# Patient Record
Sex: MALE | Race: ASIAN | NOT HISPANIC OR LATINO | URBAN - METROPOLITAN AREA
[De-identification: names, ages, dates, MRNs, and addresses within clinical notes are randomized per-mention and may not be internally consistent; named-entity substitution may affect disease eponyms.]

---

## 2020-10-09 ENCOUNTER — INPATIENT (INPATIENT)
Facility: HOSPITAL | Age: 55
LOS: 10 days | Discharge: HOME HEALTH | End: 2020-10-20
Attending: INTERNAL MEDICINE | Admitting: INTERNAL MEDICINE
Payer: COMMERCIAL

## 2020-10-09 VITALS
OXYGEN SATURATION: 100 % | WEIGHT: 100.09 LBS | RESPIRATION RATE: 22 BRPM | HEART RATE: 115 BPM | SYSTOLIC BLOOD PRESSURE: 128 MMHG | DIASTOLIC BLOOD PRESSURE: 91 MMHG | TEMPERATURE: 98 F | HEIGHT: 64 IN

## 2020-10-09 LAB
ALBUMIN SERPL ELPH-MCNC: 2.7 G/DL — LOW (ref 3.3–5)
ALP SERPL-CCNC: 127 U/L — HIGH (ref 40–120)
ALT FLD-CCNC: 17 U/L — SIGNIFICANT CHANGE UP (ref 12–78)
ANION GAP SERPL CALC-SCNC: 18 MMOL/L — HIGH (ref 5–17)
APPEARANCE UR: CLEAR — SIGNIFICANT CHANGE UP
APTT BLD: 34.4 SEC — SIGNIFICANT CHANGE UP (ref 27.5–35.5)
AST SERPL-CCNC: 10 U/L — LOW (ref 15–37)
BACTERIA # UR AUTO: ABNORMAL
BASE EXCESS BLDA CALC-SCNC: -10.3 MMOL/L — LOW (ref -2–2)
BASOPHILS # BLD AUTO: 0 K/UL — SIGNIFICANT CHANGE UP (ref 0–0.2)
BASOPHILS NFR BLD AUTO: 0 % — SIGNIFICANT CHANGE UP (ref 0–2)
BILIRUB SERPL-MCNC: 0.5 MG/DL — SIGNIFICANT CHANGE UP (ref 0.2–1.2)
BILIRUB UR-MCNC: NEGATIVE — SIGNIFICANT CHANGE UP
BLOOD GAS COMMENTS: SIGNIFICANT CHANGE UP
BLOOD GAS COMMENTS: SIGNIFICANT CHANGE UP
BLOOD GAS SOURCE: SIGNIFICANT CHANGE UP
BUN SERPL-MCNC: 20 MG/DL — SIGNIFICANT CHANGE UP (ref 7–23)
CALCIUM SERPL-MCNC: 8.7 MG/DL — SIGNIFICANT CHANGE UP (ref 8.5–10.1)
CHLORIDE SERPL-SCNC: 95 MMOL/L — LOW (ref 96–108)
CO2 SERPL-SCNC: 15 MMOL/L — LOW (ref 22–31)
COLOR SPEC: YELLOW — SIGNIFICANT CHANGE UP
CREAT SERPL-MCNC: 1 MG/DL — SIGNIFICANT CHANGE UP (ref 0.5–1.3)
D DIMER BLD IA.RAPID-MCNC: 613 NG/ML DDU — HIGH
DIFF PNL FLD: ABNORMAL
EOSINOPHIL # BLD AUTO: 0 K/UL — SIGNIFICANT CHANGE UP (ref 0–0.5)
EOSINOPHIL NFR BLD AUTO: 0 % — SIGNIFICANT CHANGE UP (ref 0–6)
EPI CELLS # UR: SIGNIFICANT CHANGE UP
FLU A RESULT: SIGNIFICANT CHANGE UP
FLU A RESULT: SIGNIFICANT CHANGE UP
FLUAV AG NPH QL: SIGNIFICANT CHANGE UP
FLUBV AG NPH QL: SIGNIFICANT CHANGE UP
GLUCOSE BLDC GLUCOMTR-MCNC: 253 MG/DL — HIGH (ref 70–99)
GLUCOSE BLDC GLUCOMTR-MCNC: 382 MG/DL — HIGH (ref 70–99)
GLUCOSE SERPL-MCNC: 378 MG/DL — HIGH (ref 70–99)
GLUCOSE UR QL: 1000 MG/DL
HCO3 BLDA-SCNC: 13 MMOL/L — LOW (ref 21–29)
HCT VFR BLD CALC: 37.1 % — LOW (ref 39–50)
HGB BLD-MCNC: 12.7 G/DL — LOW (ref 13–17)
HOROWITZ INDEX BLDA+IHG-RTO: 21 — SIGNIFICANT CHANGE UP
INR BLD: 1.07 RATIO — SIGNIFICANT CHANGE UP (ref 0.88–1.16)
KETONES UR-MCNC: ABNORMAL
LACTATE SERPL-SCNC: 2.1 MMOL/L — HIGH (ref 0.7–2)
LEUKOCYTE ESTERASE UR-ACNC: ABNORMAL
LYMPHOCYTES # BLD AUTO: 0.52 K/UL — LOW (ref 1–3.3)
LYMPHOCYTES # BLD AUTO: 2 % — LOW (ref 13–44)
MAGNESIUM SERPL-MCNC: 2.4 MG/DL — SIGNIFICANT CHANGE UP (ref 1.6–2.6)
MANUAL SMEAR VERIFICATION: SIGNIFICANT CHANGE UP
MCHC RBC-ENTMCNC: 29 PG — SIGNIFICANT CHANGE UP (ref 27–34)
MCHC RBC-ENTMCNC: 34.2 GM/DL — SIGNIFICANT CHANGE UP (ref 32–36)
MCV RBC AUTO: 84.7 FL — SIGNIFICANT CHANGE UP (ref 80–100)
MONOCYTES # BLD AUTO: 1.04 K/UL — HIGH (ref 0–0.9)
MONOCYTES NFR BLD AUTO: 4 % — SIGNIFICANT CHANGE UP (ref 2–14)
NEUTROPHILS # BLD AUTO: 24.54 K/UL — HIGH (ref 1.8–7.4)
NEUTROPHILS NFR BLD AUTO: 88 % — HIGH (ref 43–77)
NEUTS BAND # BLD: 6 % — SIGNIFICANT CHANGE UP (ref 0–8)
NITRITE UR-MCNC: NEGATIVE — SIGNIFICANT CHANGE UP
NRBC # BLD: 0 /100 — SIGNIFICANT CHANGE UP (ref 0–0)
NRBC # BLD: SIGNIFICANT CHANGE UP /100 WBCS (ref 0–0)
PCO2 BLDA: 22 MMHG — LOW (ref 32–46)
PH BLD: 7.39 — SIGNIFICANT CHANGE UP (ref 7.35–7.45)
PH UR: 5 — SIGNIFICANT CHANGE UP (ref 5–8)
PLAT MORPH BLD: NORMAL — SIGNIFICANT CHANGE UP
PLATELET # BLD AUTO: 408 K/UL — HIGH (ref 150–400)
PO2 BLDA: 94 MMHG — SIGNIFICANT CHANGE UP (ref 74–108)
POTASSIUM SERPL-MCNC: 3.2 MMOL/L — LOW (ref 3.5–5.3)
POTASSIUM SERPL-SCNC: 3.2 MMOL/L — LOW (ref 3.5–5.3)
PROT SERPL-MCNC: 8.2 GM/DL — SIGNIFICANT CHANGE UP (ref 6–8.3)
PROT UR-MCNC: 30 MG/DL
PROTHROM AB SERPL-ACNC: 12.4 SEC — SIGNIFICANT CHANGE UP (ref 10.6–13.6)
RBC # BLD: 4.38 M/UL — SIGNIFICANT CHANGE UP (ref 4.2–5.8)
RBC # FLD: 12.5 % — SIGNIFICANT CHANGE UP (ref 10.3–14.5)
RBC BLD AUTO: NORMAL — SIGNIFICANT CHANGE UP
RBC CASTS # UR COMP ASSIST: ABNORMAL /HPF (ref 0–4)
RSV RESULT: SIGNIFICANT CHANGE UP
RSV RNA RESP QL NAA+PROBE: SIGNIFICANT CHANGE UP
SAO2 % BLDA: 97 % — HIGH (ref 92–96)
SARS-COV-2 RNA SPEC QL NAA+PROBE: SIGNIFICANT CHANGE UP
SODIUM SERPL-SCNC: 128 MMOL/L — LOW (ref 135–145)
SP GR SPEC: 1.01 — SIGNIFICANT CHANGE UP (ref 1.01–1.02)
TROPONIN I SERPL-MCNC: <.015 NG/ML — SIGNIFICANT CHANGE UP (ref 0.01–0.04)
UROBILINOGEN FLD QL: NEGATIVE MG/DL — SIGNIFICANT CHANGE UP
WBC # BLD: 26.11 K/UL — HIGH (ref 3.8–10.5)
WBC # FLD AUTO: 26.11 K/UL — HIGH (ref 3.8–10.5)
WBC UR QL: ABNORMAL

## 2020-10-09 PROCEDURE — 93010 ELECTROCARDIOGRAM REPORT: CPT

## 2020-10-09 PROCEDURE — 99285 EMERGENCY DEPT VISIT HI MDM: CPT

## 2020-10-09 RX ORDER — CEFTRIAXONE 500 MG/1
1000 INJECTION, POWDER, FOR SOLUTION INTRAMUSCULAR; INTRAVENOUS ONCE
Refills: 0 | Status: COMPLETED | OUTPATIENT
Start: 2020-10-09 | End: 2020-10-09

## 2020-10-09 RX ORDER — VANCOMYCIN HCL 1 G
1000 VIAL (EA) INTRAVENOUS ONCE
Refills: 0 | Status: COMPLETED | OUTPATIENT
Start: 2020-10-09 | End: 2020-10-09

## 2020-10-09 RX ORDER — SODIUM CHLORIDE 9 MG/ML
2000 INJECTION INTRAMUSCULAR; INTRAVENOUS; SUBCUTANEOUS ONCE
Refills: 0 | Status: COMPLETED | OUTPATIENT
Start: 2020-10-09 | End: 2020-10-09

## 2020-10-09 RX ORDER — SODIUM CHLORIDE 9 MG/ML
1000 INJECTION, SOLUTION INTRAVENOUS ONCE
Refills: 0 | Status: COMPLETED | OUTPATIENT
Start: 2020-10-09 | End: 2020-10-09

## 2020-10-09 RX ORDER — ACETAMINOPHEN 500 MG
975 TABLET ORAL ONCE
Refills: 0 | Status: COMPLETED | OUTPATIENT
Start: 2020-10-09 | End: 2020-10-09

## 2020-10-09 RX ORDER — INSULIN HUMAN 100 [IU]/ML
10 INJECTION, SOLUTION SUBCUTANEOUS ONCE
Refills: 0 | Status: COMPLETED | OUTPATIENT
Start: 2020-10-09 | End: 2020-10-09

## 2020-10-09 RX ADMIN — CEFTRIAXONE 100 MILLIGRAM(S): 500 INJECTION, POWDER, FOR SOLUTION INTRAMUSCULAR; INTRAVENOUS at 21:28

## 2020-10-09 RX ADMIN — SODIUM CHLORIDE 1000 MILLILITER(S): 9 INJECTION, SOLUTION INTRAVENOUS at 22:17

## 2020-10-09 RX ADMIN — SODIUM CHLORIDE 1000 MILLILITER(S): 9 INJECTION INTRAMUSCULAR; INTRAVENOUS; SUBCUTANEOUS at 21:26

## 2020-10-09 RX ADMIN — Medication 975 MILLIGRAM(S): at 21:28

## 2020-10-09 RX ADMIN — Medication 250 MILLIGRAM(S): at 22:17

## 2020-10-09 NOTE — ED PROVIDER NOTE - OBJECTIVE STATEMENT
Pertinent PMH/PSH/FHx/SHx and Review of Systems contained within:     56 y/o M with pmhx DM had been on glimepiride and metformin up until x2 weeks ago when insurance ran out. Pt now presents with generalized weakness and b/l lower extremity swelling. Pt denies any CP, SOB, cough, abdominal pain, N/V. His main issue is weakness. Pt found to be febrile in ER. He denies sick contacts or travel. Pt had ultrasound of left lower leg at [facility] x2 days ago which was reportedly negative. Patient denies EtOH/tobacco/illicit substance use. Pertinent PMH/PSH/FHx/SHx and Review of Systems contained within:     54 y/o M with pmhx DM had been on glimepiride and metformin up until x2 weeks ago when insurance ran out. Pt now presents with generalized weakness and b/l lower extremity swelling. Pt denies any CP, SOB, cough, abdominal pain, N/V. His main issue is weakness. Pt found to be febrile in ER. He denies sick contacts or travel. Pt had ultrasound of left lower leg at [facility] x2 days ago which was reportedly negative but does not have report. Patient denies EtOH/tobacco/illicit substance use.

## 2020-10-09 NOTE — ED PROVIDER NOTE - CLINICAL SUMMARY MEDICAL DECISION MAKING FREE TEXT BOX
Patient presents with fever, noncompliance with DM medications.  Sepsis alert was called in ER.  Labs, cultures, imaging ordered and reviewed.  IVF and antibiotics were initiated.  Based on studies, source seems to be UTI.  CXR clear, no obvious pneumonia, covid and flu swabs sent.  Abdomen nontender, no concern for meningismus.  Labs do not reflect severe DKA at this time.  Patient is to be admitted to the hospital and the case was discussed with the admitting physician.  Any changes in plan, additional imaging/labs, and further work up will be at the discretion of the admitting physician. Patient presents with fever, noncompliance with DM medications.  Sepsis alert was called in ER.  Labs, cultures, imaging ordered and reviewed.  IVF and antibiotics were initiated.  Based on studies, source seems to be UTI.  CXR clear, no obvious pneumonia, covid and flu swabs sent.  Abdomen nontender, no concern for meningismus.  Labs do not reflect severe DKA at this time.  Patient is to be admitted to the hospital and the case was discussed with the admitting physician.  Any changes in plan, additional imaging/labs, and further work up will be at the discretion of the admitting physician. Per discussion with admitting physician, all necessary consults for admitted patients to be obtained by morning team unless patient requires emergent intervention or medical advice by specialist overnight.

## 2020-10-09 NOTE — ED PROVIDER NOTE - PHYSICAL EXAMINATION
Gen: Alert, NAD, ill appearing  Head: NC, AT, EOMI, normal lids/conjunctiva  ENT: normal hearing, patent oropharynx without erythema/exudate, uvula midline  Neck: +supple, no tenderness/meningismus/JVD, +Trachea midline  Pulm: Bilateral BS, normal resp effort, no wheeze/stridor/retractions  CV: tachycardia, no M/R/G, +dist pulses  Abd: soft, NT/ND, Negative [place] signs, +BS, no palpable masses  Mskel: no edema/erythema/cyanosis  Skin: no rash, warm/dry  Neuro: AAOx3, no apparent sensory/motor deficits, coordination intact Gen: Alert, NAD, ill appearing, K[place]maul breathing  Head: NC, AT, EOMI, normal lids/conjunctiva  ENT: normal hearing, patent oropharynx without erythema/exudate, uvula midline  Neck: +supple, no tenderness/meningismus/JVD, +Trachea midline  Pulm: Bilateral BS, normal resp effort, no wheeze/stridor/retractions  CV: tachycardia, no M/R/G, +dist pulses  Abd: soft, NT/ND, Negative [place] signs, +BS, no palpable masses  Mskel: no significant lateralized edema/erythema/cyanosis  Skin: no rash, warm/dry  Neuro: AAOx3, no apparent sensory/motor deficits, coordination intact

## 2020-10-09 NOTE — ED PROVIDER NOTE - CARE PLAN
Principal Discharge DX:	Sepsis  Secondary Diagnosis:	UTI (urinary tract infection)  Secondary Diagnosis:	Hyperglycemia

## 2020-10-09 NOTE — ED PROVIDER NOTE - NS ED ROS FT
+ weakness, No fever/chills, No photophobia/eye pain/changes in vision, No ear pain/sore throat/dysphagia, No chest pain/palpitations, no SOB/cough/wheeze/stridor, No abdominal pain, No N/V/D, no dysuria/frequency/discharge, + b/l le swelling, No neck/back pain, no rash, no changes in neurological status/function.

## 2020-10-09 NOTE — ED ADULT NURSE NOTE - OBJECTIVE STATEMENT
present to ED with c/o worsening generalized weakness over the past few days, states he was seen at  2 days ago for c/o b/l leg cramps and swelling, had ultrasound which was negative for DVT, went back to  today for weakness but was referred to ED due to elevated blood glucose, Pt states he hasn't taken his Metformin and Glimepiride for 2 weeks due to lack of insurance coverage. Upon arrival Pt awake, alert, oriented X 3, has severe generalized weakness other denies c/o chest pain, abdominal pain, N/V/D, fever, chills, however detected to be febrile with chills in ED

## 2020-10-09 NOTE — ED ADULT TRIAGE NOTE - CHIEF COMPLAINT QUOTE
Pt sent in by PMD for BLE edema, left worse than right and hyperglycemia ([id number]) with tachycardia, rule out DKA

## 2020-10-10 DIAGNOSIS — N30.00 ACUTE CYSTITIS WITHOUT HEMATURIA: ICD-10-CM

## 2020-10-10 DIAGNOSIS — E78.2 MIXED HYPERLIPIDEMIA: ICD-10-CM

## 2020-10-10 DIAGNOSIS — R60.0 LOCALIZED EDEMA: ICD-10-CM

## 2020-10-10 DIAGNOSIS — Z29.9 ENCOUNTER FOR PROPHYLACTIC MEASURES, UNSPECIFIED: ICD-10-CM

## 2020-10-10 DIAGNOSIS — E11.65 TYPE 2 DIABETES MELLITUS WITH HYPERGLYCEMIA: ICD-10-CM

## 2020-10-10 DIAGNOSIS — R73.9 HYPERGLYCEMIA, UNSPECIFIED: ICD-10-CM

## 2020-10-10 LAB
ALBUMIN SERPL ELPH-MCNC: 2 G/DL — LOW (ref 3.3–5)
ALP SERPL-CCNC: 90 U/L — SIGNIFICANT CHANGE UP (ref 40–120)
ALT FLD-CCNC: 13 U/L — SIGNIFICANT CHANGE UP (ref 12–78)
ANION GAP SERPL CALC-SCNC: 14 MMOL/L — SIGNIFICANT CHANGE UP (ref 5–17)
AST SERPL-CCNC: 11 U/L — LOW (ref 15–37)
BILIRUB SERPL-MCNC: 0.5 MG/DL — SIGNIFICANT CHANGE UP (ref 0.2–1.2)
BUN SERPL-MCNC: 16 MG/DL — SIGNIFICANT CHANGE UP (ref 7–23)
CALCIUM SERPL-MCNC: 7.7 MG/DL — LOW (ref 8.5–10.1)
CHLORIDE SERPL-SCNC: 102 MMOL/L — SIGNIFICANT CHANGE UP (ref 96–108)
CK MB BLD-MCNC: 2.8 % — SIGNIFICANT CHANGE UP (ref 0–3.5)
CK MB CFR SERPL CALC: 1.6 NG/ML — SIGNIFICANT CHANGE UP (ref 0.5–3.6)
CK SERPL-CCNC: 57 U/L — SIGNIFICANT CHANGE UP (ref 26–308)
CO2 SERPL-SCNC: 15 MMOL/L — LOW (ref 22–31)
CREAT SERPL-MCNC: 0.65 MG/DL — SIGNIFICANT CHANGE UP (ref 0.5–1.3)
CRP SERPL-MCNC: 31.66 MG/DL — HIGH (ref 0–0.4)
FERRITIN SERPL-MCNC: 1441 NG/ML — HIGH (ref 30–400)
GGT SERPL-CCNC: 12 U/L — SIGNIFICANT CHANGE UP (ref 9–50)
GLUCOSE BLDC GLUCOMTR-MCNC: 250 MG/DL — HIGH (ref 70–99)
GLUCOSE BLDC GLUCOMTR-MCNC: 272 MG/DL — HIGH (ref 70–99)
GLUCOSE BLDC GLUCOMTR-MCNC: 293 MG/DL — HIGH (ref 70–99)
GLUCOSE BLDC GLUCOMTR-MCNC: 293 MG/DL — HIGH (ref 70–99)
GLUCOSE BLDC GLUCOMTR-MCNC: 367 MG/DL — HIGH (ref 70–99)
GLUCOSE SERPL-MCNC: 328 MG/DL — HIGH (ref 70–99)
GRAM STN FLD: SIGNIFICANT CHANGE UP
GRAM STN FLD: SIGNIFICANT CHANGE UP
HCT VFR BLD CALC: 32.1 % — LOW (ref 39–50)
HCT VFR BLD CALC: 32.6 % — LOW (ref 39–50)
HGB BLD-MCNC: 11.3 G/DL — LOW (ref 13–17)
HGB BLD-MCNC: 11.3 G/DL — LOW (ref 13–17)
LACTATE SERPL-SCNC: 1.4 MMOL/L — SIGNIFICANT CHANGE UP (ref 0.7–2)
MAGNESIUM SERPL-MCNC: 2.2 MG/DL — SIGNIFICANT CHANGE UP (ref 1.6–2.6)
MCHC RBC-ENTMCNC: 28.8 PG — SIGNIFICANT CHANGE UP (ref 27–34)
MCHC RBC-ENTMCNC: 29 PG — SIGNIFICANT CHANGE UP (ref 27–34)
MCHC RBC-ENTMCNC: 34.7 GM/DL — SIGNIFICANT CHANGE UP (ref 32–36)
MCHC RBC-ENTMCNC: 35.2 GM/DL — SIGNIFICANT CHANGE UP (ref 32–36)
MCV RBC AUTO: 81.9 FL — SIGNIFICANT CHANGE UP (ref 80–100)
MCV RBC AUTO: 83.8 FL — SIGNIFICANT CHANGE UP (ref 80–100)
METHOD TYPE: SIGNIFICANT CHANGE UP
MSSA DNA SPEC QL NAA+PROBE: SIGNIFICANT CHANGE UP
NRBC # BLD: 0 /100 WBCS — SIGNIFICANT CHANGE UP (ref 0–0)
NRBC # BLD: 0 /100 WBCS — SIGNIFICANT CHANGE UP (ref 0–0)
PLATELET # BLD AUTO: 324 K/UL — SIGNIFICANT CHANGE UP (ref 150–400)
PLATELET # BLD AUTO: 354 K/UL — SIGNIFICANT CHANGE UP (ref 150–400)
POTASSIUM SERPL-MCNC: 3.1 MMOL/L — LOW (ref 3.5–5.3)
POTASSIUM SERPL-SCNC: 3.1 MMOL/L — LOW (ref 3.5–5.3)
PROCALCITONIN SERPL-MCNC: 0.56 NG/ML — HIGH (ref 0.02–0.1)
PROT SERPL-MCNC: 6.2 GM/DL — SIGNIFICANT CHANGE UP (ref 6–8.3)
RBC # BLD: 3.89 M/UL — LOW (ref 4.2–5.8)
RBC # BLD: 3.92 M/UL — LOW (ref 4.2–5.8)
RBC # FLD: 12.5 % — SIGNIFICANT CHANGE UP (ref 10.3–14.5)
RBC # FLD: 12.5 % — SIGNIFICANT CHANGE UP (ref 10.3–14.5)
SODIUM SERPL-SCNC: 131 MMOL/L — LOW (ref 135–145)
SPECIMEN SOURCE: SIGNIFICANT CHANGE UP
SPECIMEN SOURCE: SIGNIFICANT CHANGE UP
TROPONIN I SERPL-MCNC: 0.12 NG/ML — HIGH (ref 0.01–0.04)
TROPONIN I SERPL-MCNC: 0.27 NG/ML — HIGH (ref 0.01–0.04)
WBC # BLD: 22.91 K/UL — HIGH (ref 3.8–10.5)
WBC # BLD: 22.97 K/UL — HIGH (ref 3.8–10.5)
WBC # FLD AUTO: 22.91 K/UL — HIGH (ref 3.8–10.5)
WBC # FLD AUTO: 22.97 K/UL — HIGH (ref 3.8–10.5)

## 2020-10-10 PROCEDURE — 99223 1ST HOSP IP/OBS HIGH 75: CPT | Mod: AI

## 2020-10-10 PROCEDURE — 93970 EXTREMITY STUDY: CPT | Mod: 26

## 2020-10-10 PROCEDURE — 12345: CPT | Mod: NC

## 2020-10-10 PROCEDURE — 93306 TTE W/DOPPLER COMPLETE: CPT | Mod: 26

## 2020-10-10 PROCEDURE — 71045 X-RAY EXAM CHEST 1 VIEW: CPT | Mod: 26

## 2020-10-10 PROCEDURE — 93010 ELECTROCARDIOGRAM REPORT: CPT

## 2020-10-10 RX ORDER — CEFTRIAXONE 500 MG/1
1000 INJECTION, POWDER, FOR SOLUTION INTRAMUSCULAR; INTRAVENOUS EVERY 24 HOURS
Refills: 0 | Status: DISCONTINUED | OUTPATIENT
Start: 2020-10-10 | End: 2020-10-11

## 2020-10-10 RX ORDER — SIMVASTATIN 20 MG/1
20 TABLET, FILM COATED ORAL AT BEDTIME
Refills: 0 | Status: DISCONTINUED | OUTPATIENT
Start: 2020-10-10 | End: 2020-10-16

## 2020-10-10 RX ORDER — DEXTROSE 50 % IN WATER 50 %
25 SYRINGE (ML) INTRAVENOUS ONCE
Refills: 0 | Status: DISCONTINUED | OUTPATIENT
Start: 2020-10-10 | End: 2020-10-16

## 2020-10-10 RX ORDER — INSULIN GLARGINE 100 [IU]/ML
18 INJECTION, SOLUTION SUBCUTANEOUS EVERY MORNING
Refills: 0 | Status: DISCONTINUED | OUTPATIENT
Start: 2020-10-10 | End: 2020-10-13

## 2020-10-10 RX ORDER — INSULIN GLARGINE 100 [IU]/ML
8 INJECTION, SOLUTION SUBCUTANEOUS ONCE
Refills: 0 | Status: COMPLETED | OUTPATIENT
Start: 2020-10-10 | End: 2020-10-10

## 2020-10-10 RX ORDER — ACETAMINOPHEN 500 MG
650 TABLET ORAL EVERY 6 HOURS
Refills: 0 | Status: DISCONTINUED | OUTPATIENT
Start: 2020-10-10 | End: 2020-10-16

## 2020-10-10 RX ORDER — POTASSIUM CHLORIDE 20 MEQ
40 PACKET (EA) ORAL EVERY 4 HOURS
Refills: 0 | Status: COMPLETED | OUTPATIENT
Start: 2020-10-10 | End: 2020-10-10

## 2020-10-10 RX ORDER — INSULIN GLARGINE 100 [IU]/ML
10 INJECTION, SOLUTION SUBCUTANEOUS EVERY MORNING
Refills: 0 | Status: DISCONTINUED | OUTPATIENT
Start: 2020-10-10 | End: 2020-10-10

## 2020-10-10 RX ORDER — SODIUM CHLORIDE 9 MG/ML
1000 INJECTION, SOLUTION INTRAVENOUS
Refills: 0 | Status: DISCONTINUED | OUTPATIENT
Start: 2020-10-10 | End: 2020-10-11

## 2020-10-10 RX ORDER — ENOXAPARIN SODIUM 100 MG/ML
40 INJECTION SUBCUTANEOUS DAILY
Refills: 0 | Status: DISCONTINUED | OUTPATIENT
Start: 2020-10-10 | End: 2020-10-16

## 2020-10-10 RX ORDER — INSULIN LISPRO 100/ML
6 VIAL (ML) SUBCUTANEOUS
Refills: 0 | Status: DISCONTINUED | OUTPATIENT
Start: 2020-10-10 | End: 2020-10-13

## 2020-10-10 RX ORDER — INSULIN LISPRO 100/ML
VIAL (ML) SUBCUTANEOUS AT BEDTIME
Refills: 0 | Status: DISCONTINUED | OUTPATIENT
Start: 2020-10-10 | End: 2020-10-16

## 2020-10-10 RX ORDER — INFLUENZA VIRUS VACCINE 15; 15; 15; 15 UG/.5ML; UG/.5ML; UG/.5ML; UG/.5ML
0.5 SUSPENSION INTRAMUSCULAR ONCE
Refills: 0 | Status: COMPLETED | OUTPATIENT
Start: 2020-10-10 | End: 2020-10-20

## 2020-10-10 RX ORDER — VANCOMYCIN HCL 1 G
1000 VIAL (EA) INTRAVENOUS ONCE
Refills: 0 | Status: COMPLETED | OUTPATIENT
Start: 2020-10-10 | End: 2020-10-10

## 2020-10-10 RX ORDER — GLUCAGON INJECTION, SOLUTION 0.5 MG/.1ML
1 INJECTION, SOLUTION SUBCUTANEOUS ONCE
Refills: 0 | Status: DISCONTINUED | OUTPATIENT
Start: 2020-10-10 | End: 2020-10-16

## 2020-10-10 RX ORDER — DEXTROSE 50 % IN WATER 50 %
12.5 SYRINGE (ML) INTRAVENOUS ONCE
Refills: 0 | Status: DISCONTINUED | OUTPATIENT
Start: 2020-10-10 | End: 2020-10-16

## 2020-10-10 RX ORDER — DEXTROSE 50 % IN WATER 50 %
15 SYRINGE (ML) INTRAVENOUS ONCE
Refills: 0 | Status: DISCONTINUED | OUTPATIENT
Start: 2020-10-10 | End: 2020-10-16

## 2020-10-10 RX ORDER — INSULIN LISPRO 100/ML
VIAL (ML) SUBCUTANEOUS
Refills: 0 | Status: DISCONTINUED | OUTPATIENT
Start: 2020-10-10 | End: 2020-10-16

## 2020-10-10 RX ORDER — SODIUM CHLORIDE 9 MG/ML
1000 INJECTION, SOLUTION INTRAVENOUS
Refills: 0 | Status: DISCONTINUED | OUTPATIENT
Start: 2020-10-10 | End: 2020-10-16

## 2020-10-10 RX ADMIN — SODIUM CHLORIDE 200 MILLILITER(S): 9 INJECTION, SOLUTION INTRAVENOUS at 07:50

## 2020-10-10 RX ADMIN — Medication 40 MILLIEQUIVALENT(S): at 14:51

## 2020-10-10 RX ADMIN — Medication 650 MILLIGRAM(S): at 19:56

## 2020-10-10 RX ADMIN — ENOXAPARIN SODIUM 40 MILLIGRAM(S): 100 INJECTION SUBCUTANEOUS at 12:41

## 2020-10-10 RX ADMIN — INSULIN GLARGINE 18 UNIT(S): 100 INJECTION, SOLUTION SUBCUTANEOUS at 21:31

## 2020-10-10 RX ADMIN — Medication 6: at 11:42

## 2020-10-10 RX ADMIN — Medication 650 MILLIGRAM(S): at 18:59

## 2020-10-10 RX ADMIN — SODIUM CHLORIDE 200 MILLILITER(S): 9 INJECTION, SOLUTION INTRAVENOUS at 14:51

## 2020-10-10 RX ADMIN — Medication 6 UNIT(S): at 16:14

## 2020-10-10 RX ADMIN — SIMVASTATIN 20 MILLIGRAM(S): 20 TABLET, FILM COATED ORAL at 21:33

## 2020-10-10 RX ADMIN — Medication 6: at 16:14

## 2020-10-10 RX ADMIN — INSULIN GLARGINE 8 UNIT(S): 100 INJECTION, SOLUTION SUBCUTANEOUS at 12:36

## 2020-10-10 RX ADMIN — Medication 40 MILLIEQUIVALENT(S): at 18:21

## 2020-10-10 RX ADMIN — CEFTRIAXONE 100 MILLIGRAM(S): 500 INJECTION, POWDER, FOR SOLUTION INTRAMUSCULAR; INTRAVENOUS at 20:52

## 2020-10-10 RX ADMIN — INSULIN GLARGINE 10 UNIT(S): 100 INJECTION, SOLUTION SUBCUTANEOUS at 08:23

## 2020-10-10 RX ADMIN — Medication 10: at 08:23

## 2020-10-10 RX ADMIN — Medication 6 UNIT(S): at 11:42

## 2020-10-10 RX ADMIN — INSULIN HUMAN 10 UNIT(S): 100 INJECTION, SOLUTION SUBCUTANEOUS at 00:04

## 2020-10-10 RX ADMIN — Medication 40 MILLIEQUIVALENT(S): at 11:42

## 2020-10-10 RX ADMIN — Medication 250 MILLIGRAM(S): at 16:55

## 2020-10-10 NOTE — CHART NOTE - NSCHARTNOTEFT_GEN_A_CORE
still hyperglycemia. adjusted insulin. follow finger sticks  follow final urine culture.   replete Hypokalemia. Replete and recheck level.  follow up repeat lactic acid to evaluate for resolution of lactic acidosis.

## 2020-10-10 NOTE — H&P ADULT - HISTORY OF PRESENT ILLNESS
Pt is a 56 y/o male w/DM2 was on sulfonylurea and metform till 2 weeks ago when meds ran out and lack of insurance was on insulin in past.  comes in w/complain of generalized malaise, fatigue and le edema, had us of le to eval for dvt at [facility]  few days ago that was neg.  pt has polydipsia and polyuria. No reported temp at home but in ed was 100.6, no sob, cp, palpitations, n/v/d/c no travels or sick contacts.

## 2020-10-10 NOTE — H&P ADULT - NSHPPHYSICALEXAM_GEN_ALL_CORE
Vital Signs Last 24 Hrs  T(C): 36.8 (10 Oct 2020 05:15), Max: 38.1 (09 Oct 2020 21:16)  T(F): 98.3 (10 Oct 2020 05:15), Max: 100.6 (09 Oct 2020 21:16)  HR: 95 (10 Oct 2020 05:15) (95 - 115)  BP: 139/76 (10 Oct 2020 05:15) (120/86 - 140/70)  BP(mean): --  RR: 22 (10 Oct 2020 05:15) (20 - 24)  SpO2: 97% (10 Oct 2020 05:15) (96% - 100%)    PHYSICAL EXAM:    GENERAL: NAD, well-groomed, well-developed  HEAD:  Atraumatic, Normocephalic  EYES: EOMI, PERRLA, conjunctiva and sclera clear  ENMT: No tonsillar erythema, exudates, or enlargement; Moist mucous membranes, No lesions  NECK: Supple, No JVD, Normal thyroid  NERVOUS SYSTEM:  Alert & Oriented X3, Good concentration; Motor Strength 5/5 B/L upper and lower extremities; DTRs 2+ intact and symmetric  CHEST/LUNG: Clear to percussion bilaterally; No rales, rhonchi, wheezing, or rubs  HEART: Regular rate and rhythm; No rubs, or gallops, +S1,S2  ABDOMEN: Soft, Nontender, Nondistended; Bowel sounds present  EXTREMITIES:  2+ Peripheral Pulses, No clubbing, cyanosis, +edema  LYMPH: No cervical adenopathy  RECTAL: deferred  BREAST: No palpatble masses, skin no lesions   : deferred  SKIN: No rashes or lesions    IMPROVE VTE Individual Risk Assessment        RISK                                                          Points  [  ] Previous VTE                                                3  [  ] Thrombophilia                                             2  [  ] Lower limb paralysis                                    2        (unable to hold up >15 seconds)    [  ] Current Cancer                                             2         (within 6 months)  [ x ] Immobilization > 24 hrs                              1  [  ] ICU/CCU stay > 24 hours                            1  [  ] Age > 60                                                    1  IMPROVE VTE Score _____1____

## 2020-10-10 NOTE — PROVIDER CONTACT NOTE (CRITICAL VALUE NOTIFICATION) - ACTION/TREATMENT ORDERED:
Vancomycin iv x1 dose was given. No further recommendations made.
No further recommendations made. Will continue to monitor patient.

## 2020-10-10 NOTE — H&P ADULT - PROBLEM SELECTOR PLAN 1
admit to medciine  blood cultures  urine cx  cont ceftriaxone  pt received in ed vanco, no clear source other than urine, procal not very high, will hold off on vanco for now

## 2020-10-10 NOTE — H&P ADULT - NSHPLABSRESULTS_GEN_ALL_CORE
LABS:                        12.7   26.11 )-----------( 408      ( 09 Oct 2020 21:51 )             37.1     10    128<L>  |  95<L>  |  20  ----------------------------<  378<H>  3.2<L>   |  15<L>  |  1.00    Ca    8.7      09 Oct 2020 21:51  Mg     2.4     10    TPro  8.2  /  Alb  2.7<L>  /  TBili  0.5  /  DBili  x   /  AST  10<L>  /  ALT  17  /  AlkPhos  127<H>  10-09    PT/INR - ( 09 Oct 2020 21:51 )   PT: 12.4 sec;   INR: 1.07 ratio         PTT - ( 09 Oct 2020 21:51 )  PTT:34.4 sec  Urinalysis Basic - ( 09 Oct 2020 21:57 )    Color: Yellow / Appearance: Clear / S.015 / pH: x  Gluc: x / Ketone: Large  / Bili: Negative / Urobili: Negative mg/dL   Blood: x / Protein: 30 mg/dL / Nitrite: Negative   Leuk Esterase: Small / RBC: 6-10 /HPF / WBC 26-50   Sq Epi: x / Non Sq Epi: Occasional / Bacteria: Moderate      CAPILLARY BLOOD GLUCOSE      POCT Blood Glucose.: 272 mg/dL (10 Oct 2020 03:29)  POCT Blood Glucose.: 253 mg/dL (09 Oct 2020 23:52)  POCT Blood Glucose.: 382 mg/dL (09 Oct 2020 21:12)        RADIOLOGY & ADDITIONAL TESTS:    Imaging Personally Reviewed:  [ X] YES  [ ] NO

## 2020-10-10 NOTE — PROVIDER CONTACT NOTE (CRITICAL VALUE NOTIFICATION) - TEST AND RESULT REPORTED:
2nd set of blood culture collected on 10/9/20, preliminary shows growth in aerobic and anaroebic bottles gram positive  cocci in clusters

## 2020-10-10 NOTE — PROVIDER CONTACT NOTE (CRITICAL VALUE NOTIFICATION) - TEST AND RESULT REPORTED:
Blood culture collected on 10/9/20-preliminary shows growht in aerobic bottle. Gram positive cocci in clusters.

## 2020-10-11 LAB
-  AMPICILLIN/SULBACTAM: SIGNIFICANT CHANGE UP
-  CEFAZOLIN: SIGNIFICANT CHANGE UP
-  GENTAMICIN: SIGNIFICANT CHANGE UP
-  OXACILLIN: SIGNIFICANT CHANGE UP
-  PENICILLIN: SIGNIFICANT CHANGE UP
-  RIFAMPIN: SIGNIFICANT CHANGE UP
-  TETRACYCLINE: SIGNIFICANT CHANGE UP
-  TRIMETHOPRIM/SULFAMETHOXAZOLE: SIGNIFICANT CHANGE UP
-  VANCOMYCIN: SIGNIFICANT CHANGE UP
A1C WITH ESTIMATED AVERAGE GLUCOSE RESULT: 12.9 % — HIGH (ref 4–5.6)
ALBUMIN SERPL ELPH-MCNC: 1.7 G/DL — LOW (ref 3.3–5)
ALP SERPL-CCNC: 103 U/L — SIGNIFICANT CHANGE UP (ref 40–120)
ALT FLD-CCNC: 13 U/L — SIGNIFICANT CHANGE UP (ref 12–78)
ANION GAP SERPL CALC-SCNC: 11 MMOL/L — SIGNIFICANT CHANGE UP (ref 5–17)
AST SERPL-CCNC: 14 U/L — LOW (ref 15–37)
BILIRUB SERPL-MCNC: 0.4 MG/DL — SIGNIFICANT CHANGE UP (ref 0.2–1.2)
BUN SERPL-MCNC: 10 MG/DL — SIGNIFICANT CHANGE UP (ref 7–23)
CALCIUM SERPL-MCNC: 7.8 MG/DL — LOW (ref 8.5–10.1)
CHLORIDE SERPL-SCNC: 102 MMOL/L — SIGNIFICANT CHANGE UP (ref 96–108)
CO2 SERPL-SCNC: 22 MMOL/L — SIGNIFICANT CHANGE UP (ref 22–31)
CREAT SERPL-MCNC: 0.49 MG/DL — LOW (ref 0.5–1.3)
CULTURE RESULTS: SIGNIFICANT CHANGE UP
ESTIMATED AVERAGE GLUCOSE: 324 MG/DL — HIGH (ref 68–114)
GLUCOSE BLDC GLUCOMTR-MCNC: 172 MG/DL — HIGH (ref 70–99)
GLUCOSE BLDC GLUCOMTR-MCNC: 181 MG/DL — HIGH (ref 70–99)
GLUCOSE BLDC GLUCOMTR-MCNC: 212 MG/DL — HIGH (ref 70–99)
GLUCOSE BLDC GLUCOMTR-MCNC: 235 MG/DL — HIGH (ref 70–99)
GLUCOSE SERPL-MCNC: 214 MG/DL — HIGH (ref 70–99)
HCT VFR BLD CALC: 32.7 % — LOW (ref 39–50)
HCV AB S/CO SERPL IA: 0.12 S/CO — SIGNIFICANT CHANGE UP (ref 0–0.99)
HCV AB SERPL-IMP: SIGNIFICANT CHANGE UP
HGB BLD-MCNC: 11 G/DL — LOW (ref 13–17)
MCHC RBC-ENTMCNC: 28.4 PG — SIGNIFICANT CHANGE UP (ref 27–34)
MCHC RBC-ENTMCNC: 33.6 GM/DL — SIGNIFICANT CHANGE UP (ref 32–36)
MCV RBC AUTO: 84.5 FL — SIGNIFICANT CHANGE UP (ref 80–100)
METHOD TYPE: SIGNIFICANT CHANGE UP
NRBC # BLD: 0 /100 WBCS — SIGNIFICANT CHANGE UP (ref 0–0)
ORGANISM # SPEC MICROSCOPIC CNT: SIGNIFICANT CHANGE UP
ORGANISM # SPEC MICROSCOPIC CNT: SIGNIFICANT CHANGE UP
PLATELET # BLD AUTO: 370 K/UL — SIGNIFICANT CHANGE UP (ref 150–400)
POTASSIUM SERPL-MCNC: 3.5 MMOL/L — SIGNIFICANT CHANGE UP (ref 3.5–5.3)
POTASSIUM SERPL-SCNC: 3.5 MMOL/L — SIGNIFICANT CHANGE UP (ref 3.5–5.3)
PROT SERPL-MCNC: 6 GM/DL — SIGNIFICANT CHANGE UP (ref 6–8.3)
RBC # BLD: 3.87 M/UL — LOW (ref 4.2–5.8)
RBC # FLD: 12.7 % — SIGNIFICANT CHANGE UP (ref 10.3–14.5)
SODIUM SERPL-SCNC: 135 MMOL/L — SIGNIFICANT CHANGE UP (ref 135–145)
SPECIMEN SOURCE: SIGNIFICANT CHANGE UP
WBC # BLD: 21.28 K/UL — HIGH (ref 3.8–10.5)
WBC # FLD AUTO: 21.28 K/UL — HIGH (ref 3.8–10.5)

## 2020-10-11 PROCEDURE — 99233 SBSQ HOSP IP/OBS HIGH 50: CPT

## 2020-10-11 RX ORDER — VANCOMYCIN HCL 1 G
1000 VIAL (EA) INTRAVENOUS EVERY 12 HOURS
Refills: 0 | Status: DISCONTINUED | OUTPATIENT
Start: 2020-10-11 | End: 2020-10-11

## 2020-10-11 RX ORDER — VANCOMYCIN HCL 1 G
1000 VIAL (EA) INTRAVENOUS EVERY 8 HOURS
Refills: 0 | Status: DISCONTINUED | OUTPATIENT
Start: 2020-10-11 | End: 2020-10-12

## 2020-10-11 RX ORDER — SODIUM CHLORIDE 9 MG/ML
1000 INJECTION INTRAMUSCULAR; INTRAVENOUS; SUBCUTANEOUS
Refills: 0 | Status: DISCONTINUED | OUTPATIENT
Start: 2020-10-11 | End: 2020-10-16

## 2020-10-11 RX ORDER — CHLORPROMAZINE HCL 10 MG
25 TABLET ORAL ONCE
Refills: 0 | Status: COMPLETED | OUTPATIENT
Start: 2020-10-11 | End: 2020-10-11

## 2020-10-11 RX ADMIN — Medication 4: at 08:08

## 2020-10-11 RX ADMIN — SODIUM CHLORIDE 100 MILLILITER(S): 9 INJECTION INTRAMUSCULAR; INTRAVENOUS; SUBCUTANEOUS at 22:42

## 2020-10-11 RX ADMIN — INSULIN GLARGINE 18 UNIT(S): 100 INJECTION, SOLUTION SUBCUTANEOUS at 08:08

## 2020-10-11 RX ADMIN — ENOXAPARIN SODIUM 40 MILLIGRAM(S): 100 INJECTION SUBCUTANEOUS at 11:39

## 2020-10-11 RX ADMIN — Medication 6 UNIT(S): at 08:08

## 2020-10-11 RX ADMIN — Medication 250 MILLIGRAM(S): at 17:27

## 2020-10-11 RX ADMIN — Medication 6 UNIT(S): at 16:19

## 2020-10-11 RX ADMIN — Medication 250 MILLIGRAM(S): at 10:13

## 2020-10-11 RX ADMIN — Medication 4: at 11:39

## 2020-10-11 RX ADMIN — Medication 2: at 16:20

## 2020-10-11 RX ADMIN — Medication 25 MILLIGRAM(S): at 21:21

## 2020-10-11 RX ADMIN — SIMVASTATIN 20 MILLIGRAM(S): 20 TABLET, FILM COATED ORAL at 21:29

## 2020-10-11 RX ADMIN — SODIUM CHLORIDE 100 MILLILITER(S): 9 INJECTION INTRAMUSCULAR; INTRAVENOUS; SUBCUTANEOUS at 10:13

## 2020-10-11 RX ADMIN — Medication 6 UNIT(S): at 11:39

## 2020-10-11 NOTE — PROGRESS NOTE ADULT - ASSESSMENT
pt w/dm2 off meds comes w/generalized fatigue w/hyperglycemia, electrolyte abnomality and uti    Problem/Plan - 1:  ·  Problem: Acute staph cystitis without hematuria and associated staph bacteremia. start vancomycin. follow level in am. DC iv ceftriaxone. follow final identification and sensitivity.   Follow labs in am. start NS IVF. ECHO with no infective endocarditis.     Problem/Plan - 2:  ·  Problem: Type 2 diabetes mellitus with hyperglycemia, with long-term current use of insulin. better controlled. cont current management. follow finger sticks.    hiccups. can try thorazine. discussed with nurse.     Problem/Plan - 4:  ·  Problem: Mixed hyperlipidemia.  Plan: cont statin.     Problem/Plan - 5:  ·  Problem: Localized edema.  improved. negative US venous duplex.     Problem/Plan - 6:  Problem: Preventive measure. Plan: sq lovenox

## 2020-10-11 NOTE — PROGRESS NOTE ADULT - SUBJECTIVE AND OBJECTIVE BOX
CHIEF COMPLAINT: Follow up of staph bacteremia. + fever spike overnight and yesterday  no cough  no sob  no sore throat  no dysuria  no diarrhea  no skin rash.   Feeling weak  + hiccups per nursing staff.     PHYSICAL EXAM:    GENERAL: well built, well nourished  CHEST/LUNG: Clear to ausculation bilaterally, no wheezing, no crackles   HEART: Regular rate and rhythm; No murmurs, rubs  ABDOMEN: Soft, Nontender, Nondistended; Bowel sounds present  EXTREMITIES:  Moving all four extremities spontaneously, No clubbing, cyanosis. + trace edema  NERVOUS SYSTEM:  Grossly non focal.  Psychiatry: AAO x 3, mood is appropriate       OBJECTIVE DATA:   Vital Signs Last 24 Hrs  T(C): 37.4 (11 Oct 2020 05:25), Max: 38.3 (10 Oct 2020 18:58)  T(F): 99.3 (11 Oct 2020 05:25), Max: 101 (10 Oct 2020 18:58)  HR: 91 (11 Oct 2020 05:25) (88 - 98)  BP: 139/79 (11 Oct 2020 05:25) (128/74 - 139/79)  BP(mean): --  RR: 19 (11 Oct 2020 05:25) (19 - 20)  SpO2: 94% (11 Oct 2020 05:25) (93% - 97%)           Daily     Daily Weight in k.4 (11 Oct 2020 05:25)  LABS:                        11.0   21.28 )-----------( 370      ( 11 Oct 2020 07:13 )             32.7             10-11    135  |  102  |  10  ----------------------------<  214<H>  3.5   |  22  |  0.49<L>    Ca    7.8<L>      11 Oct 2020 07:13  Mg     2.2     10-10    TPro  6.0  /  Alb  1.7<L>  /  TBili  0.4  /  DBili  x   /  AST  14<L>  /  ALT  13  /  AlkPhos  103  10-11              PT/INR - ( 09 Oct 2020 21:51 )   PT: 12.4 sec;   INR: 1.07 ratio         PTT - ( 09 Oct 2020 21:51 )  PTT:34.4 sec  Urinalysis Basic - ( 09 Oct 2020 21:57 )    Color: Yellow / Appearance: Clear / S.015 / pH: x  Gluc: x / Ketone: Large  / Bili: Negative / Urobili: Negative mg/dL   Blood: x / Protein: 30 mg/dL / Nitrite: Negative   Leuk Esterase: Small / RBC: 6-10 /HPF / WBC 26-50   Sq Epi: x / Non Sq Epi: Occasional / Bacteria: Moderate       ABG - ( 09 Oct 2020 21:42 )  pH, Arterial: x     pH, Blood: 7.39  /  pCO2: 22    /  pO2: 94    / HCO3: 13    / Base Excess: -10.3 /  SaO2: 97               CARDIAC MARKERS ( 10 Oct 2020 14:44 )  .272 ng/mL / x     / x     / x     / x      CARDIAC MARKERS ( 10 Oct 2020 06:53 )  .124 ng/mL / x     / 57 U/L / x     / 1.6 ng/mL  CARDIAC MARKERS ( 09 Oct 2020 21:51 )  <.015 ng/mL / x     / x     / x     / x          CAPILLARY BLOOD GLUCOSE      POCT Blood Glucose.: 212 mg/dL (11 Oct 2020 07:34)      Culture - Blood  Source: .Blood Blood-Peripheral  Gram Stain (prelim) (10-10):    Growth in aerobic and anaerobic bottles: Gram Positive Cocci in Clusters  Preliminary Report (10-11):    Growth in aerobic bottle: Staphylococcus aureus    Growth in anaerobic bottle: Gram Positive Cocci in Clusters    "Due to technical problems, Proteus sp. will Not be reported as part of    the BCID panel until further notice" ***Blood Panel PCR results on this    specimen are available    approximately 3 hours after the Gram stain result.***    Gram stain, PCR, and/or culture results may not always    correspond due to difference in methodologies.    ************************************************************    This PCR assay was performed using [employer].    The following targets are tested for: Enterococcus,    vancomycin resistant enterococci, Listeria monocytogenes,    coagulase negative staphylococci, S. aureus,    methicillin resistant S. aureus, Streptococcus agalactiae    (Group B), S. pneumoniae, S. pyogenes (Group A),    Acinetobacter baumannii, Enterobacter cloacae, E. coli,    Klebsiella oxytoca, K. pneumoniae, Proteus sp.,    Serratia marcescens, Haemophilus influenzae,    Neisseria meningitidis, Pseudomonas aeruginosa, Candida    albicans, C. glabrata, C krusei, C parapsilosis,    C. tropicalis and the KPC resistance gene.  Organism: Blood Culture PCR (10-10)  Organism: Blood Culture PCR (10-10)    Sensitivities:      -  Staphylococcus aureus: Detec Any isolate of Staphylococcus aureus from a blood culture is NOT considered a contaminant.      Method Type: PCR    Culture - Blood  Source: .Blood Blood-Peripheral  Gram Stain (prelim) (10-10):    Growth in aerobic and anaerobic bottles: Gram Positive Cocci in Clusters  Preliminary Report (10-10):    Growth in aerobic and anaerobic bottles: Gram Positive Cocci in Clusters    Culture - Urine  Source: .Urine Clean Catch (Midstream)  Preliminary Report (10-10):    50,000 - 99,000 CFU/mL Staphylococcus aureus         Interval Radiology studies: reviewed by me  < from: TTE Echo Complete w/o Contrast w/ Doppler (10.10.20 @ 10:18) >   1. Leftventricular ejection fraction, by visual estimation, is 55 to 60%.   2. Normal global left ventricular systolic function.   3. Spectral Doppler shows impaired relaxation pattern of left ventricular myocardial filling (Grade I diastolic dysfunction).   4. Moderate pleural effusion in both left and right lateral regions.   5. Mild mitral annular calcification.   6. Mild thickening and calcification of the anterior and posterior mitral valve leaflets.   7. Trace mitral valve regurgitation.   8. Sclerotic aortic valve with normal opening.    < end of copied text >        MEDICATIONS  (STANDING):  dextrose 5%. 1000 milliLiter(s) (50 mL/Hr) IV Continuous <Continuous>  dextrose 50% Injectable 12.5 Gram(s) IV Push once  dextrose 50% Injectable 25 Gram(s) IV Push once  dextrose 50% Injectable 25 Gram(s) IV Push once  enoxaparin Injectable 40 milliGRAM(s) SubCutaneous daily  influenza   Vaccine 0.5 milliLiter(s) IntraMuscular once  insulin glargine Injectable (LANTUS) 18 Unit(s) SubCutaneous every morning  insulin lispro (HumaLOG) corrective regimen sliding scale   SubCutaneous three times a day before meals  insulin lispro (HumaLOG) corrective regimen sliding scale   SubCutaneous at bedtime  insulin lispro Injectable (HumaLOG) 6 Unit(s) SubCutaneous three times a day before meals  simvastatin 20 milliGRAM(s) Oral at bedtime  sodium chloride 0.9%. 1000 milliLiter(s) (100 mL/Hr) IV Continuous <Continuous>  vancomycin  IVPB 1000 milliGRAM(s) IV Intermittent every 8 hours    MEDICATIONS  (PRN):  acetaminophen    Suspension .. 650 milliGRAM(s) Oral every 6 hours PRN Temp greater or equal to 38C (100.4F), Moderate Pain (4 - 6)  dextrose 40% Gel 15 Gram(s) Oral once PRN Blood Glucose LESS THAN 70 milliGRAM(s)/deciliter  glucagon  Injectable 1 milliGRAM(s) IntraMuscular once PRN Glucose LESS THAN 70 milligrams/deciliter

## 2020-10-12 LAB
-  AMPICILLIN/SULBACTAM: SIGNIFICANT CHANGE UP
-  CEFAZOLIN: SIGNIFICANT CHANGE UP
-  CLINDAMYCIN: SIGNIFICANT CHANGE UP
-  ERYTHROMYCIN: SIGNIFICANT CHANGE UP
-  GENTAMICIN: SIGNIFICANT CHANGE UP
-  OXACILLIN: SIGNIFICANT CHANGE UP
-  PENICILLIN: SIGNIFICANT CHANGE UP
-  RIFAMPIN: SIGNIFICANT CHANGE UP
-  TETRACYCLINE: SIGNIFICANT CHANGE UP
-  TRIMETHOPRIM/SULFAMETHOXAZOLE: SIGNIFICANT CHANGE UP
-  VANCOMYCIN: SIGNIFICANT CHANGE UP
ANION GAP SERPL CALC-SCNC: 6 MMOL/L — SIGNIFICANT CHANGE UP (ref 5–17)
BUN SERPL-MCNC: 8 MG/DL — SIGNIFICANT CHANGE UP (ref 7–23)
CALCIUM SERPL-MCNC: 7.5 MG/DL — LOW (ref 8.5–10.1)
CHLORIDE SERPL-SCNC: 104 MMOL/L — SIGNIFICANT CHANGE UP (ref 96–108)
CO2 SERPL-SCNC: 28 MMOL/L — SIGNIFICANT CHANGE UP (ref 22–31)
CREAT SERPL-MCNC: 0.41 MG/DL — LOW (ref 0.5–1.3)
CULTURE RESULTS: SIGNIFICANT CHANGE UP
CULTURE RESULTS: SIGNIFICANT CHANGE UP
GLUCOSE BLDC GLUCOMTR-MCNC: 178 MG/DL — HIGH (ref 70–99)
GLUCOSE BLDC GLUCOMTR-MCNC: 185 MG/DL — HIGH (ref 70–99)
GLUCOSE BLDC GLUCOMTR-MCNC: 219 MG/DL — HIGH (ref 70–99)
GLUCOSE BLDC GLUCOMTR-MCNC: 245 MG/DL — HIGH (ref 70–99)
GLUCOSE SERPL-MCNC: 170 MG/DL — HIGH (ref 70–99)
GRAM STN FLD: SIGNIFICANT CHANGE UP
GRAM STN FLD: SIGNIFICANT CHANGE UP
HCT VFR BLD CALC: 30.2 % — LOW (ref 39–50)
HGB BLD-MCNC: 10.7 G/DL — LOW (ref 13–17)
MCHC RBC-ENTMCNC: 28.8 PG — SIGNIFICANT CHANGE UP (ref 27–34)
MCHC RBC-ENTMCNC: 35.4 GM/DL — SIGNIFICANT CHANGE UP (ref 32–36)
MCV RBC AUTO: 81.2 FL — SIGNIFICANT CHANGE UP (ref 80–100)
METHOD TYPE: SIGNIFICANT CHANGE UP
NRBC # BLD: 0 /100 WBCS — SIGNIFICANT CHANGE UP (ref 0–0)
ORGANISM # SPEC MICROSCOPIC CNT: SIGNIFICANT CHANGE UP
PLATELET # BLD AUTO: 292 K/UL — SIGNIFICANT CHANGE UP (ref 150–400)
POTASSIUM SERPL-MCNC: 2.5 MMOL/L — CRITICAL LOW (ref 3.5–5.3)
POTASSIUM SERPL-MCNC: 3.2 MMOL/L — LOW (ref 3.5–5.3)
POTASSIUM SERPL-SCNC: 2.5 MMOL/L — CRITICAL LOW (ref 3.5–5.3)
POTASSIUM SERPL-SCNC: 3.2 MMOL/L — LOW (ref 3.5–5.3)
RBC # BLD: 3.72 M/UL — LOW (ref 4.2–5.8)
RBC # FLD: 12.6 % — SIGNIFICANT CHANGE UP (ref 10.3–14.5)
SARS-COV-2 IGG SERPL QL IA: NEGATIVE — SIGNIFICANT CHANGE UP
SARS-COV-2 IGM SERPL IA-ACNC: <3.8 AU/ML — SIGNIFICANT CHANGE UP
SODIUM SERPL-SCNC: 138 MMOL/L — SIGNIFICANT CHANGE UP (ref 135–145)
SPECIMEN SOURCE: SIGNIFICANT CHANGE UP
SPECIMEN SOURCE: SIGNIFICANT CHANGE UP
WBC # BLD: 15.88 K/UL — HIGH (ref 3.8–10.5)
WBC # FLD AUTO: 15.88 K/UL — HIGH (ref 3.8–10.5)

## 2020-10-12 PROCEDURE — 99233 SBSQ HOSP IP/OBS HIGH 50: CPT

## 2020-10-12 PROCEDURE — 99223 1ST HOSP IP/OBS HIGH 75: CPT | Mod: GC

## 2020-10-12 PROCEDURE — 72147 MRI CHEST SPINE W/DYE: CPT | Mod: 26

## 2020-10-12 PROCEDURE — 72149 MRI LUMBAR SPINE W/DYE: CPT | Mod: 26

## 2020-10-12 RX ORDER — CEFAZOLIN SODIUM 1 G
2000 VIAL (EA) INJECTION EVERY 8 HOURS
Refills: 0 | Status: DISCONTINUED | OUTPATIENT
Start: 2020-10-12 | End: 2020-10-16

## 2020-10-12 RX ORDER — POTASSIUM CHLORIDE 20 MEQ
40 PACKET (EA) ORAL ONCE
Refills: 0 | Status: DISCONTINUED | OUTPATIENT
Start: 2020-10-12 | End: 2020-10-12

## 2020-10-12 RX ORDER — INSULIN NPH HUM/REG INSULIN HM 70-30/ML
14 VIAL (ML) SUBCUTANEOUS
Qty: 10 | Refills: 0
Start: 2020-10-12

## 2020-10-12 RX ORDER — CEFAZOLIN SODIUM 1 G
2000 VIAL (EA) INJECTION ONCE
Refills: 0 | Status: COMPLETED | OUTPATIENT
Start: 2020-10-12 | End: 2020-10-12

## 2020-10-12 RX ORDER — POTASSIUM CHLORIDE 20 MEQ
40 PACKET (EA) ORAL EVERY 4 HOURS
Refills: 0 | Status: COMPLETED | OUTPATIENT
Start: 2020-10-12 | End: 2020-10-12

## 2020-10-12 RX ORDER — POTASSIUM CHLORIDE 20 MEQ
10 PACKET (EA) ORAL
Refills: 0 | Status: COMPLETED | OUTPATIENT
Start: 2020-10-12 | End: 2020-10-12

## 2020-10-12 RX ORDER — CEFAZOLIN SODIUM 1 G
VIAL (EA) INJECTION
Refills: 0 | Status: DISCONTINUED | OUTPATIENT
Start: 2020-10-12 | End: 2020-10-16

## 2020-10-12 RX ORDER — MAGNESIUM SULFATE 500 MG/ML
1 VIAL (ML) INJECTION ONCE
Refills: 0 | Status: COMPLETED | OUTPATIENT
Start: 2020-10-12 | End: 2020-10-12

## 2020-10-12 RX ADMIN — Medication 250 MILLIGRAM(S): at 09:32

## 2020-10-12 RX ADMIN — Medication 40 MILLIEQUIVALENT(S): at 11:38

## 2020-10-12 RX ADMIN — Medication 2: at 08:09

## 2020-10-12 RX ADMIN — Medication 2: at 16:51

## 2020-10-12 RX ADMIN — Medication 100 MILLIGRAM(S): at 18:32

## 2020-10-12 RX ADMIN — Medication 100 MILLIGRAM(S): at 22:12

## 2020-10-12 RX ADMIN — SODIUM CHLORIDE 100 MILLILITER(S): 9 INJECTION INTRAMUSCULAR; INTRAVENOUS; SUBCUTANEOUS at 08:22

## 2020-10-12 RX ADMIN — Medication 100 MILLIEQUIVALENT(S): at 08:22

## 2020-10-12 RX ADMIN — Medication 650 MILLIGRAM(S): at 00:23

## 2020-10-12 RX ADMIN — Medication 100 GRAM(S): at 18:32

## 2020-10-12 RX ADMIN — SIMVASTATIN 20 MILLIGRAM(S): 20 TABLET, FILM COATED ORAL at 22:03

## 2020-10-12 RX ADMIN — Medication 6 UNIT(S): at 16:50

## 2020-10-12 RX ADMIN — SODIUM CHLORIDE 100 MILLILITER(S): 9 INJECTION INTRAMUSCULAR; INTRAVENOUS; SUBCUTANEOUS at 23:28

## 2020-10-12 RX ADMIN — Medication 250 MILLIGRAM(S): at 00:24

## 2020-10-12 RX ADMIN — Medication 4: at 11:37

## 2020-10-12 RX ADMIN — INSULIN GLARGINE 18 UNIT(S): 100 INJECTION, SOLUTION SUBCUTANEOUS at 08:10

## 2020-10-12 RX ADMIN — Medication 100 MILLIEQUIVALENT(S): at 09:32

## 2020-10-12 RX ADMIN — ENOXAPARIN SODIUM 40 MILLIGRAM(S): 100 INJECTION SUBCUTANEOUS at 11:37

## 2020-10-12 RX ADMIN — Medication 6 UNIT(S): at 08:10

## 2020-10-12 RX ADMIN — Medication 40 MILLIEQUIVALENT(S): at 13:33

## 2020-10-12 RX ADMIN — Medication 6 UNIT(S): at 11:37

## 2020-10-12 RX ADMIN — Medication 100 MILLIEQUIVALENT(S): at 11:38

## 2020-10-12 NOTE — PHYSICAL THERAPY INITIAL EVALUATION ADULT - GAIT DEVIATIONS NOTED, PT EVAL
decreased velocity of limb motion/decreased step length/decreased stride length/decreased wolf/increased time in double stance/decreased weight-shifting ability

## 2020-10-12 NOTE — CONSULT NOTE ADULT - ATTENDING COMMENTS
55M with diabetes presented with weakness in his LE b/l as well as swelling and pain over the course of the last 2 weeks. He denies IVDU, or other toxic habits, no recent trauma, no recent hospitalizations or antibiotics use.   10 point ROS all negative other than above.    Exam: 4/5 strength in the b/l LE, no stigmata of endocarditis, no murmurs, very faint crackles at the right base  Leukocytosis 26->15k   CXR (personally reviewed) clear   Blood culture 4/4 MSSA   Urine culture MSSA     A/P:   At this time it is unclear where the source of his bacteremia. When seen in the urine, we often think of bacteremia initially and the caused the urine to be positive (though it can be the other way around). Given the urinary complaints as well as pain and weakness in his legs, a primary and utmost urgent concern is a CNS/spinal source. His strength and sensation is mostly intake and he has control over his bladder and bowels at the moment but careful monitoring is needed if he worsens. He should have an MRI of his spine ideally today. If this is negative, will request CT C/A/P to look for source and may also help support the dx of endocarditis (his Transthoracic Echocardiogram is negative but doesn't fully rule out IE). He doesn't have findings of [place] lesions or osler's nodes but these only support the diagnosis. Additionally, his diabetes needs better control and he will need follow up outpatient ideally on insulin.     55M MSSA bacteremia (unknown source), uncontrolled DM, urinary frequency and changes     #MSSA bacteremia   -Stop Vanco  -Start Cefazolin 2g q8hrs   -repeat 2 sets of blood cultures sent and will send q48hrs until documented clearance   -TTE negative for IE; will determine need for Transesophageal Echocardiogram in coming days depending on workup  -concern for spinal source or seeding-> MRI T/L spine with IV contrast (gadolinium)  -If MRI is positive, stat ortho or neurosurgery consult   -If MRI negative, please obtain CT C/A/P with PO/IV contrast   -HIV ordered for morning (verbal consent obtained)     #DM  -good control of FS very important for control of infection  -consider endocrine consult   -consider Dietician/nutrition consultation as well     #Fever/Leukocytosis   -trend fever curve   -trend WBC until normal   -antibiotics as ordered   -f/u cultures

## 2020-10-12 NOTE — PHYSICAL THERAPY INITIAL EVALUATION ADULT - BED MOBILITY TRAINING, PT EVAL
Pt will perform all tasks of bed mobility c independently within 2-5 days to prevent pressure injuries and deconditioning.

## 2020-10-12 NOTE — PHYSICAL THERAPY INITIAL EVALUATION ADULT - PERTINENT HX OF CURRENT PROBLEM, REHAB EVAL
Pt admitted due to sepsis, UTI, hyperglycemia. Pt is a 54 y/o male w/DM2 was on sulfonylurea and metform till 2 weeks ago when meds ran out and lack of insurance was on insulin in past. Pt comes in w/complain of generalized malaise, fatigue and le edema

## 2020-10-12 NOTE — PROGRESS NOTE ADULT - ASSESSMENT
pt w/dm2 off meds comes w/generalized fatigue w/hyperglycemia, electrolyte abnomality and uti    Problem/Plan - 1:  ·  Problem: Acute staph aureus (likely MSSA) cystitis without hematuria and associated staph aureus bacteremia. Cont vancomycin. pending level. leukocytosis improving but still fever spikes. ECHO no IE. consulted ID>>> follow recs and sensitivity for further antibiotic course.   cont gentle hydration.   follow CBC in am.     Problem/Plan - 2:  ·  Problem: Type 2 diabetes mellitus with hyperglycemia. Patient did not use insulin at home and cant afford meds. hemoglobin A 1c is 12. will need insulin for adequate control. discussed with patient and care team. will prescribe relion N and R and see if patient can afford these from [employer]. Meanwhile cont current management. Follow finger sticks.     Problem/Plan - 4:  ·  Problem: Mixed hyperlipidemia.  Plan: cont statin.     Problem/Plan - 5:  ·  Problem: Localized edema.  improved. negative US venous duplex.     Hypokalemia. Replete and recheck level. Also give iv magnesium sulphate.     Generalized weakness. consulted PT. follow recs.     Problem/Plan - 6:  Problem: Preventive measure. Plan: sq lovenox

## 2020-10-12 NOTE — PHYSICAL THERAPY INITIAL EVALUATION ADULT - BALANCE TRAINING, PT EVAL
Patient will improve static and dynamic standing balance with rolling walker to fair or greater balance in 8 weeks to improve safety and decrease risk of falls.

## 2020-10-12 NOTE — PHYSICAL THERAPY INITIAL EVALUATION ADULT - ADDITIONAL COMMENTS
As per pt, pt states he lives in a private house c 3 sisters and mother c 6 steps to enter c bilat rails. Pt states there are 12-13 steps to bedroom on the 2nd floor. Pt was independent with all ADLs and ambulating s AD. Pt drives and works as a HHA.

## 2020-10-12 NOTE — PROGRESS NOTE ADULT - SUBJECTIVE AND OBJECTIVE BOX
CHIEF COMPLAINT:   Fever 101.   No nausea or vomiting  no diarrhea  no cp/sore throat/cough.   Feeling weak     PHYSICAL EXAM:    GENERAL: Moderately built, no acute distress   CHEST/LUNG: Clear to ausculation bilaterally, no wheezing, no crackles   HEART: Regular rate and rhythm; No murmurs, rubs  ABDOMEN: Soft, Nontender, Nondistended; Bowel sounds present  EXTREMITIES:  Moving all four extremities spontaneously, No clubbing, cyanosis. + trace edema  NERVOUS SYSTEM:  Grossly non focal.  Psychiatry: AAO x 3, mood is appropriate       OBJECTIVE DATA:       Vital Signs Last 24 Hrs  T(C): 37.1 (12 Oct 2020 05:25), Max: 38.3 (12 Oct 2020 01:00)  T(F): 98.8 (12 Oct 2020 05:25), Max: 101 (12 Oct 2020 01:00)  HR: 94 (12 Oct 2020 05:25) (90 - 103)  BP: 127/76 (12 Oct 2020 05:25) (127/73 - 136/78)  BP(mean): --  RR: 18 (12 Oct 2020 05:25) (17 - 18)  SpO2: 94% (12 Oct 2020 05:25) (90% - 94%)           Daily     Daily   LABS:                        10.7   15.88 )-----------( 292      ( 12 Oct 2020 06:58 )             30.2             10-12    138  |  104  |  8   ----------------------------<  170<H>  2.5<LL>   |  28  |  0.41<L>    Ca    7.5<L>      12 Oct 2020 06:58  Mg     2.2     10-10    TPro  6.0  /  Alb  1.7<L>  /  TBili  0.4  /  DBili  x   /  AST  14<L>  /  ALT  13  /  AlkPhos  103  10-11                   CARDIAC MARKERS ( 10 Oct 2020 14:44 )  .272 ng/mL / x     / x     / x     / x          CAPILLARY BLOOD GLUCOSE      POCT Blood Glucose.: 245 mg/dL (12 Oct 2020 10:46)      Culture - Blood (collected 10-10)  Source: .Blood Blood-Peripheral  Gram Stain (prelim) (10-10):    Growth in aerobic and anaerobic bottles: Gram Positive Cocci in Clusters  Preliminary Report (10-11):    Growth in aerobic and anaerobic bottles: Staphylococcus aureus    "Due to technical problems, Proteus sp. will Not be reported as part of    the BCID panel until further notice"    ***Blood Panel PCR results on this specimen are available    approximately 3 hours after the Gram stain result.***    Gram stain, PCR, and/or culture results may not always    correspond due to difference in methodologies.    ************************************************************    This PCR assay was performed using [employer].    The following targets are tested for: Enterococcus,    vancomycin resistant enterococci, Listeria monocytogenes,    coagulase negative staphylococci, S. aureus,    methicillin resistant S. aureus, Streptococcus agalactiae    (Group B), S. pneumoniae, S. pyogenes (Group A),    Acinetobacter baumannii, Enterobacter cloacae, E. coli,    Klebsiella oxytoca, K. pneumoniae, Proteus sp.,    Serratia marcescens, Haemophilus influenzae,    Neisseria meningitidis, Pseudomonas aeruginosa, Candida    albicans, C. glabrata, C krusei, C parapsilosis,    C. tropicalis and the KPC resistance gene.  Organism: Blood Culture PCR (10-10)  Organism: Blood Culture PCR (10-10)    Sensitivities:      -  Staphylococcus aureus: Detec Any isolate of Staphylococcus aureus from a blood culture is NOT considered a contaminant.      Method Type: PCR    Culture - Blood (collected 10-10)  Source: .Blood Blood-Peripheral  Gram Stain (prelim) (10-10):    Growth in aerobic and anaerobic bottles: Gram Positive Cocci in Clusters  Preliminary Report (10-11):    Growth in aerobic and anaerobic bottles: Staphylococcus aureus    See previous culture [id number]    Culture - Urine (collected 10-10)  Source: .Urine Clean Catch (Midstream)  Final Report (10-11):    50,000 - 99,000 CFU/mL Staphylococcus aureus  Organism: Staphylococcus aureus (10-11)  Organism: Staphylococcus aureus (10-11)        MEDICATIONS  (STANDING):  dextrose 5%. 1000 milliLiter(s) (50 mL/Hr) IV Continuous <Continuous>  dextrose 50% Injectable 12.5 Gram(s) IV Push once  dextrose 50% Injectable 25 Gram(s) IV Push once  dextrose 50% Injectable 25 Gram(s) IV Push once  enoxaparin Injectable 40 milliGRAM(s) SubCutaneous daily  influenza   Vaccine 0.5 milliLiter(s) IntraMuscular once  insulin glargine Injectable (LANTUS) 18 Unit(s) SubCutaneous every morning  insulin lispro (HumaLOG) corrective regimen sliding scale   SubCutaneous three times a day before meals  insulin lispro (HumaLOG) corrective regimen sliding scale   SubCutaneous at bedtime  insulin lispro Injectable (HumaLOG) 6 Unit(s) SubCutaneous three times a day before meals  magnesium sulfate  IVPB 1 Gram(s) IV Intermittent once  potassium chloride    Tablet ER 40 milliEquivalent(s) Oral every 4 hours  potassium chloride   Powder 40 milliEquivalent(s) Oral once  potassium chloride  10 mEq/100 mL IVPB 10 milliEquivalent(s) IV Intermittent every 1 hour  simvastatin 20 milliGRAM(s) Oral at bedtime  sodium chloride 0.9%. 1000 milliLiter(s) (100 mL/Hr) IV Continuous <Continuous>  vancomycin  IVPB 1000 milliGRAM(s) IV Intermittent every 8 hours    MEDICATIONS  (PRN):  acetaminophen    Suspension .. 650 milliGRAM(s) Oral every 6 hours PRN Temp greater or equal to 38C (100.4F), Moderate Pain (4 - 6)  dextrose 40% Gel 15 Gram(s) Oral once PRN Blood Glucose LESS THAN 70 milliGRAM(s)/deciliter  glucagon  Injectable 1 milliGRAM(s) IntraMuscular once PRN Glucose LESS THAN 70 milligrams/deciliter

## 2020-10-12 NOTE — CONSULT NOTE ADULT - SUBJECTIVE AND OBJECTIVE BOX
[facility]  Division of Infectious Diseases  [phone]    BHARATH GONZALES  55y, Male  [id number]    Pt is a 56 y/o male, lives at home with his family, works as a healthcare aid. The patient has a history of DM2, he has been taking sulfonylurea and metformin until 2 weeks, ran out of his medication. The patient was on insulin in past.  The patient came to ED with complains of  generalized malaise, fatigue and bilateral lower extremities edema and pain, polydipsia and polyuria. The patient denies any fevers at home, however, spiked a temperature of 100.6 once admitted to ED. Upon admission the patient did not have any sob, cp, palpitations, n/v/d/c, denies any travels or sick contacts.    The patient was admitted for an evaluation, found to have leukocytosis of 26.11, trending down - 15.88 today. The patient had a fever of 101 last night, afebrile now. Patient's urine culture and collected on 10/10/2020 grew staph aureus, ECHO performed - EF 55-60%, + Grade I diastolic dysfunction.       PMH/PSH--  DM (diabetes mellitus)    No significant past surgical history        Allergies--  No Known Allergies      Medications--  Antibiotics: vancomycin  IVPB 1000 milliGRAM(s) IV Intermittent every 8 hours    Immunologic: influenza   Vaccine 0.5 milliLiter(s) IntraMuscular once    Other: acetaminophen    Suspension .. PRN  dextrose 40% Gel PRN  dextrose 5%.  dextrose 50% Injectable  dextrose 50% Injectable  dextrose 50% Injectable  enoxaparin Injectable  glucagon  Injectable PRN  insulin glargine Injectable (LANTUS)  insulin lispro (HumaLOG) corrective regimen sliding scale  insulin lispro (HumaLOG) corrective regimen sliding scale  insulin lispro Injectable (HumaLOG)  magnesium sulfate  IVPB  simvastatin  sodium chloride 0.9%.    Antimicrobials last 90 days per EMR: MEDICATIONS  (STANDING):  cefTRIAXone   IVPB   100 mL/Hr IV Intermittent (10-09-20 @ 21:28)    cefTRIAXone   IVPB   100 mL/Hr IV Intermittent (10-10-20 @ 20:52)    vancomycin  IVPB   250 mL/Hr IV Intermittent (10-12-20 @ 09:32)   250 mL/Hr IV Intermittent (10-12-20 @ 00:24)   250 mL/Hr IV Intermittent (10-11-20 @ 17:27)   250 mL/Hr IV Intermittent (10-11-20 @ 10:13)    vancomycin  IVPB   250 mL/Hr IV Intermittent (10-10-20 @ 16:55)    vancomycin  IVPB   250 mL/Hr IV Intermittent (10-09-20 @ 22:17)        Social History--  EtOH: denies   Tobacco: denies   Drug Use: denies     Family/Marital History--  No pertinent family history in first degree relatives          Travel/Environmental/Occupational History:      Review of Systems:  A >=10-point review of systems was obtained.     Pertinent positives and negatives--  Constitutional: No fevers. No Chills. No Rigors.   Eyes:  ENMT:  Cardiovascular: No chest pain. No palpitations.  Respiratory: No shortness of breath. No cough.  Gastrointestinal: No nausea or vomiting. No diarrhea or constipation.   Genitourinary:  Musculoskeletal:  Skin:  Neurologic:  Psychiatric: Pleasant. Appropriate affect.  Endocrine:  Heme/Lymphatic:  Allergy/Immunologic:    Review of systems otherwise negative except as previously noted.    Physical Exam--  Vital Signs: T(F): 98.8 (10-12-20 @ 05:25), Max: 101 (10-12-20 @ 01:00)  HR: 94 (10-12-20 @ 05:25)  BP: 127/76 (10-12-20 @ 05:25)  RR: 18 (10-12-20 @ 05:25)  SpO2: 94% (10-12-20 @ 05:25)  Wt(kg): --  General: Nontoxic-appearing Male in no acute distress.  HEENT: AT/NC. PERRL. EOMI. Anicteric. Conjunctiva pink and moist. Oropharynx clear. Dentition fair.  Neck: Not rigid. No sense of mass.  Nodes: None palpable.  Lungs: Clear bilaterally without rales, wheezing or rhonchi  Heart: Regular rate and rhythm. No Murmur. No rub. No gallop. No palpable thrill.  Abdomen: Bowel sounds present and normoactive. Soft. Nondistended. Nontender. No sense of mass. No organomegaly.  Back: No spinal tenderness. No costovertebral angle tenderness.   Extremities: No cyanosis or clubbing. No edema.   Skin: Warm. Dry. Good turgor. No rash. No vasculitic stigmata.  Psychiatric: Appropriate affect and mood for situation.         Laboratory & Imaging Data--  CBC                        10.7   15.88 )-----------( 292      ( 12 Oct 2020 06:58 )             30.2       Chemistries  10-12    138  |  104  |  8   ----------------------------<  170<H>  2.5<LL>   |  28  |  0.41<L>    Ca    7.5<L>      12 Oct 2020 06:58    TPro  6.0  /  Alb  1.7<L>  /  TBili  0.4  /  DBili  x   /  AST  14<L>  /  ALT  13  /  AlkPhos  103  10-11      Culture Data    Culture - Blood (collected 10 Oct 2020 00:43)  Source: .Blood Blood-Peripheral  Gram Stain (12 Oct 2020 13:43):    Growth in aerobic and anaerobic bottles: Gram Positive Cocci in Clusters  Final Report (12 Oct 2020 13:43):    Growth in aerobic and anaerobic bottles: Staphylococcus aureus    "Due to technical problems, Proteus sp. will Not be reported as part of    the BCID panel until further notice"    ***Blood Panel PCR results on this specimen are available    approximately 3 hours after the Gram stain result.***    Gram stain, PCR, and/or culture results may not always    correspond due to difference in methodologies.    ************************************************************    This PCR assay was performed using [employer].    The following targets are tested for: Enterococcus,    vancomycin resistant enterococci, Listeria monocytogenes,    coagulase negative staphylococci, S. aureus,    methicillin resistant S. aureus, Streptococcus agalactiae    (Group B), S. pneumoniae, S. pyogenes (Group A),    Acinetobacter baumannii, Enterobacter cloacae, E. coli,    Klebsiella oxytoca, K. pneumoniae, Proteus sp.,    Serratia marcescens, Haemophilus influenzae,    Neisseria meningitidis, Pseudomonas aeruginosa, Candida    albicans, C. glabrata, C krusei, C parapsilosis,    C. tropicalis and the KPC resistance gene.  Organism: Blood Culture PCR  Staphylococcus aureus (12 Oct 2020 13:43)  Organism: Staphylococcus aureus (12 Oct 2020 13:43)  Organism: Blood Culture PCR (12 Oct 2020 13:43)    Culture - Blood (collected 10 Oct 2020 00:43)  Source: .Blood Blood-Peripheral  Gram Stain (12 Oct 2020 13:44):    Growth in aerobic and anaerobic bottles: Gram Positive Cocci in Clusters  Final Report (12 Oct 2020 13:44):    Growth in aerobic and anaerobic bottles: Staphylococcus aureus    See previous culture [id number]    Culture - Urine (collected 10 Oct 2020 00:40)  Source: .Urine Clean Catch (Midstream)  Final Report (11 Oct 2020 19:56):    50,000 - 99,000 CFU/mL Staphylococcus aureus  Organism: Staphylococcus aureus (11 Oct 2020 19:56)  Organism: Staphylococcus aureus (11 Oct 2020 19:56)         [facility]  Division of Infectious Diseases  [phone]    BHARATH GONZALES  55y, Male  [id number]    Pt is a 54 y/o male, lives at home with his sister and his mother, 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 The patient has a history of DM2, he has been taking sulfonylurea and metformin until 2 weeks, ran out of his medication. The patient was on insulin in the past.  The patient came to ED with complains of  generalized malaise, fatigue and bilateral lower extremities edema and pain, states that he could not walk, polydipsia and polyuria, no incontinence. The patient denies any fevers at home, however, spiked a temperature of 100.6 once admitted to ED. Upon admission the patient did not have any sob, cp, palpitations, n/v/d/c, denies any travels or sick contacts.    The patient was admitted for an evaluation due to uncontrolled diabetes, Hgb A1C 12.9, and bilateral lower extremities pain. The patient was found to have leukocytosis of 26.11, trending down - 15.88 today. The patient spiked a temperature of 101 last night, afebrile now. Patient's urine and blood cultures and collected on 10/10/2020 grew staph aureus, ECHO performed - EF 55-60%, + Grade I diastolic dysfunction, Chest Xray is negative. Venous dopplers are negative for bilateral lower extremities DVT. The patient was placed on Rocephin 1 gram IV q 24 hours, which was discontinued. The patient is currently on Vancomycin 1 gram IV q 8 hours.       PMH/PSH--  DM (diabetes mellitus)    No significant past surgical history        Allergies--  No Known Allergies      Medications--  Antibiotics: vancomycin  IVPB 1000 milliGRAM(s) IV Intermittent every 8 hours    Immunologic: influenza   Vaccine 0.5 milliLiter(s) IntraMuscular once    Other: acetaminophen    Suspension .. PRN  dextrose 40% Gel PRN  dextrose 5%.  dextrose 50% Injectable  dextrose 50% Injectable  dextrose 50% Injectable  enoxaparin Injectable  glucagon  Injectable PRN  insulin glargine Injectable (LANTUS)  insulin lispro (HumaLOG) corrective regimen sliding scale  insulin lispro (HumaLOG) corrective regimen sliding scale  insulin lispro Injectable (HumaLOG)  magnesium sulfate  IVPB  simvastatin  sodium chloride 0.9%.    Antimicrobials last 90 days per EMR: MEDICATIONS  (STANDING):  cefTRIAXone   IVPB   100 mL/Hr IV Intermittent (10-09-20 @ 21:28)    cefTRIAXone   IVPB   100 mL/Hr IV Intermittent (10-10-20 @ 20:52)    vancomycin  IVPB   250 mL/Hr IV Intermittent (10-12-20 @ 09:32)   250 mL/Hr IV Intermittent (10-12-20 @ 00:24)   250 mL/Hr IV Intermittent (10-11-20 @ 17:27)   250 mL/Hr IV Intermittent (10-11-20 @ 10:13)    vancomycin  IVPB   250 mL/Hr IV Intermittent (10-10-20 @ 16:55)    vancomycin  IVPB   250 mL/Hr IV Intermittent (10-09-20 @ 22:17)        Social History--  EtOH: denies   Tobacco: denies   Drug Use: denies .  The patient reports not to be sexually active     Family/Marital History--  Mother - HTN, cardiac disease, alive   Father - passed away, + history of cardiac disease          Travel/Environmental/Occupational History: no recent travel, works as a home health aid      Review of Systems:    Pertinent positives and negatives--  Constitutional: No fevers. No Chills. No Rigors. No recent significant weight loss.   Eyes: reports having eye exam long time ago  ENMT: + partial dentures   Cardiovascular: No chest pain. No palpitations.  Respiratory: No shortness of breath. No cough.  Gastrointestinal: No nausea or vomiting. No diarrhea or constipation. No abdominal pain  Genitourinary: + urinary urgency, denies any incontinence   Musculoskeletal: complains of pain in bilateral lower extremities, and bilateral forearms   Skin: denies   Neurologic: denies   Psychiatric: Pleasant. Appropriate affect.  Endocrine:  Heme/Lymphatic:  Allergy/Immunologic: no known allergies     Review of systems otherwise negative except as previously noted.    Physical Exam--  Vital Signs: T(F): 98.8 (10-12-20 @ 05:25), Max: 101 (10-12-20 @ 01:00)  HR: 94 (10-12-20 @ 05:25)  BP: 127/76 (10-12-20 @ 05:25)  RR: 18 (10-12-20 @ 05:25)  SpO2: 94% (10-12-20 @ 05:25)  Wt(kg): --  General: Nontoxic-appearing thin Male in no acute distress.  HEENT: AT/NC. PERRL. EOMI. Anicteric. Conjunctiva pink and moist. Oropharynx clear. Dentition + partial dentures   Neck: Not rigid. No sense of mass. supple  Nodes: None palpable.  Lungs: Clear bilaterally without rales, wheezing or rhonchi  Heart: Regular rate and rhythm. No Murmur. No rub. No gallop. No palpable thrill.  Abdomen: Bowel sounds present and normoactive. Soft. Nondistended. Nontender. No sense of mass. No organomegaly.  Back: No spinal tenderness. No costovertebral angle tenderness.   Extremities: No cyanosis or clubbing. No edema.   Skin: Warm. Dry. Good turgor. No rash. No vasculitic stigmata.  Psychiatric: Appropriate affect and mood for situation.         Laboratory & Imaging Data--  CBC                        10.7   15.88 )-----------( 292      ( 12 Oct 2020 06:58 )             30.2     WBC trend  26.11  22.97  22.91  21.28  15.88    Chemistries  10-12    138  |  104  |  8   ----------------------------<  170<H>  2.5<LL>   |  28  |  0.41<L>    Ca    7.5<L>      12 Oct 2020 06:58    TPro  6.0  /  Alb  1.7<L>  /  TBili  0.4  /  DBili  x   /  AST  14<L>  /  ALT  13  /  AlkPhos  103  10-11    A1C with Estimated Average Glucose (10.11.20 @ 11:41)    A1C with Estimated Average Glucose Result: 12.9: Method: Immunoassay       Reference Range                4.0-5.6%       High risk (prediabetic)        5.7-6.4%       Diabetic, diagnostic             >=6.5%       ADA diabetic treatment goal       <7.0%  The Hemoglobin A1c testing is NGSP-certified.Reference ranges are based  upon the 2010 recommendations of  the American Diabetes Association.  Interpretation may vary for children  and adolescents. %    Estimated Average Glucose: 324: The Estimated Average Glucose (eAG) or Mean Plasma Glucose (MPG) value is  calculated from the hemoglobin A1c value and covers the same time period.   The American Diabetes Association (ADA) and other professional  organizations recommend reporting the eAG with the HgbA1c. mg/dL      Culture Data    Culture - Blood (collected 10 Oct 2020 00:43)  Source: .Blood Blood-Peripheral  Gram Stain (12 Oct 2020 13:43):    Growth in aerobic and anaerobic bottles: Gram Positive Cocci in Clusters  Final Report (12 Oct 2020 13:43):    Growth in aerobic and anaerobic bottles: Staphylococcus aureus    "Due to technical problems, Proteus sp. will Not be reported as part of    the BCID panel until further notice"    ***Blood Panel PCR results on this specimen are available    approximately 3 hours after the Gram stain result.***    Gram stain, PCR, and/or culture results may not always    correspond due to difference in methodologies.    ************************************************************    This PCR assay was performed using [employer].    The following targets are tested for: Enterococcus,    vancomycin resistant enterococci, Listeria monocytogenes,    coagulase negative staphylococci, S. aureus,    methicillin resistant S. aureus, Streptococcus agalactiae    (Group B), S. pneumoniae, S. pyogenes (Group A),    Acinetobacter baumannii, Enterobacter cloacae, E. coli,    Klebsiella oxytoca, K. pneumoniae, Proteus sp.,    Serratia marcescens, Haemophilus influenzae,    Neisseria meningitidis, Pseudomonas aeruginosa, Candida    albicans, C. glabrata, C krusei, C parapsilosis,    C. tropicalis and the KPC resistance gene.  Organism: Blood Culture PCR  Staphylococcus aureus (12 Oct 2020 13:43)  Organism: Staphylococcus aureus (12 Oct 2020 13:43)  Organism: Blood Culture PCR (12 Oct 2020 13:43)    Culture - Blood (collected 10 Oct 2020 00:43)  Source: .Blood Blood-Peripheral  Gram Stain (12 Oct 2020 13:44):    Growth in aerobic and anaerobic bottles: Gram Positive Cocci in Clusters  Final Report (12 Oct 2020 13:44):    Growth in aerobic and anaerobic bottles: Staphylococcus aureus    See previous culture [id number]    Culture - Urine (collected 10 Oct 2020 00:40)  Source: .Urine Clean Catch (Midstream)  Final Report (11 Oct 2020 19:56):    50,000 - 99,000 CFU/mL Staphylococcus aureus  Organism: Staphylococcus aureus (11 Oct 2020 19:56)  Organism: Staphylococcus aureus (11 Oct 2020 19:56)    < from: Xray Chest 1 View- PORTABLE-Urgent (10.10.20 @ 00:28) >  EXAM:  XR CHEST PORTABLE URGENT 1V                            PROCEDURE DATE:  10/10/2020          INTERPRETATION:  Portable chest radiograph    CLINICAL INFORMATION:   Short of breath.  Cough and fever  TECHNIQUE:  Portable  AP view of the chest was obtained.    COMPARISON: No previous examinations are available for review.    FINDINGS:  The lungs are clear of airspace consolidations or effusions. No pneumothorax.    The heart and mediastinum are within normal limits.    Visualized osseous structures are intact.        IMPRESSION:   No evidence of active chest disease.    Lateral radiograph recommended to exclude posterior lung base pathology.            KEERTHI LA M.D., ATTENDING RADIOLOGIST  This document has been electronically signed. Oct 10 2020 10:01AM    < end of copied text >  < from: US Duplex Venous Lower Ext Complete, Bilateral (10.10.20 @ 01:47) >  EXAM:  US DPLX LWR EXT VEINS COMPL BI                            PROCEDURE DATE:  10/10/2020          INTERPRETATION:  CLINICAL INFORMATION: Pain in legs.    COMPARISON: None available.    TECHNIQUE: Duplex sonography of the BILATERAL LOWER extremity veins with color and spectral Doppler, with and without compression.    FINDINGS:    There is normal compressibility of the bilateral common femoral, femoral and popliteal veins.  Doppler examination shows normal spontaneous and phasic flow.    No calf vein thrombosis is detected.    IMPRESSION:  No evidence of deep venous thrombosis in either lower extremity.        GREGORY SERRANO M.D., ATTENDING RADIOLOGIST  This document has been electronically signed. Oct 10 2020  1:54AM    < end of copied text >    < from: TTE Echo Complete w/o Contrast w/ Doppler (10.10.20 @ 10:18) >   EXAM:  ECHO TTE WO CON COMP W DOPP         PROCEDURE DATE:  10/10/2020        INTERPRETATION:  REPORT:  TRANSTHORACIC ECHOCARDIOGRAM REPORT        Patient Name:   BHARATH GONZALES Patient Location: [facility] Rec #:  [id number]      Accession #:  [id number]  Account #:                      Height:           63.8 in 162.0 cm  YOB: 1965        Weight:           112.0 lb 50.80 kg  Patient Age:    55 years        BSA:              1.52 m²  Patient Gender: M               BP:       139/76 mmHg      Date of Exam:        10/10/2020 10:18:43 AM  Sonographer:         MIGUEL  Referring Physician: TINO    Procedure:   2D Echo/Doppler/Color Doppler Complete.  Indications: Edema, unspecified - R60.9  Diagnosis:   Edema, unspecified- R60.9        2D AND M-MODE MEASUREMENTS (normal ranges within parentheses):  Left Ventricle:                  Normal   Aorta/Left Atrium:          Normal  IVSd (2D):              1.00 cm (0.7-1.1) Aortic Root (2D):  3.72 cm (2.4-3.7)  LVPWd (2D):          0.98 cm (0.7-1.1) Left Atrium (2D):  3.55 cm (1.9-4.0)  LVIDd (2D):             4.23 cm (3.4-5.7) Right Ventricle:  LVIDs (2D):             3.06 cm           TAPSE:           1.99 cm  LV FS (2D):             27.6 %   (>25%)  Relative Wall Thickness  0.46    (<0.42)    LV DIASTOLIC FUNCTION:  MV Peak E: 0.71 m/s Decel Time:  184 msec  MV Peak A: 0.99 m/s Septal E/e'  10.3  E/A Ratio: 0.72     Lateral E/e' 8.3  Septal e'  0.1 m/s  Lateral e' 0.1 m/s    SPECTRAL DOPPLER ANALYSIS (where applicable):  Mitral Valve:  MV P1/2 Time: 53.28 msec  MV Area, PHT: 4.13 cm²    Aortic Valve: AoV Max Satish: 1.10 m/s AoV Peak P.9 mmHg AoV Mean PG: 3.0 mmHg    LVOT Vmax: 0.90 m/s LVOT VTI: 0.154 m LVOT Diameter: 2.01 cm    AoV Area, Vmax: 2.57 cm² AoV Area, VTI: 2.67 cm² AoV Area, Vmn: 2.10 cm²    Tricuspid Valve and PA/RV Systolic Pressure: TR Max Velocity: 2.48 m/s RA Pressure: 5 mmHg RVSP/PASP: 29.7 mmHg    Pulmonic Valve:  PV Max Velocity: 1.03 m/s PV Max P.2 mmHg PV Mean PG:      PHYSICIAN INTERPRETATION:  Left Ventricle:  Global LV systolic function was normal. Left ventricular ejection fraction, by visual estimation, is 55 to 60%. Spectral Doppler shows impaired relaxation pattern of left ventricular myocardial filling (Grade I diastolic dysfunction).  Right Ventricle: Normal right ventricular size and function.  Left Atrium: The left atrium is normal in size.  Right Atrium: The right atrium is normal in size.  Pericardium: There is a moderate pleural effusion in both left andright lateral regions.  Mitral Valve: Mild thickening and calcification of the anterior and posterior mitral valve leaflets. There is mild mitral annular calcification. Trace mitral valve regurgitation is seen.  Tricuspid Valve: The tricuspid valve is normal in structure. Trivial tricuspid regurgitation is visualized.  Aortic Valve: Sclerotic aortic valve with normal opening. Peak transaortic gradient equals 4.9 mmHg, mean transaortic gradient equals 3.0 mmHg, the calculated aortic valve area equals 2.67 cm² by the continuity equation consistent with normally opening aortic valve. Trivial aortic valve regurgitation is seen.  Pulmonic Valve: The pulmonic valve was not well visualized. Trace pulmonic valve regurgitation.      Summary:   1. Leftventricular ejection fraction, by visual estimation, is 55 to 60%.   2. Normal global left ventricular systolic function.   3. Spectral Doppler shows impaired relaxation pattern of left ventricular myocardial filling (Grade I diastolic dysfunction).   4. Moderate pleural effusion in both left and right lateral regions.   5. Mild mitral annular calcification.   6. Mild thickening and calcification of the anterior and posterior mitral valve leaflets.   7. Trace mitral valve regurgitation.   8. Sclerotic aortic valve with normal opening.      [id number] Sharyn Falk MD  Electronically signed on 10/10/2020 at 1:31:52 PM        *** Final ***        SHARYN FALK   This document has been electronically signed. Oct 10 2020 10:18AM    < end of copied text >

## 2020-10-13 LAB
ANION GAP SERPL CALC-SCNC: 6 MMOL/L — SIGNIFICANT CHANGE UP (ref 5–17)
BUN SERPL-MCNC: 5 MG/DL — LOW (ref 7–23)
CALCIUM SERPL-MCNC: 7.7 MG/DL — LOW (ref 8.5–10.1)
CHLORIDE SERPL-SCNC: 102 MMOL/L — SIGNIFICANT CHANGE UP (ref 96–108)
CO2 SERPL-SCNC: 31 MMOL/L — SIGNIFICANT CHANGE UP (ref 22–31)
CREAT SERPL-MCNC: 0.58 MG/DL — SIGNIFICANT CHANGE UP (ref 0.5–1.3)
GLUCOSE BLDC GLUCOMTR-MCNC: 162 MG/DL — HIGH (ref 70–99)
GLUCOSE BLDC GLUCOMTR-MCNC: 240 MG/DL — HIGH (ref 70–99)
GLUCOSE BLDC GLUCOMTR-MCNC: 269 MG/DL — HIGH (ref 70–99)
GLUCOSE BLDC GLUCOMTR-MCNC: 287 MG/DL — HIGH (ref 70–99)
GLUCOSE SERPL-MCNC: 231 MG/DL — HIGH (ref 70–99)
GRAM STN FLD: SIGNIFICANT CHANGE UP
GRAM STN FLD: SIGNIFICANT CHANGE UP
HCT VFR BLD CALC: 30.9 % — LOW (ref 39–50)
HGB BLD-MCNC: 10.8 G/DL — LOW (ref 13–17)
HIV 1+2 AB+HIV1 P24 AG SERPL QL IA: SIGNIFICANT CHANGE UP
MAGNESIUM SERPL-MCNC: 2.6 MG/DL — SIGNIFICANT CHANGE UP (ref 1.6–2.6)
MCHC RBC-ENTMCNC: 29 PG — SIGNIFICANT CHANGE UP (ref 27–34)
MCHC RBC-ENTMCNC: 35 GM/DL — SIGNIFICANT CHANGE UP (ref 32–36)
MCV RBC AUTO: 83.1 FL — SIGNIFICANT CHANGE UP (ref 80–100)
NRBC # BLD: 0 /100 WBCS — SIGNIFICANT CHANGE UP (ref 0–0)
PLATELET # BLD AUTO: 307 K/UL — SIGNIFICANT CHANGE UP (ref 150–400)
POTASSIUM SERPL-MCNC: 3.2 MMOL/L — LOW (ref 3.5–5.3)
POTASSIUM SERPL-SCNC: 3.2 MMOL/L — LOW (ref 3.5–5.3)
RBC # BLD: 3.72 M/UL — LOW (ref 4.2–5.8)
RBC # FLD: 13 % — SIGNIFICANT CHANGE UP (ref 10.3–14.5)
SODIUM SERPL-SCNC: 139 MMOL/L — SIGNIFICANT CHANGE UP (ref 135–145)
SPECIMEN SOURCE: SIGNIFICANT CHANGE UP
SPECIMEN SOURCE: SIGNIFICANT CHANGE UP
WBC # BLD: 13.48 K/UL — HIGH (ref 3.8–10.5)
WBC # FLD AUTO: 13.48 K/UL — HIGH (ref 3.8–10.5)

## 2020-10-13 PROCEDURE — 99223 1ST HOSP IP/OBS HIGH 75: CPT

## 2020-10-13 PROCEDURE — 99233 SBSQ HOSP IP/OBS HIGH 50: CPT

## 2020-10-13 PROCEDURE — 71260 CT THORAX DX C+: CPT | Mod: 26

## 2020-10-13 PROCEDURE — 74177 CT ABD & PELVIS W/CONTRAST: CPT | Mod: 26

## 2020-10-13 RX ORDER — INSULIN LISPRO 100/ML
8 VIAL (ML) SUBCUTANEOUS
Refills: 0 | Status: DISCONTINUED | OUTPATIENT
Start: 2020-10-13 | End: 2020-10-16

## 2020-10-13 RX ORDER — INSULIN GLARGINE 100 [IU]/ML
20 INJECTION, SOLUTION SUBCUTANEOUS EVERY MORNING
Refills: 0 | Status: DISCONTINUED | OUTPATIENT
Start: 2020-10-13 | End: 2020-10-14

## 2020-10-13 RX ORDER — POTASSIUM CHLORIDE 20 MEQ
40 PACKET (EA) ORAL ONCE
Refills: 0 | Status: COMPLETED | OUTPATIENT
Start: 2020-10-13 | End: 2020-10-13

## 2020-10-13 RX ORDER — POTASSIUM CHLORIDE 20 MEQ
10 PACKET (EA) ORAL
Refills: 0 | Status: COMPLETED | OUTPATIENT
Start: 2020-10-13 | End: 2020-10-13

## 2020-10-13 RX ORDER — TAMSULOSIN HYDROCHLORIDE 0.4 MG/1
0.4 CAPSULE ORAL AT BEDTIME
Refills: 0 | Status: DISCONTINUED | OUTPATIENT
Start: 2020-10-13 | End: 2020-10-16

## 2020-10-13 RX ORDER — IOHEXOL 300 MG/ML
30 INJECTION, SOLUTION INTRAVENOUS ONCE
Refills: 0 | Status: COMPLETED | OUTPATIENT
Start: 2020-10-13 | End: 2020-10-13

## 2020-10-13 RX ADMIN — Medication 8 UNIT(S): at 11:19

## 2020-10-13 RX ADMIN — INSULIN GLARGINE 20 UNIT(S): 100 INJECTION, SOLUTION SUBCUTANEOUS at 09:23

## 2020-10-13 RX ADMIN — Medication 100 MILLIGRAM(S): at 13:02

## 2020-10-13 RX ADMIN — Medication 100 MILLIGRAM(S): at 21:56

## 2020-10-13 RX ADMIN — IOHEXOL 30 MILLILITER(S): 300 INJECTION, SOLUTION INTRAVENOUS at 11:14

## 2020-10-13 RX ADMIN — Medication 8 UNIT(S): at 17:21

## 2020-10-13 RX ADMIN — Medication 6: at 09:24

## 2020-10-13 RX ADMIN — ENOXAPARIN SODIUM 40 MILLIGRAM(S): 100 INJECTION SUBCUTANEOUS at 12:03

## 2020-10-13 RX ADMIN — Medication 2: at 17:20

## 2020-10-13 RX ADMIN — Medication 6: at 11:18

## 2020-10-13 RX ADMIN — Medication 40 MILLIEQUIVALENT(S): at 14:03

## 2020-10-13 RX ADMIN — SODIUM CHLORIDE 100 MILLILITER(S): 9 INJECTION INTRAMUSCULAR; INTRAVENOUS; SUBCUTANEOUS at 13:02

## 2020-10-13 RX ADMIN — TAMSULOSIN HYDROCHLORIDE 0.4 MILLIGRAM(S): 0.4 CAPSULE ORAL at 21:56

## 2020-10-13 RX ADMIN — Medication 100 MILLIEQUIVALENT(S): at 14:05

## 2020-10-13 RX ADMIN — Medication 0: at 21:56

## 2020-10-13 RX ADMIN — Medication 100 MILLIEQUIVALENT(S): at 16:32

## 2020-10-13 RX ADMIN — Medication 100 MILLIEQUIVALENT(S): at 17:21

## 2020-10-13 RX ADMIN — SIMVASTATIN 20 MILLIGRAM(S): 20 TABLET, FILM COATED ORAL at 21:56

## 2020-10-13 RX ADMIN — Medication 100 MILLIGRAM(S): at 05:05

## 2020-10-13 NOTE — DIETITIAN INITIAL EVALUATION ADULT. - OTHER INFO
Pt lived c sister who did the shopping/ cooking.  Pt stated that he stopped taking meds/insulin due to financial problems, has not been following up c Endocrinologist.   Pt aware of foods containing CHO, unsure of blood glucose goals.  RD educated pt on CHO controlled diet c meal planning c plate method, blood glucose goals.  RD encouraged adequate protein-energy intake.   RD advised to discuss problems obtaining meds/insulin due to financial problems with .  Pt is being followed by Endocrinologist @ present.

## 2020-10-13 NOTE — CONSULT NOTE ADULT - SUBJECTIVE AND OBJECTIVE BOX
Patient is a 55y old  Male who presents with a chief complaint of fatigue (12 Oct 2020 14:54)      HPI:  Pt is a 56 y/o male w/DM2 was on sulfonylurea and metform till 2 weeks ago when meds ran out and lack of insurance was on insulin in past.  comes in w/complain of generalized malaise, fatigue and le edema, had us of le to eval for dvt at [facility] [place]  few days ago that was neg.  pt has polydipsia and polyuria. No reported temp at home but in ed was 100.6, no sob, cp, palpitations, n/v/d/c no travels or sick contacts. (10 Oct 2020 04:20)    pt has been diabetic for >5y, was on insulin but stopped due to insurance issues with change in job  no prior known complications from DM      PAST MEDICAL & SURGICAL HISTORY:  DM (diabetes mellitus)    No significant past surgical history    FAMILY HISTORY:  No pertinent family history in first degree relatives          Social History:    Allergies    No Known Allergies    Intolerances        MEDICATIONS  (STANDING):  ceFAZolin   IVPB      ceFAZolin   IVPB 2000 milliGRAM(s) IV Intermittent every 8 hours  dextrose 5%. 1000 milliLiter(s) (50 mL/Hr) IV Continuous <Continuous>  dextrose 50% Injectable 12.5 Gram(s) IV Push once  dextrose 50% Injectable 25 Gram(s) IV Push once  dextrose 50% Injectable 25 Gram(s) IV Push once  enoxaparin Injectable 40 milliGRAM(s) SubCutaneous daily  influenza   Vaccine 0.5 milliLiter(s) IntraMuscular once  insulin glargine Injectable (LANTUS) 20 Unit(s) SubCutaneous every morning  insulin lispro (HumaLOG) corrective regimen sliding scale   SubCutaneous three times a day before meals  insulin lispro (HumaLOG) corrective regimen sliding scale   SubCutaneous at bedtime  insulin lispro Injectable (HumaLOG) 8 Unit(s) SubCutaneous three times a day before meals  iohexol 300 mG (iodine)/mL Oral Solution 30 milliLiter(s) Oral once  simvastatin 20 milliGRAM(s) Oral at bedtime  sodium chloride 0.9%. 1000 milliLiter(s) (100 mL/Hr) IV Continuous <Continuous>  tamsulosin 0.4 milliGRAM(s) Oral at bedtime    MEDICATIONS  (PRN):  acetaminophen    Suspension .. 650 milliGRAM(s) Oral every 6 hours PRN Temp greater or equal to 38C (100.4F), Moderate Pain (4 - 6)  dextrose 40% Gel 15 Gram(s) Oral once PRN Blood Glucose LESS THAN 70 milliGRAM(s)/deciliter  glucagon  Injectable 1 milliGRAM(s) IntraMuscular once PRN Glucose LESS THAN 70 milligrams/deciliter      REVIEW OF SYSTEMS:  CONSTITUTIONAL:  as per HPI  ENT:  No earache, sore throat or runny nose.  CARDIOVASCULAR:  No pressure, squeezing, strangling, tightness, heaviness or aching about the chest, neck, axilla or epigastrium.  RESPIRATORY:  No cough, shortness of breath, PND or orthopnea.  GASTROINTESTINAL:  No nausea, vomiting or diarrhea  ENDOCRINE:  No heat or cold intolerance, +polyuria + polydipsia. no abnormal weight gain or loss, oral thrush      T(C): 37.6 (10-13-20 @ 05:20), Max: 37.9 (10-12-20 @ 18:32)  HR: 94 (10-13-20 @ 05:20) (94 - 99)  BP: 137/86 (10-13-20 @ 05:20) (137/79 - 150/83)  RR: 18 (10-13-20 @ 05:20) (18 - 18)  SpO2: 94% (10-13-20 @ 05:20) (93% - 94%)  Wt(kg): --    PHYSICAL EXAM:  GENERAL: NAD, well-groomed, well-developed  HEAD:  Atraumatic, Normocephalic  EYES:, conjunctiva and sclera clear  CHEST/LUNG: Clear to percussion bilaterally; No rales, rhonchi, wheezing, or rubs  HEART: Regular rate and rhythm; No murmurs, rubs, or gallops  ABDOMEN: Soft, Nontender, Nondistended; Bowel sounds present      CAPILLARY BLOOD GLUCOSE      POCT Blood Glucose.: 269 mg/dL (13 Oct 2020 08:34)  POCT Blood Glucose.: 219 mg/dL (12 Oct 2020 21:59)  POCT Blood Glucose.: 178 mg/dL (12 Oct 2020 16:41)  POCT Blood Glucose.: 245 mg/dL (12 Oct 2020 10:46)                            10.8   13.48 )-----------( 307      ( 13 Oct 2020 07:23 )             30.9       CMP:  10-13 @ 07:23  SGPT --  Albumin --   Alk Phos --   Anion Gap 6   SGOT --   Total Bili --   BUN 5   Calcium Total 7.7   CO2 31   Chloride 102   Creatinine 0.58   eGFR if    eGFR if non    Glucose 231   Potassium 3.2   Protein --   Sodium 139

## 2020-10-13 NOTE — DIETITIAN INITIAL EVALUATION ADULT. - PERTINENT LABORATORY DATA
10-13 Na139 mmol/L Glu 231 mg/dL<H> K+ 3.2 mmol/L<L> Cr  0.58 mg/dL BUN 5 mg/dL<L> 10-11 Alb 1.7 g/dL<L>10-11 ALT 13 U/L AST 14 U/L<L> Alkaline Phosphatase 103 U/L  10-11-20 @ 11:41 a1c 12.9<H>

## 2020-10-13 NOTE — DIETITIAN INITIAL EVALUATION ADULT. - PHYSICAL APPEARANCE
other (specify)/underweight/BMI=19.2(10/10), 10/10-10/12, 1+ edema of feet noted Nutrition focused physical exam conducted; Subcutaneous fat Exam;  [ Moderate  ]  Orbital fat pads region,  [ Mild  ]Buccal fat region,  [ Mild  ]triceps region, [ WNL  ]ribs region.  Muscle Exam; [ Mild  ]temples region, [ Moderate  ]clavicle region, [ Moderate  ]shoulder region, [ Mild  ]Scapula region, [ Mild  ]Interosseous region, [ Mild   ]thigh region, [ Mild  ]Calf region

## 2020-10-13 NOTE — DIETITIAN INITIAL EVALUATION ADULT. - ETIOLOGY
inadequate protein-energy intake in setting of uncontrolled diabetes, financial constraints to mange diabetes care inadequate protein-energy intake & impaired nutrient absorption in setting of uncontrolled diabetes, financial constraints to mange diabetes care

## 2020-10-13 NOTE — DIETITIAN INITIAL EVALUATION ADULT. - PERTINENT MEDS FT
MEDICATIONS  (STANDING):  ceFAZolin   IVPB      ceFAZolin   IVPB 2000 milliGRAM(s) IV Intermittent every 8 hours  dextrose 5%. 1000 milliLiter(s) (50 mL/Hr) IV Continuous <Continuous>  dextrose 50% Injectable 12.5 Gram(s) IV Push once  dextrose 50% Injectable 25 Gram(s) IV Push once  dextrose 50% Injectable 25 Gram(s) IV Push once  enoxaparin Injectable 40 milliGRAM(s) SubCutaneous daily  influenza   Vaccine 0.5 milliLiter(s) IntraMuscular once  insulin glargine Injectable (LANTUS) 20 Unit(s) SubCutaneous every morning  insulin lispro (HumaLOG) corrective regimen sliding scale   SubCutaneous three times a day before meals  insulin lispro (HumaLOG) corrective regimen sliding scale   SubCutaneous at bedtime  insulin lispro Injectable (HumaLOG) 8 Unit(s) SubCutaneous three times a day before meals  iohexol 300 mG (iodine)/mL Oral Solution 30 milliLiter(s) Oral once  simvastatin 20 milliGRAM(s) Oral at bedtime  sodium chloride 0.9%. 1000 milliLiter(s) (100 mL/Hr) IV Continuous <Continuous>  tamsulosin 0.4 milliGRAM(s) Oral at bedtime    MEDICATIONS  (PRN):  acetaminophen    Suspension .. 650 milliGRAM(s) Oral every 6 hours PRN Temp greater or equal to 38C (100.4F), Moderate Pain (4 - 6)  dextrose 40% Gel 15 Gram(s) Oral once PRN Blood Glucose LESS THAN 70 milliGRAM(s)/deciliter  glucagon  Injectable 1 milliGRAM(s) IntraMuscular once PRN Glucose LESS THAN 70 milligrams/deciliter

## 2020-10-13 NOTE — CONSULT NOTE ADULT - ASSESSMENT
A/P: 54yo Male with PMH DM admitted with Sepsis, MSSA bacteremia, ?etiology. Leukocytosis improving, low grade temps. Renal function stable.  this morning.   Urology consulted for CT findings of Cystitis and Prostatitis and Urine cx + Staph aureus.   As per Dr. Rosenbaum, unlikely that cause of MSSA bacteremia is from urinary source.     -F/u PVR, may need stallworth catheter if high residual   -Continue ID follow up, IV ABX per ID recs  -F/u RENETTA r/o IE  -F/u repeat blood culture and Repeat Urine cx  -F/u labs, trend renal function  -Discussed with Dr. Rosenbaum
DM2 uncontrolled with hyperglycemia secondary to poor compliance with meds (due to insurance issues)  ha1c 12.9%
1. Bacteremia - 4/4 blood cultures collected on 10/10/2020 grew Staph aurgeus  2. UTI - UCx grew staph aureus  3. Leukocytosis - decreasing  4. Fevers - had a temperature of 101 last night      Plan:  - discontinue Vancomycin  - start Cefazolin 2 grams IV q 8 hours  - HIV testing  - the patient will need MRI of thoracic spine to r/o osteomyelitis   - glycemic control is important for faster improvement of patient's bacteremia and if there is a difficulty to control patient's glucose levels, endocrine consult is suggested

## 2020-10-13 NOTE — DIETITIAN INITIAL EVALUATION ADULT. - ENERGY NEEDS
Height (cm): 162.6 (10-09)  Weight (kg): 50.8 (10-10)  BMI (kg/m2): 19.2 (10-10)  IBW: 58.9 kg         % IBW:  86%           UBW: 48.9 kg            %UBW: 103%

## 2020-10-13 NOTE — PROGRESS NOTE ADULT - ASSESSMENT
1. MSSA bacteremia (unknown source)  2. MSSA UCx  3. Leukocytosis - decreasing  4. Fevers - improving      Plan:    - continue Cefazolin 2 grams IV q 8 hours  - awaiting for repeat blood cultures final result  - awaiting for HIV testing result  - CT abdomen/pelvis/chest with PO/IV contrast   - glycemic control is important, endocrinologist consult reviewed  - TTE negative for IE, will determine need for RENETTA in coming days depending on workup  - trend fever curve   - trend WBC until normal

## 2020-10-13 NOTE — PROGRESS NOTE ADULT - ATTENDING COMMENTS
Discussed with Alba Smith NP. Note reviewed and modified where appropriate.   Seen and examined. He has painful palpable cord in right forearm.   Per RN he is retaining a little urine on PVR.   CT demonstrated cystitis/prostatitis but also has a hypodense lesion in the liver (is this a septic embolus?) (I personally reviewed)    Repeat blood culture are again positive     Plan:   continue cefazolin  repeat blood culture again tomorrow   Transesophageal Echocardiogram ordered and being arranged   if positive for endocarditis, transfer to [facility] for CT surgery evaluation   Endocrine consult noted: increased insulin->please ensure good glycemic control   warm compress to R upper ext  obtain doppler right upper ext

## 2020-10-13 NOTE — DIETITIAN INITIAL EVALUATION ADULT. - DIET TYPE
+  Glucerna Shake 2 x daily= 440 calories, 20 grams protein daily/low sodium/consistent carbohydrate (evening snack)

## 2020-10-13 NOTE — CONSULT NOTE ADULT - PROBLEM SELECTOR RECOMMENDATION 9
while inpt, increase lantus to 20units am  increase lispro to 8units with meals  continue DANAY with meals  upon d/c, in view of no prescription coverage as outpt, can be d/c on [employer] brand Relion 70/30 insulin 25units with breakfast and 15units with dinner  will need prescriptions for 70/30 relion insulin vials, insulin syringes, relion glucometer, test strips and lancets  recommend f/u with endocrine as outpt within 2wks

## 2020-10-13 NOTE — CONSULT NOTE ADULT - SUBJECTIVE AND OBJECTIVE BOX
UROLOGY CONSULT NOTE    Patient is a 55y old  Male who presents with a chief complaint of fatigue (13 Oct 2020 11:43)    HPI:  Pt is a 56 y/o male w/DM2 was on sulfonylurea and metform till 2 weeks ago when meds ran out and lack of insurance was on insulin in past.  comes in w/complain of generalized malaise, fatigue and le edema, had us of le to eval for dvt at [facility]  few days ago that was neg.  pt has polydipsia and polyuria. No reported temp at home but in ed was 100.6, no sob, cp, palpitations, n/v/d/c no travels or sick contacts. (10 Oct 2020 04:20)      PAST MEDICAL & SURGICAL HISTORY:  DM (diabetes mellitus)    No significant past surgical history    Review of Systems:  Contained within HPI    MEDICATIONS  (STANDING):  ceFAZolin   IVPB      ceFAZolin   IVPB 2000 milliGRAM(s) IV Intermittent every 8 hours  dextrose 5%. 1000 milliLiter(s) (50 mL/Hr) IV Continuous <Continuous>  dextrose 50% Injectable 12.5 Gram(s) IV Push once  dextrose 50% Injectable 25 Gram(s) IV Push once  dextrose 50% Injectable 25 Gram(s) IV Push once  enoxaparin Injectable 40 milliGRAM(s) SubCutaneous daily  influenza   Vaccine 0.5 milliLiter(s) IntraMuscular once  insulin glargine Injectable (LANTUS) 20 Unit(s) SubCutaneous every morning  insulin lispro (HumaLOG) corrective regimen sliding scale   SubCutaneous three times a day before meals  insulin lispro (HumaLOG) corrective regimen sliding scale   SubCutaneous at bedtime  insulin lispro Injectable (HumaLOG) 8 Unit(s) SubCutaneous three times a day before meals  potassium chloride  10 mEq/100 mL IVPB 10 milliEquivalent(s) IV Intermittent every 1 hour  simvastatin 20 milliGRAM(s) Oral at bedtime  sodium chloride 0.9%. 1000 milliLiter(s) (100 mL/Hr) IV Continuous <Continuous>  tamsulosin 0.4 milliGRAM(s) Oral at bedtime    MEDICATIONS  (PRN):  acetaminophen    Suspension .. 650 milliGRAM(s) Oral every 6 hours PRN Temp greater or equal to 38C (100.4F), Moderate Pain (4 - 6)  dextrose 40% Gel 15 Gram(s) Oral once PRN Blood Glucose LESS THAN 70 milliGRAM(s)/deciliter  glucagon  Injectable 1 milliGRAM(s) IntraMuscular once PRN Glucose LESS THAN 70 milligrams/deciliter      Allergies  No Known Allergies    SOCIAL HISTORY   Denies etoh or tobacco use.    FAMILY HISTORY:  No pertinent family history in first degree relatives      Vital Signs Last 24 Hrs  T(C): 37.2 (13 Oct 2020 11:44), Max: 37.9 (12 Oct 2020 18:32)  T(F): 99 (13 Oct 2020 11:44), Max: 100.3 (12 Oct 2020 18:32)  HR: 91 (13 Oct 2020 11:44) (91 - 99)  BP: 140/80 (13 Oct 2020 11:44) (137/79 - 150/83)  RR: 18 (13 Oct 2020 11:44) (18 - 18)  SpO2: 96% (13 Oct 2020 11:44) (93% - 96%)    Physical Exam:    General:  Appears stated age, well-groomed, well-nourished, no distress  Eyes : ALESSANDRA  HENT:  WNL, no JVD  Chest:  clear breath sounds  Cardiovascular: S1S2, Regular rate & rhythm  Abdomen: Soft, NTND  :   Extremities: Calf nontender b/l. 2+ peripheral pulses b/l.   Skin:  No rash  Musculoskeletal:  normal strength  Neuro/Psych:  Alert, oriented to time, place and person       LABS:                        10.8   13.48 )-----------( 307      ( 13 Oct 2020 07:23 )             30.9     10-13    139  |  102  |  5<L>  ----------------------------<  231<H>  3.2<L>   |  31  |  0.58    Ca    7.7<L>      13 Oct 2020 07:23  Mg     2.6     10-13    RADIOLOGY & ADDITIONAL STUDIES:  < from: CT Abdomen and Pelvis w/ Oral Cont and w/ IV Cont (10.13.20 @ 12:19) >  EXAM:  CT ABDOMEN AND PELVIS OC IC                          EXAM:  CT CHEST OC IC                            PROCEDURE DATE:  10/13/2020          INTERPRETATION:  CLINICAL INFORMATION: Sepsis    COMPARISON: None.    PROCEDURE:  CT of the Chest, Abdomen and Pelvis was performed with intravenous contrast.  Intravenous contrast: 90 ml Omnipaque 350. 10 ml discarded.  Oral contrast: Positive contrast was administered.  Sagittal and coronal reformats were performed.    FINDINGS:  CHEST:  LUNGS AND LARGE AIRWAYS: Patent central airways. Bilateral dependent and basilar atelectasis. 3 mm left lower lobe subpleural nodule, possibly intrapulmonary lymph node. No lung consolidations.  PLEURA: Small bilateral pleural effusions.  VESSELS: Atherosclerotic changes of thoracic aorta and coronary arteries.  HEART: Heart size is normal. No pericardial effusion.  MEDIASTINUM AND DENIS: No lymphadenopathy.  CHEST WALL AND LOWER NECK: Within normal limits.    ABDOMEN AND PELVIS:  LIVER: Subcentimeter hepatichypodensity, too small to further characterize.  BILE DUCTS: Normal caliber.  GALLBLADDER: Within normal limits.  SPLEEN: Within normal limits.  PANCREAS: Mildly atrophic.  ADRENALS: Within normal limits.  KIDNEYS/URETERS: Within normal limits.    BLADDER: Wall thickening and inflammation.  REPRODUCTIVE ORGANS: Prostatic inflammatory changes.    BOWEL: No bowel obstruction. Appendix is normal.  PERITONEUM: Trace pelvic fluid.  VESSELS: Atherosclerotic changes.  RETROPERITONEUM/LYMPH NODES: No lymphadenopathy.  ABDOMINAL WALL: Subcutaneous soft tissue edema.  BONES: Degenerative changes.    IMPRESSION:  No lung consolidations.  Cystitis/prostatitis.        REESE KAUR M.D., ATTENDING RADIOLOGIST  This document has been electronically signed. Oct 13 2020 12:41PM    < end of copied text >        Culture - Urine (10.10.20 @ 00:40)    -  Ampicillin/Sulbactam: S <=8/4    -  Cefazolin: S <=4    -  Gentamicin: S <=1 Should not be used as monotherapy    -  Oxacillin: S <=0.25    -  Penicillin: R 2    -  RIF- Rifampin: S <=1 Should not be used as monotherapy    -  Tetra/Doxy: S <=1    -  Trimethoprim/Sulfamethoxazole: S <=0.5/9.5    -  Vancomycin: S 2    Specimen Source: .Urine Clean Catch (Midstream)    Culture Results:   50,000 - 99,000 CFU/mL Staphylococcus aureus    Organism Identification: Staphylococcus aureus    Organism: Staphylococcus aureus    Method Type: ESME       UROLOGY CONSULT NOTE    Patient is a 55y old  Male who presents with a chief complaint of fatigue (13 Oct 2020 11:43)    HPI:  Pt is a 54 y/o male w/DM2 was on sulfonylurea and metform till 2 weeks ago when meds ran out and lack of insurance was on insulin in past.  comes in w/complain of generalized malaise, fatigue and le edema, had us of le to eval for dvt at [facility]  few days ago that was neg.  pt has polydipsia and polyuria. No reported temp at home but in ed was 100.6, no sob, cp, palpitations, n/v/d/c no travels or sick contacts. (10 Oct 2020 04:20)    Pt seen and examined at bedside. States he came to the hospital for weakness and b/l LE pain, still with c/o feeling weak but doing better. Admits to h/o Urinary frequency and nocturia, denies incontinence, urgency, dysuria or hematuria. Voiding spontaneously without difficulty. Denies h/o UTIs or Kidney stones. Does not have a Urologist.   Denies chills, chest pain, sob, dizziness, abdominal pain, N/V, Diarrhea/constipation.     PAST MEDICAL & SURGICAL HISTORY:  DM (diabetes mellitus)    No significant past surgical history    Review of Systems:  Contained within HPI    MEDICATIONS  (STANDING):  ceFAZolin   IVPB      ceFAZolin   IVPB 2000 milliGRAM(s) IV Intermittent every 8 hours  dextrose 5%. 1000 milliLiter(s) (50 mL/Hr) IV Continuous <Continuous>  dextrose 50% Injectable 12.5 Gram(s) IV Push once  dextrose 50% Injectable 25 Gram(s) IV Push once  dextrose 50% Injectable 25 Gram(s) IV Push once  enoxaparin Injectable 40 milliGRAM(s) SubCutaneous daily  influenza   Vaccine 0.5 milliLiter(s) IntraMuscular once  insulin glargine Injectable (LANTUS) 20 Unit(s) SubCutaneous every morning  insulin lispro (HumaLOG) corrective regimen sliding scale   SubCutaneous three times a day before meals  insulin lispro (HumaLOG) corrective regimen sliding scale   SubCutaneous at bedtime  insulin lispro Injectable (HumaLOG) 8 Unit(s) SubCutaneous three times a day before meals  potassium chloride  10 mEq/100 mL IVPB 10 milliEquivalent(s) IV Intermittent every 1 hour  simvastatin 20 milliGRAM(s) Oral at bedtime  sodium chloride 0.9%. 1000 milliLiter(s) (100 mL/Hr) IV Continuous <Continuous>  tamsulosin 0.4 milliGRAM(s) Oral at bedtime    MEDICATIONS  (PRN):  acetaminophen    Suspension .. 650 milliGRAM(s) Oral every 6 hours PRN Temp greater or equal to 38C (100.4F), Moderate Pain (4 - 6)  dextrose 40% Gel 15 Gram(s) Oral once PRN Blood Glucose LESS THAN 70 milliGRAM(s)/deciliter  glucagon  Injectable 1 milliGRAM(s) IntraMuscular once PRN Glucose LESS THAN 70 milligrams/deciliter      Allergies  No Known Allergies    SOCIAL HISTORY   Denies etoh or tobacco use.    FAMILY HISTORY:  No pertinent family history in first degree relatives      Vital Signs Last 24 Hrs  T(C): 37.2 (13 Oct 2020 11:44), Max: 37.9 (12 Oct 2020 18:32)  T(F): 99 (13 Oct 2020 11:44), Max: 100.3 (12 Oct 2020 18:32)  HR: 91 (13 Oct 2020 11:44) (91 - 99)  BP: 140/80 (13 Oct 2020 11:44) (137/79 - 150/83)  RR: 18 (13 Oct 2020 11:44) (18 - 18)  SpO2: 96% (13 Oct 2020 11:44) (93% - 96%)    Physical Exam:    General: Appears stated age, well-groomed, well-nourished, no distress  Eyes : ALESSANDRA  HENT:  WNL, no JVD  Chest:  clear breath sounds  Cardiovascular: S1S2, Regular rate & rhythm  Abdomen: Soft, NTND  : Adequate meatus. No scrotal edema or tenderness. No SP tenderness or bladder distention. Clear yellow urine in urinal at bedside.   Extremities: Calf nontender b/l. 2+ peripheral pulses b/l.   Skin:  No rash or wounds noted.  Musculoskeletal:  normal strength  Neuro/Psych:  Alert, oriented to time, place and person       LABS:                        10.8   13.48 )-----------( 307      ( 13 Oct 2020 07:23 )             30.9     10-13    139  |  102  |  5<L>  ----------------------------<  231<H>  3.2<L>   |  31  |  0.58    Ca    7.7<L>      13 Oct 2020 07:23  Mg     2.6     10-13    RADIOLOGY & ADDITIONAL STUDIES:  < from: CT Abdomen and Pelvis w/ Oral Cont and w/ IV Cont (10.13.20 @ 12:19) >  EXAM:  CT ABDOMEN AND PELVIS OC IC                          EXAM:  CT CHEST OC IC                            PROCEDURE DATE:  10/13/2020          INTERPRETATION:  CLINICAL INFORMATION: Sepsis    COMPARISON: None.    PROCEDURE:  CT of the Chest, Abdomen and Pelvis was performed with intravenous contrast.  Intravenous contrast: 90 ml Omnipaque 350. 10 ml discarded.  Oral contrast: Positive contrast was administered.  Sagittal and coronal reformats were performed.    FINDINGS:  CHEST:  LUNGS AND LARGE AIRWAYS: Patent central airways. Bilateral dependent and basilar atelectasis. 3 mm left lower lobe subpleural nodule, possibly intrapulmonary lymph node. No lung consolidations.  PLEURA: Small bilateral pleural effusions.  VESSELS: Atherosclerotic changes of thoracic aorta and coronary arteries.  HEART: Heart size is normal. No pericardial effusion.  MEDIASTINUM AND DENIS: No lymphadenopathy.  CHEST WALL AND LOWER NECK: Within normal limits.    ABDOMEN AND PELVIS:  LIVER: Subcentimeter hepatichypodensity, too small to further characterize.  BILE DUCTS: Normal caliber.  GALLBLADDER: Within normal limits.  SPLEEN: Within normal limits.  PANCREAS: Mildly atrophic.  ADRENALS: Within normal limits.  KIDNEYS/URETERS: Within normal limits.    BLADDER: Wall thickening and inflammation.  REPRODUCTIVE ORGANS: Prostatic inflammatory changes.    BOWEL: No bowel obstruction. Appendix is normal.  PERITONEUM: Trace pelvic fluid.  VESSELS: Atherosclerotic changes.  RETROPERITONEUM/LYMPH NODES: No lymphadenopathy.  ABDOMINAL WALL: Subcutaneous soft tissue edema.  BONES: Degenerative changes.    IMPRESSION:  No lung consolidations.  Cystitis/prostatitis.        REESE KAUR M.D., ATTENDING RADIOLOGIST  This document has been electronically signed. Oct 13 2020 12:41PM    < end of copied text >        Culture - Urine (10.10.20 @ 00:40)    -  Ampicillin/Sulbactam: S <=8/4    -  Cefazolin: S <=4    -  Gentamicin: S <=1 Should not be used as monotherapy    -  Oxacillin: S <=0.25    -  Penicillin: R 2    -  RIF- Rifampin: S <=1 Should not be used as monotherapy    -  Tetra/Doxy: S <=1    -  Trimethoprim/Sulfamethoxazole: S <=0.5/9.5    -  Vancomycin: S 2    Specimen Source: .Urine Clean Catch (Midstream)    Culture Results:   50,000 - 99,000 CFU/mL Staphylococcus aureus    Organism Identification: Staphylococcus aureus    Organism: Staphylococcus aureus    Method Type: ESME

## 2020-10-13 NOTE — DIETITIAN INITIAL EVALUATION ADULT. - FACTORS AFF FOOD INTAKE
Dulera not covered by insurance.  Formulary alternative is Symbicort.  Please advise.   acute illness/other (specify)

## 2020-10-13 NOTE — CHART NOTE - TREATMENT: THE FOLLOWING DIET HAS BEEN RECOMMENDED
Diet, Consistent Carbohydrate w/Evening Snack:   Low Sodium  Supplement Feeding Modality:  Oral  Glucerna Shake Cans or Servings Per Day:  2       Frequency:  Daily (10-13-20 @ 11:16) [Pending Verification By Attending]  Diet, DASH/TLC:   Sodium & Cholesterol Restricted  Consistent Carbohydrate {No Snacks} (10-10-20 @ 06:06) [Active]

## 2020-10-13 NOTE — PROGRESS NOTE ADULT - ASSESSMENT
pt w/dm2 off meds comes w/generalized fatigue w/hyperglycemia, electrolyte abnomality and uti    Problem/Plan - 1:  ·  Problem: Acute MSSA cystitis without hematuria and MSSA bacteremia.  Persistently positive blood culture. MRI spine did not show any OM. Pending CT chest/A/P. cont iv cefazolin. follow fever curve. follow CBC. will likely needs RENETTA if CT scan negative. ID following.     Problem/Plan - 2:  ·  Problem: Type 2 diabetes mellitus with hyperglycemia. Patient did not use insulin at home and cant afford meds. hemoglobin A 1c is 12. cont current insulin per endocrinologist consult recs.  will help with outpatient insulin affordability.   follow finger sticks.     Problem/Plan - 4:  ·  Problem: Mixed hyperlipidemia.  Plan: cont statin.     Problem/Plan - 5:  ·  Problem: Localized edema.  improved. negative US venous duplex.     Hypokalemia. Repleted    Generalized weakness. consulted PT. MARY at discharge. check vitamin D level.     Problem/Plan - 6:  Problem: Preventive measure. Plan: sq lovenox   pt w/dm2 off meds comes w/generalized fatigue w/hyperglycemia, electrolyte abnomality and uti    Problem/Plan - 1:  ·  Problem: Acute MSSA cystitis without hematuria and MSSA bacteremia.  Persistently positive blood culture. MRI spine did not show any OM. Pending CT chest/A/P. cont iv cefazolin. follow fever curve. follow CBC. will likely needs RENETTA if CT scan negative. ID following.     Problem/Plan - 2:  ·  Problem: Type 2 diabetes mellitus with hyperglycemia. Patient did not use insulin at home and cant afford meds. hemoglobin A 1c is 12. cont current insulin per endocrinologist consult recs.  will help with outpatient insulin affordability.   follow finger sticks.     Problem/Plan - 4:  ·  Problem: Mixed hyperlipidemia.  Plan: cont statin.     Problem/Plan - 5:  ·  Problem: Localized edema.  improved. negative US venous duplex.     Hypokalemia. Repleted    Generalized weakness. consulted PT. MARY at discharge. check vitamin D level.     Problem/Plan - 6:  Problem: Preventive measure. Plan: sq lovenox    addendum: CT abd showed cystitis/prostatitis. discusssed with ID. will consult urologist. repeat blood culture in am.   Also RENETTA to rule out IE.

## 2020-10-13 NOTE — PROGRESS NOTE ADULT - SUBJECTIVE AND OBJECTIVE BOX
CHIEF COMPLAINT:   Low grade fever.   No nausea or vomiting  no diarrhea  no cp/sore throat/cough.   Feeling weak     PHYSICAL EXAM:    GENERAL: Moderately built, malnourished   CHEST/LUNG: Clear to ausculation bilaterally, no wheezing, no crackles   HEART: Regular rate and rhythm; No murmurs, rubs  ABDOMEN: Soft, Nontender, Nondistended; Bowel sounds present  EXTREMITIES:  Moving all four extremities spontaneously, No clubbing, cyanosis. + trace edema  NERVOUS SYSTEM:  Grossly non focal.  Psychiatry: AAO x 3, mood is appropriate       OBJECTIVE DATA:       Vital Signs Last 24 Hrs  T(C): 37.6 (13 Oct 2020 05:20), Max: 37.9 (12 Oct 2020 18:32)  T(F): 99.6 (13 Oct 2020 05:20), Max: 100.3 (12 Oct 2020 18:32)  HR: 94 (13 Oct 2020 05:20) (94 - 99)  BP: 137/86 (13 Oct 2020 05:20) (137/79 - 150/83)  BP(mean): --  RR: 18 (13 Oct 2020 05:20) (18 - 18)  SpO2: 94% (13 Oct 2020 05:20) (93% - 94%)           Daily     Daily Weight in k.9 (13 Oct 2020 10:44)  LABS:                        10.8   13.48 )-----------( 307      ( 13 Oct 2020 07:23 )             30.9             10-13    139  |  102  |  5<L>  ----------------------------<  231<H>  3.2<L>   |  31  |  0.58    Ca    7.7<L>      13 Oct 2020 07:23  Mg     2.6     10-13                         CAPILLARY BLOOD GLUCOSE      POCT Blood Glucose.: 287 mg/dL (13 Oct 2020 10:48)      Culture - Blood (collected 10-12)  Source: .Blood Blood-Peripheral  Gram Stain (prelim) (10-13):    Growth in aerobic bottle: Gram Positive Cocci in Clusters  Preliminary Report (10-13):    Growth in aerobic bottle: Gram Positive Cocci in Clusters    Culture - Blood (collected 10-10)  Source: .Blood Blood-Peripheral  Gram Stain (10-12):    Growth in aerobic and anaerobic bottles: Gram Positive Cocci in Clusters  Final Report (10-12):    Growth in aerobic and anaerobic bottles: Staphylococcus aureus    "Due to technical problems, Proteus sp. will Not be reported as part of    the BCID panel until further notice"    ***Blood Panel PCR results on this specimen are available    approximately 3 hours after the Gram stain result.***    Gram stain, PCR, and/or culture results may not always    correspond due to difference in methodologies.    ************************************************************    This PCR assay was performed using [employer].    The following targets are tested for: Enterococcus,    vancomycin resistant enterococci, Listeria monocytogenes,    coagulase negative staphylococci, S. aureus,    methicillin resistant S. aureus, Streptococcus agalactiae    (Group B), S. pneumoniae, S. pyogenes (Group A),    Acinetobacter baumannii, Enterobacter cloacae, E. coli,    Klebsiella oxytoca, K. pneumoniae, Proteus sp.,    Serratia marcescens, Haemophilus influenzae,    Neisseria meningitidis, Pseudomonas aeruginosa, Candida    albicans, C. glabrata, C krusei, C parapsilosis,    C. tropicalis and the KPC resistance gene.  Organism: Blood Culture PCR  Staphylococcus aureus (10-12)  Organism: Staphylococcus aureus (10-12)    Sensitivities:      -  Ampicillin/Sulbactam: S <=8/4      -  Cefazolin: S <=4      -  Clindamycin: S <=0.25      -  Erythromycin: S <=0.25      -  Gentamicin: S <=1 Should not be used as monotherapy      -  Oxacillin: S 0.5      -  Penicillin: R 4      -  RIF- Rifampin: S <=1 Should not be used as monotherapy      -  Tetra/Doxy: S <=1      -  Trimethoprim/Sulfamethoxazole: S <=0.5/9.5      -  Vancomycin: S 1      Method Type: ESME  Organism: Blood Culture PCR (10-12)    Sensitivities:      -  Staphylococcus aureus: Detec Any isolate of Staphylococcus aureus from a blood culture is NOT considered a contaminant.      Method Type: PCR    Culture - Blood (collected 10-10)  Source: .Blood Blood-Peripheral  Gram Stain (10-12):    Growth in aerobic and anaerobic bottles: Gram Positive Cocci in Clusters  Final Report (10-12):    Growth in aerobic and anaerobic bottles: Staphylococcus aureus    See previous culture [id number]    Culture - Urine (collected 10-10)  Source: .Urine Clean Catch (Midstream)  Final Report (10-11):    50,000 - 99,000 CFU/mL Staphylococcus aureus  Organism: Staphylococcus aureus (10-11)  Organism: Staphylococcus aureus (10-11)    Sensitivities:      -  Ampicillin/Sulbactam: S <=8/4      -  Cefazolin: S <=4      -  Gentamicin: S <=1 Should not be used as monotherapy      -  Oxacillin: S <=0.25      -  Penicillin: R 2      -  RIF- Rifampin: S <=1 Should not be used as monotherapy      -  Tetra/Doxy: S <=1      -  Trimethoprim/Sulfamethoxazole: S <=0.5/9.5      -  Vancomycin: S 2      Method Type: ESME          Interval Radiology studies: reviewed     < from: MR Lumbar Spine w/ IV Cont (10.12.20 @ 18:02) >  Thoracic spine: No acute finding. No evidence of discitis osteomyelitis or epidural abscess.  Small bilateral pleural effusions.    Lumbar spine: No evidence of discitis osteomyelitis or epidural abscess.    Mild degenerative changes most notable atL4/L5 where there is mild to moderate canal stenosis and moderate to severe right neural foraminal narrowing. No nerve root or cauda equina compression.    Distended urinary bladder. Please correlate clinically for retention. Suggestion of perirectalinflammation which can be seen with proctitis. Follow-up with CT abdomen and pelvis with IV contrast is recommended.    < end of copied text >    MEDICATIONS  (STANDING):  ceFAZolin   IVPB      ceFAZolin   IVPB 2000 milliGRAM(s) IV Intermittent every 8 hours  dextrose 5%. 1000 milliLiter(s) (50 mL/Hr) IV Continuous <Continuous>  dextrose 50% Injectable 12.5 Gram(s) IV Push once  dextrose 50% Injectable 25 Gram(s) IV Push once  dextrose 50% Injectable 25 Gram(s) IV Push once  enoxaparin Injectable 40 milliGRAM(s) SubCutaneous daily  influenza   Vaccine 0.5 milliLiter(s) IntraMuscular once  insulin glargine Injectable (LANTUS) 20 Unit(s) SubCutaneous every morning  insulin lispro (HumaLOG) corrective regimen sliding scale   SubCutaneous three times a day before meals  insulin lispro (HumaLOG) corrective regimen sliding scale   SubCutaneous at bedtime  insulin lispro Injectable (HumaLOG) 8 Unit(s) SubCutaneous three times a day before meals  simvastatin 20 milliGRAM(s) Oral at bedtime  sodium chloride 0.9%. 1000 milliLiter(s) (100 mL/Hr) IV Continuous <Continuous>  tamsulosin 0.4 milliGRAM(s) Oral at bedtime    MEDICATIONS  (PRN):  acetaminophen    Suspension .. 650 milliGRAM(s) Oral every 6 hours PRN Temp greater or equal to 38C (100.4F), Moderate Pain (4 - 6)  dextrose 40% Gel 15 Gram(s) Oral once PRN Blood Glucose LESS THAN 70 milliGRAM(s)/deciliter  glucagon  Injectable 1 milliGRAM(s) IntraMuscular once PRN Glucose LESS THAN 70 milligrams/deciliter

## 2020-10-14 LAB
24R-OH-CALCIDIOL SERPL-MCNC: 22 NG/ML — LOW (ref 30–80)
ANION GAP SERPL CALC-SCNC: 8 MMOL/L — SIGNIFICANT CHANGE UP (ref 5–17)
BUN SERPL-MCNC: 6 MG/DL — LOW (ref 7–23)
CALCIUM SERPL-MCNC: 8.1 MG/DL — LOW (ref 8.5–10.1)
CHLORIDE SERPL-SCNC: 99 MMOL/L — SIGNIFICANT CHANGE UP (ref 96–108)
CO2 SERPL-SCNC: 31 MMOL/L — SIGNIFICANT CHANGE UP (ref 22–31)
CREAT SERPL-MCNC: 0.61 MG/DL — SIGNIFICANT CHANGE UP (ref 0.5–1.3)
CULTURE RESULTS: SIGNIFICANT CHANGE UP
CULTURE RESULTS: SIGNIFICANT CHANGE UP
GLUCOSE BLDC GLUCOMTR-MCNC: 126 MG/DL — HIGH (ref 70–99)
GLUCOSE BLDC GLUCOMTR-MCNC: 218 MG/DL — HIGH (ref 70–99)
GLUCOSE BLDC GLUCOMTR-MCNC: 221 MG/DL — HIGH (ref 70–99)
GLUCOSE BLDC GLUCOMTR-MCNC: 296 MG/DL — HIGH (ref 70–99)
GLUCOSE SERPL-MCNC: 276 MG/DL — HIGH (ref 70–99)
GRAM STN FLD: SIGNIFICANT CHANGE UP
GRAM STN FLD: SIGNIFICANT CHANGE UP
HCT VFR BLD CALC: 35 % — LOW (ref 39–50)
HGB BLD-MCNC: 11.5 G/DL — LOW (ref 13–17)
MCHC RBC-ENTMCNC: 28.5 PG — SIGNIFICANT CHANGE UP (ref 27–34)
MCHC RBC-ENTMCNC: 32.9 GM/DL — SIGNIFICANT CHANGE UP (ref 32–36)
MCV RBC AUTO: 86.8 FL — SIGNIFICANT CHANGE UP (ref 80–100)
NRBC # BLD: 0 /100 WBCS — SIGNIFICANT CHANGE UP (ref 0–0)
PLATELET # BLD AUTO: 349 K/UL — SIGNIFICANT CHANGE UP (ref 150–400)
POTASSIUM SERPL-MCNC: 3.2 MMOL/L — LOW (ref 3.5–5.3)
POTASSIUM SERPL-SCNC: 3.2 MMOL/L — LOW (ref 3.5–5.3)
RBC # BLD: 4.03 M/UL — LOW (ref 4.2–5.8)
RBC # FLD: 12.7 % — SIGNIFICANT CHANGE UP (ref 10.3–14.5)
SODIUM SERPL-SCNC: 138 MMOL/L — SIGNIFICANT CHANGE UP (ref 135–145)
SPECIMEN SOURCE: SIGNIFICANT CHANGE UP
SPECIMEN SOURCE: SIGNIFICANT CHANGE UP
WBC # BLD: 11.24 K/UL — HIGH (ref 3.8–10.5)
WBC # FLD AUTO: 11.24 K/UL — HIGH (ref 3.8–10.5)

## 2020-10-14 PROCEDURE — 93971 EXTREMITY STUDY: CPT | Mod: 26,RT

## 2020-10-14 PROCEDURE — 99232 SBSQ HOSP IP/OBS MODERATE 35: CPT

## 2020-10-14 PROCEDURE — 99233 SBSQ HOSP IP/OBS HIGH 50: CPT

## 2020-10-14 RX ORDER — POTASSIUM CHLORIDE 20 MEQ
40 PACKET (EA) ORAL ONCE
Refills: 0 | Status: DISCONTINUED | OUTPATIENT
Start: 2020-10-14 | End: 2020-10-14

## 2020-10-14 RX ORDER — INSULIN GLARGINE 100 [IU]/ML
24 INJECTION, SOLUTION SUBCUTANEOUS EVERY MORNING
Refills: 0 | Status: DISCONTINUED | OUTPATIENT
Start: 2020-10-14 | End: 2020-10-15

## 2020-10-14 RX ORDER — POTASSIUM CHLORIDE 20 MEQ
40 PACKET (EA) ORAL
Refills: 0 | Status: COMPLETED | OUTPATIENT
Start: 2020-10-14 | End: 2020-10-14

## 2020-10-14 RX ADMIN — Medication 40 MILLIEQUIVALENT(S): at 11:46

## 2020-10-14 RX ADMIN — Medication 8 UNIT(S): at 11:25

## 2020-10-14 RX ADMIN — Medication 6: at 08:17

## 2020-10-14 RX ADMIN — Medication 100 MILLIGRAM(S): at 05:18

## 2020-10-14 RX ADMIN — Medication 8 UNIT(S): at 08:17

## 2020-10-14 RX ADMIN — SIMVASTATIN 20 MILLIGRAM(S): 20 TABLET, FILM COATED ORAL at 21:07

## 2020-10-14 RX ADMIN — Medication 4: at 11:25

## 2020-10-14 RX ADMIN — SODIUM CHLORIDE 100 MILLILITER(S): 9 INJECTION INTRAMUSCULAR; INTRAVENOUS; SUBCUTANEOUS at 14:27

## 2020-10-14 RX ADMIN — INSULIN GLARGINE 24 UNIT(S): 100 INJECTION, SOLUTION SUBCUTANEOUS at 08:29

## 2020-10-14 RX ADMIN — Medication 100 MILLIGRAM(S): at 13:32

## 2020-10-14 RX ADMIN — ENOXAPARIN SODIUM 40 MILLIGRAM(S): 100 INJECTION SUBCUTANEOUS at 11:26

## 2020-10-14 RX ADMIN — TAMSULOSIN HYDROCHLORIDE 0.4 MILLIGRAM(S): 0.4 CAPSULE ORAL at 21:43

## 2020-10-14 RX ADMIN — Medication 100 MILLIGRAM(S): at 21:07

## 2020-10-14 RX ADMIN — Medication 40 MILLIEQUIVALENT(S): at 13:35

## 2020-10-14 NOTE — PROGRESS NOTE ADULT - ASSESSMENT
pt w/dm2 off meds comes w/generalized fatigue w/hyperglycemia, electrolyte abnomality and uti    Problem/Plan - 1:  ·  Problem: Acute MSSA cystitis without hematuria and MSSA bacteremia.  Persistently positive blood culture. MRI spine did not show any OM.  CT chest/A/P showed cystitis/prostatitis. cont iv cefazolin. follow repeat culture again today. follow CBC daily. US right arm showed right superificial thrombophlebitis. Pending RENETTA to rule out IE. Discussed with Dr Caputo in person.     Problem/Plan - 2:  ·  Problem: Type 2 diabetes mellitus with hyperglycemia. uncontrolled with hyperglycemia. Increase lantus. cont other management. follow finger sticks. Discuss with care team>>> patient in process of getting his insurance established and then hopefully will not have any problem with medication coverage as outpatient.     Problem/Plan - 4:  ·  Problem: Mixed hyperlipidemia.  Plan: cont statin.     Problem/Plan - 5:  ·  Problem: Localized edema.  improved. negative US venous duplex.     Hypokalemia. Repleted    Generalized weakness. consulted PT. MARY at discharge.    Problem/Plan - 6:  Problem: Preventive measure. Plan: sq lovenox

## 2020-10-14 NOTE — PROGRESS NOTE ADULT - PROBLEM SELECTOR PLAN 1
Continue with the same regimen while inpatient   Patient's nutrition as in the nutrition section / please see notes   patient can be discharged on 70/30 mix 40 units in divided doses

## 2020-10-14 NOTE — PROGRESS NOTE ADULT - SUBJECTIVE AND OBJECTIVE BOX
CHIEF COMPLAINT:   No fever   No nausea or vomiting  no diarrhea  no cp/sore throat/cough.       PHYSICAL EXAM:    GENERAL: Moderately built, malnourished   CHEST/LUNG: Clear to ausculation bilaterally, no wheezing, no crackles   HEART: Regular rate and rhythm; No murmurs, rubs  ABDOMEN: Soft, Nontender, Nondistended; Bowel sounds present  EXTREMITIES:  Moving all four extremities spontaneously, No clubbing, cyanosis. + trace edema  NERVOUS SYSTEM:  Grossly non focal.  Psychiatry: AAO x 3, mood is appropriate       OBJECTIVE DATA:       Vital Signs Last 24 Hrs  T(C): 36.4 (14 Oct 2020 05:03), Max: 37.3 (13 Oct 2020 17:00)  T(F): 97.5 (14 Oct 2020 05:03), Max: 99.2 (13 Oct 2020 17:00)  HR: 98 (14 Oct 2020 05:03) (90 - 98)  BP: 137/87 (14 Oct 2020 05:03) (137/87 - 150/-)  BP(mean): --  RR: 18 (14 Oct 2020 05:03) (18 - 18)  SpO2: 94% (14 Oct 2020 05:03) (93% - 96%)           Daily     Daily Weight in k.8 (14 Oct 2020 05:03)  LABS:                        11.5   11.24 )-----------( 349      ( 14 Oct 2020 08:00 )             35.0             10-14    138  |  99  |  6<L>  ----------------------------<  276<H>  3.2<L>   |  31  |  0.61    Ca    8.1<L>      14 Oct 2020 08:00  Mg     2.6     10-13                         CAPILLARY BLOOD GLUCOSE      POCT Blood Glucose.: 221 mg/dL (14 Oct 2020 11:02)      Culture - Blood (collected 10-12)  Source: .Blood Blood-Peripheral  Gram Stain (prelim) (10-13):    Growth in aerobic bottle: Gram Positive Cocci in Clusters    Growth in anaerobic bottle: Gram Positive Cocci in Clusters  Preliminary Report (10-13):    Growth in aerobic bottle: Gram Positive Cocci in Clusters    Growth in anaerobic bottle: Gram Positive Cocci in Clusters    Culture - Blood (collected 10-12)  Source: .Blood Blood-Peripheral  Gram Stain (prelim) (10-13):    Growth in aerobic bottle: Gram Positive Cocci in Clusters    Growth in anaerobic bottle: Gram Positive Cocci in Clusters  Preliminary Report (10-13):    Growth in aerobic bottle: Gram Positive Cocci in Clusters    Growth in anaerobic bottle: Gram Positive Cocci in Clusters    Culture - Blood (collected 10-10)  Source: .Blood Blood-Peripheral  Gram Stain (10-12):    Growth in aerobic and anaerobic bottles: Gram Positive Cocci in Clusters  Final Report (10-12):    Growth in aerobic and anaerobic bottles: Staphylococcus aureus    "Due to technical problems, Proteus sp. will Not be reported as part of    the BCID panel until further notice"    ***Blood Panel PCR results on this specimen are available    approximately 3 hours after the Gram stain result.***    Gram stain, PCR, and/or culture results may not always    correspond due to difference in methodologies.    ************************************************************    This PCR assay was performed using [employer].    The following targets are tested for: Enterococcus,    vancomycin resistant enterococci, Listeria monocytogenes,    coagulase negative staphylococci, S. aureus,    methicillin resistant S. aureus, Streptococcus agalactiae    (Group B), S. pneumoniae, S. pyogenes (Group A),    Acinetobacter baumannii, Enterobacter cloacae, E. coli,    Klebsiella oxytoca, K. pneumoniae, Proteus sp.,    Serratia marcescens, Haemophilus influenzae,    Neisseria meningitidis, Pseudomonas aeruginosa, Candida    albicans, C. glabrata, C krusei, C parapsilosis,    C. tropicalis and the KPC resistance gene.  Organism: Blood Culture PCR  Staphylococcus aureus (10-12)  Organism: Staphylococcus aureus (10-12)    Sensitivities:      -  Ampicillin/Sulbactam: S <=8/4      -  Cefazolin: S <=4      -  Clindamycin: S <=0.25      -  Erythromycin: S <=0.25      -  Gentamicin: S <=1 Should not be used as monotherapy      -  Oxacillin: S 0.5      -  Penicillin: R 4      -  RIF- Rifampin: S <=1 Should not be used as monotherapy      -  Tetra/Doxy: S <=1      -  Trimethoprim/Sulfamethoxazole: S <=0.5/9.5      -  Vancomycin: S 1      Method Type: ESME  Organism: Blood Culture PCR (10-12)    Sensitivities:      -  Staphylococcus aureus: Detec Any isolate of Staphylococcus aureus from a blood culture is NOT considered a contaminant.      Method Type: PCR    Culture - Blood (collected 10-10)  Source: .Blood Blood-Peripheral  Gram Stain (10-12):    Growth in aerobic and anaerobic bottles: Gram Positive Cocci in Clusters  Final Report (10-12):    Growth in aerobic and anaerobic bottles: Staphylococcus aureus    See previous culture [id number]    Culture - Urine (collected 10-10)  Source: .Urine Clean Catch (Midstream)  Final Report (10-11):    50,000 - 99,000 CFU/mL Staphylococcus aureus  Organism: Staphylococcus aureus (10-11)  Organism: Staphylococcus aureus (10-11)    Sensitivities:      -  Ampicillin/Sulbactam: S <=8/4      -  Cefazolin: S <=4      -  Gentamicin: S <=1 Should not be used as monotherapy      -  Oxacillin: S <=0.25      -  Penicillin: R 2      -  RIF- Rifampin: S <=1 Should not be used as monotherapy      -  Tetra/Doxy: S <=1      -  Trimethoprim/Sulfamethoxazole: S <=0.5/9.5      -  Vancomycin: S 2      Method Type: ESME          Interval Radiology studies: reviewed     < from: US Duplex Venous Upper Ext Ltd, Right (10.14.20 @ 09:40) >  No evidence of right upper extremity deep venous thrombosis.    Thrombi in the right mid basilic vein, compatible with superficial thrombophlebitis.    < end of copied text >    MEDICATIONS  (STANDING):  ceFAZolin   IVPB      ceFAZolin   IVPB 2000 milliGRAM(s) IV Intermittent every 8 hours  dextrose 5%. 1000 milliLiter(s) (50 mL/Hr) IV Continuous <Continuous>  dextrose 50% Injectable 12.5 Gram(s) IV Push once  dextrose 50% Injectable 25 Gram(s) IV Push once  dextrose 50% Injectable 25 Gram(s) IV Push once  enoxaparin Injectable 40 milliGRAM(s) SubCutaneous daily  influenza   Vaccine 0.5 milliLiter(s) IntraMuscular once  insulin glargine Injectable (LANTUS) 24 Unit(s) SubCutaneous every morning  insulin lispro (HumaLOG) corrective regimen sliding scale   SubCutaneous three times a day before meals  insulin lispro (HumaLOG) corrective regimen sliding scale   SubCutaneous at bedtime  insulin lispro Injectable (HumaLOG) 8 Unit(s) SubCutaneous three times a day before meals  potassium chloride    Tablet ER 40 milliEquivalent(s) Oral every 2 hours  simvastatin 20 milliGRAM(s) Oral at bedtime  sodium chloride 0.9%. 1000 milliLiter(s) (100 mL/Hr) IV Continuous <Continuous>  tamsulosin 0.4 milliGRAM(s) Oral at bedtime    MEDICATIONS  (PRN):  acetaminophen    Suspension .. 650 milliGRAM(s) Oral every 6 hours PRN Temp greater or equal to 38C (100.4F), Moderate Pain (4 - 6)  dextrose 40% Gel 15 Gram(s) Oral once PRN Blood Glucose LESS THAN 70 milliGRAM(s)/deciliter  glucagon  Injectable 1 milliGRAM(s) IntraMuscular once PRN Glucose LESS THAN 70 milligrams/deciliter

## 2020-10-14 NOTE — PROGRESS NOTE ADULT - ASSESSMENT
10/14: Venous dopplers of RUE performed No evidence of right upper extremity deep venous thrombosis. Thrombi in the right mid basilic vein, compatible with superficial thrombophlebitis.   CT abdomen/plevis/chest was done and revealed no lung disease, + cystitis and prostatitis, urology on board, PVRs monitored, 171 ml yesterday, may will need a Elizabeth. The patient remains afebrile, leukocytosis is improving, WBC 11.24 today, repeat blood cultures grew gram positive cocci in clusters. HIV testing is negative. The plan for the patient is to have RENETTA done and if positive, the patient will be referred to CT surgery evaluation.         1. MSSA bacteremia (unknown source)  2. MSSA UCx  3. Leukocytosis - decreasing  4. Fevers - improved       Plan:    - continue Cefazolin 2 grams IV q 8 hours  - awaiting for repeat blood cultures final result  - glycemic control is important, endocrinologist consult reviewed  - RENETTA and if findings are positive the patient will be referred to CT surgery for evaluation   - trend fever curve   - trend WBC until normal

## 2020-10-14 NOTE — PROGRESS NOTE ADULT - ATTENDING COMMENTS
Discussed with Alba Smith NP. Note reviewed and modified where appropriate.   Seen and examined. Yesterday there was concern for palpable cord in the right forearm.   US done shows thrombophlebitis   he says he is urinating a lot   CT demonstrated cystitis/prostatitis but also has a hypodense lesion in the liver (is this a septic embolus?) (I personally reviewed)    Repeat blood culture are again positive, and is awaiting Transesophageal Echocardiogram     Plan:   continue cefazolin 2g q8hrs   repeat blood culture sent   Transesophageal Echocardiogram ordered and being arranged   if positive for endocarditis, transfer to [facility] for CT surgery evaluation   Endocrine consult noted: increased insulin->please ensure good glycemic control   warm compress to R upper ext  consider vascular consult for thrombophlebitis

## 2020-10-14 NOTE — PROGRESS NOTE ADULT - SUBJECTIVE AND OBJECTIVE BOX
Patient seen and examined bedside resting comfortably. Wife present.   No complaints offered. Denies pain.  Voiding spontaneously without difficulty.  Denies hematuria and dysuria. Denies nausea and vomiting. Tolerating diet.  Denies fever/chills, chest pain, dyspnea, cough.    T(F): 98.2 (10-14-20 @ 16:19), Max: 99.2 (10-13-20 @ 17:00)  HR: 92 (10-14-20 @ 16:19) (87 - 98)  BP: 129/75 (10-14-20 @ 16:19) (129/75 - 150/-)  RR: 18 (10-14-20 @ 16:19) (16 - 18)  SpO2: 94% (10-14-20 @ 12:24) (93% - 96%)    POCT Blood Glucose.: 126 mg/dL (14 Oct 2020 16:08)  POCT Blood Glucose.: 221 mg/dL (14 Oct 2020 11:02)  POCT Blood Glucose.: 296 mg/dL (14 Oct 2020 08:01)  POCT Blood Glucose.: 240 mg/dL (13 Oct 2020 21:55)  POCT Blood Glucose.: 162 mg/dL (13 Oct 2020 16:47)      PHYSICAL EXAM:    General: NAD, alert and awake  Chest: nonlabored respirations  Abdomen: soft, NT/ND.   Extremities: Calf soft, nontender b/l.   : No suprapubic tenderness or bladder distention.      LABS:                        11.5   11.24 )-----------( 349      ( 14 Oct 2020 08:00 )             35.0   10-14    138  |  99  |  6<L>  ----------------------------<  276<H>  3.2<L>   |  31  |  0.61    Ca    8.1<L>      14 Oct 2020 08:00  Mg     2.6     10-13      I&O's Detail    13 Oct 2020 07:01  -  14 Oct 2020 07:00  --------------------------------------------------------  IN:    IV PiggyBack: 50 mL    IV PiggyBack: 300 mL    sodium chloride 0.9%: 700 mL  Total IN: 1050 mL    OUT:    Voided (mL): 4290 mL  Total OUT: 4290 mL    Total NET: -3240 mL      A/P: 54yo Male with PMH DM admitted with Sepsis, MSSA bacteremia, ?etiology. Leukocytosis improving, low grade temps. Renal function stable.  this morning.   Urology consulted for CT findings of Cystitis and Prostatitis and Urine cx + Staph aureus.   As per Dr. Rosenbaum, unlikely that cause of MSSA bacteremia is from urinary source.     -F/u PVR, may need stallworth catheter if high residual   -Continue ID follow up, IV ABX per ID recs  -F/u RENETTA r/o IE  -F/u repeat blood culture and Repeat Urine cx  -F/u labs, trend renal function  -No acute  intervention  -Discussed with Dr. Rosenbaum

## 2020-10-14 NOTE — PROGRESS NOTE ADULT - SUBJECTIVE AND OBJECTIVE BOX
[facility]  Division of Infectious Diseases  [phone]    BHARATH GONZALES  55y, Male  [id number]    Interval note:  The patient is in bed, comfortable, no distress, reports feeling better overall. However, the patient continues to complains of the pain in his bilateral lower extremities and forearms pain. Venous dopplers of RUE performed No evidence of right upper extremity deep venous thrombosis. Thrombi in the right mid basilic vein, compatible with superficial thrombophlebitis. CT abdomen/plevis/chest was done and revealed no lung disease, + cystitis and prostatitis, urology on board.    The patient remains afebrile, leukocytosis is improving, WBC 11.24 today, repeat blood cultures grew gram positive cocci in clusters. HIV testing is negative.   The plan for the patient is to have RENETTA done and if positive, the patient will be referred to CT surgery evaluation.   The patient was seen by urology, found to have PVR of 171 ml, may will need Elizabeth if the patient will continue having high PVRs.       PMH/PSH--  DM (diabetes mellitus)    No significant past surgical history        Allergies--  No Known Allergies      Medications--  Antibiotics: ceFAZolin   IVPB      ceFAZolin   IVPB 2000 milliGRAM(s) IV Intermittent every 8 hours    Immunologic: influenza   Vaccine 0.5 milliLiter(s) IntraMuscular once    Other: acetaminophen    Suspension .. PRN  dextrose 40% Gel PRN  dextrose 5%.  dextrose 50% Injectable  dextrose 50% Injectable  dextrose 50% Injectable  enoxaparin Injectable  glucagon  Injectable PRN  insulin glargine Injectable (LANTUS)  insulin lispro (HumaLOG) corrective regimen sliding scale  insulin lispro (HumaLOG) corrective regimen sliding scale  insulin lispro Injectable (HumaLOG)  potassium chloride    Tablet ER  simvastatin  sodium chloride 0.9%.  tamsulosin    Antimicrobials last 90 days per EMR: MEDICATIONS  (STANDING):    ceFAZolin   IVPB   100 mL/Hr IV Intermittent (10-12-20 @ 18:32)    ceFAZolin   IVPB   100 mL/Hr IV Intermittent (10-14-20 @ 05:18)   100 mL/Hr IV Intermittent (10-13-20 @ 21:56)   100 mL/Hr IV Intermittent (10-13-20 @ 13:02)   100 mL/Hr IV Intermittent (10-13-20 @ 05:05)   100 mL/Hr IV Intermittent (10-12-20 @ 22:12)    cefTRIAXone   IVPB   100 mL/Hr IV Intermittent (10-10-20 @ 20:52)    cefTRIAXone   IVPB   100 mL/Hr IV Intermittent (10-09-20 @ 21:28)    vancomycin  IVPB   250 mL/Hr IV Intermittent (10-12-20 @ 09:32)   250 mL/Hr IV Intermittent (10-12-20 @ 00:24)   250 mL/Hr IV Intermittent (10-11-20 @ 17:27)   250 mL/Hr IV Intermittent (10-11-20 @ 10:13)    vancomycin  IVPB   250 mL/Hr IV Intermittent (10-10-20 @ 16:55)    vancomycin  IVPB   250 mL/Hr IV Intermittent (10-09-20 @ 22:17)        Social History--  EtOH: denies   Tobacco: denies   Drug Use: denies     Family/Marital History--  No pertinent family history in first degree relatives          Travel/Environmental/Occupational History:      Review of Systems:    Pertinent positives and negatives--  Constitutional: No fevers. No Chills. No Rigors. No recent significant weight loss.   Eyes: reports having eye exam long time ago  ENMT: + partial dentures   Cardiovascular: No chest pain. No palpitations.  Respiratory: No shortness of breath. No cough.  Gastrointestinal: No nausea or vomiting. No diarrhea or constipation. No abdominal pain  Genitourinary: + urinary urgency, denies any incontinence, no dysuria   Musculoskeletal: complains of pain in bilateral lower extremities, and bilateral forearms   Skin: denies   Neurologic: denies   Psychiatric: Pleasant. Appropriate affect.  Endocrine:  Heme/Lymphatic:  Allergy/Immunologic: no known allergies     Review of systems otherwise negative except as previously noted.    Physical exam    Vital Signs: T(F): 97.5 (10-14-20 @ 05:03), Max: 99.2 (10-13-20 @ 17:00)  HR: 98 (10-14-20 @ 05:03)  BP: 137/87 (10-14-20 @ 05:03)  RR: 18 (10-14-20 @ 05:03)  SpO2: 94% (10-14-20 @ 05:03)  Wt(kg): --      General: Nontoxic-appearing thin Male in no acute distress.  HEENT: AT/NC. PERRL. EOMI. Anicteric. Conjunctiva pink and moist. Oropharynx clear. Dentition + partial dentures   Neck: Not rigid. No sense of mass.   Nodes: None palpable.  Lungs: Clear bilaterally without rales, wheezing or rhonchi, respirations not labored.   Heart: Regular rate and rhythm. No Murmur. No rub. No gallop. No palpable thrill.  Abdomen: Bowel sounds present and normoactive. Soft. Nondistended. Nontender. No sense of mass. No organomegaly.  Back: No spinal tenderness. No costovertebral angle tenderness.   Extremities: No cyanosis or clubbing. No edema. Tender upon palpation bilateral lower extremities and bilateral forearms. Painful palpable cord in right forearm.   Skin: Warm. Dry. Good turgor. No rash. No vasculitic stigmata.  Psychiatric: Appropriate affect and mood for situation.         Laboratory & Imaging Data--  CBC                        11.5   11.24 )-----------( 349      ( 14 Oct 2020 08:00 )             35.0   WBC trend  22.91  21.28  15.88  13.48  11.24 - today    Chemistries  10-14    138  |  99  |  6<L>  ----------------------------<  276<H>  3.2<L>   |  31  |  0.61    Ca    8.1<L>      14 Oct 2020 08:00  Mg     2.6     10-13        Culture Data    Culture - Blood (collected 12 Oct 2020 18:54)  Source: .Blood Blood-Peripheral  Gram Stain (prelim) (13 Oct 2020 16:29):    Growth in aerobic bottle: Gram Positive Cocci in Clusters    Growth in anaerobic bottle: Gram Positive Cocci in Clusters  Preliminary Report (13 Oct 2020 16:32):    Growth in aerobic bottle: Gram Positive Cocci in Clusters    Growth in anaerobic bottle: Gram Positive Cocci in Clusters    Culture - Blood (collected 12 Oct 2020 18:54)  Source: .Blood Blood-Peripheral  Gram Stain (prelim) (13 Oct 2020 16:58):    Growth in aerobic bottle: Gram Positive Cocci in Clusters    Growth in anaerobic bottle: Gram Positive Cocci in Clusters  Preliminary Report (13 Oct 2020 16:58):    Growth in aerobic bottle: Gram Positive Cocci in Clusters    Growth in anaerobic bottle: Gram Positive Cocci in Clusters    Culture - Blood (collected 10 Oct 2020 00:43)  Source: .Blood Blood-Peripheral  Gram Stain (12 Oct 2020 13:43):    Growth in aerobic and anaerobic bottles: Gram Positive Cocci in Clusters  Final Report (12 Oct 2020 13:43):    Growth in aerobic and anaerobic bottles: Staphylococcus aureus    "Due to technical problems, Proteus sp. will Not be reported as part of    the BCID panel until further notice"    ***Blood Panel PCR results on this specimen are available    approximately 3 hours after the Gram stain result.***    Gram stain, PCR, and/or culture results may not always    correspond due to difference in methodologies.    ************************************************************    This PCR assay was performed using [employer].    The following targets are tested for: Enterococcus,    vancomycin resistant enterococci, Listeria monocytogenes,    coagulase negative staphylococci, S. aureus,    methicillin resistant S. aureus, Streptococcus agalactiae    (Group B), S. pneumoniae, S. pyogenes (Group A),    Acinetobacter baumannii, Enterobacter cloacae, E. coli,    Klebsiella oxytoca, K. pneumoniae, Proteus sp.,    Serratia marcescens, Haemophilus influenzae,    Neisseria meningitidis, Pseudomonas aeruginosa, Candida    albicans, C. glabrata, C krusei, C parapsilosis,    C. tropicalis and the KPC resistance gene.  Organism: Blood Culture PCR  Staphylococcus aureus (12 Oct 2020 13:43)  Organism: Staphylococcus aureus (12 Oct 2020 13:43)  Organism: Blood Culture PCR (12 Oct 2020 13:43)    Culture - Blood (collected 10 Oct 2020 00:43)  Source: .Blood Blood-Peripheral  Gram Stain (12 Oct 2020 13:44):    Growth in aerobic and anaerobic bottles: Gram Positive Cocci in Clusters  Final Report (12 Oct 2020 13:44):    Growth in aerobic and anaerobic bottles: Staphylococcus aureus    See previous culture [id number]    Culture - Urine (collected 10 Oct 2020 00:40)  Source: .Urine Clean Catch (Midstream)  Final Report (11 Oct 2020 19:56):    50,000 - 99,000 CFU/mL Staphylococcus aureus  Organism: Staphylococcus aureus (11 Oct 2020 19:56)  Organism: Staphylococcus aureus (11 Oct 2020 19:56)      < from: CT Chest w/ Oral Cont and w/ IV Cont (10.13.20 @ 12:19) >    EXAM:  CT ABDOMEN AND PELVIS OC IC                          EXAM:  CT CHEST OC IC                            PROCEDURE DATE:  10/13/2020          INTERPRETATION:  CLINICAL INFORMATION: Sepsis    COMPARISON: None.    PROCEDURE:  CT of the Chest, Abdomen and Pelvis was performed with intravenous contrast.  Intravenous contrast: 90 ml Omnipaque 350. 10 ml discarded.  Oral contrast: Positive contrast was administered.  Sagittal and coronal reformats were performed.    FINDINGS:  CHEST:  LUNGS AND LARGE AIRWAYS: Patent central airways. Bilateral dependent and basilar atelectasis. 3 mm left lower lobe subpleural nodule, possibly intrapulmonary lymph node. No lung consolidations.  PLEURA: Small bilateral pleural effusions.  VESSELS: Atherosclerotic changes of thoracic aorta and coronary arteries.  HEART: Heart size is normal. No pericardial effusion.  MEDIASTINUM AND DENIS: No lymphadenopathy.  CHEST WALL AND LOWER NECK: Within normal limits.    ABDOMEN AND PELVIS:  LIVER: Subcentimeter hepatichypodensity, too small to further characterize.  BILE DUCTS: Normal caliber.  GALLBLADDER: Within normal limits.  SPLEEN: Within normal limits.  PANCREAS: Mildly atrophic.  ADRENALS: Within normal limits.  KIDNEYS/URETERS: Within normal limits.    BLADDER: Wall thickening and inflammation.  REPRODUCTIVE ORGANS: Prostatic inflammatory changes.    BOWEL: No bowel obstruction. Appendix is normal.  PERITONEUM: Trace pelvic fluid.  VESSELS: Atherosclerotic changes.  RETROPERITONEUM/LYMPH NODES: No lymphadenopathy.  ABDOMINAL WALL: Subcutaneous soft tissue edema.  BONES: Degenerative changes.    IMPRESSION:  No lung consolidations.  Cystitis/prostatitis.      REESE KAUR M.D., ATTENDING RADIOLOGIST  This document has been electronically signed. Oct 13 2020 12:41PM    < end of copied text >  < from: US Duplex Venous Upper Ext Ltd, Right (10.14.20 @ 09:40) >  EXAM:  US DPLX UPR EXT VEINS LTD RT                            PROCEDURE DATE:  10/14/2020          INTERPRETATION:  CLINICAL INFORMATION: Probable cord in the right arm    COMPARISON: None available.    TECHNIQUE: Duplex sonography of the RIGHT UPPER extremity veins with color and spectral Doppler, with and without compression.    FINDINGS:    The right internal jugular, subclavian, axillary, brachial, radial and ulnar veins are patent and compressible where applicable.  The cephalic vein (superficial vein) is patent and without thrombus. Thrombi are noted in the mid basilic vein, compatible with superficial thrombophlebitis.    Doppler examination shows normal spontaneous and phasic flow except the mid basilic vein.    IMPRESSION:  No evidence of right upper extremity deep venous thrombosis.    Thrombi in the right mid basilic vein, compatible with superficial thrombophlebitis.      VIK JEAN M.D., ATTENDING RADIOLOGIST  This document has been electronically signed. Oct 14 2020  9:50AM    < end of copied text >   [facility]  Division of Infectious Diseases  [phone]    BHARATH GONZALES  55y, Male  [phone]    Interval note:  The patient is in bed, comfortable, no distress, reports feeling better overall. However, the patient continues to complains of the pain in his bilateral lower extremities and forearms pain. Venous dopplers of RUE performed No evidence of right upper extremity deep venous thrombosis. Thrombi in the right mid basilic vein, compatible with superficial thrombophlebitis. CT abdomen/plevis/chest was done and revealed no lung disease, + cystitis and prostatitis, urology on board.    The patient remains afebrile, leukocytosis is improving, WBC 11.24 today, repeat blood cultures grew gram positive cocci in clusters. HIV testing is negative.   The plan for the patient is to have RENETTA done and if positive, the patient will be referred to CT surgery evaluation.   The patient was seen by urology, found to have PVR of 171 ml, may will need Elizabeth if the patient will continue having high PVRs.       PMH/PSH--  DM (diabetes mellitus)    No significant past surgical history        Allergies--  No Known Allergies      Medications--  Antibiotics: ceFAZolin   IVPB      ceFAZolin   IVPB 2000 milliGRAM(s) IV Intermittent every 8 hours    Immunologic: influenza   Vaccine 0.5 milliLiter(s) IntraMuscular once    Other: acetaminophen    Suspension .. PRN  dextrose 40% Gel PRN  dextrose 5%.  dextrose 50% Injectable  dextrose 50% Injectable  dextrose 50% Injectable  enoxaparin Injectable  glucagon  Injectable PRN  insulin glargine Injectable (LANTUS)  insulin lispro (HumaLOG) corrective regimen sliding scale  insulin lispro (HumaLOG) corrective regimen sliding scale  insulin lispro Injectable (HumaLOG)  potassium chloride    Tablet ER  simvastatin  sodium chloride 0.9%.  tamsulosin    Antimicrobials last 90 days per EMR: MEDICATIONS  (STANDING):    ceFAZolin   IVPB   100 mL/Hr IV Intermittent (10-12-20 @ 18:32)    ceFAZolin   IVPB   100 mL/Hr IV Intermittent (10-14-20 @ 05:18)   100 mL/Hr IV Intermittent (10-13-20 @ 21:56)   100 mL/Hr IV Intermittent (10-13-20 @ 13:02)   100 mL/Hr IV Intermittent (10-13-20 @ 05:05)   100 mL/Hr IV Intermittent (10-12-20 @ 22:12)    cefTRIAXone   IVPB   100 mL/Hr IV Intermittent (10-10-20 @ 20:52)    cefTRIAXone   IVPB   100 mL/Hr IV Intermittent (10-09-20 @ 21:28)    vancomycin  IVPB   250 mL/Hr IV Intermittent (10-12-20 @ 09:32)   250 mL/Hr IV Intermittent (10-12-20 @ 00:24)   250 mL/Hr IV Intermittent (10-11-20 @ 17:27)   250 mL/Hr IV Intermittent (10-11-20 @ 10:13)    vancomycin  IVPB   250 mL/Hr IV Intermittent (10-10-20 @ 16:55)    vancomycin  IVPB   250 mL/Hr IV Intermittent (10-09-20 @ 22:17)        Social History--  EtOH: denies   Tobacco: denies   Drug Use: denies     Family/Marital History--  No pertinent family history in first degree relatives          Travel/Environmental/Occupational History:      Review of Systems:    Pertinent positives and negatives--  Constitutional: No fevers. No Chills. No Rigors. No recent significant weight loss.   Eyes: reports having eye exam long time ago  ENMT: + partial dentures   Cardiovascular: No chest pain. No palpitations.  Respiratory: No shortness of breath. No cough.  Gastrointestinal: No nausea or vomiting. No diarrhea or constipation. No abdominal pain  Genitourinary: + urinary urgency, denies any incontinence, no dysuria   Musculoskeletal: complains of pain in bilateral lower extremities, and bilateral forearms   Skin: denies   Neurologic: denies   Psychiatric: Pleasant. Appropriate affect.  Endocrine:  Heme/Lymphatic:  Allergy/Immunologic: no known allergies     Review of systems otherwise negative except as previously noted.    Physical exam    Vital Signs: T(F): 97.5 (10-14-20 @ 05:03), Max: 99.2 (10-13-20 @ 17:00)  HR: 98 (10-14-20 @ 05:03)  BP: 137/87 (10-14-20 @ 05:03)  RR: 18 (10-14-20 @ 05:03)  SpO2: 94% (10-14-20 @ 05:03)  Wt(kg): --      General: Nontoxic-appearing thin Male in no acute distress.  HEENT: AT/NC. PERRL. EOMI. Anicteric. Conjunctiva pink and moist. Oropharynx clear. Dentition + partial dentures   Neck: Not rigid. No sense of mass.   Nodes: None palpable.  Lungs: Clear bilaterally without rales, wheezing or rhonchi, respirations not labored.   Heart: Regular rate and rhythm. No Murmur. No rub. No gallop. No palpable thrill.  Abdomen: Bowel sounds present and normoactive. Soft. Nondistended. Nontender. No sense of mass. No organomegaly.  Back: No spinal tenderness. No costovertebral angle tenderness.   Extremities: No cyanosis or clubbing. No edema. Tender upon palpation bilateral lower extremities and bilateral forearms. Painful palpable cord in right forearm.   Skin: Warm. Dry. Good turgor. No rash. No vasculitic stigmata.  Psychiatric: Appropriate affect and mood for situation.         Laboratory & Imaging Data--  CBC                        11.5   11.24 )-----------( 349      ( 14 Oct 2020 08:00 )             35.0   WBC trend  22.91  21.28  15.88  13.48  11.24 - today    Chemistries  10-14    138  |  99  |  6<L>  ----------------------------<  276<H>  3.2<L>   |  31  |  0.61    Ca    8.1<L>      14 Oct 2020 08:00  Mg     2.6     10-13        Culture Data    Culture - Blood (collected 12 Oct 2020 18:54)  Source: .Blood Blood-Peripheral  Gram Stain (prelim) (13 Oct 2020 16:29):    Growth in aerobic bottle: Gram Positive Cocci in Clusters    Growth in anaerobic bottle: Gram Positive Cocci in Clusters  Preliminary Report (13 Oct 2020 16:32):    Growth in aerobic bottle: Gram Positive Cocci in Clusters    Growth in anaerobic bottle: Gram Positive Cocci in Clusters    Culture - Blood (collected 12 Oct 2020 18:54)  Source: .Blood Blood-Peripheral  Gram Stain (prelim) (13 Oct 2020 16:58):    Growth in aerobic bottle: Gram Positive Cocci in Clusters    Growth in anaerobic bottle: Gram Positive Cocci in Clusters  Preliminary Report (13 Oct 2020 16:58):    Growth in aerobic bottle: Gram Positive Cocci in Clusters    Growth in anaerobic bottle: Gram Positive Cocci in Clusters    Culture - Blood (collected 10 Oct 2020 00:43)  Source: .Blood Blood-Peripheral  Gram Stain (12 Oct 2020 13:43):    Growth in aerobic and anaerobic bottles: Gram Positive Cocci in Clusters  Final Report (12 Oct 2020 13:43):    Growth in aerobic and anaerobic bottles: Staphylococcus aureus    "Due to technical problems, Proteus sp. will Not be reported as part of    the BCID panel until further notice"    ***Blood Panel PCR results on this specimen are available    approximately 3 hours after the Gram stain result.***    Gram stain, PCR, and/or culture results may not always    correspond due to difference in methodologies.    ************************************************************    This PCR assay was performed using [employer].    The following targets are tested for: Enterococcus,    vancomycin resistant enterococci, Listeria monocytogenes,    coagulase negative staphylococci, S. aureus,    methicillin resistant S. aureus, Streptococcus agalactiae    (Group B), S. pneumoniae, S. pyogenes (Group A),    Acinetobacter baumannii, Enterobacter cloacae, E. coli,    Klebsiella oxytoca, K. pneumoniae, Proteus sp.,    Serratia marcescens, Haemophilus influenzae,    Neisseria meningitidis, Pseudomonas aeruginosa, Candida    albicans, C. glabrata, C krusei, C parapsilosis,    C. tropicalis and the KPC resistance gene.  Organism: Blood Culture PCR  Staphylococcus aureus (12 Oct 2020 13:43)  Organism: Staphylococcus aureus (12 Oct 2020 13:43)  Organism: Blood Culture PCR (12 Oct 2020 13:43)    Culture - Blood (collected 10 Oct 2020 00:43)  Source: .Blood Blood-Peripheral  Gram Stain (12 Oct 2020 13:44):    Growth in aerobic and anaerobic bottles: Gram Positive Cocci in Clusters  Final Report (12 Oct 2020 13:44):    Growth in aerobic and anaerobic bottles: Staphylococcus aureus    See previous culture [id number]    Culture - Urine (collected 10 Oct 2020 00:40)  Source: .Urine Clean Catch (Midstream)  Final Report (11 Oct 2020 19:56):    50,000 - 99,000 CFU/mL Staphylococcus aureus  Organism: Staphylococcus aureus (11 Oct 2020 19:56)  Organism: Staphylococcus aureus (11 Oct 2020 19:56)      < from: CT Chest w/ Oral Cont and w/ IV Cont (10.13.20 @ 12:19) >    EXAM:  CT ABDOMEN AND PELVIS OC IC                          EXAM:  CT CHEST OC IC                            PROCEDURE DATE:  10/13/2020          INTERPRETATION:  CLINICAL INFORMATION: Sepsis    COMPARISON: None.    PROCEDURE:  CT of the Chest, Abdomen and Pelvis was performed with intravenous contrast.  Intravenous contrast: 90 ml Omnipaque 350. 10 ml discarded.  Oral contrast: Positive contrast was administered.  Sagittal and coronal reformats were performed.    FINDINGS:  CHEST:  LUNGS AND LARGE AIRWAYS: Patent central airways. Bilateral dependent and basilar atelectasis. 3 mm left lower lobe subpleural nodule, possibly intrapulmonary lymph node. No lung consolidations.  PLEURA: Small bilateral pleural effusions.  VESSELS: Atherosclerotic changes of thoracic aorta and coronary arteries.  HEART: Heart size is normal. No pericardial effusion.  MEDIASTINUM AND DENIS: No lymphadenopathy.  CHEST WALL AND LOWER NECK: Within normal limits.    ABDOMEN AND PELVIS:  LIVER: Subcentimeter hepatichypodensity, too small to further characterize.  BILE DUCTS: Normal caliber.  GALLBLADDER: Within normal limits.  SPLEEN: Within normal limits.  PANCREAS: Mildly atrophic.  ADRENALS: Within normal limits.  KIDNEYS/URETERS: Within normal limits.    BLADDER: Wall thickening and inflammation.  REPRODUCTIVE ORGANS: Prostatic inflammatory changes.    BOWEL: No bowel obstruction. Appendix is normal.  PERITONEUM: Trace pelvic fluid.  VESSELS: Atherosclerotic changes.  RETROPERITONEUM/LYMPH NODES: No lymphadenopathy.  ABDOMINAL WALL: Subcutaneous soft tissue edema.  BONES: Degenerative changes.    IMPRESSION:  No lung consolidations.  Cystitis/prostatitis.      REESE KAUR M.D., ATTENDING RADIOLOGIST  This document has been electronically signed. Oct 13 2020 12:41PM    < end of copied text >  < from: US Duplex Venous Upper Ext Ltd, Right (10.14.20 @ 09:40) >  EXAM:  US DPLX UPR EXT VEINS LTD RT                            PROCEDURE DATE:  10/14/2020          INTERPRETATION:  CLINICAL INFORMATION: Probable cord in the right arm    COMPARISON: None available.    TECHNIQUE: Duplex sonography of the RIGHT UPPER extremity veins with color and spectral Doppler, with and without compression.    FINDINGS:    The right internal jugular, subclavian, axillary, brachial, radial and ulnar veins are patent and compressible where applicable.  The cephalic vein (superficial vein) is patent and without thrombus. Thrombi are noted in the mid basilic vein, compatible with superficial thrombophlebitis.    Doppler examination shows normal spontaneous and phasic flow except the mid basilic vein.    IMPRESSION:  No evidence of right upper extremity deep venous thrombosis.    Thrombi in the right mid basilic vein, compatible with superficial thrombophlebitis.      VIK JAEN M.D., ATTENDING RADIOLOGIST  This document has been electronically signed. Oct 14 2020  9:50AM    < end of copied text >

## 2020-10-14 NOTE — PROGRESS NOTE ADULT - SUBJECTIVE AND OBJECTIVE BOX
Patient is a 55y old  Male who presents with a chief complaint of fatigue (14 Oct 2020 16:41)      Interval History: finger sticks are now < 200   stable finger sticks   , on the current diabetic regimen     MEDICATIONS  (STANDING):  ceFAZolin   IVPB      ceFAZolin   IVPB 2000 milliGRAM(s) IV Intermittent every 8 hours  dextrose 5%. 1000 milliLiter(s) (50 mL/Hr) IV Continuous <Continuous>  dextrose 50% Injectable 12.5 Gram(s) IV Push once  dextrose 50% Injectable 25 Gram(s) IV Push once  dextrose 50% Injectable 25 Gram(s) IV Push once  enoxaparin Injectable 40 milliGRAM(s) SubCutaneous daily  influenza   Vaccine 0.5 milliLiter(s) IntraMuscular once  insulin glargine Injectable (LANTUS) 24 Unit(s) SubCutaneous every morning  insulin lispro (HumaLOG) corrective regimen sliding scale   SubCutaneous three times a day before meals  insulin lispro (HumaLOG) corrective regimen sliding scale   SubCutaneous at bedtime  insulin lispro Injectable (HumaLOG) 8 Unit(s) SubCutaneous three times a day before meals  simvastatin 20 milliGRAM(s) Oral at bedtime  sodium chloride 0.9%. 1000 milliLiter(s) (100 mL/Hr) IV Continuous <Continuous>  tamsulosin 0.4 milliGRAM(s) Oral at bedtime    MEDICATIONS  (PRN):  acetaminophen    Suspension .. 650 milliGRAM(s) Oral every 6 hours PRN Temp greater or equal to 38C (100.4F), Moderate Pain (4 - 6)  dextrose 40% Gel 15 Gram(s) Oral once PRN Blood Glucose LESS THAN 70 milliGRAM(s)/deciliter  glucagon  Injectable 1 milliGRAM(s) IntraMuscular once PRN Glucose LESS THAN 70 milligrams/deciliter      Allergies    No Known Allergies    Intolerances        REVIEW OF SYSTEMS:  CONSTITUTIONAL: no changes    Vital Signs Last 24 Hrs  T(C): 36.8 (14 Oct 2020 16:19), Max: 37.1 (14 Oct 2020 12:24)  T(F): 98.2 (14 Oct 2020 16:19), Max: 98.8 (14 Oct 2020 12:24)  HR: 92 (14 Oct 2020 16:19) (87 - 98)  BP: 129/75 (14 Oct 2020 16:19) (129/75 - 150/86)  BP(mean): --  RR: 18 (14 Oct 2020 16:19) (16 - 18)  SpO2: 94% (14 Oct 2020 12:24) (93% - 95%)    PHYSICAL EXAM:  GENERAL:   HEAD: Atraumatic, Normocephalic  EYES: PERRLA, conjunctiva and sclera clear  ENMT: No tonsillar erythema, exudates, or enlargement; Moist mucous membranes, Good dentition, No lesions  NECK: Supple, No JVD, Normal thyroid  NERVOUS SYSTEM:  Alert & Oriented X3, Good concentration; Motor Strength 5/5 B/L upper and lower extremities  CHEST/LUNG: Clear to auscultaion bilaterally; No rales, rhonchi, wheezing, or rubs  HEART: Regular rate and rhythm; No murmurs, rubs, or gallops  ABDOMEN: Soft, Nontender, Nondistended; Bowel sounds present  EXTREMITIES:  2+ Peripheral Pulses, no edema  SKIN: No rashes or lesions    LABS:        CAPILLARY BLOOD GLUCOSE      POCT Blood Glucose.: 126 mg/dL (14 Oct 2020 16:08)  POCT Blood Glucose.: 221 mg/dL (14 Oct 2020 11:02)  POCT Blood Glucose.: 296 mg/dL (14 Oct 2020 08:01)  POCT Blood Glucose.: 240 mg/dL (13 Oct 2020 21:55)    Lipid panel:           Thyroid:  Diabetes Tests:  Parathyroid Panel:  Adrenals:  RADIOLOGY & ADDITIONAL TESTS:    Imaging Personally Reviewed:  [ ] YES  [ ] NO    Consultant(s) Notes Reviewed:  [ ] YES  [ ] NO    Care Discussed with Consultants/Other Providers [ ] YES  [ ] NO

## 2020-10-15 LAB
ALDOST SERPL-MCNC: 3.7 NG/DL — SIGNIFICANT CHANGE UP
ANION GAP SERPL CALC-SCNC: 4 MMOL/L — LOW (ref 5–17)
BUN SERPL-MCNC: 7 MG/DL — SIGNIFICANT CHANGE UP (ref 7–23)
CALCIUM SERPL-MCNC: 8 MG/DL — LOW (ref 8.5–10.1)
CHLORIDE SERPL-SCNC: 101 MMOL/L — SIGNIFICANT CHANGE UP (ref 96–108)
CHLORIDE UR-SCNC: 64 MMOL/L — SIGNIFICANT CHANGE UP
CO2 SERPL-SCNC: 31 MMOL/L — SIGNIFICANT CHANGE UP (ref 22–31)
CREAT ?TM UR-MCNC: 12 MG/DL — SIGNIFICANT CHANGE UP
CREAT SERPL-MCNC: 0.7 MG/DL — SIGNIFICANT CHANGE UP (ref 0.5–1.3)
CULTURE RESULTS: SIGNIFICANT CHANGE UP
GLUCOSE BLDC GLUCOMTR-MCNC: 135 MG/DL — HIGH (ref 70–99)
GLUCOSE BLDC GLUCOMTR-MCNC: 208 MG/DL — HIGH (ref 70–99)
GLUCOSE BLDC GLUCOMTR-MCNC: 215 MG/DL — HIGH (ref 70–99)
GLUCOSE BLDC GLUCOMTR-MCNC: 251 MG/DL — HIGH (ref 70–99)
GLUCOSE BLDC GLUCOMTR-MCNC: 282 MG/DL — HIGH (ref 70–99)
GLUCOSE SERPL-MCNC: 264 MG/DL — HIGH (ref 70–99)
HCT VFR BLD CALC: 32.3 % — LOW (ref 39–50)
HGB BLD-MCNC: 10.9 G/DL — LOW (ref 13–17)
MCHC RBC-ENTMCNC: 28.8 PG — SIGNIFICANT CHANGE UP (ref 27–34)
MCHC RBC-ENTMCNC: 33.7 GM/DL — SIGNIFICANT CHANGE UP (ref 32–36)
MCV RBC AUTO: 85.2 FL — SIGNIFICANT CHANGE UP (ref 80–100)
NRBC # BLD: 0 /100 WBCS — SIGNIFICANT CHANGE UP (ref 0–0)
OSMOLALITY UR: 218 MOSM/KG — SIGNIFICANT CHANGE UP (ref 50–1200)
PLATELET # BLD AUTO: 368 K/UL — SIGNIFICANT CHANGE UP (ref 150–400)
POTASSIUM SERPL-MCNC: 3.7 MMOL/L — SIGNIFICANT CHANGE UP (ref 3.5–5.3)
POTASSIUM SERPL-SCNC: 3.7 MMOL/L — SIGNIFICANT CHANGE UP (ref 3.5–5.3)
POTASSIUM UR-SCNC: 20.1 MMOL/L — SIGNIFICANT CHANGE UP
RBC # BLD: 3.79 M/UL — LOW (ref 4.2–5.8)
RBC # FLD: 12.8 % — SIGNIFICANT CHANGE UP (ref 10.3–14.5)
SODIUM SERPL-SCNC: 136 MMOL/L — SIGNIFICANT CHANGE UP (ref 135–145)
SODIUM UR-SCNC: 75 MMOL/L — SIGNIFICANT CHANGE UP
SPECIMEN SOURCE: SIGNIFICANT CHANGE UP
WBC # BLD: 10.44 K/UL — SIGNIFICANT CHANGE UP (ref 3.8–10.5)
WBC # FLD AUTO: 10.44 K/UL — SIGNIFICANT CHANGE UP (ref 3.8–10.5)

## 2020-10-15 PROCEDURE — 99232 SBSQ HOSP IP/OBS MODERATE 35: CPT

## 2020-10-15 RX ORDER — INSULIN GLARGINE 100 [IU]/ML
6 INJECTION, SOLUTION SUBCUTANEOUS ONCE
Refills: 0 | Status: COMPLETED | OUTPATIENT
Start: 2020-10-15 | End: 2020-10-15

## 2020-10-15 RX ORDER — POTASSIUM CHLORIDE 20 MEQ
40 PACKET (EA) ORAL ONCE
Refills: 0 | Status: COMPLETED | OUTPATIENT
Start: 2020-10-15 | End: 2020-10-15

## 2020-10-15 RX ORDER — INSULIN GLARGINE 100 [IU]/ML
30 INJECTION, SOLUTION SUBCUTANEOUS EVERY MORNING
Refills: 0 | Status: DISCONTINUED | OUTPATIENT
Start: 2020-10-16 | End: 2020-10-16

## 2020-10-15 RX ORDER — ERGOCALCIFEROL 1.25 MG/1
50000 CAPSULE ORAL ONCE
Refills: 0 | Status: COMPLETED | OUTPATIENT
Start: 2020-10-15 | End: 2020-10-15

## 2020-10-15 RX ADMIN — Medication 6: at 08:20

## 2020-10-15 RX ADMIN — Medication 40 MILLIEQUIVALENT(S): at 14:17

## 2020-10-15 RX ADMIN — SODIUM CHLORIDE 100 MILLILITER(S): 9 INJECTION INTRAMUSCULAR; INTRAVENOUS; SUBCUTANEOUS at 00:23

## 2020-10-15 RX ADMIN — ERGOCALCIFEROL 50000 UNIT(S): 1.25 CAPSULE ORAL at 11:24

## 2020-10-15 RX ADMIN — ENOXAPARIN SODIUM 40 MILLIGRAM(S): 100 INJECTION SUBCUTANEOUS at 11:23

## 2020-10-15 RX ADMIN — SIMVASTATIN 20 MILLIGRAM(S): 20 TABLET, FILM COATED ORAL at 21:25

## 2020-10-15 RX ADMIN — Medication 100 MILLIGRAM(S): at 14:17

## 2020-10-15 RX ADMIN — Medication 4: at 17:33

## 2020-10-15 RX ADMIN — SODIUM CHLORIDE 100 MILLILITER(S): 9 INJECTION INTRAMUSCULAR; INTRAVENOUS; SUBCUTANEOUS at 17:33

## 2020-10-15 RX ADMIN — Medication 8 UNIT(S): at 17:33

## 2020-10-15 RX ADMIN — Medication 8 UNIT(S): at 11:23

## 2020-10-15 RX ADMIN — Medication 4: at 11:23

## 2020-10-15 RX ADMIN — INSULIN GLARGINE 6 UNIT(S): 100 INJECTION, SOLUTION SUBCUTANEOUS at 09:45

## 2020-10-15 RX ADMIN — Medication 8 UNIT(S): at 08:19

## 2020-10-15 RX ADMIN — Medication 100 MILLIGRAM(S): at 05:10

## 2020-10-15 RX ADMIN — Medication 100 MILLIGRAM(S): at 21:26

## 2020-10-15 RX ADMIN — INSULIN GLARGINE 24 UNIT(S): 100 INJECTION, SOLUTION SUBCUTANEOUS at 08:19

## 2020-10-15 RX ADMIN — TAMSULOSIN HYDROCHLORIDE 0.4 MILLIGRAM(S): 0.4 CAPSULE ORAL at 21:25

## 2020-10-15 NOTE — PROGRESS NOTE ADULT - SUBJECTIVE AND OBJECTIVE BOX
Patient seen and examined bedside resting comfortably.  No complaints offered.   Voiding spontaneously without difficulty. Denies hematuria or dysuria.   Denies N/V/D, CP, SOB.    T(F): 99.2 (10-15-20 @ 16:14), Max: 99.9 (10-15-20 @ 05:09)  HR: 93 (10-15-20 @ 16:14) (88 - 96)  BP: 130/76 (10-15-20 @ 16:14) (121/70 - 137/83)  RR: 18 (10-15-20 @ 16:14) (18 - 20)  SpO2: 94% (10-15-20 @ 16:14) (93% - 94%)    POCT Blood Glucose.: 208 mg/dL (15 Oct 2020 17:28)  POCT Blood Glucose.: 215 mg/dL (15 Oct 2020 11:12)  POCT Blood Glucose.: 282 mg/dL (15 Oct 2020 09:22)  POCT Blood Glucose.: 251 mg/dL (15 Oct 2020 07:45)  POCT Blood Glucose.: 218 mg/dL (14 Oct 2020 21:19)      PHYSICAL EXAM:  General: NAD  Chest: nonlabored respirations  Abdomen: soft, NT/ND  : No suprapubic tenderness or bladder distention  Extremities: calf soft, non tender b/l      LABS:                        10.9   10.44 )-----------( 368      ( 15 Oct 2020 07:45 )             32.3   10-15    136  |  101  |  7   ----------------------------<  264<H>  3.7   |  31  |  0.70    Ca    8.0<L>      15 Oct 2020 07:45      I&O's Detail    14 Oct 2020 07:01  -  15 Oct 2020 07:00  --------------------------------------------------------  IN:    IV PiggyBack: 50 mL    Oral Fluid: 150 mL  Total IN: 200 mL    OUT:    Voided (mL): 200 mL  Total OUT: 200 mL    Total NET: 0 mL      15 Oct 2020 07:01  -  15 Oct 2020 19:05  --------------------------------------------------------  IN:    Oral Fluid: 596 mL  Total IN: 596 mL    OUT:    Voided (mL): 1975 mL  Total OUT: 1975 mL    Total NET: -1379 mL    ASSESSMENT: 56yo Male with PMH DM admitted with Sepsis, MSSA bacteremia, ?etiology. Leukocytosis resolved.  Urology consulted for CT findings of Cystitis and Prostatitis and Urine cx + Staph aureus.   Renal function stable.  this afternoon.    PLAN  - continue ID follow up, continue IV abx per ID recs  - f/u RENETTA r/o IE  - urine cx with negative results  - f/u repeat blood culture  - continue medical management  - no acute  intervention at this time.   - discussed with Dr. Rosenbaum

## 2020-10-15 NOTE — PROGRESS NOTE ADULT - SUBJECTIVE AND OBJECTIVE BOX
Patient is a 55y old  Male who presents with a chief complaint of fatigue (14 Oct 2020 17:27)      INTERVAL HPI/OVERNIGHT EVENTS:  pt wit no complaints  eating well, good appetite  am hyperglycemia    MEDICATIONS  (STANDING):  ceFAZolin   IVPB      ceFAZolin   IVPB 2000 milliGRAM(s) IV Intermittent every 8 hours  dextrose 5%. 1000 milliLiter(s) (50 mL/Hr) IV Continuous <Continuous>  dextrose 50% Injectable 12.5 Gram(s) IV Push once  dextrose 50% Injectable 25 Gram(s) IV Push once  dextrose 50% Injectable 25 Gram(s) IV Push once  enoxaparin Injectable 40 milliGRAM(s) SubCutaneous daily  influenza   Vaccine 0.5 milliLiter(s) IntraMuscular once  insulin glargine Injectable (LANTUS) 6 Unit(s) SubCutaneous once  insulin lispro (HumaLOG) corrective regimen sliding scale   SubCutaneous three times a day before meals  insulin lispro (HumaLOG) corrective regimen sliding scale   SubCutaneous at bedtime  insulin lispro Injectable (HumaLOG) 8 Unit(s) SubCutaneous three times a day before meals  simvastatin 20 milliGRAM(s) Oral at bedtime  sodium chloride 0.9%. 1000 milliLiter(s) (100 mL/Hr) IV Continuous <Continuous>  tamsulosin 0.4 milliGRAM(s) Oral at bedtime    MEDICATIONS  (PRN):  acetaminophen    Suspension .. 650 milliGRAM(s) Oral every 6 hours PRN Temp greater or equal to 38C (100.4F), Moderate Pain (4 - 6)  dextrose 40% Gel 15 Gram(s) Oral once PRN Blood Glucose LESS THAN 70 milliGRAM(s)/deciliter  glucagon  Injectable 1 milliGRAM(s) IntraMuscular once PRN Glucose LESS THAN 70 milligrams/deciliter      REVIEW OF SYSTEMS:  CONSTITUTIONAL: No fever, weight loss, or fatigue  RESPIRATORY: No cough, wheezing, chills or hemoptysis; No shortness of breath  CARDIOVASCULAR: No chest pain, palpitations, dizziness, or leg swelling  GASTROINTESTINAL: No abdominal or epigastric pain. No nausea, vomiting, or hematemesis; No diarrhea or constipation. No melena or hematochezia.  ENDOCRINE: No heat or cold intolerance; No hair loss      Vital Signs Last 24 Hrs  T(C): 37.7 (15 Oct 2020 05:09), Max: 37.7 (15 Oct 2020 05:09)  T(F): 99.9 (15 Oct 2020 05:09), Max: 99.9 (15 Oct 2020 05:09)  HR: 88 (15 Oct 2020 05:09) (87 - 94)  BP: 130/79 (15 Oct 2020 05:09) (129/75 - 142/85)  BP(mean): --  RR: 18 (15 Oct 2020 05:09) (16 - 18)  SpO2: 93% (15 Oct 2020 05:09) (93% - 94%)    PHYSICAL EXAM:  GENERAL: NAD, well-groomed, well-developed        LABS:                        10.9   10.44 )-----------( 368      ( 15 Oct 2020 07:45 )             32.3     10-15    136  |  101  |  7   ----------------------------<  264<H>  3.7   |  31  |  0.70    Ca    8.0<L>      15 Oct 2020 07:45          CAPILLARY BLOOD GLUCOSE      POCT Blood Glucose.: 251 mg/dL (15 Oct 2020 07:45)  POCT Blood Glucose.: 218 mg/dL (14 Oct 2020 21:19)  POCT Blood Glucose.: 126 mg/dL (14 Oct 2020 16:08)  POCT Blood Glucose.: 221 mg/dL (14 Oct 2020 11:02)    Lipid panel:               RADIOLOGY & ADDITIONAL TESTS:

## 2020-10-15 NOTE — PROGRESS NOTE ADULT - SUBJECTIVE AND OBJECTIVE BOX
CHIEF COMPLAINT:   Low grade fever. no cough no dysuria  no hematuria  No nausea or vomiting  no diarrhea  no cp  feeling better and participating in PT.       PHYSICAL EXAM:    GENERAL: Moderately built, malnourished   CHEST/LUNG: Clear to ausculation bilaterally, no wheezing, no crackles   HEART: Regular rate and rhythm; No murmurs, rubs  ABDOMEN: Soft, Nontender, Nondistended; Bowel sounds present  EXTREMITIES:  Moving all four extremities spontaneously, No clubbing, cyanosis. no edema  NERVOUS SYSTEM:  Grossly non focal.  Psychiatry: AAO x 3, mood is appropriate       OBJECTIVE DATA:       Vital Signs Last 24 Hrs  T(C): 37 (15 Oct 2020 11:05), Max: 37.7 (15 Oct 2020 05:09)  T(F): 98.6 (15 Oct 2020 11:05), Max: 99.9 (15 Oct 2020 05:09)  HR: 96 (15 Oct 2020 11:05) (87 - 96)  BP: 121/70 (15 Oct 2020 11:05) (121/70 - 142/85)  BP(mean): --  RR: 20 (15 Oct 2020 11:05) (16 - 20)  SpO2: 93% (15 Oct 2020 11:05) (93% - 94%)           Daily     Daily Weight in k.8 (15 Oct 2020 05:09)  LABS:                        10.9   10.44 )-----------( 368      ( 15 Oct 2020 07:45 )             32.3             10-15    136  |  101  |  7   ----------------------------<  264<H>  3.7   |  31  |  0.70    Ca    8.0<L>      15 Oct 2020 07:45                         CAPILLARY BLOOD GLUCOSE      POCT Blood Glucose.: 215 mg/dL (15 Oct 2020 11:12)      Culture - Urine (collected 10-14)  Source: .Urine Clean Catch (Midstream)  Final Report (10-15):    <10,000 CFU/mL Normal Urogenital Lin    Culture - Blood (collected 10-12)  Source: .Blood Blood-Peripheral  Gram Stain (10-14):    Growth in aerobic bottle: Gram Positive Cocci in Clusters    Growth in anaerobic bottle: Gram Positive Cocci in Clusters  Final Report (10-14):    Growth in aerobic and anaerobic bottles: Staphylococcus aureus    See previous culture [id number]    Culture - Blood (collected 10-12)  Source: .Blood Blood-Peripheral  Gram Stain (10-14):    Growth in aerobic bottle: Gram Positive Cocci in Clusters    Growth in anaerobic bottle: Gram Positive Cocci in Clusters  Final Report (10-14):    Growth in aerobic and anaerobic bottles: Staphylococcus aureus    See previous culture [id number]        MEDICATIONS  (STANDING):  ceFAZolin   IVPB      ceFAZolin   IVPB 2000 milliGRAM(s) IV Intermittent every 8 hours  dextrose 5%. 1000 milliLiter(s) (50 mL/Hr) IV Continuous <Continuous>  dextrose 50% Injectable 12.5 Gram(s) IV Push once  dextrose 50% Injectable 25 Gram(s) IV Push once  dextrose 50% Injectable 25 Gram(s) IV Push once  enoxaparin Injectable 40 milliGRAM(s) SubCutaneous daily  influenza   Vaccine 0.5 milliLiter(s) IntraMuscular once  insulin lispro (HumaLOG) corrective regimen sliding scale   SubCutaneous three times a day before meals  insulin lispro (HumaLOG) corrective regimen sliding scale   SubCutaneous at bedtime  insulin lispro Injectable (HumaLOG) 8 Unit(s) SubCutaneous three times a day before meals  simvastatin 20 milliGRAM(s) Oral at bedtime  sodium chloride 0.9%. 1000 milliLiter(s) (100 mL/Hr) IV Continuous <Continuous>  tamsulosin 0.4 milliGRAM(s) Oral at bedtime    MEDICATIONS  (PRN):  acetaminophen    Suspension .. 650 milliGRAM(s) Oral every 6 hours PRN Temp greater or equal to 38C (100.4F), Moderate Pain (4 - 6)  dextrose 40% Gel 15 Gram(s) Oral once PRN Blood Glucose LESS THAN 70 milliGRAM(s)/deciliter  glucagon  Injectable 1 milliGRAM(s) IntraMuscular once PRN Glucose LESS THAN 70 milligrams/deciliter

## 2020-10-15 NOTE — PROGRESS NOTE ADULT - ASSESSMENT
DM2 uncontrolled with hyperglycemia secondary to poor compliance with meds (due to insurance issues)  ha1c 12.9%

## 2020-10-15 NOTE — PROGRESS NOTE ADULT - ASSESSMENT
pt w/dm2 off meds comes w/generalized fatigue w/hyperglycemia, electrolyte abnomality and uti    Problem/Plan - 1:  ·  Problem: Acute MSSA cystitis without hematuria and MSSA bacteremia.  Persistently positive blood culture. pending recent blood culture.  MRI spine did not show any OM.  CT chest/A/P showed cystitis/prostatitis. cont iv cefazolin. leukocytosis resolved. US right arm showed right superificial thrombophlebitis. ID following. NPO after MN today for RENETTA tomorrow. per ID, patient will likely need 4-6 weeks of iv antibitotic.     Problem/Plan - 2:  ·  Problem: Type 2 diabetes mellitus with hyperglycemia. uncontrolled with hyperglycemia. Increased lantus again by endo.. Follow finger sticks. Hold premeal insulin if NPO. Patient can afford outpatient meds now with insurance.     Problem/Plan - 4:  ·  Problem: Mixed hyperlipidemia.  Plan: cont statin.     Problem/Plan - 5:  ·  Problem: Localized edema.  improved. negative US venous duplex.     Hypokalemia. Repleted. reviewed urine electrolytes. pending aldosterone level.     Vitamin D deficiency. start supplement.     urinary retention on PVR but patient was able to void. urology following.     Generalized weakness. consulted PT. Patient doing better.     Problem/Plan - 6:  Problem: Preventive measure. Plan: sq lovenox

## 2020-10-15 NOTE — PROGRESS NOTE ADULT - SUBJECTIVE AND OBJECTIVE BOX
BHARATH GONZALES  MRN-[id number]    Interval note:  The patient is out of bed to chair, eating his lunch, in a good mood. The patient is afebrile, T 99.9 this am, leukocytosis normalized, blood and urine cultures collected yesterday with no growth so far, blood and urine cultures collected previously grew staph aureus.   The patient ambulated with PT earlier, did well, continue to complain of pain in bilateral lower extremities and forearms. Today's blood culture is pending collection, RENETTA is pending and if positive for endocarditis once done the patient will be referred to CT surgery.     ROS:      [x ] All other systems negative    Constitutional: no fever, no chills  Head: no trauma  Eyes: no vision changes, no eye pain  ENT:  no sore throat, no rhinorrhea  Cardiovascular:  no chest pain, no palpitation  Respiratory:  no SOB, no cough  GI:  no abd pain, no vomiting, no diarrhea  urinary: no dysuria, no hematuria, no flank pain  musculoskeletal:  bilateral lower extremities pain and bilateral forearms  skin:  no rash  neurology:  no headache, no seizure, no change in mental status  psych: no anxiety, no depression         Allergies  No Known Allergies        ANTIMICROBIALS:  ceFAZolin   IVPB    ceFAZolin   IVPB 2000 every 8 hours      OTHER MEDS:  acetaminophen    Suspension .. 650 milliGRAM(s) Oral every 6 hours PRN  dextrose 40% Gel 15 Gram(s) Oral once PRN  dextrose 5%. 1000 milliLiter(s) IV Continuous <Continuous>  dextrose 50% Injectable 12.5 Gram(s) IV Push once  dextrose 50% Injectable 25 Gram(s) IV Push once  dextrose 50% Injectable 25 Gram(s) IV Push once  enoxaparin Injectable 40 milliGRAM(s) SubCutaneous daily  glucagon  Injectable 1 milliGRAM(s) IntraMuscular once PRN  influenza   Vaccine 0.5 milliLiter(s) IntraMuscular once  insulin lispro (HumaLOG) corrective regimen sliding scale   SubCutaneous three times a day before meals  insulin lispro (HumaLOG) corrective regimen sliding scale   SubCutaneous at bedtime  insulin lispro Injectable (HumaLOG) 8 Unit(s) SubCutaneous three times a day before meals  potassium chloride    Tablet ER 40 milliEquivalent(s) Oral once  simvastatin 20 milliGRAM(s) Oral at bedtime  sodium chloride 0.9%. 1000 milliLiter(s) IV Continuous <Continuous>  tamsulosin 0.4 milliGRAM(s) Oral at bedtime      Vital Signs Last 24 Hrs  T(C): 37 (15 Oct 2020 11:05), Max: 37.7 (15 Oct 2020 05:09)  T(F): 98.6 (15 Oct 2020 11:05), Max: 99.9 (15 Oct 2020 05:09)  HR: 96 (15 Oct 2020 11:05) (88 - 96)  BP: 121/70 (15 Oct 2020 11:05) (121/70 - 137/83)  BP(mean): --  RR: 20 (15 Oct 2020 11:05) (18 - 20)  SpO2: 93% (15 Oct 2020 11:05) (93% - 94%)    General: Nontoxic-appearing thin Male in no acute distress.  HEENT: AT/NC. PERRL. EOMI. Anicteric. Conjunctiva pink and moist. Oropharynx clear. Dentition + partial dentures   Neck: Not rigid. No sense of mass.   Nodes: None palpable.  Lungs: Clear bilaterally without rales, wheezing or rhonchi, respirations not labored.   Heart: Regular rate and rhythm. No Murmur. No rub. No gallop. No palpable thrill.  Abdomen: Bowel sounds present and normoactive. Soft. Nondistended. Nontender. No sense of mass. No organomegaly.  Back: No spinal tenderness. No costovertebral angle tenderness.   Extremities: No cyanosis or clubbing. No edema. Tender upon palpation bilateral lower extremities and bilateral forearms. Painful palpable cord in right forearm.   Skin: Warm. Dry. Good turgor. No rash. No vasculitic stigmata.  Psychiatric: Appropriate affect and mood for situation.       WBC Count: 10.44 K/uL (10-15 @ 07:45)  WBC Count: 11.24 K/uL (10-14 @ 08:00)  WBC Count: 13.48 K/uL (10-13 @ 07:23)  WBC Count: 15.88 K/uL (10-12 @ 06:58)  WBC Count: 21.28 K/uL (10-11 @ 07:13)  WBC Count: 22.91 K/uL (10-10 @ 18:52)  WBC Count: 22.97 K/uL (10-10 @ 06:53)                            10.9   10.44 )-----------( 368      ( 15 Oct 2020 07:45 )             32.3       10-15    136  |  101  |  7   ----------------------------<  264<H>  3.7   |  31  |  0.70    Ca    8.0<L>      15 Oct 2020 07:45            Creatinine Trend: 0.70<--, 0.61<--, 0.58<--, 0.41<--, 0.49<--, 0.65<--      MICROBIOLOGY:  v  .Blood Blood  10-14-20   No growth to date.  --  --      .Urine Clean Catch (Midstream)  10-14-20   <10,000 CFU/mL Normal Urogenital Lin  --  --      .Blood Blood-Peripheral  10-12-20   Growth in aerobic and anaerobic bottles: Staphylococcus aureus  See previous culture [id number]  --    Growth in aerobic bottle: Gram Positive Cocci in Clusters  Growth in anaerobic bottle: Gram Positive Cocci in Clusters      .Blood Blood-Peripheral  10-10-20   Growth in aerobic and anaerobic bottles: Staphylococcus aureus  See previous culture [id number]  --  Blood Culture PCR  Staphylococcus aureus      .Urine Clean Catch (Midstream)  10-10-20   50,000 - 99,000 CFU/mL Staphylococcus aureus  --  Staphylococcus aureus              COVID-19 PCR: NotDetec (10-09)    C-Reactive Protein, Serum: 31.66 (10-10)    Ferritin, Serum: 1441 (10-10)      D-Dimer Assay, Quantitative: 613 (10-09)    Procalcitonin, Serum: 0.56 (10-10-20 @ 00:43)

## 2020-10-15 NOTE — PROGRESS NOTE ADULT - PROBLEM SELECTOR PLAN 1
while inpt, increase lantus to 30units am  continue lispro  8units with meals  continue DANAY with meals  upon d/c, in view of no prescription coverage as outpt, can be d/c on [employer] brand Relion 70/30 insulin 30units with breakfast and 20units with dinner  will need prescriptions for 70/30 relion insulin vials, insulin syringes, relion glucometer, test strips and lancets  recommend f/u with endocrine as outpt within 2wks.

## 2020-10-15 NOTE — PROGRESS NOTE ADULT - ASSESSMENT
10/14: Venous dopplers of RUE performed No evidence of right upper extremity deep venous thrombosis. Thrombi in the right mid basilic vein, compatible with superficial thrombophlebitis.   CT abdomen/plevis/chest was done and revealed no lung disease, + cystitis and prostatitis, urology on board, PVRs monitored, 171 ml yesterday, may will need a Elizabeth. The patient remains afebrile, leukocytosis is improving, WBC 11.24 today, repeat blood cultures grew gram positive cocci in clusters. HIV testing is negative. The plan for the patient is to have RENETTA done and if positive, the patient will be referred to CT surgery evaluation.   10/15: repeat blood culture collected on 10/14/2020 with no growth so far, RENETTA pending, afebrile (T 99.9 this am), leukocytosis normalized, no new complains.     1. MSSA bacteremia (unknown source)  2. MSSA UCx  3. Leukocytosis - normalized   4. Fevers - improved   5. right forearm thrombophlebitis       Plan:    - continue Cefazolin 2 grams IV q 8 hours  - awaiting for repeat blood cultures final result  - glycemic control is important, endocrinologist consult reviewed  - RENETTA and if findings are positive the patient will be referred to CT surgery for evaluation   - trend fever curve   - trend WBC   - consider vascular consult for thrombophlebitis

## 2020-10-15 NOTE — PROGRESS NOTE ADULT - ATTENDING COMMENTS
Discussed with Alba Smith NP. I have reviewed all pertinent clinical information, including history, physical exam, plan and the NP's note and agree except as noted.   Pt seen and examined   doing well and awaiting RENETTA    Plan:   continue cefazolin 2g q8hrs   repeat blood culture negative thus far from 10/14  Transesophageal Echocardiogram arranged for tomorrow   if positive for endocarditis, transfer to [facility] for CT surgery evaluation   Endocrine consult noted: please ensure good glycemic control   warm compress to R upper ext as still has palpable cord( unlikely to be source as blood culture were drawn the moment that IV was placed)  consider vascular consult for thrombophlebitis if it worsens    Kevin Caputo,   Cell: [phone]  Pager [phone]  Infectious Disease Attending  After 5pm/weekends please call [phone] for all inquiries and new consults

## 2020-10-16 LAB
ANION GAP SERPL CALC-SCNC: 5 MMOL/L — SIGNIFICANT CHANGE UP (ref 5–17)
BUN SERPL-MCNC: 8 MG/DL — SIGNIFICANT CHANGE UP (ref 7–23)
CALCIUM SERPL-MCNC: 7.9 MG/DL — LOW (ref 8.5–10.1)
CHLORIDE SERPL-SCNC: 102 MMOL/L — SIGNIFICANT CHANGE UP (ref 96–108)
CO2 SERPL-SCNC: 31 MMOL/L — SIGNIFICANT CHANGE UP (ref 22–31)
CREAT SERPL-MCNC: 0.75 MG/DL — SIGNIFICANT CHANGE UP (ref 0.5–1.3)
GLUCOSE BLDC GLUCOMTR-MCNC: 131 MG/DL — HIGH (ref 70–99)
GLUCOSE BLDC GLUCOMTR-MCNC: 163 MG/DL — HIGH (ref 70–99)
GLUCOSE BLDC GLUCOMTR-MCNC: 194 MG/DL — HIGH (ref 70–99)
GLUCOSE BLDC GLUCOMTR-MCNC: 289 MG/DL — HIGH (ref 70–99)
GLUCOSE BLDC GLUCOMTR-MCNC: 87 MG/DL — SIGNIFICANT CHANGE UP (ref 70–99)
GLUCOSE SERPL-MCNC: 168 MG/DL — HIGH (ref 70–99)
POTASSIUM SERPL-MCNC: 3.7 MMOL/L — SIGNIFICANT CHANGE UP (ref 3.5–5.3)
POTASSIUM SERPL-SCNC: 3.7 MMOL/L — SIGNIFICANT CHANGE UP (ref 3.5–5.3)
SODIUM SERPL-SCNC: 138 MMOL/L — SIGNIFICANT CHANGE UP (ref 135–145)

## 2020-10-16 PROCEDURE — 93320 DOPPLER ECHO COMPLETE: CPT | Mod: 26

## 2020-10-16 PROCEDURE — 93325 DOPPLER ECHO COLOR FLOW MAPG: CPT | Mod: 26

## 2020-10-16 PROCEDURE — 93312 ECHO TRANSESOPHAGEAL: CPT | Mod: 26

## 2020-10-16 PROCEDURE — 99232 SBSQ HOSP IP/OBS MODERATE 35: CPT

## 2020-10-16 RX ORDER — INSULIN LISPRO 100/ML
VIAL (ML) SUBCUTANEOUS
Refills: 0 | Status: DISCONTINUED | OUTPATIENT
Start: 2020-10-16 | End: 2020-10-20

## 2020-10-16 RX ORDER — INSULIN LISPRO 100/ML
8 VIAL (ML) SUBCUTANEOUS
Refills: 0 | Status: DISCONTINUED | OUTPATIENT
Start: 2020-10-16 | End: 2020-10-18

## 2020-10-16 RX ORDER — CEFAZOLIN SODIUM 1 G
2000 VIAL (EA) INJECTION EVERY 8 HOURS
Refills: 0 | Status: DISCONTINUED | OUTPATIENT
Start: 2020-10-16 | End: 2020-10-20

## 2020-10-16 RX ORDER — DEXTROSE 50 % IN WATER 50 %
12.5 SYRINGE (ML) INTRAVENOUS ONCE
Refills: 0 | Status: DISCONTINUED | OUTPATIENT
Start: 2020-10-16 | End: 2020-10-16

## 2020-10-16 RX ORDER — INSULIN LISPRO 100/ML
VIAL (ML) SUBCUTANEOUS
Refills: 0 | Status: DISCONTINUED | OUTPATIENT
Start: 2020-10-16 | End: 2020-10-16

## 2020-10-16 RX ORDER — DEXTROSE 50 % IN WATER 50 %
25 SYRINGE (ML) INTRAVENOUS ONCE
Refills: 0 | Status: DISCONTINUED | OUTPATIENT
Start: 2020-10-16 | End: 2020-10-20

## 2020-10-16 RX ORDER — INSULIN LISPRO 100/ML
VIAL (ML) SUBCUTANEOUS AT BEDTIME
Refills: 0 | Status: DISCONTINUED | OUTPATIENT
Start: 2020-10-16 | End: 2020-10-16

## 2020-10-16 RX ORDER — SODIUM CHLORIDE 9 MG/ML
1000 INJECTION INTRAMUSCULAR; INTRAVENOUS; SUBCUTANEOUS
Refills: 0 | Status: DISCONTINUED | OUTPATIENT
Start: 2020-10-16 | End: 2020-10-17

## 2020-10-16 RX ORDER — ENOXAPARIN SODIUM 100 MG/ML
40 INJECTION SUBCUTANEOUS DAILY
Refills: 0 | Status: DISCONTINUED | OUTPATIENT
Start: 2020-10-16 | End: 2020-10-20

## 2020-10-16 RX ORDER — INSULIN GLARGINE 100 [IU]/ML
30 INJECTION, SOLUTION SUBCUTANEOUS EVERY MORNING
Refills: 0 | Status: DISCONTINUED | OUTPATIENT
Start: 2020-10-16 | End: 2020-10-19

## 2020-10-16 RX ORDER — DEXTROSE 50 % IN WATER 50 %
15 SYRINGE (ML) INTRAVENOUS ONCE
Refills: 0 | Status: DISCONTINUED | OUTPATIENT
Start: 2020-10-16 | End: 2020-10-16

## 2020-10-16 RX ORDER — ACETAMINOPHEN 500 MG
650 TABLET ORAL EVERY 6 HOURS
Refills: 0 | Status: DISCONTINUED | OUTPATIENT
Start: 2020-10-16 | End: 2020-10-20

## 2020-10-16 RX ORDER — TAMSULOSIN HYDROCHLORIDE 0.4 MG/1
0.4 CAPSULE ORAL AT BEDTIME
Refills: 0 | Status: DISCONTINUED | OUTPATIENT
Start: 2020-10-16 | End: 2020-10-20

## 2020-10-16 RX ORDER — SIMVASTATIN 20 MG/1
20 TABLET, FILM COATED ORAL AT BEDTIME
Refills: 0 | Status: DISCONTINUED | OUTPATIENT
Start: 2020-10-16 | End: 2020-10-20

## 2020-10-16 RX ORDER — GLUCAGON INJECTION, SOLUTION 0.5 MG/.1ML
1 INJECTION, SOLUTION SUBCUTANEOUS ONCE
Refills: 0 | Status: DISCONTINUED | OUTPATIENT
Start: 2020-10-16 | End: 2020-10-16

## 2020-10-16 RX ORDER — DEXTROSE 50 % IN WATER 50 %
25 SYRINGE (ML) INTRAVENOUS ONCE
Refills: 0 | Status: DISCONTINUED | OUTPATIENT
Start: 2020-10-16 | End: 2020-10-16

## 2020-10-16 RX ORDER — CEFAZOLIN SODIUM 1 G
500 VIAL (EA) INJECTION EVERY 8 HOURS
Refills: 0 | Status: DISCONTINUED | OUTPATIENT
Start: 2020-10-16 | End: 2020-10-16

## 2020-10-16 RX ADMIN — SIMVASTATIN 20 MILLIGRAM(S): 20 TABLET, FILM COATED ORAL at 21:16

## 2020-10-16 RX ADMIN — ENOXAPARIN SODIUM 40 MILLIGRAM(S): 100 INJECTION SUBCUTANEOUS at 11:42

## 2020-10-16 RX ADMIN — SODIUM CHLORIDE 100 MILLILITER(S): 9 INJECTION INTRAMUSCULAR; INTRAVENOUS; SUBCUTANEOUS at 16:51

## 2020-10-16 RX ADMIN — Medication 100 MILLIGRAM(S): at 21:16

## 2020-10-16 RX ADMIN — SODIUM CHLORIDE 100 MILLILITER(S): 9 INJECTION INTRAMUSCULAR; INTRAVENOUS; SUBCUTANEOUS at 03:31

## 2020-10-16 RX ADMIN — Medication 2: at 06:02

## 2020-10-16 RX ADMIN — INSULIN GLARGINE 30 UNIT(S): 100 INJECTION, SOLUTION SUBCUTANEOUS at 08:13

## 2020-10-16 RX ADMIN — Medication 100 MILLIGRAM(S): at 05:21

## 2020-10-16 RX ADMIN — TAMSULOSIN HYDROCHLORIDE 0.4 MILLIGRAM(S): 0.4 CAPSULE ORAL at 21:16

## 2020-10-16 NOTE — PROGRESS NOTE ADULT - SUBJECTIVE AND OBJECTIVE BOX
CHIEF COMPLAINT:   No fever. no cough no dysuria  no hematuria  No nausea or vomiting  no diarrhea  no cp  feeling better and participating in PT.   NPO after MN for RENETTA today       PHYSICAL EXAM:    GENERAL: Moderately built, malnourished   CHEST/LUNG: Clear to ausculation bilaterally, no wheezing, no crackles   HEART: Regular rate and rhythm; No murmurs, rubs  ABDOMEN: Soft, Nontender, Nondistended; Bowel sounds present  EXTREMITIES:  Moving all four extremities spontaneously, No clubbing, cyanosis. no edema  NERVOUS SYSTEM:  Grossly non focal.  Psychiatry: AAO x 3, mood is appropriate       OBJECTIVE DATA:       Vital Signs Last 24 Hrs  T(C): 37.2 (16 Oct 2020 04:55), Max: 37.4 (16 Oct 2020 00:13)  T(F): 98.9 (16 Oct 2020 04:55), Max: 99.4 (16 Oct 2020 00:13)  HR: 99 (16 Oct 2020 04:55) (92 - 99)  BP: 133/76 (16 Oct 2020 04:55) (121/70 - 145/81)  BP(mean): --  RR: 18 (16 Oct 2020 04:55) (18 - 20)  SpO2: 95% (16 Oct 2020 04:55) (93% - 95%)           Daily     Daily Weight in k.6 (16 Oct 2020 04:55)  LABS:                        10.9   10.44 )-----------( 368      ( 15 Oct 2020 07:45 )             32.3             10-    138  |  102  |  8   ----------------------------<  168<H>  3.7   |  31  |  0.75    Ca    7.9<L>      16 Oct 2020 08:22                         CAPILLARY BLOOD GLUCOSE      POCT Blood Glucose.: 163 mg/dL (16 Oct 2020 07:58)      Culture - Blood (collected 10-14)  Source: .Blood Blood-Venous  Preliminary Report (10-15):    No growth to date.    Culture - Blood (collected 10-14)  Source: .Blood Blood  Preliminary Report (10-15):    No growth to date.    Culture - Urine (collected 10-14)  Source: .Urine Clean Catch (Midstream)  Final Report (10-15):    <10,000 CFU/mL Normal Urogenital Lin    Culture - Blood (collected 10-12)  Source: .Blood Blood-Peripheral  Gram Stain (10-14):    Growth in aerobic bottle: Gram Positive Cocci in Clusters    Growth in anaerobic bottle: Gram Positive Cocci in Clusters  Final Report (10-14):    Growth in aerobic and anaerobic bottles: Staphylococcus aureus    See previous culture [id number]    Culture - Blood (collected 10-12)  Source: .Blood Blood-Peripheral  Gram Stain (10-14):    Growth in aerobic bottle: Gram Positive Cocci in Clusters    Growth in anaerobic bottle: Gram Positive Cocci in Clusters  Final Report (10-14):    Growth in aerobic and anaerobic bottles: Staphylococcus aureus    See previous culture [id number]        MEDICATIONS  (STANDING):  ceFAZolin   IVPB      ceFAZolin   IVPB 2000 milliGRAM(s) IV Intermittent every 8 hours  dextrose 5%. 1000 milliLiter(s) (50 mL/Hr) IV Continuous <Continuous>  dextrose 50% Injectable 25 Gram(s) IV Push once  dextrose 50% Injectable 12.5 Gram(s) IV Push once  dextrose 50% Injectable 25 Gram(s) IV Push once  enoxaparin Injectable 40 milliGRAM(s) SubCutaneous daily  influenza   Vaccine 0.5 milliLiter(s) IntraMuscular once  insulin glargine Injectable (LANTUS) 30 Unit(s) SubCutaneous every morning  insulin lispro (HumaLOG) corrective regimen sliding scale   SubCutaneous three times a day before meals  insulin lispro (HumaLOG) corrective regimen sliding scale   SubCutaneous at bedtime  insulin lispro Injectable (HumaLOG) 8 Unit(s) SubCutaneous three times a day before meals  simvastatin 20 milliGRAM(s) Oral at bedtime  sodium chloride 0.9%. 1000 milliLiter(s) (100 mL/Hr) IV Continuous <Continuous>  tamsulosin 0.4 milliGRAM(s) Oral at bedtime    MEDICATIONS  (PRN):  acetaminophen    Suspension .. 650 milliGRAM(s) Oral every 6 hours PRN Temp greater or equal to 38C (100.4F), Moderate Pain (4 - 6)  dextrose 40% Gel 15 Gram(s) Oral once PRN Blood Glucose LESS THAN 70 milliGRAM(s)/deciliter  glucagon  Injectable 1 milliGRAM(s) IntraMuscular once PRN Glucose LESS THAN 70 milligrams/deciliter

## 2020-10-16 NOTE — PROGRESS NOTE ADULT - PROBLEM SELECTOR PLAN 1
improved control  continue lantus  30units am  continue lispro  8units with meals (hold while npo)  continue DANAY with meals  upon d/c, in view of no prescription coverage as outpt, can be d/c on [employer] brand Relion 70/30 insulin 30units with breakfast and 20units with dinner  will need prescriptions for 70/30 relion insulin vials, insulin syringes, relion glucometer, test strips and lancets  recommend f/u with endocrine as outpt within 2wks.

## 2020-10-16 NOTE — PROGRESS NOTE ADULT - SUBJECTIVE AND OBJECTIVE BOX
Patient seen and examined bedside resting comfortably.  No complaints offered. Tolerating diet.  Voiding spontaneously without difficulty. Denies hematuria and dysuria  Denies N/V/D, CP, SOB, cough.    T(F): 98.8 (10-16-20 @ 16:09), Max: 99.4 (10-16-20 @ 00:13)  HR: 86 (10-16-20 @ 16:09) (73 - 99)  BP: 138/81 (10-16-20 @ 16:09) (81/41 - 153/116)  RR: 17 (10-16-20 @ 16:09) (14 - 22)  SpO2: 93% (10-16-20 @ 16:09) (93% - 100%)    POCT Blood Glucose.: 87 mg/dL (16 Oct 2020 16:38)  POCT Blood Glucose.: 131 mg/dL (16 Oct 2020 11:22)  POCT Blood Glucose.: 163 mg/dL (16 Oct 2020 07:58)  POCT Blood Glucose.: 194 mg/dL (16 Oct 2020 05:18)  POCT Blood Glucose.: 135 mg/dL (15 Oct 2020 21:20)      PHYSICAL EXAM:  General: NAD  Heart: S1S2  Chest: nonlabored respirations  : No suprapubic tenderness or bladder distention.    Neuro: AAO x3    LABS:                        10.9   10.44 )-----------( 368      ( 15 Oct 2020 07:45 )             32.3   10-16    138  |  102  |  8   ----------------------------<  168<H>  3.7   |  31  |  0.75    Ca    7.9<L>      16 Oct 2020 08:22      I&O's Detail    15 Oct 2020 07:01  -  16 Oct 2020 07:00  --------------------------------------------------------  IN:    Oral Fluid: 816 mL  Total IN: 816 mL    OUT:    Voided (mL): 5225 mL  Total OUT: 5225 mL    Total NET: -4409 mL      ASSESSMENT:   56yo Male with PMH DM admitted with Sepsis, MSSA bacteremia, ?etiology. Leukocytosis resolved.  Urology consulted for urinary retention (resolved), CT findings of Cystitis, Prostatitis, and Urine cx + Staph aureus.   Renal function stable.     PLAN  - continue ID follow up, continue IV abx per ID recs  - f/u RENETTA reults  - urine cx with negative results  - f/u repeat blood culture with no growth to date  - continue medical management  - no acute  intervention at this time, please reconsult urology as needed.   - discussed with Dr. Rosenbaum     Patient seen and examined bedside resting comfortably.  No complaints offered. Tolerating diet.  Voiding spontaneously without difficulty. Denies hematuria and dysuria  Denies N/V/D, CP, SOB, cough.    T(F): 98.8 (10-16-20 @ 16:09), Max: 99.4 (10-16-20 @ 00:13)  HR: 86 (10-16-20 @ 16:09) (73 - 99)  BP: 138/81 (10-16-20 @ 16:09) (81/41 - 153/116)  RR: 17 (10-16-20 @ 16:09) (14 - 22)  SpO2: 93% (10-16-20 @ 16:09) (93% - 100%)    POCT Blood Glucose.: 87 mg/dL (16 Oct 2020 16:38)  POCT Blood Glucose.: 131 mg/dL (16 Oct 2020 11:22)  POCT Blood Glucose.: 163 mg/dL (16 Oct 2020 07:58)  POCT Blood Glucose.: 194 mg/dL (16 Oct 2020 05:18)  POCT Blood Glucose.: 135 mg/dL (15 Oct 2020 21:20)      PHYSICAL EXAM:  General: NAD  Heart: S1S2  Chest: nonlabored respirations  : No suprapubic tenderness or bladder distention.    Neuro: AAO x3    LABS:                        10.9   10.44 )-----------( 368      ( 15 Oct 2020 07:45 )             32.3   10-16    138  |  102  |  8   ----------------------------<  168<H>  3.7   |  31  |  0.75    Ca    7.9<L>      16 Oct 2020 08:22      I&O's Detail    15 Oct 2020 07:01  -  16 Oct 2020 07:00  --------------------------------------------------------  IN:    Oral Fluid: 816 mL  Total IN: 816 mL    OUT:    Voided (mL): 5225 mL  Total OUT: 5225 mL    Total NET: -4409 mL      ASSESSMENT:   56yo Male with PMH DM admitted with Sepsis, MSSA bacteremia, ?etiology. Leukocytosis resolved.  Urology consulted for urinary retention (resolved), CT findings of Cystitis, Prostatitis, and Urine cx + Staph aureus.   Renal function stable.     PLAN  - continue ID follow up, continue IV abx per ID recs  - f/u RENETTA reults  - urine cx with negative results  - f/u repeat blood culture with no growth to date  - continue medical management  - no acute  intervention at this time. Will sign off, please reconsult urology as needed.   - discussed with Dr. Rosenbaum

## 2020-10-16 NOTE — PROGRESS NOTE ADULT - ASSESSMENT
10/14: Venous dopplers of RUE performed No evidence of right upper extremity deep venous thrombosis. Thrombi in the right mid basilic vein, compatible with superficial thrombophlebitis.   CT abdomen/plevis/chest was done and revealed no lung disease, + cystitis and prostatitis, urology on board, PVRs monitored, 171 ml yesterday, may will need a Elizabeth. The patient remains afebrile, leukocytosis is improving, WBC 11.24 today, repeat blood cultures grew gram positive cocci in clusters. HIV testing is negative. The plan for the patient is to have RENETTA done and if positive, the patient will be referred to CT surgery evaluation.   10/15: repeat blood culture collected on 10/14/2020 with no growth so far, RENETTA pending, afebrile (T 99.9 this am), leukocytosis normalized, no new complains.   10/16: no change in patient's presentation, repeat blood cultures from 10/14/2020 remains with no growth, repeat blood culture collected yesterday is pending result, the patient is awaiting for RENETTA scheduled for today.     1. MSSA bacteremia (unknown source)  2. MSSA UCx  3. Leukocytosis - normalized   4. Fevers - improved   5. right forearm thrombophlebitis       Plan:    - continue Cefazolin 2 grams IV q 8 hours  - awaiting for repeat blood cultures  - glycemic control is important, endocrinologist consult reviewed  - RENETTA and if findings are positive the patient will be referred to CT surgery for evaluation   - trend fever curve   - trend WBC   - warm compress to R upper ext as still has palpable cord ( unlikely to be source as blood culture were drawn the moment that IV was placed)  consider vascular consult for thrombophlebitis if it worsens 10/14: Venous dopplers of RUE performed No evidence of right upper extremity deep venous thrombosis. Thrombi in the right mid basilic vein, compatible with superficial thrombophlebitis.   CT abdomen/plevis/chest was done and revealed no lung disease, + cystitis and prostatitis, urology on board, PVRs monitored, 171 ml yesterday, may will need a Elizabeth. The patient remains afebrile, leukocytosis is improving, WBC 11.24 today, repeat blood cultures grew gram positive cocci in clusters. HIV testing is negative. The plan for the patient is to have RENETTA done and if positive, the patient will be referred to CT surgery evaluation.   10/15: repeat blood culture collected on 10/14/2020 with no growth so far, RENETTA pending, afebrile (T 99.9 this am), leukocytosis normalized, no new complains.   10/16: no change in patient's presentation, repeat blood cultures from 10/14/2020 remains with no growth, repeat blood culture collected yesterday is pending result, the patient is awaiting for RENETTA scheduled for today.     1. MSSA bacteremia (unknown source)  2. MSSA UCx  3. Leukocytosis - normalized   4. Fevers - improved   5. right forearm thrombophlebitis       Plan:    - continue Cefazolin 2 grams IV q 8 hours  - Repeat blood cultures negative thus far  - glycemic control is important, endocrinologist consult reviewed  - RENETTA and if findings are positive the patient will be referred to CT surgery for evaluation   - trend fever curve   - trend WBC   - warm compress to R upper ext as still has palpable cord ( unlikely to be source as blood culture were drawn the moment that IV was placed)  -consider vascular consult for thrombophlebitis if it worsens

## 2020-10-16 NOTE — PROGRESS NOTE ADULT - ASSESSMENT
pt w/dm2 off meds comes w/generalized fatigue w/hyperglycemia, electrolyte abnomality and uti    Problem/Plan - 1:  ·  Problem: Acute MSSA cystitis without hematuria and MSSA bacteremia.  Persistently positive blood culture. pending recent blood culture.  MRI spine did not show any OM.  CT chest/A/P showed cystitis/prostatitis. cont iv cefazolin. leukocytosis resolved. US right arm showed right superificial thrombophlebitis. ID following. NPO after MN today for RENETTA . per ID, patient will likely need 4-6 weeks of iv antibitotic.     Problem/Plan - 2:  ·  Problem: Type 2 diabetes mellitus with hyperglycemia. better controlled. cont current management. Follow finger sticks. Hold premeal insulin if NPO. Patient can afford outpatient meds now with insurance.     Problem/Plan - 4:  ·  Problem: Mixed hyperlipidemia.  Plan: cont statin.     Problem/Plan - 5:  ·  Problem: Localized edema.  improved. negative US venous duplex.     Hypokalemia. Repleted. reviewed urine electrolytes. pending aldosterone level.     Vitamin D deficiency. start supplement.     urinary retention on PVR but patient was able to void. urology following.     Generalized weakness. consulted PT. Patient doing better. Might not need MARY>>. will follow     Problem/Plan - 6:  Problem: Preventive measure. Plan: sq lovenox

## 2020-10-16 NOTE — PROGRESS NOTE ADULT - ATTENDING COMMENTS
Discussed with Alba Smith NP. I have reviewed all pertinent clinical information, including history, physical exam, plan and the NP's note and agree.    Kevin Caputo DO  Cell: [phone]  Pager [phone]  Infectious Disease Attending  After 5pm/weekends please call [phone] for all inquiries and new consults

## 2020-10-16 NOTE — PROGRESS NOTE ADULT - SUBJECTIVE AND OBJECTIVE BOX
Patient is a 55y old  Male who presents with a chief complaint of fatigue (15 Oct 2020 19:04)      INTERVAL HPI/OVERNIGHT EVENTS:  pt NPO for RENETTA  no complaints    MEDICATIONS  (STANDING):  ceFAZolin   IVPB      ceFAZolin   IVPB 2000 milliGRAM(s) IV Intermittent every 8 hours  dextrose 5%. 1000 milliLiter(s) (50 mL/Hr) IV Continuous <Continuous>  dextrose 50% Injectable 12.5 Gram(s) IV Push once  dextrose 50% Injectable 25 Gram(s) IV Push once  dextrose 50% Injectable 25 Gram(s) IV Push once  enoxaparin Injectable 40 milliGRAM(s) SubCutaneous daily  influenza   Vaccine 0.5 milliLiter(s) IntraMuscular once  insulin glargine Injectable (LANTUS) 30 Unit(s) SubCutaneous every morning  insulin lispro (HumaLOG) corrective regimen sliding scale   SubCutaneous three times a day before meals  insulin lispro (HumaLOG) corrective regimen sliding scale   SubCutaneous at bedtime  insulin lispro Injectable (HumaLOG) 8 Unit(s) SubCutaneous three times a day before meals  simvastatin 20 milliGRAM(s) Oral at bedtime  sodium chloride 0.9%. 1000 milliLiter(s) (100 mL/Hr) IV Continuous <Continuous>  tamsulosin 0.4 milliGRAM(s) Oral at bedtime    MEDICATIONS  (PRN):  acetaminophen    Suspension .. 650 milliGRAM(s) Oral every 6 hours PRN Temp greater or equal to 38C (100.4F), Moderate Pain (4 - 6)  dextrose 40% Gel 15 Gram(s) Oral once PRN Blood Glucose LESS THAN 70 milliGRAM(s)/deciliter  glucagon  Injectable 1 milliGRAM(s) IntraMuscular once PRN Glucose LESS THAN 70 milligrams/deciliter      REVIEW OF SYSTEMS:  CONSTITUTIONAL: No fever, weight loss, or fatigue  RESPIRATORY: No cough, wheezing, chills or hemoptysis; No shortness of breath  CARDIOVASCULAR: No chest pain, palpitations, dizziness, or leg swelling  GASTROINTESTINAL: No abdominal or epigastric pain. No nausea, vomiting, or hematemesis; No diarrhea or constipation. No melena or hematochezia.  ENDOCRINE: No heat or cold intolerance; No hair loss      Vital Signs Last 24 Hrs  T(C): 37.2 (16 Oct 2020 04:55), Max: 37.4 (16 Oct 2020 00:13)  T(F): 98.9 (16 Oct 2020 04:55), Max: 99.4 (16 Oct 2020 00:13)  HR: 99 (16 Oct 2020 04:55) (92 - 99)  BP: 133/76 (16 Oct 2020 04:55) (121/70 - 145/81)  BP(mean): --  RR: 18 (16 Oct 2020 04:55) (18 - 20)  SpO2: 95% (16 Oct 2020 04:55) (93% - 95%)    PHYSICAL EXAM:  GENERAL: NAD, well-groomed, well-developed      LABS:                        10.9   10.44 )-----------( 368      ( 15 Oct 2020 07:45 )             32.3     10-16    138  |  102  |  8   ----------------------------<  168<H>  3.7   |  31  |  0.75    Ca    7.9<L>      16 Oct 2020 08:22          CAPILLARY BLOOD GLUCOSE      POCT Blood Glucose.: 163 mg/dL (16 Oct 2020 07:58)  POCT Blood Glucose.: 194 mg/dL (16 Oct 2020 05:18)  POCT Blood Glucose.: 135 mg/dL (15 Oct 2020 21:20)  POCT Blood Glucose.: 208 mg/dL (15 Oct 2020 17:28)  POCT Blood Glucose.: 215 mg/dL (15 Oct 2020 11:12)    Lipid panel:               RADIOLOGY & ADDITIONAL TESTS:

## 2020-10-16 NOTE — PROGRESS NOTE ADULT - SUBJECTIVE AND OBJECTIVE BOX
BHARATH GONZALES  MRN-[id number]    Follow up: MSSA bacteremia     Interval note:  No change in patient's presentation today, remains afebrile, no new CBC, leukocytosis normalized previously. BC collected on 10/14/2020 with no growth, repeat blood culture collected yesterday is pending result. The patient is in his bed, no distress, awaiting for RENETTA scheduled for today, if positive for endocarditis once done the patient will be referred to CT surgery.     ROS:      [x ] All other systems negative    Constitutional: no fever, no chills  Head: no trauma  Eyes: no vision changes, no eye pain  ENT:  no sore throat, no rhinorrhea  Cardiovascular:  no chest pain, no palpitation  Respiratory:  no SOB, no cough  GI:  no abd pain, no vomiting, no diarrhea  urinary: no dysuria, no hematuria, no flank pain  musculoskeletal:  bilateral lower extremities pain and bilateral forearms  skin:  no rash  neurology:  no headache, no seizure, no change in mental status  psych: no anxiety, no depression       Allergies  No Known Allergies        ANTIMICROBIALS:  ceFAZolin   IVPB    ceFAZolin   IVPB 2000 every 8 hours      OTHER MEDS:  acetaminophen    Suspension .. 650 milliGRAM(s) Oral every 6 hours PRN  dextrose 40% Gel 15 Gram(s) Oral once PRN  dextrose 5%. 1000 milliLiter(s) IV Continuous <Continuous>  dextrose 50% Injectable 25 Gram(s) IV Push once  dextrose 50% Injectable 12.5 Gram(s) IV Push once  dextrose 50% Injectable 25 Gram(s) IV Push once  enoxaparin Injectable 40 milliGRAM(s) SubCutaneous daily  glucagon  Injectable 1 milliGRAM(s) IntraMuscular once PRN  influenza   Vaccine 0.5 milliLiter(s) IntraMuscular once  insulin glargine Injectable (LANTUS) 30 Unit(s) SubCutaneous every morning  insulin lispro (HumaLOG) corrective regimen sliding scale   SubCutaneous three times a day before meals  insulin lispro (HumaLOG) corrective regimen sliding scale   SubCutaneous at bedtime  insulin lispro Injectable (HumaLOG) 8 Unit(s) SubCutaneous three times a day before meals  simvastatin 20 milliGRAM(s) Oral at bedtime  sodium chloride 0.9%. 1000 milliLiter(s) IV Continuous <Continuous>  tamsulosin 0.4 milliGRAM(s) Oral at bedtime      Vital Signs Last 24 Hrs  T(C): 37.2 (16 Oct 2020 04:55), Max: 37.4 (16 Oct 2020 00:13)  T(F): 98.9 (16 Oct 2020 04:55), Max: 99.4 (16 Oct 2020 00:13)  HR: 99 (16 Oct 2020 04:55) (92 - 99)  BP: 133/76 (16 Oct 2020 04:55) (121/70 - 145/81)  BP(mean): --  RR: 18 (16 Oct 2020 04:55) (18 - 20)  SpO2: 95% (16 Oct 2020 04:55) (93% - 95%)    General: Nontoxic-appearing thin Male in no acute distress.  HEENT: AT/NC. PERRL. EOMI. Anicteric. Conjunctiva pink and moist. Oropharynx clear. Dentition + partial dentures   Neck: Not rigid. No sense of mass.   Nodes: None palpable.  Lungs: Clear bilaterally without rales, wheezing or rhonchi, respirations not labored.   Heart: Regular rate and rhythm. No Murmur. No rub. No gallop. No palpable thrill.  Abdomen: Bowel sounds present and normoactive. Soft. Nondistended. Nontender. No sense of mass. No organomegaly.  Back: No spinal tenderness. No costovertebral angle tenderness.   Extremities: No cyanosis or clubbing. No edema. Tender upon palpation bilateral lower extremities and bilateral forearms. Painful palpable cord in right forearm.   Skin: Warm. Dry. Good turgor. No rash. No vasculitic stigmata.  Psychiatric: Appropriate affect and mood for situation.     WBC Count: 10.44 K/uL (10-15 @ 07:45)  WBC Count: 11.24 K/uL (10-14 @ 08:00)  WBC Count: 13.48 K/uL (10-13 @ 07:23)  WBC Count: 15.88 K/uL (10-12 @ 06:58)  WBC Count: 21.28 K/uL (10-11 @ 07:13)  WBC Count: 22.91 K/uL (10-10 @ 18:52)  WBC Count: 22.97 K/uL (10-10 @ 06:53)                            10.9   10.44 )-----------( 368      ( 15 Oct 2020 07:45 )             32.3       10-16    138  |  102  |  8   ----------------------------<  168<H>  3.7   |  31  |  0.75    Ca    7.9<L>      16 Oct 2020 08:22            Creatinine Trend: 0.75<--, 0.70<--, 0.61<--, 0.58<--, 0.41<--, 0.49<--      MICROBIOLOGY:  v  .Blood Blood  10-14-20   No growth to date.  --  --      .Urine Clean Catch (Midstream)  10-14-20   <10,000 CFU/mL Normal Urogenital Lin  --  --      .Blood Blood-Peripheral  10-12-20   Growth in aerobic and anaerobic bottles: Staphylococcus aureus  See previous culture [id number]  --    Growth in aerobic bottle: Gram Positive Cocci in Clusters  Growth in anaerobic bottle: Gram Positive Cocci in Clusters      .Blood Blood-Peripheral  10-10-20   Growth in aerobic and anaerobic bottles: Staphylococcus aureus  See previous culture [id number]  --  Blood Culture PCR  Staphylococcus aureus      .Urine Clean Catch (Midstream)  10-10-20   50,000 - 99,000 CFU/mL Staphylococcus aureus  --  Staphylococcus aureus              COVID-19 PCR: NotDetec (10-09)    C-Reactive Protein, Serum: 31.66 (10-10)    Ferritin, Serum: 1441 (10-10)      D-Dimer Assay, Quantitative: 613 (10-09)    Procalcitonin, Serum: 0.56 (10-10-20 @ 00:43)   BHARATH GONZALES  MRN-[id number]    Follow up: MSSA bacteremia     Interval note:  No change in patient's presentation today, remains afebrile, no new CBC, leukocytosis normalized previously. BC collected on 10/14/2020 with no growth, repeat blood culture collected yesterday is pending result. The patient is in his bed, no distress, awaiting for RENETTA scheduled for today, if positive for endocarditis once done the patient will be referred to CT surgery.     ROS:      [x ] All other systems negative    Constitutional: no fever, no chills  Head: no trauma  Eyes: no vision changes, no eye pain  ENT:  no sore throat, no rhinorrhea  Cardiovascular:  no chest pain, no palpitation  Respiratory:  no SOB, no cough  GI:  no abd pain, no vomiting, no diarrhea  urinary: no dysuria, no hematuria, no flank pain  musculoskeletal:  bilateral lower extremities pain and bilateral forearms  skin:  no rash  neurology:  no headache, no seizure, no change in mental status  psych: no anxiety, no depression       Allergies  No Known Allergies        ANTIMICROBIALS:  ceFAZolin   IVPB    ceFAZolin   IVPB 2000 every 8 hours      OTHER MEDS:  acetaminophen    Suspension .. 650 milliGRAM(s) Oral every 6 hours PRN  dextrose 40% Gel 15 Gram(s) Oral once PRN  dextrose 5%. 1000 milliLiter(s) IV Continuous <Continuous>  dextrose 50% Injectable 25 Gram(s) IV Push once  dextrose 50% Injectable 12.5 Gram(s) IV Push once  dextrose 50% Injectable 25 Gram(s) IV Push once  enoxaparin Injectable 40 milliGRAM(s) SubCutaneous daily  glucagon  Injectable 1 milliGRAM(s) IntraMuscular once PRN  influenza   Vaccine 0.5 milliLiter(s) IntraMuscular once  insulin glargine Injectable (LANTUS) 30 Unit(s) SubCutaneous every morning  insulin lispro (HumaLOG) corrective regimen sliding scale   SubCutaneous three times a day before meals  insulin lispro (HumaLOG) corrective regimen sliding scale   SubCutaneous at bedtime  insulin lispro Injectable (HumaLOG) 8 Unit(s) SubCutaneous three times a day before meals  simvastatin 20 milliGRAM(s) Oral at bedtime  sodium chloride 0.9%. 1000 milliLiter(s) IV Continuous <Continuous>  tamsulosin 0.4 milliGRAM(s) Oral at bedtime      Vital Signs Last 24 Hrs  T(C): 37.2 (16 Oct 2020 04:55), Max: 37.4 (16 Oct 2020 00:13)  T(F): 98.9 (16 Oct 2020 04:55), Max: 99.4 (16 Oct 2020 00:13)  HR: 99 (16 Oct 2020 04:55) (92 - 99)  BP: 133/76 (16 Oct 2020 04:55) (121/70 - 145/81)  BP(mean): --  RR: 18 (16 Oct 2020 04:55) (18 - 20)  SpO2: 95% (16 Oct 2020 04:55) (93% - 95%)    General: Nontoxic-appearing thin Male in no acute distress.  HEENT: AT/NC. PERRL. EOMI. Anicteric. Conjunctiva pink and moist. Oropharynx clear. Dentition + partial dentures   Neck: Not rigid. No sense of mass.   Nodes: None palpable.  Lungs: Clear bilaterally without rales, wheezing or rhonchi, respirations not labored.   Heart: Regular rate and rhythm. No Murmur. No rub. No gallop. No palpable thrill.  Abdomen: Bowel sounds present and normoactive. Soft. Nondistended. Nontender. No sense of mass. No organomegaly.  Back: No spinal tenderness. No costovertebral angle tenderness.   Extremities: No cyanosis or clubbing. No edema. nontender upon palpation bilateral lower extremities and bilateral forearms. nontender palpable cord in right forearm.   Skin: Warm. Dry. Good turgor. No rash. No vasculitic stigmata.  Psychiatric: Appropriate affect and mood for situation.     WBC Count: 10.44 K/uL (10-15 @ 07:45)  WBC Count: 11.24 K/uL (10-14 @ 08:00)  WBC Count: 13.48 K/uL (10-13 @ 07:23)  WBC Count: 15.88 K/uL (10-12 @ 06:58)  WBC Count: 21.28 K/uL (10-11 @ 07:13)  WBC Count: 22.91 K/uL (10-10 @ 18:52)  WBC Count: 22.97 K/uL (10-10 @ 06:53)                            10.9   10.44 )-----------( 368      ( 15 Oct 2020 07:45 )             32.3       10-16    138  |  102  |  8   ----------------------------<  168<H>  3.7   |  31  |  0.75    Ca    7.9<L>      16 Oct 2020 08:22            Creatinine Trend: 0.75<--, 0.70<--, 0.61<--, 0.58<--, 0.41<--, 0.49<--      MICROBIOLOGY:  v  .Blood Blood  10-14-20   No growth to date.  --  --      .Urine Clean Catch (Midstream)  10-14-20   <10,000 CFU/mL Normal Urogenital Lin  --  --      .Blood Blood-Peripheral  10-12-20   Growth in aerobic and anaerobic bottles: Staphylococcus aureus  See previous culture [id number]  --    Growth in aerobic bottle: Gram Positive Cocci in Clusters  Growth in anaerobic bottle: Gram Positive Cocci in Clusters      .Blood Blood-Peripheral  10-10-20   Growth in aerobic and anaerobic bottles: Staphylococcus aureus  See previous culture [id number]  --  Blood Culture PCR  Staphylococcus aureus      .Urine Clean Catch (Midstream)  10-10-20   50,000 - 99,000 CFU/mL Staphylococcus aureus  --  Staphylococcus aureus      COVID-19 PCR: NotDetec (10-09)    C-Reactive Protein, Serum: 31.66 (10-10)    Ferritin, Serum: 1441 (10-10)      D-Dimer Assay, Quantitative: 613 (10-09)    Procalcitonin, Serum: 0.56 (10-10-20 @ 00:43)

## 2020-10-17 LAB
ANION GAP SERPL CALC-SCNC: 6 MMOL/L — SIGNIFICANT CHANGE UP (ref 5–17)
BUN SERPL-MCNC: 7 MG/DL — SIGNIFICANT CHANGE UP (ref 7–23)
CALCIUM SERPL-MCNC: 7.7 MG/DL — LOW (ref 8.5–10.1)
CHLORIDE SERPL-SCNC: 100 MMOL/L — SIGNIFICANT CHANGE UP (ref 96–108)
CO2 SERPL-SCNC: 30 MMOL/L — SIGNIFICANT CHANGE UP (ref 22–31)
CREAT SERPL-MCNC: 0.7 MG/DL — SIGNIFICANT CHANGE UP (ref 0.5–1.3)
GLUCOSE BLDC GLUCOMTR-MCNC: 202 MG/DL — HIGH (ref 70–99)
GLUCOSE BLDC GLUCOMTR-MCNC: 226 MG/DL — HIGH (ref 70–99)
GLUCOSE BLDC GLUCOMTR-MCNC: 227 MG/DL — HIGH (ref 70–99)
GLUCOSE BLDC GLUCOMTR-MCNC: 286 MG/DL — HIGH (ref 70–99)
GLUCOSE SERPL-MCNC: 285 MG/DL — HIGH (ref 70–99)
POTASSIUM SERPL-MCNC: 4.1 MMOL/L — SIGNIFICANT CHANGE UP (ref 3.5–5.3)
POTASSIUM SERPL-SCNC: 4.1 MMOL/L — SIGNIFICANT CHANGE UP (ref 3.5–5.3)
SODIUM SERPL-SCNC: 136 MMOL/L — SIGNIFICANT CHANGE UP (ref 135–145)

## 2020-10-17 PROCEDURE — 99232 SBSQ HOSP IP/OBS MODERATE 35: CPT

## 2020-10-17 RX ADMIN — Medication 100 MILLIGRAM(S): at 15:19

## 2020-10-17 RX ADMIN — Medication 100 MILLIGRAM(S): at 05:07

## 2020-10-17 RX ADMIN — Medication 4: at 12:00

## 2020-10-17 RX ADMIN — SIMVASTATIN 20 MILLIGRAM(S): 20 TABLET, FILM COATED ORAL at 21:05

## 2020-10-17 RX ADMIN — TAMSULOSIN HYDROCHLORIDE 0.4 MILLIGRAM(S): 0.4 CAPSULE ORAL at 21:05

## 2020-10-17 RX ADMIN — Medication 100 MILLIGRAM(S): at 21:06

## 2020-10-17 RX ADMIN — Medication 6: at 08:07

## 2020-10-17 RX ADMIN — Medication 4: at 17:05

## 2020-10-17 RX ADMIN — INSULIN GLARGINE 30 UNIT(S): 100 INJECTION, SOLUTION SUBCUTANEOUS at 08:53

## 2020-10-17 RX ADMIN — Medication 8 UNIT(S): at 17:05

## 2020-10-17 RX ADMIN — Medication 8 UNIT(S): at 08:07

## 2020-10-17 RX ADMIN — ENOXAPARIN SODIUM 40 MILLIGRAM(S): 100 INJECTION SUBCUTANEOUS at 12:00

## 2020-10-17 RX ADMIN — Medication 8 UNIT(S): at 11:59

## 2020-10-17 NOTE — PROGRESS NOTE ADULT - SUBJECTIVE AND OBJECTIVE BOX
CHIEF COMPLAINT:   Feeling better.   no nausea or vomiting  no fever   no cp/sob       PHYSICAL EXAM:    GENERAL: Moderately built, malnourished   CHEST/LUNG: Clear to ausculation bilaterally, no wheezing, no crackles   HEART: Regular rate and rhythm; No murmurs, rubs  ABDOMEN: Soft, Nontender, Nondistended; Bowel sounds present  EXTREMITIES:  Moving all four extremities spontaneously, No clubbing, cyanosis. no edema  NERVOUS SYSTEM:  Grossly non focal.  Psychiatry: AAO x 3, mood is appropriate       OBJECTIVE DATA:       Vital Signs Last 24 Hrs  T(C): 36.8 (17 Oct 2020 11:04), Max: 37.1 (16 Oct 2020 16:09)  T(F): 98.2 (17 Oct 2020 11:04), Max: 98.8 (16 Oct 2020 16:09)  HR: 88 (17 Oct 2020 11:04) (73 - 91)  BP: 122/72 (17 Oct 2020 11:04) (81/41 - 153/116)  BP(mean): 46 (16 Oct 2020 14:58) (46 - 46)  RR: 17 (17 Oct 2020 11:04) (14 - 22)  SpO2: 98% (17 Oct 2020 11:04) (93% - 100%)           Daily     Daily Weight in k.6 (17 Oct 2020 05:52)  LABS:            10-17    136  |  100  |  7   ----------------------------<  285<H>  4.1   |  30  |  0.70    Ca    7.7<L>      17 Oct 2020 08:09                         CAPILLARY BLOOD GLUCOSE      POCT Blood Glucose.: 227 mg/dL (17 Oct 2020 11:08)      Culture - Blood (collected 10-15)  Source: .Blood Blood  Preliminary Report (10-16):    No growth to date.    Culture - Blood (collected 10-14)  Source: .Blood Blood-Venous  Preliminary Report (10-15):    No growth to date.    Culture - Blood (collected 10-14)  Source: .Blood Blood  Preliminary Report (10-15):    No growth to date.    Culture - Urine (collected 10-14)  Source: .Urine Clean Catch (Midstream)  Final Report (10-15):    <10,000 CFU/mL Normal Urogenital Lin    Culture - Blood (collected 10-12)  Source: .Blood Blood-Peripheral  Gram Stain (10-14):    Growth in aerobic bottle: Gram Positive Cocci in Clusters    Growth in anaerobic bottle: Gram Positive Cocci in Clusters  Final Report (10-14):    Growth in aerobic and anaerobic bottles: Staphylococcus aureus    See previous culture [id number]    Culture - Blood (collected 10-12)  Source: .Blood Blood-Peripheral  Gram Stain (10-14):    Growth in aerobic bottle: Gram Positive Cocci in Clusters    Growth in anaerobic bottle: Gram Positive Cocci in Clusters  Final Report (10-14):    Growth in aerobic and anaerobic bottles: Staphylococcus aureus    See previous culture [id number]          Interval Radiology studies: reviewed     < from: RENETTA Echo Doppler (10.16.20 @ 14:15) >  ummary:   1. Left ventricular ejection fraction, by visual estimation, is 60 to 65%.   2. Normal Transesophageal Echocardiogram.   3. Mild mitral valve regurgitation.   4. No evidence of any vegetations.    < end of copied text >    MEDICATIONS  (STANDING):  ceFAZolin   IVPB 2000 milliGRAM(s) IV Intermittent every 8 hours  dextrose 50% Injectable 25 Gram(s) IV Push once  enoxaparin Injectable 40 milliGRAM(s) SubCutaneous daily  influenza   Vaccine 0.5 milliLiter(s) IntraMuscular once  insulin glargine Injectable (LANTUS) 30 Unit(s) SubCutaneous every morning  insulin lispro (HumaLOG) corrective regimen sliding scale   SubCutaneous three times a day before meals  insulin lispro Injectable (HumaLOG) 8 Unit(s) SubCutaneous three times a day before meals  simvastatin 20 milliGRAM(s) Oral at bedtime  tamsulosin 0.4 milliGRAM(s) Oral at bedtime    MEDICATIONS  (PRN):  acetaminophen    Suspension .. 650 milliGRAM(s) Oral every 6 hours PRN Temp greater or equal to 38C (100.4F), Moderate Pain (4 - 6)

## 2020-10-17 NOTE — PROGRESS NOTE ADULT - PROBLEM SELECTOR PLAN 1
Continue with the same regimen while inpatient   addition of low dose Metformin will help control finger sticks better with decreased Insulin Resistance   while inpatient Finger Sticks should be in 140-180 range, preferably <160

## 2020-10-17 NOTE — PROGRESS NOTE ADULT - SUBJECTIVE AND OBJECTIVE BOX
Patient is a 55y old  Male who presents with a chief complaint of fatigue (17 Oct 2020 11:23)      Interval History: Lantus increased to 30 units and prandial lispro continued at 8 units   finger sticks are in 200's   suspect increased Insulin Resistance   Creatinine and GFR normal     MEDICATIONS  (STANDING):  ceFAZolin   IVPB 2000 milliGRAM(s) IV Intermittent every 8 hours  dextrose 50% Injectable 25 Gram(s) IV Push once  enoxaparin Injectable 40 milliGRAM(s) SubCutaneous daily  influenza   Vaccine 0.5 milliLiter(s) IntraMuscular once  insulin glargine Injectable (LANTUS) 30 Unit(s) SubCutaneous every morning  insulin lispro (HumaLOG) corrective regimen sliding scale   SubCutaneous three times a day before meals  insulin lispro Injectable (HumaLOG) 8 Unit(s) SubCutaneous three times a day before meals  simvastatin 20 milliGRAM(s) Oral at bedtime  tamsulosin 0.4 milliGRAM(s) Oral at bedtime    MEDICATIONS  (PRN):  acetaminophen    Suspension .. 650 milliGRAM(s) Oral every 6 hours PRN Temp greater or equal to 38C (100.4F), Moderate Pain (4 - 6)      Allergies    No Known Allergies    Intolerances        REVIEW OF SYSTEMS:  CONSTITUTIONAL: no changes  EYES: No eye pain, visual disturbances, or discharge  ENMT:  No difficulty hearing, No sinus or throat pain  NECK: No pain or stiffness  RESPIRATORY: No cough, wheezing, chills or hemoptysis; No shortness of breath  CARDIOVASCULAR: No chest pain, palpitations or leg swelling  GASTROINTESTINAL: No abdominal or epigastric pain. No nausea, vomiting, or hematemesis; No diarrhea or constipation. No melena or hematochezia.  GENITOURINARY: No dysuria, frequency, hematuria, or incontinence  NEUROLOGICAL: No headaches, memory loss, loss of strength, numbness, or tremors  SKIN: No itching, burning, rashes, or lesions   ENDOCRINE: No heat or cold intolerance; No hair loss  MUSCULOSKELETAL: No joint pain or swelling; No muscle, back, or extremity pain  PSYCHIATRIC: No depression, anxiety, mood swings, or difficulty sleeping  HEME/LYMPH: No easy bruising, or bleeding gums  ALLERY AND IMMUNOLOGIC: No hives or eczema    Vital Signs Last 24 Hrs  T(C): 36.8 (17 Oct 2020 11:04), Max: 36.9 (17 Oct 2020 00:53)  T(F): 98.2 (17 Oct 2020 11:04), Max: 98.4 (17 Oct 2020 00:53)  HR: 88 (17 Oct 2020 11:04) (88 - 91)  BP: 122/72 (17 Oct 2020 11:04) (122/72 - 148/79)  BP(mean): --  RR: 17 (17 Oct 2020 11:04) (17 - 17)  SpO2: 98% (17 Oct 2020 11:04) (93% - 98%)    PHYSICAL EXAM:  GENERAL:   HEAD: Atraumatic, Normocephalic  EYES: PERRLA, conjunctiva and sclera clear  ENMT: No tonsillar erythema, exudates, or enlargement; Moist mucous membranes, Good dentition, No lesions  NECK: Supple, No JVD, Normal thyroid  NERVOUS SYSTEM:  Alert & Oriented X3, Good concentration; Motor Strength 5/5 B/L upper and lower extremities  CHEST/LUNG: Clear to auscultaion bilaterally; No rales, rhonchi, wheezing, or rubs  HEART: Regular rate and rhythm; No murmurs, rubs, or gallops  ABDOMEN: Soft, Nontender, Nondistended; Bowel sounds present  EXTREMITIES:  2+ Peripheral Pulses, no edema  SKIN: No rashes or lesions    LABS:        CAPILLARY BLOOD GLUCOSE      POCT Blood Glucose.: 226 mg/dL (17 Oct 2020 17:04)  POCT Blood Glucose.: 227 mg/dL (17 Oct 2020 11:08)  POCT Blood Glucose.: 286 mg/dL (17 Oct 2020 07:45)  POCT Blood Glucose.: 289 mg/dL (16 Oct 2020 21:18)    Lipid panel:           Thyroid:  Diabetes Tests:  Parathyroid Panel:  Adrenals:  RADIOLOGY & ADDITIONAL TESTS:    Imaging Personally Reviewed:  [ ] YES  [ ] NO    Consultant(s) Notes Reviewed:  [ ] YES  [ ] NO    Care Discussed with Consultants/Other Providers [ ] YES  [ ] NO

## 2020-10-17 NOTE — PROGRESS NOTE ADULT - ASSESSMENT
pt w/dm2 off meds comes w/generalized fatigue w/hyperglycemia, electrolyte abnomality and uti    Problem/Plan - 1:  ·  Problem: Acute MSSA cystitis without hematuria and MSSA bacteremia.  Persistently positive blood culture. pending recent blood culture.  MRI spine did not show any OM.  CT chest/A/P showed cystitis/prostatitis. cont iv cefazolin. leukocytosis resolved. US right arm showed right superificial thrombophlebitis. No IE on RENETTA. ID following. Midline placed. needs iv antibiotic duration recommended to start dc planning. cont PT.     Problem/Plan - 2:  ·  Problem: Type 2 diabetes mellitus with hyperglycemia. better controlled. cont current management. Follow finger sticks. Patient can afford outpatient meds now with insurance.     Problem/Plan - 4:  ·  Problem: Mixed hyperlipidemia.  Plan: cont statin.     Problem/Plan - 5:  ·  Problem: Localized edema.  improved. negative US venous duplex.     Hypokalemia. Repleted.     Vitamin D deficiency. start supplement.     urinary retention on PVR but patient was able to void. urology following.     Generalized weakness. consulted PT. Patient doing better. Might not need MARY>>. will follow     Problem/Plan - 6:  Problem: Preventive measure. Plan: sq lovenox     pt w/dm2 off meds comes w/generalized fatigue w/hyperglycemia, electrolyte abnomality and uti    Problem/Plan - 1:  ·  Problem: Acute MSSA cystitis without hematuria and MSSA bacteremia.  Persistently positive blood culture. pending recent blood culture.  MRI spine did not show any OM.  CT chest/A/P showed cystitis/prostatitis. cont iv cefazolin. leukocytosis resolved. US right arm showed right superificial thrombophlebitis. No IE on REENTTA. ID following. Midline will be placed. needs iv antibiotic duration recommended to start dc planning. cont PT.     Problem/Plan - 2:  ·  Problem: Type 2 diabetes mellitus with hyperglycemia. better controlled. cont current management. Follow finger sticks. Patient can afford outpatient meds now with insurance.     Problem/Plan - 4:  ·  Problem: Mixed hyperlipidemia.  Plan: cont statin.     Problem/Plan - 5:  ·  Problem: Localized edema.  improved. negative US venous duplex.     Hypokalemia. Repleted.     Vitamin D deficiency. start supplement.     urinary retention on PVR but patient was able to void. urology following.     Generalized weakness. consulted PT. Patient doing better. Might not need MARY>>. will follow     Problem/Plan - 6:  Problem: Preventive measure. Plan: sq lovenox

## 2020-10-18 LAB
GLUCOSE BLDC GLUCOMTR-MCNC: 213 MG/DL — HIGH (ref 70–99)
GLUCOSE BLDC GLUCOMTR-MCNC: 226 MG/DL — HIGH (ref 70–99)
GLUCOSE BLDC GLUCOMTR-MCNC: 246 MG/DL — HIGH (ref 70–99)
GLUCOSE BLDC GLUCOMTR-MCNC: 271 MG/DL — HIGH (ref 70–99)
SARS-COV-2 RNA SPEC QL NAA+PROBE: SIGNIFICANT CHANGE UP

## 2020-10-18 PROCEDURE — 99232 SBSQ HOSP IP/OBS MODERATE 35: CPT

## 2020-10-18 RX ORDER — ALPRAZOLAM 0.25 MG
0.5 TABLET ORAL AT BEDTIME
Refills: 0 | Status: DISCONTINUED | OUTPATIENT
Start: 2020-10-18 | End: 2020-10-20

## 2020-10-18 RX ORDER — METFORMIN HYDROCHLORIDE 850 MG/1
500 TABLET ORAL
Refills: 0 | Status: DISCONTINUED | OUTPATIENT
Start: 2020-10-18 | End: 2020-10-20

## 2020-10-18 RX ORDER — INSULIN LISPRO 100/ML
10 VIAL (ML) SUBCUTANEOUS
Refills: 0 | Status: DISCONTINUED | OUTPATIENT
Start: 2020-10-18 | End: 2020-10-19

## 2020-10-18 RX ADMIN — Medication 10 UNIT(S): at 18:30

## 2020-10-18 RX ADMIN — TAMSULOSIN HYDROCHLORIDE 0.4 MILLIGRAM(S): 0.4 CAPSULE ORAL at 21:19

## 2020-10-18 RX ADMIN — Medication 100 MILLIGRAM(S): at 15:00

## 2020-10-18 RX ADMIN — Medication 0.5 MILLIGRAM(S): at 21:19

## 2020-10-18 RX ADMIN — SIMVASTATIN 20 MILLIGRAM(S): 20 TABLET, FILM COATED ORAL at 21:19

## 2020-10-18 RX ADMIN — METFORMIN HYDROCHLORIDE 500 MILLIGRAM(S): 850 TABLET ORAL at 18:31

## 2020-10-18 RX ADMIN — INSULIN GLARGINE 30 UNIT(S): 100 INJECTION, SOLUTION SUBCUTANEOUS at 08:41

## 2020-10-18 RX ADMIN — Medication 4: at 08:16

## 2020-10-18 RX ADMIN — ENOXAPARIN SODIUM 40 MILLIGRAM(S): 100 INJECTION SUBCUTANEOUS at 11:16

## 2020-10-18 RX ADMIN — Medication 100 MILLIGRAM(S): at 21:23

## 2020-10-18 RX ADMIN — Medication 6: at 11:15

## 2020-10-18 RX ADMIN — Medication 8 UNIT(S): at 08:16

## 2020-10-18 RX ADMIN — Medication 100 MILLIGRAM(S): at 05:09

## 2020-10-18 RX ADMIN — Medication 10 UNIT(S): at 11:14

## 2020-10-18 RX ADMIN — Medication 4: at 18:30

## 2020-10-18 NOTE — PROGRESS NOTE ADULT - ASSESSMENT
pt w/dm2 off meds comes w/generalized fatigue w/hyperglycemia, electrolyte abnomality and uti    Problem/Plan - 1:  ·  Problem: Acute MSSA cystitis without hematuria and MSSA bacteremia.  Persistently positive blood culture. pending recent blood culture.  MRI spine did not show any OM.  CT chest/A/P showed cystitis/prostatitis. cont iv cefazolin. leukocytosis resolved. US right arm showed right superificial thrombophlebitis. No IE on RENETTA. ID following. PICC line tomorrow. follow ID about further antibiotic course. PT eval for safe discharge planning. COVID testing for discharge.     Problem/Plan - 2:  ·  Problem: Type 2 diabetes mellitus with hyperglycemia. still uncontrolled with hyperglycemia. add metformin and increase premeal insulin. Follow finger sticks. Patient can afford outpatient meds now with insurance.     Problem/Plan - 4:  ·  Problem: Mixed hyperlipidemia.  Plan: cont statin.     Problem/Plan - 5:  ·  Problem: Localized edema.  improved. negative US venous duplex.     Hypokalemia. Repleted.     Vitamin D deficiency. Cont supplement.     urinary retention on PVR but patient was able to void. urology following.     Generalized weakness. consulted PT. Patient doing better. Might not need MARY>>. will follow     Problem/Plan - 6:  Problem: Preventive measure. Plan: sq lovenox

## 2020-10-18 NOTE — PROGRESS NOTE ADULT - SUBJECTIVE AND OBJECTIVE BOX
BHARATH GONZALES  MRN-[id number]    Follow up: MSSA bacteremia     Interval note:  Pt seen and examined earlier in the day. Reports feeling well and has no complaints. Likely will get PICC line tomorrow and plan for d/c    ROS:      [x ] All other systems negative    Constitutional: no fever, no chills  Head: no trauma  Eyes: no vision changes, no eye pain  ENT:  no sore throat, no rhinorrhea  Cardiovascular:  no chest pain, no palpitation  Respiratory:  no SOB, no cough  GI:  no abd pain, no vomiting, no diarrhea  urinary: no dysuria, no hematuria, no flank pain  musculoskeletal:  bilateral lower extremities pain and bilateral forearms  skin:  no rash  neurology:  no headache, no seizure, no change in mental status  psych: no anxiety, no depression       Allergies  No Known Allergies        ANTIMICROBIALS:    ceFAZolin   IVPB 2000 every 8 hours  influenza   Vaccine 0.5 once      MEDICATIONS  (STANDING):  ALPRAZolam 0.5 at bedtime  dextrose 50% Injectable 25 once  enoxaparin Injectable 40 daily  influenza   Vaccine 0.5 once  insulin glargine Injectable (LANTUS) 30 every morning  insulin lispro (HumaLOG) corrective regimen sliding scale  three times a day before meals  insulin lispro Injectable (HumaLOG) 10 three times a day before meals  metFORMIN 500 two times a day  simvastatin 20 at bedtime  tamsulosin 0.4 at bedtime      PRN  acetaminophen    Suspension .. 650 milliGRAM(s) Oral every 6 hours PRN      Vital Signs Last 24 Hrs  T(F): 98.1 (10-18-20 @ 11:13), Max: 99.3 (10-17-20 @ 23:35)  HR: 93 (10-18-20 @ 11:13)  BP: 111/67 (10-18-20 @ 11:13)  RR: 18 (10-18-20 @ 11:13)  SpO2: 97% (10-18-20 @ 11:13) (95% - 98%)  Wt(kg): --    General: Nontoxic-appearing thin Male in no acute distress.  HEENT: AT/NC. PERRL. EOMI. Anicteric. Conjunctiva pink and moist. Oropharynx clear. Dentition + partial dentures   Neck: Not rigid. No sense of mass.   Nodes: None palpable.  Lungs: Clear bilaterally without rales, wheezing or rhonchi, respirations not labored.   Heart: Regular rate and rhythm. No Murmur. No rub. No gallop. No palpable thrill.  Abdomen: Bowel sounds present and normoactive. Soft. Nondistended. Nontender. No sense of mass. No organomegaly.  Back: No spinal tenderness. No costovertebral angle tenderness.   Extremities: No cyanosis or clubbing. No edema. nontender upon palpation bilateral lower extremities and bilateral forearms. nontender palpable cord in right forearm still present   Skin: Warm. Dry. Good turgor. No rash. No vasculitic stigmata.  Psychiatric: Appropriate affect and mood for situation.       Labs:  WBC Count: 10.44 K/uL (10-15 @ 07:45)  WBC Count: 11.24 K/uL (10-14 @ 08:00)  WBC Count: 13.48 K/uL (10-13 @ 07:23)  WBC Count: 15.88 K/uL (10-12 @ 06:58)          10-17    136  |  100  |  7   ----------------------------<  285<H>  4.1   |  30  |  0.70    Ca    7.7<L>      17 Oct 2020 08:09        Creatinine Trend: 0.70<--, 0.75<--, 0.70<--, 0.61<--, 0.58<--, 0.41<--          MICROBIOLOGY:  v  .Blood Blood  10-14-20   No growth to date.  --  --      .Urine Clean Catch (Midstream)  10-14-20   <10,000 CFU/mL Normal Urogenital Lin  --  --      .Blood Blood-Peripheral  10-12-20   Growth in aerobic and anaerobic bottles: Staphylococcus aureus  See previous culture [id number]  --    Growth in aerobic bottle: Gram Positive Cocci in Clusters  Growth in anaerobic bottle: Gram Positive Cocci in Clusters      .Blood Blood-Peripheral  10-10-20   Growth in aerobic and anaerobic bottles: Staphylococcus aureus  See previous culture [id number]  --  Blood Culture PCR  Staphylococcus aureus      .Urine Clean Catch (Midstream)  10-10-20   50,000 - 99,000 CFU/mL Staphylococcus aureus  --  Staphylococcus aureus      COVID-19 PCR: Floyd (10-09)    C-Reactive Protein, Serum: 31.66 (10-10)    Ferritin, Serum: 1441 (10-10)      D-Dimer Assay, Quantitative: 613 (10-09)    Procalcitonin, Serum: 0.56 (10-10-20 @ 00:43)    Cardiology:  < from: RENETTA Echo Doppler (10.16.20 @ 14:15) >  Summary:   1. Left ventricular ejection fraction, by visual estimation, is 60 to 65%.   2. Normal Transesophageal Echocardiogram.   3. Mild mitral valve regurgitation.   4. No evidence of any vegetations.

## 2020-10-18 NOTE — PROGRESS NOTE ADULT - SUBJECTIVE AND OBJECTIVE BOX
CHIEF COMPLAINT:   No overnight events   no fever no diarrhea   no active gross bleeding        PHYSICAL EXAM:    GENERAL: Moderately built, malnourished   CHEST/LUNG: Clear to ausculation bilaterally, no wheezing, no crackles   HEART: Regular rate and rhythm; No murmurs, rubs  ABDOMEN: Soft, Nontender, Nondistended; Bowel sounds present  EXTREMITIES:  Moving all four extremities spontaneously, No clubbing, cyanosis. no edema  NERVOUS SYSTEM:  Grossly non focal.  Psychiatry: AAO x 3, mood is appropriate       OBJECTIVE DATA:       Vital Signs Last 24 Hrs  T(C): 36.6 (18 Oct 2020 05:12), Max: 37.4 (17 Oct 2020 23:35)  T(F): 97.9 (18 Oct 2020 05:12), Max: 99.3 (17 Oct 2020 23:35)  HR: 85 (18 Oct 2020 05:12) (84 - 88)  BP: 135/80 (18 Oct 2020 05:12) (120/69 - 147/69)  BP(mean): --  RR: 17 (18 Oct 2020 05:12) (17 - 18)  SpO2: 95% (18 Oct 2020 05:12) (95% - 99%)           Daily     Daily Weight in k.8 (18 Oct 2020 05:12)  LABS:            10-17    136  |  100  |  7   ----------------------------<  285<H>  4.1   |  30  |  0.70    Ca    7.7<L>      17 Oct 2020 08:09                         CAPILLARY BLOOD GLUCOSE      POCT Blood Glucose.: 213 mg/dL (18 Oct 2020 08:15)      Culture - Blood (collected 10-15)  Source: .Blood Blood  Preliminary Report (10-16):    No growth to date.    Culture - Blood (collected 10-14)  Source: .Blood Blood-Venous  Preliminary Report (10-15):    No growth to date.    Culture - Blood (collected 10-14)  Source: .Blood Blood  Preliminary Report (10-15):    No growth to date.    Culture - Urine (collected 10-14)  Source: .Urine Clean Catch (Midstream)  Final Report (10-15):    <10,000 CFU/mL Normal Urogenital Lin      MEDICATIONS  (STANDING):  ALPRAZolam 0.5 milliGRAM(s) Oral at bedtime  ceFAZolin   IVPB 2000 milliGRAM(s) IV Intermittent every 8 hours  dextrose 50% Injectable 25 Gram(s) IV Push once  enoxaparin Injectable 40 milliGRAM(s) SubCutaneous daily  influenza   Vaccine 0.5 milliLiter(s) IntraMuscular once  insulin glargine Injectable (LANTUS) 30 Unit(s) SubCutaneous every morning  insulin lispro (HumaLOG) corrective regimen sliding scale   SubCutaneous three times a day before meals  insulin lispro Injectable (HumaLOG) 10 Unit(s) SubCutaneous three times a day before meals  metFORMIN 500 milliGRAM(s) Oral two times a day  simvastatin 20 milliGRAM(s) Oral at bedtime  tamsulosin 0.4 milliGRAM(s) Oral at bedtime    MEDICATIONS  (PRN):  acetaminophen    Suspension .. 650 milliGRAM(s) Oral every 6 hours PRN Temp greater or equal to 38C (100.4F), Moderate Pain (4 - 6)

## 2020-10-18 NOTE — PROGRESS NOTE ADULT - SUBJECTIVE AND OBJECTIVE BOX
Patient is a 55y old  Male who presents with a chief complaint of fatigue (18 Oct 2020 11:01)      Interval History: finger sticks are in low 200's   on Lantus 30 units and prandial lispro 10 units   Metformin 500 mg BID added today    MEDICATIONS  (STANDING):  ALPRAZolam 0.5 milliGRAM(s) Oral at bedtime  ceFAZolin   IVPB 2000 milliGRAM(s) IV Intermittent every 8 hours  dextrose 50% Injectable 25 Gram(s) IV Push once  enoxaparin Injectable 40 milliGRAM(s) SubCutaneous daily  influenza   Vaccine 0.5 milliLiter(s) IntraMuscular once  insulin glargine Injectable (LANTUS) 30 Unit(s) SubCutaneous every morning  insulin lispro (HumaLOG) corrective regimen sliding scale   SubCutaneous three times a day before meals  insulin lispro Injectable (HumaLOG) 10 Unit(s) SubCutaneous three times a day before meals  metFORMIN 500 milliGRAM(s) Oral two times a day  simvastatin 20 milliGRAM(s) Oral at bedtime  tamsulosin 0.4 milliGRAM(s) Oral at bedtime    MEDICATIONS  (PRN):  acetaminophen    Suspension .. 650 milliGRAM(s) Oral every 6 hours PRN Temp greater or equal to 38C (100.4F), Moderate Pain (4 - 6)      Allergies    No Known Allergies    Intolerances        REVIEW OF SYSTEMS:  CONSTITUTIONAL: no changes  EYES: No eye pain, visual disturbances, or discharge  ENMT:  No difficulty hearing, No sinus or throat pain  NECK: No pain or stiffness  RESPIRATORY: No cough, wheezing, chills or hemoptysis; No shortness of breath  CARDIOVASCULAR: No chest pain, palpitations or leg swelling  GASTROINTESTINAL: No abdominal or epigastric pain. No nausea, vomiting, or hematemesis; No diarrhea or constipation. No melena or hematochezia.  GENITOURINARY: No dysuria, frequency, hematuria, or incontinence  NEUROLOGICAL: No headaches, memory loss, loss of strength, numbness, or tremors  SKIN: No itching, burning, rashes, or lesions   ENDOCRINE: No heat or cold intolerance; No hair loss  MUSCULOSKELETAL: No joint pain or swelling; No muscle, back, or extremity pain  PSYCHIATRIC: No depression, anxiety, mood swings, or difficulty sleeping  HEME/LYMPH: No easy bruising, or bleeding gums  ALLERY AND IMMUNOLOGIC: No hives or eczema    Vital Signs Last 24 Hrs  T(C): 36.7 (18 Oct 2020 11:13), Max: 37.4 (17 Oct 2020 23:35)  T(F): 98.1 (18 Oct 2020 11:13), Max: 99.3 (17 Oct 2020 23:35)  HR: 93 (18 Oct 2020 11:13) (85 - 93)  BP: 111/67 (18 Oct 2020 11:13) (111/67 - 147/69)  BP(mean): --  RR: 18 (18 Oct 2020 11:13) (17 - 18)  SpO2: 97% (18 Oct 2020 11:13) (95% - 98%)    PHYSICAL EXAM:  GENERAL:   HEAD: Atraumatic, Normocephalic  EYES: PERRLA, conjunctiva and sclera clear  ENMT: No tonsillar erythema, exudates, or enlargement; Moist mucous membranes, Good dentition, No lesions  NECK: Supple, No JVD, Normal thyroid  NERVOUS SYSTEM:  Alert & Oriented X3, Good concentration; Motor Strength 5/5 B/L upper and lower extremities  CHEST/LUNG: Clear to auscultaion bilaterally; No rales, rhonchi, wheezing, or rubs  HEART: Regular rate and rhythm; No murmurs, rubs, or gallops  ABDOMEN: Soft, Nontender, Nondistended; Bowel sounds present  EXTREMITIES:  2+ Peripheral Pulses, no edema  SKIN: No rashes or lesions    LABS:        CAPILLARY BLOOD GLUCOSE      POCT Blood Glucose.: 226 mg/dL (18 Oct 2020 18:29)  POCT Blood Glucose.: 271 mg/dL (18 Oct 2020 11:13)  POCT Blood Glucose.: 213 mg/dL (18 Oct 2020 08:15)  POCT Blood Glucose.: 202 mg/dL (17 Oct 2020 21:02)    Lipid panel:           Thyroid:  Diabetes Tests:  Parathyroid Panel:  Adrenals:  RADIOLOGY & ADDITIONAL TESTS:    Imaging Personally Reviewed:  [ ] YES  [ ] NO    Consultant(s) Notes Reviewed:  [ ] YES  [ ] NO    Care Discussed with Consultants/Other Providers [ ] YES  [ ] NO

## 2020-10-18 NOTE — PROGRESS NOTE ADULT - PROBLEM SELECTOR PLAN 1
Continue with the same regimen while inpatient   Follow up finger sticks   while inpatient Finger Sticks should be in 140-180 range, preferably <160   increased Insulin Resistance but now on Metformin BID low dose

## 2020-10-18 NOTE — PROGRESS NOTE ADULT - ASSESSMENT
55M with DM admitted with MSSA Bacteremia    10/14: Venous dopplers of RUE performed No evidence of right upper extremity deep venous thrombosis. Thrombi in the right mid basilic vein, compatible with superficial thrombophlebitis.   CT abdomen/plevis/chest was done and revealed no lung disease, + cystitis and prostatitis, urology on board, PVRs monitored, 171 ml yesterday, may will need a Elizabeth. The patient remains afebrile, leukocytosis is improving, WBC 11.24 today, repeat blood cultures grew gram positive cocci in clusters. HIV testing is negative. The plan for the patient is to have RENETTA done and if positive, the patient will be referred to CT surgery evaluation.   10/15: repeat blood culture collected on 10/14/2020 with no growth so far, RENETTA pending, afebrile (T 99.9 this am), leukocytosis normalized, no new complains.   10/16: no change in patient's presentation, repeat blood cultures from 10/14/2020 remains with no growth, repeat blood culture collected yesterday is pending result, the patient is awaiting for RENETTA scheduled for today.   10/18: blood culture remain negative, Transesophageal Echocardiogram was negative for IE, doing well on cefazolin, workup for etiology did not reveal a source    1. MSSA bacteremia (unknown source)  2. MSSA UCx  3. Leukocytosis - normalized   4. Fevers - resolved  5. right forearm thrombophlebitis       Plan:    - continue Cefazolin 2 grams IV q 8 hours  - Repeat blood cultures negative thus far  - glycemic control is important, endocrinologist consult appreciated  - RENETTA negative  - trend fever curve   - trend WBC   -OK to place midline or PICC line from an infectious disease standpoint    For d/c planning:  Medication: Cefazolin  Dose: 2 grams  Interval: every 8 hours  Route: IV  Start date: October 19th, 2020  End date: November 11th, 2020  Labs to be drawn Weekly: CBC, CMP, ESR, CRP  Fax: [phone]   Follow up: Patient should schedule an appointment with me 2-3 weeks after completing antibiotics   Office information: [address] [facility], [address], [place], [place], Office: [phone]    Discussed with Dr. Anish Caputo,   Cell: [phone]  Pager [phone]  Infectious Disease Attending  After 5pm/weekends please call [phone] for all inquiries and new consults

## 2020-10-19 LAB
CULTURE RESULTS: SIGNIFICANT CHANGE UP
CULTURE RESULTS: SIGNIFICANT CHANGE UP
GLUCOSE BLDC GLUCOMTR-MCNC: 122 MG/DL — HIGH (ref 70–99)
GLUCOSE BLDC GLUCOMTR-MCNC: 138 MG/DL — HIGH (ref 70–99)
GLUCOSE BLDC GLUCOMTR-MCNC: 192 MG/DL — HIGH (ref 70–99)
GLUCOSE BLDC GLUCOMTR-MCNC: 228 MG/DL — HIGH (ref 70–99)
SPECIMEN SOURCE: SIGNIFICANT CHANGE UP
SPECIMEN SOURCE: SIGNIFICANT CHANGE UP

## 2020-10-19 PROCEDURE — 36410 VNPNXR 3YR/> PHY/QHP DX/THER: CPT

## 2020-10-19 PROCEDURE — 76937 US GUIDE VASCULAR ACCESS: CPT | Mod: 26

## 2020-10-19 PROCEDURE — 99232 SBSQ HOSP IP/OBS MODERATE 35: CPT

## 2020-10-19 RX ORDER — INSULIN GLARGINE 100 [IU]/ML
35 INJECTION, SOLUTION SUBCUTANEOUS
Refills: 0 | Status: DISCONTINUED | OUTPATIENT
Start: 2020-10-20 | End: 2020-10-20

## 2020-10-19 RX ORDER — INSULIN LISPRO 100/ML
12 VIAL (ML) SUBCUTANEOUS
Refills: 0 | Status: DISCONTINUED | OUTPATIENT
Start: 2020-10-19 | End: 2020-10-20

## 2020-10-19 RX ORDER — INSULIN GLARGINE 100 [IU]/ML
35 INJECTION, SOLUTION SUBCUTANEOUS EVERY MORNING
Refills: 0 | Status: DISCONTINUED | OUTPATIENT
Start: 2020-10-19 | End: 2020-10-19

## 2020-10-19 RX ADMIN — METFORMIN HYDROCHLORIDE 500 MILLIGRAM(S): 850 TABLET ORAL at 17:59

## 2020-10-19 RX ADMIN — SIMVASTATIN 20 MILLIGRAM(S): 20 TABLET, FILM COATED ORAL at 21:42

## 2020-10-19 RX ADMIN — INSULIN GLARGINE 30 UNIT(S): 100 INJECTION, SOLUTION SUBCUTANEOUS at 08:44

## 2020-10-19 RX ADMIN — TAMSULOSIN HYDROCHLORIDE 0.4 MILLIGRAM(S): 0.4 CAPSULE ORAL at 21:42

## 2020-10-19 RX ADMIN — Medication 12 UNIT(S): at 17:58

## 2020-10-19 RX ADMIN — Medication 12 UNIT(S): at 12:16

## 2020-10-19 RX ADMIN — Medication 4: at 08:37

## 2020-10-19 RX ADMIN — Medication 0.5 MILLIGRAM(S): at 21:42

## 2020-10-19 RX ADMIN — Medication 100 MILLIGRAM(S): at 15:00

## 2020-10-19 RX ADMIN — Medication 2: at 12:17

## 2020-10-19 RX ADMIN — METFORMIN HYDROCHLORIDE 500 MILLIGRAM(S): 850 TABLET ORAL at 08:44

## 2020-10-19 RX ADMIN — Medication 100 MILLIGRAM(S): at 21:45

## 2020-10-19 RX ADMIN — ENOXAPARIN SODIUM 40 MILLIGRAM(S): 100 INJECTION SUBCUTANEOUS at 17:59

## 2020-10-19 RX ADMIN — Medication 10 UNIT(S): at 08:38

## 2020-10-19 RX ADMIN — Medication 100 MILLIGRAM(S): at 05:51

## 2020-10-19 NOTE — PROGRESS NOTE ADULT - SUBJECTIVE AND OBJECTIVE BOX
CHIEF COMPLAINT:   No overnight events   no fever no diarrhea   no n/v/abd pain       PHYSICAL EXAM:    GENERAL: Moderately built, malnourished   CHEST/LUNG: Clear to ausculation bilaterally, no wheezing, no crackles   HEART: Regular rate and rhythm; No murmurs, rubs  ABDOMEN: Soft, Nontender, Nondistended; Bowel sounds present  EXTREMITIES:  Moving all four extremities spontaneously, No clubbing, cyanosis. no edema  NERVOUS SYSTEM:  Grossly non focal.  Psychiatry: AAO x 3, mood is appropriate       OBJECTIVE DATA:       Vital Signs Last 24 Hrs  T(C): 37.4 (19 Oct 2020 05:27), Max: 37.4 (19 Oct 2020 05:27)  T(F): 99.4 (19 Oct 2020 05:27), Max: 99.4 (19 Oct 2020 05:27)  HR: 90 (19 Oct 2020 05:27) (89 - 93)  BP: 117/69 (19 Oct 2020 05:27) (111/67 - 137/90)  BP(mean): --  RR: 18 (19 Oct 2020 05:27) (17 - 18)  SpO2: 96% (19 Oct 2020 05:27) (96% - 97%)           Daily     Daily Weight in k.4 (19 Oct 2020 05:27)  LABS:                                     CAPILLARY BLOOD GLUCOSE      POCT Blood Glucose.: 228 mg/dL (19 Oct 2020 07:57)      Culture - Blood (collected 10-15)  Source: .Blood Blood  Preliminary Report (10-16):    No growth to date.    Culture - Blood (collected 10-14)  Source: .Blood Blood-Venous  Preliminary Report (10-15):    No growth to date.    Culture - Blood (collected 10-14)  Source: .Blood Blood  Preliminary Report (10-15):    No growth to date.         MEDICATIONS  (STANDING):  ALPRAZolam 0.5 milliGRAM(s) Oral at bedtime  ceFAZolin   IVPB 2000 milliGRAM(s) IV Intermittent every 8 hours  dextrose 50% Injectable 25 Gram(s) IV Push once  enoxaparin Injectable 40 milliGRAM(s) SubCutaneous daily  influenza   Vaccine 0.5 milliLiter(s) IntraMuscular once  insulin lispro (HumaLOG) corrective regimen sliding scale   SubCutaneous three times a day before meals  insulin lispro Injectable (HumaLOG) 12 Unit(s) SubCutaneous three times a day before meals  metFORMIN 500 milliGRAM(s) Oral two times a day  simvastatin 20 milliGRAM(s) Oral at bedtime  tamsulosin 0.4 milliGRAM(s) Oral at bedtime    MEDICATIONS  (PRN):  acetaminophen    Suspension .. 650 milliGRAM(s) Oral every 6 hours PRN Temp greater or equal to 38C (100.4F), Moderate Pain (4 - 6)

## 2020-10-19 NOTE — PROGRESS NOTE ADULT - PROBLEM SELECTOR PLAN 1
worse control  increase lantus  to 35units am  increase lispro to 12units with meals  continue DANAY with meals  continue metformin  upon d/c, in view of no prescription coverage as outpt, can be d/c on [employer] brand Relion 70/30 insulin 40units with breakfast and 30units with dinner  will need prescriptions for 70/30 relion insulin vials, insulin syringes, relion glucometer, test strips and lancets  recommend f/u with endocrine as outpt within 2wks.

## 2020-10-19 NOTE — PROGRESS NOTE ADULT - ATTENDING COMMENTS
Discussed with Alba Smith NP. I have reviewed all pertinent clinical information, including history, physical exam, plan and the NP's note and agree. Pt seen and examined and I agree with the above assessment and plan.     Kevin Caputo DO  Cell: [phone]  Pager [phone]  Infectious Disease Attending  After 5pm/weekends please call [phone] for all inquiries and new consults Discussed with Alba Smith NP. I have reviewed all pertinent clinical information, including history, physical exam, plan and the NP's note and agree. Pt seen and examined and I agree with the above assessment and plan.     Will sign off. Please call with questions.    Kevin Caputo DO  Cell: [phone]  Pager [phone]  Infectious Disease Attending  After 5pm/weekends please call [phone] for all inquiries and new consults

## 2020-10-19 NOTE — PROGRESS NOTE ADULT - ASSESSMENT
55M with DM admitted with MSSA Bacteremia    10/14: Venous dopplers of RUE performed No evidence of right upper extremity deep venous thrombosis. Thrombi in the right mid basilic vein, compatible with superficial thrombophlebitis.   CT abdomen/plevis/chest was done and revealed no lung disease, + cystitis and prostatitis, urology on board, PVRs monitored, 171 ml yesterday, may will need a Elizabeth. The patient remains afebrile, leukocytosis is improving, WBC 11.24 today, repeat blood cultures grew gram positive cocci in clusters. HIV testing is negative. The plan for the patient is to have RENETTA done and if positive, the patient will be referred to CT surgery evaluation.   10/15: repeat blood culture collected on 10/14/2020 with no growth so far, RENETTA pending, afebrile (T 99.9 this am), leukocytosis normalized, no new complains.   10/16: no change in patient's presentation, repeat blood cultures from 10/14/2020 remains with no growth, repeat blood culture collected yesterday is pending result, the patient is awaiting for RENETTA scheduled for today.   10/18: blood culture remain negative, Transesophageal Echocardiogram was negative for IE, doing well on cefazolin, workup for etiology did not reveal a source  10/19: repeat blood cultures remain with no growth, remains afebrile, awaiting for PICC line placement, doing well on cefazolin, bacteremia with no source    1. MSSA bacteremia (unknown source)  2. MSSA UCx  3. Leukocytosis - normalized   4. Fevers - resolved  5. right forearm thrombophlebitis       Plan:    - continue Cefazolin 2 grams IV q 8 hours  - Repeat blood cultures negative thus far  - glycemic control is important, endocrinologist consult appreciated  - RENETTA negative  - trend fever curve   - trend WBC   -OK to place midline or PICC line from an infectious disease standpoint    For d/c planning:  Medication: Cefazolin  Dose: 2 grams  Interval: every 8 hours  Route: IV  Start date: October 19th, 2020  End date: November 11th, 2020  Labs to be drawn Weekly: CBC, CMP, ESR, CRP  Fax: [phone]   Follow up: Patient should schedule an appointment with me 2-3 weeks after completing antibiotics   Office information: [address], [address], [place], [place], Office: [phone]   55M with DM admitted with MSSA Bacteremia    10/14: Venous dopplers of RUE performed No evidence of right upper extremity deep venous thrombosis. Thrombi in the right mid basilic vein, compatible with superficial thrombophlebitis.   CT abdomen/plevis/chest was done and revealed no lung disease, + cystitis and prostatitis, urology on board, PVRs monitored, 171 ml yesterday, may will need a Elizabeth. The patient remains afebrile, leukocytosis is improving, WBC 11.24 today, repeat blood cultures grew gram positive cocci in clusters. HIV testing is negative. The plan for the patient is to have RENETTA done and if positive, the patient will be referred to CT surgery evaluation.   10/15: repeat blood culture collected on 10/14/2020 with no growth so far, RENETTA pending, afebrile (T 99.9 this am), leukocytosis normalized, no new complains.   10/16: no change in patient's presentation, repeat blood cultures from 10/14/2020 remains with no growth, repeat blood culture collected yesterday is pending result, the patient is awaiting for RENETTA scheduled for today.   10/18: blood culture remain negative, Transesophageal Echocardiogram was negative for IE, doing well on cefazolin, workup for etiology did not reveal a source  10/19: repeat blood cultures remain with no growth, remains afebrile, mildine placed, doing well on cefazolin, bacteremia with no source    1. MSSA bacteremia (unknown source)  2. MSSA UCx  3. Leukocytosis - normalized   4. Fevers - resolved  5. right forearm thrombophlebitis       Plan:    - continue Cefazolin 2 grams IV q 8 hours  - Repeat blood cultures negative  - glycemic control is important, endocrinologist consult appreciated  - RENETTA negative  - trend fever curve   - trend WBC   - OK for d/c from ID standpoint    For d/c planning:  Medication: Cefazolin  Dose: 2 grams  Interval: every 8 hours  Route: IV  Start date: October 19th, 2020  End date: November 11th, 2020  Labs to be drawn Weekly: CBC, CMP, ESR, CRP  Fax: [phone]   Follow up: Patient should schedule an appointment with me 2-3 weeks after completing antibiotics   Office information: [address], [address], [place], [place], Office: [phone]

## 2020-10-19 NOTE — PROGRESS NOTE ADULT - ASSESSMENT
pt w/dm2 off meds comes w/generalized fatigue w/hyperglycemia, electrolyte abnomality and uti    Problem/Plan - 1:  ·  Problem: Acute MSSA cystitis without hematuria and MSSA bacteremia.  Persistently positive blood culture. pending recent blood culture.  MRI spine did not show any OM.  CT chest/A/P showed cystitis/prostatitis. cont iv cefazolin. leukocytosis resolved. US right arm showed right superificial thrombophlebitis. No IE on RENETTA. ID following. PICC line today. medically ready for discharge on iv antibiotic till 11/11/20.     Problem/Plan - 2:  ·  Problem: Type 2 diabetes mellitus with hyperglycemia. still uncontrolled with hyperglycemia. Cont metformin and  premeal insulin. Increase lantus. Follow finger sticks.     Problem/Plan - 4:  ·  Problem: Mixed hyperlipidemia.  Plan: cont statin.     Problem/Plan - 5:  ·  Problem: Localized edema.  improved. negative US venous duplex.     Hypokalemia. Repleted.     Vitamin D deficiency. Cont supplement.     urinary retention on PVR but patient was able to void. urology following.     Generalized weakness. consulted PT. Patient doing better. Might not need MARY vs home>>. will follow about safe discharge plan.     Problem/Plan - 6:  Problem: Preventive measure. Plan: sq lovenox

## 2020-10-19 NOTE — PROGRESS NOTE ADULT - SUBJECTIVE AND OBJECTIVE BOX
BHARATH GONZALES  MRN-[id number]    Follow up: MSSA bacteremia   Interval note: The patient has no new complains, awaiting for a PICC line placement today. Plan for discharge on IV antibiotics.     ROS:      [x ] All other systems negative    Constitutional: no fever, no chills  Head: no trauma  Eyes: no vision changes, no eye pain  ENT:  no sore throat, no rhinorrhea  Cardiovascular:  no chest pain, no palpitation  Respiratory:  no SOB, no cough  GI:  no abd pain, no vomiting, no diarrhea  urinary: no dysuria, no hematuria, no flank pain  musculoskeletal:  bilateral lower extremities pain and bilateral forearms  skin:  no rash  neurology:  no headache, no seizure, no change in mental status  psych: no anxiety, no depression         Allergies  No Known Allergies        ANTIMICROBIALS:  ceFAZolin   IVPB 2000 every 8 hours      OTHER MEDS:  acetaminophen    Suspension .. 650 milliGRAM(s) Oral every 6 hours PRN  ALPRAZolam 0.5 milliGRAM(s) Oral at bedtime  dextrose 50% Injectable 25 Gram(s) IV Push once  enoxaparin Injectable 40 milliGRAM(s) SubCutaneous daily  influenza   Vaccine 0.5 milliLiter(s) IntraMuscular once  insulin lispro (HumaLOG) corrective regimen sliding scale   SubCutaneous three times a day before meals  insulin lispro Injectable (HumaLOG) 12 Unit(s) SubCutaneous three times a day before meals  metFORMIN 500 milliGRAM(s) Oral two times a day  simvastatin 20 milliGRAM(s) Oral at bedtime  tamsulosin 0.4 milliGRAM(s) Oral at bedtime      Vital Signs Last 24 Hrs  T(C): 37.4 (19 Oct 2020 05:27), Max: 37.4 (19 Oct 2020 05:27)  T(F): 99.4 (19 Oct 2020 05:27), Max: 99.4 (19 Oct 2020 05:27)  HR: 90 (19 Oct 2020 05:27) (89 - 93)  BP: 117/69 (19 Oct 2020 05:27) (111/67 - 137/90)  BP(mean): --  RR: 18 (19 Oct 2020 05:27) (17 - 18)  SpO2: 96% (19 Oct 2020 05:27) (96% - 97%)    Physical exam:  General: Nontoxic-appearing thin Male in no acute distress.  HEENT: AT/NC. PERRL. EOMI. Anicteric. Conjunctiva pink and moist. Oropharynx clear. Dentition + partial dentures   Neck: Not rigid. No sense of mass.   Nodes: None palpable.  Lungs: Clear bilaterally without rales, wheezing or rhonchi, respirations not labored.   Heart: Regular rate and rhythm. No Murmur. No rub. No gallop. No palpable thrill.  Abdomen: Bowel sounds present and normoactive. Soft. Nondistended. Nontender. No sense of mass. No organomegaly.  Back: No spinal tenderness. No costovertebral angle tenderness.   Extremities: No cyanosis or clubbing. No edema. tenderness improved upon palpation bilateral lower extremities and bilateral forearms. + palpable cord in right forearm still present   Skin: Warm. Dry. Good turgor. No rash. No vasculitic stigmata.  Psychiatric: Appropriate affect and mood for situation.     WBC Count: 10.44 K/uL (10-15 @ 07:45)  WBC Count: 11.24 K/uL (10-14 @ 08:00)  WBC Count: 13.48 K/uL (10-13 @ 07:23)        Creatinine Trend: 0.70<--, 0.75<--, 0.70<--, 0.61<--, 0.58<--, 0.41<--      MICROBIOLOGY:  v  .Blood Blood  10-15-20   No growth to date.  --  --      .Blood Blood  10-14-20   No growth to date.  --  --      .Urine Clean Catch (Midstream)  10-14-20   <10,000 CFU/mL Normal Urogenital Lin  --  --      .Blood Blood-Peripheral  10-12-20   Growth in aerobic and anaerobic bottles: Staphylococcus aureus  See previous culture [id number]  --    Growth in aerobic bottle: Gram Positive Cocci in Clusters  Growth in anaerobic bottle: Gram Positive Cocci in Clusters      .Blood Blood-Peripheral  10-10-20   Growth in aerobic and anaerobic bottles: Staphylococcus aureus  See previous culture [id number]  --  Blood Culture PCR  Staphylococcus aureus      .Urine Clean Catch (Midstream)  10-10-20   50,000 - 99,000 CFU/mL Staphylococcus aureus  --  Staphylococcus aureus              COVID-19 PCR: NotDetec (10-18)  COVID-19 PCR: NotDetec (10-09)    C-Reactive Protein, Serum: 31.66 (10-10)    Ferritin, Serum: 1441 (10-10)      D-Dimer Assay, Quantitative: 613 (10-09)     BHARATH GONZALES  MRN-[id number]    Follow up: MSSA bacteremia   Interval note: The patient has no new complains, awaiting for a midline placement today. Plan for discharge on IV antibiotics.     ROS:      [x ] All other systems negative    Constitutional: no fever, no chills  Head: no trauma  Eyes: no vision changes, no eye pain  ENT:  no sore throat, no rhinorrhea  Cardiovascular:  no chest pain, no palpitation  Respiratory:  no SOB, no cough  GI:  no abd pain, no vomiting, no diarrhea  urinary: no dysuria, no hematuria, no flank pain  musculoskeletal:  bilateral lower extremities pain and bilateral forearms  skin:  no rash  neurology:  no headache, no seizure, no change in mental status  psych: no anxiety, no depression         Allergies  No Known Allergies        ANTIMICROBIALS:  ceFAZolin   IVPB 2000 every 8 hours      OTHER MEDS:  acetaminophen    Suspension .. 650 milliGRAM(s) Oral every 6 hours PRN  ALPRAZolam 0.5 milliGRAM(s) Oral at bedtime  dextrose 50% Injectable 25 Gram(s) IV Push once  enoxaparin Injectable 40 milliGRAM(s) SubCutaneous daily  influenza   Vaccine 0.5 milliLiter(s) IntraMuscular once  insulin lispro (HumaLOG) corrective regimen sliding scale   SubCutaneous three times a day before meals  insulin lispro Injectable (HumaLOG) 12 Unit(s) SubCutaneous three times a day before meals  metFORMIN 500 milliGRAM(s) Oral two times a day  simvastatin 20 milliGRAM(s) Oral at bedtime  tamsulosin 0.4 milliGRAM(s) Oral at bedtime      Vital Signs Last 24 Hrs  T(C): 37.4 (19 Oct 2020 05:27), Max: 37.4 (19 Oct 2020 05:27)  T(F): 99.4 (19 Oct 2020 05:27), Max: 99.4 (19 Oct 2020 05:27)  HR: 90 (19 Oct 2020 05:27) (89 - 93)  BP: 117/69 (19 Oct 2020 05:27) (111/67 - 137/90)  BP(mean): --  RR: 18 (19 Oct 2020 05:27) (17 - 18)  SpO2: 96% (19 Oct 2020 05:27) (96% - 97%)    Physical exam:  General: Nontoxic-appearing thin Male in no acute distress.  HEENT: AT/NC. PERRL. EOMI. Anicteric. Conjunctiva pink and moist. Oropharynx clear. Dentition + partial dentures   Neck: Not rigid. No sense of mass.   Nodes: None palpable.  Lungs: Clear bilaterally without rales, wheezing or rhonchi, respirations not labored.   Heart: Regular rate and rhythm. No Murmur. No rub. No gallop. No palpable thrill.  Abdomen: Bowel sounds present and normoactive. Soft. Nondistended. Nontender. No sense of mass. No organomegaly.  Back: No spinal tenderness. No costovertebral angle tenderness.   Extremities: No cyanosis or clubbing. No edema. tenderness improved upon palpation bilateral lower extremities and bilateral forearms. + palpable cord in right forearm still present   Skin: Warm. Dry. Good turgor. No rash. No vasculitic stigmata.  Psychiatric: Appropriate affect and mood for situation.     WBC Count: 10.44 K/uL (10-15 @ 07:45)  WBC Count: 11.24 K/uL (10-14 @ 08:00)  WBC Count: 13.48 K/uL (10-13 @ 07:23)        Creatinine Trend: 0.70<--, 0.75<--, 0.70<--, 0.61<--, 0.58<--, 0.41<--      MICROBIOLOGY:  v  .Blood Blood  10-15-20   No growth to date.  --  --      .Blood Blood  10-14-20   No growth to date.  --  --      .Urine Clean Catch (Midstream)  10-14-20   <10,000 CFU/mL Normal Urogenital Lin  --  --      .Blood Blood-Peripheral  10-12-20   Growth in aerobic and anaerobic bottles: Staphylococcus aureus  See previous culture [id number]  --    Growth in aerobic bottle: Gram Positive Cocci in Clusters  Growth in anaerobic bottle: Gram Positive Cocci in Clusters      .Blood Blood-Peripheral  10-10-20   Growth in aerobic and anaerobic bottles: Staphylococcus aureus  See previous culture [id number]  --  Blood Culture PCR  Staphylococcus aureus      .Urine Clean Catch (Midstream)  10-10-20   50,000 - 99,000 CFU/mL Staphylococcus aureus  --  Staphylococcus aureus              COVID-19 PCR: NotDetec (10-18)  COVID-19 PCR: NotDetec (10-09)    C-Reactive Protein, Serum: 31.66 (10-10)    Ferritin, Serum: 1441 (10-10)      D-Dimer Assay, Quantitative: 613 (10-09)

## 2020-10-19 NOTE — PROGRESS NOTE ADULT - SUBJECTIVE AND OBJECTIVE BOX
Patient is a 55y old  Male who presents with a chief complaint of fatigue (18 Oct 2020 22:29)      INTERVAL HPI/OVERNIGHT EVENTS:  pt with no complaints  eating well, good appetite    MEDICATIONS  (STANDING):  ALPRAZolam 0.5 milliGRAM(s) Oral at bedtime  ceFAZolin   IVPB 2000 milliGRAM(s) IV Intermittent every 8 hours  dextrose 50% Injectable 25 Gram(s) IV Push once  enoxaparin Injectable 40 milliGRAM(s) SubCutaneous daily  influenza   Vaccine 0.5 milliLiter(s) IntraMuscular once  insulin lispro (HumaLOG) corrective regimen sliding scale   SubCutaneous three times a day before meals  insulin lispro Injectable (HumaLOG) 12 Unit(s) SubCutaneous three times a day before meals  metFORMIN 500 milliGRAM(s) Oral two times a day  simvastatin 20 milliGRAM(s) Oral at bedtime  tamsulosin 0.4 milliGRAM(s) Oral at bedtime    MEDICATIONS  (PRN):  acetaminophen    Suspension .. 650 milliGRAM(s) Oral every 6 hours PRN Temp greater or equal to 38C (100.4F), Moderate Pain (4 - 6)      REVIEW OF SYSTEMS:  CONSTITUTIONAL: No fever, weight loss, or fatigue  RESPIRATORY: No cough, wheezing, chills or hemoptysis; No shortness of breath  CARDIOVASCULAR: No chest pain, palpitations, dizziness, or leg swelling  GASTROINTESTINAL: No abdominal or epigastric pain. No nausea, vomiting, or hematemesis; No diarrhea or constipation. No melena or hematochezia.  ENDOCRINE: No heat or cold intolerance; No hair loss      Vital Signs Last 24 Hrs  T(C): 37.4 (19 Oct 2020 05:27), Max: 37.4 (19 Oct 2020 05:27)  T(F): 99.4 (19 Oct 2020 05:27), Max: 99.4 (19 Oct 2020 05:27)  HR: 90 (19 Oct 2020 05:27) (89 - 93)  BP: 117/69 (19 Oct 2020 05:27) (111/67 - 137/90)  BP(mean): --  RR: 18 (19 Oct 2020 05:27) (17 - 18)  SpO2: 96% (19 Oct 2020 05:27) (96% - 97%)    PHYSICAL EXAM:  GENERAL: NAD, well-groomed, well-developed        LABS:              CAPILLARY BLOOD GLUCOSE      POCT Blood Glucose.: 228 mg/dL (19 Oct 2020 07:57)  POCT Blood Glucose.: 246 mg/dL (18 Oct 2020 21:21)  POCT Blood Glucose.: 226 mg/dL (18 Oct 2020 18:29)  POCT Blood Glucose.: 271 mg/dL (18 Oct 2020 11:13)    Lipid panel:               RADIOLOGY & ADDITIONAL TESTS:

## 2020-10-19 NOTE — PROCEDURE NOTE - ADDITIONAL PROCEDURE DETAILS
s/p midline placement inserted into the left brachial vein under US guidance.  4fr x 20cm  SL.  No complications  -midline can be accessed

## 2020-10-20 ENCOUNTER — TRANSCRIPTION ENCOUNTER (OUTPATIENT)
Age: 55
End: 2020-10-20

## 2020-10-20 VITALS
HEART RATE: 96 BPM | DIASTOLIC BLOOD PRESSURE: 82 MMHG | RESPIRATION RATE: 17 BRPM | TEMPERATURE: 98 F | SYSTOLIC BLOOD PRESSURE: 137 MMHG | OXYGEN SATURATION: 97 %

## 2020-10-20 LAB
CULTURE RESULTS: SIGNIFICANT CHANGE UP
GLUCOSE BLDC GLUCOMTR-MCNC: 244 MG/DL — HIGH (ref 70–99)
GLUCOSE BLDC GLUCOMTR-MCNC: 284 MG/DL — HIGH (ref 70–99)
SPECIMEN SOURCE: SIGNIFICANT CHANGE UP

## 2020-10-20 PROCEDURE — 99232 SBSQ HOSP IP/OBS MODERATE 35: CPT

## 2020-10-20 PROCEDURE — 99239 HOSP IP/OBS DSCHRG MGMT >30: CPT

## 2020-10-20 RX ORDER — INSULIN GLARGINE 100 [IU]/ML
40 INJECTION, SOLUTION SUBCUTANEOUS
Refills: 0 | Status: DISCONTINUED | OUTPATIENT
Start: 2020-10-20 | End: 2020-10-20

## 2020-10-20 RX ORDER — METFORMIN HYDROCHLORIDE 850 MG/1
1 TABLET ORAL
Qty: 60 | Refills: 0
Start: 2020-10-20

## 2020-10-20 RX ORDER — ERGOCALCIFEROL 1.25 MG/1
1 CAPSULE ORAL
Qty: 13 | Refills: 0
Start: 2020-10-20

## 2020-10-20 RX ORDER — CEFAZOLIN SODIUM 1 G
2 VIAL (EA) INJECTION
Qty: 68 | Refills: 0
Start: 2020-10-20

## 2020-10-20 RX ORDER — TAMSULOSIN HYDROCHLORIDE 0.4 MG/1
1 CAPSULE ORAL
Qty: 30 | Refills: 0
Start: 2020-10-20

## 2020-10-20 RX ORDER — ISOPROPYL ALCOHOL, BENZOCAINE .7; .06 ML/ML; ML/ML
1 SWAB TOPICAL
Qty: 100 | Refills: 1
Start: 2020-10-20 | End: 2020-12-08

## 2020-10-20 RX ORDER — INSULIN GLARGINE 100 [IU]/ML
35 INJECTION, SOLUTION SUBCUTANEOUS
Qty: 12 | Refills: 0
Start: 2020-10-20

## 2020-10-20 RX ORDER — INSULIN LISPRO 100/ML
10 VIAL (ML) SUBCUTANEOUS
Qty: 12 | Refills: 0
Start: 2020-10-20

## 2020-10-20 RX ORDER — INSULIN ASPART 100 [IU]/ML
10 INJECTION, SOLUTION SUBCUTANEOUS
Qty: 12 | Refills: 0
Start: 2020-10-20

## 2020-10-20 RX ORDER — INSULIN DETEMIR 100/ML (3)
0 INSULIN PEN (ML) SUBCUTANEOUS
Qty: 0 | Refills: 0 | DISCHARGE

## 2020-10-20 RX ADMIN — Medication 4: at 11:25

## 2020-10-20 RX ADMIN — Medication 6: at 08:16

## 2020-10-20 RX ADMIN — INSULIN GLARGINE 40 UNIT(S): 100 INJECTION, SOLUTION SUBCUTANEOUS at 11:24

## 2020-10-20 RX ADMIN — Medication 12 UNIT(S): at 11:25

## 2020-10-20 RX ADMIN — Medication 100 MILLIGRAM(S): at 05:30

## 2020-10-20 RX ADMIN — METFORMIN HYDROCHLORIDE 500 MILLIGRAM(S): 850 TABLET ORAL at 08:36

## 2020-10-20 RX ADMIN — ENOXAPARIN SODIUM 40 MILLIGRAM(S): 100 INJECTION SUBCUTANEOUS at 11:26

## 2020-10-20 RX ADMIN — Medication 12 UNIT(S): at 08:17

## 2020-10-20 RX ADMIN — INFLUENZA VIRUS VACCINE 0.5 MILLILITER(S): 15; 15; 15; 15 SUSPENSION INTRAMUSCULAR at 12:16

## 2020-10-20 NOTE — PROGRESS NOTE ADULT - ASSESSMENT
55M with DM admitted with MSSA Bacteremia    10/14: Venous dopplers of RUE performed No evidence of right upper extremity deep venous thrombosis. Thrombi in the right mid basilic vein, compatible with superficial thrombophlebitis.   CT abdomen/plevis/chest was done and revealed no lung disease, + cystitis and prostatitis, urology on board, PVRs monitored, 171 ml yesterday, may will need a Elizabeth. The patient remains afebrile, leukocytosis is improving, WBC 11.24 today, repeat blood cultures grew gram positive cocci in clusters. HIV testing is negative. The plan for the patient is to have RENETTA done and if positive, the patient will be referred to CT surgery evaluation.   10/15: repeat blood culture collected on 10/14/2020 with no growth so far, RENETTA pending, afebrile (T 99.9 this am), leukocytosis normalized, no new complains.   10/16: no change in patient's presentation, repeat blood cultures from 10/14/2020 remains with no growth, repeat blood culture collected yesterday is pending result, the patient is awaiting for RENETTA scheduled for today.   10/18: blood culture remain negative, Transesophageal Echocardiogram was negative for IE, doing well on cefazolin, workup for etiology did not reveal a source  10/19: repeat blood cultures remain with no growth, remains afebrile, mildine placed, doing well on cefazolin, bacteremia with no source  10/20: s/p midline placement yesterday, overall the pt is doing better, afebrile, no new lab work, blood cultures repeated on 10/14 and 10/15 with no growth    1. MSSA bacteremia (unknown source)  2. MSSA UCx  3. Leukocytosis - normalized   4. Fevers - resolved  5. right forearm thrombophlebitis       Plan:    - continue Cefazolin 2 grams IV q 8 hours  - Repeat blood cultures negative  - glycemic control is important, endocrinologist consult appreciated  - RENETTA negative  - trend fever curve   - trend WBC   - OK for d/c from ID standpoint    For d/c planning:  Medication: Cefazolin  Dose: 2 grams  Interval: every 8 hours  Route: IV  Start date: October 19th, 2020  End date: November 11th, 2020  Labs to be drawn Weekly: CBC, CMP, ESR, CRP  Fax: [phone]   Follow up: Patient should schedule an appointment with me 2-3 weeks after completing antibiotics   Office information: [address] [facility], [address], [place], [place], Office: [phone]   55M with DM admitted with MSSA Bacteremia    10/14: Venous dopplers of RUE performed No evidence of right upper extremity deep venous thrombosis. Thrombi in the right mid basilic vein, compatible with superficial thrombophlebitis.   CT abdomen/plevis/chest was done and revealed no lung disease, + cystitis and prostatitis, urology on board, PVRs monitored, 171 ml yesterday, may will need a Elizabeth. The patient remains afebrile, leukocytosis is improving, WBC 11.24 today, repeat blood cultures grew gram positive cocci in clusters. HIV testing is negative. The plan for the patient is to have RENETTA done and if positive, the patient will be referred to CT surgery evaluation.   10/15: repeat blood culture collected on 10/14/2020 with no growth so far, RENETTA pending, afebrile (T 99.9 this am), leukocytosis normalized, no new complains.   10/16: no change in patient's presentation, repeat blood cultures from 10/14/2020 remains with no growth, repeat blood culture collected yesterday is pending result, the patient is awaiting for RENETTA scheduled for today.   10/18: blood culture remain negative, Transesophageal Echocardiogram was negative for IE, doing well on cefazolin, workup for etiology did not reveal a source  10/19: repeat blood cultures remain with no growth, remains afebrile, mildine placed, doing well on cefazolin, bacteremia with no source  10/20: s/p midline placement yesterday, overall the pt is doing better, afebrile, no new lab work, blood cultures repeated on 10/14 and 10/15 with no growth    1. MSSA bacteremia (unknown source)  2. MSSA UCx  3. Leukocytosis - normalized   4. Fevers - resolved  5. right forearm thrombophlebitis       Plan:    - continue Cefazolin 2 grams IV q 8 hours  - Repeat blood cultures negative  - glycemic control is important, endocrinologist consult appreciated  - RENETTA negative  - trend fever curve   - trend WBC   - OK for d/c from ID standpoint    For d/c planning:  Medication: Cefazolin  Dose: 2 grams  Interval: every 8 hours  Route: IV  Start date: October 19th, 2020  End date: November 11th, 2020  Labs to be drawn Weekly: CBC, CMP, ESR, CRP  Fax: [phone]   Follow up: Patient should schedule an appointment with Dr. Caputo 2-3 weeks after completing antibiotics   Office information: [address] [place] [address], [address], [place], [place], Office: [phone]

## 2020-10-20 NOTE — CHART NOTE - NSCHARTNOTEFT_GEN_A_CORE
Patient was in the hospital for his medical condition during 10/10-10/20/20. Patient will three times a day in antibiotic for his medical condition and should be excused from his work till then. Please call if any questions.

## 2020-10-20 NOTE — DISCHARGE NOTE PROVIDER - NSDCCPCAREPLAN_GEN_ALL_CORE_FT
PRINCIPAL DISCHARGE DIAGNOSIS  Diagnosis: MSSA bacteremia  Assessment and Plan of Treatment:       SECONDARY DISCHARGE DIAGNOSES  Diagnosis: Hyperglycemia  Assessment and Plan of Treatment:     Diagnosis: UTI (urinary tract infection)  Assessment and Plan of Treatment:

## 2020-10-20 NOTE — DISCHARGE NOTE PROVIDER - HOSPITAL COURSE
pt w/dm2 off meds comes w/generalized fatigue w/hyperglycemia, electrolyte abnomality and uti. found to have MSSA UTI and bacteremia.     Problem/Plan - 1:  ·  Problem: Acute MSSA cystitis without hematuria and MSSA bacteremia..  MRI spine did not show any OM.  CT chest/A/P showed cystitis/prostatitis. cont iv cefazolin till 11/11/20. US right arm showed right superificial thrombophlebitis.     Problem/Plan - 2:  ·  Problem: Type 2 diabetes mellitus with hyperglycemia. lantus and premeal insulin and metformin.     Problem/Plan - 4:  ·  Problem: Mixed hyperlipidemia.  Plan: cont statin.     Problem/Plan - 5:  ·  Problem: Localized edema.  improved. negative US venous duplex.     Hypokalemia. Repleted.     Vitamin D deficiency. Cont supplement.     urinary retention. improved. urologist followed.      Generalized weakness.refused rehab. going home with midline.           · Nutritional Assessment  This patient has been assessed with a concern for Malnutrition and has been determined to have a diagnosis/diagnoses of Moderate protein-calorie malnutrition.    This patient is being managed with:   Diet Consistent Carbohydrate w/Evening Snack-  Low Sodium  Supplement Feeding Modality:  Oral  Glucerna Shake Cans or Servings Per Day:  2       Frequency:  Daily      Seen and examined by me today. Vitals stable.   All questions welcomed and answered appropriately. Patient verbalized understanding of post discharge physician's follows up and discharge instructions.   DC time spent by me excluding billable procedures 38 mins pt w/dm2 off meds comes w/generalized fatigue w/hyperglycemia, electrolyte abnomality and uti. found to have sepsiss with MSSA UTI and bacteremia.     Problem/Plan - 1:  ·  Problem: Sepsis with Acute MSSA cystitis without hematuria and MSSA bacteremia (POA).  MRI spine did not show any OM.  CT chest/A/P showed cystitis/prostatitis. cont iv cefazolin till 11/11/20. US right arm showed right superificial thrombophlebitis.     Problem/Plan - 2:  ·  Problem: Type 2 diabetes mellitus with hyperglycemia. lantus and premeal insulin and metformin.     Problem/Plan - 4:  ·  Problem: Mixed hyperlipidemia.  Plan: cont statin.     Problem/Plan - 5:  ·  Problem: Localized edema.  improved. negative US venous duplex.     Hypokalemia. Repleted.     Vitamin D deficiency. Cont supplement.     urinary retention. improved. urologist followed.      Generalized weakness.refused rehab. going home with midline.           · Nutritional Assessment  This patient has been assessed with a concern for Malnutrition and has been determined to have a diagnosis/diagnoses of Moderate protein-calorie malnutrition.    This patient is being managed with:   Diet Consistent Carbohydrate w/Evening Snack-  Low Sodium  Supplement Feeding Modality:  Oral  Glucerna Shake Cans or Servings Per Day:  2       Frequency:  Daily      Seen and examined by me today. Vitals stable.   All questions welcomed and answered appropriately. Patient verbalized understanding of post discharge physician's follows up and discharge instructions.   DC time spent by me excluding billable procedures 38 mins Bilateral Helical Rim Advancement Flap Text: The defect edges were debeveled with a #15 blade scalpel.  Given the location of the defect and the proximity to free margins (helical rim) a bilateral helical rim advancement flap was deemed most appropriate.  Using a sterile surgical marker, the appropriate advancement flaps were drawn incorporating the defect and placing the expected incisions between the helical rim and antihelix where possible.  The area thus outlined was incised through and through with a #15 scalpel blade.  With a skin hook and iris scissors, the flaps were gently and sharply undermined and freed up.

## 2020-10-20 NOTE — DISCHARGE NOTE PROVIDER - NSDCFUADDINST_GEN_ALL_CORE_FT
1) It is important to see your primary physician as well as other necessary consultants within the next week to perform a comprehensive medical review.  Call their offices for an appointment as soon as you leave the hospital.  If you do not have a primary physician or cant reach him, contact the [facility] Physician Referral Service at [phone].  Your medical issues appear to be stable at this time, but if your symptoms recur or worsen, contact your physicians and/or return to the hospital if necessary.  If you encounter any issues or questions with your medication, call your physicians before stopping the medication.     2) Labs to be drawn Weekly: CBC, CMP, ESR, CRP

## 2020-10-20 NOTE — PROGRESS NOTE ADULT - NUTRITIONAL ASSESSMENT
This patient has been assessed with a concern for Malnutrition and has been determined to have a diagnosis/diagnoses of Moderate protein-calorie malnutrition.    This patient is being managed with:   Diet Consistent Carbohydrate w/Evening Snack-  Low Sodium  Supplement Feeding Modality:  Oral  Glucerna Shake Cans or Servings Per Day:  2       Frequency:  Daily  Entered: Oct 13 2020 11:16AM    
This patient has been assessed with a concern for Malnutrition and has been determined to have a diagnosis/diagnoses of Moderate protein-calorie malnutrition.    This patient is being managed with:   Diet Consistent Carbohydrate w/Evening Snack-  Low Sodium  Supplement Feeding Modality:  Oral  Glucerna Shake Cans or Servings Per Day:  2       Frequency:  Daily  Entered: Oct 16 2020  4:26PM    
This patient has been assessed with a concern for Malnutrition and has been determined to have a diagnosis/diagnoses of Moderate protein-calorie malnutrition.    This patient is being managed with:   Diet NPO after Midnight-     NPO Start Date: 15-Oct-2020   NPO Start Time: 23:59  Except Medications  Entered: Oct 15 2020  2:10PM    Diet Consistent Carbohydrate w/Evening Snack-  Low Sodium  Supplement Feeding Modality:  Oral  Glucerna Shake Cans or Servings Per Day:  2       Frequency:  Daily  Entered: Oct 13 2020 11:16AM    
This patient has been assessed with a concern for Malnutrition and has been determined to have a diagnosis/diagnoses of Moderate protein-calorie malnutrition.    This patient is being managed with:   Diet Consistent Carbohydrate w/Evening Snack-  Low Sodium  Supplement Feeding Modality:  Oral  Glucerna Shake Cans or Servings Per Day:  2       Frequency:  Daily  Entered: Oct 13 2020 11:16AM    
This patient has been assessed with a concern for Malnutrition and has been determined to have a diagnosis/diagnoses of Moderate protein-calorie malnutrition.    This patient is being managed with:   Diet Consistent Carbohydrate w/Evening Snack-  Low Sodium  Supplement Feeding Modality:  Oral  Glucerna Shake Cans or Servings Per Day:  2       Frequency:  Daily  Entered: Oct 13 2020 11:16AM    
This patient has been assessed with a concern for Malnutrition and has been determined to have a diagnosis/diagnoses of Moderate protein-calorie malnutrition.    This patient is being managed with:   Diet Consistent Carbohydrate w/Evening Snack-  Low Sodium  Supplement Feeding Modality:  Oral  Glucerna Shake Cans or Servings Per Day:  2       Frequency:  Daily  Entered: Oct 16 2020  4:26PM    
This patient has been assessed with a concern for Malnutrition and has been determined to have a diagnosis/diagnoses of Moderate protein-calorie malnutrition.    This patient is being managed with:   Diet DASH/TLC-  Sodium & Cholesterol Restricted  Consistent Carbohydrate {No Snacks}  Entered: Oct 10 2020  6:05AM    The following pending diet order is being considered for treatment of Moderate protein-calorie malnutrition:  Diet Consistent Carbohydrate w/Evening Snack-  Low Sodium  Supplement Feeding Modality:  Oral  Glucerna Shake Cans or Servings Per Day:  2       Frequency:  Daily  Entered: Oct 13 2020 11:16AM  
This patient has been assessed with a concern for Malnutrition and has been determined to have a diagnosis/diagnoses of Moderate protein-calorie malnutrition.    This patient is being managed with:   Diet NPO after Midnight-     NPO Start Date: 15-Oct-2020   NPO Start Time: 23:59  Except Medications  Entered: Oct 15 2020  2:10PM    Diet Consistent Carbohydrate w/Evening Snack-  Low Sodium  Supplement Feeding Modality:  Oral  Glucerna Shake Cans or Servings Per Day:  2       Frequency:  Daily  Entered: Oct 13 2020 11:16AM    
This patient has been assessed with a concern for Malnutrition and has been determined to have a diagnosis/diagnoses of Moderate protein-calorie malnutrition.    This patient is being managed with:   Diet Consistent Carbohydrate w/Evening Snack-  Low Sodium  Supplement Feeding Modality:  Oral  Glucerna Shake Cans or Servings Per Day:  2       Frequency:  Daily  Entered: Oct 13 2020 11:16AM    
This patient has been assessed with a concern for Malnutrition and has been determined to have a diagnosis/diagnoses of Moderate protein-calorie malnutrition.    This patient is being managed with:   Diet Consistent Carbohydrate w/Evening Snack-  Low Sodium  Supplement Feeding Modality:  Oral  Glucerna Shake Cans or Servings Per Day:  2       Frequency:  Daily  Entered: Oct 16 2020  4:26PM    
This patient has been assessed with a concern for Malnutrition and has been determined to have a diagnosis/diagnoses of Moderate protein-calorie malnutrition.    This patient is being managed with:   Diet Consistent Carbohydrate w/Evening Snack-  Low Sodium  Supplement Feeding Modality:  Oral  Glucerna Shake Cans or Servings Per Day:  2       Frequency:  Daily  Entered: Oct 16 2020  4:26PM    
This patient has been assessed with a concern for Malnutrition and has been determined to have a diagnosis/diagnoses of Moderate protein-calorie malnutrition.    This patient is being managed with:   Diet DASH/TLC-  Sodium & Cholesterol Restricted  Consistent Carbohydrate {No Snacks}  Entered: Oct 10 2020  6:05AM    The following pending diet order is being considered for treatment of Moderate protein-calorie malnutrition:  Diet Consistent Carbohydrate w/Evening Snack-  Low Sodium  Supplement Feeding Modality:  Oral  Glucerna Shake Cans or Servings Per Day:  2       Frequency:  Daily  Entered: Oct 13 2020 11:16AM  
This patient has been assessed with a concern for Malnutrition and has been determined to have a diagnosis/diagnoses of Moderate protein-calorie malnutrition.    This patient is being managed with:   Diet NPO after Midnight-     NPO Start Date: 15-Oct-2020   NPO Start Time: 23:59  Except Medications  Entered: Oct 15 2020  2:10PM    Diet Consistent Carbohydrate w/Evening Snack-  Low Sodium  Supplement Feeding Modality:  Oral  Glucerna Shake Cans or Servings Per Day:  2       Frequency:  Daily  Entered: Oct 13 2020 11:16AM    
This patient has been assessed with a concern for Malnutrition and has been determined to have a diagnosis/diagnoses of Moderate protein-calorie malnutrition.    This patient is being managed with:   Diet Consistent Carbohydrate w/Evening Snack-  Low Sodium  Supplement Feeding Modality:  Oral  Glucerna Shake Cans or Servings Per Day:  2       Frequency:  Daily  Entered: Oct 13 2020 11:16AM    
This patient has been assessed with a concern for Malnutrition and has been determined to have a diagnosis/diagnoses of Moderate protein-calorie malnutrition.    This patient is being managed with:   Diet Consistent Carbohydrate w/Evening Snack-  Low Sodium  Supplement Feeding Modality:  Oral  Glucerna Shake Cans or Servings Per Day:  2       Frequency:  Daily  Entered: Oct 13 2020 11:16AM    
This patient has been assessed with a concern for Malnutrition and has been determined to have a diagnosis/diagnoses of Moderate protein-calorie malnutrition.    This patient is being managed with:   Diet Consistent Carbohydrate w/Evening Snack-  Low Sodium  Supplement Feeding Modality:  Oral  Glucerna Shake Cans or Servings Per Day:  2       Frequency:  Daily  Entered: Oct 16 2020  4:26PM    
This patient has been assessed with a concern for Malnutrition and has been determined to have a diagnosis/diagnoses of Moderate protein-calorie malnutrition.    This patient is being managed with:   Diet DASH/TLC-  Sodium & Cholesterol Restricted  Consistent Carbohydrate {No Snacks}  Entered: Oct 10 2020  6:05AM    The following pending diet order is being considered for treatment of Moderate protein-calorie malnutrition:  Diet Consistent Carbohydrate w/Evening Snack-  Low Sodium  Supplement Feeding Modality:  Oral  Glucerna Shake Cans or Servings Per Day:  2       Frequency:  Daily  Entered: Oct 13 2020 11:16AM  At this time it is unclear where the source of his bacteremia. When seen in the urine, we often think of bacteremia initially and the caused the urine to be positive (though it can be the other way around). Given the urinary complaints as well as pain and weakness in his legs, a primary and utmost urgent concern is a CNS/spinal source. His strength and sensation is mostly intake and he has control over his bladder and bowels at the moment but careful monitoring is needed if he worsens. He should have an MRI of his spine ideally today. If this is negative, will request CT C/A/P to look for source and may also help support the dx of endocarditis (his Transthoracic Echocardiogram is negative but doesn't fully rule out IE). He doesn't have findings of Janeway lesions or osler's nodes but these only support the diagnosis. Additionally, his diabetes needs better control and he will need follow up outpatient ideally on insulin.
This patient has been assessed with a concern for Malnutrition and has been determined to have a diagnosis/diagnoses of Moderate protein-calorie malnutrition.    This patient is being managed with:   Diet NPO after Midnight-     NPO Start Date: 15-Oct-2020   NPO Start Time: 23:59  Except Medications  Entered: Oct 15 2020  2:10PM    Diet Consistent Carbohydrate w/Evening Snack-  Low Sodium  Supplement Feeding Modality:  Oral  Glucerna Shake Cans or Servings Per Day:  2       Frequency:  Daily  Entered: Oct 13 2020 11:16AM

## 2020-10-20 NOTE — DISCHARGE NOTE PROVIDER - INSTRUCTIONS
Consistent Carbohydrate w/Evening Snack-  Low Sodium  Supplement Feeding Modality:  Oral  Glucerna Shake Cans or Servings Per Day:  2       Frequency:  Daily

## 2020-10-20 NOTE — DISCHARGE NOTE PROVIDER - NSDCMRMEDTOKEN_GEN_ALL_CORE_FT
ceFAZolin 2 g intravenous injection: 2 gram(s) intravenous every 8 hours  Drisdol 50,000 intl units (1.25 mg) oral capsule: 1 cap(s) orally once a week   insulin glargine: 35 unit(s) subcutaneous once a day   insulin lispro (concentrated) 200 units/mL subcutaneous solution: 10 unit(s) subcutaneous 3 times a day (before meals)   metFORMIN 500 mg oral tablet: 1 tab(s) orally 2 times a day  ROSUVASTATIN 5MG TABLETS:   tamsulosin 0.4 mg oral capsule: 1 cap(s) orally once a day (at bedtime)

## 2020-10-20 NOTE — PROGRESS NOTE ADULT - REASON FOR ADMISSION
fatigue

## 2020-10-20 NOTE — PROGRESS NOTE ADULT - PROBLEM SELECTOR PLAN 1
am hyperglycemia  increase lantus  to 40units am  continue lispro 12units with meals  continue DANAY with meals  continue metformin  upon d/c, in view of no prescription coverage as outpt, can be d/c on [employer] brand Relion 70/30 insulin 40units with breakfast and 30units with dinner  will need prescriptions for 70/30 relion insulin vials, insulin syringes, relion glucometer, test strips and lancets  recommend f/u with endocrine as outpt within 2wks.

## 2020-10-20 NOTE — DISCHARGE NOTE NURSING/CASE MANAGEMENT/SOCIAL WORK - PATIENT PORTAL LINK FT
You can access the FollowMyHealth Patient Portal offered by [facility] by registering at the following website: [email or web address]. By joining [facility] [facility]’s FollowMyHealth portal, you will also be able to view your health information using other applications (apps) compatible with our system.

## 2020-10-20 NOTE — DISCHARGE NOTE PROVIDER - PROVIDER TOKENS
FREE:[LAST:[your PCP in a week],PHONE:[(   )    -],FAX:[(   )    -]],PROVIDER:[TOKEN:[[id number]],FOLLOWUP:[2 weeks]]

## 2020-10-20 NOTE — DISCHARGE NOTE PROVIDER - CARE PROVIDER_API CALL
your PCP in a week,   Phone: (   )    -  Fax: (   )    -  Follow Up Time:     Kevin Caputo (DO)  Internal Medicine  [address] [place] [address]  Phone: [phone]  Fax: ()-  Follow Up Time: 2 weeks

## 2020-10-20 NOTE — PROGRESS NOTE ADULT - SUBJECTIVE AND OBJECTIVE BOX
BHARATH GONZALES  MRN-[id number]      Follow up: MSSA bacteremia   Interval note: The patient has no new complains, s/p right arm midline placement yesterday, reports that his pain in bilateral lower extremities improved. Plan for discharge on IV antibiotics.     ROS:      [x ] All other systems negative    Constitutional: no fever, no chills  Head: no trauma  Eyes: no vision changes, no eye pain  ENT:  no sore throat, no rhinorrhea  Cardiovascular:  no chest pain, no palpitation  Respiratory:  no SOB, no cough  GI:  no abd pain, no vomiting, no diarrhea  urinary: no dysuria, no hematuria, no flank pain  musculoskeletal:  bilateral lower extremities pain and bilateral forearms - improving   skin:  no rash  neurology:  no headache, no seizure, no change in mental status  psych: no anxiety, no depression         Allergies  No Known Allergies        ANTIMICROBIALS:  ceFAZolin   IVPB 2000 every 8 hours      OTHER MEDS:  acetaminophen    Suspension .. 650 milliGRAM(s) Oral every 6 hours PRN  ALPRAZolam 0.5 milliGRAM(s) Oral at bedtime  dextrose 50% Injectable 25 Gram(s) IV Push once  enoxaparin Injectable 40 milliGRAM(s) SubCutaneous daily  influenza   Vaccine 0.5 milliLiter(s) IntraMuscular once  insulin glargine Injectable (LANTUS) 40 Unit(s) SubCutaneous <User Schedule>  insulin lispro (HumaLOG) corrective regimen sliding scale   SubCutaneous three times a day before meals  insulin lispro Injectable (HumaLOG) 12 Unit(s) SubCutaneous three times a day before meals  metFORMIN 500 milliGRAM(s) Oral two times a day  simvastatin 20 milliGRAM(s) Oral at bedtime  tamsulosin 0.4 milliGRAM(s) Oral at bedtime      Vital Signs Last 24 Hrs  T(C): 36.7 (20 Oct 2020 05:03), Max: 37.4 (20 Oct 2020 00:11)  T(F): 98 (20 Oct 2020 05:03), Max: 99.3 (20 Oct 2020 00:11)  HR: 87 (20 Oct 2020 05:03) (87 - 99)  BP: 127/82 (20 Oct 2020 05:03) (125/77 - 143/87)  BP(mean): --  RR: 17 (20 Oct 2020 05:03) (17 - 18)  SpO2: 95% (20 Oct 2020 05:03) (95% - 98%)      Physical exam:  General: Nontoxic-appearing thin Male in no acute distress.  HEENT: AT/NC. PERRL. EOMI. Anicteric. Conjunctiva pink and moist. Oropharynx clear. Dentition + partial dentures   Neck: Not rigid. No sense of mass.   Nodes: None palpable.  Lungs: Clear bilaterally without rales, wheezing or rhonchi, respirations not labored.   Heart: Regular rate and rhythm. No Murmur. No rub. No gallop. No palpable thrill.  Abdomen: Bowel sounds present and normoactive. Soft. Nondistended. Nontender. No sense of mass. No organomegaly.  Back: No spinal tenderness. No costovertebral angle tenderness.   Extremities: No cyanosis or clubbing. No edema. tenderness improved upon palpation bilateral lower extremities and bilateral forearms. + palpable cord in right forearm still present   Skin: Warm. Dry. Good turgor. No rash. No vasculitic stigmata. left arm midline c/d/i  Psychiatric: Appropriate affect and mood for situation.     WBC Count: 10.44 K/uL (10-15 @ 07:45)  WBC Count: 11.24 K/uL (10-14 @ 08:00)    Creatinine Trend: 0.70<--, 0.75<--, 0.70<--, 0.61<--, 0.58<--, 0.41<--      MICROBIOLOGY:  v  .Blood Blood  10-15-20   No Growth Final  --  --      .Blood Blood  10-14-20   No Growth Final  --  --      .Urine Clean Catch (Midstream)  10-14-20   <10,000 CFU/mL Normal Urogenital Lin  --  --      .Blood Blood-Peripheral  10-12-20   Growth in aerobic and anaerobic bottles: Staphylococcus aureus  See previous culture [id number]  --    Growth in aerobic bottle: Gram Positive Cocci in Clusters  Growth in anaerobic bottle: Gram Positive Cocci in Clusters      .Blood Blood-Peripheral  10-10-20   Growth in aerobic and anaerobic bottles: Staphylococcus aureus  See previous culture [id number]  --  Blood Culture PCR  Staphylococcus aureus      .Urine Clean Catch (Midstream)  10-10-20   50,000 - 99,000 CFU/mL Staphylococcus aureus  --  Staphylococcus aureus              COVID-19 PCR: NotDetec (10-18)  COVID-19 PCR: NotDetec (10-09)    C-Reactive Protein, Serum: 31.66 (10-10)    Ferritin, Serum: 1441 (10-10)      D-Dimer Assay, Quantitative: 613 (10-09)

## 2020-10-20 NOTE — PROGRESS NOTE ADULT - SUBJECTIVE AND OBJECTIVE BOX
Patient is a 55y old  Male who presents with a chief complaint of fatigue (19 Oct 2020 10:32)      INTERVAL HPI/OVERNIGHT EVENTS:  pt with no complaints  eating well  denies eating hs snack    MEDICATIONS  (STANDING):  ALPRAZolam 0.5 milliGRAM(s) Oral at bedtime  ceFAZolin   IVPB 2000 milliGRAM(s) IV Intermittent every 8 hours  dextrose 50% Injectable 25 Gram(s) IV Push once  enoxaparin Injectable 40 milliGRAM(s) SubCutaneous daily  influenza   Vaccine 0.5 milliLiter(s) IntraMuscular once  insulin glargine Injectable (LANTUS) 35 Unit(s) SubCutaneous <User Schedule>  insulin lispro (HumaLOG) corrective regimen sliding scale   SubCutaneous three times a day before meals  insulin lispro Injectable (HumaLOG) 12 Unit(s) SubCutaneous three times a day before meals  metFORMIN 500 milliGRAM(s) Oral two times a day  simvastatin 20 milliGRAM(s) Oral at bedtime  tamsulosin 0.4 milliGRAM(s) Oral at bedtime    MEDICATIONS  (PRN):  acetaminophen    Suspension .. 650 milliGRAM(s) Oral every 6 hours PRN Temp greater or equal to 38C (100.4F), Moderate Pain (4 - 6)      REVIEW OF SYSTEMS:  CONSTITUTIONAL: No fever, weight loss, or fatigue  RESPIRATORY: No cough, wheezing, chills or hemoptysis; No shortness of breath  CARDIOVASCULAR: No chest pain, palpitations, dizziness, or leg swelling  GASTROINTESTINAL: No abdominal or epigastric pain. No nausea, vomiting, or hematemesis; No diarrhea or constipation. No melena or hematochezia.  ENDOCRINE: No heat or cold intolerance; No hair loss      Vital Signs Last 24 Hrs  T(C): 36.7 (20 Oct 2020 05:03), Max: 37.4 (20 Oct 2020 00:11)  T(F): 98 (20 Oct 2020 05:03), Max: 99.3 (20 Oct 2020 00:11)  HR: 87 (20 Oct 2020 05:03) (87 - 99)  BP: 127/82 (20 Oct 2020 05:03) (125/77 - 143/87)  BP(mean): --  RR: 17 (20 Oct 2020 05:03) (17 - 18)  SpO2: 95% (20 Oct 2020 05:03) (93% - 98%)    PHYSICAL EXAM:  GENERAL: NAD, well-groomed, well-developed      LABS:              CAPILLARY BLOOD GLUCOSE      POCT Blood Glucose.: 284 mg/dL (20 Oct 2020 08:01)  POCT Blood Glucose.: 122 mg/dL (19 Oct 2020 21:44)  POCT Blood Glucose.: 138 mg/dL (19 Oct 2020 17:57)  POCT Blood Glucose.: 192 mg/dL (19 Oct 2020 11:28)    Lipid panel:               RADIOLOGY & ADDITIONAL TESTS:

## 2020-10-20 NOTE — PROGRESS NOTE ADULT - ATTENDING COMMENTS
Doing well. No ID objection to discharge. F/U with Dr. Caputo as above.  Jerzy Walton MD  Attending Physician  [facility]  Division of Infectious Diseases  [phone]

## 2020-10-28 DIAGNOSIS — R53.1 WEAKNESS: ICD-10-CM

## 2020-10-28 DIAGNOSIS — E11.65 TYPE 2 DIABETES MELLITUS WITH HYPERGLYCEMIA: ICD-10-CM

## 2020-10-28 DIAGNOSIS — E55.9 VITAMIN D DEFICIENCY, UNSPECIFIED: ICD-10-CM

## 2020-10-28 DIAGNOSIS — E78.2 MIXED HYPERLIPIDEMIA: ICD-10-CM

## 2020-10-28 DIAGNOSIS — N41.3 PROSTATOCYSTITIS: ICD-10-CM

## 2020-10-28 DIAGNOSIS — T38.3X6A UNDERDOSING OF INSULIN AND ORAL HYPOGLYCEMIC [ANTIDIABETIC] DRUGS, INITIAL ENCOUNTER: ICD-10-CM

## 2020-10-28 DIAGNOSIS — R33.9 RETENTION OF URINE, UNSPECIFIED: ICD-10-CM

## 2020-10-28 DIAGNOSIS — E87.6 HYPOKALEMIA: ICD-10-CM

## 2020-10-28 DIAGNOSIS — Z79.4 LONG TERM (CURRENT) USE OF INSULIN: ICD-10-CM

## 2020-10-28 DIAGNOSIS — R60.0 LOCALIZED EDEMA: ICD-10-CM

## 2020-10-28 DIAGNOSIS — Z91.120 PATIENT'S INTENTIONAL UNDERDOSING OF MEDICATION REGIMEN DUE TO FINANCIAL HARDSHIP: ICD-10-CM

## 2020-10-28 DIAGNOSIS — E44.0 MODERATE PROTEIN-CALORIE MALNUTRITION: ICD-10-CM

## 2020-10-28 DIAGNOSIS — A41.01 SEPSIS DUE TO METHICILLIN SUSCEPTIBLE STAPHYLOCOCCUS AUREUS: ICD-10-CM
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
## 2020-10-28 DIAGNOSIS — Z23 ENCOUNTER FOR IMMUNIZATION: ICD-10-CM

## 2020-10-28 DIAGNOSIS — E87.2 ACIDOSIS: ICD-10-CM

## 2020-10-28 DIAGNOSIS — A41.9 SEPSIS, UNSPECIFIED ORGANISM: ICD-10-CM

## 2020-10-28 DIAGNOSIS — I80.8 PHLEBITIS AND THROMBOPHLEBITIS OF OTHER SITES: ICD-10-CM

## 2020-10-28 PROBLEM — Z00.00 ENCOUNTER FOR PREVENTIVE HEALTH EXAMINATION: Status: ACTIVE | Noted: 2020-10-28

## 2020-11-12 ENCOUNTER — APPOINTMENT (OUTPATIENT)
Dept: INFECTIOUS DISEASE | Facility: CLINIC | Age: 55
End: 2020-11-12
Payer: COMMERCIAL

## 2020-11-12 VITALS
SYSTOLIC BLOOD PRESSURE: 154 MMHG | BODY MASS INDEX: 19.12 KG/M2 | HEIGHT: 64 IN | OXYGEN SATURATION: 95 % | WEIGHT: 112 LBS | RESPIRATION RATE: 18 BRPM | HEART RATE: 107 BPM | DIASTOLIC BLOOD PRESSURE: 85 MMHG | TEMPERATURE: 98.2 F

## 2020-11-12 DIAGNOSIS — Z23 ENCOUNTER FOR IMMUNIZATION: ICD-10-CM

## 2020-11-12 DIAGNOSIS — B95.8 UNSPECIFIED STAPHYLOCOCCUS AS THE CAUSE OF DISEASES CLASSIFIED ELSEWHERE: ICD-10-CM

## 2020-11-12 PROCEDURE — 90471 IMMUNIZATION ADMIN: CPT

## 2020-11-12 PROCEDURE — 90715 TDAP VACCINE 7 YRS/> IM: CPT

## 2020-11-12 PROCEDURE — 99214 OFFICE O/P EST MOD 30 MIN: CPT | Mod: 25

## 2020-11-12 RX ORDER — METFORMIN HYDROCHLORIDE 500 MG/1
500 TABLET, COATED ORAL
Refills: 0 | Status: ACTIVE | COMMUNITY

## 2020-11-12 NOTE — PHYSICAL EXAM
[General Appearance - Alert] : alert [General Appearance - In No Acute Distress] : in no acute distress [Sclera] : the sclera and conjunctiva were normal [PERRL With Normal Accommodation] : pupils were equal in size, round, reactive to light [Extraocular Movements] : extraocular movements were intact [Outer Ear] : the ears and nose were normal in appearance [Oropharynx] : the oropharynx was normal with no thrush [Neck Appearance] : the appearance of the neck was normal [Neck Cervical Mass (___cm)] : no neck mass was observed [Jugular Venous Distention Increased] : there was no jugular-venous distention [Thyroid Diffuse Enlargement] : the thyroid was not enlarged [Auscultation Breath Sounds / Voice Sounds] : lungs were clear to auscultation bilaterally [Heart Rate And Rhythm] : heart rate was normal and rhythm regular [Heart Sounds] : normal S1 and S2 [Heart Sounds Gallop] : no gallops [Murmurs] : no murmurs [Heart Sounds Pericardial Friction Rub] : no pericardial rub [Edema] : there was no peripheral edema [Bowel Sounds] : normal bowel sounds [Abdomen Soft] : soft [Abdomen Tenderness] : non-tender [Abdomen Mass (___ Cm)] : no abdominal mass palpated [Costovertebral Angle Tenderness] : no CVA tenderness [No Palpable Adenopathy] : no palpable adenopathy [Musculoskeletal - Swelling] : no joint swelling [Range of Motion to Joints] : range of motion to joints [] : no rash [FreeTextEntry1] : +LUE midline appears c/d/i [No Focal Deficits] : no focal deficits [Oriented To Time, Place, And Person] : oriented to person, place, and time [Affect] : the affect was normal

## 2020-11-12 NOTE — ASSESSMENT
[FreeTextEntry1] : 1.MSSA bacteremia\par -completed abx yesterday\par -ok to take out line\par -labs from 11/8 have been uploaded and look good \par -advised patient and his sister if fevers chills weakness etc to go to ER or call \par \par 2. BPH\par continue tamsulosin\par refered to Dr. Rosenbaum from urology who saw him in the hospital  \par \par 3. Need for vaccination\par -will give Tdap \par -received flu on 10/20/20\par \par 4.IDDM\par newly dx DM in hospital \par no lantus/novolog\par FS at home have been good\par may have had episode of hypoglycemia \par advised to lower lantus to 30u and check FS every morning plus premeal \par referred to endocrinology \par \par RTC PRN

## 2020-11-12 NOTE — HISTORY OF PRESENT ILLNESS
[FreeTextEntry1] : 55M with new onset IDDM on lantus 35 and lispro 8/8/8. \par He was hospitalized in October with MSSA bacteremia of unknown etiology\par He had TTE and RENETTA both negative for IE. CT C/A/P showed possible prostatitis and cystitis but no abscesses or discitis or OM.  \par He has no symptoms. \par He still has his PICC line in. \par He received his flu shot on 10/20/20 at [facility] \par Has appt with endocrine in December or January \par His most recent blood work on 11/8 was good

## 2021-07-27 NOTE — PATIENT PROFILE ADULT - LEGAL HELP
Aide Charles is a 41 year old female     PRESENTING PROBLEM:  Pt seen in urgent care earlier today for coughing and related SOB. Pt reports having a negative covid test at urgent care and CXR was unremarkable. Pt was advise to seek care in the ER due to hx of having blood clots.     Pt wanting to see if possibly she could just be seen in clinic. Discussed her hx of blood clots was about 2 years ago from birth control. Pt currently not on any blood thinners or birth control. Pt DENIES severe SOB, chest pain or difficulty breathing. Pt does endorse SOB with frequent coughing and is coughing a lot while on phone with writer.     Advised pt that the best for her would be to be seen in the ER. Discussed severity of blood clots and need for additional testing that should be done in the ER. Pt verbally agrees with this plan.     Maricruz Felix, RN, BSN       no

## 2022-05-03 NOTE — PHYSICAL THERAPY INITIAL EVALUATION ADULT - ASSISTIVE DEVICE FOR TRANSFER: BED/CHAIR, REHAB EVAL
Pt unable to tx back to PCP due to Fentanyl and MME.    Please help schedule Pt to see a new pain provider    Alexandr Martínez RN  Patient Care Supervisor   [facility]        rolling walker

## 2023-03-02 ENCOUNTER — NON-APPOINTMENT (OUTPATIENT)
Age: 58
End: 2023-03-02

## 2023-03-02 ENCOUNTER — APPOINTMENT (OUTPATIENT)
Dept: INTERNAL MEDICINE | Facility: CLINIC | Age: 58
End: 2023-03-02
Payer: MEDICAID

## 2023-03-02 VITALS
HEART RATE: 99 BPM | HEIGHT: 64 IN | WEIGHT: 133 LBS | DIASTOLIC BLOOD PRESSURE: 86 MMHG | SYSTOLIC BLOOD PRESSURE: 150 MMHG | BODY MASS INDEX: 22.71 KG/M2 | OXYGEN SATURATION: 98 % | RESPIRATION RATE: 16 BRPM

## 2023-03-02 DIAGNOSIS — Z13.220 ENCOUNTER FOR SCREENING FOR LIPOID DISORDERS: ICD-10-CM

## 2023-03-02 DIAGNOSIS — Z02.1 ENCOUNTER FOR PRE-EMPLOYMENT EXAMINATION: ICD-10-CM

## 2023-03-02 DIAGNOSIS — Z01.84 ENCOUNTER FOR ANTIBODY RESPONSE EXAMINATION: ICD-10-CM

## 2023-03-02 DIAGNOSIS — E78.5 HYPERLIPIDEMIA, UNSPECIFIED: ICD-10-CM

## 2023-03-02 DIAGNOSIS — Z11.1 ENCOUNTER FOR SCREENING FOR RESPIRATORY TUBERCULOSIS: ICD-10-CM

## 2023-03-02 DIAGNOSIS — Z13.29 ENCOUNTER FOR SCREENING FOR OTHER SUSPECTED ENDOCRINE DISORDER: ICD-10-CM

## 2023-03-02 DIAGNOSIS — Z13.0 ENCOUNTER FOR SCREENING FOR DISEASES OF THE BLOOD AND BLOOD-FORMING ORGANS AND CERTAIN DISORDERS INVOLVING THE IMMUNE MECHANISM: ICD-10-CM

## 2023-03-02 DIAGNOSIS — Z13.21 ENCOUNTER FOR SCREENING FOR NUTRITIONAL DISORDER: ICD-10-CM

## 2023-03-02 DIAGNOSIS — Z12.11 ENCOUNTER FOR SCREENING FOR MALIGNANT NEOPLASM OF COLON: ICD-10-CM

## 2023-03-02 DIAGNOSIS — N40.0 BENIGN PROSTATIC HYPERPLASIA WITHOUT LOWER URINARY TRACT SYMPMS: ICD-10-CM

## 2023-03-02 PROCEDURE — 99203 OFFICE O/P NEW LOW 30 MIN: CPT

## 2023-03-02 NOTE — HISTORY OF PRESENT ILLNESS
[FreeTextEntry1] : 57-year-old male is here for checkup. [de-identified] : 57-year-old male is here for checkup.  This is the first time that I am seeing this patients.\par \par Patient denies any fevers, chills, nausea, vomiting, diarrhea, constipation, chest pain, shortness of breath, weakness, numbness, tingling at this time.\par \par Pt needs pre employment physical. Would like forms to be filled. \par \par Reports he has had the flu vaccine this year\par Reports he has had 3 COVID vaccines\par \par

## 2023-03-02 NOTE — HEALTH RISK ASSESSMENT
[Good] : ~his/her~  mood as  good [No] : In the past 12 months have you used drugs other than those required for medical reasons? No [0] : 2) Feeling down, depressed, or hopeless: Not at all (0) [None] : None [With Family] : lives with family [Employed] : employed [College] : College [Single] : single [Fully functional (bathing, dressing, toileting, transferring, walking, feeding)] : Fully functional (bathing, dressing, toileting, transferring, walking, feeding) [Fully functional (using the telephone, shopping, preparing meals, housekeeping, doing laundry, using] : Fully functional and needs no help or supervision to perform IADLs (using the telephone, shopping, preparing meals, housekeeping, doing laundry, using transportation, managing medications and managing finances) [Never] : Never [[id number]] : 0 [ColonoscopyDate] : Never had one [ColonoscopyComments] : Would like a colonoscopy [de-identified] : Mother, Sister, Daughter [de-identified] : [facility]. Works in [place].

## 2023-03-02 NOTE — PLAN
[FreeTextEntry1] : We will do routine blood work in the form of CBC, CMP, A1c, lipid, TSH, B12 folate at this point.\par Patient is unsure of medications that he takes.  He is unable to recall the medication name and the dose at this point is completely.  Patient told to bring a medication list next time in 1 week so that we can scan it into his chart and have a record for results as to what medications he is actually taking.  Patient reports he had the flu vaccine in November.  Patient told to bring a copy of the flu vaccine in 1 week when he comes to  his preemployment forms so we can scan it into our system.\par Patient states he has a primary care doctor with [facility] Dr. Jose Apple.  He states he would like to keep that primary care doctor (Dr. Jose Apple) at this point.  Patient told that if he wishes to follow-up with us he can follow-up with us in 6 months.\par he may follow-up earlier if needed\par Pt was told to call with problems or concerns. The patient was told to seek immediate attention by calling 911 or going to the ER if his condition does not improve or gets acutely worse.\par

## 2023-03-10 ENCOUNTER — NON-APPOINTMENT (OUTPATIENT)
Age: 58
End: 2023-03-10

## 2023-03-15 ENCOUNTER — NON-APPOINTMENT (OUTPATIENT)
Age: 58
End: 2023-03-15

## 2023-03-15 LAB
ALBUMIN SERPL ELPH-MCNC: 4.5 G/DL
ALP BLD-CCNC: 65 U/L
ALT SERPL-CCNC: 12 U/L
ANION GAP SERPL CALC-SCNC: 13 MMOL/L
AST SERPL-CCNC: 18 U/L
BASOPHILS # BLD AUTO: 0.05 K/UL
BASOPHILS NFR BLD AUTO: 0.7 %
BILIRUB SERPL-MCNC: 0.4 MG/DL
BUN SERPL-MCNC: 33 MG/DL
CALCIUM SERPL-MCNC: 9.5 MG/DL
CHLORIDE SERPL-SCNC: 96 MMOL/L
CHOLEST SERPL-MCNC: 204 MG/DL
CO2 SERPL-SCNC: 23 MMOL/L
CREAT SERPL-MCNC: 1.24 MG/DL
EGFR: 67 ML/MIN/1.73M2
EOSINOPHIL # BLD AUTO: 0.16 K/UL
EOSINOPHIL NFR BLD AUTO: 2.1 %
ESTIMATED AVERAGE GLUCOSE: 189 MG/DL
FOLATE SERPL-MCNC: >20 NG/ML
GLUCOSE SERPL-MCNC: 206 MG/DL
HBA1C MFR BLD HPLC: 8.2 %
HCT VFR BLD CALC: 36.6 %
HDLC SERPL-MCNC: 79 MG/DL
HGB BLD-MCNC: 11.7 G/DL
IMM GRANULOCYTES NFR BLD AUTO: 0.3 %
LDLC SERPL CALC-MCNC: 106 MG/DL
LYMPHOCYTES # BLD AUTO: 1.85 K/UL
LYMPHOCYTES NFR BLD AUTO: 24.4 %
M TB IFN-G BLD-IMP: NEGATIVE
MAN DIFF?: NORMAL
MCHC RBC-ENTMCNC: 28.4 PG
MCHC RBC-ENTMCNC: 32 GM/DL
MCV RBC AUTO: 88.8 FL
MEV IGG FLD QL IA: >300 AU/ML
MEV IGG+IGM SER-IMP: POSITIVE
MONOCYTES # BLD AUTO: 0.48 K/UL
MONOCYTES NFR BLD AUTO: 6.3 %
MUV AB SER-ACNC: POSITIVE
MUV IGG SER QL IA: 70.9 AU/ML
NEUTROPHILS # BLD AUTO: 5.01 K/UL
NEUTROPHILS NFR BLD AUTO: 66.2 %
NONHDLC SERPL-MCNC: 125 MG/DL
PLATELET # BLD AUTO: 325 K/UL
POTASSIUM SERPL-SCNC: 5.7 MMOL/L
PROT SERPL-MCNC: 7.6 G/DL
QUANTIFERON TB PLUS MITOGEN MINUS NIL: 7.26 IU/ML
QUANTIFERON TB PLUS NIL: 0.02 IU/ML
QUANTIFERON TB PLUS TB1 MINUS NIL: 0.03 IU/ML
QUANTIFERON TB PLUS TB2 MINUS NIL: 0.01 IU/ML
RBC # BLD: 4.12 M/UL
RBC # FLD: 12.6 %
RUBV IGG FLD-ACNC: >33 INDEX
RUBV IGG SER-IMP: POSITIVE
SODIUM SERPL-SCNC: 133 MMOL/L
TRIGL SERPL-MCNC: 93 MG/DL
TSH SERPL-ACNC: 1.97 UIU/ML
VIT B12 SERPL-MCNC: 657 PG/ML
VZV AB TITR SER: POSITIVE
VZV IGG SER IF-ACNC: 1144 INDEX
WBC # FLD AUTO: 7.57 K/UL

## 2023-09-05 ENCOUNTER — APPOINTMENT (OUTPATIENT)
Dept: INTERNAL MEDICINE | Facility: CLINIC | Age: 58
End: 2023-09-05

## 2023-09-19 ENCOUNTER — APPOINTMENT (OUTPATIENT)
Dept: INTERNAL MEDICINE | Facility: CLINIC | Age: 58
End: 2023-09-19

## 2024-01-01 ENCOUNTER — INPATIENT (INPATIENT)
Facility: HOSPITAL | Age: 59
LOS: 8 days | Discharge: ANOTHER HOSPITAL | End: 2024-01-10
Attending: INTERNAL MEDICINE | Admitting: INTERNAL MEDICINE
Payer: MEDICAID

## 2024-01-01 VITALS
TEMPERATURE: 98 F | RESPIRATION RATE: 17 BRPM | WEIGHT: 119.93 LBS | OXYGEN SATURATION: 99 % | SYSTOLIC BLOOD PRESSURE: 113 MMHG | HEIGHT: 64 IN | HEART RATE: 113 BPM | DIASTOLIC BLOOD PRESSURE: 68 MMHG

## 2024-01-01 DIAGNOSIS — E78.5 HYPERLIPIDEMIA, UNSPECIFIED: ICD-10-CM

## 2024-01-01 DIAGNOSIS — N40.0 BENIGN PROSTATIC HYPERPLASIA WITHOUT LOWER URINARY TRACT SYMPTOMS: ICD-10-CM

## 2024-01-01 DIAGNOSIS — E11.65 TYPE 2 DIABETES MELLITUS WITH HYPERGLYCEMIA: ICD-10-CM

## 2024-01-01 DIAGNOSIS — N17.9 ACUTE KIDNEY FAILURE, UNSPECIFIED: ICD-10-CM

## 2024-01-01 DIAGNOSIS — D64.9 ANEMIA, UNSPECIFIED: ICD-10-CM

## 2024-01-01 DIAGNOSIS — I10 ESSENTIAL (PRIMARY) HYPERTENSION: ICD-10-CM

## 2024-01-01 DIAGNOSIS — E87.1 HYPO-OSMOLALITY AND HYPONATREMIA: ICD-10-CM

## 2024-01-01 LAB
A1C WITH ESTIMATED AVERAGE GLUCOSE RESULT: 12.8 % — HIGH (ref 4–5.6)
A1C WITH ESTIMATED AVERAGE GLUCOSE RESULT: 12.8 % — HIGH (ref 4–5.6)
ALBUMIN SERPL ELPH-MCNC: 2.4 G/DL — LOW (ref 3.3–5)
ALBUMIN SERPL ELPH-MCNC: 2.4 G/DL — LOW (ref 3.3–5)
ALP SERPL-CCNC: 36 U/L — LOW (ref 40–120)
ALP SERPL-CCNC: 36 U/L — LOW (ref 40–120)
ALT FLD-CCNC: 17 U/L — SIGNIFICANT CHANGE UP (ref 12–78)
ALT FLD-CCNC: 17 U/L — SIGNIFICANT CHANGE UP (ref 12–78)
ANION GAP SERPL CALC-SCNC: 8 MMOL/L — SIGNIFICANT CHANGE UP (ref 5–17)
ANION GAP SERPL CALC-SCNC: 8 MMOL/L — SIGNIFICANT CHANGE UP (ref 5–17)
ANION GAP SERPL CALC-SCNC: 9 MMOL/L — SIGNIFICANT CHANGE UP (ref 5–17)
ANION GAP SERPL CALC-SCNC: 9 MMOL/L — SIGNIFICANT CHANGE UP (ref 5–17)
ANISOCYTOSIS BLD QL: SLIGHT — SIGNIFICANT CHANGE UP
ANISOCYTOSIS BLD QL: SLIGHT — SIGNIFICANT CHANGE UP
APPEARANCE UR: CLEAR — SIGNIFICANT CHANGE UP
APPEARANCE UR: CLEAR — SIGNIFICANT CHANGE UP
AST SERPL-CCNC: 18 U/L — SIGNIFICANT CHANGE UP (ref 15–37)
AST SERPL-CCNC: 18 U/L — SIGNIFICANT CHANGE UP (ref 15–37)
BASOPHILS # BLD AUTO: 0 K/UL — SIGNIFICANT CHANGE UP (ref 0–0.2)
BASOPHILS # BLD AUTO: 0 K/UL — SIGNIFICANT CHANGE UP (ref 0–0.2)
BASOPHILS NFR BLD AUTO: 0 % — SIGNIFICANT CHANGE UP (ref 0–2)
BASOPHILS NFR BLD AUTO: 0 % — SIGNIFICANT CHANGE UP (ref 0–2)
BILIRUB SERPL-MCNC: <0.1 MG/DL — LOW (ref 0.2–1.2)
BILIRUB SERPL-MCNC: <0.1 MG/DL — LOW (ref 0.2–1.2)
BILIRUB UR-MCNC: NEGATIVE — SIGNIFICANT CHANGE UP
BILIRUB UR-MCNC: NEGATIVE — SIGNIFICANT CHANGE UP
BLD GP AB SCN SERPL QL: SIGNIFICANT CHANGE UP
BLD GP AB SCN SERPL QL: SIGNIFICANT CHANGE UP
BUN SERPL-MCNC: 63 MG/DL — HIGH (ref 7–23)
BUN SERPL-MCNC: 63 MG/DL — HIGH (ref 7–23)
BUN SERPL-MCNC: 66 MG/DL — HIGH (ref 7–23)
BUN SERPL-MCNC: 66 MG/DL — HIGH (ref 7–23)
CALCIUM SERPL-MCNC: 7.6 MG/DL — LOW (ref 8.5–10.1)
CALCIUM SERPL-MCNC: 7.6 MG/DL — LOW (ref 8.5–10.1)
CALCIUM SERPL-MCNC: 7.7 MG/DL — LOW (ref 8.5–10.1)
CALCIUM SERPL-MCNC: 7.7 MG/DL — LOW (ref 8.5–10.1)
CHLORIDE SERPL-SCNC: 101 MMOL/L — SIGNIFICANT CHANGE UP (ref 96–108)
CO2 SERPL-SCNC: 24 MMOL/L — SIGNIFICANT CHANGE UP (ref 22–31)
CO2 SERPL-SCNC: 24 MMOL/L — SIGNIFICANT CHANGE UP (ref 22–31)
CO2 SERPL-SCNC: 25 MMOL/L — SIGNIFICANT CHANGE UP (ref 22–31)
CO2 SERPL-SCNC: 25 MMOL/L — SIGNIFICANT CHANGE UP (ref 22–31)
COLOR SPEC: YELLOW — SIGNIFICANT CHANGE UP
COLOR SPEC: YELLOW — SIGNIFICANT CHANGE UP
CREAT SERPL-MCNC: 1.4 MG/DL — HIGH (ref 0.5–1.3)
CREAT SERPL-MCNC: 1.4 MG/DL — HIGH (ref 0.5–1.3)
CREAT SERPL-MCNC: 1.42 MG/DL — HIGH (ref 0.5–1.3)
CREAT SERPL-MCNC: 1.42 MG/DL — HIGH (ref 0.5–1.3)
DIFF PNL FLD: NEGATIVE — SIGNIFICANT CHANGE UP
DIFF PNL FLD: NEGATIVE — SIGNIFICANT CHANGE UP
EGFR: 57 ML/MIN/1.73M2 — LOW
EGFR: 57 ML/MIN/1.73M2 — LOW
EGFR: 58 ML/MIN/1.73M2 — LOW
EGFR: 58 ML/MIN/1.73M2 — LOW
EOSINOPHIL # BLD AUTO: 0 K/UL — SIGNIFICANT CHANGE UP (ref 0–0.5)
EOSINOPHIL # BLD AUTO: 0 K/UL — SIGNIFICANT CHANGE UP (ref 0–0.5)
EOSINOPHIL NFR BLD AUTO: 0 % — SIGNIFICANT CHANGE UP (ref 0–6)
EOSINOPHIL NFR BLD AUTO: 0 % — SIGNIFICANT CHANGE UP (ref 0–6)
ESTIMATED AVERAGE GLUCOSE: 321 MG/DL — HIGH (ref 68–114)
ESTIMATED AVERAGE GLUCOSE: 321 MG/DL — HIGH (ref 68–114)
FERRITIN SERPL-MCNC: 19 NG/ML — LOW (ref 30–400)
FERRITIN SERPL-MCNC: 19 NG/ML — LOW (ref 30–400)
FOLATE SERPL-MCNC: 11.7 NG/ML — SIGNIFICANT CHANGE UP
FOLATE SERPL-MCNC: 11.7 NG/ML — SIGNIFICANT CHANGE UP
GLUCOSE BLDC GLUCOMTR-MCNC: 228 MG/DL — HIGH (ref 70–99)
GLUCOSE BLDC GLUCOMTR-MCNC: 228 MG/DL — HIGH (ref 70–99)
GLUCOSE BLDC GLUCOMTR-MCNC: 235 MG/DL — HIGH (ref 70–99)
GLUCOSE BLDC GLUCOMTR-MCNC: 235 MG/DL — HIGH (ref 70–99)
GLUCOSE BLDC GLUCOMTR-MCNC: 257 MG/DL — HIGH (ref 70–99)
GLUCOSE BLDC GLUCOMTR-MCNC: 257 MG/DL — HIGH (ref 70–99)
GLUCOSE BLDC GLUCOMTR-MCNC: 310 MG/DL — HIGH (ref 70–99)
GLUCOSE BLDC GLUCOMTR-MCNC: 310 MG/DL — HIGH (ref 70–99)
GLUCOSE BLDC GLUCOMTR-MCNC: 316 MG/DL — HIGH (ref 70–99)
GLUCOSE BLDC GLUCOMTR-MCNC: 316 MG/DL — HIGH (ref 70–99)
GLUCOSE SERPL-MCNC: 276 MG/DL — HIGH (ref 70–99)
GLUCOSE SERPL-MCNC: 276 MG/DL — HIGH (ref 70–99)
GLUCOSE SERPL-MCNC: 299 MG/DL — HIGH (ref 70–99)
GLUCOSE SERPL-MCNC: 299 MG/DL — HIGH (ref 70–99)
GLUCOSE UR QL: >=1000 MG/DL
GLUCOSE UR QL: >=1000 MG/DL
HCT VFR BLD CALC: 13.9 % — CRITICAL LOW (ref 39–50)
HCT VFR BLD CALC: 13.9 % — CRITICAL LOW (ref 39–50)
HCT VFR BLD CALC: 20.4 % — CRITICAL LOW (ref 39–50)
HCT VFR BLD CALC: 20.4 % — CRITICAL LOW (ref 39–50)
HCT VFR BLD CALC: 21.3 % — LOW (ref 39–50)
HCT VFR BLD CALC: 21.3 % — LOW (ref 39–50)
HGB BLD-MCNC: 4.7 G/DL — CRITICAL LOW (ref 13–17)
HGB BLD-MCNC: 4.7 G/DL — CRITICAL LOW (ref 13–17)
HGB BLD-MCNC: 6.8 G/DL — CRITICAL LOW (ref 13–17)
HGB BLD-MCNC: 6.8 G/DL — CRITICAL LOW (ref 13–17)
HGB BLD-MCNC: 7.2 G/DL — LOW (ref 13–17)
HGB BLD-MCNC: 7.2 G/DL — LOW (ref 13–17)
HYPOCHROMIA BLD QL: SLIGHT — SIGNIFICANT CHANGE UP
HYPOCHROMIA BLD QL: SLIGHT — SIGNIFICANT CHANGE UP
IRON SATN MFR SERPL: 18 % — SIGNIFICANT CHANGE UP (ref 16–55)
IRON SATN MFR SERPL: 18 % — SIGNIFICANT CHANGE UP (ref 16–55)
IRON SATN MFR SERPL: 50 UG/DL — SIGNIFICANT CHANGE UP (ref 45–165)
IRON SATN MFR SERPL: 50 UG/DL — SIGNIFICANT CHANGE UP (ref 45–165)
KETONES UR-MCNC: ABNORMAL MG/DL
KETONES UR-MCNC: ABNORMAL MG/DL
LEUKOCYTE ESTERASE UR-ACNC: NEGATIVE — SIGNIFICANT CHANGE UP
LEUKOCYTE ESTERASE UR-ACNC: NEGATIVE — SIGNIFICANT CHANGE UP
LYMPHOCYTES # BLD AUTO: 1.07 K/UL — SIGNIFICANT CHANGE UP (ref 1–3.3)
LYMPHOCYTES # BLD AUTO: 1.07 K/UL — SIGNIFICANT CHANGE UP (ref 1–3.3)
LYMPHOCYTES # BLD AUTO: 12 % — LOW (ref 13–44)
LYMPHOCYTES # BLD AUTO: 12 % — LOW (ref 13–44)
MACROCYTES BLD QL: SLIGHT — SIGNIFICANT CHANGE UP
MACROCYTES BLD QL: SLIGHT — SIGNIFICANT CHANGE UP
MAGNESIUM SERPL-MCNC: 2.2 MG/DL — SIGNIFICANT CHANGE UP (ref 1.6–2.6)
MAGNESIUM SERPL-MCNC: 2.2 MG/DL — SIGNIFICANT CHANGE UP (ref 1.6–2.6)
MANUAL SMEAR VERIFICATION: SIGNIFICANT CHANGE UP
MANUAL SMEAR VERIFICATION: SIGNIFICANT CHANGE UP
MCHC RBC-ENTMCNC: 28.7 PG — SIGNIFICANT CHANGE UP (ref 27–34)
MCHC RBC-ENTMCNC: 28.7 PG — SIGNIFICANT CHANGE UP (ref 27–34)
MCHC RBC-ENTMCNC: 30.1 PG — SIGNIFICANT CHANGE UP (ref 27–34)
MCHC RBC-ENTMCNC: 30.1 PG — SIGNIFICANT CHANGE UP (ref 27–34)
MCHC RBC-ENTMCNC: 30.3 PG — SIGNIFICANT CHANGE UP (ref 27–34)
MCHC RBC-ENTMCNC: 30.3 PG — SIGNIFICANT CHANGE UP (ref 27–34)
MCHC RBC-ENTMCNC: 33.3 G/DL — SIGNIFICANT CHANGE UP (ref 32–36)
MCHC RBC-ENTMCNC: 33.3 G/DL — SIGNIFICANT CHANGE UP (ref 32–36)
MCHC RBC-ENTMCNC: 33.8 G/DL — SIGNIFICANT CHANGE UP (ref 32–36)
MCV RBC AUTO: 84.8 FL — SIGNIFICANT CHANGE UP (ref 80–100)
MCV RBC AUTO: 84.8 FL — SIGNIFICANT CHANGE UP (ref 80–100)
MCV RBC AUTO: 89.5 FL — SIGNIFICANT CHANGE UP (ref 80–100)
MCV RBC AUTO: 89.5 FL — SIGNIFICANT CHANGE UP (ref 80–100)
MCV RBC AUTO: 90.3 FL — SIGNIFICANT CHANGE UP (ref 80–100)
MCV RBC AUTO: 90.3 FL — SIGNIFICANT CHANGE UP (ref 80–100)
MICROCYTES BLD QL: SLIGHT — SIGNIFICANT CHANGE UP
MICROCYTES BLD QL: SLIGHT — SIGNIFICANT CHANGE UP
MONOCYTES # BLD AUTO: 0.53 K/UL — SIGNIFICANT CHANGE UP (ref 0–0.9)
MONOCYTES # BLD AUTO: 0.53 K/UL — SIGNIFICANT CHANGE UP (ref 0–0.9)
MONOCYTES NFR BLD AUTO: 6 % — SIGNIFICANT CHANGE UP (ref 2–14)
MONOCYTES NFR BLD AUTO: 6 % — SIGNIFICANT CHANGE UP (ref 2–14)
NEUTROPHILS # BLD AUTO: 7.28 K/UL — SIGNIFICANT CHANGE UP (ref 1.8–7.4)
NEUTROPHILS # BLD AUTO: 7.28 K/UL — SIGNIFICANT CHANGE UP (ref 1.8–7.4)
NEUTROPHILS NFR BLD AUTO: 81 % — HIGH (ref 43–77)
NEUTROPHILS NFR BLD AUTO: 81 % — HIGH (ref 43–77)
NEUTS BAND # BLD: 1 % — SIGNIFICANT CHANGE UP (ref 0–8)
NEUTS BAND # BLD: 1 % — SIGNIFICANT CHANGE UP (ref 0–8)
NITRITE UR-MCNC: NEGATIVE — SIGNIFICANT CHANGE UP
NITRITE UR-MCNC: NEGATIVE — SIGNIFICANT CHANGE UP
NRBC # BLD: 0 /100 WBCS — SIGNIFICANT CHANGE UP (ref 0–0)
NRBC # BLD: 0 /100 WBCS — SIGNIFICANT CHANGE UP (ref 0–0)
NRBC # BLD: 1 /100 WBCS — HIGH (ref 0–0)
NRBC # BLD: SIGNIFICANT CHANGE UP /100 WBCS (ref 0–0)
NRBC # BLD: SIGNIFICANT CHANGE UP /100 WBCS (ref 0–0)
PH UR: 6 — SIGNIFICANT CHANGE UP (ref 5–8)
PH UR: 6 — SIGNIFICANT CHANGE UP (ref 5–8)
PLAT MORPH BLD: NORMAL — SIGNIFICANT CHANGE UP
PLAT MORPH BLD: NORMAL — SIGNIFICANT CHANGE UP
PLATELET # BLD AUTO: 218 K/UL — SIGNIFICANT CHANGE UP (ref 150–400)
PLATELET # BLD AUTO: 218 K/UL — SIGNIFICANT CHANGE UP (ref 150–400)
PLATELET # BLD AUTO: 227 K/UL — SIGNIFICANT CHANGE UP (ref 150–400)
PLATELET # BLD AUTO: 227 K/UL — SIGNIFICANT CHANGE UP (ref 150–400)
PLATELET # BLD AUTO: 268 K/UL — SIGNIFICANT CHANGE UP (ref 150–400)
PLATELET # BLD AUTO: 268 K/UL — SIGNIFICANT CHANGE UP (ref 150–400)
POLYCHROMASIA BLD QL SMEAR: SLIGHT — SIGNIFICANT CHANGE UP
POLYCHROMASIA BLD QL SMEAR: SLIGHT — SIGNIFICANT CHANGE UP
POTASSIUM SERPL-MCNC: 3.7 MMOL/L — SIGNIFICANT CHANGE UP (ref 3.5–5.3)
POTASSIUM SERPL-SCNC: 3.7 MMOL/L — SIGNIFICANT CHANGE UP (ref 3.5–5.3)
PROT SERPL-MCNC: 5.4 GM/DL — LOW (ref 6–8.3)
PROT SERPL-MCNC: 5.4 GM/DL — LOW (ref 6–8.3)
PROT UR-MCNC: NEGATIVE MG/DL — SIGNIFICANT CHANGE UP
PROT UR-MCNC: NEGATIVE MG/DL — SIGNIFICANT CHANGE UP
RAPID RVP RESULT: SIGNIFICANT CHANGE UP
RAPID RVP RESULT: SIGNIFICANT CHANGE UP
RBC # BLD: 1.64 M/UL — LOW (ref 4.2–5.8)
RBC # BLD: 1.64 M/UL — LOW (ref 4.2–5.8)
RBC # BLD: 2.26 M/UL — LOW (ref 4.2–5.8)
RBC # BLD: 2.26 M/UL — LOW (ref 4.2–5.8)
RBC # BLD: 2.38 M/UL — LOW (ref 4.2–5.8)
RBC # BLD: 2.38 M/UL — LOW (ref 4.2–5.8)
RBC # FLD: 12.7 % — SIGNIFICANT CHANGE UP (ref 10.3–14.5)
RBC # FLD: 12.7 % — SIGNIFICANT CHANGE UP (ref 10.3–14.5)
RBC # FLD: 14.5 % — SIGNIFICANT CHANGE UP (ref 10.3–14.5)
RBC # FLD: 14.5 % — SIGNIFICANT CHANGE UP (ref 10.3–14.5)
RBC # FLD: 14.6 % — HIGH (ref 10.3–14.5)
RBC # FLD: 14.6 % — HIGH (ref 10.3–14.5)
RBC BLD AUTO: ABNORMAL
RBC BLD AUTO: ABNORMAL
SARS-COV-2 RNA SPEC QL NAA+PROBE: SIGNIFICANT CHANGE UP
SARS-COV-2 RNA SPEC QL NAA+PROBE: SIGNIFICANT CHANGE UP
SODIUM SERPL-SCNC: 133 MMOL/L — LOW (ref 135–145)
SODIUM SERPL-SCNC: 133 MMOL/L — LOW (ref 135–145)
SODIUM SERPL-SCNC: 135 MMOL/L — SIGNIFICANT CHANGE UP (ref 135–145)
SODIUM SERPL-SCNC: 135 MMOL/L — SIGNIFICANT CHANGE UP (ref 135–145)
SP GR SPEC: 1.02 — SIGNIFICANT CHANGE UP (ref 1–1.03)
SP GR SPEC: 1.02 — SIGNIFICANT CHANGE UP (ref 1–1.03)
TIBC SERPL-MCNC: 284 UG/DL — SIGNIFICANT CHANGE UP (ref 220–430)
TIBC SERPL-MCNC: 284 UG/DL — SIGNIFICANT CHANGE UP (ref 220–430)
TROPONIN I, HIGH SENSITIVITY RESULT: 14.1 NG/L — SIGNIFICANT CHANGE UP
TROPONIN I, HIGH SENSITIVITY RESULT: 14.1 NG/L — SIGNIFICANT CHANGE UP
UIBC SERPL-MCNC: 234 UG/DL — SIGNIFICANT CHANGE UP (ref 110–370)
UIBC SERPL-MCNC: 234 UG/DL — SIGNIFICANT CHANGE UP (ref 110–370)
UROBILINOGEN FLD QL: 0.2 MG/DL — SIGNIFICANT CHANGE UP (ref 0.2–1)
UROBILINOGEN FLD QL: 0.2 MG/DL — SIGNIFICANT CHANGE UP (ref 0.2–1)
VIT B12 SERPL-MCNC: 1666 PG/ML — HIGH (ref 232–1245)
VIT B12 SERPL-MCNC: 1666 PG/ML — HIGH (ref 232–1245)
WBC # BLD: 11.15 K/UL — HIGH (ref 3.8–10.5)
WBC # BLD: 11.15 K/UL — HIGH (ref 3.8–10.5)
WBC # BLD: 16.8 K/UL — HIGH (ref 3.8–10.5)
WBC # BLD: 16.8 K/UL — HIGH (ref 3.8–10.5)
WBC # BLD: 8.88 K/UL — SIGNIFICANT CHANGE UP (ref 3.8–10.5)
WBC # BLD: 8.88 K/UL — SIGNIFICANT CHANGE UP (ref 3.8–10.5)
WBC # FLD AUTO: 11.15 K/UL — HIGH (ref 3.8–10.5)
WBC # FLD AUTO: 11.15 K/UL — HIGH (ref 3.8–10.5)
WBC # FLD AUTO: 16.8 K/UL — HIGH (ref 3.8–10.5)
WBC # FLD AUTO: 16.8 K/UL — HIGH (ref 3.8–10.5)
WBC # FLD AUTO: 8.88 K/UL — SIGNIFICANT CHANGE UP (ref 3.8–10.5)
WBC # FLD AUTO: 8.88 K/UL — SIGNIFICANT CHANGE UP (ref 3.8–10.5)

## 2024-01-01 PROCEDURE — 99232 SBSQ HOSP IP/OBS MODERATE 35: CPT

## 2024-01-01 PROCEDURE — 93010 ELECTROCARDIOGRAM REPORT: CPT

## 2024-01-01 PROCEDURE — 99223 1ST HOSP IP/OBS HIGH 75: CPT

## 2024-01-01 PROCEDURE — 99285 EMERGENCY DEPT VISIT HI MDM: CPT

## 2024-01-01 RX ORDER — LANOLIN ALCOHOL/MO/W.PET/CERES
3 CREAM (GRAM) TOPICAL AT BEDTIME
Refills: 0 | Status: DISCONTINUED | OUTPATIENT
Start: 2024-01-01 | End: 2024-01-10

## 2024-01-01 RX ORDER — SODIUM CHLORIDE 9 MG/ML
1000 INJECTION, SOLUTION INTRAVENOUS
Refills: 0 | Status: DISCONTINUED | OUTPATIENT
Start: 2024-01-01 | End: 2024-01-08

## 2024-01-01 RX ORDER — DEXTROSE 50 % IN WATER 50 %
15 SYRINGE (ML) INTRAVENOUS ONCE
Refills: 0 | Status: DISCONTINUED | OUTPATIENT
Start: 2024-01-01 | End: 2024-01-08

## 2024-01-01 RX ORDER — TAMSULOSIN HYDROCHLORIDE 0.4 MG/1
0.4 CAPSULE ORAL AT BEDTIME
Refills: 0 | Status: DISCONTINUED | OUTPATIENT
Start: 2024-01-01 | End: 2024-01-10

## 2024-01-01 RX ORDER — PANTOPRAZOLE SODIUM 20 MG/1
40 TABLET, DELAYED RELEASE ORAL EVERY 12 HOURS
Refills: 0 | Status: DISCONTINUED | OUTPATIENT
Start: 2024-01-01 | End: 2024-01-10

## 2024-01-01 RX ORDER — GLUCAGON INJECTION, SOLUTION 0.5 MG/.1ML
1 INJECTION, SOLUTION SUBCUTANEOUS ONCE
Refills: 0 | Status: DISCONTINUED | OUTPATIENT
Start: 2024-01-01 | End: 2024-01-08

## 2024-01-01 RX ORDER — AMLODIPINE BESYLATE 2.5 MG/1
5 TABLET ORAL DAILY
Refills: 0 | Status: DISCONTINUED | OUTPATIENT
Start: 2024-01-01 | End: 2024-01-10

## 2024-01-01 RX ORDER — INSULIN GLARGINE 100 [IU]/ML
15 INJECTION, SOLUTION SUBCUTANEOUS EVERY MORNING
Refills: 0 | Status: DISCONTINUED | OUTPATIENT
Start: 2024-01-01 | End: 2024-01-03

## 2024-01-01 RX ORDER — ATORVASTATIN CALCIUM 80 MG/1
20 TABLET, FILM COATED ORAL AT BEDTIME
Refills: 0 | Status: DISCONTINUED | OUTPATIENT
Start: 2024-01-01 | End: 2024-01-10

## 2024-01-01 RX ORDER — INSULIN HUMAN 100 [IU]/ML
5 INJECTION, SOLUTION SUBCUTANEOUS ONCE
Refills: 0 | Status: COMPLETED | OUTPATIENT
Start: 2024-01-01 | End: 2024-01-01

## 2024-01-01 RX ORDER — INSULIN GLARGINE 100 [IU]/ML
15 INJECTION, SOLUTION SUBCUTANEOUS AT BEDTIME
Refills: 0 | Status: DISCONTINUED | OUTPATIENT
Start: 2024-01-01 | End: 2024-01-03

## 2024-01-01 RX ORDER — DEXTROSE 50 % IN WATER 50 %
25 SYRINGE (ML) INTRAVENOUS ONCE
Refills: 0 | Status: DISCONTINUED | OUTPATIENT
Start: 2024-01-01 | End: 2024-01-08

## 2024-01-01 RX ORDER — ROSUVASTATIN CALCIUM 5 MG/1
0 TABLET ORAL
Qty: 0 | Refills: 1 | DISCHARGE

## 2024-01-01 RX ORDER — SODIUM CHLORIDE 9 MG/ML
1000 INJECTION INTRAMUSCULAR; INTRAVENOUS; SUBCUTANEOUS ONCE
Refills: 0 | Status: COMPLETED | OUTPATIENT
Start: 2024-01-01 | End: 2024-01-01

## 2024-01-01 RX ORDER — INSULIN HUMAN 100 [IU]/ML
3 INJECTION, SOLUTION SUBCUTANEOUS ONCE
Refills: 0 | Status: COMPLETED | OUTPATIENT
Start: 2024-01-01 | End: 2024-01-01

## 2024-01-01 RX ORDER — PANTOPRAZOLE SODIUM 20 MG/1
40 TABLET, DELAYED RELEASE ORAL DAILY
Refills: 0 | Status: DISCONTINUED | OUTPATIENT
Start: 2024-01-01 | End: 2024-01-01

## 2024-01-01 RX ORDER — ACETAMINOPHEN 500 MG
650 TABLET ORAL EVERY 6 HOURS
Refills: 0 | Status: DISCONTINUED | OUTPATIENT
Start: 2024-01-01 | End: 2024-01-10

## 2024-01-01 RX ORDER — DEXTROSE 50 % IN WATER 50 %
12.5 SYRINGE (ML) INTRAVENOUS ONCE
Refills: 0 | Status: DISCONTINUED | OUTPATIENT
Start: 2024-01-01 | End: 2024-01-08

## 2024-01-01 RX ORDER — ONDANSETRON 8 MG/1
4 TABLET, FILM COATED ORAL EVERY 8 HOURS
Refills: 0 | Status: DISCONTINUED | OUTPATIENT
Start: 2024-01-01 | End: 2024-01-03

## 2024-01-01 RX ORDER — INSULIN LISPRO 100/ML
VIAL (ML) SUBCUTANEOUS
Refills: 0 | Status: DISCONTINUED | OUTPATIENT
Start: 2024-01-01 | End: 2024-01-08

## 2024-01-01 RX ADMIN — ONDANSETRON 4 MILLIGRAM(S): 8 TABLET, FILM COATED ORAL at 08:32

## 2024-01-01 RX ADMIN — INSULIN HUMAN 3 UNIT(S): 100 INJECTION, SOLUTION SUBCUTANEOUS at 04:14

## 2024-01-01 RX ADMIN — Medication 4: at 11:34

## 2024-01-01 RX ADMIN — AMLODIPINE BESYLATE 5 MILLIGRAM(S): 2.5 TABLET ORAL at 08:41

## 2024-01-01 RX ADMIN — PANTOPRAZOLE SODIUM 40 MILLIGRAM(S): 20 TABLET, DELAYED RELEASE ORAL at 17:30

## 2024-01-01 RX ADMIN — PANTOPRAZOLE SODIUM 40 MILLIGRAM(S): 20 TABLET, DELAYED RELEASE ORAL at 11:13

## 2024-01-01 RX ADMIN — INSULIN GLARGINE 15 UNIT(S): 100 INJECTION, SOLUTION SUBCUTANEOUS at 22:16

## 2024-01-01 RX ADMIN — Medication 2: at 06:55

## 2024-01-01 RX ADMIN — SODIUM CHLORIDE 1000 MILLILITER(S): 9 INJECTION INTRAMUSCULAR; INTRAVENOUS; SUBCUTANEOUS at 05:25

## 2024-01-01 RX ADMIN — Medication 4: at 17:34

## 2024-01-01 RX ADMIN — SODIUM CHLORIDE 1000 MILLILITER(S): 9 INJECTION INTRAMUSCULAR; INTRAVENOUS; SUBCUTANEOUS at 03:19

## 2024-01-01 RX ADMIN — INSULIN HUMAN 5 UNIT(S): 100 INJECTION, SOLUTION SUBCUTANEOUS at 05:42

## 2024-01-01 RX ADMIN — INSULIN GLARGINE 15 UNIT(S): 100 INJECTION, SOLUTION SUBCUTANEOUS at 08:33

## 2024-01-01 NOTE — ED ADULT NURSE NOTE - NSFALLRISKINTERV_ED_ALL_ED
Assistance OOB with selected safe patient handling equipment if applicable/Assistance with ambulation/Communicate fall risk and risk factors to all staff, patient, and family/Provide visual cue: yellow wristband, yellow gown, etc/Reinforce activity limits and safety measures with patient and family/Call bell, personal items and telephone in reach/Instruct patient to call for assistance before getting out of bed/chair/stretcher/Non-slip footwear applied when patient is off stretcher/[place] to call system/Physically safe environment - no spills, clutter or unnecessary equipment/Purposeful Proactive Rounding/Room/bathroom lighting operational, light cord in reach Assistance OOB with selected safe patient handling equipment if applicable/Assistance with ambulation/Communicate fall risk and risk factors to all staff, patient, and family/Provide visual cue: yellow wristband, yellow gown, etc/Reinforce activity limits and safety measures with patient and family/Call bell, personal items and telephone in reach/Instruct patient to call for assistance before getting out of bed/chair/stretcher/Non-slip footwear applied when patient is off stretcher/[place] to call system/Physically safe environment - no spills, clutter or unnecessary equipment/Purposeful Proactive Rounding/Room/bathroom lighting operational, light cord in reach

## 2024-01-01 NOTE — ED ADULT NURSE REASSESSMENT NOTE - NS ED NURSE REASSESS COMMENT FT1
ACP Yary made aware patient had 1 episode of nausea during blood transfusion patient stated feeling Nauseas before actual administration  blood  during 2 RN's verfication check. blood transfusion completed  in 1c . Patient VS stable . patient had another episode of vomiting now dark blood in color. Yary QURESHI made aware through teams. also Chavo FREIRE made aware at this time.

## 2024-01-01 NOTE — ED PROVIDER NOTE - CLINICAL SUMMARY MEDICAL DECISION MAKING FREE TEXT BOX
59 yo M with hyperglycemia on FS, dizziness, non-compliance with DM meds, symptoms likely 2/2 hyperglycemia, also less concerning for ACS, UTI, electrolyte disturbance  -cbc, cmp, ua/cx, mag, rvp, trop, EKG, iv, hydration bolus, finger stick q hr  -f/u results, reeval

## 2024-01-01 NOTE — ED PROVIDER NOTE - CARE PLAN
1 Principal Discharge DX:	Dizziness  Secondary Diagnosis:	Hyperglycemia   Principal Discharge DX:	Dizziness  Secondary Diagnosis:	Hyperglycemia  Secondary Diagnosis:	Fatigue associated with anemia  Secondary Diagnosis:	Anemia requiring transfusions

## 2024-01-01 NOTE — H&P ADULT - NSHPPHYSICALEXAM_GEN_ALL_CORE
T(C): 36.4 (01-01-24 @ 01:08), Max: 36.4 (01-01-24 @ 01:08)  HR: 104 (01-01-24 @ 05:30) (104 - 113)  BP: 119/67 (01-01-24 @ 05:30) (113/68 - 119/67)  RR: 20 (01-01-24 @ 05:30) (17 - 20)  SpO2: 98% (01-01-24 @ 05:30) (98% - 99%)    CONSTITUTIONAL: Well groomed  EYES: PERRLA and symmetric, EOMI, b/l conjunctiva pale  ENMT: Oral mucosa with dry membranes  RESP: No respiratory distress, no use of accessory muscles; CTA b/l  CV: tachycardic  GI: Soft, NT, ND  SKIN: pallor

## 2024-01-01 NOTE — H&P ADULT - NSICDXPASTMEDICALHX_GEN_ALL_CORE_FT
PAST MEDICAL HISTORY:  BPH (benign prostatic hyperplasia)     HLD (hyperlipidemia)     Insulin dependent type 2 diabetes mellitus      PAST MEDICAL HISTORY:  BPH (benign prostatic hyperplasia)     HLD (hyperlipidemia)     HTN (hypertension)     Insulin dependent type 2 diabetes mellitus

## 2024-01-01 NOTE — PATIENT PROFILE ADULT - FALL HARM RISK - TYPE OF ASSESSMENT
discussed the results of these studies.  There are lucent spots on his spine, which are most likely related to his multiple myeloma.  His PET scan (previously scheduled) should be able to clarify this further.    We discussed pain control for the next few days.  My recommendation would be to continue using the Tylenol and use the oxycodone as well.  I would try to use the oxycodone on a more regular basis to help you to assess whether or not it is helping and whether or not you are seeing consistent side effects from the oxycodone.  This can help you decide what to do next with your outpatient doctors.    Please return to the ER with difficulty breathing, increasing fevers, or any new, worrisome concerns.   Admission

## 2024-01-01 NOTE — PATIENT PROFILE ADULT - FALL HARM RISK - HARM RISK INTERVENTIONS
Assistance with ambulation/Assistance OOB with selected safe patient handling equipment/Communicate Risk of Fall with Harm to all staff/Reinforce activity limits and safety measures with patient and family/Tailored Fall Risk Interventions/Visual Cue: Yellow wristband and red socks/Bed in lowest position, wheels locked, appropriate side rails in place/Call bell, personal items and telephone in reach/Instruct patient to call for assistance before getting out of bed or chair/Non-slip footwear when patient is out of bed/[place] to call system/Physically safe environment - no spills, clutter or unnecessary equipment/Purposeful Proactive Rounding/Room/bathroom lighting operational, light cord in reach Assistance with ambulation/Assistance OOB with selected safe patient handling equipment/Communicate Risk of Fall with Harm to all staff/Reinforce activity limits and safety measures with patient and family/Tailored Fall Risk Interventions/Visual Cue: Yellow wristband and red socks/Bed in lowest position, wheels locked, appropriate side rails in place/Call bell, personal items and telephone in reach/Instruct patient to call for assistance before getting out of bed or chair/Non-slip footwear when patient is out of bed/[place] to call system/Physically safe environment - no spills, clutter or unnecessary equipment/Purposeful Proactive Rounding/Room/bathroom lighting operational, light cord in reach

## 2024-01-01 NOTE — ED ADULT NURSE NOTE - ED STAT RN HANDOFF DETAILS 2
Report given to receiving mac FREIRE pts history, current condition and reason for admission discussed, safety concerns addressed and reviewed, pt currently in stable condition, IV flushes for patency and site shows no signs or symptoms of infiltrate, dressing is clean dry and intact, pt is aware of plan of care. Pt education deemed successful at time of report after patient demonstrates successful teach back for proficiency. Report given to receiving RN, jennifer Tony pts history, current condition and reason for admission discussed, safety concerns addressed and reviewed, pt currently in stable condition, IV flushes for patency and site shows no signs or symptoms of infiltrate, dressing is clean dry and intact, pt is aware of plan of care. Pt education deemed successful at time of report after patient demonstrates successful teach back for proficiency.

## 2024-01-01 NOTE — ED ADULT NURSE NOTE - BEFAST ARM SIDE DRIFT
24 hr events:  renal function worsening  minimal urine output  remains in shock state on levophed  episodes of bradypnea  family meeting today with decision for comfort measures    ## ROS:  [x] unable to obtain due to metabolic encephalopathy    ## Labs:  CBC:                        9.4    11.49 )-----------( 148      ( 19 Oct 2020 04:19 )             28.5     Chem:  10-19    136  |  107  |  80<H>  ----------------------------<  188<H>  5.0   |  20<L>  |  3.26<H>    Ca    7.1<L>      19 Oct 2020 04:19  Phos  4.1     10-19  Mg     2.3     10-19      Culture - Urine (10.16.20 @ 08:29)    Specimen Source: .Urine Clean Catch (Midstream)    Culture Results: No growth    Culture - Blood (10.16.20 @ 08:26)    Specimen Source: .Blood Blood-Peripheral    Culture Results: No growth to date.        ## Imaging:  no new imaging    ## Medications:  fentaNYL   Patch  50 MICROgram(s)/Hr. 1 Patch Transdermal every 48 hours  HYDROmorphone  Injectable 1 milliGRAM(s) IV Push every 4 hours PRN  HYDROmorphone  Injectable 0.5 milliGRAM(s) IV Push every 4 hours PRN  ondansetron Injectable 4 milliGRAM(s) IV Push every 6 hours PRN  scopolamine 1 mG/72 Hr(s) Patch 1 Patch Transdermal every 72 hours      ## Vitals:  T(C): 36.7 (10-19-20 @ 12:30), Max: 37.2 (10-19-20 @ 00:00)  HR: 99 (10-19-20 @ 19:00) (74 - 103)  BP: 78/42 (10-19-20 @ 19:00) (76/44 - 164/118)  BP(mean): 51 (10-19-20 @ 19:00) (51 - 127)  RR: 14 (10-19-20 @ 19:00) (5 - 20)  SpO2: 93% (10-19-20 @ 19:00) (93% - 100%)        10-18 @ 07:01  -  10-19 @ 07:00  --------------------------------------------------------  IN: 2938.2 mL / OUT: 165 mL / NET: 2773.2 mL    10-19 @ 07:01  -  10-19 @ 20:28  --------------------------------------------------------  IN: 549.2 mL / OUT: 10 mL / NET: 539.2 mL          ## P/E:  Gen: lying in bed, moans and groans at times  HEENT: NC/AT  Resp: CTA B/L   CVS: RRR  Abd: no bowel sounds  Ext: trace bilateral lower extremity edema, R foot cold to touch no dopplerable pulses, skin color changes to darkish purple R posterior calf  Neuro: arousable, moans/groans      CENTRAL LINE: [ ] YES [ ] NO  LOCATION:   DATE INSERTED:  REMOVE: [ ] YES [ ] NO      MANNING: [x ] YES [ ] NO    DATE INSERTED:  REMOVE:  [ ] YES [ ] NO      A-LINE:  [ ] YES [x ] NO  LOCATION:   DATE INSERTED:  REMOVE:  [ ] YES [ ] NO  EXPLAIN:    GLOBAL ISSUE/BEST PRACTICE:  Analgesia: dilaudid  Sedation: n/a  HOB elevation: yes  Stress ulcer prophylaxis: n/a  VTE prophylaxis: n/a  Oral Care: n/a  Glycemic control: n/a  Nutrition: npo    CODE STATUS: [ ] full code  [x] DNR  [x] DNI  [x] [facility]  Goals of care discussion: [x] yes No

## 2024-01-01 NOTE — H&P ADULT - HISTORY OF PRESENT ILLNESS
Linwood Villanueva is a 58 year old male with PMHx of HLD, IDDM2, and BPH who presented to the ED on 1/1/24 for complaints of ***      In the ED, VSS except HR as elevated as 113. Hgb 4.7, sodium 133, BUN 63, Cr 1.42, blood glucose as elevated as 352. U/A with ketonuria and glucosuria. Received 1L NS bolus, insulin 3 U IV, typed and screened. Two units of pRBCs ordered, not yet infusing.  Linwood Villanueva is a 58 year old male with PMHx of HTN, HLD, IDDM2, and BPH who presented to the ED on 1/1/24 for complaints of dizziness and fatigue.    Patient reports he has been feeling dizzy for the past two days but it was worse today. Associated fatigue and low energy. Also describes nonspecific pain all over his shoulders. Never received colonoscopy in the past. Denies hematuria, hematemesis, or hematochezia. States he does not take his insulin as prescribed. Daughter at bedside states he is "stubborn in taking insulin."    In the ED, VSS except HR as elevated as 113. Hgb 4.7, sodium 133, BUN 63, Cr 1.42, blood glucose as elevated as 352. U/A with ketonuria and glucosuria. Received 1L NS bolus, insulin 3 U IV, typed and screened. Two units of pRBCs ordered, not yet transfusing.

## 2024-01-01 NOTE — ED ADULT NURSE NOTE - ED STAT RN HAND OFF
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 Repeat in 4-5 hrs for stability  - CTH 11/24 pm showed no acute intracranial pathology  - Neurosx recs no acute neurosurgical intervention. Ok to start ppx DVT  - CT head STAT if there is a change of mental status  - Neuro check q4hrs. PT/OT  - f/u TTE    #Hematochezia  - f/u stool labs    #Chest pain - musculoskeletal  - Reproducible with sternal palpation  - CTA chest - No PE on CT scan, D-dimer 1000  - EKG shows sinus rhythm, trop neg  - c/w ASA    #Macrocytic anemia  - Possibly secondary to alcohol use, thiamine/folic deficiency  - f/u B12, folate  - c/w thiamine, folic acid, multivitamin    #Active alcohol use disorder  - c/w CIWA protocol with Librium taper, can give extra dose of Ativan for breakthroughs  - CATCH team evaluation    DVT ppx: lovenox qd  GI ppx: protonix  Diet: Regular  Activity: Fall risk  Ambulatory status: Ambulate with cane  Lines: Peripheral IVs   Code status: FULL CODE 51 years old homeless male with past medical history of left lung mass (diagnosed 2 years ago, but lost to follow up), former smoker (7 pack-years, quit 3 months ago), active alcohol use disorder, presented to ED with a complaint of chest pain x 1 day and mechanical fall with head trauma    #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 Repeat in 4-5 hrs for stability  - CTH 11/24 pm showed no acute intracranial pathology  - Neurosx recs no acute neurosurgical intervention. Ok to start ppx DVT  - CT head STAT if there is a change of mental status  - Neuro check q4hrs. PT/OT  - f/u TTE    #Hematochezia  - f/u stool labs    #Chest pain - musculoskeletal  - Reproducible with sternal palpation  - CTA chest - No PE on CT scan, D-dimer 1000  - EKG shows sinus rhythm, trop neg  - c/w ASA    #Macrocytic anemia  - Possibly secondary to alcohol use, thiamine/folic deficiency  - f/u B12, folate  - c/w thiamine, folic acid, multivitamin    #Active alcohol use disorder  - c/w CIWA protocol with Librium taper, can give extra dose of Ativan for breakthroughs  - CATCH team evaluation    DVT ppx: lovenox qd  GI ppx: protonix  Diet: Regular  Activity: Fall risk  Ambulatory status: Ambulate with cane  Lines: Peripheral IVs   Code status: FULL CODE Handoff

## 2024-01-01 NOTE — CHART NOTE - NSCHARTNOTEFT_GEN_A_CORE
Patient seen and examined. Chart and labs reviewed.    Tolerated some food by mouth. Episode of bloody vomiting earlier today.     AT present, Mr Villanueva has no SOB/cp/LH/dizziness. Lungs CTAB, no peripheral edema. Mild tachycardia, BP stable.    Continue blood transfusion. Obtain CBC post-transfusion. Goal Hgb >7.0.    GI consulted. Awaiting recommendations. Patient seen and examined. Chart and labs reviewed.    Tolerated some food by mouth. Episode of bloody vomiting earlier today. Will make NPO as hematemesis is new.    AT present, Mr Villanueva has no SOB/cp/LH/dizziness. Lungs CTAB, no peripheral edema. Mild tachycardia, BP stable.    Continue blood transfusion. Obtain CBC post-transfusion. Goal Hgb >7.0.    GI consulted. Awaiting recommendations.

## 2024-01-01 NOTE — ED ADULT NURSE NOTE - OBJECTIVE STATEMENT
Pt alert and oriented x4, states that on Friday pt started to feel weakness and dizziness when walking around the house. Developed a cough, a few episodes of vomiting. PMH DM. Pt alert and oriented x4, states that on Friday he started to feel weakness and dizziness when walking around the house. Developed a cough,  and had a few episodes of vomiting. PMH DM, states he has not taken his injections recently because he had not been eating. Placed on monitor, NSR. Breathing unlabored on room air.

## 2024-01-01 NOTE — ED PROVIDER NOTE - OBJECTIVE STATEMENT
59 yo M with lightheadedness, general weakness, shoulder pain b/l for 3 days.  Daughter notes that pt. stopped taking insulin because "he's stubborn."  ROS: negative for fever, cough, headache, chest pain, shortness of breath, abd pain, nausea, vomiting, diarrhea, rash, paresthesia, and weakness--all other systems reviewed are negative.   PMH: DM; Meds: non-compliant with sulfonylurea and metformin; SH: Denies smoking/drinking/drug use

## 2024-01-01 NOTE — ED ADULT NURSE NOTE - ED STAT RN HANDOFF DETAILS
Report endorsed to oncoming RN Katie. Safety checks compld this shift.  IV sites checked Q2+remains WDL. Meds given as ordered with no s/s of adverse RXNs. Fall +skin precs in place. Any issues endorsed to oncoming RN for follow up.

## 2024-01-01 NOTE — CONSULT NOTE ADULT - SUBJECTIVE AND OBJECTIVE BOX
Patient is a 58y old  Male who presents with a chief complaint of Symptomatic anemia, hyperglycemia      HPI:  Linwood Villanueva is a 58 year old male with PMHx of HTN, HLD, IDDM2, and BPH who presented to the ED on 1/1/24 for complaints of dizziness and fatigue.    Patient reports he has been feeling dizzy for the past 2-3 days but it was worse today. Associated fatigue and low energy. Also describes nonspecific pain all over his shoulders. Never received colonoscopy in the past. Denies hematuria, hematemesis, or hematochezia at home prior to coming to the hospital. States he does not take his insulin as prescribed. Daughter at bedside states he is "stubborn in taking insulin."    In the ED, VSS except HR as elevated as 113. Hgb 4.7, sodium 133, BUN 63, Cr 1.42, blood glucose as elevated as 352. U/A with ketonuria and glucosuria. Received 1L NS bolus, insulin 3 U IV, typed and screened. Two units of pRBCs ordered.  He had one episode of small hematemesis in the ER.    From a GI perspective, patient reports that he has had decreased appetite and nausea recently but has not vomited at home.  He believes he has lost weight.  He reports that his stools have been recently black but he has seen intermittent dark stools for a few months.  He denies any NSAID use, abdominal pain, FH of GI malignancies or prior EGD/ colonoscopy.  He sees his PCP every 3 months but does not know if he was previously anemic.      PAST MEDICAL & SURGICAL HISTORY:  HLD (hyperlipidemia)  Insulin dependent type 2 diabetes mellitus  BPH (benign prostatic hyperplasia)  HTN (hypertension)      No significant past surgical history    Allergies  No Known Allergies      MEDICATIONS  (STANDING):  amLODIPine   Tablet 5 milliGRAM(s) Oral daily  atorvastatin 20 milliGRAM(s) Oral at bedtime  dextrose 5%. 1000 milliLiter(s) (50 mL/Hr) IV Continuous <Continuous>  dextrose 5%. 1000 milliLiter(s) (100 mL/Hr) IV Continuous <Continuous>  dextrose 50% Injectable 25 Gram(s) IV Push once  dextrose 50% Injectable 12.5 Gram(s) IV Push once  dextrose 50% Injectable 25 Gram(s) IV Push once  glucagon  Injectable 1 milliGRAM(s) IntraMuscular once  insulin glargine Injectable (LANTUS) 15 Unit(s) SubCutaneous at bedtime  insulin glargine Injectable (LANTUS) 15 Unit(s) SubCutaneous every morning  insulin lispro (ADMELOG) corrective regimen sliding scale   SubCutaneous three times a day before meals  pantoprazole  Injectable 40 milliGRAM(s) IV Push every 12 hours  tamsulosin 0.4 milliGRAM(s) Oral at bedtime    MEDICATIONS  (PRN):  acetaminophen     Tablet .. 650 milliGRAM(s) Oral every 6 hours PRN Temp greater or equal to 38C (100.4F), Mild Pain (1 - 3)  aluminum hydroxide/magnesium hydroxide/simethicone Suspension 30 milliLiter(s) Oral every 4 hours PRN Dyspepsia  dextrose Oral Gel 15 Gram(s) Oral once PRN Blood Glucose LESS THAN 70 milliGRAM(s)/deciliter  melatonin 3 milliGRAM(s) Oral at bedtime PRN Insomnia  ondansetron Injectable 4 milliGRAM(s) IV Push every 8 hours PRN Nausea and/or Vomiting      FAMILY HISTORY:  Denies family history of GI malignancies    Social History:  Denies smoking, EtOH or drug use      Review of Systems:  Pertinent ROS as per HPI, otherwise negative  General:  No wt loss, fevers, chills, night sweats, fatigue,   CV:  No pain, palpitations, hypo/hypertension  Resp:  No dyspnea, cough, tachypnea, wheezing  GI:  as per HPI  :  No pain, bleeding, incontinence, nocturia  Muscle:  No pain, weakness  Neuro:  No weakness, tingling, memory problems  Psych:  No fatigue, insomnia, mood problems, depression  Endocrine:  No polyuria, polydipsia, cold/heat intolerance  Heme:  No petechiae, ecchymosis, easy bruisability  Skin:  No rash, tattoos, scars, edema      Vital Signs Last 24 Hrs  T(C): 37.1 (01 Jan 2024 12:10), Max: 37.2 (01 Jan 2024 12:05)  T(F): 98.7 (01 Jan 2024 12:10), Max: 99 (01 Jan 2024 12:05)  HR: 98 (01 Jan 2024 12:10) (98 - 113)  BP: 118/69 (01 Jan 2024 12:10) (113/62 - 149/78)  BP(mean): --  RR: 20 (01 Jan 2024 12:10) (17 - 23)  SpO2: 97% (01 Jan 2024 12:10) (94% - 100%)    Parameters below as of 01 Jan 2024 12:10  Patient On (Oxygen Delivery Method): room air          PHYSICAL EXAM:  Constitutional: NAD, well-developed  HEENT: anicteric, EOMI  Neck: No LAD, supple  Cardiovascular: S1 and S2, RRR, no M  Respiratory: CTA and P  Gastrointestinal: BS+, soft, NT/ND, neg HSM,  Extremities: No peripheral edema, neg clubbing, cyanosis  Vascular: 2+ peripheral pulses  Neurological: A/O x 3, no focal deficits  Psychiatric: Normal mood, normal affect  Skin: No rashes, normal turgor      LABS:                        7.2    11.15 )-----------( 218      ( 01 Jan 2024 14:30 )             21.3     01-01    135  |  101  |  66<H>  ----------------------------<  299<H>  3.7   |  25  |  1.40<H>    Ca    7.7<L>      01 Jan 2024 12:15  Mg     2.2     01-01    TPro  5.4<L>  /  Alb  2.4<L>  /  TBili  <0.1<L>  /  DBili  x   /  AST  18  /  ALT  17  /  AlkPhos  36<L>  01-01      Urinalysis Basic - ( 01 Jan 2024 12:15 )    Color: x / Appearance: x / SG: x / pH: x  Gluc: 299 mg/dL / Ketone: x  / Bili: x / Urobili: x   Blood: x / Protein: x / Nitrite: x   Leuk Esterase: x / RBC: x / WBC x   Sq Epi: x / Non Sq Epi: x / Bacteria: x      LIVER FUNCTIONS - ( 01 Jan 2024 03:05 )  Alb: 2.4 g/dL / Pro: 5.4 gm/dL / ALK PHOS: 36 U/L / ALT: 17 U/L / AST: 18 U/L / GGT: x             RADIOLOGY & ADDITIONAL TESTS:

## 2024-01-01 NOTE — ED PROVIDER NOTE - PHYSICAL EXAMINATION
Vitals: tachy at 113  Gen: AAOx3, NAD, sitting comfortably in stretcher, non-toxic   Head: ncat, perrla, eomi b/l  Neck: supple, no lymphadenopathy, no midline deviation  Heart: rrr, no m/r/g  Lungs: CTA b/l, no rales/ronchi/wheezes  Abd: soft, nontender, non-distended, no rebound or guarding  Ext: no clubbing/cyanosis/edema  Neuro: sensation and muscle strength intact b/l, no focal weakness or sensory loss

## 2024-01-01 NOTE — PATIENT PROFILE ADULT - FUNCTIONAL ASSESSMENT - BASIC MOBILITY 6.
3-calculated by average/Not able to assess (calculate score using [facility] averaging method)  3-calculated by average/Not able to assess (calculate score using [facility] averaging method)

## 2024-01-01 NOTE — ED PROVIDER NOTE - INPATIENT RESIDENT/ACP NOTIFIED
farshad, [facility] farshad, [facility] [place]

## 2024-01-01 NOTE — CONSULT NOTE ADULT - ASSESSMENT
58 year old male with PMHx of HTN, HLD, IDDM2, and BPH p/w symptomatic anemia, Hb: 4.7 with dark stools and 1 episode of hematemesis in the ED.    1.  Acute blood loss anemia with hematemesis and melena.  Differential includes peptic ulcer, hiatus hernia with erosions, varices, esophageal or gastric neoplasm.  2.  IDDM.  3.  HTN.    Recs:  - Monitor Hb, transfuse to Hb > 7.  - Protonix gtt.  - Antiemetics PRN.  - Keep NPO.  - Plan for EGD tomorrow if patient medically optimized.  - Will need eventual colonoscopy (inpt vs outpt) pending EGD findings.

## 2024-01-01 NOTE — CHART NOTE - NSCHARTNOTEFT_GEN_A_CORE
Called by RN regarding red bloody vomitus after 2nd unit of PRBC.  Patient is hemodynamically stable, awaiting 3rd unit PRBC.    Around 20 cc of dark red bloody vomitus seen in trash bin.  Patient states this is the first episode of bloody vomitus he has had, he had no bloody BMs. Patient complains of nausea, given zofran and subsided  -GI consult placed  -Start IV PPI  -3rd unit PRBC  -Await repeat CBC, morning labs Called by RN regarding red bloody vomitus after 2nd unit of PRBC.  Patient is hemodynamically stable, awaiting 3rd unit PRBC.    Around 20 cc of dark red bloody vomitus seen in trash bin.  Patient states this is the first episode of bloody vomitus he has had, he had no bloody BMs. Patient complains of nausea, given zofran and subsided  -GI consult placed, spoke with Dr. Todd Maldonado who will see patient  -Start IV PPI  -3rd unit PRBC  -Await repeat CBC, morning labs

## 2024-01-01 NOTE — H&P ADULT - NSHPLABSRESULTS_GEN_ALL_CORE
4.7    8.88  )-----------( 268      ( 2024 03:05 )             13.9   01-    133<L>  |  101  |  63<H>  ----------------------------<  276<H>  3.7   |  24  |  1.42<H>    Ca    7.6<L>      2024 03:05  Mg     2.2         TPro  5.4<L>  /  Alb  2.4<L>  /  TBili  <0.1<L>  /  DBili  x   /  AST  18  /  ALT  17  /  AlkPhos  36<L>      Urinalysis Basic - ( 2024 03:20 )    Color: Yellow / Appearance: Clear / S.023 / pH: x  Gluc: x / Ketone: Trace mg/dL  / Bili: Negative / Urobili: 0.2 mg/dL   Blood: x / Protein: Negative mg/dL / Nitrite: Negative   Leuk Esterase: Negative / RBC: x / WBC x   Sq Epi: x / Non Sq Epi: x / Bacteria: x

## 2024-01-01 NOTE — ED ADULT NURSE REASSESSMENT NOTE - NS ED NURSE REASSESS COMMENT FT1
patient pending another unit of blood . currently receiving 2nd unit of blood Chavo FREIRE made aware.

## 2024-01-01 NOTE — ED ADULT NURSE REASSESSMENT NOTE - NS ED NURSE REASSESS COMMENT FT1
received patient pending blood transfusion .  consent in chart .patient educated on s/s of blood transfusion reaction .

## 2024-01-02 ENCOUNTER — RESULT REVIEW (OUTPATIENT)
Age: 59
End: 2024-01-02

## 2024-01-02 LAB
ALBUMIN SERPL ELPH-MCNC: 2.1 G/DL — LOW (ref 3.3–5)
ALBUMIN SERPL ELPH-MCNC: 2.1 G/DL — LOW (ref 3.3–5)
ALP SERPL-CCNC: 26 U/L — LOW (ref 40–120)
ALP SERPL-CCNC: 26 U/L — LOW (ref 40–120)
ALT FLD-CCNC: 13 U/L — SIGNIFICANT CHANGE UP (ref 12–78)
ALT FLD-CCNC: 13 U/L — SIGNIFICANT CHANGE UP (ref 12–78)
ANION GAP SERPL CALC-SCNC: 10 MMOL/L — SIGNIFICANT CHANGE UP (ref 5–17)
ANION GAP SERPL CALC-SCNC: 10 MMOL/L — SIGNIFICANT CHANGE UP (ref 5–17)
AST SERPL-CCNC: 13 U/L — LOW (ref 15–37)
AST SERPL-CCNC: 13 U/L — LOW (ref 15–37)
BILIRUB SERPL-MCNC: 0.5 MG/DL — SIGNIFICANT CHANGE UP (ref 0.2–1.2)
BILIRUB SERPL-MCNC: 0.5 MG/DL — SIGNIFICANT CHANGE UP (ref 0.2–1.2)
BUN SERPL-MCNC: 48 MG/DL — HIGH (ref 7–23)
BUN SERPL-MCNC: 48 MG/DL — HIGH (ref 7–23)
CALCIUM SERPL-MCNC: 7.3 MG/DL — LOW (ref 8.5–10.1)
CALCIUM SERPL-MCNC: 7.3 MG/DL — LOW (ref 8.5–10.1)
CHLORIDE SERPL-SCNC: 108 MMOL/L — SIGNIFICANT CHANGE UP (ref 96–108)
CHLORIDE SERPL-SCNC: 108 MMOL/L — SIGNIFICANT CHANGE UP (ref 96–108)
CO2 SERPL-SCNC: 21 MMOL/L — LOW (ref 22–31)
CO2 SERPL-SCNC: 21 MMOL/L — LOW (ref 22–31)
CREAT SERPL-MCNC: 1.04 MG/DL — SIGNIFICANT CHANGE UP (ref 0.5–1.3)
CREAT SERPL-MCNC: 1.04 MG/DL — SIGNIFICANT CHANGE UP (ref 0.5–1.3)
CULTURE RESULTS: SIGNIFICANT CHANGE UP
CULTURE RESULTS: SIGNIFICANT CHANGE UP
EGFR: 82 ML/MIN/1.73M2 — SIGNIFICANT CHANGE UP
EGFR: 82 ML/MIN/1.73M2 — SIGNIFICANT CHANGE UP
GLUCOSE BLDC GLUCOMTR-MCNC: 158 MG/DL — HIGH (ref 70–99)
GLUCOSE BLDC GLUCOMTR-MCNC: 158 MG/DL — HIGH (ref 70–99)
GLUCOSE BLDC GLUCOMTR-MCNC: 170 MG/DL — HIGH (ref 70–99)
GLUCOSE BLDC GLUCOMTR-MCNC: 170 MG/DL — HIGH (ref 70–99)
GLUCOSE BLDC GLUCOMTR-MCNC: 205 MG/DL — HIGH (ref 70–99)
GLUCOSE BLDC GLUCOMTR-MCNC: 205 MG/DL — HIGH (ref 70–99)
GLUCOSE BLDC GLUCOMTR-MCNC: 345 MG/DL — HIGH (ref 70–99)
GLUCOSE BLDC GLUCOMTR-MCNC: 345 MG/DL — HIGH (ref 70–99)
GLUCOSE SERPL-MCNC: 186 MG/DL — HIGH (ref 70–99)
GLUCOSE SERPL-MCNC: 186 MG/DL — HIGH (ref 70–99)
HCT VFR BLD CALC: 22.4 % — LOW (ref 39–50)
HGB BLD-MCNC: 7.5 G/DL — LOW (ref 13–17)
HGB BLD-MCNC: 7.5 G/DL — LOW (ref 13–17)
HGB BLD-MCNC: 7.7 G/DL — LOW (ref 13–17)
HGB BLD-MCNC: 7.7 G/DL — LOW (ref 13–17)
MCHC RBC-ENTMCNC: 29.3 PG — SIGNIFICANT CHANGE UP (ref 27–34)
MCHC RBC-ENTMCNC: 29.3 PG — SIGNIFICANT CHANGE UP (ref 27–34)
MCHC RBC-ENTMCNC: 29.8 PG — SIGNIFICANT CHANGE UP (ref 27–34)
MCHC RBC-ENTMCNC: 29.8 PG — SIGNIFICANT CHANGE UP (ref 27–34)
MCHC RBC-ENTMCNC: 33.5 G/DL — SIGNIFICANT CHANGE UP (ref 32–36)
MCHC RBC-ENTMCNC: 33.5 G/DL — SIGNIFICANT CHANGE UP (ref 32–36)
MCHC RBC-ENTMCNC: 34.4 G/DL — SIGNIFICANT CHANGE UP (ref 32–36)
MCHC RBC-ENTMCNC: 34.4 G/DL — SIGNIFICANT CHANGE UP (ref 32–36)
MCV RBC AUTO: 86.8 FL — SIGNIFICANT CHANGE UP (ref 80–100)
MCV RBC AUTO: 86.8 FL — SIGNIFICANT CHANGE UP (ref 80–100)
MCV RBC AUTO: 87.5 FL — SIGNIFICANT CHANGE UP (ref 80–100)
MCV RBC AUTO: 87.5 FL — SIGNIFICANT CHANGE UP (ref 80–100)
NRBC # BLD: 0 /100 WBCS — SIGNIFICANT CHANGE UP (ref 0–0)
NRBC # BLD: 0 /100 WBCS — SIGNIFICANT CHANGE UP (ref 0–0)
NRBC # BLD: 1 /100 WBCS — HIGH (ref 0–0)
NRBC # BLD: 1 /100 WBCS — HIGH (ref 0–0)
PLATELET # BLD AUTO: 211 K/UL — SIGNIFICANT CHANGE UP (ref 150–400)
PLATELET # BLD AUTO: 211 K/UL — SIGNIFICANT CHANGE UP (ref 150–400)
PLATELET # BLD AUTO: 225 K/UL — SIGNIFICANT CHANGE UP (ref 150–400)
PLATELET # BLD AUTO: 225 K/UL — SIGNIFICANT CHANGE UP (ref 150–400)
POTASSIUM SERPL-MCNC: 3.5 MMOL/L — SIGNIFICANT CHANGE UP (ref 3.5–5.3)
POTASSIUM SERPL-MCNC: 3.5 MMOL/L — SIGNIFICANT CHANGE UP (ref 3.5–5.3)
POTASSIUM SERPL-SCNC: 3.5 MMOL/L — SIGNIFICANT CHANGE UP (ref 3.5–5.3)
POTASSIUM SERPL-SCNC: 3.5 MMOL/L — SIGNIFICANT CHANGE UP (ref 3.5–5.3)
PROT SERPL-MCNC: 4.7 GM/DL — LOW (ref 6–8.3)
PROT SERPL-MCNC: 4.7 GM/DL — LOW (ref 6–8.3)
RBC # BLD: 2.56 M/UL — LOW (ref 4.2–5.8)
RBC # BLD: 2.56 M/UL — LOW (ref 4.2–5.8)
RBC # BLD: 2.58 M/UL — LOW (ref 4.2–5.8)
RBC # BLD: 2.58 M/UL — LOW (ref 4.2–5.8)
RBC # FLD: 14.5 % — SIGNIFICANT CHANGE UP (ref 10.3–14.5)
RBC # FLD: 14.5 % — SIGNIFICANT CHANGE UP (ref 10.3–14.5)
RBC # FLD: 15.1 % — HIGH (ref 10.3–14.5)
RBC # FLD: 15.1 % — HIGH (ref 10.3–14.5)
SODIUM SERPL-SCNC: 139 MMOL/L — SIGNIFICANT CHANGE UP (ref 135–145)
SODIUM SERPL-SCNC: 139 MMOL/L — SIGNIFICANT CHANGE UP (ref 135–145)
SPECIMEN SOURCE: SIGNIFICANT CHANGE UP
SPECIMEN SOURCE: SIGNIFICANT CHANGE UP
WBC # BLD: 11.52 K/UL — HIGH (ref 3.8–10.5)
WBC # BLD: 11.52 K/UL — HIGH (ref 3.8–10.5)
WBC # BLD: 12.86 K/UL — HIGH (ref 3.8–10.5)
WBC # BLD: 12.86 K/UL — HIGH (ref 3.8–10.5)
WBC # FLD AUTO: 11.52 K/UL — HIGH (ref 3.8–10.5)
WBC # FLD AUTO: 11.52 K/UL — HIGH (ref 3.8–10.5)
WBC # FLD AUTO: 12.86 K/UL — HIGH (ref 3.8–10.5)
WBC # FLD AUTO: 12.86 K/UL — HIGH (ref 3.8–10.5)

## 2024-01-02 PROCEDURE — 99233 SBSQ HOSP IP/OBS HIGH 50: CPT

## 2024-01-02 PROCEDURE — 88312 SPECIAL STAINS GROUP 1: CPT | Mod: 26

## 2024-01-02 PROCEDURE — 88305 TISSUE EXAM BY PATHOLOGIST: CPT | Mod: 26

## 2024-01-02 PROCEDURE — 43239 EGD BIOPSY SINGLE/MULTIPLE: CPT

## 2024-01-02 RX ADMIN — Medication 1: at 11:18

## 2024-01-02 RX ADMIN — PANTOPRAZOLE SODIUM 40 MILLIGRAM(S): 20 TABLET, DELAYED RELEASE ORAL at 05:31

## 2024-01-02 RX ADMIN — AMLODIPINE BESYLATE 5 MILLIGRAM(S): 2.5 TABLET ORAL at 05:31

## 2024-01-02 RX ADMIN — TAMSULOSIN HYDROCHLORIDE 0.4 MILLIGRAM(S): 0.4 CAPSULE ORAL at 22:29

## 2024-01-02 RX ADMIN — INSULIN GLARGINE 15 UNIT(S): 100 INJECTION, SOLUTION SUBCUTANEOUS at 22:30

## 2024-01-02 RX ADMIN — PANTOPRAZOLE SODIUM 40 MILLIGRAM(S): 20 TABLET, DELAYED RELEASE ORAL at 18:16

## 2024-01-02 RX ADMIN — Medication 2: at 08:36

## 2024-01-02 RX ADMIN — ATORVASTATIN CALCIUM 20 MILLIGRAM(S): 80 TABLET, FILM COATED ORAL at 22:30

## 2024-01-02 NOTE — PROGRESS NOTE ADULT - SUBJECTIVE AND OBJECTIVE BOX
PROGRESS NOTE:     Patient is a 58y old  Male who presents with a chief complaint of Symptomatic anemia, hyperglycemia (01 Jan 2024 15:15)      SUBJECTIVE / OVERNIGHT EVENTS: Pt feels weak. No vomiting.     ADDITIONAL REVIEW OF SYSTEMS:    MEDICATIONS  (STANDING):  amLODIPine   Tablet 5 milliGRAM(s) Oral daily  atorvastatin 20 milliGRAM(s) Oral at bedtime  dextrose 5%. 1000 milliLiter(s) (100 mL/Hr) IV Continuous <Continuous>  dextrose 5%. 1000 milliLiter(s) (50 mL/Hr) IV Continuous <Continuous>  dextrose 50% Injectable 25 Gram(s) IV Push once  dextrose 50% Injectable 25 Gram(s) IV Push once  dextrose 50% Injectable 12.5 Gram(s) IV Push once  glucagon  Injectable 1 milliGRAM(s) IntraMuscular once  insulin glargine Injectable (LANTUS) 15 Unit(s) SubCutaneous every morning  insulin glargine Injectable (LANTUS) 15 Unit(s) SubCutaneous at bedtime  insulin lispro (ADMELOG) corrective regimen sliding scale   SubCutaneous three times a day before meals  pantoprazole  Injectable 40 milliGRAM(s) IV Push every 12 hours  tamsulosin 0.4 milliGRAM(s) Oral at bedtime    MEDICATIONS  (PRN):  acetaminophen     Tablet .. 650 milliGRAM(s) Oral every 6 hours PRN Temp greater or equal to 38C (100.4F), Mild Pain (1 - 3)  aluminum hydroxide/magnesium hydroxide/simethicone Suspension 30 milliLiter(s) Oral every 4 hours PRN Dyspepsia  dextrose Oral Gel 15 Gram(s) Oral once PRN Blood Glucose LESS THAN 70 milliGRAM(s)/deciliter  melatonin 3 milliGRAM(s) Oral at bedtime PRN Insomnia  ondansetron Injectable 4 milliGRAM(s) IV Push every 8 hours PRN Nausea and/or Vomiting      CAPILLARY BLOOD GLUCOSE      POCT Blood Glucose.: 170 mg/dL (02 Jan 2024 10:56)  POCT Blood Glucose.: 205 mg/dL (02 Jan 2024 07:44)  POCT Blood Glucose.: 235 mg/dL (01 Jan 2024 22:00)  POCT Blood Glucose.: 310 mg/dL (01 Jan 2024 17:32)    I&O's Summary      PHYSICAL EXAM:  Vital Signs Last 24 Hrs  T(C): 36.6 (02 Jan 2024 10:55), Max: 37.4 (01 Jan 2024 23:40)  T(F): 97.8 (02 Jan 2024 10:55), Max: 99.4 (01 Jan 2024 23:40)  HR: 102 (02 Jan 2024 10:55) (92 - 104)  BP: 134/75 (02 Jan 2024 10:55) (118/69 - 162/82)  BP(mean): --  RR: 17 (02 Jan 2024 10:55) (17 - 22)  SpO2: 95% (02 Jan 2024 10:55) (95% - 97%)    Parameters below as of 02 Jan 2024 02:08  Patient On (Oxygen Delivery Method): room air        CONSTITUTIONAL: NAD  RESPIRATORY: Normal respiratory effort; lungs are clear to auscultation bilaterally  CARDIOVASCULAR: Tachycardia, normal S1 and S2, no murmur/rub/gallop; No lower extremity edema; Peripheral pulses are 2+ bilaterally  ABDOMEN: Nontender to palpation, normoactive bowel sounds, no rebound/guarding; No hepatosplenomegaly  MUSCLOSKELETAL: no clubbing or cyanosis of digits; no joint swelling or tenderness to palpation  PSYCH: A+O to person, place, and time; affect appropriate    LABS:                        7.5    12.86 )-----------( 225      ( 02 Jan 2024 08:05 )             22.4     01-02    139  |  108  |  48<H>  ----------------------------<  186<H>  3.5   |  21<L>  |  1.04    Ca    7.3<L>      02 Jan 2024 08:05  Mg     2.2     01-01    TPro  4.7<L>  /  Alb  2.1<L>  /  TBili  0.5  /  DBili  x   /  AST  13<L>  /  ALT  13  /  AlkPhos  26<L>  01-02          Urinalysis Basic - ( 02 Jan 2024 08:05 )    Color: x / Appearance: x / SG: x / pH: x  Gluc: 186 mg/dL / Ketone: x  / Bili: x / Urobili: x   Blood: x / Protein: x / Nitrite: x   Leuk Esterase: x / RBC: x / WBC x   Sq Epi: x / Non Sq Epi: x / Bacteria: x        Culture - Urine (collected 01 Jan 2024 03:20)  Source: Clean Catch Clean Catch (Midstream)  Final Report (02 Jan 2024 07:56):    <10,000 CFU/mL Normal Urogenital Lin        RADIOLOGY & ADDITIONAL TESTS:  Results Reviewed:   Imaging Personally Reviewed:  Electrocardiogram Personally Reviewed:    COORDINATION OF CARE:  Care Discussed with Consultants/Other Providers [Y/N]:  Prior or Outpatient Records Reviewed [Y/N]:

## 2024-01-02 NOTE — PROGRESS NOTE ADULT - ASSESSMENT
58 year old male with PMHx of HTN, HLD, IDDM2, and BPH who presented to the ED on 1/1/24 for complaints of dizziness and fatigue and admitted for symptomatic anemia and hyperglycemia.    Acute blood loss anemia d/t upper GI bleed   -Hgb 4.7 on admission, baseline ~11.5  -s/p 4 units PRBC 1/1   -Monitor CBC  -c/w IV PPI   -seen by GI, plan for EGD today   -currently NPO    IDDM2 with hyperglycemia secondary to medication noncompliance  Admits to not taking insulin as prescribed  Hold metformin and jardiance   Hgb A1c 12.8   c/w ISS  Continue Lantus 15 units BID  Monitor fingersticks     NATALIA likely prerenal   BUN 63 and Cr 1.42 on admission, baseline Cr ~0.8  S/p 1L NS bolus in the ED  S/p PRBC as above   Avoid nephrotoxins, renally dose meds  Monitor renal function    HTN  c/w amlodipine 5 mg   monitor BP

## 2024-01-03 LAB
ALBUMIN SERPL ELPH-MCNC: 1.9 G/DL — LOW (ref 3.3–5)
ALBUMIN SERPL ELPH-MCNC: 1.9 G/DL — LOW (ref 3.3–5)
ALP SERPL-CCNC: 26 U/L — LOW (ref 40–120)
ALP SERPL-CCNC: 26 U/L — LOW (ref 40–120)
ALT FLD-CCNC: 12 U/L — SIGNIFICANT CHANGE UP (ref 12–78)
ALT FLD-CCNC: 12 U/L — SIGNIFICANT CHANGE UP (ref 12–78)
ANION GAP SERPL CALC-SCNC: 7 MMOL/L — SIGNIFICANT CHANGE UP (ref 5–17)
ANION GAP SERPL CALC-SCNC: 7 MMOL/L — SIGNIFICANT CHANGE UP (ref 5–17)
AST SERPL-CCNC: 15 U/L — SIGNIFICANT CHANGE UP (ref 15–37)
AST SERPL-CCNC: 15 U/L — SIGNIFICANT CHANGE UP (ref 15–37)
BILIRUB SERPL-MCNC: 0.5 MG/DL — SIGNIFICANT CHANGE UP (ref 0.2–1.2)
BILIRUB SERPL-MCNC: 0.5 MG/DL — SIGNIFICANT CHANGE UP (ref 0.2–1.2)
BUN SERPL-MCNC: 29 MG/DL — HIGH (ref 7–23)
BUN SERPL-MCNC: 29 MG/DL — HIGH (ref 7–23)
CALCIUM SERPL-MCNC: 7.4 MG/DL — LOW (ref 8.5–10.1)
CALCIUM SERPL-MCNC: 7.4 MG/DL — LOW (ref 8.5–10.1)
CHLORIDE SERPL-SCNC: 106 MMOL/L — SIGNIFICANT CHANGE UP (ref 96–108)
CHLORIDE SERPL-SCNC: 106 MMOL/L — SIGNIFICANT CHANGE UP (ref 96–108)
CO2 SERPL-SCNC: 24 MMOL/L — SIGNIFICANT CHANGE UP (ref 22–31)
CO2 SERPL-SCNC: 24 MMOL/L — SIGNIFICANT CHANGE UP (ref 22–31)
CREAT SERPL-MCNC: 0.95 MG/DL — SIGNIFICANT CHANGE UP (ref 0.5–1.3)
CREAT SERPL-MCNC: 0.95 MG/DL — SIGNIFICANT CHANGE UP (ref 0.5–1.3)
EGFR: 92 ML/MIN/1.73M2 — SIGNIFICANT CHANGE UP
EGFR: 92 ML/MIN/1.73M2 — SIGNIFICANT CHANGE UP
GLUCOSE BLDC GLUCOMTR-MCNC: 141 MG/DL — HIGH (ref 70–99)
GLUCOSE BLDC GLUCOMTR-MCNC: 141 MG/DL — HIGH (ref 70–99)
GLUCOSE BLDC GLUCOMTR-MCNC: 163 MG/DL — HIGH (ref 70–99)
GLUCOSE BLDC GLUCOMTR-MCNC: 163 MG/DL — HIGH (ref 70–99)
GLUCOSE BLDC GLUCOMTR-MCNC: 192 MG/DL — HIGH (ref 70–99)
GLUCOSE BLDC GLUCOMTR-MCNC: 192 MG/DL — HIGH (ref 70–99)
GLUCOSE BLDC GLUCOMTR-MCNC: 208 MG/DL — HIGH (ref 70–99)
GLUCOSE BLDC GLUCOMTR-MCNC: 208 MG/DL — HIGH (ref 70–99)
GLUCOSE SERPL-MCNC: 147 MG/DL — HIGH (ref 70–99)
GLUCOSE SERPL-MCNC: 147 MG/DL — HIGH (ref 70–99)
HCT VFR BLD CALC: 17.9 % — CRITICAL LOW (ref 39–50)
HCT VFR BLD CALC: 17.9 % — CRITICAL LOW (ref 39–50)
HCT VFR BLD CALC: 18.7 % — CRITICAL LOW (ref 39–50)
HCT VFR BLD CALC: 18.7 % — CRITICAL LOW (ref 39–50)
HGB BLD-MCNC: 6.2 G/DL — CRITICAL LOW (ref 13–17)
MAGNESIUM SERPL-MCNC: 2.1 MG/DL — SIGNIFICANT CHANGE UP (ref 1.6–2.6)
MAGNESIUM SERPL-MCNC: 2.1 MG/DL — SIGNIFICANT CHANGE UP (ref 1.6–2.6)
MCHC RBC-ENTMCNC: 29.4 PG — SIGNIFICANT CHANGE UP (ref 27–34)
MCHC RBC-ENTMCNC: 29.4 PG — SIGNIFICANT CHANGE UP (ref 27–34)
MCHC RBC-ENTMCNC: 30.5 PG — SIGNIFICANT CHANGE UP (ref 27–34)
MCHC RBC-ENTMCNC: 30.5 PG — SIGNIFICANT CHANGE UP (ref 27–34)
MCHC RBC-ENTMCNC: 33.2 G/DL — SIGNIFICANT CHANGE UP (ref 32–36)
MCHC RBC-ENTMCNC: 33.2 G/DL — SIGNIFICANT CHANGE UP (ref 32–36)
MCHC RBC-ENTMCNC: 34.6 G/DL — SIGNIFICANT CHANGE UP (ref 32–36)
MCHC RBC-ENTMCNC: 34.6 G/DL — SIGNIFICANT CHANGE UP (ref 32–36)
MCV RBC AUTO: 88.2 FL — SIGNIFICANT CHANGE UP (ref 80–100)
MCV RBC AUTO: 88.2 FL — SIGNIFICANT CHANGE UP (ref 80–100)
MCV RBC AUTO: 88.6 FL — SIGNIFICANT CHANGE UP (ref 80–100)
MCV RBC AUTO: 88.6 FL — SIGNIFICANT CHANGE UP (ref 80–100)
NRBC # BLD: 0 /100 WBCS — SIGNIFICANT CHANGE UP (ref 0–0)
PLATELET # BLD AUTO: 236 K/UL — SIGNIFICANT CHANGE UP (ref 150–400)
PLATELET # BLD AUTO: 236 K/UL — SIGNIFICANT CHANGE UP (ref 150–400)
PLATELET # BLD AUTO: 242 K/UL — SIGNIFICANT CHANGE UP (ref 150–400)
PLATELET # BLD AUTO: 242 K/UL — SIGNIFICANT CHANGE UP (ref 150–400)
POTASSIUM SERPL-MCNC: 3.2 MMOL/L — LOW (ref 3.5–5.3)
POTASSIUM SERPL-MCNC: 3.2 MMOL/L — LOW (ref 3.5–5.3)
POTASSIUM SERPL-SCNC: 3.2 MMOL/L — LOW (ref 3.5–5.3)
POTASSIUM SERPL-SCNC: 3.2 MMOL/L — LOW (ref 3.5–5.3)
PROT SERPL-MCNC: 4.2 GM/DL — LOW (ref 6–8.3)
PROT SERPL-MCNC: 4.2 GM/DL — LOW (ref 6–8.3)
RBC # BLD: 2.03 M/UL — LOW (ref 4.2–5.8)
RBC # BLD: 2.03 M/UL — LOW (ref 4.2–5.8)
RBC # BLD: 2.11 M/UL — LOW (ref 4.2–5.8)
RBC # BLD: 2.11 M/UL — LOW (ref 4.2–5.8)
RBC # FLD: 15.6 % — HIGH (ref 10.3–14.5)
RBC # FLD: 15.6 % — HIGH (ref 10.3–14.5)
RBC # FLD: 15.7 % — HIGH (ref 10.3–14.5)
RBC # FLD: 15.7 % — HIGH (ref 10.3–14.5)
SODIUM SERPL-SCNC: 137 MMOL/L — SIGNIFICANT CHANGE UP (ref 135–145)
SODIUM SERPL-SCNC: 137 MMOL/L — SIGNIFICANT CHANGE UP (ref 135–145)
WBC # BLD: 8.28 K/UL — SIGNIFICANT CHANGE UP (ref 3.8–10.5)
WBC # BLD: 8.28 K/UL — SIGNIFICANT CHANGE UP (ref 3.8–10.5)
WBC # BLD: 8.62 K/UL — SIGNIFICANT CHANGE UP (ref 3.8–10.5)
WBC # BLD: 8.62 K/UL — SIGNIFICANT CHANGE UP (ref 3.8–10.5)
WBC # FLD AUTO: 8.28 K/UL — SIGNIFICANT CHANGE UP (ref 3.8–10.5)
WBC # FLD AUTO: 8.28 K/UL — SIGNIFICANT CHANGE UP (ref 3.8–10.5)
WBC # FLD AUTO: 8.62 K/UL — SIGNIFICANT CHANGE UP (ref 3.8–10.5)
WBC # FLD AUTO: 8.62 K/UL — SIGNIFICANT CHANGE UP (ref 3.8–10.5)

## 2024-01-03 PROCEDURE — 99232 SBSQ HOSP IP/OBS MODERATE 35: CPT

## 2024-01-03 RX ORDER — SODIUM CHLORIDE 9 MG/ML
1000 INJECTION, SOLUTION INTRAVENOUS
Refills: 0 | Status: DISCONTINUED | OUTPATIENT
Start: 2024-01-03 | End: 2024-01-10

## 2024-01-03 RX ORDER — SOD SULF/SODIUM/NAHCO3/KCL/PEG
2000 SOLUTION, RECONSTITUTED, ORAL ORAL ONCE
Refills: 0 | Status: COMPLETED | OUTPATIENT
Start: 2024-01-04 | End: 2024-01-04

## 2024-01-03 RX ORDER — POTASSIUM CHLORIDE 20 MEQ
40 PACKET (EA) ORAL ONCE
Refills: 0 | Status: COMPLETED | OUTPATIENT
Start: 2024-01-03 | End: 2024-01-03

## 2024-01-03 RX ORDER — SOD SULF/SODIUM/NAHCO3/KCL/PEG
4000 SOLUTION, RECONSTITUTED, ORAL ORAL ONCE
Refills: 0 | Status: COMPLETED | OUTPATIENT
Start: 2024-01-03 | End: 2024-01-03

## 2024-01-03 RX ADMIN — Medication 1: at 17:36

## 2024-01-03 RX ADMIN — Medication 20 MILLIGRAM(S): at 17:38

## 2024-01-03 RX ADMIN — SODIUM CHLORIDE 60 MILLILITER(S): 9 INJECTION, SOLUTION INTRAVENOUS at 16:49

## 2024-01-03 RX ADMIN — Medication 4000 MILLILITER(S): at 20:57

## 2024-01-03 RX ADMIN — TAMSULOSIN HYDROCHLORIDE 0.4 MILLIGRAM(S): 0.4 CAPSULE ORAL at 21:08

## 2024-01-03 RX ADMIN — AMLODIPINE BESYLATE 5 MILLIGRAM(S): 2.5 TABLET ORAL at 06:16

## 2024-01-03 RX ADMIN — Medication 2: at 11:57

## 2024-01-03 RX ADMIN — Medication 40 MILLIEQUIVALENT(S): at 08:47

## 2024-01-03 RX ADMIN — PANTOPRAZOLE SODIUM 40 MILLIGRAM(S): 20 TABLET, DELAYED RELEASE ORAL at 06:17

## 2024-01-03 RX ADMIN — ATORVASTATIN CALCIUM 20 MILLIGRAM(S): 80 TABLET, FILM COATED ORAL at 21:08

## 2024-01-03 RX ADMIN — INSULIN GLARGINE 15 UNIT(S): 100 INJECTION, SOLUTION SUBCUTANEOUS at 08:03

## 2024-01-03 RX ADMIN — PANTOPRAZOLE SODIUM 40 MILLIGRAM(S): 20 TABLET, DELAYED RELEASE ORAL at 17:35

## 2024-01-03 NOTE — DIETITIAN INITIAL EVALUATION ADULT - NSICDXPASTMEDICALHX_GEN_ALL_CORE_FT
PAST MEDICAL HISTORY:  BPH (benign prostatic hyperplasia)     HLD (hyperlipidemia)     HTN (hypertension)     Insulin dependent type 2 diabetes mellitus

## 2024-01-03 NOTE — DIETITIAN INITIAL EVALUATION ADULT - NAME AND PHONE
Lexi Mooney RD, CDN, [facility] [place]  Lexi Mooney RD, CDN, [facility]

## 2024-01-03 NOTE — DIETITIAN INITIAL EVALUATION ADULT - PERTINENT LABORATORY DATA
01-03    137  |  106  |  29<H>  ----------------------------<  147<H>  3.2<L>   |  24  |  0.95    Ca    7.4<L>      03 Jan 2024 05:25  Mg     2.1     01-03    TPro  4.2<L>  /  Alb  1.9<L>  /  TBili  0.5  /  DBili  x   /  AST  15  /  ALT  12  /  AlkPhos  26<L>  01-03  POCT Blood Glucose.: 141 mg/dL (01-03-24 @ 07:36)  A1C with Estimated Average Glucose Result: 12.8 % <H>/estimated average Glucose 321 mg/dL<H> (01-01-24 @ 06:00)

## 2024-01-03 NOTE — DIETITIAN INITIAL EVALUATION ADULT - PERTINENT MEDS FT
MEDICATIONS  (STANDING):  amLODIPine   Tablet 5 milliGRAM(s) Oral daily  atorvastatin 20 milliGRAM(s) Oral at bedtime  bisacodyl 20 milliGRAM(s) Oral once  dextrose 5%. 1000 milliLiter(s) (50 mL/Hr) IV Continuous <Continuous>  dextrose 5%. 1000 milliLiter(s) (100 mL/Hr) IV Continuous <Continuous>  dextrose 50% Injectable 25 Gram(s) IV Push once  dextrose 50% Injectable 25 Gram(s) IV Push once  dextrose 50% Injectable 12.5 Gram(s) IV Push once  glucagon  Injectable 1 milliGRAM(s) IntraMuscular once  insulin glargine Injectable (LANTUS) 15 Unit(s) SubCutaneous at bedtime  insulin glargine Injectable (LANTUS) 15 Unit(s) SubCutaneous every morning  insulin lispro (ADMELOG) corrective regimen sliding scale   SubCutaneous three times a day before meals  pantoprazole  Injectable 40 milliGRAM(s) IV Push every 12 hours  polyethylene glycol/electrolyte Solution. 4000 milliLiter(s) Oral once  tamsulosin 0.4 milliGRAM(s) Oral at bedtime    MEDICATIONS  (PRN):  acetaminophen     Tablet .. 650 milliGRAM(s) Oral every 6 hours PRN Temp greater or equal to 38C (100.4F), Mild Pain (1 - 3)  aluminum hydroxide/magnesium hydroxide/simethicone Suspension 30 milliLiter(s) Oral every 4 hours PRN Dyspepsia  dextrose Oral Gel 15 Gram(s) Oral once PRN Blood Glucose LESS THAN 70 milliGRAM(s)/deciliter  melatonin 3 milliGRAM(s) Oral at bedtime PRN Insomnia  ondansetron Injectable 4 milliGRAM(s) IV Push every 8 hours PRN Nausea and/or Vomiting

## 2024-01-03 NOTE — PROGRESS NOTE ADULT - ASSESSMENT
58 year old male with PMHx of HTN, HLD, IDDM2, and BPH who presented to the ED on 1/1/24 for complaints of dizziness and fatigue and admitted for symptomatic anemia and hyperglycemia.      Acute blood loss anemia d/t upper GI bleed   -Hgb 4.7 on admission, baseline ~11.5  -s/p 4 units PRBC 1/1   -Monitor CBC  -c/w IV PPI   -seen by GI, plan for EGD today   -currently NPO, for colonoscopy in AM.     IDDM2 with hyperglycemia secondary to medication noncompliance  Admits to not taking insulin as prescribed  Hold metformin and jardiance   Hgb A1c 12.8   c/w ISS  Continue Lantus 15 units BID, hold while NPO  Monitor fingersticks     NATALIA likely prerenal   BUN 63 and Cr 1.42 on admission, baseline Cr ~0.8  S/p 1L NS bolus in the ED  S/p PRBC as above   Avoid nephrotoxins, renally dose meds  Monitor renal function    HTN  c/w amlodipine 5 mg   monitor BP    Discussed with GI, for colonoscopy in AM.  58 year old male with PMHx of HTN, HLD, IDDM2, and BPH who presented to the ED on 1/1/24 for complaints of dizziness and fatigue and admitted for symptomatic anemia and hyperglycemia.      Acute blood loss anemia d/t upper GI bleed   -Hgb 4.7 on admission, baseline ~11.5  -s/p 4 units PRBC 1/1   -Monitor CBC  -c/w IV PPI 40 mg bid  -seen by GI, plan for colonoscopy in AM.     IDDM2 with hyperglycemia secondary to medication noncompliance  Admits to not taking insulin as prescribed  Hold metformin and jardiance   Hgb A1c 12.8   c/w ISS  Continue Lantus 15 units BID, hold while NPO  Monitor fingersticks     NATALIA likely prerenal   BUN 63 and Cr 1.42 on admission, baseline Cr ~0.8  S/p 1L NS bolus in the ED  S/p PRBC as above   Avoid nephrotoxins, renally dose meds  Monitor renal function    HTN  c/w amlodipine 5 mg   monitor BP    Discussed with GI, for colonoscopy in AM. Prep done today.

## 2024-01-03 NOTE — PROVIDER CONTACT NOTE (CRITICAL VALUE NOTIFICATION) - TEST AND RESULT REPORTED:
Hemoglobin 6.8 Hemotocrit 20.4
Hgb 6.2 Hct 18.7
hemoglobin - 4.7, hematocrit -13.9
Hgb 6.2 and Hct 17.9

## 2024-01-03 NOTE — DIETITIAN INITIAL EVALUATION ADULT - ORAL INTAKE PTA/DIET HISTORY
Pt reports good appetite PTA, his sister did the shopping/cooking.  Diet PTA: lack of compliance c consistent CHO intake as per diet hx, skips breakfast @ times.  Pt without report of altered chew/swallow/food allergies/intolerances.

## 2024-01-03 NOTE — DIETITIAN INITIAL EVALUATION ADULT - OTHER INFO
Pt without report of wt. changes, was on metformin, empagliflozin, Lantus PTA.  Pt was SMBG 2 x day,  elevated glucose PTA & A1C% in the 10's.  Pt s/p EGD 01/02; findings of nodule on gastric side of the GEJ, possible healing Kavita Valero tear noted.  Pt is scheduled for colonoscopy.  Pt provided c education for consistent CHO intake & blood glucose goals.

## 2024-01-03 NOTE — PROGRESS NOTE ADULT - SUBJECTIVE AND OBJECTIVE BOX
INTERVAL HPI/OVERNIGHT EVENTS:  Pt seen and examined at bedside.     Allergies/Intolerance: No Known Allergies      MEDICATIONS  (STANDING):  amLODIPine   Tablet 5 milliGRAM(s) Oral daily  atorvastatin 20 milliGRAM(s) Oral at bedtime  bisacodyl 20 milliGRAM(s) Oral once  dextrose 5%. 1000 milliLiter(s) (100 mL/Hr) IV Continuous <Continuous>  dextrose 5%. 1000 milliLiter(s) (50 mL/Hr) IV Continuous <Continuous>  dextrose 50% Injectable 25 Gram(s) IV Push once  dextrose 50% Injectable 25 Gram(s) IV Push once  dextrose 50% Injectable 12.5 Gram(s) IV Push once  glucagon  Injectable 1 milliGRAM(s) IntraMuscular once  insulin lispro (ADMELOG) corrective regimen sliding scale   SubCutaneous three times a day before meals  pantoprazole  Injectable 40 milliGRAM(s) IV Push every 12 hours  polyethylene glycol/electrolyte Solution. 4000 milliLiter(s) Oral once  tamsulosin 0.4 milliGRAM(s) Oral at bedtime    MEDICATIONS  (PRN):  acetaminophen     Tablet .. 650 milliGRAM(s) Oral every 6 hours PRN Temp greater or equal to 38C (100.4F), Mild Pain (1 - 3)  dextrose Oral Gel 15 Gram(s) Oral once PRN Blood Glucose LESS THAN 70 milliGRAM(s)/deciliter  melatonin 3 milliGRAM(s) Oral at bedtime PRN Insomnia        ROS: all systems reviewed and wnl      PHYSICAL EXAMINATION:  Vital Signs Last 24 Hrs  T(C): 37.1 (03 Jan 2024 11:23), Max: 37.1 (03 Jan 2024 11:23)  T(F): 98.8 (03 Jan 2024 11:23), Max: 98.8 (03 Jan 2024 11:23)  HR: 94 (03 Jan 2024 11:23) (94 - 108)  BP: 123/69 (03 Jan 2024 11:23) (110/60 - 146/73)  BP(mean): --  RR: 18 (03 Jan 2024 11:23) (16 - 22)  SpO2: 96% (03 Jan 2024 11:23) (94% - 100%)    Parameters below as of 03 Jan 2024 11:03  Patient On (Oxygen Delivery Method): room air      CAPILLARY BLOOD GLUCOSE      POCT Blood Glucose.: 208 mg/dL (03 Jan 2024 11:40)  POCT Blood Glucose.: 141 mg/dL (03 Jan 2024 07:36)  POCT Blood Glucose.: 345 mg/dL (02 Jan 2024 22:28)  POCT Blood Glucose.: 158 mg/dL (02 Jan 2024 16:44)        GENERAL:   NECK: supple, No JVD  CHEST/LUNG: clear to auscultation bilaterally; no rales, rhonchi, or wheezing b/l  HEART: normal S1, S2  ABDOMEN: BS+, soft, ND, NT   EXTREMITIES:  pulses palpable; no clubbing, cyanosis, or edema b/l LEs  SKIN: no rashes or lesions      LABS:                        6.2    8.62  )-----------( 236      ( 03 Jan 2024 09:54 )             18.7     01-03    137  |  106  |  29<H>  ----------------------------<  147<H>  3.2<L>   |  24  |  0.95    Ca    7.4<L>      03 Jan 2024 05:25  Mg     2.1     01-03    TPro  4.2<L>  /  Alb  1.9<L>  /  TBili  0.5  /  DBili  x   /  AST  15  /  ALT  12  /  AlkPhos  26<L>  01-03      Urinalysis Basic - ( 03 Jan 2024 05:25 )    Color: x / Appearance: x / SG: x / pH: x  Gluc: 147 mg/dL / Ketone: x  / Bili: x / Urobili: x   Blood: x / Protein: x / Nitrite: x   Leuk Esterase: x / RBC: x / WBC x   Sq Epi: x / Non Sq Epi: x / Bacteria: x             INTERVAL HPI/OVERNIGHT EVENTS:  Pt seen and examined at bedside.     Allergies/Intolerance: No Known Allergies      MEDICATIONS  (STANDING):  amLODIPine   Tablet 5 milliGRAM(s) Oral daily  atorvastatin 20 milliGRAM(s) Oral at bedtime  bisacodyl 20 milliGRAM(s) Oral once  dextrose 5%. 1000 milliLiter(s) (100 mL/Hr) IV Continuous <Continuous>  dextrose 5%. 1000 milliLiter(s) (50 mL/Hr) IV Continuous <Continuous>  dextrose 50% Injectable 25 Gram(s) IV Push once  dextrose 50% Injectable 25 Gram(s) IV Push once  dextrose 50% Injectable 12.5 Gram(s) IV Push once  glucagon  Injectable 1 milliGRAM(s) IntraMuscular once  insulin lispro (ADMELOG) corrective regimen sliding scale   SubCutaneous three times a day before meals  pantoprazole  Injectable 40 milliGRAM(s) IV Push every 12 hours  polyethylene glycol/electrolyte Solution. 4000 milliLiter(s) Oral once  tamsulosin 0.4 milliGRAM(s) Oral at bedtime    MEDICATIONS  (PRN):  acetaminophen     Tablet .. 650 milliGRAM(s) Oral every 6 hours PRN Temp greater or equal to 38C (100.4F), Mild Pain (1 - 3)  dextrose Oral Gel 15 Gram(s) Oral once PRN Blood Glucose LESS THAN 70 milliGRAM(s)/deciliter  melatonin 3 milliGRAM(s) Oral at bedtime PRN Insomnia        ROS: all systems reviewed and wnl      PHYSICAL EXAMINATION:  Vital Signs Last 24 Hrs  T(C): 37.1 (03 Jan 2024 11:23), Max: 37.1 (03 Jan 2024 11:23)  T(F): 98.8 (03 Jan 2024 11:23), Max: 98.8 (03 Jan 2024 11:23)  HR: 94 (03 Jan 2024 11:23) (94 - 108)  BP: 123/69 (03 Jan 2024 11:23) (110/60 - 146/73)  BP(mean): --  RR: 18 (03 Jan 2024 11:23) (16 - 22)  SpO2: 96% (03 Jan 2024 11:23) (94% - 100%)    Parameters below as of 03 Jan 2024 11:03  Patient On (Oxygen Delivery Method): room air      CAPILLARY BLOOD GLUCOSE      POCT Blood Glucose.: 208 mg/dL (03 Jan 2024 11:40)  POCT Blood Glucose.: 141 mg/dL (03 Jan 2024 07:36)  POCT Blood Glucose.: 345 mg/dL (02 Jan 2024 22:28)  POCT Blood Glucose.: 158 mg/dL (02 Jan 2024 16:44)        GENERAL: stable, no melena or abdominal pain.   NECK: supple, No JVD  CHEST/LUNG: clear to auscultation bilaterally; no rales, rhonchi, or wheezing b/l  HEART: normal S1, S2  ABDOMEN: BS+, soft, ND, NT   EXTREMITIES:  pulses palpable; no clubbing, cyanosis, or edema b/l LEs      LABS:                        6.2    8.62  )-----------( 236      ( 03 Jan 2024 09:54 )             18.7     01-03    137  |  106  |  29<H>  ----------------------------<  147<H>  3.2<L>   |  24  |  0.95    Ca    7.4<L>      03 Jan 2024 05:25  Mg     2.1     01-03    TPro  4.2<L>  /  Alb  1.9<L>  /  TBili  0.5  /  DBili  x   /  AST  15  /  ALT  12  /  AlkPhos  26<L>  01-03      Urinalysis Basic - ( 03 Jan 2024 05:25 )    Color: x / Appearance: x / SG: x / pH: x  Gluc: 147 mg/dL / Ketone: x  / Bili: x / Urobili: x   Blood: x / Protein: x / Nitrite: x   Leuk Esterase: x / RBC: x / WBC x   Sq Epi: x / Non Sq Epi: x / Bacteria: x

## 2024-01-04 ENCOUNTER — RESULT REVIEW (OUTPATIENT)
Age: 59
End: 2024-01-04

## 2024-01-04 LAB
BLD GP AB SCN SERPL QL: SIGNIFICANT CHANGE UP
BLD GP AB SCN SERPL QL: SIGNIFICANT CHANGE UP
GLUCOSE BLDC GLUCOMTR-MCNC: 100 MG/DL — HIGH (ref 70–99)
GLUCOSE BLDC GLUCOMTR-MCNC: 100 MG/DL — HIGH (ref 70–99)
GLUCOSE BLDC GLUCOMTR-MCNC: 136 MG/DL — HIGH (ref 70–99)
GLUCOSE BLDC GLUCOMTR-MCNC: 136 MG/DL — HIGH (ref 70–99)
GLUCOSE BLDC GLUCOMTR-MCNC: 187 MG/DL — HIGH (ref 70–99)
GLUCOSE BLDC GLUCOMTR-MCNC: 187 MG/DL — HIGH (ref 70–99)
GLUCOSE BLDC GLUCOMTR-MCNC: 308 MG/DL — HIGH (ref 70–99)
GLUCOSE BLDC GLUCOMTR-MCNC: 308 MG/DL — HIGH (ref 70–99)
HCT VFR BLD CALC: 24.5 % — LOW (ref 39–50)
HCT VFR BLD CALC: 24.5 % — LOW (ref 39–50)
HGB BLD-MCNC: 8.2 G/DL — LOW (ref 13–17)
HGB BLD-MCNC: 8.2 G/DL — LOW (ref 13–17)
MCHC RBC-ENTMCNC: 29.5 PG — SIGNIFICANT CHANGE UP (ref 27–34)
MCHC RBC-ENTMCNC: 29.5 PG — SIGNIFICANT CHANGE UP (ref 27–34)
MCHC RBC-ENTMCNC: 33.5 G/DL — SIGNIFICANT CHANGE UP (ref 32–36)
MCHC RBC-ENTMCNC: 33.5 G/DL — SIGNIFICANT CHANGE UP (ref 32–36)
MCV RBC AUTO: 88.1 FL — SIGNIFICANT CHANGE UP (ref 80–100)
MCV RBC AUTO: 88.1 FL — SIGNIFICANT CHANGE UP (ref 80–100)
NRBC # BLD: 0 /100 WBCS — SIGNIFICANT CHANGE UP (ref 0–0)
NRBC # BLD: 0 /100 WBCS — SIGNIFICANT CHANGE UP (ref 0–0)
PLATELET # BLD AUTO: 279 K/UL — SIGNIFICANT CHANGE UP (ref 150–400)
PLATELET # BLD AUTO: 279 K/UL — SIGNIFICANT CHANGE UP (ref 150–400)
RBC # BLD: 2.78 M/UL — LOW (ref 4.2–5.8)
RBC # BLD: 2.78 M/UL — LOW (ref 4.2–5.8)
RBC # FLD: 14.9 % — HIGH (ref 10.3–14.5)
RBC # FLD: 14.9 % — HIGH (ref 10.3–14.5)
SURGICAL PATHOLOGY STUDY: SIGNIFICANT CHANGE UP
SURGICAL PATHOLOGY STUDY: SIGNIFICANT CHANGE UP
WBC # BLD: 8.67 K/UL — SIGNIFICANT CHANGE UP (ref 3.8–10.5)
WBC # BLD: 8.67 K/UL — SIGNIFICANT CHANGE UP (ref 3.8–10.5)
WBC # FLD AUTO: 8.67 K/UL — SIGNIFICANT CHANGE UP (ref 3.8–10.5)
WBC # FLD AUTO: 8.67 K/UL — SIGNIFICANT CHANGE UP (ref 3.8–10.5)

## 2024-01-04 PROCEDURE — 99233 SBSQ HOSP IP/OBS HIGH 50: CPT

## 2024-01-04 PROCEDURE — 88305 TISSUE EXAM BY PATHOLOGIST: CPT | Mod: 26

## 2024-01-04 PROCEDURE — 45380 COLONOSCOPY AND BIOPSY: CPT

## 2024-01-04 PROCEDURE — 99232 SBSQ HOSP IP/OBS MODERATE 35: CPT | Mod: 25

## 2024-01-04 RX ORDER — ONDANSETRON 8 MG/1
4 TABLET, FILM COATED ORAL ONCE
Refills: 0 | Status: DISCONTINUED | OUTPATIENT
Start: 2024-01-04 | End: 2024-01-04

## 2024-01-04 RX ORDER — INSULIN GLARGINE 100 [IU]/ML
20 INJECTION, SOLUTION SUBCUTANEOUS AT BEDTIME
Refills: 0 | Status: DISCONTINUED | OUTPATIENT
Start: 2024-01-04 | End: 2024-01-10

## 2024-01-04 RX ORDER — SODIUM CHLORIDE 9 MG/ML
1000 INJECTION, SOLUTION INTRAVENOUS
Refills: 0 | Status: DISCONTINUED | OUTPATIENT
Start: 2024-01-04 | End: 2024-01-04

## 2024-01-04 RX ORDER — IOHEXOL 300 MG/ML
30 INJECTION, SOLUTION INTRAVENOUS ONCE
Refills: 0 | Status: COMPLETED | OUTPATIENT
Start: 2024-01-04 | End: 2024-01-05

## 2024-01-04 RX ADMIN — PANTOPRAZOLE SODIUM 40 MILLIGRAM(S): 20 TABLET, DELAYED RELEASE ORAL at 05:57

## 2024-01-04 RX ADMIN — SODIUM CHLORIDE 50 MILLILITER(S): 9 INJECTION, SOLUTION INTRAVENOUS at 11:43

## 2024-01-04 RX ADMIN — ATORVASTATIN CALCIUM 20 MILLIGRAM(S): 80 TABLET, FILM COATED ORAL at 21:21

## 2024-01-04 RX ADMIN — TAMSULOSIN HYDROCHLORIDE 0.4 MILLIGRAM(S): 0.4 CAPSULE ORAL at 21:21

## 2024-01-04 RX ADMIN — Medication 2000 MILLILITER(S): at 05:59

## 2024-01-04 RX ADMIN — Medication 1: at 17:08

## 2024-01-04 RX ADMIN — PANTOPRAZOLE SODIUM 40 MILLIGRAM(S): 20 TABLET, DELAYED RELEASE ORAL at 17:09

## 2024-01-04 RX ADMIN — INSULIN GLARGINE 20 UNIT(S): 100 INJECTION, SOLUTION SUBCUTANEOUS at 21:53

## 2024-01-04 NOTE — BRIEF OPERATIVE NOTE - OPERATION/FINDINGS
Colon report - see photos in paper chart  Indication - JEB  Exam to TI  Prep good  Withdrawal 9 minutes  Findings:   - 3mm TC polyp removed with jumbo forceps  - few right sided diverticula  - small internal hemorrhoids  - otherwise normal colonoscopy and ileoscopy    Recommend:  - resume diet  - once daily PPI  - discharge on PO Fe  - await biopsies
EGD report - see photos in paper chart  Indication - dark stools, anemia  Exam to d2  Esophagus - normal  Stomach - nodule on gastric side of the GEJ with some old heme in stomach. Possible healing Kavita Valero tear.   Duodenum - normal to d2.     Recommend:   - resume diet  - once daily PPI PO  - await pathology  - will discuss with pt re inpt vs outpatient colonsocopy

## 2024-01-04 NOTE — PROGRESS NOTE ADULT - SUBJECTIVE AND OBJECTIVE BOX
Discussed path of EGD with patient - adenocarcinoma of gastric side of GEJ    Will obtain CT C/A/P with PO and IV contrast for staging. Pending scans, may need transfer for EUS and surg onc

## 2024-01-04 NOTE — PRE-OP CHECKLIST - HAND OFF
Lab orders placed. Please advise patient to complete lab work prior to appointment so that results can be discussed at the time of visit.    
Patient is scheduled for a complete physical on 3/4/2022.  Patient was advised to come in for fasting lab work 3-7 days prior to the physical.  Lab visit has been scheduled.  Please place lab order.             
Unit RN to OR RN
Unit RN to OR RN

## 2024-01-04 NOTE — PRE PROCEDURE NOTE - PRE PROCEDURE EVALUATION
58 year old male with PMHx of HTN, HLD, IDDM2, and BPH with sx anemia and dark stools. Plan for EGD. Labs reviewed.     NAD  RRR  CTAB  soft NT ND  WWP no edema  AAOx3  
58 year old male with PMHx of HTN, HLD, IDDM2, and BPH with sx anemia and dark stools. EGD negative. Plan for colonoscopy. Labs reviewed.     NAD  RRR  CTAB  soft NT ND  WWP no edema  AAOx3

## 2024-01-04 NOTE — PROGRESS NOTE ADULT - SUBJECTIVE AND OBJECTIVE BOX
Patient is a 58y old  Male who presents with a chief complaint of Symptomatic anemia, hyperglycemia (2024 11:54)    INTERVAL HPI/OVERNIGHT EVENTS: Seen after colonoscopy this afternoon. HD stable feeling well.     MEDICATIONS  (STANDING):  amLODIPine   Tablet 5 milliGRAM(s) Oral daily  atorvastatin 20 milliGRAM(s) Oral at bedtime  dextrose 5%. 1000 milliLiter(s) (100 mL/Hr) IV Continuous <Continuous>  dextrose 5%. 1000 milliLiter(s) (50 mL/Hr) IV Continuous <Continuous>  dextrose 50% Injectable 25 Gram(s) IV Push once  dextrose 50% Injectable 25 Gram(s) IV Push once  dextrose 50% Injectable 12.5 Gram(s) IV Push once  glucagon  Injectable 1 milliGRAM(s) IntraMuscular once  insulin lispro (ADMELOG) corrective regimen sliding scale   SubCutaneous three times a day before meals  iohexol 300 mG (iodine)/mL Oral Solution 30 milliLiter(s) Oral once  lactated ringers. 1000 milliLiter(s) (60 mL/Hr) IV Continuous <Continuous>  pantoprazole  Injectable 40 milliGRAM(s) IV Push every 12 hours  tamsulosin 0.4 milliGRAM(s) Oral at bedtime    MEDICATIONS  (PRN):  acetaminophen     Tablet .. 650 milliGRAM(s) Oral every 6 hours PRN Temp greater or equal to 38C (100.4F), Mild Pain (1 - 3)  dextrose Oral Gel 15 Gram(s) Oral once PRN Blood Glucose LESS THAN 70 milliGRAM(s)/deciliter  melatonin 3 milliGRAM(s) Oral at bedtime PRN Insomnia    Allergies    No Known Allergies    Intolerances      REVIEW OF SYSTEMS:  All other systems reviewed and are negative    Vital Signs Last 24 Hrs  T(C): 36.7 (2024 13:00), Max: 37.1 (2024 16:00)  T(F): 98.1 (2024 13:00), Max: 98.7 (2024 16:00)  HR: 93 (2024 13:00) (86 - 99)  BP: 146/75 (2024 13:00) (114/71 - 146/75)  BP(mean): --  RR: 17 (2024 13:00) (16 - 22)  SpO2: 97% (2024 13:00) (95% - 99%)    Parameters below as of 2024 13:00  Patient On (Oxygen Delivery Method): room air      Daily Height in cm: 162.6 (2024 08:40)    Daily Weight in k.2 (2024 05:08)  I&O's Summary    CAPILLARY BLOOD GLUCOSE      POCT Blood Glucose.: 100 mg/dL (2024 12:11)  POCT Blood Glucose.: 136 mg/dL (2024 08:27)  POCT Blood Glucose.: 163 mg/dL (2024 20:57)  POCT Blood Glucose.: 192 mg/dL (2024 17:05)    PHYSICAL EXAM:  GENERAL: stable, no melena or abdominal pain.   NECK: supple, No JVD  CHEST/LUNG: clear to auscultation bilaterally; no rales, rhonchi, or wheezing b/l  HEART: normal S1, S2  ABDOMEN: BS+, soft, ND, NT   EXTREMITIES:  pulses palpable; no clubbing, cyanosis, or edema b/l LEs      Labs                          8.2    8.67  )-----------( 279      ( 2024 07:35 )             24.5     01-03    137  |  106  |  29<H>  ----------------------------<  147<H>  3.2<L>   |  24  |  0.95    Ca    7.4<L>      2024 05:25  Mg     2.1     01-03    TPro  4.2<L>  /  Alb  1.9<L>  /  TBili  0.5  /  DBili  x   /  AST  15  /  ALT  12  /  AlkPhos  26<L>  01-03          Urinalysis Basic - ( 2024 05:25 )    Color: x / Appearance: x / SG: x / pH: x  Gluc: 147 mg/dL / Ketone: x  / Bili: x / Urobili: x   Blood: x / Protein: x / Nitrite: x   Leuk Esterase: x / RBC: x / WBC x   Sq Epi: x / Non Sq Epi: x / Bacteria: x                  DVT prophylaxis: > Lovenox 40mg SQ daily  > Heparin   > SCD's

## 2024-01-04 NOTE — PROGRESS NOTE ADULT - ASSESSMENT
58 year old male with PMHx of HTN, HLD, IDDM2, and BPH who presented to the ED on 1/1/24 for complaints of dizziness and fatigue and admitted for symptomatic anemia and hyperglycemia.    #Acute blood loss anemia d/t upper GI bleed   -Hgb 4.7 on admission, baseline ~11.5 s/p 4 units PRBC 1/1. Hgb stable at  8.2 today.  -c/w IV PPI 40 mg bid  - EGD on 01/02: nodule on gastric side of the GEJ with some old heme in stomach. Possible healing Kavita Valero tear.   - Colonoscopy on 01/04:  right sided diverticula, small internal hemorrhoids  - Pathology from EGD concerning for adenocarcinoma. Will get CT CAP with oral and IV contrast     #IDDM2 with hyperglycemia secondary to medication noncompliance  - A1c 12.8. Admits to not taking insulin as prescribed. Hold metformin and jardiance   - Continue Lantus 20U nightly + SSI    #NATALIA, resolved   BUN 63 and Cr 1.42 on admission, baseline Cr ~0.8. now .95.   Avoid nephrotoxins, renally dose meds  Monitor renal function    #HTN  #HLD  c/w amlodipine 5 mg   -c/w statin    Diet: clear liquids   DVT prophylaxis: SCDs  Dispo: admit   Code Status: ARIANNA

## 2024-01-05 LAB
ANION GAP SERPL CALC-SCNC: 6 MMOL/L — SIGNIFICANT CHANGE UP (ref 5–17)
ANION GAP SERPL CALC-SCNC: 6 MMOL/L — SIGNIFICANT CHANGE UP (ref 5–17)
BASOPHILS # BLD AUTO: 0.02 K/UL — SIGNIFICANT CHANGE UP (ref 0–0.2)
BASOPHILS # BLD AUTO: 0.02 K/UL — SIGNIFICANT CHANGE UP (ref 0–0.2)
BASOPHILS NFR BLD AUTO: 0.3 % — SIGNIFICANT CHANGE UP (ref 0–2)
BASOPHILS NFR BLD AUTO: 0.3 % — SIGNIFICANT CHANGE UP (ref 0–2)
BUN SERPL-MCNC: 12 MG/DL — SIGNIFICANT CHANGE UP (ref 7–23)
BUN SERPL-MCNC: 12 MG/DL — SIGNIFICANT CHANGE UP (ref 7–23)
CALCIUM SERPL-MCNC: 7.9 MG/DL — LOW (ref 8.5–10.1)
CALCIUM SERPL-MCNC: 7.9 MG/DL — LOW (ref 8.5–10.1)
CHLORIDE SERPL-SCNC: 105 MMOL/L — SIGNIFICANT CHANGE UP (ref 96–108)
CHLORIDE SERPL-SCNC: 105 MMOL/L — SIGNIFICANT CHANGE UP (ref 96–108)
CO2 SERPL-SCNC: 27 MMOL/L — SIGNIFICANT CHANGE UP (ref 22–31)
CO2 SERPL-SCNC: 27 MMOL/L — SIGNIFICANT CHANGE UP (ref 22–31)
CREAT SERPL-MCNC: 0.95 MG/DL — SIGNIFICANT CHANGE UP (ref 0.5–1.3)
CREAT SERPL-MCNC: 0.95 MG/DL — SIGNIFICANT CHANGE UP (ref 0.5–1.3)
EGFR: 92 ML/MIN/1.73M2 — SIGNIFICANT CHANGE UP
EGFR: 92 ML/MIN/1.73M2 — SIGNIFICANT CHANGE UP
EOSINOPHIL # BLD AUTO: 0.11 K/UL — SIGNIFICANT CHANGE UP (ref 0–0.5)
EOSINOPHIL # BLD AUTO: 0.11 K/UL — SIGNIFICANT CHANGE UP (ref 0–0.5)
EOSINOPHIL NFR BLD AUTO: 1.8 % — SIGNIFICANT CHANGE UP (ref 0–6)
EOSINOPHIL NFR BLD AUTO: 1.8 % — SIGNIFICANT CHANGE UP (ref 0–6)
GLUCOSE BLDC GLUCOMTR-MCNC: 222 MG/DL — HIGH (ref 70–99)
GLUCOSE BLDC GLUCOMTR-MCNC: 222 MG/DL — HIGH (ref 70–99)
GLUCOSE BLDC GLUCOMTR-MCNC: 236 MG/DL — HIGH (ref 70–99)
GLUCOSE BLDC GLUCOMTR-MCNC: 236 MG/DL — HIGH (ref 70–99)
GLUCOSE BLDC GLUCOMTR-MCNC: 302 MG/DL — HIGH (ref 70–99)
GLUCOSE BLDC GLUCOMTR-MCNC: 302 MG/DL — HIGH (ref 70–99)
GLUCOSE BLDC GLUCOMTR-MCNC: 311 MG/DL — HIGH (ref 70–99)
GLUCOSE BLDC GLUCOMTR-MCNC: 311 MG/DL — HIGH (ref 70–99)
GLUCOSE SERPL-MCNC: 282 MG/DL — HIGH (ref 70–99)
GLUCOSE SERPL-MCNC: 282 MG/DL — HIGH (ref 70–99)
HCT VFR BLD CALC: 23.1 % — LOW (ref 39–50)
HCT VFR BLD CALC: 23.1 % — LOW (ref 39–50)
HGB BLD-MCNC: 7.6 G/DL — LOW (ref 13–17)
HGB BLD-MCNC: 7.6 G/DL — LOW (ref 13–17)
IMM GRANULOCYTES NFR BLD AUTO: 1 % — HIGH (ref 0–0.9)
IMM GRANULOCYTES NFR BLD AUTO: 1 % — HIGH (ref 0–0.9)
LYMPHOCYTES # BLD AUTO: 1.37 K/UL — SIGNIFICANT CHANGE UP (ref 1–3.3)
LYMPHOCYTES # BLD AUTO: 1.37 K/UL — SIGNIFICANT CHANGE UP (ref 1–3.3)
LYMPHOCYTES # BLD AUTO: 22.9 % — SIGNIFICANT CHANGE UP (ref 13–44)
LYMPHOCYTES # BLD AUTO: 22.9 % — SIGNIFICANT CHANGE UP (ref 13–44)
MAGNESIUM SERPL-MCNC: 1.9 MG/DL — SIGNIFICANT CHANGE UP (ref 1.6–2.6)
MAGNESIUM SERPL-MCNC: 1.9 MG/DL — SIGNIFICANT CHANGE UP (ref 1.6–2.6)
MCHC RBC-ENTMCNC: 29.2 PG — SIGNIFICANT CHANGE UP (ref 27–34)
MCHC RBC-ENTMCNC: 29.2 PG — SIGNIFICANT CHANGE UP (ref 27–34)
MCHC RBC-ENTMCNC: 32.9 G/DL — SIGNIFICANT CHANGE UP (ref 32–36)
MCHC RBC-ENTMCNC: 32.9 G/DL — SIGNIFICANT CHANGE UP (ref 32–36)
MCV RBC AUTO: 88.8 FL — SIGNIFICANT CHANGE UP (ref 80–100)
MCV RBC AUTO: 88.8 FL — SIGNIFICANT CHANGE UP (ref 80–100)
MONOCYTES # BLD AUTO: 0.52 K/UL — SIGNIFICANT CHANGE UP (ref 0–0.9)
MONOCYTES # BLD AUTO: 0.52 K/UL — SIGNIFICANT CHANGE UP (ref 0–0.9)
MONOCYTES NFR BLD AUTO: 8.7 % — SIGNIFICANT CHANGE UP (ref 2–14)
MONOCYTES NFR BLD AUTO: 8.7 % — SIGNIFICANT CHANGE UP (ref 2–14)
NEUTROPHILS # BLD AUTO: 3.91 K/UL — SIGNIFICANT CHANGE UP (ref 1.8–7.4)
NEUTROPHILS # BLD AUTO: 3.91 K/UL — SIGNIFICANT CHANGE UP (ref 1.8–7.4)
NEUTROPHILS NFR BLD AUTO: 65.3 % — SIGNIFICANT CHANGE UP (ref 43–77)
NEUTROPHILS NFR BLD AUTO: 65.3 % — SIGNIFICANT CHANGE UP (ref 43–77)
NRBC # BLD: 0 /100 WBCS — SIGNIFICANT CHANGE UP (ref 0–0)
NRBC # BLD: 0 /100 WBCS — SIGNIFICANT CHANGE UP (ref 0–0)
PLATELET # BLD AUTO: 281 K/UL — SIGNIFICANT CHANGE UP (ref 150–400)
PLATELET # BLD AUTO: 281 K/UL — SIGNIFICANT CHANGE UP (ref 150–400)
POTASSIUM SERPL-MCNC: 4 MMOL/L — SIGNIFICANT CHANGE UP (ref 3.5–5.3)
POTASSIUM SERPL-MCNC: 4 MMOL/L — SIGNIFICANT CHANGE UP (ref 3.5–5.3)
POTASSIUM SERPL-SCNC: 4 MMOL/L — SIGNIFICANT CHANGE UP (ref 3.5–5.3)
POTASSIUM SERPL-SCNC: 4 MMOL/L — SIGNIFICANT CHANGE UP (ref 3.5–5.3)
RBC # BLD: 2.6 M/UL — LOW (ref 4.2–5.8)
RBC # BLD: 2.6 M/UL — LOW (ref 4.2–5.8)
RBC # FLD: 14.5 % — SIGNIFICANT CHANGE UP (ref 10.3–14.5)
RBC # FLD: 14.5 % — SIGNIFICANT CHANGE UP (ref 10.3–14.5)
SODIUM SERPL-SCNC: 138 MMOL/L — SIGNIFICANT CHANGE UP (ref 135–145)
SODIUM SERPL-SCNC: 138 MMOL/L — SIGNIFICANT CHANGE UP (ref 135–145)
SURGICAL PATHOLOGY STUDY: SIGNIFICANT CHANGE UP
SURGICAL PATHOLOGY STUDY: SIGNIFICANT CHANGE UP
WBC # BLD: 5.99 K/UL — SIGNIFICANT CHANGE UP (ref 3.8–10.5)
WBC # BLD: 5.99 K/UL — SIGNIFICANT CHANGE UP (ref 3.8–10.5)
WBC # FLD AUTO: 5.99 K/UL — SIGNIFICANT CHANGE UP (ref 3.8–10.5)
WBC # FLD AUTO: 5.99 K/UL — SIGNIFICANT CHANGE UP (ref 3.8–10.5)

## 2024-01-05 PROCEDURE — 74177 CT ABD & PELVIS W/CONTRAST: CPT | Mod: 26

## 2024-01-05 PROCEDURE — 99232 SBSQ HOSP IP/OBS MODERATE 35: CPT

## 2024-01-05 PROCEDURE — 71260 CT THORAX DX C+: CPT | Mod: 26

## 2024-01-05 RX ADMIN — Medication 4: at 17:46

## 2024-01-05 RX ADMIN — SODIUM CHLORIDE 60 MILLILITER(S): 9 INJECTION, SOLUTION INTRAVENOUS at 08:58

## 2024-01-05 RX ADMIN — PANTOPRAZOLE SODIUM 40 MILLIGRAM(S): 20 TABLET, DELAYED RELEASE ORAL at 05:21

## 2024-01-05 RX ADMIN — IOHEXOL 30 MILLILITER(S): 300 INJECTION, SOLUTION INTRAVENOUS at 08:56

## 2024-01-05 RX ADMIN — Medication 2: at 08:51

## 2024-01-05 RX ADMIN — AMLODIPINE BESYLATE 5 MILLIGRAM(S): 2.5 TABLET ORAL at 05:21

## 2024-01-05 RX ADMIN — PANTOPRAZOLE SODIUM 40 MILLIGRAM(S): 20 TABLET, DELAYED RELEASE ORAL at 17:48

## 2024-01-05 RX ADMIN — Medication 4: at 12:00

## 2024-01-05 RX ADMIN — ATORVASTATIN CALCIUM 20 MILLIGRAM(S): 80 TABLET, FILM COATED ORAL at 23:27

## 2024-01-05 RX ADMIN — SODIUM CHLORIDE 60 MILLILITER(S): 9 INJECTION, SOLUTION INTRAVENOUS at 23:51

## 2024-01-05 RX ADMIN — TAMSULOSIN HYDROCHLORIDE 0.4 MILLIGRAM(S): 0.4 CAPSULE ORAL at 23:27

## 2024-01-05 RX ADMIN — INSULIN GLARGINE 20 UNIT(S): 100 INJECTION, SOLUTION SUBCUTANEOUS at 21:45

## 2024-01-05 NOTE — PROGRESS NOTE ADULT - ASSESSMENT
58 year old male with PMHx of HTN, HLD, IDDM2, and BPH who presented to the ED on 1/1/24 for complaints of dizziness and fatigue and admitted for symptomatic anemia and hyperglycemia.    #Acute blood loss anemia d/t upper GI bleed   -Hgb 4.7 on admission, baseline ~11.5 s/p 4 units PRBC 1/1. Hgb stable at 7.6 today.  -c/w IV PPI 40 mg bid  - EGD on 01/02: nodule on gastric side of the GEJ with some old heme in stomach. Possible healing Kavita Valero tear.   - Colonoscopy on 01/04:  right sided diverticula, small internal hemorrhoids  - Pathology from EGD concerning for adenocarcinoma.   - CT CAP with oral and IV contrast pending    - GI following     #IDDM2 with hyperglycemia secondary to medication noncompliance  - A1c 12.8. Admits to not taking insulin as prescribed. Hold metformin and jardiance   - Continue Lantus 20U nightly + SSI    #NATALIA, resolved   BUN 63 and Cr 1.42 on admission, baseline Cr ~0.8. now .95.   Avoid nephrotoxins, renally dose meds  Monitor renal function    #HTN  #HLD  c/w amlodipine 5 mg   -c/w statin    Diet: clear liquids   DVT prophylaxis: SCDs  Dispo: admit   Code Status: ARIANNA

## 2024-01-05 NOTE — PROGRESS NOTE ADULT - SUBJECTIVE AND OBJECTIVE BOX
Patient is a 58y old  Male who presents with a chief complaint of Symptomatic anemia, hyperglycemia (2024 15:22)    INTERVAL HPI/OVERNIGHT EVENTS: NAEON HD stable     MEDICATIONS  (STANDING):  amLODIPine   Tablet 5 milliGRAM(s) Oral daily  atorvastatin 20 milliGRAM(s) Oral at bedtime  dextrose 5%. 1000 milliLiter(s) (50 mL/Hr) IV Continuous <Continuous>  dextrose 5%. 1000 milliLiter(s) (100 mL/Hr) IV Continuous <Continuous>  dextrose 50% Injectable 25 Gram(s) IV Push once  dextrose 50% Injectable 25 Gram(s) IV Push once  dextrose 50% Injectable 12.5 Gram(s) IV Push once  glucagon  Injectable 1 milliGRAM(s) IntraMuscular once  insulin glargine Injectable (LANTUS) 20 Unit(s) SubCutaneous at bedtime  insulin lispro (ADMELOG) corrective regimen sliding scale   SubCutaneous three times a day before meals  lactated ringers. 1000 milliLiter(s) (60 mL/Hr) IV Continuous <Continuous>  pantoprazole  Injectable 40 milliGRAM(s) IV Push every 12 hours  tamsulosin 0.4 milliGRAM(s) Oral at bedtime    MEDICATIONS  (PRN):  acetaminophen     Tablet .. 650 milliGRAM(s) Oral every 6 hours PRN Temp greater or equal to 38C (100.4F), Mild Pain (1 - 3)  dextrose Oral Gel 15 Gram(s) Oral once PRN Blood Glucose LESS THAN 70 milliGRAM(s)/deciliter  melatonin 3 milliGRAM(s) Oral at bedtime PRN Insomnia    Allergies    No Known Allergies    Intolerances      REVIEW OF SYSTEMS:  All other systems reviewed and are negative    Vital Signs Last 24 Hrs  T(C): 36.2 (2024 11:09), Max: 37.1 (2024 06:21)  T(F): 97.2 (2024 11:09), Max: 98.7 (2024 06:21)  HR: 94 (2024 11:09) (93 - 97)  BP: 125/73 (2024 11:09) (120/68 - 146/75)  BP(mean): --  RR: 19 (2024 11:09) (17 - 19)  SpO2: 94% (2024 11:09) (94% - 97%)    Parameters below as of 2024 06:21  Patient On (Oxygen Delivery Method): room air      Daily     Daily Weight in k.3 (2024 06:21)  I&O's Summary    CAPILLARY BLOOD GLUCOSE      POCT Blood Glucose.: 302 mg/dL (2024 11:13)  POCT Blood Glucose.: 236 mg/dL (2024 07:55)  POCT Blood Glucose.: 308 mg/dL (2024 21:12)  POCT Blood Glucose.: 187 mg/dL (2024 17:06)    PHYSICAL EXAM:  GENERAL: stable, no melena or abdominal pain.   NECK: supple, No JVD  CHEST/LUNG: clear to auscultation bilaterally; no rales, rhonchi, or wheezing b/l  HEART: normal S1, S2  ABDOMEN: BS+, soft, ND, NT   EXTREMITIES:  pulses palpable; no clubbing, cyanosis, or edema b/l LEs    Labs                          7.6    5.99  )-----------( 281      ( 2024 05:58 )             23.1         138  |  105  |  12  ----------------------------<  282<H>  4.0   |  27  |  0.95    Ca    7.9<L>      2024 05:58  Mg     1.9     01-            Urinalysis Basic - ( 2024 05:58 )    Color: x / Appearance: x / SG: x / pH: x  Gluc: 282 mg/dL / Ketone: x  / Bili: x / Urobili: x   Blood: x / Protein: x / Nitrite: x   Leuk Esterase: x / RBC: x / WBC x   Sq Epi: x / Non Sq Epi: x / Bacteria: x                  DVT prophylaxis: > Lovenox 40mg SQ daily  > Heparin   > SCD's

## 2024-01-05 NOTE — ACUTE INTERFACILITY TRANSFER NOTE - HOSPITAL COURSE
58 year old male with PMHx of HTN, HLD, IDDM2, and BPH who presented to the ED on 1/1/24 for complaints of dizziness and fatigue and admitted for symptomatic anemia and hyperglycemia. Pt admitted to medicine and transfused 4 uprbc until Hb stabilized. Pt underwent EGD and colonoscopy. Colonoscopy with diverticula and internal hemrrhoids. 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 CT abd for staging with peripancreatic nodule. Pt accepted for transfer to [facility] for surgical-oncology eval.    Accepting MD: Dr Chong  GI consultant: Abdirashid 58 year old male with PMHx of HTN, HLD, IDDM2, and BPH who presented to the ED on 1/1/24 for complaints of dizziness and fatigue and admitted for symptomatic anemia and hyperglycemia. Pt admitted to medicine and transfused 4 uprbc until Hb stabilized. Pt underwent EGD and colonoscopy. Colonoscopy with diverticula and internal hemrrhoids. 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 CT abd for staging with peripancreatic nodule. Pt accepted for transfer to [facility] for surgical-oncology eval.    Accepting MD: Dr Chong  GI consultant: Abdirashid

## 2024-01-06 LAB
ANION GAP SERPL CALC-SCNC: 3 MMOL/L — LOW (ref 5–17)
ANION GAP SERPL CALC-SCNC: 3 MMOL/L — LOW (ref 5–17)
BASOPHILS # BLD AUTO: 0.02 K/UL — SIGNIFICANT CHANGE UP (ref 0–0.2)
BASOPHILS # BLD AUTO: 0.02 K/UL — SIGNIFICANT CHANGE UP (ref 0–0.2)
BASOPHILS NFR BLD AUTO: 0.4 % — SIGNIFICANT CHANGE UP (ref 0–2)
BASOPHILS NFR BLD AUTO: 0.4 % — SIGNIFICANT CHANGE UP (ref 0–2)
BUN SERPL-MCNC: 7 MG/DL — SIGNIFICANT CHANGE UP (ref 7–23)
BUN SERPL-MCNC: 7 MG/DL — SIGNIFICANT CHANGE UP (ref 7–23)
CALCIUM SERPL-MCNC: 7.9 MG/DL — LOW (ref 8.5–10.1)
CALCIUM SERPL-MCNC: 7.9 MG/DL — LOW (ref 8.5–10.1)
CHLORIDE SERPL-SCNC: 106 MMOL/L — SIGNIFICANT CHANGE UP (ref 96–108)
CHLORIDE SERPL-SCNC: 106 MMOL/L — SIGNIFICANT CHANGE UP (ref 96–108)
CO2 SERPL-SCNC: 29 MMOL/L — SIGNIFICANT CHANGE UP (ref 22–31)
CO2 SERPL-SCNC: 29 MMOL/L — SIGNIFICANT CHANGE UP (ref 22–31)
CREAT SERPL-MCNC: 0.75 MG/DL — SIGNIFICANT CHANGE UP (ref 0.5–1.3)
CREAT SERPL-MCNC: 0.75 MG/DL — SIGNIFICANT CHANGE UP (ref 0.5–1.3)
EGFR: 103 ML/MIN/1.73M2 — SIGNIFICANT CHANGE UP
EGFR: 103 ML/MIN/1.73M2 — SIGNIFICANT CHANGE UP
EOSINOPHIL # BLD AUTO: 0.21 K/UL — SIGNIFICANT CHANGE UP (ref 0–0.5)
EOSINOPHIL # BLD AUTO: 0.21 K/UL — SIGNIFICANT CHANGE UP (ref 0–0.5)
EOSINOPHIL NFR BLD AUTO: 4 % — SIGNIFICANT CHANGE UP (ref 0–6)
EOSINOPHIL NFR BLD AUTO: 4 % — SIGNIFICANT CHANGE UP (ref 0–6)
GLUCOSE BLDC GLUCOMTR-MCNC: 130 MG/DL — HIGH (ref 70–99)
GLUCOSE BLDC GLUCOMTR-MCNC: 130 MG/DL — HIGH (ref 70–99)
GLUCOSE BLDC GLUCOMTR-MCNC: 287 MG/DL — HIGH (ref 70–99)
GLUCOSE BLDC GLUCOMTR-MCNC: 287 MG/DL — HIGH (ref 70–99)
GLUCOSE BLDC GLUCOMTR-MCNC: 345 MG/DL — HIGH (ref 70–99)
GLUCOSE BLDC GLUCOMTR-MCNC: 345 MG/DL — HIGH (ref 70–99)
GLUCOSE BLDC GLUCOMTR-MCNC: 386 MG/DL — HIGH (ref 70–99)
GLUCOSE BLDC GLUCOMTR-MCNC: 386 MG/DL — HIGH (ref 70–99)
GLUCOSE SERPL-MCNC: 117 MG/DL — HIGH (ref 70–99)
GLUCOSE SERPL-MCNC: 117 MG/DL — HIGH (ref 70–99)
HCT VFR BLD CALC: 23.2 % — LOW (ref 39–50)
HCT VFR BLD CALC: 23.2 % — LOW (ref 39–50)
HGB BLD-MCNC: 7.8 G/DL — LOW (ref 13–17)
HGB BLD-MCNC: 7.8 G/DL — LOW (ref 13–17)
IMM GRANULOCYTES NFR BLD AUTO: 0.6 % — SIGNIFICANT CHANGE UP (ref 0–0.9)
IMM GRANULOCYTES NFR BLD AUTO: 0.6 % — SIGNIFICANT CHANGE UP (ref 0–0.9)
LYMPHOCYTES # BLD AUTO: 1.27 K/UL — SIGNIFICANT CHANGE UP (ref 1–3.3)
LYMPHOCYTES # BLD AUTO: 1.27 K/UL — SIGNIFICANT CHANGE UP (ref 1–3.3)
LYMPHOCYTES # BLD AUTO: 24.3 % — SIGNIFICANT CHANGE UP (ref 13–44)
LYMPHOCYTES # BLD AUTO: 24.3 % — SIGNIFICANT CHANGE UP (ref 13–44)
MAGNESIUM SERPL-MCNC: 1.7 MG/DL — SIGNIFICANT CHANGE UP (ref 1.6–2.6)
MAGNESIUM SERPL-MCNC: 1.7 MG/DL — SIGNIFICANT CHANGE UP (ref 1.6–2.6)
MCHC RBC-ENTMCNC: 29.1 PG — SIGNIFICANT CHANGE UP (ref 27–34)
MCHC RBC-ENTMCNC: 29.1 PG — SIGNIFICANT CHANGE UP (ref 27–34)
MCHC RBC-ENTMCNC: 33.6 G/DL — SIGNIFICANT CHANGE UP (ref 32–36)
MCHC RBC-ENTMCNC: 33.6 G/DL — SIGNIFICANT CHANGE UP (ref 32–36)
MCV RBC AUTO: 86.6 FL — SIGNIFICANT CHANGE UP (ref 80–100)
MCV RBC AUTO: 86.6 FL — SIGNIFICANT CHANGE UP (ref 80–100)
MONOCYTES # BLD AUTO: 0.45 K/UL — SIGNIFICANT CHANGE UP (ref 0–0.9)
MONOCYTES # BLD AUTO: 0.45 K/UL — SIGNIFICANT CHANGE UP (ref 0–0.9)
MONOCYTES NFR BLD AUTO: 8.6 % — SIGNIFICANT CHANGE UP (ref 2–14)
MONOCYTES NFR BLD AUTO: 8.6 % — SIGNIFICANT CHANGE UP (ref 2–14)
NEUTROPHILS # BLD AUTO: 3.25 K/UL — SIGNIFICANT CHANGE UP (ref 1.8–7.4)
NEUTROPHILS # BLD AUTO: 3.25 K/UL — SIGNIFICANT CHANGE UP (ref 1.8–7.4)
NEUTROPHILS NFR BLD AUTO: 62.1 % — SIGNIFICANT CHANGE UP (ref 43–77)
NEUTROPHILS NFR BLD AUTO: 62.1 % — SIGNIFICANT CHANGE UP (ref 43–77)
NRBC # BLD: 0 /100 WBCS — SIGNIFICANT CHANGE UP (ref 0–0)
NRBC # BLD: 0 /100 WBCS — SIGNIFICANT CHANGE UP (ref 0–0)
PLATELET # BLD AUTO: 268 K/UL — SIGNIFICANT CHANGE UP (ref 150–400)
PLATELET # BLD AUTO: 268 K/UL — SIGNIFICANT CHANGE UP (ref 150–400)
POTASSIUM SERPL-MCNC: 3.8 MMOL/L — SIGNIFICANT CHANGE UP (ref 3.5–5.3)
POTASSIUM SERPL-MCNC: 3.8 MMOL/L — SIGNIFICANT CHANGE UP (ref 3.5–5.3)
POTASSIUM SERPL-SCNC: 3.8 MMOL/L — SIGNIFICANT CHANGE UP (ref 3.5–5.3)
POTASSIUM SERPL-SCNC: 3.8 MMOL/L — SIGNIFICANT CHANGE UP (ref 3.5–5.3)
RBC # BLD: 2.68 M/UL — LOW (ref 4.2–5.8)
RBC # BLD: 2.68 M/UL — LOW (ref 4.2–5.8)
RBC # FLD: 14 % — SIGNIFICANT CHANGE UP (ref 10.3–14.5)
RBC # FLD: 14 % — SIGNIFICANT CHANGE UP (ref 10.3–14.5)
SODIUM SERPL-SCNC: 138 MMOL/L — SIGNIFICANT CHANGE UP (ref 135–145)
SODIUM SERPL-SCNC: 138 MMOL/L — SIGNIFICANT CHANGE UP (ref 135–145)
WBC # BLD: 5.23 K/UL — SIGNIFICANT CHANGE UP (ref 3.8–10.5)
WBC # BLD: 5.23 K/UL — SIGNIFICANT CHANGE UP (ref 3.8–10.5)
WBC # FLD AUTO: 5.23 K/UL — SIGNIFICANT CHANGE UP (ref 3.8–10.5)
WBC # FLD AUTO: 5.23 K/UL — SIGNIFICANT CHANGE UP (ref 3.8–10.5)

## 2024-01-06 PROCEDURE — 99232 SBSQ HOSP IP/OBS MODERATE 35: CPT

## 2024-01-06 RX ADMIN — TAMSULOSIN HYDROCHLORIDE 0.4 MILLIGRAM(S): 0.4 CAPSULE ORAL at 21:07

## 2024-01-06 RX ADMIN — Medication 4: at 17:27

## 2024-01-06 RX ADMIN — ATORVASTATIN CALCIUM 20 MILLIGRAM(S): 80 TABLET, FILM COATED ORAL at 21:07

## 2024-01-06 RX ADMIN — SODIUM CHLORIDE 60 MILLILITER(S): 9 INJECTION, SOLUTION INTRAVENOUS at 21:09

## 2024-01-06 RX ADMIN — INSULIN GLARGINE 20 UNIT(S): 100 INJECTION, SOLUTION SUBCUTANEOUS at 21:09

## 2024-01-06 RX ADMIN — AMLODIPINE BESYLATE 5 MILLIGRAM(S): 2.5 TABLET ORAL at 05:52

## 2024-01-06 RX ADMIN — Medication 3: at 11:56

## 2024-01-06 RX ADMIN — PANTOPRAZOLE SODIUM 40 MILLIGRAM(S): 20 TABLET, DELAYED RELEASE ORAL at 17:27

## 2024-01-06 RX ADMIN — SODIUM CHLORIDE 60 MILLILITER(S): 9 INJECTION, SOLUTION INTRAVENOUS at 17:30

## 2024-01-06 RX ADMIN — PANTOPRAZOLE SODIUM 40 MILLIGRAM(S): 20 TABLET, DELAYED RELEASE ORAL at 05:52

## 2024-01-06 NOTE — PROGRESS NOTE ADULT - ASSESSMENT
58 year old male with PMHx of HTN, HLD, IDDM2, and BPH who presented to the ED on 1/1/24 for complaints of dizziness and fatigue and admitted for symptomatic anemia and hyperglycemia.    #Acute blood loss anemia d/t upper GI bleed   -Hgb 4.7 on admission, baseline ~11.5 s/p 4 units PRBC 1/1. Hgb stable at 7.6 today.  -c/w IV PPI 40 mg bid  - EGD on 01/02: nodule on gastric side of the GEJ with some old heme in stomach. Possible healing Kavita Valero tear.   - Colonoscopy on 01/04:  right sided diverticula, small internal hemorrhoids  - Pathology from EGD concerning for adenocarcinoma.   - CT CAP shows High attenuation in the gastric lumen either blood products or ingested   material. Limited evaluation for underlying mass. Diffuse wall thickening   consistent with gastritis. Mildly distended esophagus. Correlate with   recent endoscopic findings.  - Transfer accepted to [facility] for EUS and surgical oncology      #IDDM2 with hyperglycemia secondary to medication noncompliance  - A1c 12.8. Admits to not taking insulin as prescribed. Hold metformin and jardiance   - Continue Lantus 20U nightly + SSI    #NATALIA, resolved   BUN 63 and Cr 1.42 on admission, baseline Cr ~0.8. now .95.   Avoid nephrotoxins, renally dose meds  Monitor renal function    #HTN  #HLD  c/w amlodipine 5 mg   -c/w statin    Diet: clear liquids   DVT prophylaxis: SCDs  Dispo: admit   Code Status:     58 year old male with PMHx of HTN, HLD, IDDM2, and BPH who presented to the ED on 1/1/24 for complaints of dizziness and fatigue and admitted for symptomatic anemia and hyperglycemia.    #Acute blood loss anemia d/t upper GI bleed   -Hgb 4.7 on admission, baseline ~11.5 s/p 4 units PRBC 1/1. Hgb stable at 7.6 today.  -c/w IV PPI 40 mg bid  - EGD on 01/02: nodule on gastric side of the GEJ with some old heme in stomach. Possible healing Kavita Valero tear.   - Colonoscopy on 01/04:  right sided diverticula, small internal hemorrhoids  - Pathology from EGD concerning for adenocarcinoma.   - CT CAP shows High attenuation in the gastric lumen either blood products or ingested   material. Limited evaluation for underlying mass. Diffuse wall thickening   consistent with gastritis. Mildly distended esophagus. Correlate with   recent endoscopic findings.  - Transfer accepted to [facility] for EUS and surgical oncology      #IDDM2 with hyperglycemia secondary to medication noncompliance  - A1c 12.8. Admits to not taking insulin as prescribed. Hold metformin and jardiance   - Continue Lantus 20U nightly + SSI    #NATALIA, resolved   BUN 63 and Cr 1.42 on admission, baseline Cr ~0.8. now .95.   Avoid nephrotoxins, renally dose meds  Monitor renal function    #HTN  #HLD  c/w amlodipine 5 mg   -c/w statin    Diet: clear liquids   DVT prophylaxis: SCDs  Dispo: admit   Code Status:

## 2024-01-06 NOTE — PROGRESS NOTE ADULT - SUBJECTIVE AND OBJECTIVE BOX
Patient is a 58y old  Male who presents with a chief complaint of Symptomatic anemia, hyperglycemia (2024 15:21)    INTERVAL HPI/OVERNIGHT EVENTS: NAEON, pending transfer     MEDICATIONS  (STANDING):  amLODIPine   Tablet 5 milliGRAM(s) Oral daily  atorvastatin 20 milliGRAM(s) Oral at bedtime  dextrose 5%. 1000 milliLiter(s) (100 mL/Hr) IV Continuous <Continuous>  dextrose 5%. 1000 milliLiter(s) (50 mL/Hr) IV Continuous <Continuous>  dextrose 50% Injectable 25 Gram(s) IV Push once  dextrose 50% Injectable 25 Gram(s) IV Push once  dextrose 50% Injectable 12.5 Gram(s) IV Push once  glucagon  Injectable 1 milliGRAM(s) IntraMuscular once  insulin glargine Injectable (LANTUS) 20 Unit(s) SubCutaneous at bedtime  insulin lispro (ADMELOG) corrective regimen sliding scale   SubCutaneous three times a day before meals  lactated ringers. 1000 milliLiter(s) (60 mL/Hr) IV Continuous <Continuous>  pantoprazole  Injectable 40 milliGRAM(s) IV Push every 12 hours  tamsulosin 0.4 milliGRAM(s) Oral at bedtime    MEDICATIONS  (PRN):  acetaminophen     Tablet .. 650 milliGRAM(s) Oral every 6 hours PRN Temp greater or equal to 38C (100.4F), Mild Pain (1 - 3)  dextrose Oral Gel 15 Gram(s) Oral once PRN Blood Glucose LESS THAN 70 milliGRAM(s)/deciliter  melatonin 3 milliGRAM(s) Oral at bedtime PRN Insomnia    Allergies    No Known Allergies    Intolerances      REVIEW OF SYSTEMS:  All other systems reviewed and are negative    Vital Signs Last 24 Hrs  T(C): 36.4 (2024 11:00), Max: 37.2 (2024 05:14)  T(F): 97.5 (2024 11:00), Max: 99 (2024 05:14)  HR: 89 (2024 11:00) (84 - 102)  BP: 154/87 (2024 11:00) (120/70 - 155/80)  BP(mean): --  RR: 18 (2024 11:00) (18 - 18)  SpO2: 96% (2024 11:00) (95% - 96%)    Parameters below as of 2024 05:14  Patient On (Oxygen Delivery Method): room air      Daily     Daily Weight in k.3 (2024 05:14)  I&O's Summary    2024 07:01  -  2024 07:00  --------------------------------------------------------  IN: 1760 mL / OUT: 2000 mL / NET: -240 mL    2024 07:01  -  2024 12:06  --------------------------------------------------------  IN: 480 mL / OUT: 0 mL / NET: 480 mL      CAPILLARY BLOOD GLUCOSE      POCT Blood Glucose.: 287 mg/dL (2024 11:06)  POCT Blood Glucose.: 130 mg/dL (2024 07:56)  POCT Blood Glucose.: 222 mg/dL (2024 21:10)  POCT Blood Glucose.: 311 mg/dL (2024 16:54)    PHYSICAL EXAM:  GENERAL: stable, no melena or abdominal pain.   NECK: supple, No JVD  CHEST/LUNG: clear to auscultation bilaterally; no rales, rhonchi, or wheezing b/l  HEART: normal S1, S2  ABDOMEN: BS+, soft, ND, NT   EXTREMITIES:  pulses palpable; no clubbing, cyanosis, or edema b/l LEs    Labs                          7.8    5.23  )-----------( 268      ( 2024 08:20 )             23.2     -    138  |  106  |  7   ----------------------------<  117<H>  3.8   |  29  |  0.75    Ca    7.9<L>      2024 08:20  Mg     1.7     -            Urinalysis Basic - ( 2024 08:20 )    Color: x / Appearance: x / SG: x / pH: x  Gluc: 117 mg/dL / Ketone: x  / Bili: x / Urobili: x   Blood: x / Protein: x / Nitrite: x   Leuk Esterase: x / RBC: x / WBC x   Sq Epi: x / Non Sq Epi: x / Bacteria: x                  DVT prophylaxis: > Lovenox 40mg SQ daily  > Heparin   > SCD's

## 2024-01-07 LAB
GLUCOSE BLDC GLUCOMTR-MCNC: 238 MG/DL — HIGH (ref 70–99)
GLUCOSE BLDC GLUCOMTR-MCNC: 238 MG/DL — HIGH (ref 70–99)
GLUCOSE BLDC GLUCOMTR-MCNC: 323 MG/DL — HIGH (ref 70–99)
GLUCOSE BLDC GLUCOMTR-MCNC: 323 MG/DL — HIGH (ref 70–99)
GLUCOSE BLDC GLUCOMTR-MCNC: 358 MG/DL — HIGH (ref 70–99)
GLUCOSE BLDC GLUCOMTR-MCNC: 358 MG/DL — HIGH (ref 70–99)
GLUCOSE BLDC GLUCOMTR-MCNC: 99 MG/DL — SIGNIFICANT CHANGE UP (ref 70–99)
GLUCOSE BLDC GLUCOMTR-MCNC: 99 MG/DL — SIGNIFICANT CHANGE UP (ref 70–99)

## 2024-01-07 PROCEDURE — 99232 SBSQ HOSP IP/OBS MODERATE 35: CPT

## 2024-01-07 RX ADMIN — ATORVASTATIN CALCIUM 20 MILLIGRAM(S): 80 TABLET, FILM COATED ORAL at 21:30

## 2024-01-07 RX ADMIN — AMLODIPINE BESYLATE 5 MILLIGRAM(S): 2.5 TABLET ORAL at 05:21

## 2024-01-07 RX ADMIN — Medication 5: at 10:07

## 2024-01-07 RX ADMIN — INSULIN GLARGINE 20 UNIT(S): 100 INJECTION, SOLUTION SUBCUTANEOUS at 21:31

## 2024-01-07 RX ADMIN — TAMSULOSIN HYDROCHLORIDE 0.4 MILLIGRAM(S): 0.4 CAPSULE ORAL at 21:30

## 2024-01-07 RX ADMIN — PANTOPRAZOLE SODIUM 40 MILLIGRAM(S): 20 TABLET, DELAYED RELEASE ORAL at 05:21

## 2024-01-07 RX ADMIN — SODIUM CHLORIDE 60 MILLILITER(S): 9 INJECTION, SOLUTION INTRAVENOUS at 17:36

## 2024-01-07 RX ADMIN — Medication 2: at 12:47

## 2024-01-07 RX ADMIN — PANTOPRAZOLE SODIUM 40 MILLIGRAM(S): 20 TABLET, DELAYED RELEASE ORAL at 17:36

## 2024-01-07 NOTE — PROGRESS NOTE ADULT - ASSESSMENT
58 year old male with PMHx of HTN, HLD, IDDM2, and BPH who presented to the ED on 1/1/24 for complaints of dizziness and fatigue and admitted for symptomatic anemia and hyperglycemia.    #Acute blood loss anemia d/t upper GI bleed   -Hgb 4.7 on admission, baseline ~11.5 s/p 4 units PRBC 1/1. Hgb stable at 7.6 today.  -c/w IV PPI 40 mg bid  - EGD on 01/02: nodule on gastric side of the GEJ with some old heme in stomach. Possible healing Kavita Valero tear.   - Colonoscopy on 01/04:  right sided diverticula, small internal hemorrhoids  - Pathology from EGD concerning for adenocarcinoma.   - CT CAP shows High attenuation in the gastric lumen either blood products or ingested   material. Limited evaluation for underlying mass. Diffuse wall thickening   consistent with gastritis. Mildly distended esophagus. Correlate with   recent endoscopic findings.  - Transfer accepted to [facility] for EUS and surgical oncology      #IDDM2 with hyperglycemia secondary to medication noncompliance  - A1c 12.8. Admits to not taking insulin as prescribed. Hold metformin and jardiance   - Continue Lantus 20U nightly + SSI    #NATALIA, resolved   BUN 63 and Cr 1.42 on admission, baseline Cr ~0.8. now .95.   Avoid nephrotoxins, renally dose meds  Monitor renal function    #HTN  #HLD  c/w amlodipine 5 mg   -c/w statin    Diet: clear liquids   DVT prophylaxis: SCDs  Dispo: admit, pending transfer   Code Status:     58 year old male with PMHx of HTN, HLD, IDDM2, and BPH who presented to the ED on 1/1/24 for complaints of dizziness and fatigue and admitted for symptomatic anemia and hyperglycemia.    #Acute blood loss anemia d/t upper GI bleed   -Hgb 4.7 on admission, baseline ~11.5 s/p 4 units PRBC 1/1. Hgb stable at 7.6 today.  -c/w IV PPI 40 mg bid  - EGD on 01/02: nodule on gastric side of the GEJ with some old heme in stomach. Possible healing Kavita Valero tear.   - Colonoscopy on 01/04:  right sided diverticula, small internal hemorrhoids  - Pathology from EGD concerning for adenocarcinoma.   - CT CAP shows High attenuation in the gastric lumen either blood products or ingested   material. Limited evaluation for underlying mass. Diffuse wall thickening   consistent with gastritis. Mildly distended esophagus. Correlate with   recent endoscopic findings.  - Transfer accepted to [facility] for EUS and surgical oncology      #IDDM2 with hyperglycemia secondary to medication noncompliance  - A1c 12.8. Admits to not taking insulin as prescribed. Hold metformin and jardiance   - Continue Lantus 20U nightly + SSI    #NATALIA, resolved   BUN 63 and Cr 1.42 on admission, baseline Cr ~0.8. now .95.   Avoid nephrotoxins, renally dose meds  Monitor renal function    #HTN  #HLD  c/w amlodipine 5 mg   -c/w statin    Diet: clear liquids   DVT prophylaxis: SCDs  Dispo: admit, pending transfer   Code Status:

## 2024-01-07 NOTE — PROGRESS NOTE ADULT - SUBJECTIVE AND OBJECTIVE BOX
Patient is a 58y old  Male who presents with a chief complaint of Symptomatic anemia, hyperglycemia (06 Jan 2024 12:06)    INTERVAL HPI/OVERNIGHT EVENTS: NAEON    MEDICATIONS  (STANDING):  amLODIPine   Tablet 5 milliGRAM(s) Oral daily  atorvastatin 20 milliGRAM(s) Oral at bedtime  dextrose 5%. 1000 milliLiter(s) (50 mL/Hr) IV Continuous <Continuous>  dextrose 5%. 1000 milliLiter(s) (100 mL/Hr) IV Continuous <Continuous>  dextrose 50% Injectable 25 Gram(s) IV Push once  dextrose 50% Injectable 12.5 Gram(s) IV Push once  dextrose 50% Injectable 25 Gram(s) IV Push once  glucagon  Injectable 1 milliGRAM(s) IntraMuscular once  insulin glargine Injectable (LANTUS) 20 Unit(s) SubCutaneous at bedtime  insulin lispro (ADMELOG) corrective regimen sliding scale   SubCutaneous three times a day before meals  lactated ringers. 1000 milliLiter(s) (60 mL/Hr) IV Continuous <Continuous>  pantoprazole  Injectable 40 milliGRAM(s) IV Push every 12 hours  tamsulosin 0.4 milliGRAM(s) Oral at bedtime    MEDICATIONS  (PRN):  acetaminophen     Tablet .. 650 milliGRAM(s) Oral every 6 hours PRN Temp greater or equal to 38C (100.4F), Mild Pain (1 - 3)  dextrose Oral Gel 15 Gram(s) Oral once PRN Blood Glucose LESS THAN 70 milliGRAM(s)/deciliter  melatonin 3 milliGRAM(s) Oral at bedtime PRN Insomnia    Allergies    No Known Allergies    Intolerances      REVIEW OF SYSTEMS:  All other systems reviewed and are negative    Vital Signs Last 24 Hrs  T(C): 36.3 (07 Jan 2024 10:57), Max: 36.8 (06 Jan 2024 16:25)  T(F): 97.4 (07 Jan 2024 10:57), Max: 98.2 (06 Jan 2024 16:25)  HR: 98 (07 Jan 2024 10:57) (92 - 98)  BP: 138/77 (07 Jan 2024 10:57) (129/80 - 156/76)  BP(mean): --  RR: 18 (07 Jan 2024 10:57) (18 - 18)  SpO2: 95% (07 Jan 2024 10:57) (95% - 96%)    Parameters below as of 07 Jan 2024 10:57  Patient On (Oxygen Delivery Method): room air      Daily     Daily   I&O's Summary    06 Jan 2024 07:01  -  07 Jan 2024 07:00  --------------------------------------------------------  IN: 1620 mL / OUT: 2500 mL / NET: -880 mL    07 Jan 2024 07:01  -  07 Jan 2024 12:05  --------------------------------------------------------  IN: 600 mL / OUT: 0 mL / NET: 600 mL      CAPILLARY BLOOD GLUCOSE      POCT Blood Glucose.: 358 mg/dL (07 Jan 2024 10:03)  POCT Blood Glucose.: 386 mg/dL (06 Jan 2024 20:58)  POCT Blood Glucose.: 345 mg/dL (06 Jan 2024 16:41)    PHYSICAL EXAM:  GENERAL: stable, no melena or abdominal pain.   NECK: supple, No JVD  CHEST/LUNG: clear to auscultation bilaterally; no rales, rhonchi, or wheezing b/l  HEART: normal S1, S2  ABDOMEN: BS+, soft, ND, NT   EXTREMITIES:  pulses palpable; no clubbing, cyanosis, or edema b/l LEs    Labs                          7.8    5.23  )-----------( 268      ( 06 Jan 2024 08:20 )             23.2     01-06    138  |  106  |  7   ----------------------------<  117<H>  3.8   |  29  |  0.75    Ca    7.9<L>      06 Jan 2024 08:20  Mg     1.7     01-06            Urinalysis Basic - ( 06 Jan 2024 08:20 )    Color: x / Appearance: x / SG: x / pH: x  Gluc: 117 mg/dL / Ketone: x  / Bili: x / Urobili: x   Blood: x / Protein: x / Nitrite: x   Leuk Esterase: x / RBC: x / WBC x   Sq Epi: x / Non Sq Epi: x / Bacteria: x                  DVT prophylaxis: > Lovenox 40mg SQ daily  > Heparin   > SCD's

## 2024-01-08 LAB
GLUCOSE BLDC GLUCOMTR-MCNC: 107 MG/DL — HIGH (ref 70–99)
GLUCOSE BLDC GLUCOMTR-MCNC: 107 MG/DL — HIGH (ref 70–99)
GLUCOSE BLDC GLUCOMTR-MCNC: 136 MG/DL — HIGH (ref 70–99)
GLUCOSE BLDC GLUCOMTR-MCNC: 136 MG/DL — HIGH (ref 70–99)
GLUCOSE BLDC GLUCOMTR-MCNC: 242 MG/DL — HIGH (ref 70–99)
GLUCOSE BLDC GLUCOMTR-MCNC: 242 MG/DL — HIGH (ref 70–99)
GLUCOSE BLDC GLUCOMTR-MCNC: 398 MG/DL — HIGH (ref 70–99)
GLUCOSE BLDC GLUCOMTR-MCNC: 398 MG/DL — HIGH (ref 70–99)
GLUCOSE BLDC GLUCOMTR-MCNC: 445 MG/DL — HIGH (ref 70–99)
GLUCOSE BLDC GLUCOMTR-MCNC: 445 MG/DL — HIGH (ref 70–99)
GLUCOSE BLDC GLUCOMTR-MCNC: 449 MG/DL — HIGH (ref 70–99)
GLUCOSE BLDC GLUCOMTR-MCNC: 449 MG/DL — HIGH (ref 70–99)

## 2024-01-08 PROCEDURE — 99232 SBSQ HOSP IP/OBS MODERATE 35: CPT

## 2024-01-08 RX ORDER — INSULIN LISPRO 100/ML
VIAL (ML) SUBCUTANEOUS AT BEDTIME
Refills: 0 | Status: DISCONTINUED | OUTPATIENT
Start: 2024-01-08 | End: 2024-01-10

## 2024-01-08 RX ORDER — DEXTROSE 50 % IN WATER 50 %
15 SYRINGE (ML) INTRAVENOUS ONCE
Refills: 0 | Status: DISCONTINUED | OUTPATIENT
Start: 2024-01-08 | End: 2024-01-10

## 2024-01-08 RX ORDER — SODIUM CHLORIDE 9 MG/ML
1000 INJECTION, SOLUTION INTRAVENOUS
Refills: 0 | Status: DISCONTINUED | OUTPATIENT
Start: 2024-01-08 | End: 2024-01-10

## 2024-01-08 RX ORDER — DEXTROSE 50 % IN WATER 50 %
25 SYRINGE (ML) INTRAVENOUS ONCE
Refills: 0 | Status: DISCONTINUED | OUTPATIENT
Start: 2024-01-08 | End: 2024-01-10

## 2024-01-08 RX ORDER — INSULIN LISPRO 100/ML
VIAL (ML) SUBCUTANEOUS
Refills: 0 | Status: DISCONTINUED | OUTPATIENT
Start: 2024-01-08 | End: 2024-01-10

## 2024-01-08 RX ORDER — MINERAL OIL
1 OIL (ML) MISCELLANEOUS EVERY 12 HOURS
Refills: 0 | Status: DISCONTINUED | OUTPATIENT
Start: 2024-01-08 | End: 2024-01-08

## 2024-01-08 RX ORDER — GLUCAGON INJECTION, SOLUTION 0.5 MG/.1ML
1 INJECTION, SOLUTION SUBCUTANEOUS ONCE
Refills: 0 | Status: DISCONTINUED | OUTPATIENT
Start: 2024-01-08 | End: 2024-01-10

## 2024-01-08 RX ORDER — DEXTROSE 50 % IN WATER 50 %
12.5 SYRINGE (ML) INTRAVENOUS ONCE
Refills: 0 | Status: DISCONTINUED | OUTPATIENT
Start: 2024-01-08 | End: 2024-01-10

## 2024-01-08 RX ADMIN — PANTOPRAZOLE SODIUM 40 MILLIGRAM(S): 20 TABLET, DELAYED RELEASE ORAL at 05:43

## 2024-01-08 RX ADMIN — SODIUM CHLORIDE 60 MILLILITER(S): 9 INJECTION, SOLUTION INTRAVENOUS at 08:00

## 2024-01-08 RX ADMIN — PANTOPRAZOLE SODIUM 40 MILLIGRAM(S): 20 TABLET, DELAYED RELEASE ORAL at 18:10

## 2024-01-08 RX ADMIN — Medication 6: at 16:53

## 2024-01-08 RX ADMIN — ATORVASTATIN CALCIUM 20 MILLIGRAM(S): 80 TABLET, FILM COATED ORAL at 21:23

## 2024-01-08 RX ADMIN — AMLODIPINE BESYLATE 5 MILLIGRAM(S): 2.5 TABLET ORAL at 05:43

## 2024-01-08 RX ADMIN — Medication 2: at 11:09

## 2024-01-08 RX ADMIN — INSULIN GLARGINE 20 UNIT(S): 100 INJECTION, SOLUTION SUBCUTANEOUS at 21:22

## 2024-01-08 RX ADMIN — SODIUM CHLORIDE 60 MILLILITER(S): 9 INJECTION, SOLUTION INTRAVENOUS at 15:32

## 2024-01-08 RX ADMIN — TAMSULOSIN HYDROCHLORIDE 0.4 MILLIGRAM(S): 0.4 CAPSULE ORAL at 21:22

## 2024-01-08 RX ADMIN — Medication 10: at 18:09

## 2024-01-08 NOTE — PROGRESS NOTE ADULT - SUBJECTIVE AND OBJECTIVE BOX
Patient is a 58y old  Male who presents with a chief complaint of Symptomatic anemia, hyperglycemia (2024 12:05)    INTERVAL HPI/OVERNIGHT EVENTS: NAEON, awaiting transfer. Called Transfer center, accepting physician confirmed awaiting bed.     MEDICATIONS  (STANDING):  amLODIPine   Tablet 5 milliGRAM(s) Oral daily  atorvastatin 20 milliGRAM(s) Oral at bedtime  dextrose 5%. 1000 milliLiter(s) (50 mL/Hr) IV Continuous <Continuous>  dextrose 5%. 1000 milliLiter(s) (100 mL/Hr) IV Continuous <Continuous>  dextrose 50% Injectable 25 Gram(s) IV Push once  dextrose 50% Injectable 25 Gram(s) IV Push once  dextrose 50% Injectable 12.5 Gram(s) IV Push once  glucagon  Injectable 1 milliGRAM(s) IntraMuscular once  insulin glargine Injectable (LANTUS) 20 Unit(s) SubCutaneous at bedtime  insulin lispro (ADMELOG) corrective regimen sliding scale   SubCutaneous three times a day before meals  lactated ringers. 1000 milliLiter(s) (60 mL/Hr) IV Continuous <Continuous>  pantoprazole  Injectable 40 milliGRAM(s) IV Push every 12 hours  tamsulosin 0.4 milliGRAM(s) Oral at bedtime    MEDICATIONS  (PRN):  acetaminophen     Tablet .. 650 milliGRAM(s) Oral every 6 hours PRN Temp greater or equal to 38C (100.4F), Mild Pain (1 - 3)  dextrose Oral Gel 15 Gram(s) Oral once PRN Blood Glucose LESS THAN 70 milliGRAM(s)/deciliter  melatonin 3 milliGRAM(s) Oral at bedtime PRN Insomnia    Allergies    No Known Allergies    Intolerances      REVIEW OF SYSTEMS:  All other systems reviewed and are negative    Vital Signs Last 24 Hrs  T(C): 37.1 (2024 11:22), Max: 37.2 (2024 05:17)  T(F): 98.8 (2024 11:22), Max: 98.9 (2024 05:17)  HR: 102 (2024 11:22) (83 - 104)  BP: 119/72 (2024 11:22) (119/72 - 142/80)  BP(mean): --  RR: 18 (2024 11:22) (18 - 19)  SpO2: 98% (2024 11:22) (96% - 98%)    Parameters below as of 2024 11:22  Patient On (Oxygen Delivery Method): room air      Daily     Daily Weight in k.4 (2024 05:17)  I&O's Summary    2024 07:01  -  2024 07:00  --------------------------------------------------------  IN: 1200 mL / OUT: 3300 mL / NET: -2100 mL      CAPILLARY BLOOD GLUCOSE      POCT Blood Glucose.: 242 mg/dL (2024 11:03)  POCT Blood Glucose.: 107 mg/dL (2024 07:55)  POCT Blood Glucose.: 323 mg/dL (2024 21:19)  POCT Blood Glucose.: 99 mg/dL (2024 16:40)  POCT Blood Glucose.: 238 mg/dL (2024 12:27)    PHYSICAL EXAM:  GENERAL: stable, no melena or abdominal pain.   NECK: supple, No JVD  CHEST/LUNG: clear to auscultation bilaterally; no rales, rhonchi, or wheezing b/l  HEART: normal S1, S2  ABDOMEN: BS+, soft, ND, NT   EXTREMITIES:  pulses palpable; no clubbing, cyanosis, or edema b/l LEs    Labs                                DVT prophylaxis: > Lovenox 40mg SQ daily  > Heparin   > SCD's

## 2024-01-08 NOTE — PROGRESS NOTE ADULT - ASSESSMENT
58 year old male with PMHx of HTN, HLD, IDDM2, and BPH who presented to the ED on 1/1/24 for complaints of dizziness and fatigue and admitted for symptomatic anemia and hyperglycemia.    #Acute blood loss anemia d/t upper GI bleed   -Hgb 4.7 on admission, baseline ~11.5 s/p 4 units PRBC 1/1. Hgb stable at 7.6 today.  -c/w IV PPI 40 mg bid. EGD on 01/02: nodule on gastric side of the GEJ with some old heme in stomach. Possible healing Kavita Valero tear. Colonoscopy on 01/04:  right sided diverticula, small internal hemorrhoids  - Pathology from EGD concerning for adenocarcinoma.   - CT CAP shows High attenuation in the gastric lumen either blood products or ingested   material. Limited evaluation for underlying mass. Diffuse wall thickening   consistent with gastritis. Mildly distended esophagus. Correlate with   recent endoscopic findings.  - Transfer accepted to [facility] for EUS and surgical oncology      #IDDM2 with hyperglycemia secondary to medication noncompliance  - A1c 12.8. Admits to not taking insulin as prescribed. Hold metformin and jardiance   - Continue Lantus 20U nightly + SSI    #NATALIA, resolved   BUN 63 and Cr 1.42 on admission, baseline Cr ~0.8. now .95.   Avoid nephrotoxins, renally dose meds  Monitor renal function    #HTN  #HLD  c/w amlodipine 5 mg   -c/w statin    Diet: clear liquids   DVT prophylaxis: SCDs  Dispo: admit, pending transfer   Code Status:     58 year old male with PMHx of HTN, HLD, IDDM2, and BPH who presented to the ED on 1/1/24 for complaints of dizziness and fatigue and admitted for symptomatic anemia and hyperglycemia.    #Acute blood loss anemia d/t upper GI bleed   -Hgb 4.7 on admission, baseline ~11.5 s/p 4 units PRBC 1/1. Hgb stable at 7.6 today.  -c/w IV PPI 40 mg bid. EGD on 01/02: nodule on gastric side of the GEJ with some old heme in stomach. Possible healing Kavita Valero tear. Colonoscopy on 01/04:  right sided diverticula, small internal hemorrhoids  - Pathology from EGD concerning for adenocarcinoma.   - CT CAP shows High attenuation in the gastric lumen either blood products or ingested   material. Limited evaluation for underlying mass. Diffuse wall thickening   consistent with gastritis. Mildly distended esophagus. Correlate with   recent endoscopic findings.  - Transfer accepted to [facility] for EUS and surgical oncology      #IDDM2 with hyperglycemia secondary to medication noncompliance  - A1c 12.8. Admits to not taking insulin as prescribed. Hold metformin and jardiance   - Continue Lantus 20U nightly + SSI    #NATALIA, resolved   BUN 63 and Cr 1.42 on admission, baseline Cr ~0.8. now .95.   Avoid nephrotoxins, renally dose meds  Monitor renal function    #HTN  #HLD  c/w amlodipine 5 mg   -c/w statin    Diet: clear liquids   DVT prophylaxis: SCDs  Dispo: admit, pending transfer   Code Status:

## 2024-01-09 LAB
ANION GAP SERPL CALC-SCNC: 6 MMOL/L — SIGNIFICANT CHANGE UP (ref 5–17)
ANION GAP SERPL CALC-SCNC: 6 MMOL/L — SIGNIFICANT CHANGE UP (ref 5–17)
BASOPHILS # BLD AUTO: 0.02 K/UL — SIGNIFICANT CHANGE UP (ref 0–0.2)
BASOPHILS # BLD AUTO: 0.02 K/UL — SIGNIFICANT CHANGE UP (ref 0–0.2)
BASOPHILS NFR BLD AUTO: 0.5 % — SIGNIFICANT CHANGE UP (ref 0–2)
BASOPHILS NFR BLD AUTO: 0.5 % — SIGNIFICANT CHANGE UP (ref 0–2)
BUN SERPL-MCNC: 13 MG/DL — SIGNIFICANT CHANGE UP (ref 7–23)
BUN SERPL-MCNC: 13 MG/DL — SIGNIFICANT CHANGE UP (ref 7–23)
CALCIUM SERPL-MCNC: 8.1 MG/DL — LOW (ref 8.5–10.1)
CALCIUM SERPL-MCNC: 8.1 MG/DL — LOW (ref 8.5–10.1)
CHLORIDE SERPL-SCNC: 108 MMOL/L — SIGNIFICANT CHANGE UP (ref 96–108)
CHLORIDE SERPL-SCNC: 108 MMOL/L — SIGNIFICANT CHANGE UP (ref 96–108)
CO2 SERPL-SCNC: 26 MMOL/L — SIGNIFICANT CHANGE UP (ref 22–31)
CO2 SERPL-SCNC: 26 MMOL/L — SIGNIFICANT CHANGE UP (ref 22–31)
CREAT SERPL-MCNC: 0.93 MG/DL — SIGNIFICANT CHANGE UP (ref 0.5–1.3)
CREAT SERPL-MCNC: 0.93 MG/DL — SIGNIFICANT CHANGE UP (ref 0.5–1.3)
EGFR: 94 ML/MIN/1.73M2 — SIGNIFICANT CHANGE UP
EGFR: 94 ML/MIN/1.73M2 — SIGNIFICANT CHANGE UP
EOSINOPHIL # BLD AUTO: 0.11 K/UL — SIGNIFICANT CHANGE UP (ref 0–0.5)
EOSINOPHIL # BLD AUTO: 0.11 K/UL — SIGNIFICANT CHANGE UP (ref 0–0.5)
EOSINOPHIL NFR BLD AUTO: 2.6 % — SIGNIFICANT CHANGE UP (ref 0–6)
EOSINOPHIL NFR BLD AUTO: 2.6 % — SIGNIFICANT CHANGE UP (ref 0–6)
GLUCOSE BLDC GLUCOMTR-MCNC: 162 MG/DL — HIGH (ref 70–99)
GLUCOSE BLDC GLUCOMTR-MCNC: 162 MG/DL — HIGH (ref 70–99)
GLUCOSE BLDC GLUCOMTR-MCNC: 184 MG/DL — HIGH (ref 70–99)
GLUCOSE BLDC GLUCOMTR-MCNC: 184 MG/DL — HIGH (ref 70–99)
GLUCOSE BLDC GLUCOMTR-MCNC: 290 MG/DL — HIGH (ref 70–99)
GLUCOSE BLDC GLUCOMTR-MCNC: 290 MG/DL — HIGH (ref 70–99)
GLUCOSE BLDC GLUCOMTR-MCNC: 355 MG/DL — HIGH (ref 70–99)
GLUCOSE BLDC GLUCOMTR-MCNC: 355 MG/DL — HIGH (ref 70–99)
GLUCOSE SERPL-MCNC: 165 MG/DL — HIGH (ref 70–99)
GLUCOSE SERPL-MCNC: 165 MG/DL — HIGH (ref 70–99)
HCT VFR BLD CALC: 24.3 % — LOW (ref 39–50)
HCT VFR BLD CALC: 24.3 % — LOW (ref 39–50)
HGB BLD-MCNC: 7.8 G/DL — LOW (ref 13–17)
HGB BLD-MCNC: 7.8 G/DL — LOW (ref 13–17)
IMM GRANULOCYTES NFR BLD AUTO: 0.5 % — SIGNIFICANT CHANGE UP (ref 0–0.9)
IMM GRANULOCYTES NFR BLD AUTO: 0.5 % — SIGNIFICANT CHANGE UP (ref 0–0.9)
LYMPHOCYTES # BLD AUTO: 0.95 K/UL — LOW (ref 1–3.3)
LYMPHOCYTES # BLD AUTO: 0.95 K/UL — LOW (ref 1–3.3)
LYMPHOCYTES # BLD AUTO: 22.1 % — SIGNIFICANT CHANGE UP (ref 13–44)
LYMPHOCYTES # BLD AUTO: 22.1 % — SIGNIFICANT CHANGE UP (ref 13–44)
MAGNESIUM SERPL-MCNC: 1.7 MG/DL — SIGNIFICANT CHANGE UP (ref 1.6–2.6)
MAGNESIUM SERPL-MCNC: 1.7 MG/DL — SIGNIFICANT CHANGE UP (ref 1.6–2.6)
MCHC RBC-ENTMCNC: 28.3 PG — SIGNIFICANT CHANGE UP (ref 27–34)
MCHC RBC-ENTMCNC: 28.3 PG — SIGNIFICANT CHANGE UP (ref 27–34)
MCHC RBC-ENTMCNC: 32.1 G/DL — SIGNIFICANT CHANGE UP (ref 32–36)
MCHC RBC-ENTMCNC: 32.1 G/DL — SIGNIFICANT CHANGE UP (ref 32–36)
MCV RBC AUTO: 88 FL — SIGNIFICANT CHANGE UP (ref 80–100)
MCV RBC AUTO: 88 FL — SIGNIFICANT CHANGE UP (ref 80–100)
MONOCYTES # BLD AUTO: 0.49 K/UL — SIGNIFICANT CHANGE UP (ref 0–0.9)
MONOCYTES # BLD AUTO: 0.49 K/UL — SIGNIFICANT CHANGE UP (ref 0–0.9)
MONOCYTES NFR BLD AUTO: 11.4 % — SIGNIFICANT CHANGE UP (ref 2–14)
MONOCYTES NFR BLD AUTO: 11.4 % — SIGNIFICANT CHANGE UP (ref 2–14)
NEUTROPHILS # BLD AUTO: 2.71 K/UL — SIGNIFICANT CHANGE UP (ref 1.8–7.4)
NEUTROPHILS # BLD AUTO: 2.71 K/UL — SIGNIFICANT CHANGE UP (ref 1.8–7.4)
NEUTROPHILS NFR BLD AUTO: 62.9 % — SIGNIFICANT CHANGE UP (ref 43–77)
NEUTROPHILS NFR BLD AUTO: 62.9 % — SIGNIFICANT CHANGE UP (ref 43–77)
NRBC # BLD: 0 /100 WBCS — SIGNIFICANT CHANGE UP (ref 0–0)
NRBC # BLD: 0 /100 WBCS — SIGNIFICANT CHANGE UP (ref 0–0)
PLATELET # BLD AUTO: 308 K/UL — SIGNIFICANT CHANGE UP (ref 150–400)
PLATELET # BLD AUTO: 308 K/UL — SIGNIFICANT CHANGE UP (ref 150–400)
POTASSIUM SERPL-MCNC: 3.9 MMOL/L — SIGNIFICANT CHANGE UP (ref 3.5–5.3)
POTASSIUM SERPL-MCNC: 3.9 MMOL/L — SIGNIFICANT CHANGE UP (ref 3.5–5.3)
POTASSIUM SERPL-SCNC: 3.9 MMOL/L — SIGNIFICANT CHANGE UP (ref 3.5–5.3)
POTASSIUM SERPL-SCNC: 3.9 MMOL/L — SIGNIFICANT CHANGE UP (ref 3.5–5.3)
RBC # BLD: 2.76 M/UL — LOW (ref 4.2–5.8)
RBC # BLD: 2.76 M/UL — LOW (ref 4.2–5.8)
RBC # FLD: 13.2 % — SIGNIFICANT CHANGE UP (ref 10.3–14.5)
RBC # FLD: 13.2 % — SIGNIFICANT CHANGE UP (ref 10.3–14.5)
SODIUM SERPL-SCNC: 140 MMOL/L — SIGNIFICANT CHANGE UP (ref 135–145)
SODIUM SERPL-SCNC: 140 MMOL/L — SIGNIFICANT CHANGE UP (ref 135–145)
WBC # BLD: 4.3 K/UL — SIGNIFICANT CHANGE UP (ref 3.8–10.5)
WBC # BLD: 4.3 K/UL — SIGNIFICANT CHANGE UP (ref 3.8–10.5)
WBC # FLD AUTO: 4.3 K/UL — SIGNIFICANT CHANGE UP (ref 3.8–10.5)
WBC # FLD AUTO: 4.3 K/UL — SIGNIFICANT CHANGE UP (ref 3.8–10.5)

## 2024-01-09 PROCEDURE — 99232 SBSQ HOSP IP/OBS MODERATE 35: CPT

## 2024-01-09 RX ADMIN — SODIUM CHLORIDE 60 MILLILITER(S): 9 INJECTION, SOLUTION INTRAVENOUS at 21:59

## 2024-01-09 RX ADMIN — SODIUM CHLORIDE 60 MILLILITER(S): 9 INJECTION, SOLUTION INTRAVENOUS at 08:35

## 2024-01-09 RX ADMIN — AMLODIPINE BESYLATE 5 MILLIGRAM(S): 2.5 TABLET ORAL at 05:30

## 2024-01-09 RX ADMIN — Medication 2: at 11:36

## 2024-01-09 RX ADMIN — PANTOPRAZOLE SODIUM 40 MILLIGRAM(S): 20 TABLET, DELAYED RELEASE ORAL at 17:10

## 2024-01-09 RX ADMIN — ATORVASTATIN CALCIUM 20 MILLIGRAM(S): 80 TABLET, FILM COATED ORAL at 21:58

## 2024-01-09 RX ADMIN — Medication 2: at 21:58

## 2024-01-09 RX ADMIN — INSULIN GLARGINE 20 UNIT(S): 100 INJECTION, SOLUTION SUBCUTANEOUS at 21:58

## 2024-01-09 RX ADMIN — Medication 10: at 17:09

## 2024-01-09 RX ADMIN — PANTOPRAZOLE SODIUM 40 MILLIGRAM(S): 20 TABLET, DELAYED RELEASE ORAL at 05:30

## 2024-01-09 RX ADMIN — Medication 2: at 08:21

## 2024-01-09 RX ADMIN — TAMSULOSIN HYDROCHLORIDE 0.4 MILLIGRAM(S): 0.4 CAPSULE ORAL at 21:58

## 2024-01-09 NOTE — PROGRESS NOTE ADULT - TIME BILLING
I have spent a total of 35 minutes to prepare to see the patient, obtaining and reviewing history, physical examination, explaining the diagnosis, prognosis and treatment plan with the patient/family/caregiver. I also have spent the time ordering studies and testing, interpreting results, medicine reconciliation, subspecialty consultation and documentation as above.
I have spent a total of 35 minutes to prepare to see the patient, obtaining and reviewing history, physical examination, explaining the diagnosis, prognosis and treatment plan with the patient/family/caregiver. I also have spent the time ordering studies and testing, interpreting results, medicine reconciliation, subspecialty consultation and documentation as above.
I have spent a total of 37 minutes to prepare to see the patient, obtaining and reviewing history, physical examination, explaining the diagnosis, prognosis and treatment plan with the patient/family/caregiver. I also have spent the time ordering studies and testing, interpreting results, medicine reconciliation, subspecialty consultation and documentation as above.
I have spent a total of 38 minutes to prepare to see the patient, obtaining and reviewing history, physical examination, explaining the diagnosis, prognosis and treatment plan with the patient/family/caregiver. I also have spent the time ordering studies and testing, interpreting results, medicine reconciliation, subspecialty consultation and documentation as above.
I have spent a total of 50 minutes to prepare to see the patient, obtaining and reviewing history, physical examination, explaining the diagnosis, prognosis and treatment plan with the patient/family/caregiver. I also have spent the time ordering studies and testing, interpreting results, medicine reconciliation, subspecialty consultation and documentation as above.
I have spent a total of 36 minutes to prepare to see the patient, obtaining and reviewing history, physical examination, explaining the diagnosis, prognosis and treatment plan with the patient/family/caregiver. I also have spent the time ordering studies and testing, interpreting results, medicine reconciliation, subspecialty consultation and documentation as above.

## 2024-01-09 NOTE — PROGRESS NOTE ADULT - SUBJECTIVE AND OBJECTIVE BOX
Patient is a 58y old  Male who presents with a chief complaint of Symptomatic anemia, hyperglycemia (2024 11:35)    INTERVAL HPI/OVERNIGHT EVENTS: NAEON, resting comfortably in bed     MEDICATIONS  (STANDING):  amLODIPine   Tablet 5 milliGRAM(s) Oral daily  atorvastatin 20 milliGRAM(s) Oral at bedtime  dextrose 5%. 1000 milliLiter(s) (50 mL/Hr) IV Continuous <Continuous>  dextrose 5%. 1000 milliLiter(s) (100 mL/Hr) IV Continuous <Continuous>  dextrose 50% Injectable 25 Gram(s) IV Push once  dextrose 50% Injectable 12.5 Gram(s) IV Push once  dextrose 50% Injectable 25 Gram(s) IV Push once  glucagon  Injectable 1 milliGRAM(s) IntraMuscular once  insulin glargine Injectable (LANTUS) 20 Unit(s) SubCutaneous at bedtime  insulin lispro (ADMELOG) corrective regimen sliding scale   SubCutaneous three times a day before meals  insulin lispro (ADMELOG) corrective regimen sliding scale   SubCutaneous at bedtime  lactated ringers. 1000 milliLiter(s) (60 mL/Hr) IV Continuous <Continuous>  pantoprazole  Injectable 40 milliGRAM(s) IV Push every 12 hours  tamsulosin 0.4 milliGRAM(s) Oral at bedtime    MEDICATIONS  (PRN):  acetaminophen     Tablet .. 650 milliGRAM(s) Oral every 6 hours PRN Temp greater or equal to 38C (100.4F), Mild Pain (1 - 3)  dextrose Oral Gel 15 Gram(s) Oral once PRN Blood Glucose LESS THAN 70 milliGRAM(s)/deciliter  melatonin 3 milliGRAM(s) Oral at bedtime PRN Insomnia    Allergies    No Known Allergies    Intolerances      REVIEW OF SYSTEMS:  All other systems reviewed and are negative    Vital Signs Last 24 Hrs  T(C): 37.1 (2024 10:51), Max: 37.2 (2024 16:18)  T(F): 98.7 (2024 10:51), Max: 98.9 (2024 16:18)  HR: 92 (2024 10:51) (92 - 106)  BP: 121/71 (2024 10:51) (121/70 - 127/68)  BP(mean): --  RR: 18 (2024 10:51) (18 - 18)  SpO2: 95% (2024 10:51) (95% - 97%)    Parameters below as of 2024 05:19  Patient On (Oxygen Delivery Method): room air      Daily     Daily Weight in k.4 (2024 05:19)  I&O's Summary    2024 07:01  -  2024 07:00  --------------------------------------------------------  IN: 1200 mL / OUT: 600 mL / NET: 600 mL    2024 07:01  -  2024 12:05  --------------------------------------------------------  IN: 1180 mL / OUT: 0 mL / NET: 1180 mL      CAPILLARY BLOOD GLUCOSE      POCT Blood Glucose.: 184 mg/dL (2024 10:59)  POCT Blood Glucose.: 162 mg/dL (2024 07:51)  POCT Blood Glucose.: 136 mg/dL (2024 21:03)  POCT Blood Glucose.: 398 mg/dL (2024 17:53)  POCT Blood Glucose.: 445 mg/dL (2024 16:35)  POCT Blood Glucose.: 449 mg/dL (2024 16:33)    PHYSICAL EXAM:  GENERAL: stable, Thin male. no melena or abdominal pain.   NECK: supple, No JVD  CHEST/LUNG: clear to auscultation bilaterally; no rales, rhonchi, or wheezing b/l  HEART: normal S1, S2  ABDOMEN: BS+, soft, ND, NT   EXTREMITIES:  pulses palpable; no clubbing, cyanosis, or edema b/l LEs  Labs                          7.8    4.30  )-----------( 308      ( 2024 09:49 )             24.3     01-09    140  |  108  |  13  ----------------------------<  165<H>  3.9   |  26  |  0.93    Ca    8.1<L>      2024 09:49  Mg     1.7                 Urinalysis Basic - ( 2024 09:49 )    Color: x / Appearance: x / SG: x / pH: x  Gluc: 165 mg/dL / Ketone: x  / Bili: x / Urobili: x   Blood: x / Protein: x / Nitrite: x   Leuk Esterase: x / RBC: x / WBC x   Sq Epi: x / Non Sq Epi: x / Bacteria: x                  DVT prophylaxis: > Lovenox 40mg SQ daily  > Heparin   > SCD's

## 2024-01-09 NOTE — PROGRESS NOTE ADULT - REASON FOR ADMISSION
Symptomatic anemia, hyperglycemia

## 2024-01-09 NOTE — PROGRESS NOTE ADULT - ASSESSMENT
58 year old male with PMHx of HTN, HLD, IDDM2, and BPH who presented to the ED on 1/1/24 for complaints of dizziness and fatigue and admitted for symptomatic anemia and hyperglycemia.    #Acute blood loss anemia d/t upper GI bleed   -Hgb 4.7 on admission, baseline ~11.5 s/p 4 units PRBC 1/1. Hgb stable at 7.6 today.  -c/w IV PPI 40 mg bid. EGD on 01/02: nodule on gastric side of the GEJ with some old heme in stomach. Possible healing Kavita Valero tear. Colonoscopy on 01/04:  right sided diverticula, small internal hemorrhoids  - Pathology from EGD concerning for adenocarcinoma.   - CT CAP shows High attenuation in the gastric lumen either blood products or ingested   material. Limited evaluation for underlying mass. Diffuse wall thickening   consistent with gastritis. Mildly distended esophagus. Correlate with   recent endoscopic findings.  - Transfer accepted to [facility] for EUS and surgical oncology . Accepted by Dr. French     #IDDM2 with hyperglycemia secondary to medication noncompliance  - A1c 12.8. Admits to not taking insulin as prescribed. Hold metformin and Jardiance   - Continue Lantus 20U nightly + SSI    #NATALIA, resolved   BUN 63 and Cr 1.42 on admission, baseline Cr ~0.8. now .95.   Avoid nephrotoxins, renally dose meds  Monitor renal function    #HTN  #HLD  c/w amlodipine 5 mg   -c/w statin    Diet: diabetic diet   DVT prophylaxis: SCDs  Dispo: admit, pending transfer   Code Status:     58 year old male with PMHx of HTN, HLD, IDDM2, and BPH who presented to the ED on 1/1/24 for complaints of dizziness and fatigue and admitted for symptomatic anemia and hyperglycemia.    #Acute blood loss anemia d/t upper GI bleed   -Hgb 4.7 on admission, baseline ~11.5 s/p 4 units PRBC 1/1. Hgb stable at 7.6 today.  -c/w IV PPI 40 mg bid. EGD on 01/02: nodule on gastric side of the GEJ with some old heme in stomach. Possible healing Kavita Valero tear. Colonoscopy on 01/04:  right sided diverticula, small internal hemorrhoids  - Pathology from EGD concerning for adenocarcinoma.   - CT CAP shows High attenuation in the gastric lumen either blood products or ingested   material. Limited evaluation for underlying mass. Diffuse wall thickening   consistent with gastritis. Mildly distended esophagus. Correlate with   recent endoscopic findings.  - Transfer accepted to [facility] for EUS and surgical oncology . Accepted by Dr. French     #IDDM2 with hyperglycemia secondary to medication noncompliance  - A1c 12.8. Admits to not taking insulin as prescribed. Hold metformin and Jardiance   - Continue Lantus 20U nightly + SSI    #NATALIA, resolved   BUN 63 and Cr 1.42 on admission, baseline Cr ~0.8. now .95.   Avoid nephrotoxins, renally dose meds  Monitor renal function    #HTN  #HLD  c/w amlodipine 5 mg   -c/w statin    Diet: diabetic diet   DVT prophylaxis: SCDs  Dispo: admit, pending transfer   Code Status:

## 2024-01-10 ENCOUNTER — TRANSCRIPTION ENCOUNTER (OUTPATIENT)
Age: 59
End: 2024-01-10

## 2024-01-10 ENCOUNTER — NON-APPOINTMENT (OUTPATIENT)
Age: 59
End: 2024-01-10

## 2024-01-10 VITALS
TEMPERATURE: 98 F | RESPIRATION RATE: 19 BRPM | SYSTOLIC BLOOD PRESSURE: 141 MMHG | OXYGEN SATURATION: 95 % | HEART RATE: 102 BPM | DIASTOLIC BLOOD PRESSURE: 74 MMHG

## 2024-01-10 LAB
GLUCOSE BLDC GLUCOMTR-MCNC: 132 MG/DL — HIGH (ref 70–99)
GLUCOSE BLDC GLUCOMTR-MCNC: 132 MG/DL — HIGH (ref 70–99)
GLUCOSE BLDC GLUCOMTR-MCNC: 286 MG/DL — HIGH (ref 70–99)
GLUCOSE BLDC GLUCOMTR-MCNC: 286 MG/DL — HIGH (ref 70–99)
GLUCOSE BLDC GLUCOMTR-MCNC: 358 MG/DL — HIGH (ref 70–99)
GLUCOSE BLDC GLUCOMTR-MCNC: 358 MG/DL — HIGH (ref 70–99)

## 2024-01-10 PROCEDURE — 99239 HOSP IP/OBS DSCHRG MGMT >30: CPT

## 2024-01-10 RX ORDER — PANTOPRAZOLE SODIUM 20 MG/1
40 TABLET, DELAYED RELEASE ORAL
Refills: 0 | Status: DISCONTINUED | OUTPATIENT
Start: 2024-01-10 | End: 2024-01-10

## 2024-01-10 RX ORDER — PANTOPRAZOLE SODIUM 20 MG/1
1 TABLET, DELAYED RELEASE ORAL
Qty: 30 | Refills: 0
Start: 2024-01-10 | End: 2024-02-08

## 2024-01-10 RX ADMIN — PANTOPRAZOLE SODIUM 40 MILLIGRAM(S): 20 TABLET, DELAYED RELEASE ORAL at 05:31

## 2024-01-10 RX ADMIN — AMLODIPINE BESYLATE 5 MILLIGRAM(S): 2.5 TABLET ORAL at 05:31

## 2024-01-10 RX ADMIN — Medication 6: at 08:23

## 2024-01-10 RX ADMIN — Medication 10: at 17:27

## 2024-01-10 NOTE — CHART NOTE - NSCHARTNOTEFT_GEN_A_CORE
Pt adm c diagnosis of dizziness, hyperglycemia, fatigue associated c anemia, NATALIA; resolved,  c acute blood loss anemia due to upper GI bleed, s/p EGD; nodule on gastric side of the GEJ, possible healing Kavita Valero tear, s/p Colonoscopy; right sided diverticula, small internal hemorrhoid, pathology from EGD concerning for adenocarcinoma, pt accepted to [facility] & awaiting for transfer.  PMH include HTN, BPH, DM(was on metformin, empagliflozin, Lantus PTA), HLD.  Nutrition therapy for heart healthy, consistent CHO intake reviewed, per pt., his daughter took home all the information which was provided to him.  Diet, Regular:   Consistent Carbohydrate {Evening Snack}  DASH/TLC {Sodium & Cholesterol Restricted} (01-09-24 @ 12:03) [Active]  Oral intake: >75%  Weight   01/09, 54.4 kg 01/02, 54.4 kg, c stable wt. @ present.    MEDICATIONS  (STANDING):  amLODIPine   Tablet 5 milliGRAM(s) Oral daily  atorvastatin 20 milliGRAM(s) Oral at bedtime  dextrose 5%. 1000 milliLiter(s) (100 mL/Hr) IV Continuous <Continuous>  dextrose 5%. 1000 milliLiter(s) (50 mL/Hr) IV Continuous <Continuous>  dextrose 50% Injectable 25 Gram(s) IV Push once  dextrose 50% Injectable 12.5 Gram(s) IV Push once  dextrose 50% Injectable 25 Gram(s) IV Push once  glucagon  Injectable 1 milliGRAM(s) IntraMuscular once  insulin glargine Injectable (LANTUS) 20 Unit(s) SubCutaneous at bedtime  insulin lispro (ADMELOG) corrective regimen sliding scale   SubCutaneous three times a day before meals  insulin lispro (ADMELOG) corrective regimen sliding scale   SubCutaneous at bedtime  lactated ringers. 1000 milliLiter(s) (60 mL/Hr) IV Continuous <Continuous>  pantoprazole  Injectable 40 milliGRAM(s) IV Push every 12 hours  tamsulosin 0.4 milliGRAM(s) Oral at bedtime    MEDICATIONS  (PRN):  acetaminophen     Tablet .. 650 milliGRAM(s) Oral every 6 hours PRN Temp greater or equal to 38C (100.4F), Mild Pain (1 - 3)  dextrose Oral Gel 15 Gram(s) Oral once PRN Blood Glucose LESS THAN 70 milliGRAM(s)/deciliter  melatonin 3 milliGRAM(s) Oral at bedtime PRN Insomnia    Labs: 01-09 Na140 mmol/L Glu 165 mg/dL<H> K+ 3.9 mmol/L Cr  0.93 mg/dL BUN 13 mg/dL  01-01-24 @ 06:00 a1c 12.8<H>    Nutrition diagnosis (01/03)  Malnutrition: moderate malnutrition in context of chronic illness  Etiology: impaired nutrient absorption in setting of uncontrolled diabetes  Signs/Symptoms: mild/moderate fat/muscle loss, A1C=12.8%  Goal/Expected Outcome: pt to verbalize understanding of A1C% goal <7.0% & consistent CHO intake; met     Recommend outpatient Endocrine consult     No further nutrition intervention is indicated at present. Pt adm c diagnosis of dizziness, hyperglycemia, fatigue associated c anemia, NATALIA; resolved,  c acute blood loss anemia due to upper GI bleed, s/p EGD; nodule on gastric side of the GEJ, possible healing Kavita Valero tear, s/p Colonoscopy; right sided diverticula, small internal hemorrhoid, pathology from EGD concerning for adenocarcinoma, pt accepted to [facility] & awaiting for transfer.  PMH include HTN, BPH, DM(was on metformin, empagliflozin, Lantus PTA), HLD.  Nutrition therapy for heart healthy, consistent CHO intake reviewed, per pt., his daughter took home all the information which was provided to him.  Diet, Regular:   Consistent Carbohydrate {Evening Snack}  DASH/TLC {Sodium & Cholesterol Restricted} (01-09-24 @ 12:03) [Active]  Oral intake: >75%  Weight   01/09, 54.4 kg 01/02, 54.4 kg, c stable wt. @ present.    MEDICATIONS  (STANDING):  amLODIPine   Tablet 5 milliGRAM(s) Oral daily  atorvastatin 20 milliGRAM(s) Oral at bedtime  dextrose 5%. 1000 milliLiter(s) (100 mL/Hr) IV Continuous <Continuous>  dextrose 5%. 1000 milliLiter(s) (50 mL/Hr) IV Continuous <Continuous>  dextrose 50% Injectable 25 Gram(s) IV Push once  dextrose 50% Injectable 12.5 Gram(s) IV Push once  dextrose 50% Injectable 25 Gram(s) IV Push once  glucagon  Injectable 1 milliGRAM(s) IntraMuscular once  insulin glargine Injectable (LANTUS) 20 Unit(s) SubCutaneous at bedtime  insulin lispro (ADMELOG) corrective regimen sliding scale   SubCutaneous three times a day before meals  insulin lispro (ADMELOG) corrective regimen sliding scale   SubCutaneous at bedtime  lactated ringers. 1000 milliLiter(s) (60 mL/Hr) IV Continuous <Continuous>  pantoprazole  Injectable 40 milliGRAM(s) IV Push every 12 hours  tamsulosin 0.4 milliGRAM(s) Oral at bedtime    MEDICATIONS  (PRN):  acetaminophen     Tablet .. 650 milliGRAM(s) Oral every 6 hours PRN Temp greater or equal to 38C (100.4F), Mild Pain (1 - 3)  dextrose Oral Gel 15 Gram(s) Oral once PRN Blood Glucose LESS THAN 70 milliGRAM(s)/deciliter  melatonin 3 milliGRAM(s) Oral at bedtime PRN Insomnia    Labs: 01-09 Na140 mmol/L Glu 165 mg/dL<H> K+ 3.9 mmol/L Cr  0.93 mg/dL BUN 13 mg/dL  01-01-24 @ 06:00 a1c 12.8<H>    Nutrition diagnosis (01/03)  Malnutrition: moderate malnutrition in context of chronic illness  Etiology: impaired nutrient absorption in setting of uncontrolled diabetes  Signs/Symptoms: mild/moderate fat/muscle loss, A1C=12.8%  Goal/Expected Outcome: pt to verbalize understanding of A1C% goal <7.0% & consistent CHO intake; met     Recommend outpatient Endocrine consult     No further nutrition intervention is indicated at present.

## 2024-01-10 NOTE — DISCHARGE NOTE PROVIDER - NSDCMRMEDTOKEN_GEN_ALL_CORE_FT
amLODIPine 5 mg oral tablet: 1 tab(s) orally once a day  atorvastatin 20 mg oral tablet: 1 tab(s) orally once a day  empagliflozin 25 mg oral tablet: 1 tab(s) orally once a day  Lantus 100 units/mL subcutaneous solution: 15 unit(s) subcutaneous once a day (at bedtime)  metFORMIN 500 mg oral tablet: 1 tab(s) orally 2 times a day  pantoprazole 40 mg oral delayed release tablet: 1 tab(s) orally once a day (before a meal)  tamsulosin 0.4 mg oral capsule: 1 cap(s) orally once a day (at bedtime)

## 2024-01-10 NOTE — DISCHARGE NOTE PROVIDER - CARE PROVIDER_API CALL
Ellie Mendenhall  Internal Medicine  [address] [place] [address]  Phone: [phone]  Fax: [phone]  Follow Up Time:     Drake Chong  Surgery  [address] [place] [id number]  Phone: [phone]  Fax: [phone]  Follow Up Time:    Ellie Mendenhall  Internal Medicine  [address] [place] [address]  Phone: [phone]  Fax: [phone]  Follow Up Time:     Drake Chong  Surgery  [address] [place] [id number]  Phone: [phone]  Fax: [phone]  Follow Up Time:

## 2024-01-10 NOTE — DISCHARGE NOTE NURSING/CASE MANAGEMENT/SOCIAL WORK - NSDCVIVACCINE_GEN_ALL_CORE_FT
influenza, injectable, quadrivalent, preservative free; 20-Oct-2020 12:16; Rafa Thacker (RN); [employer]; 5X74X (Exp. Date: 30-Jun-2021); IntraMuscular; Deltoid Right.; 0.5 milliLiter(s); VIS (VIS Published: 15-Aug-2019, VIS Presented: 20-Oct-2020);    influenza, injectable, quadrivalent, preservative free; 20-Oct-2020 12:16; Rafa Thacker (RN); [employer]; 5X74X (Exp. Date: 30-Jun-2021); IntraMuscular; Deltoid Right.; 0.5 milliLiter(s); VIS (VIS Published: 15-Aug-2019, VIS Presented: 20-Oct-2020);    Never

## 2024-01-10 NOTE — DISCHARGE NOTE NURSING/CASE MANAGEMENT/SOCIAL WORK - PATIENT PORTAL LINK FT
You can access the FollowMyHealth Patient Portal offered by [facility] by registering at the following website: [email or web address]. By joining [employer]’s FollowMyHealth portal, you will also be able to view your health information using other applications (apps) compatible with our system. You can access the FollowMyHealth Patient Portal offered by [facility] by registering at the following website: [email or web address]. By joining [facility]’s FollowMyHealth portal, you will also be able to view your health information using other applications (apps) compatible with our system.

## 2024-01-10 NOTE — DISCHARGE NOTE PROVIDER - HOSPITAL COURSE
58 year old male with PMHx of HTN, HLD, IDDM2, and BPH who presented to the ED on 1/1/24 for complaints of dizziness and fatigue and admitted for symptomatic anemia and hyperglycemia.    1) Acute blood loss anemia d/t upper GI bleed   2) NATALIA resolved  3) DM-2 w/ hyperglycemia 2ndary non-compliance- improved  4) HTN  5) HLD    - patient cleared from medicine Dr. Hagen for discharge  - Hgb 4.7 on admission, baseline ~11.5 s/p 4 units PRBC 1/1. Hgb stable at 7.6 today.  - EGD on 01/02: nodule on gastric side of the GEJ with some old heme in stomach. Possible healing Kavita Valero tear. Colonoscopy on 01/04:  right sided diverticula, small internal hemorrhoids  - Pathology from EGD concerning for adenocarcinoma.   - please follow up w/ Dr. Mendenhall for EUS at [facility] and Surgery oncologist Dr. Chong 58 year old male with PMHx of HTN, HLD, IDDM2, and BPH who presented to the ED on 1/1/24 for complaints of dizziness and fatigue and admitted for symptomatic anemia and hyperglycemia.    1) Acute blood loss anemia d/t upper GI bleed   2) NATALIA resolved  3) DM-2 w/ hyperglycemia 2ndary non-compliance- improved  4) HTN  5) HLD    - patient cleared from medicine Dr. Hagen for discharge  - Hgb 4.7 on admission, baseline ~11.5 s/p 4 units PRBC 1/1. Hgb stable at 7.6 today.  - EGD on 01/02: nodule on gastric side of the GEJ with some old heme in stomach. Possible healing Kavita Valero tear. Colonoscopy on 01/04:  right sided diverticula, small internal hemorrhoids  - Pathology from EGD concerning for adenocarcinoma.   - please follow up w/ Dr. Mendenhall for EUS at [facility] and Surgery oncologist Dr. Chong

## 2024-01-10 NOTE — DISCHARGE NOTE NURSING/CASE MANAGEMENT/SOCIAL WORK - NSDCPEFALRISK_GEN_ALL_CORE
For information on Fall & Injury Prevention, visit: [email or web address] OR  [email or web address]://[email or web address]-injury OR  https://www.cdc.gov/steadi/patient.html For information on Fall & Injury Prevention, visit: [email or web address]://[email or web address]-[email or web address]-[email or web address]-mobility OR  [email or web address]://[email or web address]-[email or web address]-injury OR  https://www.cdc.gov/steadi/patient.html

## 2024-01-10 NOTE — DISCHARGE NOTE PROVIDER - PROVIDER TOKENS
PROVIDER:[TOKEN:[[id number]]],PROVIDER:[TOKEN:[[id number]]] PROVIDER:[TOKEN:[[id number]]],PROVIDER:[TOKEN:[[id number]]]

## 2024-01-10 NOTE — DISCHARGE NOTE PROVIDER - CARE PROVIDERS DIRECT ADDRESSES
,DirectAddress_Unknown,[email or web address] ,DirectAddress_Unknown,[email or web address]

## 2024-01-10 NOTE — DISCHARGE NOTE PROVIDER - NSDCCPCAREPLAN_GEN_ALL_CORE_FT
PRINCIPAL DISCHARGE DIAGNOSIS  Diagnosis: Dizziness  Assessment and Plan of Treatment:       SECONDARY DISCHARGE DIAGNOSES  Diagnosis: Hyperglycemia  Assessment and Plan of Treatment:     Diagnosis: Fatigue associated with anemia  Assessment and Plan of Treatment:     Diagnosis: Anemia requiring transfusions  Assessment and Plan of Treatment:      PRINCIPAL DISCHARGE DIAGNOSIS  Diagnosis: GI bleed  Assessment and Plan of Treatment: You were admitted to the hospital due to concern for GI Bleed.  Please continue taking your medications that are prescribed by your doctor. Please return to the hospital should you experience any new or worsening symptoms.      SECONDARY DISCHARGE DIAGNOSES  Diagnosis: Hyperglycemia  Assessment and Plan of Treatment:     Diagnosis: HTN (hypertension)  Assessment and Plan of Treatment:     Diagnosis: Type 2 diabetes mellitus with hyperglycemia, with long-term current use of insulin  Assessment and Plan of Treatment:     Diagnosis: Fatigue associated with anemia  Assessment and Plan of Treatment:     Diagnosis: Anemia requiring transfusions  Assessment and Plan of Treatment:

## 2024-01-11 ENCOUNTER — NON-APPOINTMENT (OUTPATIENT)
Age: 59
End: 2024-01-11

## 2024-01-11 PROBLEM — I10 ESSENTIAL (PRIMARY) HYPERTENSION: Chronic | Status: ACTIVE | Noted: 2024-01-01

## 2024-01-11 PROBLEM — E11.9 TYPE 2 DIABETES MELLITUS WITHOUT COMPLICATIONS: Chronic | Status: ACTIVE | Noted: 2024-01-01

## 2024-01-11 PROBLEM — E78.5 HYPERLIPIDEMIA, UNSPECIFIED: Chronic | Status: ACTIVE | Noted: 2024-01-01

## 2024-01-11 PROBLEM — N40.0 BENIGN PROSTATIC HYPERPLASIA WITHOUT LOWER URINARY TRACT SYMPTOMS: Chronic | Status: ACTIVE | Noted: 2024-01-01

## 2024-01-16 NOTE — ASU PATIENT PROFILE, ADULT - FALL HARM RISK - UNIVERSAL INTERVENTIONS
Bed in lowest position, wheels locked, appropriate side rails in place/Call bell, personal items and telephone in reach/Instruct patient to call for assistance before getting out of bed or chair/Non-slip footwear when patient is out of bed/[place] to call system/Physically safe environment - no spills, clutter or unnecessary equipment/Purposeful Proactive Rounding/Room/bathroom lighting operational, light cord in reach Bed in lowest position, wheels locked, appropriate side rails in place/Call bell, personal items and telephone in reach/Instruct patient to call for assistance before getting out of bed or chair/Non-slip footwear when patient is out of bed/[place] to call system/Physically safe environment - no spills, clutter or unnecessary equipment/Purposeful Proactive Rounding/Room/bathroom lighting operational, light cord in reach

## 2024-01-16 NOTE — ASU PATIENT PROFILE, ADULT - CAREGIVER NAME
Verified Results  prc PROTHROMBIN TIME, IN-OFFICE FINGERSTICK [id number] 08:45AM MAXWELL PADGETT   [Feb 6, 2017 12:33PM MAXWELL PADGETT]  ok. no changes needed. repeat in 1 month.     Test Name Result Flag Reference   INR, FINGERSTICK 2.1     CURRENT DOSE 5mg qd          SAURABH GONZALES

## 2024-01-16 NOTE — ASU PATIENT PROFILE, ADULT - SURGERY TIME
14:30 Helical Rim Advancement Flap Text: The defect edges were debeveled with a #15 blade scalpel.  Given the location of the defect and the proximity to free margins (helical rim) a double helical rim advancement flap was deemed most appropriate.  Using a sterile surgical marker, the appropriate advancement flaps were drawn incorporating the defect and placing the expected incisions between the helical rim and antihelix where possible.  The area thus outlined was incised through and through with a #15 scalpel blade.  With a skin hook and iris scissors, the flaps were gently and sharply undermined and freed up.

## 2024-01-17 ENCOUNTER — APPOINTMENT (OUTPATIENT)
Dept: GASTROENTEROLOGY | Facility: HOSPITAL | Age: 59
End: 2024-01-17

## 2024-01-17 ENCOUNTER — TRANSCRIPTION ENCOUNTER (OUTPATIENT)
Age: 59
End: 2024-01-17

## 2024-01-17 ENCOUNTER — OUTPATIENT (OUTPATIENT)
Dept: OUTPATIENT SERVICES | Facility: HOSPITAL | Age: 59
LOS: 1 days | Discharge: HOME | End: 2024-01-17
Payer: MEDICAID

## 2024-01-17 VITALS
SYSTOLIC BLOOD PRESSURE: 137 MMHG | HEART RATE: 100 BPM | OXYGEN SATURATION: 96 % | DIASTOLIC BLOOD PRESSURE: 78 MMHG | RESPIRATION RATE: 18 BRPM

## 2024-01-17 VITALS
TEMPERATURE: 98 F | WEIGHT: 117.95 LBS | HEIGHT: 64 IN | OXYGEN SATURATION: 95 % | DIASTOLIC BLOOD PRESSURE: 72 MMHG | SYSTOLIC BLOOD PRESSURE: 139 MMHG | HEART RATE: 105 BPM | RESPIRATION RATE: 18 BRPM

## 2024-01-17 DIAGNOSIS — C80.1 MALIGNANT (PRIMARY) NEOPLASM, UNSPECIFIED: ICD-10-CM

## 2024-01-17 DIAGNOSIS — I10 ESSENTIAL (PRIMARY) HYPERTENSION: ICD-10-CM

## 2024-01-17 LAB — GLUCOSE BLDC GLUCOMTR-MCNC: 262 MG/DL — HIGH (ref 70–99)

## 2024-01-17 PROCEDURE — 43237 ENDOSCOPIC US EXAM ESOPH: CPT

## 2024-01-17 RX ORDER — SODIUM CHLORIDE 9 MG/ML
500 INJECTION, SOLUTION INTRAVENOUS
Refills: 0 | Status: DISCONTINUED | OUTPATIENT
Start: 2024-01-17 | End: 2024-02-01

## 2024-01-17 NOTE — PRE PROCEDURE NOTE - PRE PROCEDURE EVALUATION
Attending Physician:  Dr. Mendenhall    Procedure: EUS    Indication for Procedure: Staging  ________________________________________________________  PAST MEDICAL & SURGICAL HISTORY:  HLD (hyperlipidemia)      Insulin dependent type 2 diabetes mellitus      BPH (benign prostatic hyperplasia)      HTN (hypertension)      No significant past surgical history        ALLERGIES:  No Known Allergies    HOME MEDICATIONS:  amLODIPine 5 mg oral tablet: 1 tab(s) orally once a day  atorvastatin 20 mg oral tablet: 1 tab(s) orally once a day  empagliflozin 25 mg oral tablet: 1 tab(s) orally once a day  Lantus 100 units/mL subcutaneous solution: 15 unit(s) subcutaneous once a day (at bedtime)    AICD/PPM: [ ] yes   [ ] no    PERTINENT LAB DATA:                      PHYSICAL EXAMINATION:    Height (cm): 162.6  Weight (kg): 53.5  BMI (kg/m2): 20.2  BSA (m2): 1.56T(C): 36.7  HR: 105  BP: 139/72  RR: 18  SpO2: 95%    Constitutional: NAD  HEENT: PERRLA, EOMI,    Neck:  No JVD  Respiratory: CTAB/L  Cardiovascular: S1 and S2  Gastrointestinal: BS+, soft, NT/ND  Extremities: No peripheral edema  Neurological: A/O x 3, no focal deficits  Psychiatric: Normal mood, normal affect  Skin: No rashes    ASA Class: I [ ]  II [ ]  III [ x ]  IV [ ]    COMMENTS:    The patient is a suitable candidate for the planned procedure unless box checked [ ]  No, explain:    Risks and alternatives to the procedure discussed in detail. All questions answered.

## 2024-01-17 NOTE — ASU DISCHARGE PLAN (ADULT/PEDIATRIC) - NSDISCH_GASTRIC_ENDO_ALL_CORE_FT
Spoke with patient and conveyed message below. Patient states her last dose of hydrocodone was last week (she ran out of the medication). States she was waiting from a call from you regarding if her insurance is accepted by Dr. Dacosta's practice. I gave her the number to call pain management regarding this to see if her insurance is accepted. Did you call them as well? She was under the impression you were doing some research and were going to get back to her? Please advise if you have any additional information that should be conveyed to patient. Thank you.    You might feel 'gassy' or "crampy'' for a few hours. This is because air was put into the stomach during the procedure. However, if you have continued abdominal (stomach) pain or swelling, contact your doctor right away.

## 2024-01-18 ENCOUNTER — NON-APPOINTMENT (OUTPATIENT)
Age: 59
End: 2024-01-18

## 2024-01-24 ENCOUNTER — APPOINTMENT (OUTPATIENT)
Dept: SURGICAL ONCOLOGY | Facility: CLINIC | Age: 59
End: 2024-01-24
Payer: MEDICAID

## 2024-01-24 VITALS
HEIGHT: 64 IN | OXYGEN SATURATION: 95 % | TEMPERATURE: 97.7 F | HEART RATE: 107 BPM | DIASTOLIC BLOOD PRESSURE: 81 MMHG | SYSTOLIC BLOOD PRESSURE: 169 MMHG | WEIGHT: 129.52 LBS | BODY MASS INDEX: 22.11 KG/M2

## 2024-01-24 VITALS — SYSTOLIC BLOOD PRESSURE: 154 MMHG | HEART RATE: 104 BPM | DIASTOLIC BLOOD PRESSURE: 82 MMHG

## 2024-01-24 PROCEDURE — 99204 OFFICE O/P NEW MOD 45 MIN: CPT

## 2024-01-24 NOTE — CONSULT LETTER
[Dear  ___] : Dear  [unfilled], [Consult Letter:] : I had the pleasure of evaluating your patient, [unfilled]. [Consult Closing:] : Thank you very much for allowing me to participate in the care of this patient.  If you have any questions, please do not hesitate to contact me. [Sincerely,] : Sincerely, [FreeTextEntry3] : Drake Chong MD, MPH, FACS, FSSO Chief, Surgical Oncology - [facility] Director, [facility] General Surgery Residency , [facility] General Surgical Oncology Fellowship Program Professor of Surgery [employer] at [facility]

## 2024-01-24 NOTE — ASSESSMENT
[FreeTextEntry1] : BHARATH GONZALES is a 57yo M referred by Dr. 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 He presented to [facility] on 01/01/24 for an admission of symptomatic anemia, hyperglycemia with symptoms present 2 days prior. She has an EUS planned with GI.  ***EGD*** 01/02/24 Specimen(s) Submitted 1  GE Junction  Final Diagnosis Gastroesophageal junction, biopsy: - Fragments of adenocarcinoma, moderately differentiated -  Fragment of squamous mucosa with mild reflux changes -  Negative for Helicobacter pylori (cresyl violet stain)  ***CT CAP*** 01/05/24 FINDINGS:  ABDOMEN AND PELVIS: LIVER: Subcentimeter right lobe hypodensity too small to characterize, similar to prior..  REPRODUCTIVE ORGANS: Enlarged prostate measuring 3.8 x 4.7 cm indenting the bladder base.  BOWEL: No bowel obstruction. Appendix is normal.. Mildly distended fluid-filled esophagus. High attenuation in the gastric lumen either blood products or ingested material. Limited evaluation for underlying mass. Diffuse gastric wall thickening. 6.6 x 6.7 mm peripancreatic node (2:72).  IMPRESSION: Indeterminate 3.6 mm left apical lung nodule.  High attenuation in the gastric lumen either blood products or ingested material. Limited evaluation for underlying mass. Diffuse wall thickening consistent with gastritis. Mildly distended esophagus. Correlate with recent endoscopic findings.  6.6 x 6.7 mm peripancreatic node.  After further discussion with GI staging was noted as T2-T3,N0. Some areas suspicious for T3N0.  Plan: 1) PET CT 2) Nutritional assessment and counseling if considering Sx 3) Refer to medical oncology and radiation oncology for neoadjuvant chemoXRT

## 2024-01-24 NOTE — HISTORY OF PRESENT ILLNESS
[de-identified] : BHARATH GONZALES is a 57yo M referred by Dr. 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 He presented to [facility] on 01/01/24 for an admission of symptomatic anemia, hyperglycemia with symptoms present 2 days prior. Underwent recent EUS with Dr. Mendenhall   ***EGD*** 01/02/24 Specimen(s) Submitted 1  GE Junction  Final Diagnosis Gastroesophageal junction, biopsy: - Fragments of adenocarcinoma, moderately differentiated -  Fragment of squamous mucosa with mild reflux changes -  Negative for Helicobacter pylori (cresyl violet stain)  ***CT CAP*** 01/05/24 FINDINGS:  ABDOMEN AND PELVIS: LIVER: Subcentimeter right lobe hypodensity too small to characterize, similar to prior..  REPRODUCTIVE ORGANS: Enlarged prostate measuring 3.8 x 4.7 cm indenting the bladder base.  BOWEL: No bowel obstruction. Appendix is normal.. Mildly distended fluid-filled esophagus. High attenuation in the gastric lumen either blood products or ingested material. Limited evaluation for underlying mass. Diffuse gastric wall thickening. 6.6 x 6.7 mm peripancreatic node (2:72).  IMPRESSION: Indeterminate 3.6 mm left apical lung nodule.  High attenuation in the gastric lumen either blood products or ingested material. Limited evaluation for underlying mass. Diffuse wall thickening consistent with gastritis. Mildly distended esophagus. Correlate with recent endoscopic findings.  6.6 x 6.7 mm peripancreatic node.  PMH: anemia, DM II PSH: Denies Family Hx: Father/ Mother DM Social Hx: Denies

## 2024-01-24 NOTE — PHYSICAL EXAM
[Normal] : oriented to person, place and time, with appropriate affect [de-identified] : bp: 169/81, HR: 107- pt denies any symptoms, headache, chest pain, dizziness or feeling of faint

## 2024-01-25 ENCOUNTER — NON-APPOINTMENT (OUTPATIENT)
Age: 59
End: 2024-01-25

## 2024-01-31 ENCOUNTER — OUTPATIENT (OUTPATIENT)
Dept: OUTPATIENT SERVICES | Facility: HOSPITAL | Age: 59
LOS: 1 days | Discharge: HOME | End: 2024-01-31

## 2024-01-31 DIAGNOSIS — C15.9 MALIGNANT NEOPLASM OF ESOPHAGUS, UNSPECIFIED: ICD-10-CM

## 2024-02-02 ENCOUNTER — RESULT REVIEW (OUTPATIENT)
Age: 59
End: 2024-02-02

## 2024-02-02 ENCOUNTER — APPOINTMENT (OUTPATIENT)
Dept: NUCLEAR MEDICINE | Facility: IMAGING CENTER | Age: 59
End: 2024-02-02

## 2024-02-05 ENCOUNTER — APPOINTMENT (OUTPATIENT)
Dept: HEMATOLOGY ONCOLOGY | Facility: CLINIC | Age: 59
End: 2024-02-05
Payer: MEDICAID

## 2024-02-05 ENCOUNTER — NON-APPOINTMENT (OUTPATIENT)
Age: 59
End: 2024-02-05

## 2024-02-05 VITALS
WEIGHT: 130.25 LBS | HEIGHT: 64.33 IN | TEMPERATURE: 97 F | DIASTOLIC BLOOD PRESSURE: 79 MMHG | BODY MASS INDEX: 22.24 KG/M2 | OXYGEN SATURATION: 96 % | HEART RATE: 109 BPM | RESPIRATION RATE: 16 BRPM | SYSTOLIC BLOOD PRESSURE: 145 MMHG

## 2024-02-05 DIAGNOSIS — I10 ESSENTIAL (PRIMARY) HYPERTENSION: ICD-10-CM

## 2024-02-05 DIAGNOSIS — Z86.39 PERSONAL HISTORY OF OTHER ENDOCRINE, NUTRITIONAL AND METABOLIC DISEASE: ICD-10-CM

## 2024-02-05 DIAGNOSIS — E11.9 TYPE 2 DIABETES MELLITUS W/OUT COMPLICATIONS: ICD-10-CM

## 2024-02-05 LAB
ALBUMIN SERPL ELPH-MCNC: 4.4 G/DL
ALP BLD-CCNC: 71 U/L
ALT SERPL-CCNC: 12 U/L
ANION GAP SERPL CALC-SCNC: 15 MMOL/L
APTT BLD: 35.9 SEC
AST SERPL-CCNC: 16 U/L
BILIRUB SERPL-MCNC: 0.2 MG/DL
BUN SERPL-MCNC: 29 MG/DL
CALCIUM SERPL-MCNC: 9.5 MG/DL
CANCER AG19-9 SERPL-ACNC: 15 U/ML
CEA SERPL-MCNC: 4.8 NG/ML
CHLORIDE SERPL-SCNC: 96 MMOL/L
CO2 SERPL-SCNC: 24 MMOL/L
CREAT SERPL-MCNC: 1.11 MG/DL
EGFR: 76 ML/MIN/1.73M2
GLUCOSE SERPL-MCNC: 287 MG/DL
INR PPP: 0.83 RATIO
POTASSIUM SERPL-SCNC: 4.6 MMOL/L
PROT SERPL-MCNC: 7.3 G/DL
PT BLD: 9.4 SEC
SODIUM SERPL-SCNC: 135 MMOL/L
SURGICAL PATHOLOGY STUDY: SIGNIFICANT CHANGE UP

## 2024-02-05 PROCEDURE — 99245 OFF/OP CONSLTJ NEW/EST HI 55: CPT

## 2024-02-05 RX ORDER — AMLODIPINE BESYLATE 5 MG/1
5 TABLET ORAL
Refills: 0 | Status: ACTIVE | COMMUNITY
Start: 2024-02-05

## 2024-02-05 RX ORDER — LOSARTAN POTASSIUM 25 MG/1
25 TABLET, FILM COATED ORAL
Qty: 180 | Refills: 1 | Status: ACTIVE | COMMUNITY
Start: 2024-02-05

## 2024-02-06 ENCOUNTER — OUTPATIENT (OUTPATIENT)
Dept: OUTPATIENT SERVICES | Facility: HOSPITAL | Age: 59
LOS: 1 days | Discharge: HOME | End: 2024-02-06
Payer: COMMERCIAL

## 2024-02-06 LAB
HBV CORE IGG+IGM SER QL: REACTIVE
HBV SURFACE AB SER QL: REACTIVE
HBV SURFACE AG SER QL: NONREACTIVE
HCV AB SER QL: NONREACTIVE
HCV S/CO RATIO: 0.54 S/CO
HEPB DNA PCR INT: NOT DETECTED
HEPB DNA PCR LOG: NOT DETECTED LOGIU/ML

## 2024-02-08 ENCOUNTER — APPOINTMENT (OUTPATIENT)
Dept: GASTROENTEROLOGY | Facility: CLINIC | Age: 59
End: 2024-02-08

## 2024-02-12 ENCOUNTER — NON-APPOINTMENT (OUTPATIENT)
Age: 59
End: 2024-02-12

## 2024-02-12 ENCOUNTER — RESULT REVIEW (OUTPATIENT)
Age: 59
End: 2024-02-12

## 2024-02-12 ENCOUNTER — APPOINTMENT (OUTPATIENT)
Dept: HEMATOLOGY ONCOLOGY | Facility: CLINIC | Age: 59
End: 2024-02-12

## 2024-02-12 ENCOUNTER — APPOINTMENT (OUTPATIENT)
Dept: RADIATION ONCOLOGY | Facility: CLINIC | Age: 59
End: 2024-02-12
Payer: MEDICAID

## 2024-02-12 VITALS
HEIGHT: 64 IN | SYSTOLIC BLOOD PRESSURE: 142 MMHG | TEMPERATURE: 96.62 F | OXYGEN SATURATION: 97 % | WEIGHT: 128.86 LBS | BODY MASS INDEX: 22 KG/M2 | RESPIRATION RATE: 16 BRPM | DIASTOLIC BLOOD PRESSURE: 80 MMHG | HEART RATE: 114 BPM

## 2024-02-12 LAB
BASOPHILS # BLD AUTO: 0.04 K/UL — SIGNIFICANT CHANGE UP (ref 0–0.2)
BASOPHILS NFR BLD AUTO: 0.6 % — SIGNIFICANT CHANGE UP (ref 0–2)
EOSINOPHIL # BLD AUTO: 0.09 K/UL — SIGNIFICANT CHANGE UP (ref 0–0.5)
EOSINOPHIL NFR BLD AUTO: 1.3 % — SIGNIFICANT CHANGE UP (ref 0–6)
HCT VFR BLD CALC: 34.9 % — LOW (ref 39–50)
HGB BLD-MCNC: 11.7 G/DL — LOW (ref 13–17)
IMM GRANULOCYTES NFR BLD AUTO: 0.7 % — SIGNIFICANT CHANGE UP (ref 0–0.9)
LYMPHOCYTES # BLD AUTO: 1.73 K/UL — SIGNIFICANT CHANGE UP (ref 1–3.3)
LYMPHOCYTES # BLD AUTO: 24.2 % — SIGNIFICANT CHANGE UP (ref 13–44)
MCHC RBC-ENTMCNC: 28.4 PG — SIGNIFICANT CHANGE UP (ref 27–34)
MCHC RBC-ENTMCNC: 33.5 G/DL — SIGNIFICANT CHANGE UP (ref 32–36)
MCV RBC AUTO: 84.7 FL — SIGNIFICANT CHANGE UP (ref 80–100)
MONOCYTES # BLD AUTO: 0.42 K/UL — SIGNIFICANT CHANGE UP (ref 0–0.9)
MONOCYTES NFR BLD AUTO: 5.9 % — SIGNIFICANT CHANGE UP (ref 2–14)
NEUTROPHILS # BLD AUTO: 4.82 K/UL — SIGNIFICANT CHANGE UP (ref 1.8–7.4)
NEUTROPHILS NFR BLD AUTO: 67.3 % — SIGNIFICANT CHANGE UP (ref 43–77)
NRBC # BLD: 0 /100 WBCS — SIGNIFICANT CHANGE UP (ref 0–0)
PLATELET # BLD AUTO: 336 K/UL — SIGNIFICANT CHANGE UP (ref 150–400)
RBC # BLD: 4.12 M/UL — LOW (ref 4.2–5.8)
RBC # FLD: 15.2 % — HIGH (ref 10.3–14.5)
WBC # BLD: 7.15 K/UL — SIGNIFICANT CHANGE UP (ref 3.8–10.5)
WBC # FLD AUTO: 7.15 K/UL — SIGNIFICANT CHANGE UP (ref 3.8–10.5)

## 2024-02-12 PROCEDURE — 99205 OFFICE O/P NEW HI 60 MIN: CPT | Mod: GC

## 2024-02-13 NOTE — HISTORY OF PRESENT ILLNESS
Pt resting quietly on stretcher with no complaints.  Pt AAOx4 with no resp distress noted.  Pt denies any needs at this time.  Skin PWD.  Pt family at bedside. Will continue to monitor and follow plan of care.  Pt continues to complain that she is having cold and numbness to her toes.  MD notified.     Laura Cee RN  03/21/17 1598     [Disease: _____________________] : Disease: [unfilled] [T: ___] : T[unfilled] [N: ___] : N[unfilled] [M: ___] : M[unfilled] [AJCC Stage: ____] : AJCC Stage: [unfilled] [de-identified] : 58 year old male with PMHx of HTN, HLD, IDDM2, and BPH with newly diagnosed GEJ adenocarcinoma   Patient presented [facility] 1/1/24 c/o dizziness and fatigue and admitted for symptomatic anemia (hb 4.7) and hyperglycemia. Symptoms started 2 days prior to admission. EGD on 01/02: nodule on gastric side of the GEJ with some old heme in stomach. Possible healing Kavita Valero tear. Pathology reveal GEJ bx: fragments of adenocarcinoma, moderately differentiated. Colonoscopy on 01/04: right sided diverticula, small internal hemorrhoids CT CAP 1/5/24: Indeterminate 3.6 mm left apical lung nodule, High attenuation in the gastric lumen either blood products or ingested material. Limited evaluation for underlying mass. Diffuse wall thickening consistent with gastritis. Mildly distended esophagus. Correlate with recent endoscopic findings, 6.6 x 6.7 mm peripancreatic node, enlarged prostate indenting the bladder base. Of note, no prior colonoscopy. Referred to Surgical Oncology. Saw Dr. Chong 1/24/24 who recommended PET/CT and referred to Rad Onc and Med Onc to discuss neoadjuvant chemoRT.   Patient here today for initial Medical Oncology evaluation.  [de-identified] : adenocarcinoma, moderately differentiated.  [de-identified] : JEREMI Her2/gunnar negative   [de-identified] : dry cough, worse in am for 2-3 mos.  weight is stable. No dysphagia. Good appetite. No weight loss.  Energy level is stable.  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  lives in [place]

## 2024-02-13 NOTE — REASON FOR VISIT
[Initial Consultation] : an initial consultation [Family Member] : family member [Patient Declined  Services] : - None: Patient declined  services [FreeTextEntry2] : GEJ adenocarcinoma [FreeTextEntry3] : Stephanie dialect

## 2024-02-13 NOTE — HISTORY OF PRESENT ILLNESS
[FreeTextEntry1] : Linwood Ramos is a 58 year old male with PMHx of HTN, HLD, IDDM2, and BPH with newly diagnosed GEJ adenocarcinoma, atleast cT3N0, Stage IIA. Patient presents to discuss radiation therapy.  Brief Oncological History: Patient presented [facility] 1/1/24 with dizziness and fatigue and admitted for symptomatic anemia (Hgb 4.7) and hyperglycemia. EGD on 01/02 with nodule on gastric side of the GEJ with some old heme in stomach. Pathology of GEJ biopsy showed fragments of adenocarcinoma, moderately differentiated. Colonoscopy on 01/04: right sided diverticula, small internal hemorrhoids.  1/5/24 - CT C/A/P showed indeterminate 3.6 mm left apical lung nodule, high attenuation in the gastric lumen either blood products or ingested material. Limited evaluation for underlying mass. Diffuse wall thickening consistent with gastritis. Mildly distended esophagus. Correlate with recent endoscopic findings, 6.6 x 6.7 mm peripancreatic node, enlarged prostate indenting the bladder base.  1/17/24 - Endoscopy showing medium sized fungating mass at the GEJ 39cm from incissors. Mass non obtructing and partially circumferential.   Patient saw MedOnc Dr. Angelo and Surgeon Dr. Chong. Pending a PET CT, noted to be cancelled at 2/2/24 due to auth issues and yet to be scheduled. He has no complaints today, eating a normal diet, denies N/V, diarrhea or constipation, or melena. 
yes

## 2024-02-13 NOTE — REVIEW OF SYSTEMS
[Patient Intake Form Reviewed] : Patient intake form was reviewed [Fever] : no fever [Chills] : no chills [Chest Pain] : no chest pain [Shortness Of Breath] : no shortness of breath [Cough] : no cough [Abdominal Pain] : no abdominal pain [Vomiting] : no vomiting [Constipation] : no constipation [Negative] : Endocrine

## 2024-02-13 NOTE — PHYSICAL EXAM
[Sclera] : the sclera and conjunctiva were normal [Outer Ear] : the ears and nose were normal in appearance [] : no respiratory distress [Heart Rate And Rhythm] : heart rate and rhythm were normal [Abdomen Soft] : soft [Nondistended] : nondistended [Abdomen Tenderness] : non-tender [Normal] : no palpable adenopathy [Supraclavicular Lymph Nodes Enlarged Bilaterally] : supraclavicular [Musculoskeletal - Swelling] : no joint swelling [Skin Color & Pigmentation] : normal skin color and pigmentation [No Focal Deficits] : no focal deficits [Oriented To Time, Place, And Person] : oriented to person, place, and time

## 2024-02-13 NOTE — VITALS
[Maximal Pain Intensity: 0/10] : 0/10 [Least Pain Intensity: 0/10] : 0/10 [90: Able to carry normal activity; minor signs or symptoms of disease.] : 90: Able to carry normal activity; minor signs or symptoms of disease.  [ECOG Performance Status: 0 - Fully active, able to carry on all pre-disease performance without restriction] : Performance Status: 0 - Fully active, able to carry on all pre-disease performance without restriction [Date: ____________] : Patient's last distress assessment performed on [unfilled]. [3 - Distress Level] : Distress Level: 3

## 2024-02-19 ENCOUNTER — APPOINTMENT (OUTPATIENT)
Dept: NUCLEAR MEDICINE | Facility: CLINIC | Age: 59
End: 2024-02-19
Payer: MEDICAID

## 2024-02-19 ENCOUNTER — OUTPATIENT (OUTPATIENT)
Dept: OUTPATIENT SERVICES | Facility: HOSPITAL | Age: 59
LOS: 1 days | End: 2024-02-19

## 2024-02-19 DIAGNOSIS — C16.0 MALIGNANT NEOPLASM OF CARDIA: ICD-10-CM

## 2024-02-19 PROCEDURE — 78815 PET IMAGE W/CT SKULL-THIGH: CPT | Mod: 26,PI

## 2024-02-21 ENCOUNTER — TRANSCRIPTION ENCOUNTER (OUTPATIENT)
Age: 59
End: 2024-02-21

## 2024-02-21 ENCOUNTER — OUTPATIENT (OUTPATIENT)
Dept: OUTPATIENT SERVICES | Facility: HOSPITAL | Age: 59
LOS: 1 days | End: 2024-02-21
Payer: MEDICAID

## 2024-02-21 ENCOUNTER — RESULT REVIEW (OUTPATIENT)
Age: 59
End: 2024-02-21

## 2024-02-21 VITALS
WEIGHT: 117.95 LBS | OXYGEN SATURATION: 98 % | RESPIRATION RATE: 16 BRPM | HEART RATE: 108 BPM | SYSTOLIC BLOOD PRESSURE: 152 MMHG | DIASTOLIC BLOOD PRESSURE: 68 MMHG | HEIGHT: 64 IN | TEMPERATURE: 97 F

## 2024-02-21 VITALS
RESPIRATION RATE: 16 BRPM | OXYGEN SATURATION: 95 % | HEART RATE: 94 BPM | DIASTOLIC BLOOD PRESSURE: 76 MMHG | SYSTOLIC BLOOD PRESSURE: 138 MMHG

## 2024-02-21 DIAGNOSIS — C16.0 MALIGNANT NEOPLASM OF CARDIA: ICD-10-CM

## 2024-02-21 PROCEDURE — 36561 INSERT TUNNELED CV CATH: CPT

## 2024-02-21 PROCEDURE — 77001 FLUOROGUIDE FOR VEIN DEVICE: CPT

## 2024-02-21 PROCEDURE — 76937 US GUIDE VASCULAR ACCESS: CPT

## 2024-02-21 PROCEDURE — C1788: CPT

## 2024-02-21 PROCEDURE — C1769: CPT

## 2024-02-21 PROCEDURE — C1894: CPT

## 2024-02-21 PROCEDURE — 76937 US GUIDE VASCULAR ACCESS: CPT | Mod: 26

## 2024-02-21 PROCEDURE — 77001 FLUOROGUIDE FOR VEIN DEVICE: CPT | Mod: 26

## 2024-02-21 RX ORDER — AMLODIPINE BESYLATE 2.5 MG/1
1 TABLET ORAL
Refills: 0 | DISCHARGE

## 2024-02-21 RX ORDER — INSULIN GLARGINE 100 [IU]/ML
15 INJECTION, SOLUTION SUBCUTANEOUS
Qty: 0 | Refills: 0 | DISCHARGE

## 2024-02-21 RX ORDER — ATORVASTATIN CALCIUM 80 MG/1
1 TABLET, FILM COATED ORAL
Refills: 0 | DISCHARGE

## 2024-02-21 RX ORDER — EMPAGLIFLOZIN 10 MG/1
1 TABLET, FILM COATED ORAL
Refills: 0 | DISCHARGE

## 2024-02-21 NOTE — ASU PATIENT PROFILE, ADULT - NSICDXPASTMEDICALHX_GEN_ALL_CORE_FT
PAST MEDICAL HISTORY:  BPH (benign prostatic hyperplasia)     Gastroesophageal cancer     Gastroesophageal cancer     HLD (hyperlipidemia)     HTN (hypertension)     Insulin dependent type 2 diabetes mellitus

## 2024-02-21 NOTE — PRE PROCEDURE NOTE - PRE PROCEDURE EVALUATION
Interventional Radiology    HPI: 58y Male with GE junction adenocarcinoma presenting for chest port placement.    Allergies: No Known Allergies    Medications (Abx/Cardiac/Anticoagulation/Blood Products)      Data:  162.6  53.5  T(C): 36.3  HR: 108  BP: 152/68  RR: 16  SpO2: 98%    Exam  General: No acute distress  Chest: Non labored breathing  Abdomen: Non-distended  Extremities: No swelling, warm    Plan:   58y Male with GE junction adenocarcinoma presenting for chest port placement.  -- Relevant imaging and labs were reviewed.   -- Risks, benefits, and alternatives were explained to the patient and informed consent was obtained.

## 2024-02-21 NOTE — ASU DISCHARGE PLAN (ADULT/PEDIATRIC) - NS MD DC FALL RISK RISK
For information on Fall & Injury Prevention, visit: [email or web address] OR  [email or web address] OR  https://www.cdc.gov/steadi/patient.html

## 2024-02-21 NOTE — ASU PATIENT PROFILE, ADULT - ABILITY TO HEAR (WITH HEARING AID OR HEARING APPLIANCE IF NORMALLY USED):
Called pt LVM to schedule FOB   Next avail is 8/24/2023 @ 1130am on 3rd floor   appt set for pt pending call back to confirm if time and date will work   Adequate: hears normal conversation without difficulty

## 2024-02-21 NOTE — ASU PATIENT PROFILE, ADULT - FALL HARM RISK - UNIVERSAL INTERVENTIONS
Bed in lowest position, wheels locked, appropriate side rails in place/Call bell, personal items and telephone in reach/Instruct patient to call for assistance before getting out of bed or chair/Non-slip footwear when patient is out of bed/[place] to call system/Physically safe environment - no spills, clutter or unnecessary equipment/Purposeful Proactive Rounding/Room/bathroom lighting operational, light cord in reach

## 2024-02-21 NOTE — ASU DISCHARGE PLAN (ADULT/PEDIATRIC) - NURSING INSTRUCTIONS
Please feel free to contact us at [phone] if any problems arise. After 6PM, Monday through Friday, on weekends and on holidays, please call [phone] and ask for the radiology resident on call to be paged.

## 2024-02-21 NOTE — ASU PATIENT PROFILE, ADULT - FALL HARM RISK - PT AGE POPULATION HIDDEN
Dr. Moshe Su, Please sign pended order below to adjust directions to ensure the patient's medication list is current. Adult

## 2024-02-22 PROBLEM — C16.0 MALIGNANT NEOPLASM OF CARDIA: Chronic | Status: ACTIVE | Noted: 2024-02-21

## 2024-02-23 ENCOUNTER — NON-APPOINTMENT (OUTPATIENT)
Age: 59
End: 2024-02-23

## 2024-02-26 ENCOUNTER — RESULT REVIEW (OUTPATIENT)
Age: 59
End: 2024-02-26

## 2024-02-26 ENCOUNTER — APPOINTMENT (OUTPATIENT)
Dept: HEMATOLOGY ONCOLOGY | Facility: CLINIC | Age: 59
End: 2024-02-26
Payer: MEDICAID

## 2024-02-26 VITALS
HEART RATE: 103 BPM | DIASTOLIC BLOOD PRESSURE: 80 MMHG | WEIGHT: 125.2 LBS | SYSTOLIC BLOOD PRESSURE: 144 MMHG | OXYGEN SATURATION: 98 % | RESPIRATION RATE: 16 BRPM | BODY MASS INDEX: 21.49 KG/M2 | TEMPERATURE: 97 F

## 2024-02-26 LAB
BASOPHILS # BLD AUTO: 0.04 K/UL — SIGNIFICANT CHANGE UP (ref 0–0.2)
BASOPHILS NFR BLD AUTO: 0.6 % — SIGNIFICANT CHANGE UP (ref 0–2)
EOSINOPHIL # BLD AUTO: 0.1 K/UL — SIGNIFICANT CHANGE UP (ref 0–0.5)
EOSINOPHIL NFR BLD AUTO: 1.4 % — SIGNIFICANT CHANGE UP (ref 0–6)
HCT VFR BLD CALC: 36.5 % — LOW (ref 39–50)
HGB BLD-MCNC: 12.2 G/DL — LOW (ref 13–17)
IMM GRANULOCYTES NFR BLD AUTO: 0.3 % — SIGNIFICANT CHANGE UP (ref 0–0.9)
LYMPHOCYTES # BLD AUTO: 2.11 K/UL — SIGNIFICANT CHANGE UP (ref 1–3.3)
LYMPHOCYTES # BLD AUTO: 29.2 % — SIGNIFICANT CHANGE UP (ref 13–44)
MCHC RBC-ENTMCNC: 28 PG — SIGNIFICANT CHANGE UP (ref 27–34)
MCHC RBC-ENTMCNC: 33.4 G/DL — SIGNIFICANT CHANGE UP (ref 32–36)
MCV RBC AUTO: 83.7 FL — SIGNIFICANT CHANGE UP (ref 80–100)
MONOCYTES # BLD AUTO: 0.44 K/UL — SIGNIFICANT CHANGE UP (ref 0–0.9)
MONOCYTES NFR BLD AUTO: 6.1 % — SIGNIFICANT CHANGE UP (ref 2–14)
NEUTROPHILS # BLD AUTO: 4.52 K/UL — SIGNIFICANT CHANGE UP (ref 1.8–7.4)
NEUTROPHILS NFR BLD AUTO: 62.4 % — SIGNIFICANT CHANGE UP (ref 43–77)
NRBC # BLD: 0 /100 WBCS — SIGNIFICANT CHANGE UP (ref 0–0)
PLATELET # BLD AUTO: 306 K/UL — SIGNIFICANT CHANGE UP (ref 150–400)
RBC # BLD: 4.36 M/UL — SIGNIFICANT CHANGE UP (ref 4.2–5.8)
RBC # FLD: 14.4 % — SIGNIFICANT CHANGE UP (ref 10.3–14.5)
WBC # BLD: 7.23 K/UL — SIGNIFICANT CHANGE UP (ref 3.8–10.5)
WBC # FLD AUTO: 7.23 K/UL — SIGNIFICANT CHANGE UP (ref 3.8–10.5)

## 2024-02-26 PROCEDURE — 99214 OFFICE O/P EST MOD 30 MIN: CPT

## 2024-02-27 DIAGNOSIS — Z45.2 ENCOUNTER FOR ADJUSTMENT AND MANAGEMENT OF VASCULAR ACCESS DEVICE: ICD-10-CM

## 2024-02-27 LAB
ALBUMIN SERPL ELPH-MCNC: 4.5 G/DL
ALP BLD-CCNC: 76 U/L
ALT SERPL-CCNC: 10 U/L
ANION GAP SERPL CALC-SCNC: 17 MMOL/L
AST SERPL-CCNC: 14 U/L
BILIRUB SERPL-MCNC: 0.2 MG/DL
BUN SERPL-MCNC: 35 MG/DL
CALCIUM SERPL-MCNC: 9.7 MG/DL
CEA SERPL-MCNC: 6.3 NG/ML
CHLORIDE SERPL-SCNC: 92 MMOL/L
CO2 SERPL-SCNC: 24 MMOL/L
CREAT SERPL-MCNC: 1.54 MG/DL
EGFR: 51 ML/MIN/1.73M2
GLUCOSE SERPL-MCNC: 352 MG/DL
POTASSIUM SERPL-SCNC: 5.9 MMOL/L
PROT SERPL-MCNC: 7.7 G/DL
SODIUM SERPL-SCNC: 132 MMOL/L

## 2024-02-28 ENCOUNTER — NON-APPOINTMENT (OUTPATIENT)
Age: 59
End: 2024-02-28

## 2024-02-28 ENCOUNTER — APPOINTMENT (OUTPATIENT)
Dept: INFUSION THERAPY | Facility: HOSPITAL | Age: 59
End: 2024-02-28

## 2024-02-29 DIAGNOSIS — E86.0 DEHYDRATION: ICD-10-CM

## 2024-03-01 ENCOUNTER — APPOINTMENT (OUTPATIENT)
Dept: HEMATOLOGY ONCOLOGY | Facility: CLINIC | Age: 59
End: 2024-03-01

## 2024-03-01 ENCOUNTER — TRANSCRIPTION ENCOUNTER (OUTPATIENT)
Age: 59
End: 2024-03-01

## 2024-03-01 LAB
ALBUMIN SERPL ELPH-MCNC: 4.5 G/DL
ALP BLD-CCNC: 72 U/L
ALT SERPL-CCNC: 12 U/L
ANION GAP SERPL CALC-SCNC: 13 MMOL/L
AST SERPL-CCNC: 17 U/L
BILIRUB SERPL-MCNC: 0.2 MG/DL
BUN SERPL-MCNC: 29 MG/DL
CALCIUM SERPL-MCNC: 9.8 MG/DL
CHLORIDE SERPL-SCNC: 95 MMOL/L
CO2 SERPL-SCNC: 26 MMOL/L
CREAT SERPL-MCNC: 1.23 MG/DL
EGFR: 67 ML/MIN/1.73M2
GLUCOSE SERPL-MCNC: 181 MG/DL
POTASSIUM SERPL-SCNC: 4.2 MMOL/L
PROT SERPL-MCNC: 7.5 G/DL
SODIUM SERPL-SCNC: 134 MMOL/L

## 2024-03-04 ENCOUNTER — NON-APPOINTMENT (OUTPATIENT)
Age: 59
End: 2024-03-04

## 2024-03-04 ENCOUNTER — APPOINTMENT (OUTPATIENT)
Dept: INFUSION THERAPY | Facility: HOSPITAL | Age: 59
End: 2024-03-04

## 2024-03-04 ENCOUNTER — RESULT REVIEW (OUTPATIENT)
Age: 59
End: 2024-03-04

## 2024-03-04 LAB
ALBUMIN SERPL ELPH-MCNC: 4.3 G/DL — SIGNIFICANT CHANGE UP (ref 3.3–5)
ALP SERPL-CCNC: 57 U/L — SIGNIFICANT CHANGE UP (ref 40–120)
ALT FLD-CCNC: 9 U/L — LOW (ref 10–45)
ANION GAP SERPL CALC-SCNC: 12 MMOL/L — SIGNIFICANT CHANGE UP (ref 5–17)
AST SERPL-CCNC: 19 U/L — SIGNIFICANT CHANGE UP (ref 10–40)
BILIRUB SERPL-MCNC: 0.2 MG/DL — SIGNIFICANT CHANGE UP (ref 0.2–1.2)
BUN SERPL-MCNC: 25 MG/DL — HIGH (ref 7–23)
CALCIUM SERPL-MCNC: 9.4 MG/DL — SIGNIFICANT CHANGE UP (ref 8.4–10.5)
CHLORIDE SERPL-SCNC: 97 MMOL/L — SIGNIFICANT CHANGE UP (ref 96–108)
CO2 SERPL-SCNC: 25 MMOL/L — SIGNIFICANT CHANGE UP (ref 22–31)
CREAT SERPL-MCNC: 1.11 MG/DL — SIGNIFICANT CHANGE UP (ref 0.5–1.3)
EGFR: 76 ML/MIN/1.73M2 — SIGNIFICANT CHANGE UP
GLUCOSE SERPL-MCNC: 99 MG/DL — SIGNIFICANT CHANGE UP (ref 70–99)
POTASSIUM SERPL-MCNC: 3.8 MMOL/L — SIGNIFICANT CHANGE UP (ref 3.5–5.3)
POTASSIUM SERPL-SCNC: 3.8 MMOL/L — SIGNIFICANT CHANGE UP (ref 3.5–5.3)
PROT SERPL-MCNC: 7.3 G/DL — SIGNIFICANT CHANGE UP (ref 6–8.3)
SODIUM SERPL-SCNC: 134 MMOL/L — LOW (ref 135–145)

## 2024-03-05 ENCOUNTER — NON-APPOINTMENT (OUTPATIENT)
Age: 59
End: 2024-03-05

## 2024-03-05 DIAGNOSIS — R11.2 NAUSEA WITH VOMITING, UNSPECIFIED: ICD-10-CM

## 2024-03-05 DIAGNOSIS — Z51.11 ENCOUNTER FOR ANTINEOPLASTIC CHEMOTHERAPY: ICD-10-CM

## 2024-03-06 ENCOUNTER — APPOINTMENT (OUTPATIENT)
Dept: INFUSION THERAPY | Facility: HOSPITAL | Age: 59
End: 2024-03-06

## 2024-03-09 NOTE — REVIEW OF SYSTEMS
[Recent Change In Weight] : ~T recent weight change [Diarrhea: Grade 0] : Diarrhea: Grade 0 [Negative] : Allergic/Immunologic

## 2024-03-09 NOTE — HISTORY OF PRESENT ILLNESS
[T: ___] : T[unfilled] [Disease: _____________________] : Disease: [unfilled] [M: ___] : M[unfilled] [N: ___] : N[unfilled] [AJCC Stage: ____] : AJCC Stage: [unfilled] [de-identified] : 59 year old male with PMHx of HTN, HLD, IDDM2, and BPH with newly diagnosed GEJ adenocarcinoma   Patient presented [facility] 1/1/24 c/o dizziness and fatigue and admitted for symptomatic anemia (hb 4.7) and hyperglycemia. Symptoms started 2 days prior to admission. EGD on 01/02: nodule on gastric side of the GEJ with some old heme in stomach. Possible healing Kavita Valero tear. Pathology reveal GEJ bx: fragments of adenocarcinoma, moderately differentiated. Colonoscopy on 01/04: right sided diverticula, small internal hemorrhoids CT [facility] 1/5/24: Indeterminate 3.6 mm left apical lung nodule, High attenuation in the gastric lumen either blood products or ingested material. Limited evaluation for underlying mass. Diffuse wall thickening consistent with gastritis. Mildly distended esophagus. Correlate with recent endoscopic findings, 6.6 x 6.7 mm peripancreatic node, enlarged prostate indenting the bladder base. Of note, no prior colonoscopy. Referred to Surgical Oncology. Saw Dr. Chong 1/24/24 who recommended PET/CT and referred to Rad Onc and Med Onc to discuss neoadjuvant chemoRT.   Patient here today for initial Medical Oncology evaluation.   2/19/24: PET: 1.  Mild uptake at proximal stomach, possibly related to reported gastroesophageal junction lesion. Remaining alimentary tract shows diffuse uptake which is likely related to use of metformin. This limits evaluation for additional lesions in the bowel and the aerodigestive tract. 2.  Previously identified peripancreatic node is difficult to identify on the current low-dose CT and there is no distinct abnormal uptake in that region. Suggest MRI of the abdomen.   [de-identified] : adenocarcinoma, moderately differentiated.  [de-identified] : JEREMI Her2/gunnar negative   [de-identified] : has lost another few pounds since last visit but reports appetite is stable. energy level is unchanged. he saw rad onc. we reviewed results of recent PET/CT.

## 2024-03-11 ENCOUNTER — APPOINTMENT (OUTPATIENT)
Dept: HEMATOLOGY ONCOLOGY | Facility: CLINIC | Age: 59
End: 2024-03-11
Payer: MEDICAID

## 2024-03-11 VITALS
SYSTOLIC BLOOD PRESSURE: 121 MMHG | WEIGHT: 125.66 LBS | RESPIRATION RATE: 16 BRPM | DIASTOLIC BLOOD PRESSURE: 79 MMHG | BODY MASS INDEX: 21.57 KG/M2 | HEART RATE: 101 BPM | OXYGEN SATURATION: 97 % | TEMPERATURE: 97.8 F

## 2024-03-11 DIAGNOSIS — N17.9 ACUTE KIDNEY FAILURE, UNSPECIFIED: ICD-10-CM

## 2024-03-11 PROCEDURE — 99214 OFFICE O/P EST MOD 30 MIN: CPT

## 2024-03-11 NOTE — REASON FOR VISIT
[Family Member] : family member [Patient Declined  Services] : - None: Patient declined  services [Follow-Up Visit] : a follow-up [FreeTextEntry2] : GEJ adenocarcinoma [FreeTextEntry3] : Stephanie dialect

## 2024-03-11 NOTE — ASSESSMENT
[Palliative Care Plan] : not applicable at this time [Curative] : Goals of care discussed with patient: Curative [FreeTextEntry1] : unknown

## 2024-03-11 NOTE — REVIEW OF SYSTEMS
[Diarrhea: Grade 0] : Diarrhea: Grade 0 [Negative] : Constitutional [de-identified] : + cold sensitivity

## 2024-03-18 ENCOUNTER — APPOINTMENT (OUTPATIENT)
Dept: HEMATOLOGY ONCOLOGY | Facility: CLINIC | Age: 59
End: 2024-03-18

## 2024-03-18 ENCOUNTER — RESULT REVIEW (OUTPATIENT)
Age: 59
End: 2024-03-18

## 2024-03-18 ENCOUNTER — APPOINTMENT (OUTPATIENT)
Dept: INFUSION THERAPY | Facility: HOSPITAL | Age: 59
End: 2024-03-18

## 2024-03-18 ENCOUNTER — NON-APPOINTMENT (OUTPATIENT)
Age: 59
End: 2024-03-18

## 2024-03-18 LAB
ALBUMIN SERPL ELPH-MCNC: 4 G/DL — SIGNIFICANT CHANGE UP (ref 3.3–5)
ALP SERPL-CCNC: 68 U/L — SIGNIFICANT CHANGE UP (ref 40–120)
ALT FLD-CCNC: 11 U/L — SIGNIFICANT CHANGE UP (ref 10–45)
ANION GAP SERPL CALC-SCNC: 13 MMOL/L — SIGNIFICANT CHANGE UP (ref 5–17)
AST SERPL-CCNC: 22 U/L — SIGNIFICANT CHANGE UP (ref 10–40)
BASOPHILS # BLD AUTO: 0.04 K/UL — SIGNIFICANT CHANGE UP (ref 0–0.2)
BASOPHILS NFR BLD AUTO: 0.7 % — SIGNIFICANT CHANGE UP (ref 0–2)
BILIRUB SERPL-MCNC: 0.2 MG/DL — SIGNIFICANT CHANGE UP (ref 0.2–1.2)
BUN SERPL-MCNC: 28 MG/DL — HIGH (ref 7–23)
CALCIUM SERPL-MCNC: 8.8 MG/DL — SIGNIFICANT CHANGE UP (ref 8.4–10.5)
CHLORIDE SERPL-SCNC: 100 MMOL/L — SIGNIFICANT CHANGE UP (ref 96–108)
CO2 SERPL-SCNC: 25 MMOL/L — SIGNIFICANT CHANGE UP (ref 22–31)
CREAT SERPL-MCNC: 1.53 MG/DL — HIGH (ref 0.5–1.3)
EGFR: 51 ML/MIN/1.73M2 — LOW
EOSINOPHIL # BLD AUTO: 0.18 K/UL — SIGNIFICANT CHANGE UP (ref 0–0.5)
EOSINOPHIL NFR BLD AUTO: 3 % — SIGNIFICANT CHANGE UP (ref 0–6)
GLUCOSE SERPL-MCNC: 62 MG/DL — LOW (ref 70–99)
HCT VFR BLD CALC: 34.2 % — LOW (ref 39–50)
HGB BLD-MCNC: 11.7 G/DL — LOW (ref 13–17)
IMM GRANULOCYTES NFR BLD AUTO: 0.8 % — SIGNIFICANT CHANGE UP (ref 0–0.9)
LYMPHOCYTES # BLD AUTO: 1.82 K/UL — SIGNIFICANT CHANGE UP (ref 1–3.3)
LYMPHOCYTES # BLD AUTO: 30.7 % — SIGNIFICANT CHANGE UP (ref 13–44)
MCHC RBC-ENTMCNC: 28.1 PG — SIGNIFICANT CHANGE UP (ref 27–34)
MCHC RBC-ENTMCNC: 34.2 G/DL — SIGNIFICANT CHANGE UP (ref 32–36)
MCV RBC AUTO: 82.2 FL — SIGNIFICANT CHANGE UP (ref 80–100)
MONOCYTES # BLD AUTO: 0.44 K/UL — SIGNIFICANT CHANGE UP (ref 0–0.9)
MONOCYTES NFR BLD AUTO: 7.4 % — SIGNIFICANT CHANGE UP (ref 2–14)
NEUTROPHILS # BLD AUTO: 3.4 K/UL — SIGNIFICANT CHANGE UP (ref 1.8–7.4)
NEUTROPHILS NFR BLD AUTO: 57.4 % — SIGNIFICANT CHANGE UP (ref 43–77)
NRBC # BLD: 0 /100 WBCS — SIGNIFICANT CHANGE UP (ref 0–0)
PLATELET # BLD AUTO: 226 K/UL — SIGNIFICANT CHANGE UP (ref 150–400)
POTASSIUM SERPL-MCNC: 3.7 MMOL/L — SIGNIFICANT CHANGE UP (ref 3.5–5.3)
POTASSIUM SERPL-SCNC: 3.7 MMOL/L — SIGNIFICANT CHANGE UP (ref 3.5–5.3)
PROT SERPL-MCNC: 7 G/DL — SIGNIFICANT CHANGE UP (ref 6–8.3)
RBC # BLD: 4.16 M/UL — LOW (ref 4.2–5.8)
RBC # FLD: 14.7 % — HIGH (ref 10.3–14.5)
SODIUM SERPL-SCNC: 138 MMOL/L — SIGNIFICANT CHANGE UP (ref 135–145)
WBC # BLD: 5.93 K/UL — SIGNIFICANT CHANGE UP (ref 3.8–10.5)
WBC # FLD AUTO: 5.93 K/UL — SIGNIFICANT CHANGE UP (ref 3.8–10.5)

## 2024-03-20 ENCOUNTER — APPOINTMENT (OUTPATIENT)
Dept: INFUSION THERAPY | Facility: HOSPITAL | Age: 59
End: 2024-03-20

## 2024-03-22 ENCOUNTER — OUTPATIENT (OUTPATIENT)
Dept: OUTPATIENT SERVICES | Facility: HOSPITAL | Age: 59
LOS: 1 days | Discharge: HOME | End: 2024-03-22

## 2024-03-22 DIAGNOSIS — C15.9 MALIGNANT NEOPLASM OF ESOPHAGUS, UNSPECIFIED: ICD-10-CM

## 2024-03-26 NOTE — PROGRESS NOTE ADULT - SUBJECTIVE AND OBJECTIVE BOX
Trauma / Surgical Daily Progress Note    Date of Service  3/26/2024    Chief Complaint  73 y.o. female admitted 3/25/2024 with vaginal cuff dehiscence with small bowel evisceration.     Postoperative day 1 exploratory celiotomy & repair of vaginal cuff dehiscence.     Interval Events  Admitted to SICU postoperatively for hyperkalemia.  WBC trend down on postop day 1 to 11.1  Hyperkalemia resolved, continue to hold ACEi    - Transfer to [facility] only  - Resume atenolol, amlodipine & lovastatin  - Start Lovenox for DVT prophylaxis  - No additional surgical critical care recommendations at this time    Review of Systems  Review of Systems   Constitutional: Negative.    HENT: Negative.     Eyes: Negative.    Respiratory: Negative.     Cardiovascular: Negative.    Gastrointestinal:  Positive for abdominal pain (incisional). Negative for nausea and vomiting.   Genitourinary: Negative.    Musculoskeletal: Negative.    Neurological: Negative.       Vital Signs  Temp:  [35.6 °C (96 °F)-36.7 °C (98 °F)] 36.7 °C (98 °F)  Pulse:  [53-72] 67  Resp:  [13-37] 17  BP: (126-156)/(58-71) 145/65  SpO2:  [87 %-98 %] 95 %    Physical Exam  Physical Exam  Vitals reviewed.   Constitutional:       General: She is not in acute distress.     Appearance: She is not ill-appearing.   Cardiovascular:      Rate and Rhythm: Normal rate and regular rhythm.      Heart sounds: Normal heart sounds.   Pulmonary:      Effort: Pulmonary effort is normal. No respiratory distress.      Breath sounds: Normal breath sounds.   Abdominal:      General: There is no distension.      Palpations: Abdomen is soft.      Tenderness: There is abdominal tenderness (incisional). There is no guarding.      Comments: Midline abdominal incision with dressing intact, no acute drainage.   Genitourinary:     Comments: Gibbs in place with clear yellow urine.  Musculoskeletal:      Right lower leg: No edema.      Left lower leg: No edema.   Skin:     General: Skin is warm and dry.    Neurological:      Mental Status: She is alert and oriented to person, place, and time. Mental status is at baseline.      GCS: GCS eye subscore is 4. GCS verbal subscore is 5. GCS motor subscore is 6.   Psychiatric:         Mood and Affect: Mood normal.         Behavior: Behavior is cooperative.       Laboratory  Recent Results (from the past 24 hour(s))   POCT glucose device results    Collection Time: 03/25/24 12:11 PM   Result Value Ref Range    POC Glucose, Blood 182 (H) 65 - 99 mg/dL   POCT glucose device results    Collection Time: 03/25/24  5:52 PM   Result Value Ref Range    POC Glucose, Blood 161 (H) 65 - 99 mg/dL   POCT glucose device results    Collection Time: 03/26/24  5:10 AM   Result Value Ref Range    POC Glucose, Blood 113 (H) 65 - 99 mg/dL   CBC with Differential    Collection Time: 03/26/24  5:18 AM   Result Value Ref Range    WBC 11.1 (H) 4.8 - 10.8 K/uL    RBC 4.18 (L) 4.20 - 5.40 M/uL    Hemoglobin 12.7 12.0 - 16.0 g/dL    Hematocrit 39.3 37.0 - 47.0 %    MCV 94.0 81.4 - 97.8 fL    MCH 30.4 27.0 - 33.0 pg    MCHC 32.3 32.2 - 35.5 g/dL    RDW 46.4 35.9 - 50.0 fL    Platelet Count 283 164 - 446 K/uL    MPV 10.4 9.0 - 12.9 fL    Neutrophils-Polys 85.40 (H) 44.00 - 72.00 %    Lymphocytes 8.10 (L) 22.00 - 41.00 %    Monocytes 5.80 0.00 - 13.40 %    Eosinophils 0.00 0.00 - 6.90 %    Basophils 0.30 0.00 - 1.80 %    Immature Granulocytes 0.40 0.00 - 0.90 %    Nucleated RBC 0.00 0.00 - 0.20 /100 WBC    Neutrophils (Absolute) 9.50 (H) 1.82 - 7.42 K/uL    Lymphs (Absolute) 0.90 (L) 1.00 - 4.80 K/uL    Monos (Absolute) 0.65 0.00 - 0.85 K/uL    Eos (Absolute) 0.00 0.00 - 0.51 K/uL    Baso (Absolute) 0.03 0.00 - 0.12 K/uL    Immature Granulocytes (abs) 0.04 0.00 - 0.11 K/uL    NRBC (Absolute) 0.00 K/uL   Comp Metabolic Panel (CMP)    Collection Time: 03/26/24  5:18 AM   Result Value Ref Range    Sodium 142 135 - 145 mmol/L    Potassium 5.1 3.6 - 5.5 mmol/L    Chloride 114 (H) 96 - 112 mmol/L    Co2 17 (L)  [facility]  Division of Infectious Diseases  [phone]    BHARATH GONZALES  55y, Male  [phone]    Interval note:  The patient is in bed, comfortable, no distress. The patient continues to complains of bilateral lower extremities and forearms pain, the patient did ambulate with PT and the walker, did well. The patient is afebrile, Tmax within 24 hours 100.3 - yesterday at 18:32, leukocytosis is trending down, WBC 13.48 today, blood and urine cultures collected on 10/10/2020 grew staph aureus, repeat blood cultures collected yesterday - Growth in aerobic bottle: Gram Positive Cocci in Clusters. MRI of thoracic and lumbar spine were performed, negative for osteomyelitis and / or epidural abscess, + small bilateral pleural effusions, + distended urinary bladder, perirectal inflammation, possible proctitis, CT abdomen / pelvis / chest were recommended - the patient is about to be transported to have the test done.   Patient denies any discomfort with urination, thinks that he empties his bladder completely, exam of his abdomen is benign. HIV test is pending result.        PMH/PSH--  DM (diabetes mellitus)    No significant past surgical history        Allergies--  No Known Allergies      Medications--  Antibiotics: ceFAZolin   IVPB      ceFAZolin   IVPB 2000 milliGRAM(s) IV Intermittent every 8 hours    Immunologic: influenza   Vaccine 0.5 milliLiter(s) IntraMuscular once    Other: acetaminophen    Suspension .. PRN  dextrose 40% Gel PRN  dextrose 5%.  dextrose 50% Injectable  dextrose 50% Injectable  dextrose 50% Injectable  enoxaparin Injectable  glucagon  Injectable PRN  insulin glargine Injectable (LANTUS)  insulin lispro (HumaLOG) corrective regimen sliding scale  insulin lispro (HumaLOG) corrective regimen sliding scale  insulin lispro Injectable (HumaLOG)  simvastatin  sodium chloride 0.9%.  tamsulosin    Antimicrobials last 90 days per EMR: MEDICATIONS  (STANDING):    ceFAZolin   IVPB   100 mL/Hr IV Intermittent (10-12-20 @ 18:32)    ceFAZolin   IVPB   100 mL/Hr IV Intermittent (10-13-20 @ 05:05)   100 mL/Hr IV Intermittent (10-12-20 @ 22:12)    cefTRIAXone   IVPB   100 mL/Hr IV Intermittent (10-10-20 @ 20:52)    cefTRIAXone   IVPB   100 mL/Hr IV Intermittent (10-09-20 @ 21:28)    vancomycin  IVPB   250 mL/Hr IV Intermittent (10-12-20 @ 09:32)   250 mL/Hr IV Intermittent (10-12-20 @ 00:24)   250 mL/Hr IV Intermittent (10-11-20 @ 17:27)   250 mL/Hr IV Intermittent (10-11-20 @ 10:13)    vancomycin  IVPB   250 mL/Hr IV Intermittent (10-10-20 @ 16:55)    vancomycin  IVPB   250 mL/Hr IV Intermittent (10-09-20 @ 22:17)        Social History--  EtOH: denies   Tobacco: denies   Drug Use: denies     Family/Marital History--  No pertinent family history in first degree relatives          Travel/Environmental/Occupational History:      Review of Systems:    Pertinent positives and negatives--  Constitutional: No fevers. No Chills. No Rigors. No recent significant weight loss.   Eyes: reports having eye exam long time ago  ENMT: + partial dentures   Cardiovascular: No chest pain. No palpitations.  Respiratory: No shortness of breath. No cough.  Gastrointestinal: No nausea or vomiting. No diarrhea or constipation. No abdominal pain  Genitourinary: + urinary urgency, denies any incontinence   Musculoskeletal: complains of pain in bilateral lower extremities, and bilateral forearms   Skin: denies   Neurologic: denies   Psychiatric: Pleasant. Appropriate affect.  Endocrine:  Heme/Lymphatic:  Allergy/Immunologic: no known allergies     Review of systems otherwise negative except as previously noted.        Physical Exam--  Vital Signs: T(F): 99.6 (10-13-20 @ 05:20), Max: 100.3 (10-12-20 @ 18:32)  HR: 94 (10-13-20 @ 05:20)  BP: 137/86 (10-13-20 @ 05:20)  RR: 18 (10-13-20 @ 05:20)  SpO2: 94% (10-13-20 @ 05:20)  Wt(kg): --  General: Nontoxic-appearing thin Male in no acute distress.  HEENT: AT/NC. PERRL. EOMI. Anicteric. Conjunctiva pink and moist. Oropharynx clear. Dentition + partial dentures   Neck: Not rigid. No sense of mass. supple  Nodes: None palpable.  Lungs: Clear bilaterally without rales, wheezing or rhonchi, respirations not labored.   Heart: Regular rate and rhythm. No Murmur. No rub. No gallop. No palpable thrill.  Abdomen: Bowel sounds present and normoactive. Soft. Nondistended. Nontender. No sense of mass. No organomegaly.  Back: No spinal tenderness. No costovertebral angle tenderness.   Extremities: No cyanosis or clubbing. No edema. Tender upon palpation bilateral lower extremities and bilateral forearms.   Skin: Warm. Dry. Good turgor. No rash. No vasculitic stigmata.  Psychiatric: Appropriate affect and mood for situation.         Laboratory & Imaging Data--  CBC                        10.8   13.48 )-----------( 307      ( 13 Oct 2020 07:23 )             30.9     WBC trend  22.91  21.28  15.88  13.48 - today    Chemistries  10-13    139  |  102  |  5<L>  ----------------------------<  231<H>  3.2<L>   |  31  |  0.58    Ca    7.7<L>      13 Oct 2020 07:23  Mg     2.6     10-13        Culture Data    Culture - Blood (collected 12 Oct 2020 18:54)  Source: .Blood Blood-Peripheral  Gram Stain (prelim) (13 Oct 2020 11:18):    Growth in aerobic bottle: Gram Positive Cocci in Clusters  Preliminary Report (13 Oct 2020 11:18):    Growth in aerobic bottle: Gram Positive Cocci in Clusters    Culture - Blood (collected 10 Oct 2020 00:43)  Source: .Blood Blood-Peripheral  Gram Stain (12 Oct 2020 13:43):    Growth in aerobic and anaerobic bottles: Gram Positive Cocci in Clusters  Final Report (12 Oct 2020 13:43):    Growth in aerobic and anaerobic bottles: Staphylococcus aureus    "Due to technical problems, Proteus sp. will Not be reported as part of    the BCID panel until further notice"    ***Blood Panel PCR results on this specimen are available    approximately 3 hours after the Gram stain result.***    Gram stain, PCR, and/or culture results may not always    correspond due to difference in methodologies.    ************************************************************    This PCR assay was performed using [employer].    The following targets are tested for: Enterococcus,    vancomycin resistant enterococci, Listeria monocytogenes,    coagulase negative staphylococci, S. aureus,    methicillin resistant S. aureus, Streptococcus agalactiae    (Group B), S. pneumoniae, S. pyogenes (Group A),    Acinetobacter baumannii, Enterobacter cloacae, E. coli,    Klebsiella oxytoca, K. pneumoniae, Proteus sp.,    Serratia marcescens, Haemophilus influenzae,    Neisseria meningitidis, Pseudomonas aeruginosa, Candida    albicans, C. glabrata, C krusei, C parapsilosis,    C. tropicalis and the KPC resistance gene.  Organism: Blood Culture PCR  Staphylococcus aureus (12 Oct 2020 13:43)  Organism: Staphylococcus aureus (12 Oct 2020 13:43)  Organism: Blood Culture PCR (12 Oct 2020 13:43)    Culture - Blood (collected 10 Oct 2020 00:43)  Source: .Blood Blood-Peripheral  Gram Stain (12 Oct 2020 13:44):    Growth in aerobic and anaerobic bottles: Gram Positive Cocci in Clusters  Final Report (12 Oct 2020 13:44):    Growth in aerobic and anaerobic bottles: Staphylococcus aureus    See previous culture [id number]    Culture - Urine (collected 10 Oct 2020 00:40)  Source: .Urine Clean Catch (Midstream)  Final Report (11 Oct 2020 19:56):    50,000 - 99,000 CFU/mL Staphylococcus aureus  Organism: Staphylococcus aureus (11 Oct 2020 19:56)  Organism: Staphylococcus aureus (11 Oct 2020 19:56)    < from: MR Lumbar Spine w/ IV Cont (10.12.20 @ 18:02) >    EXAM:  MR SPINE THORACIC IC                          EXAM:  MR SPINE LUMBAR IC                            PROCEDURE DATE:  10/12/2020          INTERPRETATION:  CLINICAL HISTORY: Sepsis. Unknown source. Lower back pain.    TECHNIQUE: MRI of the thoracic and lumbar spines with and without IV contrast.  Examination consists of precontrast transaxial and sagittal T1 and T2-weighted sequences as well as sagittal STIR. Postcontrast transaxial T1 and sagittal T1 fat sat images were also acquired.  7 mls of Gadavist gadolinium-based intravenous contrast was administered without untoward event. 3 mls started.    COMPARISON: None available.    FINDINGS:  There is motion artifact present which degrades image quality, particularly affecting the postcontrast sequences.    Thoracic spine:  The thoracic spinal alignment is intact. There are no acute fractures or evidence of traumatic malalignment. The vertebral body heights are maintained. No suspicious vertebral body marrow signal abnormality or enhancement to suggest edema from occult fracture, marrow infiltrative process or infection. No aggressive endplate erosion.    The intervertebral disc spaces are maintained. No fluid or abnormal enhancement is seen within the intervertebral disc spaces. There is no significant disc herniation, spinal canal stenosis or foraminal narrowing.    The thoracic spinal cord is normal in signal intensity and morphology. There is no abnormal intraspinal enhancement. There is no epidural fluid collection or enhancement.    There are small bilateral pleural effusions. The visualized mediastinum is grossly unremarkable.    The paraspinal musculature is unremarkable in bulk and signal intensity.    Lumbar spine:  There is mild straightening of the normal lumbar lordosis. There are no acute fractures or evidence of traumatic malalignment. The vertebral body heights are maintained. Multilevel facet alignment is preserved. No suspicious vertebral body marrow signal or enhancement to suggest edema from occult fracture, marrow infiltrative process or infection. No aggressive endplate erosion.    There is no epidural fluid collection or abnormal enhancement. The nerve roots of the cauda equina are normal in signal intensity and morphology. There is no abnormal leptomeningeal enhancement. The conus medullaris terminates at the T12/L1 level and is unremarkable.    Mild disc desiccation changes noted from L3/L4 through L5/S1. No significant intervertebral disc space height loss. Evaluation of the individuallevels demonstrates:    T12/L1 through L1/L2: No significant disc herniation, spinal canal stenosis or neural foraminal narrowing.    L3/L4: Minimal circumferential disc bulge, mild facet arthropathy and ligamentum flavum thickening which does not contribute to significant canal or right foraminal stenosis. There is mild left neural foraminal narrowing.    L4/L5: Small circumferential disc bulges and bilateral asymmetric foraminal component, slightly greater on the right as well as facet arthropathy and ligamentum flavum thickening contribute to mild to moderate canal stenosis. There is moderate left and moderate to severe right neural foraminal narrowing. No evidence of nerve root impingement.    L5/S1: Minimal broad-based disc bulge. No significant canal or foraminal stenosis.    Limited images through the abdomen and pelvis demonstrate a distended urinary bladder. There is suggestion of inflammatory change around the rectum.    The paraspinal musculature is unremarkable.    IMPRESSION:  Thoracic spine: No acute finding. No evidence of discitis osteomyelitis or epidural abscess.  Small bilateral pleural effusions.    Lumbar spine: No evidence of discitis osteomyelitis or epidural abscess.    Mild degenerative changes most notable atL4/L5 where there is mild to moderate canal stenosis and moderate to severe right neural foraminal narrowing. No nerve root or cauda equina compression.    Distended urinary bladder. Please correlate clinically for retention. Suggestion of perirectalinflammation which can be seen with proctitis. Follow-up with CT abdomen and pelvis with IV contrast is recommended.            NOEL CONTRERAS M.D., ATTENDING RADIOLOGIST  This document has been electronically signed. Oct 12 2020 10:19PM    < end of copied text >  < from: MR Lumbar Spine w/ IV Cont (10.12.20 @ 18:02) >  EXAM:  MR SPINE THORACIC IC                          EXAM:  MR SPINE LUMBAR IC                            PROCEDURE DATE:  10/12/2020          INTERPRETATION:  CLINICAL HISTORY: Sepsis. Unknown source. Lower back pain.    TECHNIQUE: MRI of the thoracic and lumbar spines with and without IV contrast.  Examination consists of precontrast transaxial and sagittal T1 and T2-weighted sequences as well as sagittal STIR. Postcontrast transaxial T1 and sagittal T1 fat sat images were also acquired.  7 mls of Gadavist gadolinium-based intravenous contrast was administered without untoward event. 3 mls started.    COMPARISON: None available.    FINDINGS:  There is motion artifact present which degrades image quality, particularly affecting the postcontrast sequences.    Thoracic spine:  The thoracic spinal alignment is intact. There are no acute fractures or evidence of traumatic malalignment. The vertebral body heights are maintained. No suspicious vertebral body marrow signal abnormality or enhancement to suggest edema from occult fracture, marrow infiltrative process or infection. No aggressive endplate erosion.    The intervertebral disc spaces are maintained. No fluid or abnormal enhancement is seen within the intervertebral disc spaces. There is no significant disc herniation, spinal canal stenosis or foraminal narrowing.    The thoracic spinal cord is normal in signal intensity and morphology. There is no abnormal intraspinal enhancement. There is no epidural fluid collection or enhancement.    There are small bilateral pleural effusions. The visualized mediastinum is grossly unremarkable.    The paraspinal musculature is unremarkable in bulk and signal intensity.    Lumbar spine:  There is mild straightening of the normal lumbar lordosis. There are no acute fractures or evidence of traumatic malalignment. The vertebral body heights are maintained. Multilevel facet alignment is preserved. No suspicious vertebral body marrow signal or enhancement to suggest edema from occult fracture, marrow infiltrative process or infection. No aggressive endplate erosion.    There is no epidural fluid collection or abnormal enhancement. The nerve roots of the cauda equina are normal in signal intensity and morphology. There is no abnormal leptomeningeal enhancement. The conus medullaris terminates at the T12/L1 level and is unremarkable.    Mild disc desiccation changes noted from L3/L4 through L5/S1. No significant intervertebral disc space height loss. Evaluation of the individuallevels demonstrates:    T12/L1 through L1/L2: No significant disc herniation, spinal canal stenosis or neural foraminal narrowing.    L3/L4: Minimal circumferential disc bulge, mild facet arthropathy and ligamentum flavum thickening which does not contribute to significant canal or right foraminal stenosis. There is mild left neural foraminal narrowing.    L4/L5: Small circumferential disc bulges and bilateral asymmetric foraminal component, slightly greater on the right as well as facet arthropathy and ligamentum flavum thickening contribute to mild to moderate canal stenosis. There is moderate left and moderate to severe right neural foraminal narrowing. No evidence of nerve root impingement.    L5/S1: Minimal broad-based disc bulge. No significant canal or foraminal stenosis.    Limited images through the abdomen and pelvis demonstrate a distended urinary bladder. There is suggestion of inflammatory change around the rectum.    The paraspinal musculature is unremarkable.    IMPRESSION:  Thoracic spine: No acute finding. No evidence of discitis osteomyelitis or epidural abscess.  Small bilateral pleural effusions.    Lumbar spine: No evidence of discitis osteomyelitis or epidural abscess.    Mild degenerative changes most notable atL4/L5 where there is mild to moderate canal stenosis and moderate to severe right neural foraminal narrowing. No nerve root or cauda equina compression.    Distended urinary bladder. Please correlate clinically for retention. Suggestion of perirectalinflammation which can be seen with proctitis. Follow-up with CT abdomen and pelvis with IV contrast is recommended.            NOEL CONTRERAS M.D., ATTENDING RADIOLOGIST  This document has been electronically signed. Oct 12 2020 10:19PM    < end of copied text >       20 - 33 mmol/L    Anion Gap 11.0 7.0 - 16.0    Glucose 143 (H) 65 - 99 mg/dL    Bun 31 (H) 8 - 22 mg/dL    Creatinine 1.21 0.50 - 1.40 mg/dL    Calcium 8.2 (L) 8.5 - 10.5 mg/dL    Correct Calcium 9.3 8.5 - 10.5 mg/dL    AST(SGOT) 14 12 - 45 U/L    ALT(SGPT) 8 2 - 50 U/L    Alkaline Phosphatase 62 30 - 99 U/L    Total Bilirubin <0.2 0.1 - 1.5 mg/dL    Albumin 2.6 (L) 3.2 - 4.9 g/dL    Total Protein 5.0 (L) 6.0 - 8.2 g/dL    Globulin 2.4 1.9 - 3.5 g/dL    A-G Ratio 1.1 g/dL   ESTIMATED GFR    Collection Time: 03/26/24  5:18 AM   Result Value Ref Range    GFR (CKD-EPI) 47 (A) >60 mL/min/1.73 m 2       Fluids    Intake/Output Summary (Last 24 hours) at 3/26/2024 1055  Last data filed at 3/26/2024 1000  Gross per 24 hour   Intake 2677.54 ml   Output 795 ml   Net 1882.54 ml       Core Measures & Quality Metrics  Labs reviewed, Medications reviewed and Radiology images reviewed  Uribe Catheter: Trial uribe removal.      DVT Prophylaxis: Enoxaparin (Lovenox)    Ulcer prophylaxis: No  Antibiotics: Treating active infection/contamination beyond 24 hours perioperative coverage      RAP Score Total: 6    CAGE Results: not completed Blood Alcohol>0.08: not completed     Assessment/Plan  * Prolapse of small intestine- (present on admission)  Assessment & Plan  Hx of hysterectomy 40 years ago  Had BM and felt bowel come out 3/24 AM  Seen in [place] and transfer to [place]  Large volume evisceration of small bowel coming through perineum upon arrival.  3/25 exploratory celiotomy & repair of vaginal cuff dehiscence.    Hypertension- (present on admission)  Assessment & Plan  Chronic condition treated with atenolol, amlodipine & benazepril.  3/26 Resumed atenolol & amlodipine.  Continue to hold benazepril & monitor K+.    Type 2 diabetes mellitus (HCC)- (present on admission)  Assessment & Plan  Chronic condition treated with metformin.  Holding maintenance metformin for 48 hours following intravenous contrast administration.  Insulin  sliding scale coverage.    Hyperkalemia- (present on admission)  Assessment & Plan  Monitor.  Hold ACEi    No contraindication to deep vein thrombosis (DVT) prophylaxis- (present on admission)  Assessment & Plan  3/26 Prophylactic dose enoxaparin 40 mg QD initiated.     Hyperlipidemia- (present on admission)  Assessment & Plan  Chronic condition treated with lovastatin.  3/26 Resumed maintenance medication.      Discussed patient condition with RN, Pharmacy, Charge nurse / hot rounds, Patient, and trauma surgery, Dr. Parra.   [facility]  Division of Infectious Diseases  [phone]    BHARATH GONZALES  55y, Male  [phone]    Interval note:  The patient is in bed, comfortable, no distress. The patient continues to complains of bilateral lower extremities and forearms pain, the patient did ambulate with PT and the walker, did well. The patient is afebrile, Tmax within 24 hours 100.3 - yesterday at 18:32, leukocytosis is trending down, WBC 13.48 today, blood and urine cultures collected on 10/10/2020 grew staph aureus, repeat blood cultures collected yesterday - Growth in aerobic bottle: Gram Positive Cocci in Clusters. MRI of thoracic and lumbar spine were performed, negative for osteomyelitis and / or epidural abscess, + small bilateral pleural effusions, + distended urinary bladder, perirectal inflammation, possible proctitis, CT abdomen / pelvis / chest were recommended - the patient is about to be transported to have the test done.   Patient denies any discomfort with urination, thinks that he empties his bladder completely, exam of his abdomen is benign. HIV test is pending result.        PMH/PSH--  DM (diabetes mellitus)    No significant past surgical history        Allergies--  No Known Allergies      Medications--  Antibiotics: ceFAZolin   IVPB      ceFAZolin   IVPB 2000 milliGRAM(s) IV Intermittent every 8 hours    Immunologic: influenza   Vaccine 0.5 milliLiter(s) IntraMuscular once    Other: acetaminophen    Suspension .. PRN  dextrose 40% Gel PRN  dextrose 5%.  dextrose 50% Injectable  dextrose 50% Injectable  dextrose 50% Injectable  enoxaparin Injectable  glucagon  Injectable PRN  insulin glargine Injectable (LANTUS)  insulin lispro (HumaLOG) corrective regimen sliding scale  insulin lispro (HumaLOG) corrective regimen sliding scale  insulin lispro Injectable (HumaLOG)  simvastatin  sodium chloride 0.9%.  tamsulosin    Antimicrobials last 90 days per EMR: MEDICATIONS  (STANDING):    ceFAZolin   IVPB   100 mL/Hr IV Intermittent (10-12-20 @ 18:32)    ceFAZolin   IVPB   100 mL/Hr IV Intermittent (10-13-20 @ 05:05)   100 mL/Hr IV Intermittent (10-12-20 @ 22:12)    cefTRIAXone   IVPB   100 mL/Hr IV Intermittent (10-10-20 @ 20:52)    cefTRIAXone   IVPB   100 mL/Hr IV Intermittent (10-09-20 @ 21:28)    vancomycin  IVPB   250 mL/Hr IV Intermittent (10-12-20 @ 09:32)   250 mL/Hr IV Intermittent (10-12-20 @ 00:24)   250 mL/Hr IV Intermittent (10-11-20 @ 17:27)   250 mL/Hr IV Intermittent (10-11-20 @ 10:13)    vancomycin  IVPB   250 mL/Hr IV Intermittent (10-10-20 @ 16:55)    vancomycin  IVPB   250 mL/Hr IV Intermittent (10-09-20 @ 22:17)        Social History--  EtOH: denies   Tobacco: denies   Drug Use: denies     Family/Marital History--  No pertinent family history in first degree relatives          Travel/Environmental/Occupational History:      Review of Systems:    Pertinent positives and negatives--  Constitutional: No fevers. No Chills. No Rigors. No recent significant weight loss.   Eyes: reports having eye exam long time ago  ENMT: + partial dentures   Cardiovascular: No chest pain. No palpitations.  Respiratory: No shortness of breath. No cough.  Gastrointestinal: No nausea or vomiting. No diarrhea or constipation. No abdominal pain  Genitourinary: + urinary urgency, denies any incontinence   Musculoskeletal: complains of pain in bilateral lower extremities, and bilateral forearms   Skin: denies   Neurologic: denies   Psychiatric: Pleasant. Appropriate affect.  Endocrine:  Heme/Lymphatic:  Allergy/Immunologic: no known allergies     Review of systems otherwise negative except as previously noted.        Physical Exam--  Vital Signs: T(F): 99.6 (10-13-20 @ 05:20), Max: 100.3 (10-12-20 @ 18:32)  HR: 94 (10-13-20 @ 05:20)  BP: 137/86 (10-13-20 @ 05:20)  RR: 18 (10-13-20 @ 05:20)  SpO2: 94% (10-13-20 @ 05:20)  Wt(kg): --    General: Nontoxic-appearing thin Male in no acute distress.  HEENT: AT/NC. PERRL. EOMI. Anicteric. Conjunctiva pink and moist. Oropharynx clear. Dentition + partial dentures   Neck: Not rigid. No sense of mass. supple  Nodes: None palpable.  Lungs: Clear bilaterally without rales, wheezing or rhonchi, respirations not labored.   Heart: Regular rate and rhythm. No Murmur. No rub. No gallop. No palpable thrill.  Abdomen: Bowel sounds present and normoactive. Soft. Nondistended. Nontender. No sense of mass. No organomegaly.  Back: No spinal tenderness. No costovertebral angle tenderness.   Extremities: No cyanosis or clubbing. No edema. Tender upon palpation bilateral lower extremities and bilateral forearms. +palpable cord in the right antecubital space   Skin: Warm. Dry. Good turgor. No rash. No vasculitic stigmata.  Psychiatric: Appropriate affect and mood for situation.         Laboratory & Imaging Data--  CBC                        10.8   13.48 )-----------( 307      ( 13 Oct 2020 07:23 )             30.9     WBC trend  22.91  21.28  15.88  13.48 - today    Chemistries  10-13    139  |  102  |  5<L>  ----------------------------<  231<H>  3.2<L>   |  31  |  0.58    Ca    7.7<L>      13 Oct 2020 07:23  Mg     2.6     10-13        Culture Data    Culture - Blood (collected 12 Oct 2020 18:54)  Source: .Blood Blood-Peripheral  Gram Stain (prelim) (13 Oct 2020 11:18):    Growth in aerobic bottle: Gram Positive Cocci in Clusters  Preliminary Report (13 Oct 2020 11:18):    Growth in aerobic bottle: Gram Positive Cocci in Clusters    Culture - Blood (collected 10 Oct 2020 00:43)  Source: .Blood Blood-Peripheral  Gram Stain (12 Oct 2020 13:43):    Growth in aerobic and anaerobic bottles: Gram Positive Cocci in Clusters  Final Report (12 Oct 2020 13:43):    Growth in aerobic and anaerobic bottles: Staphylococcus aureus    "Due to technical problems, Proteus sp. will Not be reported as part of    the BCID panel until further notice"    ***Blood Panel PCR results on this specimen are available    approximately 3 hours after the Gram stain result.***    Gram stain, PCR, and/or culture results may not always    correspond due to difference in methodologies.    ************************************************************    This PCR assay was performed using [employer].    The following targets are tested for: Enterococcus,    vancomycin resistant enterococci, Listeria monocytogenes,    coagulase negative staphylococci, S. aureus,    methicillin resistant S. aureus, Streptococcus agalactiae    (Group B), S. pneumoniae, S. pyogenes (Group A),    Acinetobacter baumannii, Enterobacter cloacae, E. coli,    Klebsiella oxytoca, K. pneumoniae, Proteus sp.,    Serratia marcescens, Haemophilus influenzae,    Neisseria meningitidis, Pseudomonas aeruginosa, Candida    albicans, C. glabrata, C krusei, C parapsilosis,    C. tropicalis and the KPC resistance gene.  Organism: Blood Culture PCR  Staphylococcus aureus (12 Oct 2020 13:43)  Organism: Staphylococcus aureus (12 Oct 2020 13:43)  Organism: Blood Culture PCR (12 Oct 2020 13:43)    Culture - Blood (collected 10 Oct 2020 00:43)  Source: .Blood Blood-Peripheral  Gram Stain (12 Oct 2020 13:44):    Growth in aerobic and anaerobic bottles: Gram Positive Cocci in Clusters  Final Report (12 Oct 2020 13:44):    Growth in aerobic and anaerobic bottles: Staphylococcus aureus    See previous culture [id number]    Culture - Urine (collected 10 Oct 2020 00:40)  Source: .Urine Clean Catch (Midstream)  Final Report (11 Oct 2020 19:56):    50,000 - 99,000 CFU/mL Staphylococcus aureus  Organism: Staphylococcus aureus (11 Oct 2020 19:56)  Organism: Staphylococcus aureus (11 Oct 2020 19:56)      RADIOLOGY:  MR Lumbar Spine w/ IV Cont (10.12.20 @ 18:02)  EXAM:  MR SPINE THORACIC IC                        EXAM:  MR SPINE LUMBAR IC                          PROCEDURE DATE:  10/12/2020        INTERPRETATION:  CLINICAL HISTORY: Sepsis. Unknown source. Lower back pain.  IMPRESSION:  Thoracic spine: No acute finding. No evidence of discitis osteomyelitis or epidural abscess.  Small bilateral pleural effusions.    Lumbar spine: No evidence of discitis osteomyelitis or epidural abscess.    Mild degenerative changes most notable atL4/L5 where there is mild to moderate canal stenosis and moderate to severe right neural foraminal narrowing. No nerve root or cauda equina compression.    Distended urinary bladder. Please correlate clinically for retention. Suggestion of perirectal inflammation which can be seen with proctitis. Follow-up with CT abdomen and pelvis with IV contrast is recommended.    NOEL CONTRERAS M.D., ATTENDING RADIOLOGIST  This document has been electronically signed. Oct 12 2020 10:19PM      CT Chest w/ Oral Cont and w/ IV Cont (10.13.20 @ 12:19)   FINDINGS:  CHEST:  LUNGS AND LARGE AIRWAYS: Patent central airways. Bilateral dependent and basilar atelectasis. 3 mm left lower lobe subpleural nodule, possibly intrapulmonary lymph node. No lung consolidations.  PLEURA: Small bilateral pleural effusions.  VESSELS: Atherosclerotic changes of thoracic aorta and coronary arteries.  HEART: Heart size is normal. No pericardial effusion.  MEDIASTINUM AND DENIS: No lymphadenopathy.  CHEST WALL AND LOWER NECK: Within normal limits.    ABDOMEN AND PELVIS:  LIVER: Subcentimeter hepatichypodensity, too small to further characterize.  BILE DUCTS: Normal caliber.  GALLBLADDER: Within normal limits.  SPLEEN: Within normal limits.  PANCREAS: Mildly atrophic.  ADRENALS: Within normal limits.  KIDNEYS/URETERS: Within normal limits.    BLADDER: Wall thickening and inflammation.  REPRODUCTIVE ORGANS: Prostatic inflammatory changes.    BOWEL: No bowel obstruction. Appendix is normal.  PERITONEUM: Trace pelvic fluid.  VESSELS: Atherosclerotic changes.  RETROPERITONEUM/LYMPH NODES: No lymphadenopathy.  ABDOMINAL WALL: Subcutaneous soft tissue edema.  BONES: Degenerative changes.    IMPRESSION:  No lung consolidations.  Cystitis/prostatitis.

## 2024-04-01 ENCOUNTER — RESULT REVIEW (OUTPATIENT)
Age: 59
End: 2024-04-01

## 2024-04-01 ENCOUNTER — APPOINTMENT (OUTPATIENT)
Dept: HEMATOLOGY ONCOLOGY | Facility: CLINIC | Age: 59
End: 2024-04-01

## 2024-04-01 ENCOUNTER — APPOINTMENT (OUTPATIENT)
Dept: INFUSION THERAPY | Facility: HOSPITAL | Age: 59
End: 2024-04-01

## 2024-04-01 LAB
ALBUMIN SERPL ELPH-MCNC: 4 G/DL — SIGNIFICANT CHANGE UP (ref 3.3–5)
ALP SERPL-CCNC: 64 U/L — SIGNIFICANT CHANGE UP (ref 40–120)
ALT FLD-CCNC: 11 U/L — SIGNIFICANT CHANGE UP (ref 10–45)
ANION GAP SERPL CALC-SCNC: 11 MMOL/L — SIGNIFICANT CHANGE UP (ref 5–17)
AST SERPL-CCNC: 30 U/L — SIGNIFICANT CHANGE UP (ref 10–40)
BASOPHILS # BLD AUTO: 0.02 K/UL — SIGNIFICANT CHANGE UP (ref 0–0.2)
BASOPHILS NFR BLD AUTO: 0.3 % — SIGNIFICANT CHANGE UP (ref 0–2)
BILIRUB SERPL-MCNC: 0.2 MG/DL — SIGNIFICANT CHANGE UP (ref 0.2–1.2)
BUN SERPL-MCNC: 24 MG/DL — HIGH (ref 7–23)
CALCIUM SERPL-MCNC: 9 MG/DL — SIGNIFICANT CHANGE UP (ref 8.4–10.5)
CEA SERPL-MCNC: 4.8 NG/ML — HIGH (ref 0–3.8)
CHLORIDE SERPL-SCNC: 94 MMOL/L — LOW (ref 96–108)
CO2 SERPL-SCNC: 26 MMOL/L — SIGNIFICANT CHANGE UP (ref 22–31)
CREAT SERPL-MCNC: 1.09 MG/DL — SIGNIFICANT CHANGE UP (ref 0.5–1.3)
EGFR: 77 ML/MIN/1.73M2 — SIGNIFICANT CHANGE UP
EOSINOPHIL # BLD AUTO: 0.08 K/UL — SIGNIFICANT CHANGE UP (ref 0–0.5)
EOSINOPHIL NFR BLD AUTO: 1.1 % — SIGNIFICANT CHANGE UP (ref 0–6)
GLUCOSE SERPL-MCNC: 121 MG/DL — HIGH (ref 70–99)
HBV DNA # SERPL NAA+PROBE: SIGNIFICANT CHANGE UP
HBV DNA SERPL NAA+PROBE-LOG#: SIGNIFICANT CHANGE UP LOGIU/ML
HCT VFR BLD CALC: 34.3 % — LOW (ref 39–50)
HGB BLD-MCNC: 11.8 G/DL — LOW (ref 13–17)
IMM GRANULOCYTES NFR BLD AUTO: 1.1 % — HIGH (ref 0–0.9)
LYMPHOCYTES # BLD AUTO: 2.12 K/UL — SIGNIFICANT CHANGE UP (ref 1–3.3)
LYMPHOCYTES # BLD AUTO: 29.7 % — SIGNIFICANT CHANGE UP (ref 13–44)
MCHC RBC-ENTMCNC: 28.4 PG — SIGNIFICANT CHANGE UP (ref 27–34)
MCHC RBC-ENTMCNC: 34.4 G/DL — SIGNIFICANT CHANGE UP (ref 32–36)
MCV RBC AUTO: 82.5 FL — SIGNIFICANT CHANGE UP (ref 80–100)
MONOCYTES # BLD AUTO: 0.71 K/UL — SIGNIFICANT CHANGE UP (ref 0–0.9)
MONOCYTES NFR BLD AUTO: 9.9 % — SIGNIFICANT CHANGE UP (ref 2–14)
NEUTROPHILS # BLD AUTO: 4.14 K/UL — SIGNIFICANT CHANGE UP (ref 1.8–7.4)
NEUTROPHILS NFR BLD AUTO: 57.9 % — SIGNIFICANT CHANGE UP (ref 43–77)
NRBC # BLD: 0 /100 WBCS — SIGNIFICANT CHANGE UP (ref 0–0)
PLATELET # BLD AUTO: 172 K/UL — SIGNIFICANT CHANGE UP (ref 150–400)
POTASSIUM SERPL-MCNC: 3.7 MMOL/L — SIGNIFICANT CHANGE UP (ref 3.5–5.3)
POTASSIUM SERPL-SCNC: 3.7 MMOL/L — SIGNIFICANT CHANGE UP (ref 3.5–5.3)
PROT SERPL-MCNC: 7.1 G/DL — SIGNIFICANT CHANGE UP (ref 6–8.3)
RBC # BLD: 4.16 M/UL — LOW (ref 4.2–5.8)
RBC # FLD: 15.1 % — HIGH (ref 10.3–14.5)
SODIUM SERPL-SCNC: 131 MMOL/L — LOW (ref 135–145)
WBC # BLD: 7.15 K/UL — SIGNIFICANT CHANGE UP (ref 3.8–10.5)
WBC # FLD AUTO: 7.15 K/UL — SIGNIFICANT CHANGE UP (ref 3.8–10.5)

## 2024-04-02 ENCOUNTER — NON-APPOINTMENT (OUTPATIENT)
Age: 59
End: 2024-04-02

## 2024-04-02 DIAGNOSIS — R11.2 NAUSEA WITH VOMITING, UNSPECIFIED: ICD-10-CM

## 2024-04-02 DIAGNOSIS — Z51.11 ENCOUNTER FOR ANTINEOPLASTIC CHEMOTHERAPY: ICD-10-CM

## 2024-04-02 LAB
OSMOLALITY SERPL: 277 MOSMOL/KG — LOW (ref 280–301)
URATE SERPL-MCNC: 8 MG/DL — SIGNIFICANT CHANGE UP (ref 3.4–8.8)

## 2024-04-03 ENCOUNTER — APPOINTMENT (OUTPATIENT)
Dept: INFUSION THERAPY | Facility: HOSPITAL | Age: 59
End: 2024-04-03

## 2024-04-03 ENCOUNTER — APPOINTMENT (OUTPATIENT)
Dept: HEMATOLOGY ONCOLOGY | Facility: CLINIC | Age: 59
End: 2024-04-03
Payer: MEDICAID

## 2024-04-03 VITALS
TEMPERATURE: 97.2 F | DIASTOLIC BLOOD PRESSURE: 77 MMHG | HEART RATE: 103 BPM | WEIGHT: 128.09 LBS | BODY MASS INDEX: 21.99 KG/M2 | SYSTOLIC BLOOD PRESSURE: 148 MMHG | OXYGEN SATURATION: 96 % | RESPIRATION RATE: 16 BRPM

## 2024-04-03 PROCEDURE — 99214 OFFICE O/P EST MOD 30 MIN: CPT

## 2024-04-04 PROCEDURE — 77470 SPECIAL RADIATION TREATMENT: CPT | Mod: 26

## 2024-04-05 ENCOUNTER — APPOINTMENT (OUTPATIENT)
Dept: NUCLEAR MEDICINE | Facility: IMAGING CENTER | Age: 59
End: 2024-04-05

## 2024-04-08 DIAGNOSIS — C15.9 MALIGNANT NEOPLASM OF ESOPHAGUS, UNSPECIFIED: ICD-10-CM

## 2024-04-10 PROCEDURE — 77334 RADIATION TREATMENT AID(S): CPT | Mod: 26

## 2024-04-10 PROCEDURE — 77263 THER RADIOLOGY TX PLNG CPLX: CPT

## 2024-04-15 ENCOUNTER — RESULT REVIEW (OUTPATIENT)
Age: 59
End: 2024-04-15

## 2024-04-15 ENCOUNTER — APPOINTMENT (OUTPATIENT)
Dept: INFUSION THERAPY | Facility: HOSPITAL | Age: 59
End: 2024-04-15

## 2024-04-15 ENCOUNTER — APPOINTMENT (OUTPATIENT)
Dept: HEMATOLOGY ONCOLOGY | Facility: CLINIC | Age: 59
End: 2024-04-15
Payer: MEDICAID

## 2024-04-15 LAB
ALBUMIN SERPL ELPH-MCNC: 3.9 G/DL — SIGNIFICANT CHANGE UP (ref 3.3–5)
ALP SERPL-CCNC: 75 U/L — SIGNIFICANT CHANGE UP (ref 40–120)
ALT FLD-CCNC: 19 U/L — SIGNIFICANT CHANGE UP (ref 10–45)
ANION GAP SERPL CALC-SCNC: 13 MMOL/L — SIGNIFICANT CHANGE UP (ref 5–17)
AST SERPL-CCNC: 27 U/L — SIGNIFICANT CHANGE UP (ref 10–40)
BASOPHILS # BLD AUTO: 0.02 K/UL — SIGNIFICANT CHANGE UP (ref 0–0.2)
BASOPHILS NFR BLD AUTO: 0.4 % — SIGNIFICANT CHANGE UP (ref 0–2)
BILIRUB SERPL-MCNC: 0.2 MG/DL — SIGNIFICANT CHANGE UP (ref 0.2–1.2)
BUN SERPL-MCNC: 18 MG/DL — SIGNIFICANT CHANGE UP (ref 7–23)
CALCIUM SERPL-MCNC: 9.4 MG/DL — SIGNIFICANT CHANGE UP (ref 8.4–10.5)
CHLORIDE SERPL-SCNC: 93 MMOL/L — LOW (ref 96–108)
CO2 SERPL-SCNC: 26 MMOL/L — SIGNIFICANT CHANGE UP (ref 22–31)
CREAT SERPL-MCNC: 1.11 MG/DL — SIGNIFICANT CHANGE UP (ref 0.5–1.3)
EGFR: 76 ML/MIN/1.73M2 — SIGNIFICANT CHANGE UP
EOSINOPHIL # BLD AUTO: 0.07 K/UL — SIGNIFICANT CHANGE UP (ref 0–0.5)
EOSINOPHIL NFR BLD AUTO: 1.3 % — SIGNIFICANT CHANGE UP (ref 0–6)
GLUCOSE SERPL-MCNC: 156 MG/DL — HIGH (ref 70–99)
HCT VFR BLD CALC: 33.6 % — LOW (ref 39–50)
HGB BLD-MCNC: 11.6 G/DL — LOW (ref 13–17)
IMM GRANULOCYTES NFR BLD AUTO: 0.8 % — SIGNIFICANT CHANGE UP (ref 0–0.9)
LYMPHOCYTES # BLD AUTO: 1.75 K/UL — SIGNIFICANT CHANGE UP (ref 1–3.3)
LYMPHOCYTES # BLD AUTO: 33.1 % — SIGNIFICANT CHANGE UP (ref 13–44)
MCHC RBC-ENTMCNC: 28.2 PG — SIGNIFICANT CHANGE UP (ref 27–34)
MCHC RBC-ENTMCNC: 34.5 G/DL — SIGNIFICANT CHANGE UP (ref 32–36)
MCV RBC AUTO: 81.6 FL — SIGNIFICANT CHANGE UP (ref 80–100)
MONOCYTES # BLD AUTO: 0.46 K/UL — SIGNIFICANT CHANGE UP (ref 0–0.9)
MONOCYTES NFR BLD AUTO: 8.7 % — SIGNIFICANT CHANGE UP (ref 2–14)
NEUTROPHILS # BLD AUTO: 2.95 K/UL — SIGNIFICANT CHANGE UP (ref 1.8–7.4)
NEUTROPHILS NFR BLD AUTO: 55.7 % — SIGNIFICANT CHANGE UP (ref 43–77)
NRBC # BLD: 0 /100 WBCS — SIGNIFICANT CHANGE UP (ref 0–0)
PLATELET # BLD AUTO: 194 K/UL — SIGNIFICANT CHANGE UP (ref 150–400)
POTASSIUM SERPL-MCNC: 4.2 MMOL/L — SIGNIFICANT CHANGE UP (ref 3.5–5.3)
POTASSIUM SERPL-SCNC: 4.2 MMOL/L — SIGNIFICANT CHANGE UP (ref 3.5–5.3)
PROT SERPL-MCNC: 7 G/DL — SIGNIFICANT CHANGE UP (ref 6–8.3)
RBC # BLD: 4.12 M/UL — LOW (ref 4.2–5.8)
RBC # FLD: 15.5 % — HIGH (ref 10.3–14.5)
SODIUM SERPL-SCNC: 132 MMOL/L — LOW (ref 135–145)
WBC # BLD: 5.29 K/UL — SIGNIFICANT CHANGE UP (ref 3.8–10.5)
WBC # FLD AUTO: 5.29 K/UL — SIGNIFICANT CHANGE UP (ref 3.8–10.5)

## 2024-04-15 PROCEDURE — 99214 OFFICE O/P EST MOD 30 MIN: CPT

## 2024-04-15 RX ORDER — DIPHENHYDRAMINE HYDROCHLORIDE AND LIDOCAINE HYDROCHLORIDE AND ALUMINUM HYDROXIDE AND MAGNESIUM HYDRO
KIT
Qty: 1 | Refills: 1 | Status: ACTIVE | COMMUNITY
Start: 2024-04-15 | End: 1900-01-01

## 2024-04-15 NOTE — HISTORY OF PRESENT ILLNESS
[Disease: _____________________] : Disease: [unfilled] [T: ___] : T[unfilled] [N: ___] : N[unfilled] [M: ___] : M[unfilled] [AJCC Stage: ____] : AJCC Stage: [unfilled] [Therapy: ___] : Therapy: [unfilled] [Cycle: ___] : Cycle: [unfilled] [Day: ___] : Day: [unfilled] [de-identified] : 59 year old male with PMHx of HTN, HLD, IDDM2, and BPH with newly diagnosed GEJ adenocarcinoma   Patient presented [facility] 1/1/24 c/o dizziness and fatigue and admitted for symptomatic anemia (hb 4.7) and hyperglycemia. Symptoms started 2 days prior to admission. EGD on 01/02: nodule on gastric side of the GEJ with some old heme in stomach. Possible healing Kavita Valero tear. Pathology reveal GEJ bx: fragments of adenocarcinoma, moderately differentiated. Colonoscopy on 01/04: right sided diverticula, small internal hemorrhoids CT CAP 1/5/24: Indeterminate 3.6 mm left apical lung nodule, High attenuation in the gastric lumen either blood products or ingested material. Limited evaluation for underlying mass. Diffuse wall thickening consistent with gastritis. Mildly distended esophagus. Correlate with recent endoscopic findings, 6.6 x 6.7 mm peripancreatic node, enlarged prostate indenting the bladder base. Of note, no prior colonoscopy. Referred to Surgical Oncology. Saw Dr. Chong 1/24/24 who recommended PET/CT and referred to Rad Onc and Med Onc to discuss neoadjuvant chemoRT.   Patient here today for initial Medical Oncology evaluation.   2/19/24: PET: 1.  Mild uptake at proximal stomach, possibly related to reported gastroesophageal junction lesion. Remaining alimentary tract shows diffuse uptake which is likely related to use of metformin. This limits evaluation for additional lesions in the bowel and the aerodigestive tract. 2.  Previously identified peripancreatic node is difficult to identify on the current low-dose CT and there is no distinct abnormal uptake in that region. Suggest MRI of the abdomen. 3/4/24: C1 FOLFOX, dose reduced (as part of CALGB 65277 protocol) 3/18/24: C2 FOLFOX, dose reduced 4/1/24: C3 FOLFOX, dose reduced 4/15/24: C4 FOLFOX  [de-identified] : adenocarcinoma, moderately differentiated.  [de-identified] : JEREMI Her2/gunnar negative   [de-identified] : Here for treatment. Doing well. Has some fatigue and cold sensitivity only. Otherwise, tolerating treatment well.

## 2024-04-15 NOTE — REASON FOR VISIT
[Follow-Up Visit] : a follow-up [Family Member] : family member [Patient Declined  Services] : - None: Patient declined  services [FreeTextEntry2] : GEJ adenocarcinoma [FreeTextEntry3] : Stephanie dialect

## 2024-04-15 NOTE — ASSESSMENT
[Curative] : Goals of care discussed with patient: Curative [Palliative Care Plan] : not applicable at this time [FreeTextEntry1] : unknown

## 2024-04-16 ENCOUNTER — NON-APPOINTMENT (OUTPATIENT)
Age: 59
End: 2024-04-16

## 2024-04-17 ENCOUNTER — APPOINTMENT (OUTPATIENT)
Dept: INFUSION THERAPY | Facility: HOSPITAL | Age: 59
End: 2024-04-17

## 2024-04-18 ENCOUNTER — APPOINTMENT (OUTPATIENT)
Dept: NUCLEAR MEDICINE | Facility: IMAGING CENTER | Age: 59
End: 2024-04-18

## 2024-04-22 NOTE — HISTORY OF PRESENT ILLNESS
[Disease: _____________________] : Disease: [unfilled] [T: ___] : T[unfilled] [N: ___] : N[unfilled] [AJCC Stage: ____] : AJCC Stage: [unfilled] [M: ___] : M[unfilled] [Therapy: ___] : Therapy: [unfilled] [Cycle: ___] : Cycle: [unfilled] [Day: ___] : Day: [unfilled] [de-identified] : 59 year old male with PMHx of HTN, HLD, IDDM2, and BPH with newly diagnosed GEJ adenocarcinoma   Patient presented [facility] 1/1/24 c/o dizziness and fatigue and admitted for symptomatic anemia (hb 4.7) and hyperglycemia. Symptoms started 2 days prior to admission. EGD on 01/02: nodule on gastric side of the GEJ with some old heme in stomach. Possible healing Kavita Valero tear. Pathology reveal GEJ bx: fragments of adenocarcinoma, moderately differentiated. Colonoscopy on 01/04: right sided diverticula, small internal hemorrhoids CT CAP 1/5/24: Indeterminate 3.6 mm left apical lung nodule, High attenuation in the gastric lumen either blood products or ingested material. Limited evaluation for underlying mass. Diffuse wall thickening consistent with gastritis. Mildly distended esophagus. Correlate with recent endoscopic findings, 6.6 x 6.7 mm peripancreatic node, enlarged prostate indenting the bladder base. Of note, no prior colonoscopy. Referred to Surgical Oncology. Saw Dr. Chong 1/24/24 who recommended PET/CT and referred to Rad Onc and Med Onc to discuss neoadjuvant chemoRT.   Patient here today for initial Medical Oncology evaluation.   2/19/24: PET: 1.  Mild uptake at proximal stomach, possibly related to reported gastroesophageal junction lesion. Remaining alimentary tract shows diffuse uptake which is likely related to use of metformin. This limits evaluation for additional lesions in the bowel and the aerodigestive tract. 2.  Previously identified peripancreatic node is difficult to identify on the current low-dose CT and there is no distinct abnormal uptake in that region. Suggest MRI of the abdomen. 3/4/24: C1 FOLFOX, dose reduced (as part of CALGB 39626 protocol) 3/18/24: C2  4/1/24: C3 [de-identified] : adenocarcinoma, moderately differentiated.  [de-identified] : JEREMI Her2/gunnar negative   [de-identified] : Here for clinical follow up. Translated by daughter at bedside as per patient request. He is doing well. He has tolerated treatment well so far. He notes a little constipation, most days. However, he is eating well, has no N/V, abdominal pain, blood in his stool, CP, SOB, or palpitations. He has cold sensations when he touches cold objects and forgets, but is not having neuropathic pain initiated randomly without such events. He is active and independent.

## 2024-04-22 NOTE — REVIEW OF SYSTEMS
[Negative] : Allergic/Immunologic [Constipation] : constipation [Abdominal Pain] : no abdominal pain [Vomiting] : no vomiting [de-identified] : + cold sensitivity

## 2024-04-22 NOTE — END OF VISIT
[Time Spent: ___ minutes] : I have spent [unfilled] minutes of time on the encounter. [] : Fellow [FreeTextEntry3] : 58 y/o M with locally advanced esophageal cancer started FOLFOX 3/4/24  - Started dose reduced (due to pt's age) FOLFOX 3/4/24, s/p 3 cycles - As part of the CALGB 08914 regimen, will obtain PET/CT on 4/5/24 and then proceeding with chemo/RT with regimen determined by PET response (i.e. continuing FOLFOX/RT concurrently for about 3 cycles vs Carbo/Taxol/RT concurrently for ~ 5weeks) - Therefore, discussed with pt to schedule RT appt with Dr. Mota - Has been on Dex D2&D3 with Reglan PRN thereafter, has not needed PRNs thus far - Grade 1 neuropathy: cold sensitivity only for now, continue to monitor - RTO in 2 weeks   Patient does note more fluctuating blood sugars since starting treatment, likely due to anti-emetic dex - He follows up with PCP and endocrinology, encouraged pt to bring logs of his sugars to those appts

## 2024-04-23 ENCOUNTER — NON-APPOINTMENT (OUTPATIENT)
Age: 59
End: 2024-04-23

## 2024-04-24 ENCOUNTER — RESULT REVIEW (OUTPATIENT)
Age: 59
End: 2024-04-24

## 2024-04-24 ENCOUNTER — APPOINTMENT (OUTPATIENT)
Dept: CT IMAGING | Facility: IMAGING CENTER | Age: 59
End: 2024-04-24
Payer: MEDICAID

## 2024-04-24 ENCOUNTER — OUTPATIENT (OUTPATIENT)
Dept: OUTPATIENT SERVICES | Facility: HOSPITAL | Age: 59
LOS: 1 days | End: 2024-04-24
Payer: MEDICAID

## 2024-04-24 DIAGNOSIS — C16.0 MALIGNANT NEOPLASM OF CARDIA: ICD-10-CM

## 2024-04-24 PROCEDURE — 74177 CT ABD & PELVIS W/CONTRAST: CPT

## 2024-04-24 PROCEDURE — 71260 CT THORAX DX C+: CPT | Mod: 26

## 2024-04-24 PROCEDURE — 74177 CT ABD & PELVIS W/CONTRAST: CPT | Mod: 26

## 2024-04-24 PROCEDURE — 71260 CT THORAX DX C+: CPT

## 2024-04-26 ENCOUNTER — APPOINTMENT (OUTPATIENT)
Dept: NUCLEAR MEDICINE | Facility: IMAGING CENTER | Age: 59
End: 2024-04-26

## 2024-04-29 ENCOUNTER — RESULT REVIEW (OUTPATIENT)
Age: 59
End: 2024-04-29

## 2024-04-29 ENCOUNTER — APPOINTMENT (OUTPATIENT)
Dept: HEMATOLOGY ONCOLOGY | Facility: CLINIC | Age: 59
End: 2024-04-29

## 2024-04-29 ENCOUNTER — APPOINTMENT (OUTPATIENT)
Dept: INFUSION THERAPY | Facility: HOSPITAL | Age: 59
End: 2024-04-29

## 2024-04-29 DIAGNOSIS — T45.1X5A TOXIC GASTROENTERITIS AND COLITIS: ICD-10-CM

## 2024-04-29 DIAGNOSIS — K52.1 TOXIC GASTROENTERITIS AND COLITIS: ICD-10-CM

## 2024-04-29 LAB
BASOPHILS # BLD AUTO: 0.01 K/UL — SIGNIFICANT CHANGE UP (ref 0–0.2)
BASOPHILS NFR BLD AUTO: 0.2 % — SIGNIFICANT CHANGE UP (ref 0–2)
CANCER AG19-9 SERPL-ACNC: 27 U/ML — SIGNIFICANT CHANGE UP
CEA SERPL-MCNC: 5.7 NG/ML — HIGH (ref 0–3.8)
EOSINOPHIL # BLD AUTO: 0.07 K/UL — SIGNIFICANT CHANGE UP (ref 0–0.5)
EOSINOPHIL NFR BLD AUTO: 1.3 % — SIGNIFICANT CHANGE UP (ref 0–6)
HCT VFR BLD CALC: 34.3 % — LOW (ref 39–50)
HGB BLD-MCNC: 11.5 G/DL — LOW (ref 13–17)
IMM GRANULOCYTES NFR BLD AUTO: 0.6 % — SIGNIFICANT CHANGE UP (ref 0–0.9)
LYMPHOCYTES # BLD AUTO: 1.28 K/UL — SIGNIFICANT CHANGE UP (ref 1–3.3)
LYMPHOCYTES # BLD AUTO: 23.9 % — SIGNIFICANT CHANGE UP (ref 13–44)
MCHC RBC-ENTMCNC: 28.3 PG — SIGNIFICANT CHANGE UP (ref 27–34)
MCHC RBC-ENTMCNC: 33.5 G/DL — SIGNIFICANT CHANGE UP (ref 32–36)
MCV RBC AUTO: 84.5 FL — SIGNIFICANT CHANGE UP (ref 80–100)
MONOCYTES # BLD AUTO: 0.43 K/UL — SIGNIFICANT CHANGE UP (ref 0–0.9)
MONOCYTES NFR BLD AUTO: 8 % — SIGNIFICANT CHANGE UP (ref 2–14)
NEUTROPHILS # BLD AUTO: 3.54 K/UL — SIGNIFICANT CHANGE UP (ref 1.8–7.4)
NEUTROPHILS NFR BLD AUTO: 66 % — SIGNIFICANT CHANGE UP (ref 43–77)
NRBC # BLD: 0 /100 WBCS — SIGNIFICANT CHANGE UP (ref 0–0)
PLATELET # BLD AUTO: 149 K/UL — LOW (ref 150–400)
RBC # BLD: 4.06 M/UL — LOW (ref 4.2–5.8)
RBC # FLD: 16 % — HIGH (ref 10.3–14.5)
WBC # BLD: 5.36 K/UL — SIGNIFICANT CHANGE UP (ref 3.8–10.5)
WBC # FLD AUTO: 5.36 K/UL — SIGNIFICANT CHANGE UP (ref 3.8–10.5)

## 2024-04-30 LAB
ALBUMIN SERPL ELPH-MCNC: 4.1 G/DL — SIGNIFICANT CHANGE UP (ref 3.3–5)
ALP SERPL-CCNC: 73 U/L — SIGNIFICANT CHANGE UP (ref 40–120)
ALT FLD-CCNC: 31 U/L — SIGNIFICANT CHANGE UP (ref 10–45)
ANION GAP SERPL CALC-SCNC: 20 MMOL/L — HIGH (ref 5–17)
AST SERPL-CCNC: 33 U/L — SIGNIFICANT CHANGE UP (ref 10–40)
BILIRUB SERPL-MCNC: 0.3 MG/DL — SIGNIFICANT CHANGE UP (ref 0.2–1.2)
BUN SERPL-MCNC: 22 MG/DL — SIGNIFICANT CHANGE UP (ref 7–23)
CALCIUM SERPL-MCNC: 9.8 MG/DL — SIGNIFICANT CHANGE UP (ref 8.4–10.5)
CHLORIDE SERPL-SCNC: 91 MMOL/L — LOW (ref 96–108)
CO2 SERPL-SCNC: 21 MMOL/L — LOW (ref 22–31)
CREAT SERPL-MCNC: 1.26 MG/DL — SIGNIFICANT CHANGE UP (ref 0.5–1.3)
EGFR: 65 ML/MIN/1.73M2 — SIGNIFICANT CHANGE UP
GLUCOSE SERPL-MCNC: 176 MG/DL — HIGH (ref 70–99)
POTASSIUM SERPL-MCNC: 3.9 MMOL/L — SIGNIFICANT CHANGE UP (ref 3.5–5.3)
POTASSIUM SERPL-SCNC: 3.9 MMOL/L — SIGNIFICANT CHANGE UP (ref 3.5–5.3)
PROT SERPL-MCNC: 7.4 G/DL — SIGNIFICANT CHANGE UP (ref 6–8.3)
SODIUM SERPL-SCNC: 132 MMOL/L — LOW (ref 135–145)

## 2024-05-01 ENCOUNTER — APPOINTMENT (OUTPATIENT)
Dept: INFUSION THERAPY | Facility: HOSPITAL | Age: 59
End: 2024-05-01

## 2024-05-01 PROCEDURE — 77300 RADIATION THERAPY DOSE PLAN: CPT | Mod: 26

## 2024-05-01 PROCEDURE — 77338 DESIGN MLC DEVICE FOR IMRT: CPT | Mod: 26

## 2024-05-01 PROCEDURE — 77301 RADIOTHERAPY DOSE PLAN IMRT: CPT | Mod: 26

## 2024-05-03 ENCOUNTER — APPOINTMENT (OUTPATIENT)
Dept: RADIATION ONCOLOGY | Facility: CLINIC | Age: 59
End: 2024-05-03
Payer: MEDICAID

## 2024-05-03 PROCEDURE — 99024 POSTOP FOLLOW-UP VISIT: CPT

## 2024-05-07 ENCOUNTER — APPOINTMENT (OUTPATIENT)
Dept: HEMATOLOGY ONCOLOGY | Facility: CLINIC | Age: 59
End: 2024-05-07

## 2024-05-07 ENCOUNTER — NON-APPOINTMENT (OUTPATIENT)
Age: 59
End: 2024-05-07

## 2024-05-07 NOTE — HISTORY OF PRESENT ILLNESS
[de-identified] :  Randomized Phase II Study of PET Response-Adapted Combined Modality Therapy for Esophageal Cancer: Mature Results of the CALGB 24660 ([place]) Trial | Journal of Clinical Oncology ([email or web address].org) ASCO Publications  The modifications for concurrent treatment with FOLFOX was 5- mg/m2/d, over 96 hours via continuous IV infusion every week during radiotherapy. Oxaliplatin 85 mg/m2 IV was given on day 1 every 2 weeks for three cycles.

## 2024-05-08 ENCOUNTER — APPOINTMENT (OUTPATIENT)
Dept: SURGICAL ONCOLOGY | Facility: CLINIC | Age: 59
End: 2024-05-08
Payer: MEDICAID

## 2024-05-08 ENCOUNTER — APPOINTMENT (OUTPATIENT)
Dept: HEMATOLOGY ONCOLOGY | Facility: CLINIC | Age: 59
End: 2024-05-08
Payer: MEDICAID

## 2024-05-08 VITALS
WEIGHT: 128.09 LBS | HEIGHT: 64 IN | DIASTOLIC BLOOD PRESSURE: 89 MMHG | BODY MASS INDEX: 21.87 KG/M2 | OXYGEN SATURATION: 97 % | RESPIRATION RATE: 16 BRPM | SYSTOLIC BLOOD PRESSURE: 153 MMHG | TEMPERATURE: 98.5 F | HEART RATE: 103 BPM

## 2024-05-08 PROCEDURE — 99214 OFFICE O/P EST MOD 30 MIN: CPT

## 2024-05-08 PROCEDURE — 99204 OFFICE O/P NEW MOD 45 MIN: CPT

## 2024-05-08 NOTE — REASON FOR VISIT
[Follow-Up Visit] : a follow-up [Patient Declined  Services] : - None: Patient declined  services [FreeTextEntry2] : GEJ adenocarcinoma [FreeTextEntry3] : Stephanie dialect

## 2024-05-08 NOTE — HISTORY OF PRESENT ILLNESS
[Disease: _____________________] : Disease: [unfilled] [T: ___] : T[unfilled] [N: ___] : N[unfilled] [M: ___] : M[unfilled] [AJCC Stage: ____] : AJCC Stage: [unfilled] [de-identified] : 59 year old male with PMHx of HTN, HLD, IDDM2, and BPH with newly diagnosed GEJ adenocarcinoma   Patient presented [facility] 1/1/24 c/o dizziness and fatigue and admitted for symptomatic anemia (hb 4.7) and hyperglycemia. Symptoms started 2 days prior to admission. EGD on 01/02: nodule on gastric side of the GEJ with some old heme in stomach. Possible healing Kavita Valero tear. Pathology reveal GEJ bx: fragments of adenocarcinoma, moderately differentiated. Colonoscopy on 01/04: right sided diverticula, small internal hemorrhoids CT CAP 1/5/24: Indeterminate 3.6 mm left apical lung nodule, High attenuation in the gastric lumen either blood products or ingested material. Limited evaluation for underlying mass. Diffuse wall thickening consistent with gastritis. Mildly distended esophagus. Correlate with recent endoscopic findings, 6.6 x 6.7 mm peripancreatic node, enlarged prostate indenting the bladder base. Of note, no prior colonoscopy. Referred to Surgical Oncology. Saw Dr. Chong 1/24/24 who recommended PET/CT and referred to Rad Onc and Med Onc to discuss neoadjuvant chemoRT.   Patient here today for initial Medical Oncology evaluation.   2/19/24: PET: 1.  Mild uptake at proximal stomach, possibly related to reported gastroesophageal junction lesion. Remaining alimentary tract shows diffuse uptake which is likely related to use of metformin. This limits evaluation for additional lesions in the bowel and the aerodigestive tract. 2.  Previously identified peripancreatic node is difficult to identify on the current low-dose CT and there is no distinct abnormal uptake in that region. Suggest MRI of the abdomen. 3/4/24: C1 FOLFOX, dose reduced (as part of CALGB 78897 protocol) 3/18/24: C2 FOLFOX, dose reduced 4/1/24: C3 FOLFOX, dose reduced 4/15/24: C4 FOLFOX  4/29/24: C5 5FU/LV  [de-identified] : adenocarcinoma, moderately differentiated.  [de-identified] : JEREMI Her2/gunnar negative   [Therapy: ___] : Therapy: [unfilled] [Cycle: ___] : Cycle: [unfilled] [Day: ___] : Day: [unfilled] [de-identified] : Has tingling in his feet. Started Cymbalta 20 mg QD. States he is taking and it is helping. Does get fatigue with activity, however, able to perform all ADLs independently. No new complaints.

## 2024-05-09 ENCOUNTER — NON-APPOINTMENT (OUTPATIENT)
Age: 59
End: 2024-05-09

## 2024-05-09 VITALS
SYSTOLIC BLOOD PRESSURE: 146 MMHG | OXYGEN SATURATION: 99 % | TEMPERATURE: 96.8 F | DIASTOLIC BLOOD PRESSURE: 83 MMHG | HEIGHT: 64 IN | BODY MASS INDEX: 21.89 KG/M2 | HEART RATE: 98 BPM | RESPIRATION RATE: 16 BRPM | WEIGHT: 128.2 LBS

## 2024-05-09 PROCEDURE — 77387B: CUSTOM | Mod: 26

## 2024-05-09 PROCEDURE — 77427 RADIATION TX MANAGEMENT X5: CPT

## 2024-05-10 ENCOUNTER — NON-APPOINTMENT (OUTPATIENT)
Age: 59
End: 2024-05-10

## 2024-05-10 PROCEDURE — 77387B: CUSTOM | Mod: 26

## 2024-05-13 ENCOUNTER — RESULT REVIEW (OUTPATIENT)
Age: 59
End: 2024-05-13

## 2024-05-13 ENCOUNTER — APPOINTMENT (OUTPATIENT)
Dept: INFUSION THERAPY | Facility: HOSPITAL | Age: 59
End: 2024-05-13

## 2024-05-13 ENCOUNTER — APPOINTMENT (OUTPATIENT)
Dept: HEMATOLOGY ONCOLOGY | Facility: CLINIC | Age: 59
End: 2024-05-13

## 2024-05-13 LAB
BASOPHILS # BLD AUTO: 0.02 K/UL — SIGNIFICANT CHANGE UP (ref 0–0.2)
BASOPHILS NFR BLD AUTO: 0.3 % — SIGNIFICANT CHANGE UP (ref 0–2)
EOSINOPHIL # BLD AUTO: 0.05 K/UL — SIGNIFICANT CHANGE UP (ref 0–0.5)
EOSINOPHIL NFR BLD AUTO: 0.8 % — SIGNIFICANT CHANGE UP (ref 0–6)
HCT VFR BLD CALC: 34.5 % — LOW (ref 39–50)
HGB BLD-MCNC: 11.7 G/DL — LOW (ref 13–17)
IMM GRANULOCYTES NFR BLD AUTO: 1.1 % — HIGH (ref 0–0.9)
LYMPHOCYTES # BLD AUTO: 1.31 K/UL — SIGNIFICANT CHANGE UP (ref 1–3.3)
LYMPHOCYTES # BLD AUTO: 21.3 % — SIGNIFICANT CHANGE UP (ref 13–44)
MCHC RBC-ENTMCNC: 29.2 PG — SIGNIFICANT CHANGE UP (ref 27–34)
MCHC RBC-ENTMCNC: 33.9 G/DL — SIGNIFICANT CHANGE UP (ref 32–36)
MCV RBC AUTO: 86 FL — SIGNIFICANT CHANGE UP (ref 80–100)
MONOCYTES # BLD AUTO: 0.54 K/UL — SIGNIFICANT CHANGE UP (ref 0–0.9)
MONOCYTES NFR BLD AUTO: 8.8 % — SIGNIFICANT CHANGE UP (ref 2–14)
NEUTROPHILS # BLD AUTO: 4.15 K/UL — SIGNIFICANT CHANGE UP (ref 1.8–7.4)
NEUTROPHILS NFR BLD AUTO: 67.7 % — SIGNIFICANT CHANGE UP (ref 43–77)
NRBC # BLD: 0 /100 WBCS — SIGNIFICANT CHANGE UP (ref 0–0)
PLATELET # BLD AUTO: 156 K/UL — SIGNIFICANT CHANGE UP (ref 150–400)
RBC # BLD: 4.01 M/UL — LOW (ref 4.2–5.8)
RBC # FLD: 15.7 % — HIGH (ref 10.3–14.5)
WBC # BLD: 6.14 K/UL — SIGNIFICANT CHANGE UP (ref 3.8–10.5)
WBC # FLD AUTO: 6.14 K/UL — SIGNIFICANT CHANGE UP (ref 3.8–10.5)

## 2024-05-13 PROCEDURE — 77387B: CUSTOM | Mod: 26

## 2024-05-14 ENCOUNTER — APPOINTMENT (OUTPATIENT)
Dept: THORACIC SURGERY | Facility: CLINIC | Age: 59
End: 2024-05-14
Payer: MEDICAID

## 2024-05-14 VITALS
WEIGHT: 128 LBS | SYSTOLIC BLOOD PRESSURE: 160 MMHG | RESPIRATION RATE: 17 BRPM | OXYGEN SATURATION: 98 % | BODY MASS INDEX: 21.85 KG/M2 | HEART RATE: 108 BPM | DIASTOLIC BLOOD PRESSURE: 88 MMHG | HEIGHT: 64 IN

## 2024-05-14 LAB
ALBUMIN SERPL ELPH-MCNC: 3.7 G/DL — SIGNIFICANT CHANGE UP (ref 3.3–5)
ALP SERPL-CCNC: 59 U/L — SIGNIFICANT CHANGE UP (ref 40–120)
ALT FLD-CCNC: 18 U/L — SIGNIFICANT CHANGE UP (ref 10–45)
ANION GAP SERPL CALC-SCNC: 17 MMOL/L — SIGNIFICANT CHANGE UP (ref 5–17)
AST SERPL-CCNC: 21 U/L — SIGNIFICANT CHANGE UP (ref 10–40)
BILIRUB SERPL-MCNC: 0.4 MG/DL — SIGNIFICANT CHANGE UP (ref 0.2–1.2)
BUN SERPL-MCNC: 16 MG/DL — SIGNIFICANT CHANGE UP (ref 7–23)
CALCIUM SERPL-MCNC: 8.8 MG/DL — SIGNIFICANT CHANGE UP (ref 8.4–10.5)
CANCER AG19-9 SERPL-ACNC: 24 U/ML — SIGNIFICANT CHANGE UP
CEA SERPL-MCNC: 5.2 NG/ML — HIGH (ref 0–3.8)
CHLORIDE SERPL-SCNC: 96 MMOL/L — SIGNIFICANT CHANGE UP (ref 96–108)
CO2 SERPL-SCNC: 18 MMOL/L — LOW (ref 22–31)
CREAT SERPL-MCNC: 0.9 MG/DL — SIGNIFICANT CHANGE UP (ref 0.5–1.3)
EGFR: 97 ML/MIN/1.73M2 — SIGNIFICANT CHANGE UP
GLUCOSE SERPL-MCNC: 120 MG/DL — HIGH (ref 70–99)
POTASSIUM SERPL-MCNC: 3.8 MMOL/L — SIGNIFICANT CHANGE UP (ref 3.5–5.3)
POTASSIUM SERPL-SCNC: 3.8 MMOL/L — SIGNIFICANT CHANGE UP (ref 3.5–5.3)
PROT SERPL-MCNC: 6.7 G/DL — SIGNIFICANT CHANGE UP (ref 6–8.3)
SODIUM SERPL-SCNC: 131 MMOL/L — LOW (ref 135–145)

## 2024-05-14 PROCEDURE — 77387B: CUSTOM | Mod: 26

## 2024-05-14 PROCEDURE — 99245 OFF/OP CONSLTJ NEW/EST HI 55: CPT

## 2024-05-14 RX ORDER — INSULIN GLARGINE 100 [IU]/ML
100 INJECTION, SOLUTION SUBCUTANEOUS
Refills: 0 | Status: COMPLETED | COMMUNITY
End: 2024-05-14

## 2024-05-14 RX ORDER — TAMSULOSIN HYDROCHLORIDE 0.4 MG/1
0.4 CAPSULE ORAL
Refills: 0 | Status: COMPLETED | COMMUNITY
End: 2024-05-14

## 2024-05-14 RX ORDER — PANTOPRAZOLE 40 MG/1
40 TABLET, DELAYED RELEASE ORAL DAILY
Qty: 30 | Refills: 3 | Status: COMPLETED | COMMUNITY
Start: 2024-02-12 | End: 2024-05-14

## 2024-05-14 RX ORDER — DULOXETINE HYDROCHLORIDE 20 MG/1
20 CAPSULE, DELAYED RELEASE PELLETS ORAL
Qty: 30 | Refills: 0 | Status: COMPLETED | COMMUNITY
Start: 2024-04-29 | End: 2024-05-14

## 2024-05-14 RX ORDER — TAMSULOSIN HYDROCHLORIDE 0.4 MG/1
0.4 CAPSULE ORAL
Refills: 0 | Status: ACTIVE | COMMUNITY

## 2024-05-14 RX ORDER — EMPAGLIFLOZIN 25 MG/1
TABLET, FILM COATED ORAL
Refills: 0 | Status: ACTIVE | COMMUNITY

## 2024-05-14 RX ORDER — ROSUVASTATIN CALCIUM 5 MG/1
5 TABLET, FILM COATED ORAL
Refills: 0 | Status: COMPLETED | COMMUNITY
End: 2024-05-14

## 2024-05-14 RX ORDER — METOCLOPRAMIDE 10 MG/1
10 TABLET ORAL
Qty: 40 | Refills: 3 | Status: COMPLETED | COMMUNITY
Start: 2024-02-26 | End: 2024-05-14

## 2024-05-14 RX ORDER — LIDOCAINE AND PRILOCAINE 25; 25 MG/G; MG/G
2.5-2.5 CREAM TOPICAL
Qty: 1 | Refills: 1 | Status: COMPLETED | COMMUNITY
Start: 2024-03-04 | End: 2024-05-14

## 2024-05-14 RX ORDER — ATORVASTATIN CALCIUM 80 MG/1
TABLET, FILM COATED ORAL
Refills: 0 | Status: ACTIVE | COMMUNITY

## 2024-05-14 NOTE — PHYSICAL EXAM
[General Appearance - Alert] : alert [General Appearance - In No Acute Distress] : in no acute distress [Sclera] : the sclera and conjunctiva were normal [PERRL With Normal Accommodation] : pupils were equal in size, round, and reactive to light [Extraocular Movements] : extraocular movements were intact [Outer Ear] : the ears and nose were normal in appearance [Oropharynx] : the oropharynx was normal [Neck Appearance] : the appearance of the neck was normal [Neck Cervical Mass (___cm)] : no neck mass was observed [Jugular Venous Distention Increased] : there was no jugular-venous distention [Thyroid Diffuse Enlargement] : the thyroid was not enlarged [Thyroid Nodule] : there were no palpable thyroid nodules [Heart Rate And Rhythm] : heart rate was normal and rhythm regular [Heart Sounds] : normal S1 and S2 [Heart Sounds Gallop] : no gallops [Murmurs] : no murmurs [Heart Sounds Pericardial Friction Rub] : no pericardial rub [Examination Of The Chest] : the chest was normal in appearance [Chest Visual Inspection Thoracic Asymmetry] : no chest asymmetry [Diminished Respiratory Excursion] : normal chest expansion [2+] : left 2+ [No Abnormalities] : the abdominal aorta was not enlarged and no bruit was heard [Breast Appearance] : normal in appearance [Breast Palpation Mass] : no palpable masses [Bowel Sounds] : normal bowel sounds [Abdomen Soft] : soft [Abdomen Tenderness] : non-tender [Abdomen Mass (___ Cm)] : no abdominal mass palpated [Cervical Lymph Nodes Enlarged Anterior Bilaterally] : anterior cervical [Cervical Lymph Nodes Enlarged Posterior Bilaterally] : posterior cervical [Supraclavicular Lymph Nodes Enlarged Bilaterally] : supraclavicular [No CVA Tenderness] : no ~M costovertebral angle tenderness [No Spinal Tenderness] : no spinal tenderness [Abnormal Walk] : normal gait [Nail Clubbing] : no clubbing  or cyanosis of the fingernails [Musculoskeletal - Swelling] : no joint swelling seen [Motor Tone] : muscle strength and tone were normal [Skin Color & Pigmentation] : normal skin color and pigmentation [Skin Turgor] : normal skin turgor [Deep Tendon Reflexes (DTR)] : deep tendon reflexes were 2+ and symmetric [Sensation] : the sensory exam was normal to light touch and pinprick [No Focal Deficits] : no focal deficits [Oriented To Time, Place, And Person] : oriented to person, place, and time [Impaired Insight] : insight and judgment were intact [Affect] : the affect was normal [] : no respiratory distress [Auscultation Breath Sounds / Voice Sounds] : lungs were clear to auscultation bilaterally [Right Carotid Bruit] : no bruit heard over the right carotid [Left Carotid Bruit] : no bruit heard over the left carotid [Right Femoral Bruit] : no bruit heard over the right femoral artery [Left Femoral Bruit] : no bruit heard over the left femoral artery [FreeTextEntry1] : Deferred

## 2024-05-14 NOTE — ASSESSMENT
[FreeTextEntry1] : Mr. 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 Chemo started on 03/04/2024 with Dr. Mima Angelo, C5 on 04/29/2024. As per the CALGB 31439 regimen, proceeded with chemo/RT, started RT started on 05/09/2024 with Dr. Donato Mota, Planned Dose: 4140 cGy. Planned to completed Chemo/RT on 06/10/2024.  Referred by Dr. Dangelo Love.   EGD on 01/02/2024: nodule on gastric side of the GEJ with some old heme in stomach. Possible healing Kavita Valero tear. Pathology reveal GEJ bx: fragments of adenocarcinoma, moderately differentiated.  EUS on 01/17/2024 showed malignant esophageal tumor was found at the GEJ. The lesion was partially circumferential. There was sonographic evidence suggesting invasion up to the level of the muscularis propria (Layer 4). No lymphadenopathy seen.   CAP 01/05/2024: Indeterminate 3.6 mm left apical lung nodule, High attenuation in the gastric lumen either blood products or ingested material. Limited evaluation for underlying mass. Diffuse wall thickening consistent with gastritis. Mildly distended esophagus. Correlate with recent endoscopic findings, 6.6 x 6.7 mm peripancreatic node, enlarged prostate indenting the bladder base.   PET/CT on 02/19/2024: - Mild uptake at proximal stomach, possibly related to reported gastroesophageal junction lesion. Remaining alimentary tract shows diffuse uptake which is likely related to use of metformin. This limits evaluation for additional lesions in the bowel and the aerodigestive tract. - Previously identified peripancreatic node is difficult to identify on the current low-dose CT and there is no distinct abnormal uptake in that region. Suggest MRI of the abdomen.  CT chest on 04/24/2024: - A 0.3 cm left apical groundglass nodule (2/17), stable dating back to 2020. - Stable pancreatic uncinate process cystic lesion measuring 1.6 cm. - A 1.3 cm nodular lesion at the GE junction (2/67), likely corresponding to previously noted FDG avid lesion. - An indeterminant hyperattenuating nodular lesion measuring 1.9 cm along the course of distal gonadal vasculature (2/123), stable since 1/5/2024, however new since 10/13/2020.  No correlate to findings noted in the prior PET/CT.   I have reviewed the patient's medical records and diagnostic images at time of this office consultation and have made the following recommendation: 1. Studies reviewed and explained to patient, continue chemo/RT, RTC on 06/25/2024 with PET/CT. Meanwhile, schedule for RA, [facility] esophagectomy on 07/16/2024 with Dr. Dangelo Love.  2. PFTs by Dr. Avila.  3. Medical clearance, Cardiac clearance, and PST.  4. Hold Jardiance for 3 days prior to surgery.   I, RAMAN Kim, personally performed the evaluation and management (E/M) services for this new patient.  That E/M includes conducting the initial examination, assessing all conditions, and establishing the plan of care.  Today, my ACP, SHARON MurphyC, was here to observe my evaluation and management services for this patient to be followed going forward.

## 2024-05-14 NOTE — CONSULT LETTER
[Dear  ___] : Dear  [unfilled], [Consult Letter:] : I had the pleasure of evaluating your patient, [unfilled]. [Please see my note below.] : Please see my note below. [Consult Closing:] : Thank you very much for allowing me to participate in the care of this patient.  If you have any questions, please do not hesitate to contact me. [Sincerely,] : Sincerely, [FreeTextEntry2] : Dr. Dangelo Love (Surgical Oncology) [FreeTextEntry3] : Lewis Laureano MD Director of Thoracic, [facility] Cardiovascular & Thoracic Surgery Assitant Professor Cardiovascular & Thoracic Surgery [facility]

## 2024-05-14 NOTE — HISTORY OF PRESENT ILLNESS
[FreeTextEntry1] : Mr. 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 Chemo started on 03/04/2024 with Dr. Mima Angelo, C5 on 04/29/2024. As per the CALGB 05893 regimen, proceeded with chemo/RT, started RT started on 05/09/2024 with Dr. Donato Mota, Planned Dose: 4140 cGy. Planned to completed Chemo/RT on 06/10/2024.  Referred by Dr. Dangelo Love.   EGD on 01/02/2024: nodule on gastric side of the GEJ with some old heme in stomach. Possible healing Kavita Valero tear. Pathology reveal GEJ bx: fragments of adenocarcinoma, moderately differentiated.  EUS on 01/17/2024 showed malignant esophageal tumor was found at the GEJ. The lesion was partially circumferential. There was sonographic evidence suggesting invasion up to the level of the muscularis propria (Layer 4). No lymphadenopathy seen.   CAP 01/05/2024: Indeterminate 3.6 mm left apical lung nodule, High attenuation in the gastric lumen either blood products or ingested material. Limited evaluation for underlying mass. Diffuse wall thickening consistent with gastritis. Mildly distended esophagus. Correlate with recent endoscopic findings, 6.6 x 6.7 mm peripancreatic node, enlarged prostate indenting the bladder base.   PET/CT on 02/19/2024: - Mild uptake at proximal stomach, possibly related to reported gastroesophageal junction lesion. Remaining alimentary tract shows diffuse uptake which is likely related to use of metformin. This limits evaluation for additional lesions in the bowel and the aerodigestive tract. - Previously identified peripancreatic node is difficult to identify on the current low-dose CT and there is no distinct abnormal uptake in that region. Suggest MRI of the abdomen.  CT chest on 04/24/2024: - A 0.3 cm left apical groundglass nodule (2/17), stable dating back to 2020. - Stable pancreatic uncinate process cystic lesion measuring 1.6 cm. - A 1.3 cm nodular lesion at the GE junction (2/67), likely corresponding to previously noted FDG avid lesion. - An indeterminant hyperattenuating nodular lesion measuring 1.9 cm along the course of distal gonadal vasculature (2/123), stable since 1/5/2024, however new since 10/13/2020.  No correlate to findings noted in the prior PET/CT.   Patient is here today for CT surgery consultation. Patient is currently on regular diet, denies dysphagia. Weight stable.

## 2024-05-14 NOTE — DATA REVIEWED
[FreeTextEntry1] : I have independently reviewed patient's EGD on 01/02/2024, EUS on 01/17/2024, CT on 01/05/2024, PET/CT on 02/19/2024, CT on 04/24/2024

## 2024-05-15 ENCOUNTER — APPOINTMENT (OUTPATIENT)
Dept: HEMATOLOGY ONCOLOGY | Facility: CLINIC | Age: 59
End: 2024-05-15

## 2024-05-15 ENCOUNTER — APPOINTMENT (OUTPATIENT)
Dept: INFUSION THERAPY | Facility: HOSPITAL | Age: 59
End: 2024-05-15

## 2024-05-15 PROCEDURE — 77014: CPT | Mod: 26

## 2024-05-16 ENCOUNTER — NON-APPOINTMENT (OUTPATIENT)
Age: 59
End: 2024-05-16

## 2024-05-16 VITALS
RESPIRATION RATE: 17 BRPM | TEMPERATURE: 97.2 F | OXYGEN SATURATION: 98 % | HEIGHT: 64 IN | DIASTOLIC BLOOD PRESSURE: 88 MMHG | BODY MASS INDEX: 22.6 KG/M2 | SYSTOLIC BLOOD PRESSURE: 149 MMHG | HEART RATE: 99 BPM | WEIGHT: 132.39 LBS

## 2024-05-16 PROCEDURE — 77387B: CUSTOM | Mod: 26

## 2024-05-16 PROCEDURE — 77427 RADIATION TX MANAGEMENT X5: CPT

## 2024-05-16 NOTE — PHYSICAL EXAM
[General Appearance - Alert] : alert [General Appearance - In No Acute Distress] : in no acute distress [Outer Ear] : the ears and nose were normal in appearance [Hearing Threshold Finger Rub Not Coke] : hearing was normal [Respiration, Rhythm And Depth] : normal respiratory rhythm and effort [Exaggerated Use Of Accessory Muscles For Inspiration] : no accessory muscle use [Abdomen Soft] : soft [Nondistended] : nondistended [Musculoskeletal - Swelling] : no joint swelling [Nail Clubbing] : no clubbing  or cyanosis of the fingernails [Skin Color & Pigmentation] : normal skin color and pigmentation [] : no rash [Oriented To Time, Place, And Person] : oriented to person, place, and time [Mood] : the mood was normal

## 2024-05-17 ENCOUNTER — APPOINTMENT (OUTPATIENT)
Dept: INFUSION THERAPY | Facility: HOSPITAL | Age: 59
End: 2024-05-17

## 2024-05-17 PROCEDURE — 77387B: CUSTOM | Mod: 26

## 2024-05-20 ENCOUNTER — APPOINTMENT (OUTPATIENT)
Dept: HEMATOLOGY ONCOLOGY | Facility: CLINIC | Age: 59
End: 2024-05-20

## 2024-05-20 ENCOUNTER — RESULT REVIEW (OUTPATIENT)
Age: 59
End: 2024-05-20

## 2024-05-20 ENCOUNTER — APPOINTMENT (OUTPATIENT)
Dept: INFUSION THERAPY | Facility: HOSPITAL | Age: 59
End: 2024-05-20

## 2024-05-20 ENCOUNTER — APPOINTMENT (OUTPATIENT)
Dept: HEMATOLOGY ONCOLOGY | Facility: CLINIC | Age: 59
End: 2024-05-20
Payer: MEDICAID

## 2024-05-20 LAB
BASOPHILS # BLD AUTO: 0.03 K/UL — SIGNIFICANT CHANGE UP (ref 0–0.2)
BASOPHILS NFR BLD AUTO: 0.6 % — SIGNIFICANT CHANGE UP (ref 0–2)
EOSINOPHIL # BLD AUTO: 0.05 K/UL — SIGNIFICANT CHANGE UP (ref 0–0.5)
EOSINOPHIL NFR BLD AUTO: 1 % — SIGNIFICANT CHANGE UP (ref 0–6)
HCT VFR BLD CALC: 33.1 % — LOW (ref 39–50)
HGB BLD-MCNC: 11.5 G/DL — LOW (ref 13–17)
IMM GRANULOCYTES NFR BLD AUTO: 4.6 % — HIGH (ref 0–0.9)
LYMPHOCYTES # BLD AUTO: 0.64 K/UL — LOW (ref 1–3.3)
LYMPHOCYTES # BLD AUTO: 13.3 % — SIGNIFICANT CHANGE UP (ref 13–44)
MCHC RBC-ENTMCNC: 30.3 PG — SIGNIFICANT CHANGE UP (ref 27–34)
MCHC RBC-ENTMCNC: 34.7 G/DL — SIGNIFICANT CHANGE UP (ref 32–36)
MCV RBC AUTO: 87.1 FL — SIGNIFICANT CHANGE UP (ref 80–100)
MONOCYTES # BLD AUTO: 0.6 K/UL — SIGNIFICANT CHANGE UP (ref 0–0.9)
MONOCYTES NFR BLD AUTO: 12.4 % — SIGNIFICANT CHANGE UP (ref 2–14)
NEUTROPHILS # BLD AUTO: 3.29 K/UL — SIGNIFICANT CHANGE UP (ref 1.8–7.4)
NEUTROPHILS NFR BLD AUTO: 68.1 % — SIGNIFICANT CHANGE UP (ref 43–77)
NRBC # BLD: 0 /100 WBCS — SIGNIFICANT CHANGE UP (ref 0–0)
PLATELET # BLD AUTO: 178 K/UL — SIGNIFICANT CHANGE UP (ref 150–400)
RBC # BLD: 3.8 M/UL — LOW (ref 4.2–5.8)
RBC # FLD: 15.3 % — HIGH (ref 10.3–14.5)
WBC # BLD: 4.83 K/UL — SIGNIFICANT CHANGE UP (ref 3.8–10.5)
WBC # FLD AUTO: 4.83 K/UL — SIGNIFICANT CHANGE UP (ref 3.8–10.5)

## 2024-05-20 PROCEDURE — 77387B: CUSTOM | Mod: 26

## 2024-05-20 PROCEDURE — 99214 OFFICE O/P EST MOD 30 MIN: CPT

## 2024-05-20 NOTE — HISTORY OF PRESENT ILLNESS
[Disease: _____________________] : Disease: [unfilled] [T: ___] : T[unfilled] [N: ___] : N[unfilled] [M: ___] : M[unfilled] [AJCC Stage: ____] : AJCC Stage: [unfilled] [de-identified] : 59 year old male with PMHx of HTN, HLD, IDDM2, and BPH with newly diagnosed GEJ adenocarcinoma   Patient presented [facility] 1/1/24 c/o dizziness and fatigue and admitted for symptomatic anemia (hb 4.7) and hyperglycemia. Symptoms started 2 days prior to admission. EGD on 01/02: nodule on gastric side of the GEJ with some old heme in stomach. Possible healing Kavita Valero tear. Pathology reveal GEJ bx: fragments of adenocarcinoma, moderately differentiated. Colonoscopy on 01/04: right sided diverticula, small internal hemorrhoids CT CAP 1/5/24: Indeterminate 3.6 mm left apical lung nodule, High attenuation in the gastric lumen either blood products or ingested material. Limited evaluation for underlying mass. Diffuse wall thickening consistent with gastritis. Mildly distended esophagus. Correlate with recent endoscopic findings, 6.6 x 6.7 mm peripancreatic node, enlarged prostate indenting the bladder base. Of note, no prior colonoscopy. Referred to Surgical Oncology. Saw Dr. Chong 1/24/24 who recommended PET/CT and referred to Rad Onc and Med Onc to discuss neoadjuvant chemoRT.   Patient here today for initial Medical Oncology evaluation.   2/19/24: PET: 1.  Mild uptake at proximal stomach, possibly related to reported gastroesophageal junction lesion. Remaining alimentary tract shows diffuse uptake which is likely related to use of metformin. This limits evaluation for additional lesions in the bowel and the aerodigestive tract. 2.  Previously identified peripancreatic node is difficult to identify on the current low-dose CT and there is no distinct abnormal uptake in that region. Suggest MRI of the abdomen. 3/4/24: C1 FOLFOX, dose reduced (as part of CALGB 62355 protocol) 3/18/24: C2 FOLFOX, dose reduced 4/1/24: C3 FOLFOX, dose reduced 4/15/24: C4 FOLFOX  4/29/24: C5 5FU/LV  5/9/24: Started RT  5/13/24: C6 5FU/LV over 96 hours 5/20/24: C7  [de-identified] : adenocarcinoma, moderately differentiated.  [de-identified] : JEREMI Her2/gunnar negative   [Therapy: ___] : Therapy: [unfilled] [Cycle: ___] : Cycle: [unfilled] [Day: ___] : Day: [unfilled] [de-identified] : Here to continue treatment.  Has some trouble falling asleep after he wakes up to use the bathroom. Otherwise, no complaints today.

## 2024-05-21 ENCOUNTER — OUTPATIENT (OUTPATIENT)
Dept: OUTPATIENT SERVICES | Facility: HOSPITAL | Age: 59
LOS: 1 days | Discharge: HOME | End: 2024-05-21

## 2024-05-21 ENCOUNTER — NON-APPOINTMENT (OUTPATIENT)
Age: 59
End: 2024-05-21

## 2024-05-21 DIAGNOSIS — C15.9 MALIGNANT NEOPLASM OF ESOPHAGUS, UNSPECIFIED: ICD-10-CM

## 2024-05-21 LAB
ALBUMIN SERPL ELPH-MCNC: 4 G/DL — SIGNIFICANT CHANGE UP (ref 3.3–5)
ALP SERPL-CCNC: 75 U/L — SIGNIFICANT CHANGE UP (ref 40–120)
ALT FLD-CCNC: 16 U/L — SIGNIFICANT CHANGE UP (ref 10–45)
ANION GAP SERPL CALC-SCNC: 16 MMOL/L — SIGNIFICANT CHANGE UP (ref 5–17)
AST SERPL-CCNC: 24 U/L — SIGNIFICANT CHANGE UP (ref 10–40)
BILIRUB SERPL-MCNC: 0.3 MG/DL — SIGNIFICANT CHANGE UP (ref 0.2–1.2)
BUN SERPL-MCNC: 19 MG/DL — SIGNIFICANT CHANGE UP (ref 7–23)
CALCIUM SERPL-MCNC: 9.2 MG/DL — SIGNIFICANT CHANGE UP (ref 8.4–10.5)
CHLORIDE SERPL-SCNC: 94 MMOL/L — LOW (ref 96–108)
CO2 SERPL-SCNC: 23 MMOL/L — SIGNIFICANT CHANGE UP (ref 22–31)
CREAT SERPL-MCNC: 0.93 MG/DL — SIGNIFICANT CHANGE UP (ref 0.5–1.3)
EGFR: 93 ML/MIN/1.73M2 — SIGNIFICANT CHANGE UP
GLUCOSE SERPL-MCNC: 268 MG/DL — HIGH (ref 70–99)
POTASSIUM SERPL-MCNC: 3.8 MMOL/L — SIGNIFICANT CHANGE UP (ref 3.5–5.3)
POTASSIUM SERPL-SCNC: 3.8 MMOL/L — SIGNIFICANT CHANGE UP (ref 3.5–5.3)
PROT SERPL-MCNC: 7.1 G/DL — SIGNIFICANT CHANGE UP (ref 6–8.3)
SODIUM SERPL-SCNC: 133 MMOL/L — LOW (ref 135–145)

## 2024-05-21 PROCEDURE — 77387B: CUSTOM | Mod: 26

## 2024-05-21 NOTE — HISTORY OF PRESENT ILLNESS
[FreeTextEntry1] : Linwood Ramos is a 58 year old male with PMHx of HTN, HLD, IDDM2, and BPH with newly diagnosed locally advanced GEJ adenocarcinoma, probably Siewert I or II although not classified on endoscopy, and likely at least cT3N0, Stage IIA. He was started on induction FOLFOX following a PET adaptive approach and is now s/p 3 cycles and follows up to discuss latest imaging and finalize plans for adding concurrent radiation.     5/3/2024: Patient presents for telephonic follow-up. CT-C/A/P performed on 4/24/2024 IMPRESSION: 1.  A 0.3 cm left apical groundglass nodule, stable since 2020. 2.  A 1.3 cm nodule lesion at the GE junction, likely corresponding to previously noted FDG avid lesion. 3.  An indeterminant hyperattenuating nodular lesion measuring 1.9 cm along the course of distal gonadal vasculature (2/123), stable since 1/5/2024, however new since 10/13/2020. No correlate to findings noted in the prior PET/CT.

## 2024-05-21 NOTE — REASON FOR VISIT
[Consideration of Curative Therapy] : consideration of curative therapy for [Other: ___] : [unfilled] [Home] : at home, [unfilled] , at the time of the educational consult. [Medical Office: ([facility])___] : at the medical office located in  [Family Member] : family member [Self] : self [FreeTextEntry4] : Andrez Marques RN

## 2024-05-22 PROCEDURE — 77014: CPT | Mod: 26

## 2024-05-23 ENCOUNTER — NON-APPOINTMENT (OUTPATIENT)
Age: 59
End: 2024-05-23

## 2024-05-23 VITALS
WEIGHT: 130.29 LBS | RESPIRATION RATE: 17 BRPM | HEIGHT: 64 IN | SYSTOLIC BLOOD PRESSURE: 157 MMHG | HEART RATE: 99 BPM | BODY MASS INDEX: 22.24 KG/M2 | DIASTOLIC BLOOD PRESSURE: 87 MMHG | OXYGEN SATURATION: 97 %

## 2024-05-23 PROCEDURE — 77014: CPT | Mod: 26

## 2024-05-23 PROCEDURE — 77427 RADIATION TX MANAGEMENT X5: CPT

## 2024-05-23 RX ORDER — SUCRALFATE 1 G/10ML
1 SUSPENSION ORAL 3 TIMES DAILY
Qty: 900 | Refills: 0 | Status: ACTIVE | COMMUNITY
Start: 2024-05-23 | End: 1900-01-01

## 2024-05-23 NOTE — REASON FOR VISIT
[Initial Consultation] : an initial consultation for [Esophagus Cancer] : esophagus cancer [Gastric Cancer] : gastric cancer

## 2024-05-23 NOTE — HISTORY OF PRESENT ILLNESS
[FreeTextEntry1] : Linwood Ramos is a 59 year old male with PMHx of HTN, HLD, IDDM2, and BPH with newly diagnosed GEJ adenocarcinoma, probably Siewert I or II although not classified on endoscopy, and likely at least cT3N0, Stage IIA. Patient presents to discuss radiation therapy. He is pending a PET CT for completion of staging.  We discussed and reviewed the patient's history of disease, radiographic and pathological findings and the role of radiation therapy in his cancer care. We discussed and recommended a course of neoadjuvant chemoRT prior to surgical resection. We also discussed a PET adaptive approach with neoadjuvant chemotherapy alone first as an option followed by chemoradiation. Once staging is completed, we will finalize treatment plan and coordinate next steps with Medical Oncology.  5/3/2024: Patient presents for telephonic follow-up. CT-C/A/P performed on 4/24/2024 IMPRESSION: 1.  A 0.3 cm left apical groundglass nodule, stable since 2020. 2.  A 1.3 cm nodule lesion at the GE junction, likely corresponding to previously noted FDG avid lesion. 3.  An indeterminant hyperattenuating nodular lesion measuring 1.9 cm along the course of distal gonadal vasculature (2/123), stable since 1/5/2024, however new since 10/13/2020. No correlate to findings noted in the prior PET/CT.  5/9/2024: Patient presents to clinic for OTV s/p 1/23 fx of 4140 cGy to the esophagus/LN's. Patient s/p C4 FOLFOX, C5 will be 5FU/LV  due to neuropathy of feet. Following CALGB 90885 regimen.  Currently feeling well, denies pain/discomfort. Tolerating oral diet,, eating softer diet. Weight stable.  5/16/2024: Patient presents to clinic for OTV s/p 6/23 fx of 4140 cGy to the esophagus/LN's. He denies nausea/vomiting. Patient also denies difficulty or pain with swallowing.  He reports occasional constipation that is relieved by MiraLAX.  He now uses pantoprazole once daily.  He is tolerating solids/liquids.  Denies any skin reactions to site treated.  Weight slightly increased

## 2024-05-23 NOTE — PHYSICAL EXAM
[General Appearance - Alert] : alert [General Appearance - In No Acute Distress] : in no acute distress [Sclera] : the sclera and conjunctiva were normal [Abdomen Soft] : soft [Abdomen Tenderness] : non-tender [Normal] : normal skin color and pigmentation and no rash [Affect] : the affect was normal

## 2024-05-23 NOTE — REVIEW OF SYSTEMS
[Dyspepsia: Grade 1 - Mild symptoms; intervention not indicated] : Dyspepsia: Grade 1 - Mild symptoms; intervention not indicated [Cough: Grade 0] : Cough: Grade 0 [Dyspnea: Grade 0] : Dyspnea: Grade 0 [Constipation: Grade 1 - Occasional or intermittent symptoms; occasional use of stool softeners, laxatives, dietary modification, or enema] : Constipation: Grade 1 - Occasional or intermittent symptoms; occasional use of stool softeners, laxatives, dietary modification, or enema [Dysphagia: Grade 1 - Symptomatic, able to eat regular diet] : Dysphagia: Grade 1 - Symptomatic, able to eat regular diet [Esophagitis: Grade 0] : Esophagitis: Grade 0 [Gastroparesis: Grade 1 - Mild nausea, early satiety and bloating, able to maintain caloric intake on regular diet] : Gastroparesis: Grade 1 - Mild nausea, early satiety and bloating, able to maintain caloric intake on regular diet [Nausea: Grade 0] : Nausea: Grade 0 [Vomiting: Grade 0] : Vomiting: Grade 0 [Fatigue: Grade 0] : Fatigue: Grade 0 [Pruritus: Grade 0] : Pruritus: Grade 0 [Skin Hyperpigmentation: Grade 0] : Skin Hyperpigmentation: Grade 0 [Dermatitis Radiation: Grade 0] : Dermatitis Radiation: Grade 0 [FreeTextEntry2] : intermittent manages with Miralax [FreeTextEntry5] : pantoprazole [FreeTextEntry6] : softer foods this week [FreeTextEntry9] : early satiety

## 2024-05-23 NOTE — HISTORY OF PRESENT ILLNESS
[FreeTextEntry1] : Linwood Ramos is a 59 year old male with PMHx of HTN, HLD, IDDM2, and BPH with newly diagnosed GEJ adenocarcinoma, probably Siewert I or II although not classified on endoscopy, and likely at least cT3N0, Stage IIA. Patient presents to discuss radiation therapy. He is pending a PET CT for completion of staging.  We discussed and reviewed the patient's history of disease, radiographic and pathological findings and the role of radiation therapy in his cancer care. We discussed and recommended a course of neoadjuvant chemoRT prior to surgical resection. We also discussed a PET adaptive approach with neoadjuvant chemotherapy alone first as an option followed by chemoradiation. Once staging is completed, we will finalize treatment plan and coordinate next steps with Medical Oncology.  5/3/2024: Patient presents for telephonic follow-up. CT-C/A/P performed on 4/24/2024 IMPRESSION: 1.  A 0.3 cm left apical groundglass nodule, stable since 2020. 2.  A 1.3 cm nodule lesion at the GE junction, likely corresponding to previously noted FDG avid lesion. 3.  An indeterminant hyperattenuating nodular lesion measuring 1.9 cm along the course of distal gonadal vasculature (2/123), stable since 1/5/2024, however new since 10/13/2020. No correlate to findings noted in the prior PET/CT.  5/9/2024: Patient presents to clinic for OTV s/p 1/23 fx of 4140 cGy to the esophagus/LN's. Patient s/p C4 FOLFOX, C5 will be 5FU/LV  due to neuropathy of feet. Following CALGB 91486 regimen.  Currently feeling well, denies pain/discomfort. Tolerating oral diet,, eating softer diet. Weight stable.  5/16/2024: Patient presents to clinic for OTV s/p 6/23 fx of 4140 cGy to the esophagus/LN's. He denies nausea/vomiting. Patient also denies difficulty or pain with swallowing.  He reports occasional constipation that is relieved by MiraLAX.  He now uses pantoprazole once daily.  He is tolerating solids/liquids.  Denies any skin reactions to site treated.  Weight slightly increased  5/23/2024: Patient presents to clinic for OTV s/p 11/23 fx of 4140 cGy to the esophagus/LN's. Today he reports increased burping, denies esophagitis, odynophagia. Takes pantoprazole for baseline dyspepsia, denies symptoms at this time. He endorses increased dysphagia from prior week and resumed softer diet which has been helpful. Continues with early satiety.

## 2024-05-23 NOTE — REVIEW OF SYSTEMS
[Fatigue: Grade 0] : Fatigue: Grade 0 [Constipation: Grade 1 - Occasional or intermittent symptoms; occasional use of stool softeners, laxatives, dietary modification, or enema] : Constipation: Grade 1 - Occasional or intermittent symptoms; occasional use of stool softeners, laxatives, dietary modification, or enema [Dyspepsia: Grade 1 - Mild symptoms; intervention not indicated] : Dyspepsia: Grade 1 - Mild symptoms; intervention not indicated [Dysphagia: Grade 1 - Symptomatic, able to eat regular diet] : Dysphagia: Grade 1 - Symptomatic, able to eat regular diet [Esophagitis: Grade 0] : Esophagitis: Grade 0 [Nausea: Grade 0] : Nausea: Grade 0 [Vomiting: Grade 0] : Vomiting: Grade 0 [Cough: Grade 0] : Cough: Grade 0 [Dyspnea: Grade 0] : Dyspnea: Grade 0 [Pruritus: Grade 0] : Pruritus: Grade 0 [Skin Hyperpigmentation: Grade 0] : Skin Hyperpigmentation: Grade 0 [Dermatitis Radiation: Grade 0] : Dermatitis Radiation: Grade 0 [FreeTextEntry2] : intermittent manages with Miralax [FreeTextEntry5] : pantoprazole [FreeTextEntry6] : eating more varied diet this week

## 2024-05-23 NOTE — HISTORY OF PRESENT ILLNESS
[FreeTextEntry1] : Linwood Ramos is a 58 year old male with PMHx of HTN, HLD, IDDM2, and BPH with newly diagnosed GEJ adenocarcinoma, probably Siewert I or II although not classified on endoscopy, and likely at least cT3N0, Stage IIA. Patient presents to discuss radiation therapy. He is pending a PET CT for completion of staging.  We discussed and reviewed the patient's history of disease, radiographic and pathological findings and the role of radiation therapy in his cancer care. We discussed and recommended a course of neoadjuvant chemoRT prior to surgical resection. We also discussed a PET adaptive approach with neoadjuvant chemotherapy alone first as an option followed by chemoradiation. Once staging is completed, we will finalize treatment plan and coordinate next steps with Medical Oncology.  5/3/2024: Patient presents for telephonic follow-up. CT-C/A/P performed on 4/24/2024 IMPRESSION: 1.  A 0.3 cm left apical groundglass nodule, stable since 2020. 2.  A 1.3 cm nodule lesion at the GE junction, likely corresponding to previously noted FDG avid lesion. 3.  An indeterminant hyperattenuating nodular lesion measuring 1.9 cm along the course of distal gonadal vasculature (2/123), stable since 1/5/2024, however new since 10/13/2020. No correlate to findings noted in the prior PET/CT.  5/9/2024: Patient presents to clinic for OTV s/p 1/23 fx of 4140 cGy to the esophagus/LN's. Patient s/p C4 FOLFOX, C5 will be 5FU/LV  due to neuropathy of feet. Following CALGB 53511 regimen.  Currently feeling well, denies pain/discomfort. Tolerating oral diet,, eating softer diet. Weight stable.

## 2024-05-23 NOTE — DISEASE MANAGEMENT
[Clinical] : TNM Stage: c [IIA] : IIA [TTNM] : 3 [NTNM] : 0 [MTNM] : x [de-identified] : 1980 cGy [de-identified] : 2440 cGy [de-identified] : esophagus/LN

## 2024-05-23 NOTE — ASSESSMENT
[FreeTextEntry1] : IMP: 59 year old male referred by Dr. Dudley for gastroesophageal junction lesion. 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 Chemo started on 03/04/2024 with Dr. Mima Angelo. Currently on neoadjuvant chemoradiation with good response.  PLAN:  - Referred to Dr. Laureano for combined - [place] [facility] esophagectomy - Continue with chemo/RT, started RT started on 05/09/2024 with Dr. Donato Mota       Planned to completed Chemo/RT on 06/10/2024. - RTO after visit with Dr. Laureano to plan concurrent resection .  I have discussed the diagnosis, therapeutic plan and options with the patient at length. Patient expressed verbal understanding to proceed with proposed plan. All questions answered.

## 2024-05-23 NOTE — DISEASE MANAGEMENT
[Clinical] : TNM Stage: c [IIA] : IIA [TTNM] : 3 [NTNM] : 0 [MTNM] : x [de-identified] : 1080 cGy [de-identified] : 6970 cGy [de-identified] : esophagus/LN

## 2024-05-23 NOTE — REVIEW OF SYSTEMS
[Dysphagia: Grade 1 - Symptomatic, able to eat regular diet] : Dysphagia: Grade 1 - Symptomatic, able to eat regular diet [Esophagitis: Grade 0] : Esophagitis: Grade 0 [Nausea: Grade 0] : Nausea: Grade 0 [Vomiting: Grade 0] : Vomiting: Grade 0 [Fatigue: Grade 0] : Fatigue: Grade 0 [Cough: Grade 0] : Cough: Grade 0 [Dyspnea: Grade 0] : Dyspnea: Grade 0 [Pruritus: Grade 0] : Pruritus: Grade 0 [Skin Hyperpigmentation: Grade 0] : Skin Hyperpigmentation: Grade 0 [Dermatitis Radiation: Grade 0] : Dermatitis Radiation: Grade 0 [FreeTextEntry6] : eating softer diet

## 2024-05-23 NOTE — CONSULT LETTER
[Dear  ___] : Dear  [unfilled], [Courtesy Letter:] : I had the pleasure of seeing your patient, [unfilled], in my office today. [Please see my note below.] : Please see my note below. [Referral Closing:] : Thank you very much for seeing this patient.  If you have any questions, please do not hesitate to contact me. [Sincerely,] : Sincerely, [DrGalilea  ___] : Dr. HURTADO [DrGalilea ___] : Dr. HURTADO [FreeTextEntry3] : Dangelo Agustin MD, TATA, FACS Director of Surgical Oncology- [facility] , Department of Surgery [facility] [place] [id number] Ph: [phone] Cell: [phone]

## 2024-05-23 NOTE — DISEASE MANAGEMENT
[Clinical] : TNM Stage: c [IIA] : IIA [TTNM] : 3 [NTNM] : 0 [MTNM] : x [de-identified] : 180 cGy [de-identified] : 7270 cGy [de-identified] : esophagus/LN

## 2024-05-23 NOTE — HISTORY OF PRESENT ILLNESS
[de-identified] : Mr. BHARATH GONZALES is a 59 year old male referred by Dr. Dudley for gastroesophageal junction lesion. 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 Chemo started on 03/04/2024 with Dr. Mima Angelo.   PET/CT on 02/19/2024: - Mild uptake at proximal stomach, possibly related to reported gastroesophageal junction lesion. Remaining alimentary tract shows diffuse uptake which is likely related to use of metformin. This limits evaluation for additional lesions in the bowel and the aerodigestive tract. - Previously identified peripancreatic node is difficult to identify on the current low-dose CT and there is no distinct abnormal uptake in that region. Suggest MRI of the abdomen.  CT chest on 04/24/2024: - A 0.3 cm left apical groundglass nodule (2/17), stable dating back to 2020. - Stable pancreatic uncinate process cystic lesion measuring 1.6 cm. - A 1.3 cm nodular lesion at the GE junction (2/67), likely corresponding to previously noted FDG avid lesion. - An indeterminant hyperattenuating nodular lesion measuring 1.9 cm along the course of distal gonadal vasculature (2/123), stable since 1/5/2024, however new since 10/13/2020. No correlate to findings noted in the prior PET/CT.  5/8/24: Patient reports doing well. Denies fever, chills, N/V/D, unintentional weight loss.

## 2024-05-24 ENCOUNTER — APPOINTMENT (OUTPATIENT)
Dept: INFUSION THERAPY | Facility: HOSPITAL | Age: 59
End: 2024-05-24

## 2024-05-24 PROCEDURE — 77387B: CUSTOM | Mod: 26

## 2024-05-28 ENCOUNTER — NON-APPOINTMENT (OUTPATIENT)
Age: 59
End: 2024-05-28

## 2024-05-28 ENCOUNTER — RESULT REVIEW (OUTPATIENT)
Age: 59
End: 2024-05-28

## 2024-05-28 ENCOUNTER — APPOINTMENT (OUTPATIENT)
Dept: INFUSION THERAPY | Facility: HOSPITAL | Age: 59
End: 2024-05-28

## 2024-05-28 ENCOUNTER — APPOINTMENT (OUTPATIENT)
Dept: HEMATOLOGY ONCOLOGY | Facility: CLINIC | Age: 59
End: 2024-05-28

## 2024-05-28 DIAGNOSIS — R11.2 NAUSEA WITH VOMITING, UNSPECIFIED: ICD-10-CM

## 2024-05-28 DIAGNOSIS — Z51.11 ENCOUNTER FOR ANTINEOPLASTIC CHEMOTHERAPY: ICD-10-CM

## 2024-05-28 LAB
ALBUMIN SERPL ELPH-MCNC: 4.1 G/DL — SIGNIFICANT CHANGE UP (ref 3.3–5)
ALP SERPL-CCNC: 63 U/L — SIGNIFICANT CHANGE UP (ref 40–120)
ALT FLD-CCNC: 16 U/L — SIGNIFICANT CHANGE UP (ref 10–45)
ANION GAP SERPL CALC-SCNC: 14 MMOL/L — SIGNIFICANT CHANGE UP (ref 5–17)
AST SERPL-CCNC: 22 U/L — SIGNIFICANT CHANGE UP (ref 10–40)
BASOPHILS # BLD AUTO: 0.02 K/UL — SIGNIFICANT CHANGE UP (ref 0–0.2)
BASOPHILS NFR BLD AUTO: 0.3 % — SIGNIFICANT CHANGE UP (ref 0–2)
BILIRUB SERPL-MCNC: 0.3 MG/DL — SIGNIFICANT CHANGE UP (ref 0.2–1.2)
BUN SERPL-MCNC: 29 MG/DL — HIGH (ref 7–23)
CALCIUM SERPL-MCNC: 8.7 MG/DL — SIGNIFICANT CHANGE UP (ref 8.4–10.5)
CHLORIDE SERPL-SCNC: 92 MMOL/L — LOW (ref 96–108)
CO2 SERPL-SCNC: 24 MMOL/L — SIGNIFICANT CHANGE UP (ref 22–31)
CREAT SERPL-MCNC: 1.24 MG/DL — SIGNIFICANT CHANGE UP (ref 0.5–1.3)
EGFR: 66 ML/MIN/1.73M2 — SIGNIFICANT CHANGE UP
EOSINOPHIL # BLD AUTO: 0.03 K/UL — SIGNIFICANT CHANGE UP (ref 0–0.5)
EOSINOPHIL NFR BLD AUTO: 0.5 % — SIGNIFICANT CHANGE UP (ref 0–6)
GLUCOSE SERPL-MCNC: 348 MG/DL — HIGH (ref 70–99)
HCT VFR BLD CALC: 32.1 % — LOW (ref 39–50)
HGB BLD-MCNC: 11.1 G/DL — LOW (ref 13–17)
IMM GRANULOCYTES NFR BLD AUTO: 1 % — HIGH (ref 0–0.9)
LYMPHOCYTES # BLD AUTO: 0.36 K/UL — LOW (ref 1–3.3)
LYMPHOCYTES # BLD AUTO: 5.9 % — LOW (ref 13–44)
MCHC RBC-ENTMCNC: 30.7 PG — SIGNIFICANT CHANGE UP (ref 27–34)
MCHC RBC-ENTMCNC: 34.6 G/DL — SIGNIFICANT CHANGE UP (ref 32–36)
MCV RBC AUTO: 88.9 FL — SIGNIFICANT CHANGE UP (ref 80–100)
MONOCYTES # BLD AUTO: 0.64 K/UL — SIGNIFICANT CHANGE UP (ref 0–0.9)
MONOCYTES NFR BLD AUTO: 10.5 % — SIGNIFICANT CHANGE UP (ref 2–14)
NEUTROPHILS # BLD AUTO: 5 K/UL — SIGNIFICANT CHANGE UP (ref 1.8–7.4)
NEUTROPHILS NFR BLD AUTO: 81.8 % — HIGH (ref 43–77)
NRBC # BLD: 0 /100 WBCS — SIGNIFICANT CHANGE UP (ref 0–0)
PLATELET # BLD AUTO: 148 K/UL — LOW (ref 150–400)
POTASSIUM SERPL-MCNC: 4.5 MMOL/L — SIGNIFICANT CHANGE UP (ref 3.5–5.3)
POTASSIUM SERPL-SCNC: 4.5 MMOL/L — SIGNIFICANT CHANGE UP (ref 3.5–5.3)
PROT SERPL-MCNC: 6.5 G/DL — SIGNIFICANT CHANGE UP (ref 6–8.3)
RBC # BLD: 3.61 M/UL — LOW (ref 4.2–5.8)
RBC # FLD: 15.7 % — HIGH (ref 10.3–14.5)
SODIUM SERPL-SCNC: 129 MMOL/L — LOW (ref 135–145)
WBC # BLD: 6.11 K/UL — SIGNIFICANT CHANGE UP (ref 3.8–10.5)
WBC # FLD AUTO: 6.11 K/UL — SIGNIFICANT CHANGE UP (ref 3.8–10.5)

## 2024-05-28 PROCEDURE — 77387B: CUSTOM | Mod: 26

## 2024-05-28 RX ORDER — SUCRALFATE 1 G/10ML
1 SUSPENSION ORAL 3 TIMES DAILY
Qty: 420 | Refills: 0 | Status: ACTIVE | COMMUNITY
Start: 2024-05-28 | End: 1900-01-01

## 2024-05-29 ENCOUNTER — APPOINTMENT (OUTPATIENT)
Dept: INFUSION THERAPY | Facility: HOSPITAL | Age: 59
End: 2024-05-29

## 2024-05-29 PROCEDURE — 77387B: CUSTOM | Mod: 26

## 2024-05-30 ENCOUNTER — NON-APPOINTMENT (OUTPATIENT)
Age: 59
End: 2024-05-30

## 2024-05-30 VITALS
HEIGHT: 64 IN | DIASTOLIC BLOOD PRESSURE: 88 MMHG | TEMPERATURE: 97.7 F | WEIGHT: 130.07 LBS | OXYGEN SATURATION: 98 % | RESPIRATION RATE: 17 BRPM | SYSTOLIC BLOOD PRESSURE: 162 MMHG | BODY MASS INDEX: 22.21 KG/M2 | HEART RATE: 97 BPM

## 2024-05-30 PROCEDURE — 77387B: CUSTOM | Mod: 26

## 2024-05-30 NOTE — HISTORY OF PRESENT ILLNESS
[FreeTextEntry1] : Linwood Ramos is a 59 year old male with PMHx of HTN, HLD, IDDM2, and BPH with newly diagnosed GEJ adenocarcinoma, probably Siewert I or II although not classified on endoscopy, and likely at least cT3N0, Stage IIA. Patient presents to discuss radiation therapy. He is pending a PET CT for completion of staging.  We discussed and reviewed the patient's history of disease, radiographic and pathological findings and the role of radiation therapy in his cancer care. We discussed and recommended a course of neoadjuvant chemoRT prior to surgical resection. We also discussed a PET adaptive approach with neoadjuvant chemotherapy alone first as an option followed by chemoradiation. Once staging is completed, we will finalize treatment plan and coordinate next steps with Medical Oncology.  5/3/2024: Patient presents for telephonic follow-up. CT-C/A/P performed on 4/24/2024 IMPRESSION: 1.  A 0.3 cm left apical groundglass nodule, stable since 2020. 2.  A 1.3 cm nodule lesion at the GE junction, likely corresponding to previously noted FDG avid lesion. 3.  An indeterminant hyperattenuating nodular lesion measuring 1.9 cm along the course of distal gonadal vasculature (2/123), stable since 1/5/2024, however new since 10/13/2020. No correlate to findings noted in the prior PET/CT.  5/9/2024: Patient presents to clinic for OTV s/p 1/23 fx of 4140 cGy to the esophagus/LN's. Patient s/p C4 FOLFOX, C5 will be 5FU/LV  due to neuropathy of feet. Following CALGB 56879 regimen.  Currently feeling well, denies pain/discomfort. Tolerating oral diet,, eating softer diet. Weight stable.  5/16/2024: Patient presents to clinic for OTV s/p 6/23 fx of 4140 cGy to the esophagus/LN's. He denies nausea/vomiting. Patient also denies difficulty or pain with swallowing.  He reports occasional constipation that is relieved by MiraLAX.  He now uses pantoprazole once daily.  He is tolerating solids/liquids.  Denies any skin reactions to site treated.  Weight slightly increased  5/23/2024: Patient presents to clinic for OTV s/p 11/23 fx of 4140 cGy to the esophagus/LN's. Today he reports increased burping, denies esophagitis, odynophagia. Takes pantoprazole for baseline dyspepsia, denies symptoms at this time. He endorses increased dysphagia from prior week and resumed softer diet which has been helpful. Continues with early satiety.   5/30/2024: Patient presents to clinic for OTV s/p 15/23 fx of 4140 cGy to the esophagus/LN's . Received sucralfate last week, and that has helped with swallowing. He denies any difficulty with swallowing or pain with swallowing. He is tolerating liquid or solids. Hi skin looks great. He denies nausea or vomiting. No passage of loose stools or constipation. His weight remains stable

## 2024-05-30 NOTE — DISEASE MANAGEMENT
[Clinical] : TNM Stage: c [IIA] : IIA [TTNM] : 3 [NTNM] : 0 [MTNM] : x [de-identified] :  2700 cGy [de-identified] : 8810 cGy [de-identified] : esophagus/LN

## 2024-05-30 NOTE — REVIEW OF SYSTEMS
[Constipation: Grade 1 - Occasional or intermittent symptoms; occasional use of stool softeners, laxatives, dietary modification, or enema] : Constipation: Grade 1 - Occasional or intermittent symptoms; occasional use of stool softeners, laxatives, dietary modification, or enema [Dyspepsia: Grade 1 - Mild symptoms; intervention not indicated] : Dyspepsia: Grade 1 - Mild symptoms; intervention not indicated [Dysphagia: Grade 1 - Symptomatic, able to eat regular diet] : Dysphagia: Grade 1 - Symptomatic, able to eat regular diet [Esophagitis: Grade 0] : Esophagitis: Grade 0 [Nausea: Grade 0] : Nausea: Grade 0 [Vomiting: Grade 0] : Vomiting: Grade 0 [Fatigue: Grade 0] : Fatigue: Grade 0 [Cough: Grade 0] : Cough: Grade 0 [Dyspnea: Grade 0] : Dyspnea: Grade 0 [Pruritus: Grade 0] : Pruritus: Grade 0 [Skin Hyperpigmentation: Grade 0] : Skin Hyperpigmentation: Grade 0 [Dermatitis Radiation: Grade 0] : Dermatitis Radiation: Grade 0 [Gastroparesis: Grade 0] : Gastroparesis: Grade 0 [FreeTextEntry2] : intermittent manages with Miralax [FreeTextEntry5] : pantoprazole [FreeTextEntry6] : toleeating liquids and solids [FreeTextEntry9] : early satiety resolved

## 2024-05-30 NOTE — PHYSICAL EXAM
[] : no respiratory distress [Respiration, Rhythm And Depth] : normal respiratory rhythm and effort [Exaggerated Use Of Accessory Muscles For Inspiration] : no accessory muscle use [Abdomen Soft] : soft [Nondistended] : nondistended [Normal] : oriented to person, place and time, the affect was normal, the mood was normal and not anxious

## 2024-05-31 ENCOUNTER — APPOINTMENT (OUTPATIENT)
Dept: INFUSION THERAPY | Facility: HOSPITAL | Age: 59
End: 2024-05-31

## 2024-05-31 PROCEDURE — 77387B: CUSTOM | Mod: 26

## 2024-05-31 PROCEDURE — 77427 RADIATION TX MANAGEMENT X5: CPT

## 2024-06-01 ENCOUNTER — APPOINTMENT (OUTPATIENT)
Dept: INFUSION THERAPY | Facility: HOSPITAL | Age: 59
End: 2024-06-01

## 2024-06-03 ENCOUNTER — APPOINTMENT (OUTPATIENT)
Dept: INFUSION THERAPY | Facility: HOSPITAL | Age: 59
End: 2024-06-03

## 2024-06-03 ENCOUNTER — RESULT REVIEW (OUTPATIENT)
Age: 59
End: 2024-06-03

## 2024-06-03 ENCOUNTER — APPOINTMENT (OUTPATIENT)
Dept: HEMATOLOGY ONCOLOGY | Facility: CLINIC | Age: 59
End: 2024-06-03
Payer: MEDICAID

## 2024-06-03 ENCOUNTER — APPOINTMENT (OUTPATIENT)
Dept: HEMATOLOGY ONCOLOGY | Facility: CLINIC | Age: 59
End: 2024-06-03

## 2024-06-03 ENCOUNTER — NON-APPOINTMENT (OUTPATIENT)
Age: 59
End: 2024-06-03

## 2024-06-03 LAB
BASOPHILS # BLD AUTO: 0.01 K/UL — SIGNIFICANT CHANGE UP (ref 0–0.2)
BASOPHILS NFR BLD AUTO: 0.2 % — SIGNIFICANT CHANGE UP (ref 0–2)
EOSINOPHIL # BLD AUTO: 0.05 K/UL — SIGNIFICANT CHANGE UP (ref 0–0.5)
EOSINOPHIL NFR BLD AUTO: 0.8 % — SIGNIFICANT CHANGE UP (ref 0–6)
HCT VFR BLD CALC: 29.9 % — LOW (ref 39–50)
HGB BLD-MCNC: 10.5 G/DL — LOW (ref 13–17)
IMM GRANULOCYTES NFR BLD AUTO: 1.2 % — HIGH (ref 0–0.9)
LYMPHOCYTES # BLD AUTO: 0.28 K/UL — LOW (ref 1–3.3)
LYMPHOCYTES # BLD AUTO: 4.7 % — LOW (ref 13–44)
MCHC RBC-ENTMCNC: 31.3 PG — SIGNIFICANT CHANGE UP (ref 27–34)
MCHC RBC-ENTMCNC: 35.1 G/DL — SIGNIFICANT CHANGE UP (ref 32–36)
MCV RBC AUTO: 89.3 FL — SIGNIFICANT CHANGE UP (ref 80–100)
MONOCYTES # BLD AUTO: 0.68 K/UL — SIGNIFICANT CHANGE UP (ref 0–0.9)
MONOCYTES NFR BLD AUTO: 11.4 % — SIGNIFICANT CHANGE UP (ref 2–14)
NEUTROPHILS # BLD AUTO: 4.87 K/UL — SIGNIFICANT CHANGE UP (ref 1.8–7.4)
NEUTROPHILS NFR BLD AUTO: 81.7 % — HIGH (ref 43–77)
NRBC # BLD: 0 /100 WBCS — SIGNIFICANT CHANGE UP (ref 0–0)
PLATELET # BLD AUTO: 132 K/UL — LOW (ref 150–400)
RBC # BLD: 3.35 M/UL — LOW (ref 4.2–5.8)
RBC # FLD: 15.4 % — HIGH (ref 10.3–14.5)
WBC # BLD: 5.96 K/UL — SIGNIFICANT CHANGE UP (ref 3.8–10.5)
WBC # FLD AUTO: 5.96 K/UL — SIGNIFICANT CHANGE UP (ref 3.8–10.5)

## 2024-06-03 PROCEDURE — 77387B: CUSTOM | Mod: 26

## 2024-06-03 PROCEDURE — 99214 OFFICE O/P EST MOD 30 MIN: CPT

## 2024-06-03 RX ORDER — DEXAMETHASONE 4 MG/1
4 TABLET ORAL
Qty: 24 | Refills: 3 | Status: ACTIVE | COMMUNITY
Start: 2024-02-26 | End: 1900-01-01

## 2024-06-03 NOTE — HISTORY OF PRESENT ILLNESS
[Disease: _____________________] : Disease: [unfilled] [T: ___] : T[unfilled] [N: ___] : N[unfilled] [M: ___] : M[unfilled] [AJCC Stage: ____] : AJCC Stage: [unfilled] [de-identified] : 59 year old male with PMHx of HTN, HLD, IDDM2, and BPH with newly diagnosed GEJ adenocarcinoma   Patient presented [facility] 1/1/24 c/o dizziness and fatigue and admitted for symptomatic anemia (hb 4.7) and hyperglycemia. Symptoms started 2 days prior to admission. EGD on 01/02: nodule on gastric side of the GEJ with some old heme in stomach. Possible healing Kavita Valero tear. Pathology reveal GEJ bx: fragments of adenocarcinoma, moderately differentiated. Colonoscopy on 01/04: right sided diverticula, small internal hemorrhoids CT CAP 1/5/24: Indeterminate 3.6 mm left apical lung nodule, High attenuation in the gastric lumen either blood products or ingested material. Limited evaluation for underlying mass. Diffuse wall thickening consistent with gastritis. Mildly distended esophagus. Correlate with recent endoscopic findings, 6.6 x 6.7 mm peripancreatic node, enlarged prostate indenting the bladder base. Of note, no prior colonoscopy. Referred to Surgical Oncology. Saw Dr. Chong 1/24/24 who recommended PET/CT and referred to Rad Onc and Med Onc to discuss neoadjuvant chemoRT.   Patient here today for initial Medical Oncology evaluation.   2/19/24: PET: 1.  Mild uptake at proximal stomach, possibly related to reported gastroesophageal junction lesion. Remaining alimentary tract shows diffuse uptake which is likely related to use of metformin. This limits evaluation for additional lesions in the bowel and the aerodigestive tract. 2.  Previously identified peripancreatic node is difficult to identify on the current low-dose CT and there is no distinct abnormal uptake in that region. Suggest MRI of the abdomen. 3/4/24: C1 FOLFOX, dose reduced (as part of CALGB 69089 protocol) 3/18/24: C2 FOLFOX, dose reduced 4/1/24: C3 FOLFOX, dose reduced 4/15/24: C4 FOLFOX  4/29/24: C5 5FU/LV  5/9/24: Started RT  5/13/24: C6 5FU/LV over 96 hours 5/20/24: C7  5/28/24: C8 FOLFOX  6/3/24: C9 5FU/lV  [de-identified] : adenocarcinoma, moderately differentiated.  [de-identified] : JEREMI Her2/gunnar negative   [Therapy: ___] : Therapy: [unfilled] [Cycle: ___] : Cycle: [unfilled] [Day: ___] : Day: [unfilled] [de-identified] : Here to continue treatment.  Feels well today Use of carafate has resolved odynophagia. Tolerating regular diet.  Has fatigue if he climbs but starts (no shortness of breath).  Neuropathy: on cymbalta. Did not notice any change after receiving oxaliplatin last week. Symptoms are minimal.

## 2024-06-04 ENCOUNTER — APPOINTMENT (OUTPATIENT)
Dept: HEMATOLOGY ONCOLOGY | Facility: CLINIC | Age: 59
End: 2024-06-04
Payer: MEDICAID

## 2024-06-04 VITALS
RESPIRATION RATE: 16 BRPM | HEART RATE: 104 BPM | BODY MASS INDEX: 21.53 KG/M2 | SYSTOLIC BLOOD PRESSURE: 158 MMHG | DIASTOLIC BLOOD PRESSURE: 84 MMHG | OXYGEN SATURATION: 97 % | WEIGHT: 125.44 LBS | TEMPERATURE: 97 F

## 2024-06-04 DIAGNOSIS — T45.1X5A DRUG-INDUCED POLYNEUROPATHY: ICD-10-CM

## 2024-06-04 DIAGNOSIS — G62.0 DRUG-INDUCED POLYNEUROPATHY: ICD-10-CM

## 2024-06-04 LAB
ALBUMIN SERPL ELPH-MCNC: 4 G/DL — SIGNIFICANT CHANGE UP (ref 3.3–5)
ALP SERPL-CCNC: 71 U/L — SIGNIFICANT CHANGE UP (ref 40–120)
ALT FLD-CCNC: 17 U/L — SIGNIFICANT CHANGE UP (ref 10–45)
ANION GAP SERPL CALC-SCNC: 17 MMOL/L — SIGNIFICANT CHANGE UP (ref 5–17)
AST SERPL-CCNC: 27 U/L — SIGNIFICANT CHANGE UP (ref 10–40)
BILIRUB SERPL-MCNC: 0.4 MG/DL — SIGNIFICANT CHANGE UP (ref 0.2–1.2)
BUN SERPL-MCNC: 21 MG/DL — SIGNIFICANT CHANGE UP (ref 7–23)
CALCIUM SERPL-MCNC: 9.3 MG/DL — SIGNIFICANT CHANGE UP (ref 8.4–10.5)
CHLORIDE SERPL-SCNC: 94 MMOL/L — LOW (ref 96–108)
CO2 SERPL-SCNC: 20 MMOL/L — LOW (ref 22–31)
CREAT SERPL-MCNC: 1.09 MG/DL — SIGNIFICANT CHANGE UP (ref 0.5–1.3)
EGFR: 77 ML/MIN/1.73M2 — SIGNIFICANT CHANGE UP
GLUCOSE SERPL-MCNC: 210 MG/DL — HIGH (ref 70–99)
POTASSIUM SERPL-MCNC: 3.9 MMOL/L — SIGNIFICANT CHANGE UP (ref 3.5–5.3)
POTASSIUM SERPL-SCNC: 3.9 MMOL/L — SIGNIFICANT CHANGE UP (ref 3.5–5.3)
PROT SERPL-MCNC: 7 G/DL — SIGNIFICANT CHANGE UP (ref 6–8.3)
SODIUM SERPL-SCNC: 131 MMOL/L — LOW (ref 135–145)

## 2024-06-04 PROCEDURE — 77387B: CUSTOM | Mod: 26

## 2024-06-04 PROCEDURE — 99213 OFFICE O/P EST LOW 20 MIN: CPT

## 2024-06-05 PROCEDURE — 77014: CPT | Mod: 26

## 2024-06-06 ENCOUNTER — APPOINTMENT (OUTPATIENT)
Dept: INFUSION THERAPY | Facility: HOSPITAL | Age: 59
End: 2024-06-06

## 2024-06-06 ENCOUNTER — NON-APPOINTMENT (OUTPATIENT)
Age: 59
End: 2024-06-06

## 2024-06-06 VITALS
TEMPERATURE: 96.6 F | WEIGHT: 127.65 LBS | HEART RATE: 97 BPM | BODY MASS INDEX: 21.79 KG/M2 | SYSTOLIC BLOOD PRESSURE: 175 MMHG | HEIGHT: 64 IN | OXYGEN SATURATION: 100 % | RESPIRATION RATE: 17 BRPM | DIASTOLIC BLOOD PRESSURE: 93 MMHG

## 2024-06-06 PROCEDURE — 77387B: CUSTOM | Mod: 26

## 2024-06-06 NOTE — HISTORY OF PRESENT ILLNESS
[FreeTextEntry1] : Linwood Ramos is a 59 year old male with PMHx of HTN, HLD, IDDM2, and BPH with newly diagnosed GEJ adenocarcinoma, probably Siewert I or II although not classified on endoscopy, and likely at least cT3N0, Stage IIA. Patient presents to discuss radiation therapy. He is pending a PET CT for completion of staging.  We discussed and reviewed the patient's history of disease, radiographic and pathological findings and the role of radiation therapy in his cancer care. We discussed and recommended a course of neoadjuvant chemoRT prior to surgical resection. We also discussed a PET adaptive approach with neoadjuvant chemotherapy alone first as an option followed by chemoradiation. Once staging is completed, we will finalize treatment plan and coordinate next steps with Medical Oncology.  5/3/2024: Patient presents for telephonic follow-up. CT-C/A/P performed on 4/24/2024 IMPRESSION: 1.  A 0.3 cm left apical groundglass nodule, stable since 2020. 2.  A 1.3 cm nodule lesion at the GE junction, likely corresponding to previously noted FDG avid lesion. 3.  An indeterminant hyperattenuating nodular lesion measuring 1.9 cm along the course of distal gonadal vasculature (2/123), stable since 1/5/2024, however new since 10/13/2020. No correlate to findings noted in the prior PET/CT.  5/9/2024: Patient presents to clinic for OTV s/p 1/23 fx of 4140 cGy to the esophagus/LN's. Patient s/p C4 FOLFOX, C5 will be 5FU/LV  due to neuropathy of feet. Following CALGB 43267 regimen.  Currently feeling well, denies pain/discomfort. Tolerating oral diet,, eating softer diet. Weight stable.  5/16/2024: Patient presents to clinic for OTV s/p 6/23 fx of 4140 cGy to the esophagus/LN's. He denies nausea/vomiting. Patient also denies difficulty or pain with swallowing.  He reports occasional constipation that is relieved by MiraLAX.  He now uses pantoprazole once daily.  He is tolerating solids/liquids.  Denies any skin reactions to site treated.  Weight slightly increased  5/23/2024: Patient presents to clinic for OTV s/p 11/23 fx of 4140 cGy to the esophagus/LN's. Today he reports increased burping, denies esophagitis, odynophagia. Takes pantoprazole for baseline dyspepsia, denies symptoms at this time. He endorses increased dysphagia from prior week and resumed softer diet which has been helpful. Continues with early satiety.   5/30/2024: Patient presents to clinic for OTV s/p 15/23 fx of 4140 cGy to the esophagus/LN's . Received sucralfate last week, and that has helped with swallowing. He denies any difficulty with swallowing or pain with swallowing. He is tolerating liquid or solids. Hi skin looks great. He denies nausea or vomiting. No passage of loose stools or constipation. His weight remains stable  6/06/2024: Patient presents to clinic for OTV s/p 20/23 fx of 4140 cGy to the esophagus/LN's. Patient feeling well today. Denies pain, nausea, vomiting, dyspepsia or dysphagia.

## 2024-06-06 NOTE — DISEASE MANAGEMENT
[Clinical] : TNM Stage: c [IIA] : IIA [TTNM] : 3 [NTNM] : 0 [MTNM] : x [de-identified] : 9487 [de-identified] : 0450 cGy [de-identified] : esophagus/LN

## 2024-06-06 NOTE — REVIEW OF SYSTEMS
[Constipation: Grade 1 - Occasional or intermittent symptoms; occasional use of stool softeners, laxatives, dietary modification, or enema] : Constipation: Grade 1 - Occasional or intermittent symptoms; occasional use of stool softeners, laxatives, dietary modification, or enema [Dyspepsia: Grade 0] : Dyspepsia: Grade 0 [Dysphagia: Grade 0] : Dysphagia: Grade 0 [Esophagitis: Grade 0] : Esophagitis: Grade 0 [Nausea: Grade 0] : Nausea: Grade 0 [Vomiting: Grade 0] : Vomiting: Grade 0 [Oral Pain: Grade 0] : Oral Pain: Grade 0 [Dizziness: Grade 0] : Dizziness: Grade 0  [Headache: Grade 0] : Headache: Grade 0 [Lethargy: Grade 0] : Lethargy: Grade 0 [Dyspnea: Grade 0] : Dyspnea: Grade 0 [FreeTextEntry2] : Pt. takes Miralax PRN

## 2024-06-07 ENCOUNTER — APPOINTMENT (OUTPATIENT)
Dept: INFUSION THERAPY | Facility: HOSPITAL | Age: 59
End: 2024-06-07

## 2024-06-07 DIAGNOSIS — E86.0 DEHYDRATION: ICD-10-CM

## 2024-06-07 PROCEDURE — 77387B: CUSTOM | Mod: 26

## 2024-06-10 ENCOUNTER — RESULT REVIEW (OUTPATIENT)
Age: 59
End: 2024-06-10

## 2024-06-10 ENCOUNTER — NON-APPOINTMENT (OUTPATIENT)
Age: 59
End: 2024-06-10

## 2024-06-10 ENCOUNTER — APPOINTMENT (OUTPATIENT)
Dept: INFUSION THERAPY | Facility: HOSPITAL | Age: 59
End: 2024-06-10

## 2024-06-10 ENCOUNTER — APPOINTMENT (OUTPATIENT)
Dept: HEMATOLOGY ONCOLOGY | Facility: CLINIC | Age: 59
End: 2024-06-10

## 2024-06-10 LAB
ALBUMIN SERPL ELPH-MCNC: 4 G/DL — SIGNIFICANT CHANGE UP (ref 3.3–5)
ALP SERPL-CCNC: 61 U/L — SIGNIFICANT CHANGE UP (ref 40–120)
ALT FLD-CCNC: 16 U/L — SIGNIFICANT CHANGE UP (ref 10–45)
ANION GAP SERPL CALC-SCNC: 15 MMOL/L — SIGNIFICANT CHANGE UP (ref 5–17)
AST SERPL-CCNC: 24 U/L — SIGNIFICANT CHANGE UP (ref 10–40)
BASOPHILS # BLD AUTO: 0.02 K/UL — SIGNIFICANT CHANGE UP (ref 0–0.2)
BASOPHILS NFR BLD AUTO: 0.3 % — SIGNIFICANT CHANGE UP (ref 0–2)
BILIRUB SERPL-MCNC: 0.4 MG/DL — SIGNIFICANT CHANGE UP (ref 0.2–1.2)
BUN SERPL-MCNC: 24 MG/DL — HIGH (ref 7–23)
CALCIUM SERPL-MCNC: 8.6 MG/DL — SIGNIFICANT CHANGE UP (ref 8.4–10.5)
CHLORIDE SERPL-SCNC: 93 MMOL/L — LOW (ref 96–108)
CO2 SERPL-SCNC: 21 MMOL/L — LOW (ref 22–31)
CREAT SERPL-MCNC: 0.95 MG/DL — SIGNIFICANT CHANGE UP (ref 0.5–1.3)
EGFR: 91 ML/MIN/1.73M2 — SIGNIFICANT CHANGE UP
EOSINOPHIL # BLD AUTO: 0.03 K/UL — SIGNIFICANT CHANGE UP (ref 0–0.5)
EOSINOPHIL NFR BLD AUTO: 0.5 % — SIGNIFICANT CHANGE UP (ref 0–6)
GLUCOSE SERPL-MCNC: 299 MG/DL — HIGH (ref 70–99)
HCT VFR BLD CALC: 30.3 % — LOW (ref 39–50)
HGB BLD-MCNC: 10.6 G/DL — LOW (ref 13–17)
IMM GRANULOCYTES NFR BLD AUTO: 1.6 % — HIGH (ref 0–0.9)
LYMPHOCYTES # BLD AUTO: 0.14 K/UL — LOW (ref 1–3.3)
LYMPHOCYTES # BLD AUTO: 2.3 % — LOW (ref 13–44)
MCHC RBC-ENTMCNC: 31.7 PG — SIGNIFICANT CHANGE UP (ref 27–34)
MCHC RBC-ENTMCNC: 35 G/DL — SIGNIFICANT CHANGE UP (ref 32–36)
MCV RBC AUTO: 90.7 FL — SIGNIFICANT CHANGE UP (ref 80–100)
MONOCYTES # BLD AUTO: 0.43 K/UL — SIGNIFICANT CHANGE UP (ref 0–0.9)
MONOCYTES NFR BLD AUTO: 7 % — SIGNIFICANT CHANGE UP (ref 2–14)
NEUTROPHILS # BLD AUTO: 5.42 K/UL — SIGNIFICANT CHANGE UP (ref 1.8–7.4)
NEUTROPHILS NFR BLD AUTO: 88.3 % — HIGH (ref 43–77)
NRBC # BLD: 0 /100 WBCS — SIGNIFICANT CHANGE UP (ref 0–0)
PLATELET # BLD AUTO: 135 K/UL — LOW (ref 150–400)
POTASSIUM SERPL-MCNC: 3.9 MMOL/L — SIGNIFICANT CHANGE UP (ref 3.5–5.3)
POTASSIUM SERPL-SCNC: 3.9 MMOL/L — SIGNIFICANT CHANGE UP (ref 3.5–5.3)
PROT SERPL-MCNC: 6.4 G/DL — SIGNIFICANT CHANGE UP (ref 6–8.3)
RBC # BLD: 3.34 M/UL — LOW (ref 4.2–5.8)
RBC # FLD: 14.9 % — HIGH (ref 10.3–14.5)
SODIUM SERPL-SCNC: 129 MMOL/L — LOW (ref 135–145)
WBC # BLD: 6.14 K/UL — SIGNIFICANT CHANGE UP (ref 3.8–10.5)
WBC # FLD AUTO: 6.14 K/UL — SIGNIFICANT CHANGE UP (ref 3.8–10.5)

## 2024-06-10 PROCEDURE — 77387B: CUSTOM | Mod: 26

## 2024-06-11 ENCOUNTER — NON-APPOINTMENT (OUTPATIENT)
Age: 59
End: 2024-06-11

## 2024-06-11 ENCOUNTER — APPOINTMENT (OUTPATIENT)
Dept: INFUSION THERAPY | Facility: HOSPITAL | Age: 59
End: 2024-06-11

## 2024-06-11 PROBLEM — G62.0 CHEMOTHERAPY-INDUCED NEUROPATHY: Status: ACTIVE | Noted: 2024-04-29

## 2024-06-11 PROCEDURE — 77387B: CUSTOM | Mod: 26

## 2024-06-11 NOTE — HISTORY OF PRESENT ILLNESS
[Disease: _____________________] : Disease: [unfilled] [T: ___] : T[unfilled] [N: ___] : N[unfilled] [M: ___] : M[unfilled] [AJCC Stage: ____] : AJCC Stage: [unfilled] [Therapy: ___] : Therapy: [unfilled] [Cycle: ___] : Cycle: [unfilled] [Day: ___] : Day: [unfilled] [de-identified] : 59 year old male with PMHx of HTN, HLD, IDDM2, and BPH with newly diagnosed GEJ adenocarcinoma   Patient presented [facility] 1/1/24 c/o dizziness and fatigue and admitted for symptomatic anemia (hb 4.7) and hyperglycemia. Symptoms started 2 days prior to admission. EGD on 01/02: nodule on gastric side of the GEJ with some old heme in stomach. Possible healing Kavita Valero tear. Pathology reveal GEJ bx: fragments of adenocarcinoma, moderately differentiated. Colonoscopy on 01/04: right sided diverticula, small internal hemorrhoids CT CAP 1/5/24: Indeterminate 3.6 mm left apical lung nodule, High attenuation in the gastric lumen either blood products or ingested material. Limited evaluation for underlying mass. Diffuse wall thickening consistent with gastritis. Mildly distended esophagus. Correlate with recent endoscopic findings, 6.6 x 6.7 mm peripancreatic node, enlarged prostate indenting the bladder base. Of note, no prior colonoscopy. Referred to Surgical Oncology. Saw Dr. Chong 1/24/24 who recommended PET/CT and referred to Rad Onc and Med Onc to discuss neoadjuvant chemoRT.   Patient here today for initial Medical Oncology evaluation.   2/19/24: PET: 1.  Mild uptake at proximal stomach, possibly related to reported gastroesophageal junction lesion. Remaining alimentary tract shows diffuse uptake which is likely related to use of metformin. This limits evaluation for additional lesions in the bowel and the aerodigestive tract. 2.  Previously identified peripancreatic node is difficult to identify on the current low-dose CT and there is no distinct abnormal uptake in that region. Suggest MRI of the abdomen. 3/4/24: C1 FOLFOX, dose reduced (as part of CALGB 31146 protocol) 3/18/24: C2  4/1/24: C3  4/15/24: C4 4/29/24: C5 5FU/LV alone 5/9/24: Started RT 5/13/24: C6 5FU/LV over 96 hours (oxali held 2/2 neuropathy) 5/20/24: C7 5/28/24: C8 FOLFOX 6/3/24: C9 5FU/lV.  [de-identified] : adenocarcinoma, moderately differentiated.  [de-identified] : JEREMI Her2/gunnar negative   [de-identified] : here for f/u. reports feeling well. denies any mucositis, diarrhea. weight stable. neuropathy has improved but still has it. has been taking cymbalta.

## 2024-06-11 NOTE — REASON FOR VISIT
[Follow-Up Visit] : a follow-up [Family Member] : family member [FreeTextEntry2] : Esophageal adenocarcinoma

## 2024-06-12 ENCOUNTER — APPOINTMENT (OUTPATIENT)
Dept: INFUSION THERAPY | Facility: HOSPITAL | Age: 59
End: 2024-06-12

## 2024-06-14 ENCOUNTER — APPOINTMENT (OUTPATIENT)
Dept: INFUSION THERAPY | Facility: HOSPITAL | Age: 59
End: 2024-06-14

## 2024-06-17 ENCOUNTER — APPOINTMENT (OUTPATIENT)
Dept: HEMATOLOGY ONCOLOGY | Facility: CLINIC | Age: 59
End: 2024-06-17
Payer: MEDICAID

## 2024-06-17 ENCOUNTER — APPOINTMENT (OUTPATIENT)
Dept: ULTRASOUND IMAGING | Facility: IMAGING CENTER | Age: 59
End: 2024-06-17

## 2024-06-17 ENCOUNTER — OUTPATIENT (OUTPATIENT)
Dept: OUTPATIENT SERVICES | Facility: HOSPITAL | Age: 59
LOS: 1 days | End: 2024-06-17
Payer: MEDICAID

## 2024-06-17 VITALS
OXYGEN SATURATION: 98 % | HEART RATE: 91 BPM | SYSTOLIC BLOOD PRESSURE: 150 MMHG | TEMPERATURE: 97.2 F | DIASTOLIC BLOOD PRESSURE: 77 MMHG | WEIGHT: 119.38 LBS | BODY MASS INDEX: 20.49 KG/M2 | RESPIRATION RATE: 16 BRPM

## 2024-06-17 DIAGNOSIS — C16.0 MALIGNANT NEOPLASM OF CARDIA: ICD-10-CM

## 2024-06-17 PROCEDURE — 93970 EXTREMITY STUDY: CPT | Mod: 26

## 2024-06-17 PROCEDURE — 99214 OFFICE O/P EST MOD 30 MIN: CPT

## 2024-06-17 PROCEDURE — 93970 EXTREMITY STUDY: CPT

## 2024-06-17 RX ORDER — FAMOTIDINE 20 MG/1
20 TABLET, FILM COATED ORAL
Qty: 60 | Refills: 0 | Status: ACTIVE | COMMUNITY
Start: 2024-06-17 | End: 1900-01-01

## 2024-06-17 RX ORDER — PANTOPRAZOLE 40 MG/1
40 TABLET, DELAYED RELEASE ORAL DAILY
Qty: 30 | Refills: 0 | Status: ACTIVE | COMMUNITY
Start: 2024-05-23 | End: 1900-01-01

## 2024-06-17 NOTE — HISTORY OF PRESENT ILLNESS
[Disease: _____________________] : Disease: [unfilled] [T: ___] : T[unfilled] [N: ___] : N[unfilled] [M: ___] : M[unfilled] [AJCC Stage: ____] : AJCC Stage: [unfilled] [de-identified] : 59 year old male with PMHx of HTN, HLD, IDDM2, and BPH with newly diagnosed GEJ adenocarcinoma   Patient presented [facility] 1/1/24 c/o dizziness and fatigue and admitted for symptomatic anemia (hb 4.7) and hyperglycemia. Symptoms started 2 days prior to admission. EGD on 01/02: nodule on gastric side of the GEJ with some old heme in stomach. Possible healing Kavita Valero tear. Pathology reveal GEJ bx: fragments of adenocarcinoma, moderately differentiated. Colonoscopy on 01/04: right sided diverticula, small internal hemorrhoids CT CAP 1/5/24: Indeterminate 3.6 mm left apical lung nodule, High attenuation in the gastric lumen either blood products or ingested material. Limited evaluation for underlying mass. Diffuse wall thickening consistent with gastritis. Mildly distended esophagus. Correlate with recent endoscopic findings, 6.6 x 6.7 mm peripancreatic node, enlarged prostate indenting the bladder base. Of note, no prior colonoscopy. Referred to Surgical Oncology. Saw Dr. Chong 1/24/24 who recommended PET/CT and referred to Rad Onc and Med Onc to discuss neoadjuvant chemoRT.   Patient here today for initial Medical Oncology evaluation.   2/19/24: PET: 1.  Mild uptake at proximal stomach, possibly related to reported gastroesophageal junction lesion. Remaining alimentary tract shows diffuse uptake which is likely related to use of metformin. This limits evaluation for additional lesions in the bowel and the aerodigestive tract. 2.  Previously identified peripancreatic node is difficult to identify on the current low-dose CT and there is no distinct abnormal uptake in that region. Suggest MRI of the abdomen. 3/4/24: C1 FOLFOX, dose reduced (as part of CALGB 44829 protocol) 3/18/24: C2  4/1/24: C3  4/15/24: C4 4/29/24: C5 5FU/LV alone 5/9/24: Started RT 5/13/24: C6 5FU/LV over 96 hours (oxali held 2/2 neuropathy) 5/20/24: C7 5/28/24: C8 FOLFOX 6/3/24: C9 5FU/lV.  6/10/24: C10 over 24 hours d/t RT completing 6/11/24   [de-identified] : adenocarcinoma, moderately differentiated.  [de-identified] : JEREMI Her2/gunnar negative   [de-identified] : Here for clinical f/u w/ his sister s/p CCRT  Admits to odynophagia, fatigues easily, lost 8 lbs.  C/o dry cough in the morning.

## 2024-06-17 NOTE — REVIEW OF SYSTEMS
[Diarrhea: Grade 0] : Diarrhea: Grade 0 [Negative] : Allergic/Immunologic [Fatigue] : fatigue [Recent Change In Weight] : ~T recent weight change [Odynophagia] : odynophagia [Chest Pain] : no chest pain [Cough] : cough

## 2024-06-17 NOTE — ASSESSMENT
[Curative] : Goals of care discussed with patient: Curative [Palliative Care Plan] : not applicable at this time [FreeTextEntry1] : Patient seen and discussed with Dr. Mima Angelo

## 2024-06-19 ENCOUNTER — APPOINTMENT (OUTPATIENT)
Dept: INFUSION THERAPY | Facility: HOSPITAL | Age: 59
End: 2024-06-19

## 2024-06-20 ENCOUNTER — APPOINTMENT (OUTPATIENT)
Dept: INFUSION THERAPY | Facility: HOSPITAL | Age: 59
End: 2024-06-20

## 2024-06-21 ENCOUNTER — APPOINTMENT (OUTPATIENT)
Dept: INFUSION THERAPY | Facility: HOSPITAL | Age: 59
End: 2024-06-21

## 2024-06-21 PROBLEM — C16.0 ADENOCARCINOMA OF GASTROESOPHAGEAL JUNCTION: Status: ACTIVE | Noted: 2024-01-11

## 2024-06-22 ENCOUNTER — APPOINTMENT (OUTPATIENT)
Dept: INFUSION THERAPY | Facility: HOSPITAL | Age: 59
End: 2024-06-22

## 2024-06-22 NOTE — REASON FOR VISIT
[Home] : at home, [unfilled] , at the time of the visit. [Medical Office: ([facility])___] : at the medical office located in  [Verbal consent obtained from patient] : the patient, [unfilled] [Initial Consultation] : an initial consultation for [Esophagus Cancer] : esophagus cancer [Gastric Cancer] : gastric cancer

## 2024-06-25 ENCOUNTER — APPOINTMENT (OUTPATIENT)
Dept: THORACIC SURGERY | Facility: CLINIC | Age: 59
End: 2024-06-25

## 2024-06-25 ENCOUNTER — APPOINTMENT (OUTPATIENT)
Dept: SURGICAL ONCOLOGY | Facility: CLINIC | Age: 59
End: 2024-06-25
Payer: MEDICAID

## 2024-06-25 DIAGNOSIS — C16.0 MALIGNANT NEOPLASM OF CARDIA: ICD-10-CM

## 2024-06-25 PROCEDURE — 99214 OFFICE O/P EST MOD 30 MIN: CPT

## 2024-06-26 NOTE — CONSULT LETTER
[Dear  ___] : Dear  [unfilled], [Courtesy Letter:] : I had the pleasure of seeing your patient, [unfilled], in my office today. [Please see my note below.] : Please see my note below. [Consult Closing:] : Thank you very much for allowing me to participate in the care of this patient.  If you have any questions, please do not hesitate to contact me. [Sincerely,] : Sincerely, [DrGalilea  ___] : Dr. HURTADO [DrGalilea ___] : Dr. HURTADO [FreeTextEntry3] : Dangelo Agustin MD, TATA, FACS Director of Surgical Oncology- [facility] , Department of Surgery [facility] [place] [id number] Ph: [phone] Cell: [phone]

## 2024-06-26 NOTE — HISTORY OF PRESENT ILLNESS
[de-identified] : Mr. BHARATH GONZALES is a 59 year old male referred by Dr. Dudley for gastroesophageal junction lesion. 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 Chemo started on 03/04/2024 with Dr. Mima Angelo.   PET/CT on 02/19/2024: - Mild uptake at proximal stomach, possibly related to reported gastroesophageal junction lesion. Remaining alimentary tract shows diffuse uptake which is likely related to use of metformin. This limits evaluation for additional lesions in the bowel and the aerodigestive tract. - Previously identified peripancreatic node is difficult to identify on the current low-dose CT and there is no distinct abnormal uptake in that region. Suggest MRI of the abdomen.  CT chest on 04/24/2024: - A 0.3 cm left apical groundglass nodule (2/17), stable dating back to 2020. - Stable pancreatic uncinate process cystic lesion measuring 1.6 cm. - A 1.3 cm nodular lesion at the GE junction (2/67), likely corresponding to previously noted FDG avid lesion. - An indeterminant hyperattenuating nodular lesion measuring 1.9 cm along the course of distal gonadal vasculature (2/123), stable since 1/5/2024, however new since 10/13/2020. No correlate to findings noted in the prior PET/CT.  6/25/24 Patient reports doing well. Denies fever, chills, N/V/D, unintentional weight loss.

## 2024-06-26 NOTE — ASSESSMENT
[FreeTextEntry1] : IMP: 59 year old male referred by Dr. Dudley for gastroesophageal junction lesion. 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 Chemo started on 03/04/2024 with Dr. Mima Angelo. Currently s/p neoadjuvant chemoradiation with good response.  PLAN:  - Pt sees Dr. Laureano and myself for combined - Michael Broderick esophagectomy planned for July 2024 - Completed neoadjuvant chemo RT with good response - RTO post op I have discussed the diagnosis, therapeutic plan and options with the patient at length. Patient expressed verbal understanding to proceed with proposed plan. All questions answered.

## 2024-06-28 NOTE — ASSESSMENT
[FreeTextEntry1] : Mr. 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 Chemo started on 03/04/2024 with Dr. Mima Angelo, C5 on 04/29/2024. As per the CALGB 28105 regimen, proceeded with chemo/RT, started RT started on 05/09/2024 with Dr. Donato Mota, Planned Dose: 4140 cGy. Planned to completed Chemo/RT on 06/10/2024.  Referred by Dr. Dangelo Love.   EGD on 01/02/2024: nodule on gastric side of the GEJ with some old heme in stomach. Possible healing Kavita Valero tear. Pathology reveal GEJ bx: fragments of adenocarcinoma, moderately differentiated.  EUS on 01/17/2024 showed malignant esophageal tumor was found at the GEJ. The lesion was partially circumferential. There was sonographic evidence suggesting invasion up to the level of the muscularis propria (Layer 4). No lymphadenopathy seen.   CAP 01/05/2024: Indeterminate 3.6 mm left apical lung nodule, High attenuation in the gastric lumen either blood products or ingested material. Limited evaluation for underlying mass. Diffuse wall thickening consistent with gastritis. Mildly distended esophagus. Correlate with recent endoscopic findings, 6.6 x 6.7 mm peripancreatic node, enlarged prostate indenting the bladder base.   PET/CT on 02/19/2024: - Mild uptake at proximal stomach, possibly related to reported gastroesophageal junction lesion. Remaining alimentary tract shows diffuse uptake which is likely related to use of metformin. This limits evaluation for additional lesions in the bowel and the aerodigestive tract. - Previously identified peripancreatic node is difficult to identify on the current low-dose CT and there is no distinct abnormal uptake in that region. Suggest MRI of the abdomen.  CT chest on 04/24/2024: - A 0.3 cm left apical groundglass nodule (2/17), stable dating back to 2020. - Stable pancreatic uncinate process cystic lesion measuring 1.6 cm. - A 1.3 cm nodular lesion at the GE junction (2/67), likely corresponding to previously noted FDG avid lesion. - An indeterminant hyperattenuating nodular lesion measuring 1.9 cm along the course of distal gonadal vasculature (2/123), stable since 1/5/2024, however new since 10/13/2020.  No correlate to findings noted in the prior PET/CT.   I have reviewed the patient's medical records and diagnostic images at time of this office consultation and have made the following recommendation: 1.  I, RAMAN Kim, personally performed the evaluation and management (E/M) services for this established patient who follow up today with an existing condition.  That E/M includes conducting the examination, assessing all new/exacerbated/existing conditions, and establishing a plan of care.  Today, my ACP, Britney Vega ANP-C, was here to observe my evaluation and management services for this existing condition to be followed going forward.

## 2024-06-28 NOTE — HISTORY OF PRESENT ILLNESS
[FreeTextEntry1] : Mr. 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 Chemo started on 03/04/2024 with Dr. Mima Angelo, C5 on 04/29/2024. As per the CALGB 85287 regimen, proceeded with chemo/RT, started RT started on 05/09/2024 with Dr. Donato Mota, Planned Dose: 4140 cGy. Planned to completed Chemo/RT on 06/10/2024.  Referred by Dr. Dangelo Love.   EGD on 01/02/2024: nodule on gastric side of the GEJ with some old heme in stomach. Possible healing Kavita Valero tear. Pathology reveal GEJ bx: fragments of adenocarcinoma, moderately differentiated.  EUS on 01/17/2024 showed malignant esophageal tumor was found at the GEJ. The lesion was partially circumferential. There was sonographic evidence suggesting invasion up to the level of the muscularis propria (Layer 4). No lymphadenopathy seen.   CAP 01/05/2024: Indeterminate 3.6 mm left apical lung nodule, High attenuation in the gastric lumen either blood products or ingested material. Limited evaluation for underlying mass. Diffuse wall thickening consistent with gastritis. Mildly distended esophagus. Correlate with recent endoscopic findings, 6.6 x 6.7 mm peripancreatic node, enlarged prostate indenting the bladder base.   PET/CT on 02/19/2024: - Mild uptake at proximal stomach, possibly related to reported gastroesophageal junction lesion. Remaining alimentary tract shows diffuse uptake which is likely related to use of metformin. This limits evaluation for additional lesions in the bowel and the aerodigestive tract. - Previously identified peripancreatic node is difficult to identify on the current low-dose CT and there is no distinct abnormal uptake in that region. Suggest MRI of the abdomen.  CT chest on 04/24/2024: - A 0.3 cm left apical groundglass nodule (2/17), stable dating back to 2020. - Stable pancreatic uncinate process cystic lesion measuring 1.6 cm. - A 1.3 cm nodular lesion at the GE junction (2/67), likely corresponding to previously noted FDG avid lesion. - An indeterminant hyperattenuating nodular lesion measuring 1.9 cm along the course of distal gonadal vasculature (2/123), stable since 1/5/2024, however new since 10/13/2020.  No correlate to findings noted in the prior PET/CT.   Patient was initially seen on 05/14/2024, Studies reviewed and explained to patient, continue chemo/RT, RTC on 06/25/2024 with PET/CT. Meanwhile, schedule for RA, [place] Broderick esophagectomy on 07/16/2024 with Dr. Dangelo Love. PFTs by Dr. Avila. Medical clearance, Cardiac clearance, and PST. Hold Jardiance for 3 days prior to surgery.   Patient is s/p 10 cycles of Chemo on 06/10/2024. Completed 4140 cGy in 23 fx on 06/11/2024.   PET/CT is scheduled on 07/08/2024. PFTs is scheduled on 07/08/2024.   Patient is here today for a follow up.

## 2024-06-28 NOTE — CONSULT LETTER
[Dear  ___] : Dear  [unfilled], [Consult Letter:] : I had the pleasure of evaluating your patient, [unfilled]. [Please see my note below.] : Please see my note below. [Consult Closing:] : Thank you very much for allowing me to participate in the care of this patient.  If you have any questions, please do not hesitate to contact me. [Sincerely,] : Sincerely, [FreeTextEntry2] : Dr. Dangelo Love (Surgical Oncology) [FreeTextEntry3] : Lewis Laureano MD Director of Thoracic, [facility] Cardiovascular & Thoracic Surgery Assitant Professor Cardiovascular & Thoracic Surgery [facility]

## 2024-07-05 ENCOUNTER — APPOINTMENT (OUTPATIENT)
Dept: RADIATION ONCOLOGY | Facility: CLINIC | Age: 59
End: 2024-07-05
Payer: MEDICAID

## 2024-07-05 VITALS
SYSTOLIC BLOOD PRESSURE: 155 MMHG | DIASTOLIC BLOOD PRESSURE: 76 MMHG | WEIGHT: 119.38 LBS | BODY MASS INDEX: 20.38 KG/M2 | RESPIRATION RATE: 16 BRPM | HEIGHT: 64 IN | HEART RATE: 89 BPM | OXYGEN SATURATION: 100 % | TEMPERATURE: 97.2 F

## 2024-07-05 DIAGNOSIS — C16.0 MALIGNANT NEOPLASM OF CARDIA: ICD-10-CM

## 2024-07-05 PROCEDURE — 99024 POSTOP FOLLOW-UP VISIT: CPT

## 2024-07-08 ENCOUNTER — RESULT REVIEW (OUTPATIENT)
Age: 59
End: 2024-07-08

## 2024-07-08 ENCOUNTER — APPOINTMENT (OUTPATIENT)
Dept: PULMONOLOGY | Facility: CLINIC | Age: 59
End: 2024-07-08
Payer: MEDICAID

## 2024-07-08 ENCOUNTER — APPOINTMENT (OUTPATIENT)
Dept: NUCLEAR MEDICINE | Facility: IMAGING CENTER | Age: 59
End: 2024-07-08
Payer: MEDICAID

## 2024-07-08 ENCOUNTER — APPOINTMENT (OUTPATIENT)
Dept: HEMATOLOGY ONCOLOGY | Facility: CLINIC | Age: 59
End: 2024-07-08
Payer: MEDICAID

## 2024-07-08 ENCOUNTER — OUTPATIENT (OUTPATIENT)
Dept: OUTPATIENT SERVICES | Facility: HOSPITAL | Age: 59
LOS: 1 days | End: 2024-07-08
Payer: MEDICAID

## 2024-07-08 VITALS
WEIGHT: 120 LBS | SYSTOLIC BLOOD PRESSURE: 143 MMHG | HEART RATE: 110 BPM | BODY MASS INDEX: 21.26 KG/M2 | DIASTOLIC BLOOD PRESSURE: 78 MMHG | HEIGHT: 63 IN

## 2024-07-08 VITALS
DIASTOLIC BLOOD PRESSURE: 80 MMHG | SYSTOLIC BLOOD PRESSURE: 143 MMHG | WEIGHT: 121.5 LBS | TEMPERATURE: 97.9 F | RESPIRATION RATE: 17 BRPM | HEIGHT: 63 IN | OXYGEN SATURATION: 96 % | HEART RATE: 105 BPM | BODY MASS INDEX: 21.53 KG/M2

## 2024-07-08 VITALS
OXYGEN SATURATION: 99 % | DIASTOLIC BLOOD PRESSURE: 91 MMHG | WEIGHT: 121.47 LBS | TEMPERATURE: 97.2 F | BODY MASS INDEX: 20.85 KG/M2 | RESPIRATION RATE: 16 BRPM | HEART RATE: 104 BPM | SYSTOLIC BLOOD PRESSURE: 154 MMHG

## 2024-07-08 DIAGNOSIS — C16.0 MALIGNANT NEOPLASM OF CARDIA: ICD-10-CM

## 2024-07-08 DIAGNOSIS — Z00.8 ENCOUNTER FOR OTHER GENERAL EXAMINATION: ICD-10-CM

## 2024-07-08 LAB
BASOPHILS # BLD AUTO: 0.03 K/UL — SIGNIFICANT CHANGE UP (ref 0–0.2)
BASOPHILS NFR BLD AUTO: 0.5 % — SIGNIFICANT CHANGE UP (ref 0–2)
EOSINOPHIL # BLD AUTO: 0.02 K/UL — SIGNIFICANT CHANGE UP (ref 0–0.5)
EOSINOPHIL NFR BLD AUTO: 0.3 % — SIGNIFICANT CHANGE UP (ref 0–6)
HCT VFR BLD CALC: 36.2 % — LOW (ref 39–50)
HGB BLD-MCNC: 12.3 G/DL — LOW (ref 13–17)
IMM GRANULOCYTES NFR BLD AUTO: 0.8 % — SIGNIFICANT CHANGE UP (ref 0–0.9)
LYMPHOCYTES # BLD AUTO: 1.55 K/UL — SIGNIFICANT CHANGE UP (ref 1–3.3)
LYMPHOCYTES # BLD AUTO: 26 % — SIGNIFICANT CHANGE UP (ref 13–44)
MCHC RBC-ENTMCNC: 31.4 PG — SIGNIFICANT CHANGE UP (ref 27–34)
MCHC RBC-ENTMCNC: 34 G/DL — SIGNIFICANT CHANGE UP (ref 32–36)
MCV RBC AUTO: 92.3 FL — SIGNIFICANT CHANGE UP (ref 80–100)
MONOCYTES # BLD AUTO: 0.4 K/UL — SIGNIFICANT CHANGE UP (ref 0–0.9)
MONOCYTES NFR BLD AUTO: 6.7 % — SIGNIFICANT CHANGE UP (ref 2–14)
NEUTROPHILS # BLD AUTO: 3.92 K/UL — SIGNIFICANT CHANGE UP (ref 1.8–7.4)
NEUTROPHILS NFR BLD AUTO: 65.7 % — SIGNIFICANT CHANGE UP (ref 43–77)
NRBC # BLD: 0 /100 WBCS — SIGNIFICANT CHANGE UP (ref 0–0)
PLATELET # BLD AUTO: 155 K/UL — SIGNIFICANT CHANGE UP (ref 150–400)
RBC # BLD: 3.92 M/UL — LOW (ref 4.2–5.8)
RBC # FLD: 13.4 % — SIGNIFICANT CHANGE UP (ref 10.3–14.5)
WBC # BLD: 5.97 K/UL — SIGNIFICANT CHANGE UP (ref 3.8–10.5)
WBC # FLD AUTO: 5.97 K/UL — SIGNIFICANT CHANGE UP (ref 3.8–10.5)

## 2024-07-08 PROCEDURE — A9552: CPT

## 2024-07-08 PROCEDURE — 94729 DIFFUSING CAPACITY: CPT

## 2024-07-08 PROCEDURE — 99214 OFFICE O/P EST MOD 30 MIN: CPT | Mod: 25

## 2024-07-08 PROCEDURE — 94010 BREATHING CAPACITY TEST: CPT

## 2024-07-08 PROCEDURE — 99214 OFFICE O/P EST MOD 30 MIN: CPT

## 2024-07-08 PROCEDURE — 78815 PET IMAGE W/CT SKULL-THIGH: CPT | Mod: 26,PS

## 2024-07-08 PROCEDURE — 99204 OFFICE O/P NEW MOD 45 MIN: CPT | Mod: 25

## 2024-07-08 PROCEDURE — ZZZZZ: CPT

## 2024-07-08 PROCEDURE — 94726 PLETHYSMOGRAPHY LUNG VOLUMES: CPT

## 2024-07-08 PROCEDURE — 78815 PET IMAGE W/CT SKULL-THIGH: CPT

## 2024-07-09 ENCOUNTER — APPOINTMENT (OUTPATIENT)
Dept: THORACIC SURGERY | Facility: CLINIC | Age: 59
End: 2024-07-09
Payer: MEDICAID

## 2024-07-09 DIAGNOSIS — E87.1 HYPO-OSMOLALITY AND HYPONATREMIA: ICD-10-CM

## 2024-07-09 LAB
ALBUMIN SERPL ELPH-MCNC: 3.8 G/DL
ALP BLD-CCNC: 224 U/L
ALT SERPL-CCNC: 41 U/L
ANION GAP SERPL CALC-SCNC: 16 MMOL/L
AST SERPL-CCNC: 53 U/L
BILIRUB SERPL-MCNC: 0.4 MG/DL
BUN SERPL-MCNC: 26 MG/DL
CALCIUM SERPL-MCNC: 9 MG/DL
CHLORIDE SERPL-SCNC: 90 MMOL/L
CREAT SERPL-MCNC: 0.93 MG/DL
EGFR: 93 ML/MIN/1.73M2
GLUCOSE SERPL-MCNC: 255 MG/DL
POTASSIUM SERPL-SCNC: 5.3 MMOL/L
PROT SERPL-MCNC: 7 G/DL

## 2024-07-09 PROCEDURE — 99443: CPT

## 2024-07-09 RX ORDER — NORMAL SALT TABLETS 1 G/G
1 TABLET ORAL
Qty: 30 | Refills: 0 | Status: ACTIVE | COMMUNITY
Start: 2024-07-09 | End: 1900-01-01

## 2024-07-10 ENCOUNTER — APPOINTMENT (OUTPATIENT)
Dept: ENDOCRINOLOGY | Facility: CLINIC | Age: 59
End: 2024-07-10

## 2024-07-10 ENCOUNTER — OUTPATIENT (OUTPATIENT)
Dept: OUTPATIENT SERVICES | Facility: HOSPITAL | Age: 59
LOS: 1 days | End: 2024-07-10

## 2024-07-10 VITALS
SYSTOLIC BLOOD PRESSURE: 133 MMHG | HEIGHT: 64 IN | DIASTOLIC BLOOD PRESSURE: 81 MMHG | OXYGEN SATURATION: 98 % | RESPIRATION RATE: 14 BRPM | WEIGHT: 115.96 LBS | TEMPERATURE: 98 F | HEART RATE: 88 BPM

## 2024-07-10 VITALS
HEART RATE: 90 BPM | WEIGHT: 117 LBS | OXYGEN SATURATION: 97 % | BODY MASS INDEX: 20.73 KG/M2 | HEIGHT: 63 IN | TEMPERATURE: 97.5 F | RESPIRATION RATE: 18 BRPM | SYSTOLIC BLOOD PRESSURE: 140 MMHG | DIASTOLIC BLOOD PRESSURE: 70 MMHG

## 2024-07-10 DIAGNOSIS — E11.9 TYPE 2 DIABETES MELLITUS WITHOUT COMPLICATIONS: ICD-10-CM

## 2024-07-10 DIAGNOSIS — Z95.828 PRESENCE OF OTHER VASCULAR IMPLANTS AND GRAFTS: Chronic | ICD-10-CM

## 2024-07-10 DIAGNOSIS — C16.0 MALIGNANT NEOPLASM OF CARDIA: ICD-10-CM

## 2024-07-10 DIAGNOSIS — Z79.4 LONG TERM (CURRENT) USE OF INSULIN: ICD-10-CM

## 2024-07-10 DIAGNOSIS — Z98.890 OTHER SPECIFIED POSTPROCEDURAL STATES: Chronic | ICD-10-CM

## 2024-07-10 DIAGNOSIS — R73.9 HYPERGLYCEMIA, UNSPECIFIED: ICD-10-CM

## 2024-07-10 DIAGNOSIS — E87.1 HYPO-OSMOLALITY AND HYPONATREMIA: ICD-10-CM

## 2024-07-10 LAB
ALBUMIN SERPL ELPH-MCNC: 3.9 G/DL — SIGNIFICANT CHANGE UP (ref 3.3–5)
ALP SERPL-CCNC: 239 U/L — HIGH (ref 40–120)
ALT FLD-CCNC: 44 U/L — HIGH (ref 4–41)
AST SERPL-CCNC: 46 U/L — HIGH (ref 4–40)
BILIRUB DIRECT SERPL-MCNC: <0.2 MG/DL — SIGNIFICANT CHANGE UP (ref 0–0.3)
BILIRUB INDIRECT FLD-MCNC: >0.2 MG/DL — SIGNIFICANT CHANGE UP (ref 0–1)
BILIRUB SERPL-MCNC: 0.4 MG/DL — SIGNIFICANT CHANGE UP (ref 0.2–1.2)
BLD GP AB SCN SERPL QL: NEGATIVE — SIGNIFICANT CHANGE UP
GLUCOSE BLDC GLUCOMTR-MCNC: 384
GLUCOSE SERPL-MCNC: 479 MG/DL — CRITICAL HIGH (ref 70–99)
HBA1C MFR BLD HPLC: 11.4
OSMOLALITY SERPL: 278 MOSMOL/KG
PROT SERPL-MCNC: 7.6 G/DL — SIGNIFICANT CHANGE UP (ref 6–8.3)
RH IG SCN BLD-IMP: POSITIVE — SIGNIFICANT CHANGE UP
RH IG SCN BLD-IMP: POSITIVE — SIGNIFICANT CHANGE UP

## 2024-07-10 PROCEDURE — 99205 OFFICE O/P NEW HI 60 MIN: CPT | Mod: 25

## 2024-07-10 PROCEDURE — 83036 HEMOGLOBIN GLYCOSYLATED A1C: CPT | Mod: QW

## 2024-07-10 PROCEDURE — 96372 THER/PROPH/DIAG INJ SC/IM: CPT

## 2024-07-10 PROCEDURE — 82962 GLUCOSE BLOOD TEST: CPT

## 2024-07-10 RX ORDER — LOSARTAN/HYDROCHLOROTHIAZIDE 100MG-25MG
1 TABLET ORAL
Refills: 0 | DISCHARGE

## 2024-07-10 RX ORDER — LANCETS 33 GAUGE
EACH MISCELLANEOUS
Qty: 360 | Refills: 2 | Status: ACTIVE | COMMUNITY
Start: 2024-07-10 | End: 1900-01-01

## 2024-07-10 RX ORDER — EMPAGLIFLOZIN 25 MG/1
25 TABLET, FILM COATED ORAL
Qty: 90 | Refills: 1 | Status: ACTIVE | COMMUNITY
Start: 2024-07-10 | End: 1900-01-01

## 2024-07-10 RX ORDER — METFORMIN HYDROCHLORIDE 850 MG/1
1 TABLET, FILM COATED ORAL
Refills: 0 | DISCHARGE

## 2024-07-10 RX ORDER — PEN NEEDLE, DIABETIC 29 G X1/2"
32G X 4 MM NEEDLE, DISPOSABLE MISCELLANEOUS
Qty: 1 | Refills: 3 | Status: ACTIVE | COMMUNITY
Start: 2024-07-10 | End: 1900-01-01

## 2024-07-10 RX ORDER — INSULIN ASPART 100 [IU]/ML
0 INJECTION, SOLUTION INTRAVENOUS; SUBCUTANEOUS
Refills: 0 | DISCHARGE

## 2024-07-10 RX ORDER — AMLODIPINE BESYLATE 2.5 MG/1
1 TABLET ORAL
Refills: 0 | DISCHARGE

## 2024-07-10 RX ORDER — BLOOD SUGAR DIAGNOSTIC
STRIP MISCELLANEOUS 4 TIMES DAILY
Qty: 360 | Refills: 2 | Status: ACTIVE | COMMUNITY
Start: 2024-07-10 | End: 1900-01-01

## 2024-07-10 RX ORDER — TAMSULOSIN HYDROCHLORIDE 0.4 MG/1
1 CAPSULE ORAL
Refills: 0 | DISCHARGE

## 2024-07-10 RX ORDER — GLUCOSAM/CHON-MSM1/C/MANG/BOSW 500-416.6
TABLET ORAL
Qty: 1 | Refills: 0 | Status: ACTIVE | COMMUNITY
Start: 2024-07-10 | End: 1900-01-01

## 2024-07-10 RX ORDER — PANTOPRAZOLE SODIUM 40 MG/10ML
1 INJECTION, POWDER, FOR SOLUTION INTRAVENOUS
Refills: 0 | DISCHARGE

## 2024-07-10 RX ORDER — INSULIN LISPRO 100 [IU]/ML
100 INJECTION, SOLUTION INTRAVENOUS; SUBCUTANEOUS
Qty: 0 | Refills: 0 | Status: COMPLETED | OUTPATIENT
Start: 2024-07-10

## 2024-07-10 RX ORDER — ATORVASTATIN CALCIUM 20 MG/1
1 TABLET, FILM COATED ORAL
Refills: 0 | DISCHARGE

## 2024-07-10 RX ORDER — BLOOD-GLUCOSE METER
70 EACH MISCELLANEOUS
Qty: 3 | Refills: 3 | Status: ACTIVE | COMMUNITY
Start: 2024-07-10 | End: 1900-01-01

## 2024-07-10 RX ORDER — INSULIN ASPART 100 [IU]/ML
100 INJECTION, SOLUTION INTRAVENOUS; SUBCUTANEOUS 3 TIMES DAILY
Qty: 6 | Refills: 2 | Status: ACTIVE | COMMUNITY
Start: 2024-07-10 | End: 1900-01-01

## 2024-07-10 RX ORDER — FLASH GLUCOSE SENSOR
KIT MISCELLANEOUS
Qty: 6 | Refills: 3 | Status: ACTIVE | COMMUNITY
Start: 2024-07-10 | End: 1900-01-01

## 2024-07-10 RX ORDER — BLOOD-GLUCOSE SENSOR
EACH MISCELLANEOUS
Qty: 9 | Refills: 3 | Status: ACTIVE | COMMUNITY
Start: 2024-07-10 | End: 1900-01-01

## 2024-07-10 RX ORDER — METFORMIN ER 500 MG 500 MG/1
500 TABLET ORAL
Qty: 360 | Refills: 1 | Status: ACTIVE | COMMUNITY
Start: 2024-07-10 | End: 1900-01-01

## 2024-07-10 RX ORDER — INSULIN GLARGINE-YFGN 100 [IU]/ML
100 INJECTION, SOLUTION SUBCUTANEOUS DAILY
Qty: 6 | Refills: 2 | Status: ACTIVE | COMMUNITY
Start: 2024-07-10 | End: 1900-01-01

## 2024-07-10 RX ORDER — EMPAGLIFLOZIN 25 MG/1
1 TABLET, FILM COATED ORAL
Refills: 0 | DISCHARGE

## 2024-07-10 RX ADMIN — INSULIN LISPRO 0 UNIT/ML: 100 INJECTION, SOLUTION INTRAVENOUS; SUBCUTANEOUS at 00:00

## 2024-07-10 NOTE — H&P PST ADULT - NEGATIVE GASTROINTESTINAL SYMPTOMS
Mother of pt called to let us knowt hat she was referred by her PCP with a 2nd opinion about getting an U/S and to check for endometriosis or fibroids  She stated she had talked to Dr Baljeet Laura about this but never went on with the process and that Dr Baljeet Laura just kept giving different options to take  Please advise  Thank you  Robert curriet set up for consult 2/23 no nausea/no vomiting/no diarrhea/no constipation/no change in bowel habits/no flatulence/no abdominal pain/no melena/no hematochezia/no steatorrhea/no jaundice/no hiccoughs

## 2024-07-10 NOTE — H&P PST ADULT - HISTORY OF PRESENT ILLNESS
59 year old male, went to [facility] c/o fatigue, dizziness.  1/1/20240  pt was worked up found to be severely anemic EGD on 01/02/2024: nodule on gastric side of the GEJ with some old heme in stomach. Possible healing Kavita Valero tear. Pathology reveal GEJ bx: fragments of adenocarcinoma, moderately differentiated. Pt received chem and radiation therapy chemotherapy completed 6/25/24. pt reevaluated by surgeon and Now present in Presurgical testing for preop evaluation for scheduled procedure Robotic assisted Michael senait esophagectomy      ?      ?    ?    ? 59 year old male, went to [facility] c/o fatigue, dizziness.  1/1/20240  pt was worked up found to be severely anemic EGD on 01/02/2024: nodule on gastric side of the GEJ with some old heme in stomach. Possible healing Kavita Valero tear. Pathology reveal GEJ bx: fragments of adenocarcinoma, moderately differentiated. Pt received  radiation therapy & chemotherapy completed 6/25/24. Pt was reevaluated by surgeon and now present in Presurgical testing for preop evaluation for scheduled procedure Robotic assisted [place] senait esophagectomy      ?      ?    ?    ? 59 year old male, went to [facility] c/o fatigue, dizziness.  1/1/20240  pt was worked up found to be severely anemic received PRBC EGD on 01/02/2024: nodule on gastric side of the GEJ with some old heme in stomach. Possible healing Kavita Valero tear. Pathology reveal GEJ bx: fragments of adenocarcinoma, moderately differentiated. Pt received  radiation therapy & chemotherapy completed 6/25/24. Pt was reevaluated by surgeon and now present in Presurgical testing for preop evaluation for scheduled procedure Robotic assisted Michael senait esophagectomy      ?      ?    ?    ?

## 2024-07-10 NOTE — H&P PST ADULT - I HAVE PERSONALLY SEEN AND EXAMINED THE PATIENT. THERE HAVE NOT BEEN ANY CHANGES IN THE PATIENT'S HISTORY OR EXAM UNLESS COMMENTED BELOW
Patient here today for follow up BPH and LUTS, PSA 1.55.    Concerns include frequency, weak stream and nocturia.  Denies symptoms urgency, intermittency, incontinence, incomplete emptying, hematuria, dysuria and straining.    Denies known Latex allergy or symptoms of Latex sensitivity.  Medications verified, no changes.    Refill           Statement Selected

## 2024-07-10 NOTE — H&P PST ADULT - NSANTHOSAYNRD_GEN_A_CORE
No. FARHAN screening performed.  STOP BANG Legend: 0-2 = LOW Risk; 3-4 = INTERMEDIATE Risk; 5-8 = HIGH Risk

## 2024-07-10 NOTE — H&P PST ADULT - PROBLEM SELECTOR PLAN 2
glucose 7/10/24 255- repeated   no metformin 7/15/24 pm dose and morning of surgery  No Novolog day of surgery  Jardiance last hameed 7/12/24 glucose 7/10/24 255- repeated   no metformin 7/15/24 pm dose and morning of surgery  No Novolog day of surgery  Jardiance last hameed 7/12/24  Hypoglycemic order done

## 2024-07-10 NOTE — PROVIDER CONTACT NOTE (CRITICAL VALUE NOTIFICATION) - ACTION/TREATMENT ORDERED:
Pt refused to go to ED, pt states he understands the risk of not seeking treatment immediately.  Pt states he is seeing endocrinology at 2PM today. Pt refused to go to ED, pt states he understands the risk of not seeking treatment immediately.  Pt states he is seeing endocrinology at 2PM today.    Update @320pm - pt in endocrinology office, repeat glucose 348.  Per pt's sister, endocrinology is not recommending patient to go to ED.  Pt will be treated by endocrinology.  Pt remains asymptomatic per his sister.

## 2024-07-10 NOTE — H&P PST ADULT - GENERAL COMMENTS
loss weight with chemo and radiation treatment last dose in 5/24- Pt weight is steadily increasing, loss weight with chemo and radiation treatment last dose in 6/25/24- Pt weight is steadily increasing,

## 2024-07-10 NOTE — H&P PST ADULT - PROBLEM SELECTOR PLAN 1
Scheduled for robotic assisted QUINTIN-Broderick esophagectomy  Preop instructions provided and patient verbalizes understanding.  Labs done and results pending.  Hibiclens and Famotidine provided with instructions.  CMP, , CBC, result in chart  HgAIC, T&S results  pending    hepatic levels elevated- ct abdomen 4/25/24- stable right hepatic lobe subcentimeter hypodense focus to small to characterize, normal hepatic morphology

## 2024-07-10 NOTE — H&P PST ADULT - PROBLEM SELECTOR PLAN 3
7/8/24- 127, pt prescribed salt tabs 1 g BID (started 7/9/24 evening) sodium level to be repeated in house as recommended pt oncology in [employer] note

## 2024-07-10 NOTE — H&P PST ADULT - LAST ECHOCARDIOGRAM
EF 60-65% at that time, most recent Echo done 7/2024- [facility] [phone]- will request 2020 EF 60-65% at that time, most recent Echo done 7/2024- [facility] [phone]- will request

## 2024-07-11 DIAGNOSIS — E11.9 TYPE 2 DIABETES MELLITUS W/OUT COMPLICATIONS: ICD-10-CM

## 2024-07-12 ENCOUNTER — INPATIENT (INPATIENT)
Facility: HOSPITAL | Age: 59
LOS: 27 days | Discharge: HOME | End: 2024-08-09
Attending: THORACIC SURGERY (CARDIOTHORACIC VASCULAR SURGERY) | Admitting: THORACIC SURGERY (CARDIOTHORACIC VASCULAR SURGERY)
Payer: MEDICAID

## 2024-07-12 VITALS
OXYGEN SATURATION: 98 % | RESPIRATION RATE: 18 BRPM | SYSTOLIC BLOOD PRESSURE: 154 MMHG | HEART RATE: 94 BPM | WEIGHT: 119.05 LBS | HEIGHT: 64 IN | DIASTOLIC BLOOD PRESSURE: 89 MMHG | TEMPERATURE: 98 F

## 2024-07-12 DIAGNOSIS — C15.9 MALIGNANT NEOPLASM OF ESOPHAGUS, UNSPECIFIED: ICD-10-CM

## 2024-07-12 DIAGNOSIS — Z95.828 PRESENCE OF OTHER VASCULAR IMPLANTS AND GRAFTS: Chronic | ICD-10-CM

## 2024-07-12 LAB
ADD ON TEST-SPECIMEN IN LAB: SIGNIFICANT CHANGE UP
ALBUMIN SERPL ELPH-MCNC: 3.4 G/DL — SIGNIFICANT CHANGE UP (ref 3.3–5)
ALP SERPL-CCNC: 204 U/L — HIGH (ref 40–120)
ALT FLD-CCNC: 46 U/L — HIGH (ref 4–41)
ANION GAP SERPL CALC-SCNC: 11 MMOL/L — SIGNIFICANT CHANGE UP (ref 7–14)
APTT BLD: 36.5 SEC — HIGH (ref 24.5–35.6)
AST SERPL-CCNC: 55 U/L — HIGH (ref 4–40)
B-OH-BUTYR SERPL-SCNC: <0 MMOL/L — SIGNIFICANT CHANGE UP (ref 0–0.4)
BILIRUB DIRECT SERPL-MCNC: <0.2 MG/DL — SIGNIFICANT CHANGE UP (ref 0–0.3)
BILIRUB INDIRECT FLD-MCNC: >0.1 MG/DL — SIGNIFICANT CHANGE UP (ref 0–1)
BILIRUB SERPL-MCNC: 0.3 MG/DL — SIGNIFICANT CHANGE UP (ref 0.2–1.2)
BLD GP AB SCN SERPL QL: NEGATIVE — SIGNIFICANT CHANGE UP
BUN SERPL-MCNC: 24 MG/DL — HIGH (ref 7–23)
CALCIUM SERPL-MCNC: 8.8 MG/DL — SIGNIFICANT CHANGE UP (ref 8.4–10.5)
CHLORIDE SERPL-SCNC: 99 MMOL/L — SIGNIFICANT CHANGE UP (ref 98–107)
CO2 SERPL-SCNC: 21 MMOL/L — LOW (ref 22–31)
CREAT SERPL-MCNC: 0.79 MG/DL — SIGNIFICANT CHANGE UP (ref 0.5–1.3)
EGFR: 101 ML/MIN/1.73M2 — SIGNIFICANT CHANGE UP
GLUCOSE BLDC GLUCOMTR-MCNC: 121 MG/DL — HIGH (ref 70–99)
GLUCOSE BLDC GLUCOMTR-MCNC: 230 MG/DL — HIGH (ref 70–99)
GLUCOSE BLDC GLUCOMTR-MCNC: 310 MG/DL — HIGH (ref 70–99)
GLUCOSE BLDC GLUCOMTR-MCNC: 350 MG/DL — HIGH (ref 70–99)
GLUCOSE SERPL-MCNC: 354 MG/DL — HIGH (ref 70–99)
HCT VFR BLD CALC: 34.1 % — LOW (ref 39–50)
HGB BLD-MCNC: 11.4 G/DL — LOW (ref 13–17)
INR BLD: <0.9 RATIO — LOW (ref 0.85–1.18)
LACTATE SERPL-SCNC: 1 MMOL/L — SIGNIFICANT CHANGE UP (ref 0.5–2)
MAGNESIUM SERPL-MCNC: 2.1 MG/DL — SIGNIFICANT CHANGE UP (ref 1.6–2.6)
MCHC RBC-ENTMCNC: 30.6 PG — SIGNIFICANT CHANGE UP (ref 27–34)
MCHC RBC-ENTMCNC: 33.4 GM/DL — SIGNIFICANT CHANGE UP (ref 32–36)
MCV RBC AUTO: 91.4 FL — SIGNIFICANT CHANGE UP (ref 80–100)
NRBC # BLD: 0 /100 WBCS — SIGNIFICANT CHANGE UP (ref 0–0)
NRBC # FLD: 0 K/UL — SIGNIFICANT CHANGE UP (ref 0–0)
PHOSPHATE SERPL-MCNC: 2.9 MG/DL — SIGNIFICANT CHANGE UP (ref 2.5–4.5)
PLATELET # BLD AUTO: 169 K/UL — SIGNIFICANT CHANGE UP (ref 150–400)
POTASSIUM SERPL-MCNC: 4.7 MMOL/L — SIGNIFICANT CHANGE UP (ref 3.5–5.3)
POTASSIUM SERPL-SCNC: 4.7 MMOL/L — SIGNIFICANT CHANGE UP (ref 3.5–5.3)
PREALB SERPL-MCNC: 21 MG/DL — SIGNIFICANT CHANGE UP (ref 20–40)
PROT SERPL-MCNC: 6.8 G/DL — SIGNIFICANT CHANGE UP (ref 6–8.3)
PROTHROM AB SERPL-ACNC: 8.8 SEC — LOW (ref 9.5–13)
RBC # BLD: 3.73 M/UL — LOW (ref 4.2–5.8)
RBC # FLD: 13.1 % — SIGNIFICANT CHANGE UP (ref 10.3–14.5)
RH IG SCN BLD-IMP: POSITIVE — SIGNIFICANT CHANGE UP
RH IG SCN BLD-IMP: POSITIVE — SIGNIFICANT CHANGE UP
SODIUM SERPL-SCNC: 131 MMOL/L — LOW (ref 135–145)
WBC # BLD: 4.73 K/UL — SIGNIFICANT CHANGE UP (ref 3.8–10.5)
WBC # FLD AUTO: 4.73 K/UL — SIGNIFICANT CHANGE UP (ref 3.8–10.5)

## 2024-07-12 PROCEDURE — 71045 X-RAY EXAM CHEST 1 VIEW: CPT | Mod: 26

## 2024-07-12 PROCEDURE — 99233 SBSQ HOSP IP/OBS HIGH 50: CPT

## 2024-07-12 RX ORDER — INSULIN LISPRO 100/ML
15 VIAL (ML) SUBCUTANEOUS
Refills: 0 | Status: DISCONTINUED | OUTPATIENT
Start: 2024-07-12 | End: 2024-07-13

## 2024-07-12 RX ORDER — DEXTROSE 4 G
12.5 TABLET,CHEWABLE ORAL ONCE
Refills: 0 | Status: DISCONTINUED | OUTPATIENT
Start: 2024-07-12 | End: 2024-07-25

## 2024-07-12 RX ORDER — DEXTROSE 4 G
25 TABLET,CHEWABLE ORAL ONCE
Refills: 0 | Status: DISCONTINUED | OUTPATIENT
Start: 2024-07-12 | End: 2024-07-25

## 2024-07-12 RX ORDER — ALBUMIN HUMAN 25 %
250 INTRAVENOUS SOLUTION INTRAVENOUS
Refills: 0 | Status: COMPLETED | OUTPATIENT
Start: 2024-07-12 | End: 2024-07-12

## 2024-07-12 RX ORDER — INSULIN LISPRO 100/ML
VIAL (ML) SUBCUTANEOUS
Refills: 0 | Status: DISCONTINUED | OUTPATIENT
Start: 2024-07-12 | End: 2024-07-15

## 2024-07-12 RX ORDER — HYDRALAZINE HYDROCHLORIDE 100 MG/1
10 TABLET ORAL EVERY 4 HOURS
Refills: 0 | Status: DISCONTINUED | OUTPATIENT
Start: 2024-07-12 | End: 2024-07-16

## 2024-07-12 RX ORDER — HEPARIN SODIUM 1000 [USP'U]/ML
5000 INJECTION, SOLUTION INTRAVENOUS; SUBCUTANEOUS EVERY 12 HOURS
Refills: 0 | Status: DISCONTINUED | OUTPATIENT
Start: 2024-07-12 | End: 2024-07-22

## 2024-07-12 RX ORDER — TAMSULOSIN HCL 0.4 MG
0.4 CAPSULE ORAL AT BEDTIME
Refills: 0 | Status: DISCONTINUED | OUTPATIENT
Start: 2024-07-12 | End: 2024-07-16

## 2024-07-12 RX ORDER — AMLODIPINE BESYLATE 2.5 MG/1
10 TABLET ORAL DAILY
Refills: 0 | Status: DISCONTINUED | OUTPATIENT
Start: 2024-07-13 | End: 2024-07-16

## 2024-07-12 RX ORDER — INSULIN GLARGINE-YFGN 100 [IU]/ML
15 INJECTION, SOLUTION SUBCUTANEOUS ONCE
Refills: 0 | Status: COMPLETED | OUTPATIENT
Start: 2024-07-12 | End: 2024-07-12

## 2024-07-12 RX ORDER — PANTOPRAZOLE SODIUM 20 MG/1
40 TABLET, DELAYED RELEASE ORAL
Refills: 0 | Status: DISCONTINUED | OUTPATIENT
Start: 2024-07-12 | End: 2024-07-16

## 2024-07-12 RX ORDER — LOSARTAN POTASSIUM 50 MG/1
50 TABLET, FILM COATED ORAL DAILY
Refills: 0 | Status: DISCONTINUED | OUTPATIENT
Start: 2024-07-12 | End: 2024-07-16

## 2024-07-12 RX ORDER — AMLODIPINE BESYLATE 2.5 MG/1
5 TABLET ORAL DAILY
Refills: 0 | Status: DISCONTINUED | OUTPATIENT
Start: 2024-07-12 | End: 2024-07-12

## 2024-07-12 RX ORDER — INSULIN LISPRO 100/ML
VIAL (ML) SUBCUTANEOUS AT BEDTIME
Refills: 0 | Status: DISCONTINUED | OUTPATIENT
Start: 2024-07-12 | End: 2024-07-15

## 2024-07-12 RX ORDER — INSULIN LISPRO 100/ML
15 VIAL (ML) SUBCUTANEOUS
Refills: 0 | Status: DISCONTINUED | OUTPATIENT
Start: 2024-07-13 | End: 2024-07-13

## 2024-07-12 RX ORDER — BACTERIOSTATIC SODIUM CHLORIDE 0.9 %
1000 VIAL (ML) INJECTION
Refills: 0 | Status: DISCONTINUED | OUTPATIENT
Start: 2024-07-12 | End: 2024-07-13

## 2024-07-12 RX ORDER — INSULIN GLARGINE-YFGN 100 [IU]/ML
20 INJECTION, SOLUTION SUBCUTANEOUS EVERY MORNING
Refills: 0 | Status: DISCONTINUED | OUTPATIENT
Start: 2024-07-13 | End: 2024-07-13

## 2024-07-12 RX ADMIN — Medication 30 MILLILITER(S): at 19:42

## 2024-07-12 RX ADMIN — Medication 250 MILLILITER(S): at 16:25

## 2024-07-12 RX ADMIN — INSULIN GLARGINE-YFGN 15 UNIT(S): 100 INJECTION, SOLUTION SUBCUTANEOUS at 12:54

## 2024-07-12 RX ADMIN — Medication 4: at 16:32

## 2024-07-12 RX ADMIN — Medication 30 MILLILITER(S): at 13:08

## 2024-07-12 RX ADMIN — AMLODIPINE BESYLATE 5 MILLIGRAM(S): 2.5 TABLET ORAL at 14:43

## 2024-07-12 RX ADMIN — Medication 15 UNIT(S): at 16:32

## 2024-07-12 RX ADMIN — Medication 250 MILLILITER(S): at 13:09

## 2024-07-12 RX ADMIN — Medication 0.4 MILLIGRAM(S): at 21:49

## 2024-07-12 RX ADMIN — LOSARTAN POTASSIUM 50 MILLIGRAM(S): 50 TABLET, FILM COATED ORAL at 17:18

## 2024-07-12 RX ADMIN — HEPARIN SODIUM 5000 UNIT(S): 1000 INJECTION, SOLUTION INTRAVENOUS; SUBCUTANEOUS at 17:18

## 2024-07-12 NOTE — PATIENT PROFILE ADULT - FALL HARM RISK - FALL HARM RISK
[employer] Application   Below Knee    NAME:  Tonie Rodriguez. YOB: 1946  MEDICAL RECORD NUMBER:  [id number]  DATE:  12/12/2022    Remove old [employer] or Boots. Appied primary and secondary dressing as ordered. Applied Unna roll from toes to knee overlapping each time. Applied ace wrap or coban from toes to below the knee. Secured with tape and/or metal clips covered with tape. Instructed patient/caregiver to keep dressing dry and intact. DO NOT REMOVE DRESSING. Instructed pt/family/caregiver to report excessive draining, loose bandage, wet dressing, severe pain or tingling in toes. Applied [employer] dressing below the knee to bilateral lower legs. Unna Boot(s) were applied per  Guidelines.     Response to treatment: Well tolerated by patient      Electronically signed by Darrel Rogers RN on 12/12/2022 at 10:40 AM No indicators present

## 2024-07-12 NOTE — CHART NOTE - NSCHARTNOTEFT_GEN_A_CORE
HPI:  Linwood Villanueva is a 59 y.o. man with history of GEJ adenocarcinoma (T3N0, stage 2A) s/p neoadjuvant chemotherapy. Patient admitted for optimization prior to planned Michael-Broderick Esophagectomy.    Vital Signs:  T(C): 36.7 (12 Jul 2024 12:00), Max: 36.7 (12 Jul 2024 10:49)  T(F): 98 (12 Jul 2024 12:00), Max: 98.1 (12 Jul 2024 10:49)  HR: 95 (12 Jul 2024 13:00) (90 - 95)  BP: 165/99 (12 Jul 2024 13:00) (151/95 - 166/93)  BP(mean): 118 (12 Jul 2024 13:00) (108 - 118)  ABP: --  ABP(mean): --  RR: 13 (12 Jul 2024 13:00) (13 - 23)  SpO2: 97% (12 Jul 2024 13:00) (95% - 98%)    O2 Parameters below as of 12 Jul 2024 13:00  Patient On (Oxygen Delivery Method): room air    PET (7/8):  1. Resolution of mild hypermetabolism in GE junction, compatible with   response to interval therapy.    2. Unchanged non-FDG-avid groundglass nodules.    3. Nonspecific small bowel hypermetabolism, also present previously,   likely is related to metformin.    Plan:  - Evaluation for nutritional optimization   - Evaluation for hyponatremia seen on outpatient labs   - Will continue to follow pre-operatively.   - Plan discussed with Dr. Bowman

## 2024-07-12 NOTE — PATIENT PROFILE ADULT - FALL HARM RISK - UNIVERSAL INTERVENTIONS
Bed in lowest position, wheels locked, appropriate side rails in place/Call bell, personal items and telephone in reach/Instruct patient to call for assistance before getting out of bed or chair/Non-slip footwear when patient is out of bed/[place] to call system/Physically safe environment - no spills, clutter or unnecessary equipment/Purposeful Proactive Rounding/Room/bathroom lighting operational, light cord in reach

## 2024-07-12 NOTE — H&P ADULT - HISTORY OF PRESENT ILLNESS
59 year old male, went to [facility] c/o fatigue, dizziness.  1/1/20240  pt was worked up found to be severely anemic received PRBC EGD on 01/02/2024: nodule on gastric side of the GEJ with some old heme in stomach. Possible healing Kavita Valero tear. Pathology reveal GEJ bx: fragments of adenocarcinoma, moderately differentiated. Pt received  radiation therapy & chemotherapy completed 6/25/24. Pt was reevaluated by surgeon and now present to [facility] for preop optimization for scheduled Robotic assisted [place] senait esophagectomy.

## 2024-07-12 NOTE — PROGRESS NOTE ADULT - SUBJECTIVE AND OBJECTIVE BOX
CHIEF COMPLAINT: FOLLOW UP IN ICU FOR PATIENT WITH ESOPHAGEAL CANCER    ISSUES:   Esophageal cancer  Hyperglycemia   Uncontrolled DM2 (HgbA1c 11)  Hyponatremia  HTN  HLD  BPH      INTERVAL EVENTS:   Transferred to [facility] CTICU from outside hospital.      HISTORY:   Patient denies chest pain, cough, pleuritic pain, fever or dyspnea. Patient denies nausea, vomiting, diarrhea, melena, hematochezia.  Pt denies dysphagia or food getting stuck in chest.       PHYSICAL EXAM:   Gen: Comfortable, No acute distress  Eyes: Sclera white, Conjunctiva normal, Eyelids normal, Pupils symmetrical   ENT: Mucous membranes moist,  ,  ,    Neck: Trachea midline,  ,  ,  ,  ,  ,    CV: Rate regular, Rhythm regular,  ,  ,    Resp: Breath sounds clear, No accessory muscles use,  ,  ,    Abd: Soft, Non-distended, Non-tender,   ,  ,  ,    Skin: Warm, No peripheral edema of lower extremities,  ,    : No stallworth  Neuro: Moving all 4 extremities,    Psych: A&Ox3      ASSESSMENT AND PLAN:     NEURO:  No pain issues.          RESPIRATORY:  Stable on room air - Incentive spirometry. Chest PT and suctioning of secretions. Out of bed to chair and ambulate with assistance. Continuous pulse oximetry for support & to prevent decompensation.           CARDIOVASCULAR:  Hemodynamically stable - Not on pressors. Continue hemodynamic monitoring.  Telemetry (medical test) - Reviewed by me today independently. Normal sinus rhythm.    HTN - stable  - Increase amlodipine to 10mg qday  - Start losartan PO       RENAL:  Stable - Monitor IOs and electrolytes. Keep K above 4.0 and Mg above 2.0.     Hyponatremia - Secondary to hypovolemia. Worsening. IVF. Monitor Na. Avoid overcorrection. Goal to correct no more than 10 every 24 hours.        GASTROINTESTINAL:  GI prophylaxis not indicated  Zofran and Reglan IV PRN for nausea  Regular consistency diet        GERD - stable. Continue pantoprazole         HEMATOLOGIC:  No signs of active bleeding. Monitor Hgb in CBC in AM  DVT prophylaxis with heparin subQ           INFECTIOUS DISEASE:  All surgical sites appear clean. No signs of active infection. Will monitor for fever and leukocytosis.             ENDOCRINE:  Hyperglycemia in setting of uncontrolled DM2 (HgbA1c 11) – Worsening. Lactic acid normal. Beta hydroxybutyrate normal.   - Lantus qday  - Start Nutritional lispro  - Start Lispro sliding scale  - Carb control diet -- no outside food and no snacking in between meals.   - Monitor glucose fingersticks for goal 120-180. Insulin sliding scale.            ONCOLOGY:  Esophageal cancer - stable  - Awaiting esophagectomy  - Pre-op optimization of medical conditions prior to surgery  - Medicine consult for pre-op clearance           Pertinent clinical, laboratory, radiographic, hemodynamic, echocardiographic, respiratory data, microbiologic data and chart were reviewed by myself and analyzed frequently throughout the course of the day and night by myself.    Plan discussed at length with the CTICU staff and Attending CT Surgeon -   Dr Lewis Laureano.      Patient's status was discussed with patient at bedside.       	  I have spent 50 minutes of time with this patient monitoring hemodynamic status, respiratory status, and coordinating care in the ICU.    ________________________________________________      _________________________  VITAL SIGNS:  Vital Signs Last 24 Hrs  T(C): 36.8 (12 Jul 2024 16:00), Max: 36.8 (12 Jul 2024 16:00)  T(F): 98.2 (12 Jul 2024 16:00), Max: 98.2 (12 Jul 2024 16:00)  HR: 93 (12 Jul 2024 19:00) (90 - 103)  BP: 155/88 (12 Jul 2024 19:00) (151/95 - 175/99)  BP(mean): 108 (12 Jul 2024 19:00) (108 - 120)  RR: 19 (12 Jul 2024 19:00) (11 - 24)  SpO2: 97% (12 Jul 2024 19:00) (95% - 99%)    Parameters below as of 12 Jul 2024 19:00  Patient On (Oxygen Delivery Method): room air      I/Os:   I&O's Detail    12 Jul 2024 07:01  -  12 Jul 2024 19:30  --------------------------------------------------------  IN:    Albumin 5%  - 250 mL: 500 mL    Oral Fluid: 360 mL    sodium chloride 0.9%: 210 mL  Total IN: 1070 mL    OUT:    Voided (mL): 1500 mL  Total OUT: 1500 mL    Total NET: -430 mL              MEDICATIONS:  MEDICATIONS  (STANDING):  dextrose 50% Injectable 25 Gram(s) IV Push once  dextrose 50% Injectable 12.5 Gram(s) IV Push once  heparin   Injectable 5000 Unit(s) SubCutaneous every 12 hours  insulin lispro (ADMELOG) corrective regimen sliding scale   SubCutaneous three times a day before meals  insulin lispro (ADMELOG) corrective regimen sliding scale   SubCutaneous at bedtime  insulin lispro Injectable (ADMELOG) 15 Unit(s) SubCutaneous before dinner  losartan 50 milliGRAM(s) Oral daily  pantoprazole    Tablet 40 milliGRAM(s) Oral before breakfast  sodium chloride 0.9%. 1000 milliLiter(s) (30 mL/Hr) IV Continuous <Continuous>    MEDICATIONS  (PRN):  hydrALAZINE Injectable 10 milliGRAM(s) IV Push every 4 hours PRN systolic BP greater than 170      LABS:  Laboratory data was independently reviewed by me today.                           11.4   4.73  )-----------( 169      ( 12 Jul 2024 11:15 )             34.1     07-12    131<L>  |  99  |  24<H>  ----------------------------<  354<H>  4.7   |  21<L>  |  0.79    Ca    8.8      12 Jul 2024 11:15  Phos  2.9     07-12  Mg     2.10     07-12    TPro  6.8  /  Alb  3.4  /  TBili  0.3  /  DBili  <0.2  /  AST  55<H>  /  ALT  46<H>  /  AlkPhos  204<H>  07-12    LIVER FUNCTIONS - ( 12 Jul 2024 11:15 )  Alb: 3.4 g/dL / Pro: 6.8 g/dL / ALK PHOS: 204 U/L / ALT: 46 U/L / AST: 55 U/L / GGT: x           PT/INR - ( 12 Jul 2024 11:15 )   PT: 8.8 sec;   INR: <0.90 ratio         PTT - ( 12 Jul 2024 11:15 )  PTT:36.5 sec    Urinalysis Basic - ( 12 Jul 2024 11:15 )    Color: x / Appearance: x / SG: x / pH: x  Gluc: 354 mg/dL / Ketone: x  / Bili: x / Urobili: x   Blood: x / Protein: x / Nitrite: x   Leuk Esterase: x / RBC: x / WBC x   Sq Epi: x / Non Sq Epi: x / Bacteria: x        RADIOLOGY:   Radiology images were independently reviewed by me today. Reports were reviewed by me today.    Xray Chest 1 View- PORTABLE-Urgent:   ACC: [id number] EXAM:  XR CHEST PORTABLE URGENT 1V   ORDERED BY: JOSUÉ ANDRADE     PROCEDURE DATE:  07/12/2024          INTERPRETATION:  CLINICAL INFORMATION: Esophageal cancer    Time of Exam:  July 12, 2024 at 2:13 PM    EXAM:  Frontal Chest    FINDINGS:  Chemotherapy port with tip in the SVC.  Both lungs are equally aerated and free of any focal abnormalities.  The heart is not enlarged and there is no effusion or pneumothorax.  The bones show no acute findings.      COMPARISON: October 9, 2020        IMPRESSION: Clear lungs    --- End of Report ---            JUAREZ BURGOS MD; Attending Radiologist  This document has been electronically signed. Jul 12 2024  4:56PM (07-12-24 @ 14:33)

## 2024-07-12 NOTE — PATIENT PROFILE ADULT - NSTOBACCONEVERSMOKERY/N_GEN_A
Continued Stay Note  [facility]     Patient Name: Krystina Banks  MRN: [id number]  Today's Date: 7/7/2017    Admit Date: 7/6/2017          Discharge Plan       07/07/17 1521    Case Management/Social Work Plan    Plan Home    Patient/Family In Agreement With Plan yes    Additional Comments PT eval noted, no Home PT needed, rolling walker recommended. I have ordered a rolling walker to be delivered here from [facility]. No other d/c needs identifed.              Discharge Codes     None        Expected Discharge Date and Time     Expected Discharge Date Expected Discharge Time    Jul 7, 2017             Sonja C Kellerman, RN     No

## 2024-07-12 NOTE — H&P ADULT - ASSESSMENT
Assessment: 59 year old M c/o fatigue, dizziness, found to be severely anemic s/p EGD on 01/02/2024 found to have nodule on gastric side of the GEJ. Pathology revealed fragments of adenocarcinoma, moderately differentiated. Pt now s/p radiation therapy & chemotherapy completed 6/25/24 and presents to [facility] for preop optimization for scheduled Robotic assisted Michael senait esophagectomy    Plan  - Treat hyperglycemia  - Risk stratification for OR  - Preop for OR    Plan discussed with and in agreement with Dr. Laureano    Thoracic Surgery  [phone]

## 2024-07-13 LAB
A1C WITH ESTIMATED AVERAGE GLUCOSE RESULT: 10.9 % — HIGH (ref 4–5.6)
ANION GAP SERPL CALC-SCNC: 11 MMOL/L — SIGNIFICANT CHANGE UP (ref 7–14)
BUN SERPL-MCNC: 18 MG/DL — SIGNIFICANT CHANGE UP (ref 7–23)
CALCIUM SERPL-MCNC: 8.9 MG/DL — SIGNIFICANT CHANGE UP (ref 8.4–10.5)
CHLORIDE SERPL-SCNC: 103 MMOL/L — SIGNIFICANT CHANGE UP (ref 98–107)
CO2 SERPL-SCNC: 21 MMOL/L — LOW (ref 22–31)
CREAT SERPL-MCNC: 0.68 MG/DL — SIGNIFICANT CHANGE UP (ref 0.5–1.3)
EGFR: 106 ML/MIN/1.73M2 — SIGNIFICANT CHANGE UP
ESTIMATED AVERAGE GLUCOSE: 266 — SIGNIFICANT CHANGE UP
GLUCOSE BLDC GLUCOMTR-MCNC: 133 MG/DL — HIGH (ref 70–99)
GLUCOSE BLDC GLUCOMTR-MCNC: 134 MG/DL — HIGH (ref 70–99)
GLUCOSE BLDC GLUCOMTR-MCNC: 190 MG/DL — HIGH (ref 70–99)
GLUCOSE BLDC GLUCOMTR-MCNC: 208 MG/DL — HIGH (ref 70–99)
GLUCOSE SERPL-MCNC: 155 MG/DL — HIGH (ref 70–99)
HCT VFR BLD CALC: 32.8 % — LOW (ref 39–50)
HGB BLD-MCNC: 10.9 G/DL — LOW (ref 13–17)
MAGNESIUM SERPL-MCNC: 2 MG/DL — SIGNIFICANT CHANGE UP (ref 1.6–2.6)
MCHC RBC-ENTMCNC: 30.2 PG — SIGNIFICANT CHANGE UP (ref 27–34)
MCHC RBC-ENTMCNC: 33.2 GM/DL — SIGNIFICANT CHANGE UP (ref 32–36)
MCV RBC AUTO: 90.9 FL — SIGNIFICANT CHANGE UP (ref 80–100)
NRBC # BLD: 0 /100 WBCS — SIGNIFICANT CHANGE UP (ref 0–0)
NRBC # FLD: 0 K/UL — SIGNIFICANT CHANGE UP (ref 0–0)
PHOSPHATE SERPL-MCNC: 3.7 MG/DL — SIGNIFICANT CHANGE UP (ref 2.5–4.5)
PLATELET # BLD AUTO: 175 K/UL — SIGNIFICANT CHANGE UP (ref 150–400)
POTASSIUM SERPL-MCNC: 4.1 MMOL/L — SIGNIFICANT CHANGE UP (ref 3.5–5.3)
POTASSIUM SERPL-SCNC: 4.1 MMOL/L — SIGNIFICANT CHANGE UP (ref 3.5–5.3)
RBC # BLD: 3.61 M/UL — LOW (ref 4.2–5.8)
RBC # FLD: 13 % — SIGNIFICANT CHANGE UP (ref 10.3–14.5)
SODIUM SERPL-SCNC: 135 MMOL/L — SIGNIFICANT CHANGE UP (ref 135–145)
WBC # BLD: 4.75 K/UL — SIGNIFICANT CHANGE UP (ref 3.8–10.5)
WBC # FLD AUTO: 4.75 K/UL — SIGNIFICANT CHANGE UP (ref 3.8–10.5)

## 2024-07-13 PROCEDURE — 99233 SBSQ HOSP IP/OBS HIGH 50: CPT

## 2024-07-13 RX ORDER — INSULIN LISPRO 100/ML
10 VIAL (ML) SUBCUTANEOUS
Refills: 0 | Status: DISCONTINUED | OUTPATIENT
Start: 2024-07-13 | End: 2024-07-15

## 2024-07-13 RX ORDER — INSULIN GLARGINE-YFGN 100 [IU]/ML
15 INJECTION, SOLUTION SUBCUTANEOUS EVERY MORNING
Refills: 0 | Status: DISCONTINUED | OUTPATIENT
Start: 2024-07-13 | End: 2024-07-15

## 2024-07-13 RX ORDER — INSULIN LISPRO 100/ML
10 VIAL (ML) SUBCUTANEOUS
Refills: 0 | Status: DISCONTINUED | OUTPATIENT
Start: 2024-07-13 | End: 2024-07-16

## 2024-07-13 RX ORDER — ALBUMIN HUMAN 25 %
250 INTRAVENOUS SOLUTION INTRAVENOUS
Refills: 0 | Status: COMPLETED | OUTPATIENT
Start: 2024-07-13 | End: 2024-07-13

## 2024-07-13 RX ADMIN — HEPARIN SODIUM 5000 UNIT(S): 1000 INJECTION, SOLUTION INTRAVENOUS; SUBCUTANEOUS at 06:16

## 2024-07-13 RX ADMIN — HEPARIN SODIUM 5000 UNIT(S): 1000 INJECTION, SOLUTION INTRAVENOUS; SUBCUTANEOUS at 17:10

## 2024-07-13 RX ADMIN — INSULIN GLARGINE-YFGN 15 UNIT(S): 100 INJECTION, SOLUTION SUBCUTANEOUS at 07:55

## 2024-07-13 RX ADMIN — PANTOPRAZOLE SODIUM 40 MILLIGRAM(S): 20 TABLET, DELAYED RELEASE ORAL at 06:16

## 2024-07-13 RX ADMIN — Medication 2: at 11:38

## 2024-07-13 RX ADMIN — Medication 10 UNIT(S): at 16:24

## 2024-07-13 RX ADMIN — AMLODIPINE BESYLATE 10 MILLIGRAM(S): 2.5 TABLET ORAL at 06:16

## 2024-07-13 RX ADMIN — Medication 30 MILLILITER(S): at 07:53

## 2024-07-13 RX ADMIN — Medication 4: at 07:53

## 2024-07-13 RX ADMIN — Medication 250 MILLILITER(S): at 09:38

## 2024-07-13 RX ADMIN — Medication 0.4 MILLIGRAM(S): at 22:29

## 2024-07-13 RX ADMIN — Medication 10 UNIT(S): at 07:53

## 2024-07-13 RX ADMIN — Medication 250 MILLILITER(S): at 07:56

## 2024-07-13 RX ADMIN — LOSARTAN POTASSIUM 50 MILLIGRAM(S): 50 TABLET, FILM COATED ORAL at 06:16

## 2024-07-13 RX ADMIN — Medication 10 UNIT(S): at 11:38

## 2024-07-13 NOTE — PROGRESS NOTE ADULT - SUBJECTIVE AND OBJECTIVE BOX
CHIEF COMPLAINT: FOLLOW UP IN ICU FOR PATIENT WITH ESOPHAGEAL CANCER    ISSUES:   Esophageal cancer  Hyperglycemia   Uncontrolled DM2 (HgbA1c 11)  Hyponatremia  HTN  HLD  BPH      INTERVAL EVENTS:     Sugars, BP better controlled  Tolerating oral diet  Planned for esophagectomy on 7/16/24    HISTORY:   Patient denies chest pain, cough, pleuritic pain, fever or dyspnea. Patient denies nausea, vomiting, diarrhea, melena, hematochezia.  Pt denies dysphagia or food getting stuck in chest.       PHYSICAL EXAM:   Gen: Comfortable, No acute distress  Eyes: Sclera white, Conjunctiva normal, Eyelids normal, Pupils symmetrical   ENT: Mucous membranes moist,  ,  ,    Neck: Trachea midline,  ,  ,  ,  ,  ,    CV: Rate regular, Rhythm regular,  ,  ,    Resp: Breath sounds clear, No accessory muscles use,  ,  ,    Abd: Soft, Non-distended, Non-tender,   ,  ,  ,    Skin: Warm, No peripheral edema of lower extremities,  ,    : No stallworth  Neuro: Moving all 4 extremities,    Psych: A&Ox3      ASSESSMENT AND PLAN:     NEURO:  No pain issues.          RESPIRATORY:  Stable on room air - Incentive spirometry. Chest PT and suctioning of secretions. Out of bed to chair and ambulate with assistance. Continuous pulse oximetry for support & to prevent decompensation.           CARDIOVASCULAR:  Hemodynamically stable - Not on pressors. Continue hemodynamic monitoring.  Telemetry (medical test) - Reviewed by me today independently. Normal sinus rhythm.    HTN - stable  - Increase amlodipine to 10mg qday  - Start losartan PO       RENAL:  Stable - Monitor IOs and electrolytes. Keep K above 4.0 and Mg above 2.0.     Hyponatremia - Secondary to hypovolemia. Worsening. IVF. Monitor Na. Avoid overcorrection. Goal to correct no more than 10 every 24 hours.        GASTROINTESTINAL:  GI prophylaxis not indicated  Zofran and Reglan IV PRN for nausea  Regular consistency diet        GERD - stable. Continue pantoprazole         HEMATOLOGIC:  No signs of active bleeding. Monitor Hgb in CBC in AM  DVT prophylaxis with heparin subQ           INFECTIOUS DISEASE:  All surgical sites appear clean. No signs of active infection. Will monitor for fever and leukocytosis.             ENDOCRINE:  Hyperglycemia in setting of uncontrolled DM2 (HgbA1c 11) – Worsening. Lactic acid normal. Beta hydroxybutyrate normal.   - Lantus qday  - Start Nutritional lispro  - Start Lispro sliding scale  - Carb control diet -- no outside food and no snacking in between meals.   - Monitor glucose fingersticks for goal 120-180. Insulin sliding scale.            ONCOLOGY:  Esophageal cancer - stable  - Awaiting esophagectomy  - Pre-op optimization of medical conditions prior to surgery  - Medicine consult for pre-op clearance           Pertinent clinical, laboratory, radiographic, hemodynamic, echocardiographic, respiratory data, microbiologic data and chart were reviewed by myself and analyzed frequently throughout the course of the day and night by myself.    Plan discussed at length with the CTICU staff and Attending CT Surgeon -   Dr Lewis Laureano.      Patient's status was discussed with patient at bedside.       	  I have spent 50 minutes of time with this patient monitoring hemodynamic status, respiratory status, and coordinating care in the ICU.   CHIEF COMPLAINT: FOLLOW UP IN ICU FOR PATIENT WITH ESOPHAGEAL CANCER    ISSUES:   Esophageal cancer  Hyperglycemia   Uncontrolled DM2 (HgbA1c 11)  Hyponatremia  HTN  HLD  BPH      INTERVAL EVENTS:     Sugars, BP better controlled  Tolerating oral diet  Planned for esophagectomy on 7/16/24    HISTORY:   Patient denies chest pain, cough, pleuritic pain, fever or dyspnea. Patient denies nausea, vomiting, diarrhea, melena, hematochezia.  Pt denies dysphagia or food getting stuck in chest.       PHYSICAL EXAM:   Gen: Comfortable, No acute distress  Eyes: Sclera white, Conjunctiva normal, Eyelids normal, Pupils symmetrical   ENT: Mucous membranes moist,  ,  ,    Neck: Trachea midline,  ,  ,  ,  ,  ,    CV: Rate regular, Rhythm regular,  ,  ,    Resp: Breath sounds clear, No accessory muscles use,  ,  ,    Abd: Soft, Non-distended, Non-tender,   ,  ,  ,    Skin: Warm, No peripheral edema of lower extremities,  ,    : No stallworth  Neuro: Moving all 4 extremities,    Psych: A&Ox3      ASSESSMENT AND PLAN:     NEURO:  No pain issues.          RESPIRATORY:  Stable on room air - Incentive spirometry. Chest PT and suctioning of secretions. Out of bed to chair and ambulate with assistance. Continuous pulse oximetry for support & to prevent decompensation.           CARDIOVASCULAR:  Hemodynamically stable - Not on pressors. Continue hemodynamic monitoring.  Telemetry (medical test) - Reviewed by me today independently. Normal sinus rhythm.    HTN - stable  - Amlodipine to 10mg qday  - Losartan PO       RENAL:  Stable - Monitor IOs and electrolytes. Keep K above 4.0 and Mg above 2.0.  Hyponatremia - Secondary to hypovolemia. Worsening. IVF. Monitor Na. Avoid overcorrection. Goal to correct no more than 10 every 24 hours.        GASTROINTESTINAL:  GI prophylaxis not indicated  Zofran and Reglan IV PRN for nausea  Regular consistency diet        GERD - stable. Continue pantoprazole         HEMATOLOGIC:  No signs of active bleeding. Monitor Hgb in CBC in AM  DVT prophylaxis with heparin subQ           INFECTIOUS DISEASE:  All surgical sites appear clean. No signs of active infection. Will monitor for fever and leukocytosis.             ENDOCRINE:  Hyperglycemia in setting of uncontrolled DM2 (HgbA1c 11) – Worsening. Lactic acid normal. Beta hydroxybutyrate normal.   - Lantus 15 units qday  -  Nutritional lispro 10 QAC  - Start Lispro sliding scale  - Carb control diet -- no outside food and no snacking in between meals.   - Monitor glucose fingersticks for goal 120-180. Insulin sliding scale.            ONCOLOGY:  Esophageal cancer - stable  - Awaiting esophagectomy  - Pre-op optimization of medical conditions prior to surgery  - Medicine consult for pre-op clearance           Pertinent clinical, laboratory, radiographic, hemodynamic, echocardiographic, respiratory data, microbiologic data and chart were reviewed by myself and analyzed frequently throughout the course of the day and night by myself.    Plan discussed at length with the CTICU staff and Attending CT Surgeon -   Dr. Jarret Poe    Patient's status was discussed with patient at bedside.    I have spent 50 minutes of time with this patient monitoring hemodynamic status, respiratory status, and coordinating care in the ICU.    _________________________  VITAL SIGNS:  Vital Signs Last 24 Hrs  T(C): 36.9 (13 Jul 2024 12:00), Max: 37 (13 Jul 2024 04:00)  T(F): 98.5 (13 Jul 2024 12:00), Max: 98.6 (13 Jul 2024 04:00)  HR: 104 (13 Jul 2024 12:00) (80 - 104)  BP: 143/86 (13 Jul 2024 12:00) (128/77 - 175/99)  BP(mean): 102 (13 Jul 2024 12:00) (93 - 120)  RR: 12 (13 Jul 2024 08:00) (12 - 24)  SpO2: 98% (13 Jul 2024 08:00) (95% - 99%)    Parameters below as of 13 Jul 2024 12:00  Patient On (Oxygen Delivery Method): room air      I/Os:   I&O's Detail    12 Jul 2024 07:01  -  13 Jul 2024 07:00  --------------------------------------------------------  IN:    Albumin 5%  - 250 mL: 500 mL    Oral Fluid: 360 mL    sodium chloride 0.9%: 570 mL  Total IN: 1430 mL    OUT:    Voided (mL): 2950 mL  Total OUT: 2950 mL    Total NET: -1520 mL      13 Jul 2024 07:01  -  13 Jul 2024 15:42  --------------------------------------------------------  IN:    Albumin 5%  - 250 mL: 500 mL    Oral Fluid: 480 mL    sodium chloride 0.9%: 270 mL  Total IN: 1250 mL    OUT:    Voided (mL): 700 mL  Total OUT: 700 mL    Total NET: 550 mL              MEDICATIONS:  MEDICATIONS  (STANDING):  amLODIPine   Tablet 10 milliGRAM(s) Oral daily  dextrose 50% Injectable 25 Gram(s) IV Push once  dextrose 50% Injectable 12.5 Gram(s) IV Push once  heparin   Injectable 5000 Unit(s) SubCutaneous every 12 hours  insulin glargine Injectable (LANTUS) 15 Unit(s) SubCutaneous every morning  insulin lispro (ADMELOG) corrective regimen sliding scale   SubCutaneous three times a day before meals  insulin lispro (ADMELOG) corrective regimen sliding scale   SubCutaneous at bedtime  insulin lispro Injectable (ADMELOG) 10 Unit(s) SubCutaneous before dinner  insulin lispro Injectable (ADMELOG) 10 Unit(s) SubCutaneous before breakfast  insulin lispro Injectable (ADMELOG) 10 Unit(s) SubCutaneous before lunch  losartan 50 milliGRAM(s) Oral daily  pantoprazole    Tablet 40 milliGRAM(s) Oral before breakfast  sodium chloride 0.9%. 1000 milliLiter(s) (30 mL/Hr) IV Continuous <Continuous>  tamsulosin 0.4 milliGRAM(s) Oral at bedtime    MEDICATIONS  (PRN):  hydrALAZINE Injectable 10 milliGRAM(s) IV Push every 4 hours PRN systolic BP greater than 170      LABS:  Laboratory data was independently reviewed by me today.                           10.9   4.75  )-----------( 175      ( 13 Jul 2024 02:44 )             32.8     07-13    135  |  103  |  18  ----------------------------<  155<H>  4.1   |  21<L>  |  0.68    Ca    8.9      13 Jul 2024 02:44  Phos  3.7     07-13  Mg     2.00     07-13    TPro  6.8  /  Alb  3.4  /  TBili  0.3  /  DBili  <0.2  /  AST  55<H>  /  ALT  46<H>  /  AlkPhos  204<H>  07-12    LIVER FUNCTIONS - ( 12 Jul 2024 11:15 )  Alb: 3.4 g/dL / Pro: 6.8 g/dL / ALK PHOS: 204 U/L / ALT: 46 U/L / AST: 55 U/L / GGT: x           PT/INR - ( 12 Jul 2024 11:15 )   PT: 8.8 sec;   INR: <0.90 ratio         PTT - ( 12 Jul 2024 11:15 )  PTT:36.5 sec    Urinalysis Basic - ( 13 Jul 2024 02:44 )    Color: x / Appearance: x / SG: x / pH: x  Gluc: 155 mg/dL / Ketone: x  / Bili: x / Urobili: x   Blood: x / Protein: x / Nitrite: x   Leuk Esterase: x / RBC: x / WBC x   Sq Epi: x / Non Sq Epi: x / Bacteria: x        RADIOLOGY:   Radiology images were independently reviewed by me today. Reports were reviewed by me today.    Xray Chest 1 View- PORTABLE-Urgent:   ACC: [id number] EXAM:  XR CHEST PORTABLE URGENT 1V   ORDERED BY: JOSUÉ ANDRADE     PROCEDURE DATE:  07/12/2024          INTERPRETATION:  CLINICAL INFORMATION: Esophageal cancer    Time of Exam:  July 12, 2024 at 2:13 PM    EXAM:  Frontal Chest    FINDINGS:  Chemotherapy port with tip in the SVC.  Both lungs are equally aerated and free of any focal abnormalities.  The heart is not enlarged and there is no effusion or pneumothorax.  The bones show no acute findings.      COMPARISON: October 9, 2020        IMPRESSION: Clear lungs    --- End of Report ---            JUAREZ BURGOS MD; Attending Radiologist  This document has been electronically signed. Jul 12 2024  4:56PM (07-12-24 @ 14:33)

## 2024-07-13 NOTE — CONSULT NOTE ADULT - SUBJECTIVE AND OBJECTIVE BOX
C A R D I O L O G Y  *********************    DATE OF SERVICE: 07-13-24    HISTORY OF PRESENT ILLNESS: HPI:Pt is a 59 y.o. man with history of HTN,, DLD, DM,  GEJ adenocarcinoma (T3N0, stage 2A) s/p neoadjuvant chemotherapy admitted for optimization prior to planned [facility] Esophagectomy on tuesday. Recently had a nuclear stress test in our office for evaluation of preoperative cardiac risk assessment prior to esophageal cancer surgery scheduled on 07/16/2024. The patient denies any chest pain, shortness ofbreath, or anginal symptoms. He has no palpitations, dizziness, or syncope    HOME Medications:  amlodipine 5 mg tablet 1 TABLET DAILY  atorvastatin 20 mg tablet 1 TABLET DAILY  Jardiance 25 mg tablet 1 TABLET DAILY  losartan 50 mg-hydrochlorothiazide 12.5 mg tablet 1 TABLET DAILY  metformin 500 mg tablet 1 TABLET BID  tamsulosin 0.4 mg capsule 1 CAPSULE DAILY        PAST MEDICAL & SURGICAL HISTORY:  HLD (hyperlipidemia)  Insulin dependent type 2 diabetes mellitus  BPH (benign prostatic hyperplasia)  HTN (hypertension)  Gastroesophageal cancer  Port-A-Cath in place      Hospital MEDICATIONS:  amLODIPine   Tablet 10 milliGRAM(s) Oral daily  dextrose 50% Injectable 25 Gram(s) IV Push once  dextrose 50% Injectable 12.5 Gram(s) IV Push once  heparin   Injectable 5000 Unit(s) SubCutaneous every 12 hours  insulin glargine Injectable (LANTUS) 15 Unit(s) SubCutaneous every morning  insulin lispro (ADMELOG) corrective regimen sliding scale   SubCutaneous at bedtime  insulin lispro (ADMELOG) corrective regimen sliding scale   SubCutaneous three times a day before meals  insulin lispro Injectable (ADMELOG) 10 Unit(s) SubCutaneous before lunch  insulin lispro Injectable (ADMELOG) 10 Unit(s) SubCutaneous before dinner  insulin lispro Injectable (ADMELOG) 10 Unit(s) SubCutaneous before breakfast  losartan 50 milliGRAM(s) Oral daily  pantoprazole    Tablet 40 milliGRAM(s) Oral before breakfast  sodium chloride 0.9%. 1000 milliLiter(s) (30 mL/Hr) IV Continuous <Continuous>  tamsulosin 0.4 milliGRAM(s) Oral at bedtime      Allergies: No Known Allergies  Intolerances        FAMILY HISTORY: Non-contributary for premature coronary disease or sudden cardiac death    SOCIAL HISTORY:    [X ] Non-smoker  [ ] Smoker  [ ] Alcohol    FLU VACCINE THIS YEAR STARTS IN AUGUST:  [ ] Yes    [ ] No    IF OVER 65 HAVE YOU EVER HAD A PNA VACCINE:  [ ] Yes    [ ] No       [ ] N/A      REVIEW OF SYSTEMS:  [ ]chest pain  [  ]shortness of breath  [  ]palpitations  [  ]syncope  [ ]near syncope [ ]upper extremity weakness   [ ] lower extremity weakness  [  ]diplopia  [  ]altered mental status   [  ]fevers  [ ]chills [ ]nausea  [ ]vomiting  [  ]dysphagia    [ ]abdominal pain  [ ]melena  [ ]BRBPR    [  ]epistaxis  [  ]rash    [ ]lower extremity edema    [X] All others negative	  [ ] Unable to obtain      LABS:	 	    CARDIAC MARKERS:                  10.9   4.75  )-----------( 175      ( 13 Jul 2024 02:44 )             32.8     Hb Trend: 10.9<--    07-13    135  |  103  |  18  ----------------------------<  155<H>  4.1   |  21<L>  |  0.68    Ca    8.9      13 Jul 2024 02:44  Phos  3.7     07-13  Mg     2.00     07-13    TPro  6.8  /  Alb  3.4  /  TBili  0.3  /  DBili  <0.2  /  AST  55<H>  /  ALT  46<H>  /  AlkPhos  204<H>  07-12    Creatinine Trend: 0.68<--, 0.79<--      PHYSICAL EXAM:  T(C): 36.9 (07-13-24 @ 08:00), Max: 37 (07-13-24 @ 04:00)  HR: 98 (07-13-24 @ 08:00) (80 - 103)  BP: 152/92 (07-13-24 @ 08:00) (128/77 - 175/99)  RR: 12 (07-13-24 @ 08:00) (11 - 24)  SpO2: 98% (07-13-24 @ 08:00) (95% - 99%)  Wt(kg): --   BMI (kg/m2): 20.4 (07-12-24 @ 10:49)  I&O's Summary    12 Jul 2024 07:01  -  13 Jul 2024 07:00  --------------------------------------------------------  IN: 1430 mL / OUT: 2950 mL / NET: -1520 mL    13 Jul 2024 07:01  -  13 Jul 2024 11:55  --------------------------------------------------------  IN: 1130 mL / OUT: 300 mL / NET: 830 mL        Gen: NAD  HEENT:  (-)icterus (-)pallor  CV: N S1 S2 1/6 CATHY (+)2 Pulses B/l  Resp:  Clear to auscultation B/L, normal effort  GI: (+) BS Soft, NT, ND  Lymph:  (-)Edema, (-)obvious lymphadenopathy  Skin: Warm to touch, Normal turgor  Psych: Appropriate mood and affect      TELEMETRY: 	  NSR    ECG:  	NSR      TTE 07/2024  Findings:  1. Normal left ventricular size and function.  Mild diastolic dysfunction.  2. Normal left atrial size  3. Right atrial cavity is normal in size.  4. Normal right ventricular size and function.  5. Normal trileaflet aortic valve opening.  6. Normal mitral valve opening.  7. Normal appearing tricuspid valve with mild tricuspid  regurgitation.  8. Pulmonic valve is grossly normal, yet poorly visualized  with no doppler evidence for pulmonic stenosis.  9. No evidence of significant pericardial effusion.  10. The aortic root is normal.  11. Normal pulmonary artery.  12. IVC is normal with respiratory variation.  FULL STUDY DONE INCLUDING M-MODE RECORDING,  SPECTRAL DOPPLER AND    Stress 07/2024  Normal myocardial perfusion SPECT images No evidence of stress induced ischemia or infarction.  Normal left ventricular size and function.  Calculated EF is: 68%    ASSESSMENT/PLAN: 	Pt is a 59 y.o. man with history of HTN,, DLD, DM,  GEJ adenocarcinoma (T3N0, stage 2A) s/p neoadjuvant chemotherapy admitted for optimization prior to planned [place] Esophagectomy on tuesday. Recently had a nuclear stress test in our office for evaluation of preoperative cardiac risk assessment prior to esophageal cancer surgery scheduled on 07/16/2024. The patient denies any chest pain, shortness ofbreath, or anginal symptoms. He has no palpitations, dizziness, or syncope    Pre-OP/HTN  - EKG from office with no q waves, no chest pain,  euvolemic on exam and no severely stenotic valvular disease on recent TTE  - Patient low risk for esophageal surgery for MACEE can proceed with no further  CV w/u  - c/w Amlodipine and Losartan    Mart Mason MD  Pager: [phone]

## 2024-07-14 LAB
ANION GAP SERPL CALC-SCNC: 12 MMOL/L — SIGNIFICANT CHANGE UP (ref 7–14)
BUN SERPL-MCNC: 19 MG/DL — SIGNIFICANT CHANGE UP (ref 7–23)
CALCIUM SERPL-MCNC: 9 MG/DL — SIGNIFICANT CHANGE UP (ref 8.4–10.5)
CHLORIDE SERPL-SCNC: 105 MMOL/L — SIGNIFICANT CHANGE UP (ref 98–107)
CO2 SERPL-SCNC: 21 MMOL/L — LOW (ref 22–31)
CREAT SERPL-MCNC: 0.71 MG/DL — SIGNIFICANT CHANGE UP (ref 0.5–1.3)
EGFR: 104 ML/MIN/1.73M2 — SIGNIFICANT CHANGE UP
GLUCOSE BLDC GLUCOMTR-MCNC: 131 MG/DL — HIGH (ref 70–99)
GLUCOSE BLDC GLUCOMTR-MCNC: 146 MG/DL — HIGH (ref 70–99)
GLUCOSE BLDC GLUCOMTR-MCNC: 213 MG/DL — HIGH (ref 70–99)
GLUCOSE BLDC GLUCOMTR-MCNC: 229 MG/DL — HIGH (ref 70–99)
GLUCOSE SERPL-MCNC: 119 MG/DL — HIGH (ref 70–99)
HCT VFR BLD CALC: 33.2 % — LOW (ref 39–50)
HGB BLD-MCNC: 11.1 G/DL — LOW (ref 13–17)
MAGNESIUM SERPL-MCNC: 2.2 MG/DL — SIGNIFICANT CHANGE UP (ref 1.6–2.6)
MCHC RBC-ENTMCNC: 31.2 PG — SIGNIFICANT CHANGE UP (ref 27–34)
MCHC RBC-ENTMCNC: 33.4 GM/DL — SIGNIFICANT CHANGE UP (ref 32–36)
MCV RBC AUTO: 93.3 FL — SIGNIFICANT CHANGE UP (ref 80–100)
NRBC # BLD: 0 /100 WBCS — SIGNIFICANT CHANGE UP (ref 0–0)
NRBC # FLD: 0 K/UL — SIGNIFICANT CHANGE UP (ref 0–0)
PHOSPHATE SERPL-MCNC: 4.1 MG/DL — SIGNIFICANT CHANGE UP (ref 2.5–4.5)
PLATELET # BLD AUTO: 176 K/UL — SIGNIFICANT CHANGE UP (ref 150–400)
POTASSIUM SERPL-MCNC: 4 MMOL/L — SIGNIFICANT CHANGE UP (ref 3.5–5.3)
POTASSIUM SERPL-SCNC: 4 MMOL/L — SIGNIFICANT CHANGE UP (ref 3.5–5.3)
RBC # BLD: 3.56 M/UL — LOW (ref 4.2–5.8)
RBC # FLD: 13 % — SIGNIFICANT CHANGE UP (ref 10.3–14.5)
SODIUM SERPL-SCNC: 138 MMOL/L — SIGNIFICANT CHANGE UP (ref 135–145)
WBC # BLD: 5.04 K/UL — SIGNIFICANT CHANGE UP (ref 3.8–10.5)
WBC # FLD AUTO: 5.04 K/UL — SIGNIFICANT CHANGE UP (ref 3.8–10.5)

## 2024-07-14 PROCEDURE — 99231 SBSQ HOSP IP/OBS SF/LOW 25: CPT

## 2024-07-14 RX ADMIN — INSULIN GLARGINE-YFGN 15 UNIT(S): 100 INJECTION, SOLUTION SUBCUTANEOUS at 07:58

## 2024-07-14 RX ADMIN — HEPARIN SODIUM 5000 UNIT(S): 1000 INJECTION, SOLUTION INTRAVENOUS; SUBCUTANEOUS at 17:47

## 2024-07-14 RX ADMIN — Medication 10 UNIT(S): at 17:46

## 2024-07-14 RX ADMIN — Medication 0.4 MILLIGRAM(S): at 22:39

## 2024-07-14 RX ADMIN — PANTOPRAZOLE SODIUM 40 MILLIGRAM(S): 20 TABLET, DELAYED RELEASE ORAL at 05:03

## 2024-07-14 RX ADMIN — LOSARTAN POTASSIUM 50 MILLIGRAM(S): 50 TABLET, FILM COATED ORAL at 05:03

## 2024-07-14 RX ADMIN — AMLODIPINE BESYLATE 10 MILLIGRAM(S): 2.5 TABLET ORAL at 05:03

## 2024-07-14 RX ADMIN — Medication 10 UNIT(S): at 11:51

## 2024-07-14 RX ADMIN — HEPARIN SODIUM 5000 UNIT(S): 1000 INJECTION, SOLUTION INTRAVENOUS; SUBCUTANEOUS at 05:03

## 2024-07-14 RX ADMIN — Medication 4: at 17:46

## 2024-07-14 RX ADMIN — Medication 10 UNIT(S): at 07:57

## 2024-07-14 NOTE — PROGRESS NOTE ADULT - SUBJECTIVE AND OBJECTIVE BOX
Subjective: no acute complaints  seen and examined w Dr Poe    Vital Signs:  Vital Signs Last 24 Hrs  T(C): 36.9 (07-14-24 @ 08:58), Max: 37 (07-13-24 @ 16:00)  T(F): 98.5 (07-14-24 @ 08:58), Max: 98.6 (07-13-24 @ 16:00)  HR: 100 (07-14-24 @ 08:58) (88 - 104)  BP: 105/65 (07-14-24 @ 08:58) (105/65 - 152/87)  RR: 18 (07-14-24 @ 08:58) (13 - 18)  SpO2: 100% (07-14-24 @ 08:58) (98% - 100%) on (O2)    PE  Telemetry/Alarms: SR  General: awake and alert NAD  Neurology: A&Ox3, nonfocal, ALFORD x 4  Respiratory: CTA B/L  CV: RRR, S1S2, no murmurs, rubs or gallops  Abdominal: Soft, NT, ND +BS, Last BM  Extremities: No edema, + peripheral pulses      Relevant labs, radiology and Medications reviewed                        11.1   5.04  )-----------( 176      ( 14 Jul 2024 06:08 )             33.2     07-14    138  |  105  |  19  ----------------------------<  119<H>  4.0   |  21<L>  |  0.71    Ca    9.0      14 Jul 2024 07:09  Phos  4.1     07-14  Mg     2.20     07-14    TPro  6.8  /  Alb  3.4  /  TBili  0.3  /  DBili  <0.2  /  AST  55<H>  /  ALT  46<H>  /  AlkPhos  204<H>  07-12    PT/INR - ( 12 Jul 2024 11:15 )   PT: 8.8 sec;   INR: <0.90 ratio         PTT - ( 12 Jul 2024 11:15 )  PTT:36.5 sec  MEDICATIONS  (STANDING):  amLODIPine   Tablet 10 milliGRAM(s) Oral daily  dextrose 50% Injectable 25 Gram(s) IV Push once  dextrose 50% Injectable 12.5 Gram(s) IV Push once  heparin   Injectable 5000 Unit(s) SubCutaneous every 12 hours  insulin glargine Injectable (LANTUS) 15 Unit(s) SubCutaneous every morning  insulin lispro (ADMELOG) corrective regimen sliding scale   SubCutaneous at bedtime  insulin lispro (ADMELOG) corrective regimen sliding scale   SubCutaneous three times a day before meals  insulin lispro Injectable (ADMELOG) 10 Unit(s) SubCutaneous before lunch  insulin lispro Injectable (ADMELOG) 10 Unit(s) SubCutaneous before dinner  insulin lispro Injectable (ADMELOG) 10 Unit(s) SubCutaneous before breakfast  losartan 50 milliGRAM(s) Oral daily  pantoprazole    Tablet 40 milliGRAM(s) Oral before breakfast  tamsulosin 0.4 milliGRAM(s) Oral at bedtime    MEDICATIONS  (PRN):  hydrALAZINE Injectable 10 milliGRAM(s) IV Push every 4 hours PRN systolic BP greater than 170    Pertinent Physical Exam  I&O's Summary    13 Jul 2024 07:01  -  14 Jul 2024 07:00  --------------------------------------------------------  IN: 2060 mL / OUT: 1800 mL / NET: 260 mL        Social History:    Substance Use History:  · Substance Use	caffeine  denies any illicit drugs  · Caffeine Type	coffee; tea  · Caffeine Amount/Frequency	1-2 cups/cans per day     Tobacco Usage:  · Tobacco Usage: Never smoker      PAST MEDICAL & SURGICAL HISTORY:  HLD (hyperlipidemia)      Insulin dependent type 2 diabetes mellitus      BPH (benign prostatic hyperplasia)      HTN (hypertension)      Gastroesophageal cancer      Gastroesophageal cancer      Port-A-Cath in place      ASSESSMENT  Linwood Villanueva is a 59 y.o. man with history of GEJ adenocarcinoma (T3N0, stage 2A) s/p neoadjuvant chemotherapy. Patient admitted for optimization prior to planned [facility] Esophagectomy.  sues:    PLAN  preop optimization  glucose control  plan for OR Tuesday

## 2024-07-14 NOTE — DIETITIAN INITIAL EVALUATION ADULT - ORAL INTAKE PTA/DIET HISTORY
Patient reports his appetite has decreased since his chemo started, now pt started with RT which resulting his appetite worsened for 2 wks. Pt reports drinking Glucerna 1x daily. Pt endorses misses insulin injection.

## 2024-07-14 NOTE — DIETITIAN INITIAL EVALUATION ADULT - PERTINENT LABORATORY DATA
07-14    138  |  105  |  19  ----------------------------<  119<H>  4.0   |  21<L>  |  0.71    Ca    9.0      14 Jul 2024 07:09  Phos  4.1     07-14  Mg     2.20     07-14    POCT Blood Glucose.: 146 mg/dL (07-14-24 @ 11:48)  A1C with Estimated Average Glucose Result: 10.9 % (07-13-24 @ 02:44)  A1C with Estimated Average Glucose Result: 12.8 % (01-01-24 @ 06:00)

## 2024-07-14 NOTE — DIETITIAN INITIAL EVALUATION ADULT - OTHER INFO
PEr chart review, Pt is a 59 y.o. man with history of HTN,, DLD, DM,  GEJ adenocarcinoma (T3N0, stage 2A) s/p neoadjuvant chemotherapy admitted for optimization prior to planned [facility]-Bro[facility] Esophagectomy on tuesday. Recently had a nuclear stress test in our office for evaluation of preoperative cardiac risk assessment prior to esophageal cancer surgery scheduled on 07/16/2024. The patient denies any chest pain, shortness ofbreath, or anginal symptoms. He has no palpitations, dizziness, or syncope.     Patient seen at bedside. Reports his appetite has improved in hospital. States " my appetite has returned". Able to consume 100% of his breakfast this morning. Pt's diet supplementing with Glucerna 3x daily (660kcals, 30g protein), reported good intake. Denies any GI distress (nausea/vomiting/diarrhea/constipation.) Last BM today as per pt. Pt's A1C noted 10.9% and POCT 131-208 in hospital currently on Consistent Carbohydrate and on insulin regimen. Provided pt with handout Carbohydrate Counting for People with Diabetes. Discussed carbohydrate sources, carbohydrate portions, protein sources, mixed meals, and nutrition label reading. Stressed importance of balanced meals with adequate protein and fiber. Pt was informed of current A1c, goal A1c, and goal fingerstick range. RD to remain available for further nutritional interventions as indicated.

## 2024-07-14 NOTE — PROGRESS NOTE ADULT - SUBJECTIVE AND OBJECTIVE BOX
pt seen and examined, no complaints, ROS - .       amLODIPine   Tablet 10 milliGRAM(s) Oral daily  dextrose 50% Injectable 25 Gram(s) IV Push once  dextrose 50% Injectable 12.5 Gram(s) IV Push once  heparin   Injectable 5000 Unit(s) SubCutaneous every 12 hours  hydrALAZINE Injectable 10 milliGRAM(s) IV Push every 4 hours PRN  insulin glargine Injectable (LANTUS) 15 Unit(s) SubCutaneous every morning  insulin lispro (ADMELOG) corrective regimen sliding scale   SubCutaneous three times a day before meals  insulin lispro (ADMELOG) corrective regimen sliding scale   SubCutaneous at bedtime  insulin lispro Injectable (ADMELOG) 10 Unit(s) SubCutaneous before dinner  insulin lispro Injectable (ADMELOG) 10 Unit(s) SubCutaneous before breakfast  insulin lispro Injectable (ADMELOG) 10 Unit(s) SubCutaneous before lunch  losartan 50 milliGRAM(s) Oral daily  pantoprazole    Tablet 40 milliGRAM(s) Oral before breakfast  tamsulosin 0.4 milliGRAM(s) Oral at bedtime                            11.1   5.04  )-----------( 176      ( 14 Jul 2024 06:08 )             33.2       Hemoglobin: 11.1 g/dL (07-14 @ 06:08)  Hemoglobin: 10.9 g/dL (07-13 @ 02:44)  Hemoglobin: 11.4 g/dL (07-12 @ 11:15)      07-14    138  |  105  |  19  ----------------------------<  119<H>  4.0   |  21<L>  |  0.71    Ca    9.0      14 Jul 2024 07:09  Phos  4.1     07-14  Mg     2.20     07-14    TPro  6.8  /  Alb  3.4  /  TBili  0.3  /  DBili  <0.2  /  AST  55<H>  /  ALT  46<H>  /  AlkPhos  204<H>  07-12    Creatinine Trend: 0.71<--, 0.68<--, 0.79<--    COAGS:           T(C): 36.9 (07-14-24 @ 08:58), Max: 37 (07-13-24 @ 16:00)  HR: 100 (07-14-24 @ 08:58) (88 - 104)  BP: 105/65 (07-14-24 @ 08:58) (105/65 - 152/87)  RR: 18 (07-14-24 @ 08:58) (13 - 18)  SpO2: 100% (07-14-24 @ 08:58) (98% - 100%)  Wt(kg): --    I&O's Summary    13 Jul 2024 07:01  -  14 Jul 2024 07:00  --------------------------------------------------------  IN: 2060 mL / OUT: 1800 mL / NET: 260 mL      Gen: NAD  HEENT:  (-)icterus (-)pallor  CV: N S1 S2 1/6 CATHY (+)2 Pulses B/l  Resp:  Clear to auscultation B/L, normal effort  GI: (+) BS Soft, NT, ND  Lymph:  (-)Edema, (-)obvious lymphadenopathy  Skin: Warm to touch, Normal turgor  Psych: Appropriate mood and affect      TELEMETRY: 	  NSR    ECG:  	NSR      TTE 07/2024  Findings:  1. Normal left ventricular size and function.  Mild diastolic dysfunction.  2. Normal left atrial size  3. Right atrial cavity is normal in size.  4. Normal right ventricular size and function.  5. Normal trileaflet aortic valve opening.  6. Normal mitral valve opening.  7. Normal appearing tricuspid valve with mild tricuspid  regurgitation.  8. Pulmonic valve is grossly normal, yet poorly visualized  with no doppler evidence for pulmonic stenosis.  9. No evidence of significant pericardial effusion.  10. The aortic root is normal.  11. Normal pulmonary artery.  12. IVC is normal with respiratory variation.  FULL STUDY DONE INCLUDING M-MODE RECORDING,  SPECTRAL DOPPLER AND    Stress 07/2024  Normal myocardial perfusion SPECT images No evidence of stress induced ischemia or infarction.  Normal left ventricular size and function.  Calculated EF is: 68%    ASSESSMENT/PLAN: 	Pt is a 59 y.o. man with history of HTN,, DLD, DM,  GEJ adenocarcinoma (T3N0, stage 2A) s/p neoadjuvant chemotherapy admitted for optimization prior to planned Berne-Broderick Esophagectomy on tuesday. Recently had a nuclear stress test in our office for evaluation of preoperative cardiac risk assessment prior to esophageal cancer surgery scheduled on 07/16/2024. The patient denies any chest pain, shortness ofbreath, or anginal symptoms. He has no palpitations, dizziness, or syncope    Pre-OP/HTN  - EKG from office with no q waves, no chest pain,  euvolemic on exam and no severely stenotic valvular disease on recent TTE  - Patient low risk for esophageal surgery for MACEE can proceed with no further  CV w/u  - c/w Amlodipine and Losartan  - c/w hydralizine prn

## 2024-07-14 NOTE — DIETITIAN INITIAL EVALUATION ADULT - NS FNS DIET ORDER
Diet, Consistent Carbohydrate Renal/No Snacks:   Supplement Feeding Modality:  Oral  Glucerna Shake Cans or Servings Per Day:  3       Frequency:  Three Times a day (07-13-24 @ 09:37) [Active]

## 2024-07-14 NOTE — DIETITIAN INITIAL EVALUATION ADULT - ADD RECOMMEND
1) Recommend continue with current diet, which remains appropriate at this time.   2) Encourage PO intake and honor food preferences within therapeutic diet restrictions as able.   3) Monitor PO intake, Labs, weights, BMs, and skin integrity.   4) RD to remain available for further nutritional interventions as indicated.

## 2024-07-14 NOTE — PROGRESS NOTE ADULT - NS ATTEND AMEND GEN_ALL_CORE FT
Patient seen and examined. Agree with plan as detailed in PA/NP Note.     Mart Mason MD  Pager: [phone]

## 2024-07-14 NOTE — DIETITIAN INITIAL EVALUATION ADULT - PERTINENT MEDS FT
MEDICATIONS  (STANDING):  amLODIPine   Tablet 10 milliGRAM(s) Oral daily  dextrose 50% Injectable 25 Gram(s) IV Push once  dextrose 50% Injectable 12.5 Gram(s) IV Push once  heparin   Injectable 5000 Unit(s) SubCutaneous every 12 hours  insulin glargine Injectable (LANTUS) 15 Unit(s) SubCutaneous every morning  insulin lispro (ADMELOG) corrective regimen sliding scale   SubCutaneous three times a day before meals  insulin lispro (ADMELOG) corrective regimen sliding scale   SubCutaneous at bedtime  insulin lispro Injectable (ADMELOG) 10 Unit(s) SubCutaneous before dinner  insulin lispro Injectable (ADMELOG) 10 Unit(s) SubCutaneous before breakfast  insulin lispro Injectable (ADMELOG) 10 Unit(s) SubCutaneous before lunch  losartan 50 milliGRAM(s) Oral daily  pantoprazole    Tablet 40 milliGRAM(s) Oral before breakfast  tamsulosin 0.4 milliGRAM(s) Oral at bedtime    MEDICATIONS  (PRN):  hydrALAZINE Injectable 10 milliGRAM(s) IV Push every 4 hours PRN systolic BP greater than 170

## 2024-07-15 ENCOUNTER — TRANSCRIPTION ENCOUNTER (OUTPATIENT)
Age: 59
End: 2024-07-15

## 2024-07-15 DIAGNOSIS — I10 ESSENTIAL (PRIMARY) HYPERTENSION: ICD-10-CM

## 2024-07-15 DIAGNOSIS — E78.5 HYPERLIPIDEMIA, UNSPECIFIED: ICD-10-CM

## 2024-07-15 DIAGNOSIS — E11.9 TYPE 2 DIABETES MELLITUS WITHOUT COMPLICATIONS: ICD-10-CM

## 2024-07-15 LAB
ADD ON TEST-SPECIMEN IN LAB: SIGNIFICANT CHANGE UP
ALBUMIN SERPL ELPH-MCNC: 3.4 G/DL — SIGNIFICANT CHANGE UP (ref 3.3–5)
ALP SERPL-CCNC: 158 U/L — HIGH (ref 40–120)
ALT FLD-CCNC: 38 U/L — SIGNIFICANT CHANGE UP (ref 4–41)
ANION GAP SERPL CALC-SCNC: 11 MMOL/L — SIGNIFICANT CHANGE UP (ref 7–14)
APTT BLD: 34.4 SEC — SIGNIFICANT CHANGE UP (ref 24.5–35.6)
AST SERPL-CCNC: 58 U/L — HIGH (ref 4–40)
BILIRUB DIRECT SERPL-MCNC: <0.2 MG/DL — SIGNIFICANT CHANGE UP (ref 0–0.3)
BILIRUB INDIRECT FLD-MCNC: >0.1 MG/DL — SIGNIFICANT CHANGE UP (ref 0–1)
BILIRUB SERPL-MCNC: 0.3 MG/DL — SIGNIFICANT CHANGE UP (ref 0.2–1.2)
BLD GP AB SCN SERPL QL: NEGATIVE — SIGNIFICANT CHANGE UP
BUN SERPL-MCNC: 28 MG/DL — HIGH (ref 7–23)
CALCIUM SERPL-MCNC: 9 MG/DL — SIGNIFICANT CHANGE UP (ref 8.4–10.5)
CHLORIDE SERPL-SCNC: 103 MMOL/L — SIGNIFICANT CHANGE UP (ref 98–107)
CO2 SERPL-SCNC: 21 MMOL/L — LOW (ref 22–31)
CREAT SERPL-MCNC: 1.02 MG/DL — SIGNIFICANT CHANGE UP (ref 0.5–1.3)
EGFR: 84 ML/MIN/1.73M2 — SIGNIFICANT CHANGE UP
GLUCOSE BLDC GLUCOMTR-MCNC: 101 MG/DL — HIGH (ref 70–99)
GLUCOSE BLDC GLUCOMTR-MCNC: 149 MG/DL — HIGH (ref 70–99)
GLUCOSE BLDC GLUCOMTR-MCNC: 154 MG/DL — HIGH (ref 70–99)
GLUCOSE BLDC GLUCOMTR-MCNC: 384 MG/DL — HIGH (ref 70–99)
GLUCOSE SERPL-MCNC: 320 MG/DL — HIGH (ref 70–99)
HCT VFR BLD CALC: 32.6 % — LOW (ref 39–50)
HGB BLD-MCNC: 11 G/DL — LOW (ref 13–17)
INR BLD: <0.9 RATIO — LOW (ref 0.85–1.18)
MAGNESIUM SERPL-MCNC: 2.2 MG/DL — SIGNIFICANT CHANGE UP (ref 1.6–2.6)
MCHC RBC-ENTMCNC: 30.7 PG — SIGNIFICANT CHANGE UP (ref 27–34)
MCHC RBC-ENTMCNC: 33.7 GM/DL — SIGNIFICANT CHANGE UP (ref 32–36)
MCV RBC AUTO: 91.1 FL — SIGNIFICANT CHANGE UP (ref 80–100)
NRBC # BLD: 0 /100 WBCS — SIGNIFICANT CHANGE UP (ref 0–0)
NRBC # FLD: 0 K/UL — SIGNIFICANT CHANGE UP (ref 0–0)
PHOSPHATE SERPL-MCNC: 4.4 MG/DL — SIGNIFICANT CHANGE UP (ref 2.5–4.5)
PLATELET # BLD AUTO: 166 K/UL — SIGNIFICANT CHANGE UP (ref 150–400)
POTASSIUM SERPL-MCNC: 4.7 MMOL/L — SIGNIFICANT CHANGE UP (ref 3.5–5.3)
POTASSIUM SERPL-SCNC: 4.7 MMOL/L — SIGNIFICANT CHANGE UP (ref 3.5–5.3)
PROT SERPL-MCNC: 6.3 G/DL — SIGNIFICANT CHANGE UP (ref 6–8.3)
PROTHROM AB SERPL-ACNC: 9.4 SEC — LOW (ref 9.5–13)
RBC # BLD: 3.58 M/UL — LOW (ref 4.2–5.8)
RBC # FLD: 13.2 % — SIGNIFICANT CHANGE UP (ref 10.3–14.5)
RH IG SCN BLD-IMP: POSITIVE — SIGNIFICANT CHANGE UP
SODIUM SERPL-SCNC: 135 MMOL/L — SIGNIFICANT CHANGE UP (ref 135–145)
WBC # BLD: 3.64 K/UL — LOW (ref 3.8–10.5)
WBC # FLD AUTO: 3.64 K/UL — LOW (ref 3.8–10.5)

## 2024-07-15 PROCEDURE — 99254 IP/OBS CNSLTJ NEW/EST MOD 60: CPT | Mod: GC

## 2024-07-15 RX ORDER — INSULIN GLARGINE-YFGN 100 [IU]/ML
15 INJECTION, SOLUTION SUBCUTANEOUS
Refills: 0 | Status: DISCONTINUED | OUTPATIENT
Start: 2024-07-16 | End: 2024-07-16

## 2024-07-15 RX ORDER — INSULIN LISPRO 100/ML
VIAL (ML) SUBCUTANEOUS
Refills: 0 | Status: DISCONTINUED | OUTPATIENT
Start: 2024-07-15 | End: 2024-07-16

## 2024-07-15 RX ORDER — INSULIN LISPRO 100/ML
VIAL (ML) SUBCUTANEOUS AT BEDTIME
Refills: 0 | Status: DISCONTINUED | OUTPATIENT
Start: 2024-07-15 | End: 2024-07-16

## 2024-07-15 RX ORDER — BACTERIOSTATIC SODIUM CHLORIDE 0.9 %
1000 VIAL (ML) INJECTION
Refills: 0 | Status: DISCONTINUED | OUTPATIENT
Start: 2024-07-15 | End: 2024-07-16

## 2024-07-15 RX ADMIN — PANTOPRAZOLE SODIUM 40 MILLIGRAM(S): 20 TABLET, DELAYED RELEASE ORAL at 06:18

## 2024-07-15 RX ADMIN — HEPARIN SODIUM 5000 UNIT(S): 1000 INJECTION, SOLUTION INTRAVENOUS; SUBCUTANEOUS at 06:17

## 2024-07-15 RX ADMIN — HEPARIN SODIUM 5000 UNIT(S): 1000 INJECTION, SOLUTION INTRAVENOUS; SUBCUTANEOUS at 17:26

## 2024-07-15 RX ADMIN — Medication 10 UNIT(S): at 17:25

## 2024-07-15 RX ADMIN — AMLODIPINE BESYLATE 10 MILLIGRAM(S): 2.5 TABLET ORAL at 06:18

## 2024-07-15 RX ADMIN — Medication 10 UNIT(S): at 08:00

## 2024-07-15 RX ADMIN — INSULIN GLARGINE-YFGN 15 UNIT(S): 100 INJECTION, SOLUTION SUBCUTANEOUS at 08:01

## 2024-07-15 RX ADMIN — Medication 0.4 MILLIGRAM(S): at 22:07

## 2024-07-15 RX ADMIN — LOSARTAN POTASSIUM 50 MILLIGRAM(S): 50 TABLET, FILM COATED ORAL at 06:18

## 2024-07-15 RX ADMIN — Medication 10: at 08:00

## 2024-07-15 RX ADMIN — Medication 10 UNIT(S): at 12:07

## 2024-07-15 NOTE — CONSULT NOTE ADULT - ASSESSMENT
The patient is a 59y Male with PMH of T2DM, HTN, HLD, GE junction cancer here for esophagectomy.  Endocrinology consulted for T2DM    #Uncontrolled Type 2 Diabetes Mellitus   - Follows with: Dr. Nolasco  - A1C with Estimated Average Glucose Result: 10.9 % (07-13-24)  - home regimen: Semglee 18 units, Novolog 6 units  - eGFR: 84 mL/min/1.73m2 (07-15-24)  - Weight (kg): 54 (07-12-24)  - glucose 384-101, no evidence of DKA on labs      INPATIENT PLAN:  - Recommend increasing Lantus to *** units QHS   - Recommend continuing Admelog 10 units TID before meals (HOLD if NPO or not eating)  - Recommend Low dose admelog correction scale TIDQAC and separate low dose scale QHS  - Please check FSG before meals and QHS, or q6h while NPO  - Inpatient glucose goals: <140 pre-meal, <180 random  - consistent carb diet when eating  - nutrition consult placed      DISCHARGE PLANNING:  - Discharge recs pending clinical course  - will need Endocrinology follow up. Please provide clinic info in DC paperwork for patient to make an appointment:    #Hypertension  - Goal BP <130/80  - Management as per primary team  - check urine microalbumin level as outpatient    #Hyperlipidemia  - LDL goal <70  - check lipid panel as outpatient on a yearly basis    Pending discussion with attending physician  Kashif Low, DO PGY-4  Endocrine Fellow  For nonurgent matters (including consults or followup questions), please email [email or web address] or [email or web address]. For urgent matters, please call answering service at [phone] (weekdays), [phone] (nights/weekends).    The patient is a 59y Male with PMH of T2DM, HTN, HLD, GE junction cancer here for esophagectomy.  Endocrinology consulted for T2DM    #Uncontrolled Type 2 Diabetes Mellitus   - Follows with: Dr. Nolasco  - A1C with Estimated Average Glucose Result: 10.9 % (07-13-24)  - home regimen: Semglee 18 units, Novolog 6 units  - eGFR: 84 mL/min/1.73m2 (07-15-24)  - Weight (kg): 54 (07-12-24)  - glucose 384-101, no evidence of DKA on labs      INPATIENT PLAN:  - Recommend continuing Lantus 15 units in the morning. Do not hold for NPO status.   - Recommend continuing Admelog 10 units TID before meals (HOLD if NPO or not eating). Please discontinue the standing Admelog dose after dinner tonight to anticipate patient's prolonged NPO state after esophagectomy.   - Recommend Low dose admelog correction scale TIDQAC and separate low dose scale QHS  - Please check FSG before meals and QHS, or q6h while NPO  - Inpatient glucose goals: <140 pre-meal, <180 random  - consistent carb diet when eating  - nutrition consult placed  - Discussed with thoracic surgery ([id number]), patient will likely be NPO for 7 days after the procedure and receiving IV fluids only. Recommend low dose correction Admelog q6h while NPO. Recommend adding dextrose to his IV fluids while on the Lantus. If his nutrition plan changes we can reevaluate.       DISCHARGE PLANNING:  - Discharge recs pending clinical course and nutrition plan. If he resumes his normal diet, can discharge on home regimen of Semglee 18 units and Admelog 6 units with meals along with his oral medications as before.  - will need Endocrinology follow up. Patient has an appointment with Dr. Nolasco scheduled for August 8th at 8:20 AM at [address] [facility] [address].   #Hypertension  - Goal BP <130/80  - Management as per primary team  - check urine microalbumin level as outpatient    #Hyperlipidemia  - LDL goal <70  - check lipid panel as outpatient on a yearly basis    Discussed with attending physician  Kashif Low, DO PGY-4  Endocrine Fellow  For nonurgent matters (including consults or followup questions), please email [email or web address] or [email or web address]. For urgent matters, please call answering service at [phone] (weekdays), [phone] (nights/weekends).

## 2024-07-15 NOTE — PROGRESS NOTE ADULT - SUBJECTIVE AND OBJECTIVE BOX
DATE OF SERVICE: 07-15-24    Patient denies chest pain or shortness of breath.   Review of symptoms otherwise negative.    MEDICATIONS:  amLODIPine   Tablet 10 milliGRAM(s) Oral daily  dextrose 50% Injectable 25 Gram(s) IV Push once  dextrose 50% Injectable 12.5 Gram(s) IV Push once  heparin   Injectable 5000 Unit(s) SubCutaneous every 12 hours  hydrALAZINE Injectable 10 milliGRAM(s) IV Push every 4 hours PRN  insulin glargine Injectable (LANTUS) 15 Unit(s) SubCutaneous every morning  insulin lispro (ADMELOG) corrective regimen sliding scale   SubCutaneous three times a day before meals  insulin lispro (ADMELOG) corrective regimen sliding scale   SubCutaneous at bedtime  insulin lispro Injectable (ADMELOG) 10 Unit(s) SubCutaneous before lunch  insulin lispro Injectable (ADMELOG) 10 Unit(s) SubCutaneous before dinner  insulin lispro Injectable (ADMELOG) 10 Unit(s) SubCutaneous before breakfast  losartan 50 milliGRAM(s) Oral daily  pantoprazole    Tablet 40 milliGRAM(s) Oral before breakfast  tamsulosin 0.4 milliGRAM(s) Oral at bedtime      LABS:                        11.0   3.64  )-----------( 166      ( 15 Jul 2024 06:05 )             32.6       Hemoglobin: 11.0 g/dL (07-15 @ 06:05)  Hemoglobin: 11.1 g/dL (07-14 @ 06:08)  Hemoglobin: 10.9 g/dL (07-13 @ 02:44)  Hemoglobin: 11.4 g/dL (07-12 @ 11:15)      07-15    135  |  103  |  28<H>  ----------------------------<  320<H>  4.7   |  21<L>  |  1.02    Ca    9.0      15 Jul 2024 06:05  Phos  4.4     07-15  Mg     2.20     07-15    TPro  6.3  /  Alb  3.4  /  TBili  0.3  /  DBili  <0.2  /  AST  58<H>  /  ALT  38  /  AlkPhos  158<H>  07-15    Creatinine Trend: 1.02<--, 0.71<--, 0.68<--, 0.79<--    COAGS: PT/INR - ( 15 Jul 2024 06:05 )   PT: 9.4 sec;   INR: <0.90 ratio         PTT - ( 15 Jul 2024 06:05 )  PTT:34.4 sec          PHYSICAL EXAM:  T(C): 36.7 (07-15-24 @ 08:05), Max: 36.9 (07-15-24 @ 00:00)  HR: 101 (07-15-24 @ 08:05) (94 - 102)  BP: 153/84 (07-15-24 @ 08:05) (116/68 - 153/84)  RR: 18 (07-15-24 @ 08:05) (18 - 18)  SpO2: 95% (07-15-24 @ 08:05) (95% - 98%)  Wt(kg): --    I&O's Summary    14 Jul 2024 07:01  -  15 Jul 2024 07:00  --------------------------------------------------------  IN: 920 mL / OUT: 2 mL / NET: 918 mL          Gen: NAD  HEENT:  (-)icterus (-)pallor  CV: N S1 S2 1/6 CATHY (+)2 Pulses B/l  Resp:  Clear to auscultation B/L, normal effort  GI: (+) BS Soft, NT, ND  Lymph:  (-)Edema, (-)obvious lymphadenopathy  Skin: Warm to touch, Normal turgor  Psych: Appropriate mood and affect      TELEMETRY: 	  NSR    ECG:  	NSR      TTE 07/2024  Findings:  1. Normal left ventricular size and function.  Mild diastolic dysfunction.  2. Normal left atrial size  3. Right atrial cavity is normal in size.  4. Normal right ventricular size and function.  5. Normal trileaflet aortic valve opening.  6. Normal mitral valve opening.  7. Normal appearing tricuspid valve with mild tricuspid  regurgitation.  8. Pulmonic valve is grossly normal, yet poorly visualized  with no doppler evidence for pulmonic stenosis.  9. No evidence of significant pericardial effusion.  10. The aortic root is normal.  11. Normal pulmonary artery.  12. IVC is normal with respiratory variation.  FULL STUDY DONE INCLUDING M-MODE RECORDING,  SPECTRAL DOPPLER AND    Stress 07/2024  Normal myocardial perfusion SPECT images No evidence of stress induced ischemia or infarction.  Normal left ventricular size and function.  Calculated EF is: 68%    ASSESSMENT/PLAN: 	Pt is a 59 y.o. man with history of HTN,, DLD, DM,  GEJ adenocarcinoma (T3N0, stage 2A) s/p neoadjuvant chemotherapy admitted for optimization prior to planned Michael-Broderick Esophagectomy on tuesday. Recently had a nuclear stress test in our office for evaluation of preoperative cardiac risk assessment prior to esophageal cancer surgery scheduled on 07/16/2024. The patient denies any chest pain, shortness ofbreath, or anginal symptoms. He has no palpitations, dizziness, or syncope    Pre-OP/HTN  - EKG from office with no q waves, no chest pain,  euvolemic on exam and no severely stenotic valvular disease on recent TTE  - Patient low risk for esophageal surgery for MACEE can proceed with no further  CV w/u  - c/w Amlodipine and Losartan      Mart Mason MD  Pager: [phone]

## 2024-07-15 NOTE — CONSULT NOTE ADULT - SUBJECTIVE AND OBJECTIVE BOX
HPI:  59 year old male, went to [facility] c/o fatigue, dizziness.  1/1/20240  pt was worked up found to be severely anemic received PRBC EGD on 01/02/2024: nodule on gastric side of the GEJ with some old heme in stomach. Possible healing Kavita Valero tear. Pathology reveal GEJ bx: fragments of adenocarcinoma, moderately differentiated. Pt received  radiation therapy & chemotherapy completed 6/25/24. Pt was reevaluated by surgeon and now present to [facility] for preop optimization for scheduled Robotic assisted Michael senait esophagectomy. (12 Jul 2024 14:00)      DIABETES HX:  - History/diagnosis: T2DM   - Microvascular complications:  denies nephropathy, retinopathy, reports neuropathy  - Macrovascular complications: denies CAD, CVA  - Follows with: endocrinologist Dr. Nolasco  - A1C with Estimated Average Glucose Result: 10.9 % (07-13-24)  - Home regimen: Semglee 18 units, Novolog 6 units, has been on this regimen for one week. Is also on metformin 1000 BID and Jardiance 25 mg daily, has held these two medications since his PET scan on 7/8/2024  - Adherence: Reports compliance  - Blood sugar: fasting glucose around 190  - Hypoglycemia episodes: Denies  - Diet/lifestyle: Reports carb-heavy diet  - Symptoms: Denies  - Family history: parents with T2DM    PAST MEDICAL & SURGICAL HISTORY:  HLD (hyperlipidemia)  type 2 diabetes mellitus  BPH (benign prostatic hyperplasia)  HTN (hypertension)  Gastroesophageal cancer  Port-A-Cath in place  FAMILY HISTORY: Paremts with T2DM    Social History: Denies tobacco, alcohol or drug use    Outpatient Medications:     MEDICATIONS  (STANDING):  amLODIPine   Tablet 10 milliGRAM(s) Oral daily  dextrose 50% Injectable 25 Gram(s) IV Push once  dextrose 50% Injectable 12.5 Gram(s) IV Push once  heparin   Injectable 5000 Unit(s) SubCutaneous every 12 hours  insulin glargine Injectable (LANTUS) 15 Unit(s) SubCutaneous every morning  insulin lispro (ADMELOG) corrective regimen sliding scale   SubCutaneous three times a day before meals  insulin lispro (ADMELOG) corrective regimen sliding scale   SubCutaneous at bedtime  insulin lispro Injectable (ADMELOG) 10 Unit(s) SubCutaneous before dinner  insulin lispro Injectable (ADMELOG) 10 Unit(s) SubCutaneous before breakfast  insulin lispro Injectable (ADMELOG) 10 Unit(s) SubCutaneous before lunch  losartan 50 milliGRAM(s) Oral daily  pantoprazole    Tablet 40 milliGRAM(s) Oral before breakfast  tamsulosin 0.4 milliGRAM(s) Oral at bedtime    MEDICATIONS  (PRN):  hydrALAZINE Injectable 10 milliGRAM(s) IV Push every 4 hours PRN systolic BP greater than 170      Allergies    No Known Allergies    Intolerances      Review of Systems:  Constitutional: No fever  Eyes: No blurry vision  Neuro: No tremors  HEENT: No pain  Cardiovascular: No chest pain, palpitations  Respiratory: No SOB, no cough  GI: No nausea, vomiting, abdominal pain  : No dysuria  Skin: no rash  Psych: no depression  Endocrine: no polyuria, polydipsia  Hem/lymph: no swelling  Osteoporosis: no fractures    ALL OTHER SYSTEMS REVIEWED AND NEGATIVE    PHYSICAL EXAM:  VITALS: T(C): 37 (07-15-24 @ 13:03)  T(F): 98.6 (07-15-24 @ 13:03), Max: 98.6 (07-15-24 @ 13:03)  HR: 98 (07-15-24 @ 13:03) (94 - 101)  BP: 114/64 (07-15-24 @ 13:03) (114/64 - 153/84)  RR:  (18 - 18)  SpO2:  (95% - 98%)  Wt(kg): --  GENERAL: NAD, well-groomed, well-developed  EYES: No proptosis, no lid lag, anicteric  HEENT:  Atraumatic, Normocephalic, moist mucous membranes  RESPIRATORY: Normal respiratory effort; no audible wheezing  SKIN: Dry, intact, No rashes or lesions  MUSCULOSKELETAL: Full range of motion, normal strength  NEURO: sensation intact, extraocular movements intact, no tremor  PSYCH: Alert and oriented x 3, normal affect, normal mood  CUSHING'S SIGNS: no striae                          11.0   3.64  )-----------( 166      ( 15 Jul 2024 06:05 )             32.6       07-15    135  |  103  |  28<H>  ----------------------------<  320<H>  4.7   |  21<L>  |  1.02    eGFR: 84    Ca    9.0      07-15  Mg     2.20     07-15  Phos  4.4     07-15    TPro  6.3  /  Alb  3.4  /  TBili  0.3  /  DBili  <0.2  /  AST  58<H>  /  ALT  38  /  AlkPhos  158<H>  07-15      A1C with Estimated Average Glucose Result: 10.9 % (07-13-24 @ 02:44)  A1C with Estimated Average Glucose Result: 12.8 % (01-01-24 @ 06:00)      CAPILLARY BLOOD GLUCOSE      POCT Blood Glucose.: 101 mg/dL (15 Jul 2024 11:46)  POCT Blood Glucose.: 384 mg/dL (15 Jul 2024 07:48)  POCT Blood Glucose.: 229 mg/dL (14 Jul 2024 22:23)  POCT Blood Glucose.: 213 mg/dL (14 Jul 2024 17:32)      Thyroid Function Tests:  amLODIPine   Tablet 10 milliGRAM(s) Oral daily  dextrose 50% Injectable 25 Gram(s) IV Push once  dextrose 50% Injectable 12.5 Gram(s) IV Push once  heparin   Injectable 5000 Unit(s) SubCutaneous every 12 hours  hydrALAZINE Injectable 10 milliGRAM(s) IV Push every 4 hours PRN  insulin glargine Injectable (LANTUS) 15 Unit(s) SubCutaneous every morning  insulin lispro (ADMELOG) corrective regimen sliding scale   SubCutaneous three times a day before meals  insulin lispro (ADMELOG) corrective regimen sliding scale   SubCutaneous at bedtime  insulin lispro Injectable (ADMELOG) 10 Unit(s) SubCutaneous before dinner  insulin lispro Injectable (ADMELOG) 10 Unit(s) SubCutaneous before breakfast  insulin lispro Injectable (ADMELOG) 10 Unit(s) SubCutaneous before lunch  losartan 50 milliGRAM(s) Oral daily  pantoprazole    Tablet 40 milliGRAM(s) Oral before breakfast  tamsulosin 0.4 milliGRAM(s) Oral at bedtime

## 2024-07-15 NOTE — PROGRESS NOTE ADULT - ASSESSMENT
Linwood Villanueva is a 59 y.o. man with history of GEJ adenocarcinoma (T3N0, stage 2A) s/p neoadjuvant chemotherapy. Patient admitted for optimization prior to planned Michael-Broderick Esophagectomy.  sues:    PLAN  -preop optimization  - plan for OR tomorrow  - NPO at MN  - Consent in chart    Thoracic   [id number]

## 2024-07-15 NOTE — PROGRESS NOTE ADULT - SUBJECTIVE AND OBJECTIVE BOX
Thoracic Surgery Daily Progress Note   BHARATH GONZALES | [id number] | CATARINA Laureano  --------------------------------------------------------------------------------------------------------------------  SUBJECTIVE / 24H EVENTS  Patient seen and examined on morning rounds. No acute events overnight. Waiting for surgery tomorrow. Eating lunch. Denies shortness of breath, nausea, vomiting, chest pain, headache.  --------------------------------------------------------------------------------------------------------------------  OBJECTIVE:    VITAL SIGNS:  T(C): 36.7 (07-15-24 @ 08:05), Max: 36.9 (07-15-24 @ 00:00)  HR: 101 (07-15-24 @ 08:05) (94 - 102)  BP: 153/84 (07-15-24 @ 08:05) (116/68 - 153/84)  RR: 18 (07-15-24 @ 08:05) (18 - 18)  SpO2: 95% (07-15-24 @ 08:05) (95% - 98%)  Daily     Daily   POCT Blood Glucose.: 101 mg/dL (07-15-24 @ 11:46)  POCT Blood Glucose.: 384 mg/dL (07-15-24 @ 07:48)  POCT Blood Glucose.: 229 mg/dL (07-14-24 @ 22:23)      PHYSICAL EXAM:  Gen: NAD  Neuro: AO3  LS: Respirations unlabored.  Card: RRR. No m/r/g.   GI: Soft. Nontender. Nondistended.  Ext: Warm, well perfused    07-14-24 @ 07:01  -  07-15-24 @ 07:00  --------------------------------------------------------  IN:    Oral Fluid: 920 mL  Total IN: 920 mL    OUT:    Voided (mL): 2 mL  Total OUT: 2 mL    Total NET: 918 mL    LAB VALUES:  07-15    135  |  103  |  28<H>  ----------------------------<  320<H>  4.7   |  21<L>  |  1.02    Ca    9.0      15 Jul 2024 06:05  Phos  4.4     07-15  Mg     2.20     07-15    TPro  6.3  /  Alb  3.4  /  TBili  0.3  /  DBili  <0.2  /  AST  58<H>  /  ALT  38  /  AlkPhos  158<H>  07-15                               11.0   3.64  )-----------( 166      ( 15 Jul 2024 06:05 )             32.6     LIVER FUNCTIONS - ( 15 Jul 2024 06:05 )  Alb: 3.4 g/dL / Pro: 6.3 g/dL / ALK PHOS: 158 U/L / ALT: 38 U/L / AST: 58 U/L / GGT: x           PT/INR - ( 15 Jul 2024 06:05 )   PT: 9.4 sec;   INR: <0.90 ratio         PTT - ( 15 Jul 2024 06:05 )  PTT:34.4 sec      Urinalysis Basic - ( 15 Jul 2024 06:05 )    Color: x / Appearance: x / SG: x / pH: x  Gluc: 320 mg/dL / Ketone: x  / Bili: x / Urobili: x   Blood: x / Protein: x / Nitrite: x   Leuk Esterase: x / RBC: x / WBC x   Sq Epi: x / Non Sq Epi: x / Bacteria: x    MICROBIOLOGY:    No new microbiology data for review.     RADIOLOGY:    No new radiographic images for review.    MEDICATIONS  (STANDING):  amLODIPine   Tablet 10 milliGRAM(s) Oral daily  dextrose 50% Injectable 25 Gram(s) IV Push once  dextrose 50% Injectable 12.5 Gram(s) IV Push once  heparin   Injectable 5000 Unit(s) SubCutaneous every 12 hours  insulin glargine Injectable (LANTUS) 15 Unit(s) SubCutaneous every morning  insulin lispro (ADMELOG) corrective regimen sliding scale   SubCutaneous at bedtime  insulin lispro (ADMELOG) corrective regimen sliding scale   SubCutaneous three times a day before meals  insulin lispro Injectable (ADMELOG) 10 Unit(s) SubCutaneous before lunch  insulin lispro Injectable (ADMELOG) 10 Unit(s) SubCutaneous before dinner  insulin lispro Injectable (ADMELOG) 10 Unit(s) SubCutaneous before breakfast  losartan 50 milliGRAM(s) Oral daily  pantoprazole    Tablet 40 milliGRAM(s) Oral before breakfast  tamsulosin 0.4 milliGRAM(s) Oral at bedtime    MEDICATIONS  (PRN):  hydrALAZINE Injectable 10 milliGRAM(s) IV Push every 4 hours PRN systolic BP greater than 170

## 2024-07-16 ENCOUNTER — APPOINTMENT (OUTPATIENT)
Dept: THORACIC SURGERY | Facility: HOSPITAL | Age: 59
End: 2024-07-16

## 2024-07-16 ENCOUNTER — APPOINTMENT (OUTPATIENT)
Dept: SURGICAL ONCOLOGY | Facility: HOSPITAL | Age: 59
End: 2024-07-16

## 2024-07-16 LAB
ALBUMIN SERPL ELPH-MCNC: 3.6 G/DL — SIGNIFICANT CHANGE UP (ref 3.3–5)
ALP SERPL-CCNC: 129 U/L — HIGH (ref 40–120)
ALT FLD-CCNC: 98 U/L — HIGH (ref 4–41)
ANION GAP SERPL CALC-SCNC: 11 MMOL/L — SIGNIFICANT CHANGE UP (ref 7–14)
AST SERPL-CCNC: 136 U/L — HIGH (ref 4–40)
BILIRUB SERPL-MCNC: 0.4 MG/DL — SIGNIFICANT CHANGE UP (ref 0.2–1.2)
BLOOD GAS ARTERIAL - LYTES,HGB,ICA,LACT RESULT: SIGNIFICANT CHANGE UP
BUN SERPL-MCNC: 27 MG/DL — HIGH (ref 7–23)
CALCIUM SERPL-MCNC: 8.4 MG/DL — SIGNIFICANT CHANGE UP (ref 8.4–10.5)
CHLORIDE SERPL-SCNC: 106 MMOL/L — SIGNIFICANT CHANGE UP (ref 98–107)
CO2 SERPL-SCNC: 20 MMOL/L — LOW (ref 22–31)
CREAT SERPL-MCNC: 0.86 MG/DL — SIGNIFICANT CHANGE UP (ref 0.5–1.3)
EGFR: 99 ML/MIN/1.73M2 — SIGNIFICANT CHANGE UP
GAS PNL BLDA: SIGNIFICANT CHANGE UP
GLUCOSE BLDC GLUCOMTR-MCNC: 166 MG/DL — HIGH (ref 70–99)
GLUCOSE BLDC GLUCOMTR-MCNC: 267 MG/DL — HIGH (ref 70–99)
GLUCOSE SERPL-MCNC: 181 MG/DL — HIGH (ref 70–99)
HCT VFR BLD CALC: 28.1 % — LOW (ref 39–50)
HGB BLD-MCNC: 9.4 G/DL — LOW (ref 13–17)
MAGNESIUM SERPL-MCNC: 1.7 MG/DL — SIGNIFICANT CHANGE UP (ref 1.6–2.6)
MCHC RBC-ENTMCNC: 30.8 PG — SIGNIFICANT CHANGE UP (ref 27–34)
MCHC RBC-ENTMCNC: 33.5 GM/DL — SIGNIFICANT CHANGE UP (ref 32–36)
MCV RBC AUTO: 92.1 FL — SIGNIFICANT CHANGE UP (ref 80–100)
NRBC # BLD: 0 /100 WBCS — SIGNIFICANT CHANGE UP (ref 0–0)
NRBC # FLD: 0 K/UL — SIGNIFICANT CHANGE UP (ref 0–0)
PHOSPHATE SERPL-MCNC: 3.4 MG/DL — SIGNIFICANT CHANGE UP (ref 2.5–4.5)
PLATELET # BLD AUTO: 122 K/UL — LOW (ref 150–400)
POTASSIUM SERPL-MCNC: 4 MMOL/L — SIGNIFICANT CHANGE UP (ref 3.5–5.3)
POTASSIUM SERPL-SCNC: 4 MMOL/L — SIGNIFICANT CHANGE UP (ref 3.5–5.3)
PROT SERPL-MCNC: 6.3 G/DL — SIGNIFICANT CHANGE UP (ref 6–8.3)
RBC # BLD: 3.05 M/UL — LOW (ref 4.2–5.8)
RBC # FLD: 13.2 % — SIGNIFICANT CHANGE UP (ref 10.3–14.5)
SODIUM SERPL-SCNC: 137 MMOL/L — SIGNIFICANT CHANGE UP (ref 135–145)
WBC # BLD: 8.53 K/UL — SIGNIFICANT CHANGE UP (ref 3.8–10.5)
WBC # FLD AUTO: 8.53 K/UL — SIGNIFICANT CHANGE UP (ref 3.8–10.5)

## 2024-07-16 PROCEDURE — S2900 ROBOTIC SURGICAL SYSTEM: CPT | Mod: NC

## 2024-07-16 PROCEDURE — 88305 TISSUE EXAM BY PATHOLOGIST: CPT | Mod: 26

## 2024-07-16 PROCEDURE — 99233 SBSQ HOSP IP/OBS HIGH 50: CPT

## 2024-07-16 PROCEDURE — 43287 ESPHG DSTL 2/3 W/LAPS MOBLJ: CPT | Mod: 62

## 2024-07-16 PROCEDURE — 71045 X-RAY EXAM CHEST 1 VIEW: CPT | Mod: 26

## 2024-07-16 PROCEDURE — 88309 TISSUE EXAM BY PATHOLOGIST: CPT | Mod: 26

## 2024-07-16 RX ORDER — DEXTROSE MONOHYDRATE, SODIUM CHLORIDE, SODIUM LACTATE, CALCIUM CHLORIDE, MAGNESIUM CHLORIDE 1.5; 538; 448; 18.4; 5.08 G/100ML; MG/100ML; MG/100ML; MG/100ML; MG/100ML
1000 SOLUTION INTRAPERITONEAL
Refills: 0 | Status: DISCONTINUED | OUTPATIENT
Start: 2024-07-16 | End: 2024-07-18

## 2024-07-16 RX ORDER — ALBUTEROL 90 MCG
2.5 AEROSOL REFILL (GRAM) INHALATION EVERY 6 HOURS
Refills: 0 | Status: DISCONTINUED | OUTPATIENT
Start: 2024-07-16 | End: 2024-07-17

## 2024-07-16 RX ORDER — BACTERIOSTATIC SODIUM CHLORIDE 0.9 %
4 VIAL (ML) INJECTION EVERY 6 HOURS
Refills: 0 | Status: DISCONTINUED | OUTPATIENT
Start: 2024-07-16 | End: 2024-08-09

## 2024-07-16 RX ORDER — DORNASE ALFA 1 MG/ML
2.5 SOLUTION RESPIRATORY (INHALATION) DAILY
Refills: 0 | Status: DISCONTINUED | OUTPATIENT
Start: 2024-07-16 | End: 2024-08-09

## 2024-07-16 RX ORDER — ACETAMINOPHEN 500 MG
650 TABLET ORAL EVERY 6 HOURS
Refills: 0 | Status: DISCONTINUED | OUTPATIENT
Start: 2024-07-16 | End: 2024-07-16

## 2024-07-16 RX ORDER — HYDROMORPHONE HCL IN 0.9% NACL 0.2 MG/ML
30 PLASTIC BAG, INJECTION (ML) INTRAVENOUS
Refills: 0 | Status: DISCONTINUED | OUTPATIENT
Start: 2024-07-16 | End: 2024-07-22

## 2024-07-16 RX ORDER — INSULIN LISPRO 100/ML
VIAL (ML) SUBCUTANEOUS EVERY 6 HOURS
Refills: 0 | Status: DISCONTINUED | OUTPATIENT
Start: 2024-07-16 | End: 2024-07-23

## 2024-07-16 RX ORDER — INSULIN GLARGINE-YFGN 100 [IU]/ML
7 INJECTION, SOLUTION SUBCUTANEOUS EVERY MORNING
Refills: 0 | Status: DISCONTINUED | OUTPATIENT
Start: 2024-07-17 | End: 2024-07-17

## 2024-07-16 RX ORDER — ONDANSETRON HCL/PF 4 MG/2 ML
4 VIAL (ML) INJECTION EVERY 6 HOURS
Refills: 0 | Status: DISCONTINUED | OUTPATIENT
Start: 2024-07-16 | End: 2024-07-23

## 2024-07-16 RX ORDER — PANTOPRAZOLE SODIUM 20 MG/1
40 TABLET, DELAYED RELEASE ORAL DAILY
Refills: 0 | Status: DISCONTINUED | OUTPATIENT
Start: 2024-07-16 | End: 2024-07-18

## 2024-07-16 RX ORDER — ACETAMINOPHEN 500 MG
1000 TABLET ORAL EVERY 6 HOURS
Refills: 0 | Status: COMPLETED | OUTPATIENT
Start: 2024-07-16 | End: 2024-07-17

## 2024-07-16 RX ORDER — NALOXONE HYDROCHLORIDE 0.4 MG/ML
0.1 INJECTION, SOLUTION INTRAMUSCULAR; INTRAVENOUS; SUBCUTANEOUS
Refills: 0 | Status: DISCONTINUED | OUTPATIENT
Start: 2024-07-16 | End: 2024-08-09

## 2024-07-16 RX ORDER — KETOROLAC TROMETHAMINE 10 MG
15 TABLET ORAL ONCE
Refills: 0 | Status: DISCONTINUED | OUTPATIENT
Start: 2024-07-16 | End: 2024-07-16

## 2024-07-16 RX ORDER — HYDROMORPHONE HCL IN 0.9% NACL 0.2 MG/ML
0.5 PLASTIC BAG, INJECTION (ML) INTRAVENOUS
Refills: 0 | Status: DISCONTINUED | OUTPATIENT
Start: 2024-07-16 | End: 2024-07-22

## 2024-07-16 RX ADMIN — PANTOPRAZOLE SODIUM 40 MILLIGRAM(S): 20 TABLET, DELAYED RELEASE ORAL at 21:03

## 2024-07-16 RX ADMIN — PANTOPRAZOLE SODIUM 40 MILLIGRAM(S): 20 TABLET, DELAYED RELEASE ORAL at 06:25

## 2024-07-16 RX ADMIN — Medication 1: at 19:00

## 2024-07-16 RX ADMIN — AMLODIPINE BESYLATE 10 MILLIGRAM(S): 2.5 TABLET ORAL at 06:26

## 2024-07-16 RX ADMIN — Medication 15 MILLIGRAM(S): at 20:48

## 2024-07-16 RX ADMIN — Medication 15 MILLIGRAM(S): at 21:10

## 2024-07-16 RX ADMIN — LOSARTAN POTASSIUM 50 MILLIGRAM(S): 50 TABLET, FILM COATED ORAL at 06:26

## 2024-07-16 RX ADMIN — Medication 3: at 06:32

## 2024-07-16 RX ADMIN — Medication 400 MILLIGRAM(S): at 23:49

## 2024-07-16 RX ADMIN — DEXTROSE MONOHYDRATE, SODIUM CHLORIDE, SODIUM LACTATE, CALCIUM CHLORIDE, MAGNESIUM CHLORIDE 100 MILLILITER(S): 1.5; 538; 448; 18.4; 5.08 SOLUTION INTRAPERITONEAL at 20:20

## 2024-07-16 RX ADMIN — INSULIN GLARGINE-YFGN 15 UNIT(S): 100 INJECTION, SOLUTION SUBCUTANEOUS at 06:31

## 2024-07-16 RX ADMIN — Medication 75 MILLILITER(S): at 00:51

## 2024-07-16 RX ADMIN — HEPARIN SODIUM 5000 UNIT(S): 1000 INJECTION, SOLUTION INTRAVENOUS; SUBCUTANEOUS at 21:04

## 2024-07-16 RX ADMIN — HEPARIN SODIUM 5000 UNIT(S): 1000 INJECTION, SOLUTION INTRAVENOUS; SUBCUTANEOUS at 06:31

## 2024-07-16 RX ADMIN — Medication 1: at 23:39

## 2024-07-16 RX ADMIN — Medication 30 MILLILITER(S): at 19:12

## 2024-07-16 RX ADMIN — Medication 30 MILLILITER(S): at 19:39

## 2024-07-16 NOTE — PRE-OP CHECKLIST - TO WHOM
SUGAR TOLBERT RN Sski Pregnancy And Lactation Text: This medication is Pregnancy Category D and isn't considered safe during pregnancy. It is excreted in breast milk.

## 2024-07-16 NOTE — PRE-OP CHECKLIST - SELECT TESTS ORDERED
BMP/Type and Cross/Type and Screen/POCT Blood Glucose 025 @2744 in unit/BMP/Type and Cross/Type and Screen/POCT Blood Glucose

## 2024-07-16 NOTE — CHART NOTE - NSCHARTNOTEFT_GEN_A_CORE
The patient is a 59y Male with PMH of T2DM, HTN, HLD, GE junction cancer here for esophagectomy.  Endocrinology consulted for T2DM    Fingersticks today are slightly above range while in OR, patient received 15 units of Lantus this morning.    #Uncontrolled Type 2 Diabetes Mellitus   - Follows with: Dr. Nolasco  - A1C with Estimated Average Glucose Result: 10.9 % (07-13-24)  - home regimen: Semglee 18 units, Novolog 6 units  - eGFR: 84 mL/min/1.73m2 (07-15-24)  - Weight (kg): 54 (07-12-24)  - glucose 384-101, no evidence of DKA on labs      INPATIENT PLAN:  - Recommend continuing Lantus 15 units in the morning. Do not hold for NPO status.   - Recommend Low dose admelog correction scale q6h while NPO  - Will follow up tomorrow with further recommendations      Discussed with attending physician  Kashif Low, DO PGY-4  Endocrine Fellow  For nonurgent matters (including consults or followup questions), please email [email or web address] or [email or web address]. For urgent matters, please call answering service at [phone] (weekdays), [phone] (nights/weekends).

## 2024-07-16 NOTE — PROGRESS NOTE ADULT - SUBJECTIVE AND OBJECTIVE BOX
BHARATH GONZALES            MRN-[id number]         No Known Allergies               HPI:  59 year old male, went to [facility] c/o fatigue, dizziness.  1/1/20240  pt was worked up found to be severely anemic received PRBC EGD on 01/02/2024: nodule on gastric side of the GEJ with some old heme in stomach. Possible healing Kavita Valero tear. Pathology reveal GEJ bx: fragments of adenocarcinoma, moderately differentiated. Pt received  radiation therapy & chemotherapy completed 6/25/24. Pt was reevaluated by surgeon and now present to [facility] for preop optimization for scheduled Robotic assisted Michael broderick esophagectomy. (12 Jul 2024 14:00)    CHIEF COMPLAINT: Follow up in ICU  for postoperative care of patient who is s/p  Esophagogastrectomy     Procedure: Michael broderick esophagectomy 7/16/2024    Issues:              Esophageal cancer              Postop pain              Chest tube in place  HLD (hyperlipidemia)  Insulin dependent type 2 diabetes mellitus  BPH (benign prostatic hyperplasia)  HTN (hypertension)  Gastroesophageal cancer  Gastroesophageal cancer  Port-A-Cath in place        Postop course:     Patient reports moderate pain at chest wall incision sites which is worse with coughing and deep breathing without associated fever or dyspnea. Pain is improved with use of PCA and  IV Tylenol.         PAST MEDICAL & SURGICAL HISTORY:  HLD (hyperlipidemia)      Insulin dependent type 2 diabetes mellitus      BPH (benign prostatic hyperplasia)      HTN (hypertension)      Gastroesophageal cancer      Gastroesophageal cancer      Port-A-Cath in place          ICU Vital Signs Last 24 Hrs  T(C): 36.8 (16 Jul 2024 07:49), Max: 37.1 (16 Jul 2024 06:01)  T(F): 98.3 (16 Jul 2024 07:49), Max: 98.8 (16 Jul 2024 06:01)  HR: 100 (16 Jul 2024 07:49) (65 - 100)  BP: 107/59 (16 Jul 2024 07:49) (107/59 - 162/93)  BP(mean): --  ABP: --  ABP(mean): --  RR: 18 (16 Jul 2024 07:49) (16 - 18)  SpO2: 95% (16 Jul 2024 07:49) (95% - 98%)    O2 Parameters below as of 16 Jul 2024 07:49  Patient On (Oxygen Delivery Method): room air          I&O's Detail    15 Jul 2024 07:01  -  16 Jul 2024 07:00  --------------------------------------------------------  IN:    Oral Fluid: 720 mL    sodium chloride 0.9%: 225 mL  Total IN: 945 mL    OUT:  Total OUT: 0 mL    Total NET: 945 mL        CAPILLARY BLOOD GLUCOSE      POCT Blood Glucose.: 267 mg/dL (16 Jul 2024 06:30)    Home Medications:  amLODIPine 5 mg oral tablet: 1 tab(s) orally once a day Noon (10 Jul 2024 09:06)  atorvastatin 20 mg oral tablet: 1 tab(s) orally once a day noon (10 Jul 2024 09:06)  Jardiance 25 mg oral tablet: 1 tab(s) orally once a day Noon (10 Jul 2024 09:06)  losartan-hydrochlorothiazide 50 mg-12.5 mg oral tablet: 1 tab(s) orally once a day (10 Jul 2024 09:06)  metFORMIN 500 mg oral tablet: 1 tab(s) orally 2 times a day (10 Jul 2024 09:16)  NovoLOG 100 units/mL subcutaneous solution: subcutaneous 3 times a day (before meals) 15 units (10 Jul 2024 09:44)  pantoprazole 40 mg oral delayed release tablet: 1 tab(s) orally once a day AM (10 Jul 2024 09:16)  Sodium Chloride, 1 G, PO, 2 times Daily:  (10 Jul 2024 09:16)  tamsulosin 0.4 mg oral capsule: 1 cap(s) orally once a day (at bedtime) (10 Jul 2024 09:16)    MEDICATIONS  (STANDING):  amLODIPine   Tablet 10 milliGRAM(s) Oral daily  dextrose 50% Injectable 25 Gram(s) IV Push once  dextrose 50% Injectable 12.5 Gram(s) IV Push once  heparin   Injectable 5000 Unit(s) SubCutaneous every 12 hours  HYDROmorphone PCA (1 mG/mL) 30 milliLiter(s) PCA Continuous PCA Continuous  insulin glargine Injectable (LANTUS) 15 Unit(s) SubCutaneous <User Schedule>  insulin lispro (ADMELOG) corrective regimen sliding scale   SubCutaneous every 6 hours  losartan 50 milliGRAM(s) Oral daily  pantoprazole    Tablet 40 milliGRAM(s) Oral before breakfast  sodium chloride 0.9%. 1000 milliLiter(s) (75 mL/Hr) IV Continuous <Continuous>  tamsulosin 0.4 milliGRAM(s) Oral at bedtime    MEDICATIONS  (PRN):  hydrALAZINE Injectable 10 milliGRAM(s) IV Push every 4 hours PRN systolic BP greater than 170  HYDROmorphone PCA (1 mG/mL) Rescue Clinician Bolus 0.5 milliGRAM(s) IV Push every 15 minutes PRN for Pain Scale GREATER THAN 6  naloxone Injectable 0.1 milliGRAM(s) IV Push every 3 minutes PRN For ANY of the following changes in patient status:  A. RR LESS THAN 10 breaths per minute, B. Oxygen saturation LESS THAN 90%, C. Sedation score of 6  ondansetron Injectable 4 milliGRAM(s) IV Push every 6 hours PRN Nausea        Physical exam:                             General:               Pt is awake, alert, not in any distress                                                  Neuro:                  Nonfocal                             Cardiovascular:   S1 & S2, regular                           Respiratory:         Air entry is fair and equal on both sides, has bilateral conducted sounds                           GI:                          Soft, nondistended and nontender, Bowel sounds absent                            Ext:                        No cyanosis or edema     Labs:                                                                           11.0   3.64  )-----------( 166      ( 15 Jul 2024 06:05 )             32.6             07-15    135  |  103  |  28<H>  ----------------------------<  320<H>  4.7   |  21<L>  |  1.02    Ca    9.0      15 Jul 2024 06:05  Phos  4.4     07-15  Mg     2.20     07-15    TPro  6.3  /  Alb  3.4  /  TBili  0.3  /  DBili  <0.2  /  AST  58<H>  /  ALT  38  /  AlkPhos  158<H>  07-15                  PT/INR - ( 15 Jul 2024 06:05 )   PT: 9.4 sec;   INR: <0.90 ratio         PTT - ( 15 Jul 2024 06:05 )  PTT:34.4 sec  LIVER FUNCTIONS - ( 15 Jul 2024 06:05 )  Alb: 3.4 g/dL / Pro: 6.3 g/dL / ALK PHOS: 158 U/L / ALT: 38 U/L / AST: 58 U/L / GGT: x             CXR:      Plan:  General: 59yMale s/p [place] Broderick Esophagogastrectomy / J-tube,  appears fairly comfortable, complaining of chest pain with deep breathing.                             Neuro:                                         Pain control with PCA / PCEA / IV Tylenol                            Cardiovascular:                                          Continue hemodynamic monitoring.                                        Continue IVF LR 75cc/hr                            Respiratory:                                         Postop hypoxemia requiring O2 via nasal cannula - Wean nasal cannula for goal O2sat above 92%.                                              Obtain CXR. Incentive spirometry.                                                   Chest PT and frequent suctioning. Continue bronchodilators, pulmozyme and inhaled 3% saline                                                      OOB to chair & ambulate w/ assistance.                                                           Continuous pulse oximetry for support & to prevent decompensation.                                         Monitor chest tube output                                         Chest tube to suction                             GI                                          NPO / with J-tube feeds                                          Continue GI prophylaxis with  Protonix                                          Continue Zofran / Reglan for nausea - PRN                                          NGT to low continuous wall suction & J-tube to gravity                                          Flush both NGT and J-tube with 30cc of sterile water Q 4hrs.  	                                                                 Renal:                                          Continue LR 75cc/hr                                          Monitor I/Os and electrolytes                                          Elizabeth                                                  Hem/ Onc:                                                                                   Monitor chest tube output &  signs of bleeding.                                           Follow CBC in AM                           Infectious disease:                                             No signs of infection. Monitor for fever / leukocytosis.                                           All surgical incision / chest tube  sites look clean                            Endocrine                                              DM-2: Continue Accu-Checks with coverage.     Pt is on SQ Heparin and Venodyne boots for DVT prophylaxis.     Pertinent clinical, laboratory, radiographic, hemodynamic, echocardiographic, respiratory data, microbiologic data and chart were reviewed and analyzed frequently throughout the course of the day and night  Patient seen, examined and plan discussed with CT Surgeon Dr. Santos / CTICU team during rounds.    Status discussed with patient  / family at bedside  and  updated plan of care.     I have spent 50 minutes with this patient including 20 minutes of time coordinating care in the ICU.      I have spent 70 / 120 minutes of critical care time with this patient between 7am and 11.59pm monitoring hemodynamic status, respiratory status, IVF resuscitation , titrating pressors and managing ventilator.             Andrey Bernstein MD                                                                     BHARATH GONZALES            MRN-[id number]         No Known Allergies               59 year old male, went to [facility] c/o fatigue, dizziness.  1/1/20240  pt was worked up found to be severely anemic received PRBC EGD on 01/02/2024: nodule on gastric side of the GEJ with some old heme in stomach. Possible healing Kavita Valero tear. Pathology reveal GEJ bx: fragments of adenocarcinoma, moderately differentiated. Pt received  radiation therapy & chemotherapy completed 6/25/24. Pt was reevaluated by surgeon and now present to [facility] for preop optimization for scheduled Robotic assisted Michael broderick esophagectomy. (12 Jul 2024 14:00)    CHIEF COMPLAINT: Follow up in ICU  for postoperative care of patient who is s/p  Esophagogastrectomy     Procedure: [place] broderick esophagectomy 7/16/2024    Issues:              Esophageal cancer              Postop pain              Chest tube in place  HLD (hyperlipidemia)  Insulin dependent type 2 diabetes mellitus  BPH (benign prostatic hyperplasia)  HTN (hypertension)  Gastroesophageal cancer  Port-A-Cath in place      Postop course:     Patient reports moderate pain at chest wall incision sites which is worse with coughing and deep breathing without associated fever or dyspnea. Pain is improved with use of PCA and  IV Tylenol.         PAST MEDICAL & SURGICAL HISTORY:  HLD (hyperlipidemia)      Insulin dependent type 2 diabetes mellitus      BPH (benign prostatic hyperplasia)      HTN (hypertension)      Gastroesophageal cancer      Gastroesophageal cancer      Port-A-Cath in place          ICU Vital Signs Last 24 Hrs  T(C): 36.8 (16 Jul 2024 07:49), Max: 37.1 (16 Jul 2024 06:01)  T(F): 98.3 (16 Jul 2024 07:49), Max: 98.8 (16 Jul 2024 06:01)  HR: 100 (16 Jul 2024 07:49) (65 - 100)  BP: 107/59 (16 Jul 2024 07:49) (107/59 - 162/93)  BP(mean): --  ABP: --  ABP(mean): --  RR: 18 (16 Jul 2024 07:49) (16 - 18)  SpO2: 95% (16 Jul 2024 07:49) (95% - 98%)    O2 Parameters below as of 16 Jul 2024 07:49  Patient On (Oxygen Delivery Method): room air          I&O's Detail    15 Jul 2024 07:01  -  16 Jul 2024 07:00  --------------------------------------------------------  IN:    Oral Fluid: 720 mL    sodium chloride 0.9%: 225 mL  Total IN: 945 mL    OUT:  Total OUT: 0 mL    Total NET: 945 mL        CAPILLARY BLOOD GLUCOSE      POCT Blood Glucose.: 267 mg/dL (16 Jul 2024 06:30)    Home Medications:  amLODIPine 5 mg oral tablet: 1 tab(s) orally once a day Noon (10 Jul 2024 09:06)  atorvastatin 20 mg oral tablet: 1 tab(s) orally once a day noon (10 Jul 2024 09:06)  Jardiance 25 mg oral tablet: 1 tab(s) orally once a day Noon (10 Jul 2024 09:06)  losartan-hydrochlorothiazide 50 mg-12.5 mg oral tablet: 1 tab(s) orally once a day (10 Jul 2024 09:06)  metFORMIN 500 mg oral tablet: 1 tab(s) orally 2 times a day (10 Jul 2024 09:16)  NovoLOG 100 units/mL subcutaneous solution: subcutaneous 3 times a day (before meals) 15 units (10 Jul 2024 09:44)  pantoprazole 40 mg oral delayed release tablet: 1 tab(s) orally once a day AM (10 Jul 2024 09:16)  Sodium Chloride, 1 G, PO, 2 times Daily:  (10 Jul 2024 09:16)  tamsulosin 0.4 mg oral capsule: 1 cap(s) orally once a day (at bedtime) (10 Jul 2024 09:16)    MEDICATIONS  (STANDING):  amLODIPine   Tablet 10 milliGRAM(s) Oral daily  dextrose 50% Injectable 25 Gram(s) IV Push once  dextrose 50% Injectable 12.5 Gram(s) IV Push once  heparin   Injectable 5000 Unit(s) SubCutaneous every 12 hours  HYDROmorphone PCA (1 mG/mL) 30 milliLiter(s) PCA Continuous PCA Continuous  insulin glargine Injectable (LANTUS) 15 Unit(s) SubCutaneous <User Schedule>  insulin lispro (ADMELOG) corrective regimen sliding scale   SubCutaneous every 6 hours  losartan 50 milliGRAM(s) Oral daily  pantoprazole    Tablet 40 milliGRAM(s) Oral before breakfast  sodium chloride 0.9%. 1000 milliLiter(s) (75 mL/Hr) IV Continuous <Continuous>  tamsulosin 0.4 milliGRAM(s) Oral at bedtime    MEDICATIONS  (PRN):  hydrALAZINE Injectable 10 milliGRAM(s) IV Push every 4 hours PRN systolic BP greater than 170  HYDROmorphone PCA (1 mG/mL) Rescue Clinician Bolus 0.5 milliGRAM(s) IV Push every 15 minutes PRN for Pain Scale GREATER THAN 6  naloxone Injectable 0.1 milliGRAM(s) IV Push every 3 minutes PRN For ANY of the following changes in patient status:  A. RR LESS THAN 10 breaths per minute, B. Oxygen saturation LESS THAN 90%, C. Sedation score of 6  ondansetron Injectable 4 milliGRAM(s) IV Push every 6 hours PRN Nausea        Physical exam:                             General:               Pt is awake, alert, not in any distress                                                  Neuro:                  Nonfocal                             Cardiovascular:   S1 & S2, regular                           Respiratory:         Air entry is fair and equal on both sides, has bilateral conducted sounds                           GI:                          Soft, nondistended and nontender, Bowel sounds absent                            Ext:                        No cyanosis or edema     Labs:                                                                           11.0   3.64  )-----------( 166      ( 15 Jul 2024 06:05 )             32.6             07-15    135  |  103  |  28<H>  ----------------------------<  320<H>  4.7   |  21<L>  |  1.02    Ca    9.0      15 Jul 2024 06:05  Phos  4.4     07-15  Mg     2.20     07-15    TPro  6.3  /  Alb  3.4  /  TBili  0.3  /  DBili  <0.2  /  AST  58<H>  /  ALT  38  /  AlkPhos  158<H>  07-15                  PT/INR - ( 15 Jul 2024 06:05 )   PT: 9.4 sec;   INR: <0.90 ratio         PTT - ( 15 Jul 2024 06:05 )  PTT:34.4 sec  LIVER FUNCTIONS - ( 15 Jul 2024 06:05 )  Alb: 3.4 g/dL / Pro: 6.3 g/dL / ALK PHOS: 158 U/L / ALT: 38 U/L / AST: 58 U/L / GGT: x             CXR:  Postop changes, NGT and CT in place. Lungs mildly congested.     Plan:  General: 59yMale s/p Michael Broderick Esophagogastrectomy 7/16/2024,  appears fairly comfortable, complaining of chest pain with deep breathing.                             Neuro:                                         Pain control with PCA  / IV Tylenol PRN                            Cardiovascular:                                          HTN: Continue hemodynamic monitoring. Hold Norvasc    HLD: Hold Lipitor    Continue IVF LR 100cc/hr                            Respiratory:                                         Postop hypoxemia requiring O2 via nasal cannula - Wean nasal cannula for goal O2sat above 92%.                                              CXR is clear. Incentive spirometry.                                                   Chest PT and frequent suctioning. Continue bronchodilators, pulmozyme and inhaled 3% saline                                                      OOB to chair & ambulate w/ assistance.                                                           Continuous pulse oximetry for support & to prevent decompensation.                                         Monitor chest tube output                                         Chest tube to suction                             GI                                          NPO                                           Continue GI prophylaxis with  Protonix                                          Continue Zofran / Reglan for nausea - PRN                                          NGT to low continuous wall suction                                           Flush NGT  with 30cc of sterile water Q 4hrs.  	                                                                 Renal:                                          Continue LR 100cc/hr                                          Monitor I/Os and electrolytes                                          BPH: Has Elizabeth in place                                                 Hem/ Onc:                                                                                   Monitor chest tube output &  signs of bleeding.                                           Follow CBC in AM                           Infectious disease:                                             No signs of infection. Monitor for fever / leukocytosis.                                           All surgical incision / chest tube  sites look clean                            Endocrine                                              DM-2: Continue Accu-Checks with coverage.  Consider Lantus      Pt is on SQ Heparin and Venodyne boots for DVT prophylaxis.     Pertinent clinical, laboratory, radiographic, hemodynamic, echocardiographic, respiratory data, microbiologic data and chart were reviewed and analyzed frequently throughout the course of the day and night  Patient seen, examined and plan discussed with CT Surgeon Dr. Laureano / CTICU team during rounds.  Status discussed with patient and  updated plan of care.   I have spent 50 minutes of  time with this patient  monitoring hemodynamic status, respiratory status,and coordinating care in the ICU          Andrey Bernstein MD

## 2024-07-16 NOTE — BRIEF OPERATIVE NOTE - COMMENTS
Glenn MONTANO PA-C provided direct first assist support to the surgeon during this surgical procedure. My involvement included positioning, prepping and draping the patient prior to surgery, robotic port placement, robotic docking, robotic instrument insertion and removal, ensuring clear visibility and exposure for the surgeon by using instruments such as retractors, suction and sponges, stapling tissues and vessels, retrieving specimens from the operative field, closing surgical incisions, undocking the robot and dressing wounds.  As well as other tasks as directed by the surgeon.

## 2024-07-17 LAB
ANION GAP SERPL CALC-SCNC: 12 MMOL/L — SIGNIFICANT CHANGE UP (ref 7–14)
APTT BLD: 32.7 SEC — SIGNIFICANT CHANGE UP (ref 24.5–35.6)
BLOOD GAS ARTERIAL - LYTES,HGB,ICA,LACT RESULT: SIGNIFICANT CHANGE UP
BUN SERPL-MCNC: 27 MG/DL — HIGH (ref 7–23)
CALCIUM SERPL-MCNC: 8.2 MG/DL — LOW (ref 8.4–10.5)
CHLORIDE SERPL-SCNC: 105 MMOL/L — SIGNIFICANT CHANGE UP (ref 98–107)
CO2 SERPL-SCNC: 21 MMOL/L — LOW (ref 22–31)
CREAT SERPL-MCNC: 0.85 MG/DL — SIGNIFICANT CHANGE UP (ref 0.5–1.3)
EGFR: 99 ML/MIN/1.73M2 — SIGNIFICANT CHANGE UP
GLUCOSE BLDC GLUCOMTR-MCNC: 126 MG/DL — HIGH (ref 70–99)
GLUCOSE BLDC GLUCOMTR-MCNC: 205 MG/DL — HIGH (ref 70–99)
GLUCOSE BLDC GLUCOMTR-MCNC: 206 MG/DL — HIGH (ref 70–99)
GLUCOSE BLDC GLUCOMTR-MCNC: 89 MG/DL — SIGNIFICANT CHANGE UP (ref 70–99)
GLUCOSE SERPL-MCNC: 161 MG/DL — HIGH (ref 70–99)
HCT VFR BLD CALC: 29 % — LOW (ref 39–50)
HGB BLD-MCNC: 9.5 G/DL — LOW (ref 13–17)
INR BLD: <0.9 RATIO — SIGNIFICANT CHANGE UP (ref 0.85–1.18)
MAGNESIUM SERPL-MCNC: 1.8 MG/DL — SIGNIFICANT CHANGE UP (ref 1.6–2.6)
MCHC RBC-ENTMCNC: 30.4 PG — SIGNIFICANT CHANGE UP (ref 27–34)
MCHC RBC-ENTMCNC: 32.8 GM/DL — SIGNIFICANT CHANGE UP (ref 32–36)
MCV RBC AUTO: 92.9 FL — SIGNIFICANT CHANGE UP (ref 80–100)
NRBC # BLD: 0 /100 WBCS — SIGNIFICANT CHANGE UP (ref 0–0)
NRBC # FLD: 0 K/UL — SIGNIFICANT CHANGE UP (ref 0–0)
PHOSPHATE SERPL-MCNC: 4.6 MG/DL — HIGH (ref 2.5–4.5)
PLATELET # BLD AUTO: 134 K/UL — LOW (ref 150–400)
POTASSIUM SERPL-MCNC: 4.6 MMOL/L — SIGNIFICANT CHANGE UP (ref 3.5–5.3)
POTASSIUM SERPL-SCNC: 4.6 MMOL/L — SIGNIFICANT CHANGE UP (ref 3.5–5.3)
PROTHROM AB SERPL-ACNC: 9.6 SEC — SIGNIFICANT CHANGE UP (ref 9.5–13)
RBC # BLD: 3.12 M/UL — LOW (ref 4.2–5.8)
RBC # FLD: 13.4 % — SIGNIFICANT CHANGE UP (ref 10.3–14.5)
SODIUM SERPL-SCNC: 138 MMOL/L — SIGNIFICANT CHANGE UP (ref 135–145)
WBC # BLD: 9.91 K/UL — SIGNIFICANT CHANGE UP (ref 3.8–10.5)
WBC # FLD AUTO: 9.91 K/UL — SIGNIFICANT CHANGE UP (ref 3.8–10.5)

## 2024-07-17 PROCEDURE — 99233 SBSQ HOSP IP/OBS HIGH 50: CPT

## 2024-07-17 PROCEDURE — 71045 X-RAY EXAM CHEST 1 VIEW: CPT | Mod: 26

## 2024-07-17 PROCEDURE — 99232 SBSQ HOSP IP/OBS MODERATE 35: CPT | Mod: GC

## 2024-07-17 RX ORDER — DEXTROSE 4 G
25 TABLET,CHEWABLE ORAL ONCE
Refills: 0 | Status: COMPLETED | OUTPATIENT
Start: 2024-07-17 | End: 2024-07-17

## 2024-07-17 RX ORDER — LIDOCAINE 5% 5 G/100G
1 CREAM TOPICAL AT BEDTIME
Refills: 0 | Status: DISCONTINUED | OUTPATIENT
Start: 2024-07-17 | End: 2024-08-09

## 2024-07-17 RX ORDER — MAGNESIUM SULFATE 500 MG/ML
2 VIAL (ML) INJECTION ONCE
Refills: 0 | Status: COMPLETED | OUTPATIENT
Start: 2024-07-17 | End: 2024-07-17

## 2024-07-17 RX ORDER — CHLORHEXIDINE GLUCONATE 500 MG/1
1 CLOTH TOPICAL DAILY
Refills: 0 | Status: DISCONTINUED | OUTPATIENT
Start: 2024-07-17 | End: 2024-07-25

## 2024-07-17 RX ORDER — METOPROLOL TARTRATE 100 MG
2.5 TABLET ORAL EVERY 6 HOURS
Refills: 0 | Status: DISCONTINUED | OUTPATIENT
Start: 2024-07-17 | End: 2024-07-19

## 2024-07-17 RX ORDER — LEVALBUTEROL HYDROCHLORIDE 0.31 MG/3ML
0.63 SOLUTION RESPIRATORY (INHALATION) EVERY 6 HOURS
Refills: 0 | Status: DISCONTINUED | OUTPATIENT
Start: 2024-07-17 | End: 2024-08-09

## 2024-07-17 RX ADMIN — Medication 25 GRAM(S): at 23:33

## 2024-07-17 RX ADMIN — Medication 2.5 MILLIGRAM(S): at 05:51

## 2024-07-17 RX ADMIN — Medication 2.5 MILLIGRAM(S): at 09:31

## 2024-07-17 RX ADMIN — Medication 2.5 MILLIGRAM(S): at 16:08

## 2024-07-17 RX ADMIN — Medication 400 MILLIGRAM(S): at 13:27

## 2024-07-17 RX ADMIN — Medication 1000 MILLIGRAM(S): at 18:54

## 2024-07-17 RX ADMIN — Medication 400 MILLIGRAM(S): at 18:24

## 2024-07-17 RX ADMIN — Medication 30 MILLILITER(S): at 19:27

## 2024-07-17 RX ADMIN — DEXTROSE MONOHYDRATE, SODIUM CHLORIDE, SODIUM LACTATE, CALCIUM CHLORIDE, MAGNESIUM CHLORIDE 100 MILLILITER(S): 1.5; 538; 448; 18.4; 5.08 SOLUTION INTRAPERITONEAL at 19:27

## 2024-07-17 RX ADMIN — PANTOPRAZOLE SODIUM 40 MILLIGRAM(S): 20 TABLET, DELAYED RELEASE ORAL at 13:27

## 2024-07-17 RX ADMIN — HEPARIN SODIUM 5000 UNIT(S): 1000 INJECTION, SOLUTION INTRAVENOUS; SUBCUTANEOUS at 18:24

## 2024-07-17 RX ADMIN — Medication 4 MILLILITER(S): at 05:51

## 2024-07-17 RX ADMIN — INSULIN GLARGINE-YFGN 7 UNIT(S): 100 INJECTION, SOLUTION SUBCUTANEOUS at 07:30

## 2024-07-17 RX ADMIN — Medication 4 MILLILITER(S): at 09:31

## 2024-07-17 RX ADMIN — HEPARIN SODIUM 5000 UNIT(S): 1000 INJECTION, SOLUTION INTRAVENOUS; SUBCUTANEOUS at 06:00

## 2024-07-17 RX ADMIN — Medication 25 GRAM(S): at 06:00

## 2024-07-17 RX ADMIN — DORNASE ALFA 2.5 MILLIGRAM(S): 1 SOLUTION RESPIRATORY (INHALATION) at 09:31

## 2024-07-17 RX ADMIN — Medication 2.5 MILLIGRAM(S): at 21:16

## 2024-07-17 RX ADMIN — LEVALBUTEROL HYDROCHLORIDE 0.63 MILLIGRAM(S): 0.31 SOLUTION RESPIRATORY (INHALATION) at 20:07

## 2024-07-17 RX ADMIN — Medication 4 MILLILITER(S): at 16:07

## 2024-07-17 RX ADMIN — Medication 400 MILLIGRAM(S): at 06:00

## 2024-07-17 RX ADMIN — Medication 2: at 13:30

## 2024-07-17 RX ADMIN — Medication 2: at 06:08

## 2024-07-17 RX ADMIN — Medication 4 MILLIGRAM(S): at 03:29

## 2024-07-17 RX ADMIN — Medication 4 MILLILITER(S): at 20:08

## 2024-07-17 NOTE — CONSULT NOTE ADULT - ASSESSMENT
Pt is a 59 y.o. man with history of HTN,, DLD, DM,  GEJ adenocarcinoma (T3N0, stage 2A) s/p neoadjuvant chemotherapy admitted for planned [place]Broderick Esophagectomy.    S/p  [place]Broderick Esophagectomy:    Cont mx as per SICU  Thoracic Sx f/up appreciated.  NPO  Pain mx Dilaudid PCA  IV PPI  IV Zofran PRN for N/V  Xopenex Neb  NGT suction    DM II:    FSSS  On NPH 3 units every 6 hours (Pt is NPO)    HTN:    Cardio f/up appreciated

## 2024-07-17 NOTE — CONSULT NOTE ADULT - SUBJECTIVE AND OBJECTIVE BOX
Patient is a 59y old  Male s/p Esophagectomy (17 Jul 2024 15:44)      HPI:  59 year old male, went to [facility] c/o fatigue, dizziness.  1/1/20240  pt was worked up found to be severely anemic received PRBC EGD on 01/02/2024: nodule on gastric side of the GEJ with some old heme in stomach. Possible healing Kavita Valero tear. Pathology reveal GEJ bx: fragments of adenocarcinoma, moderately differentiated. Pt received  radiation therapy & chemotherapy completed 6/25/24. Pt was reevaluated by surgeon and now presented to [facility]  Robotic assisted Michael senait esophagectomy. (12 Jul 2024 14:00)      PAST MEDICAL & SURGICAL HISTORY:  HLD (hyperlipidemia)      Insulin dependent type 2 diabetes mellitus      BPH (benign prostatic hyperplasia)      HTN (hypertension)      Gastroesophageal cancer      Gastroesophageal cancer      Port-A-Cath in place          Review of Systems:   CONSTITUTIONAL: No fever,    RESPIRATORY: No cough,  No shortness of breath  CARDIOVASCULAR: No chest pain, palpitations, dizziness, or leg swelling  GASTROINTESTINAL:  No nausea, vomiting.  NEUROLOGICAL: No headache          Allergies    No Known Allergies    Intolerances        Social History:     FAMILY HISTORY:      MEDICATIONS  (STANDING):  acetaminophen   IVPB .. 1000 milliGRAM(s) IV Intermittent every 6 hours  dextrose 50% Injectable 25 Gram(s) IV Push once  dextrose 50% Injectable 12.5 Gram(s) IV Push once  dornase malik Solution 2.5 milliGRAM(s) Inhalation daily  heparin   Injectable 5000 Unit(s) SubCutaneous every 12 hours  HYDROmorphone PCA (1 mG/mL) 30 milliLiter(s) PCA Continuous PCA Continuous  insulin lispro (ADMELOG) corrective regimen sliding scale   SubCutaneous every 6 hours  lactated ringers. 1000 milliLiter(s) (100 mL/Hr) IV Continuous <Continuous>  levalbuterol Inhalation 0.63 milliGRAM(s) Inhalation every 6 hours  lidocaine   4% Patch 1 Patch Transdermal at bedtime  pantoprazole  Injectable 40 milliGRAM(s) IV Push daily  sodium chloride 3%  Inhalation 4 milliLiter(s) Inhalation every 6 hours    MEDICATIONS  (PRN):  HYDROmorphone PCA (1 mG/mL) Rescue Clinician Bolus 0.5 milliGRAM(s) IV Push every 15 minutes PRN for Pain Scale GREATER THAN 6  naloxone Injectable 0.1 milliGRAM(s) IV Push every 3 minutes PRN For ANY of the following changes in patient status:  A. RR LESS THAN 10 breaths per minute, B. Oxygen saturation LESS THAN 90%, C. Sedation score of 6  ondansetron Injectable 4 milliGRAM(s) IV Push every 6 hours PRN Nausea      CAPILLARY BLOOD GLUCOSE      POCT Blood Glucose.: 206 mg/dL (17 Jul 2024 13:29)  POCT Blood Glucose.: 205 mg/dL (17 Jul 2024 06:07)  POCT Blood Glucose.: 166 mg/dL (16 Jul 2024 23:36)    I&O's Summary    16 Jul 2024 07:01  -  17 Jul 2024 07:00  --------------------------------------------------------  IN: 1450 mL / OUT: 1320 mL / NET: 130 mL    17 Jul 2024 07:01  -  17 Jul 2024 17:04  --------------------------------------------------------  IN: 1540 mL / OUT: 505 mL / NET: 1035 mL        T(C): 36.3 (07-17-24 @ 16:00), Max: 36.6 (07-17-24 @ 00:00)  HR: 102 (07-17-24 @ 16:08) (86 - 111)  BP: 139/79 (07-17-24 @ 05:00) (111/79 - 149/82)  RR: 18 (07-17-24 @ 16:00) (9 - 23)  SpO2: 98% (07-17-24 @ 16:08) (93% - 100%)    PHYSICAL EXAM:      NECK: Supple, No JVD  CHEST/LUNG: Clear to auscultation bilaterally; No wheezing.  HEART: Regular rate and rhythm; No murmurs, rubs, or gallops  ABDOMEN: Soft, NGT  EXTREMITIES:  2+ Peripheral Pulses, No edema  NEUROLOGY: AAOx 3      LABS:                        9.5    9.91  )-----------( 134      ( 17 Jul 2024 03:30 )             29.0     07-17    138  |  105  |  27<H>  ----------------------------<  161<H>  4.6   |  21<L>  |  0.85    Ca    8.2<L>      17 Jul 2024 03:30  Phos  4.6     07-17  Mg     1.80     07-17    TPro  6.3  /  Alb  3.6  /  TBili  0.4  /  DBili  x   /  AST  136<H>  /  ALT  98<H>  /  AlkPhos  129<H>  07-16    PT/INR - ( 17 Jul 2024 03:30 )   PT: 9.6 sec;   INR: <0.90 ratio         PTT - ( 17 Jul 2024 03:30 )  PTT:32.7 sec      Urinalysis Basic - ( 17 Jul 2024 03:30 )    Color: x / Appearance: x / SG: x / pH: x  Gluc: 161 mg/dL / Ketone: x  / Bili: x / Urobili: x   Blood: x / Protein: x / Nitrite: x   Leuk Esterase: x / RBC: x / WBC x   Sq Epi: x / Non Sq Epi: x / Bacteria: x      CAPILLARY BLOOD GLUCOSE      POCT Blood Glucose.: 206 mg/dL (17 Jul 2024 13:29)  POCT Blood Glucose.: 205 mg/dL (17 Jul 2024 06:07)  POCT Blood Glucose.: 166 mg/dL (16 Jul 2024 23:36)        RADIOLOGY & ADDITIONAL TESTS:    Imaging Personally Reviewed:    Consultant(s) Notes Reviewed:      Care Discussed with Consultants/Other Providers:    Thanks for consult. Will follow.

## 2024-07-17 NOTE — PROGRESS NOTE ADULT - ASSESSMENT
The patient is a 59y Male with PMH of T2DM, HTN, HLD, GE junction cancer here for esophagectomy.  Endocrinology consulted for T2DM    #Uncontrolled Type 2 Diabetes Mellitus   - Follows with: Dr. Nolasco  - A1C with Estimated Average Glucose Result: 10.9 % (07-13-24)  - home regimen: Semglee 18 units, Novolog 6 units  - eGFR: 84 mL/min/1.73m2 (07-15-24)  - Weight (kg): 54 (07-12-24)  - glucose 384-101, no evidence of DKA on labs      INPATIENT PLAN:  - Received 7 units of Lantus this morning  - Currently ordered for NPH insulin 3 units q6h.   - Recommend Low dose admelog correction scale q6h  - Please check FSG before meals and QHS, or q6h while NPO  - Inpatient glucose goals: <140 pre-meal, <180 random      DISCHARGE PLANNING:  - Discharge recs pending clinical course and nutrition plan. If he resumes his normal diet, can discharge on home regimen of Semglee 18 units and Admelog 6 units with meals along with his oral medications as before.  - will need Endocrinology follow up. Patient has an appointment with Dr. Nolasco scheduled for August 8th at 8:20 AM at [address] [facility] [address].   #Hypertension  - Goal BP <130/80  - Management as per primary team  - check urine microalbumin level as outpatient    #Hyperlipidemia  - LDL goal <70  - check lipid panel as outpatient on a yearly basis    Pending discussion with attending physician  Kashif Low, DO PGY-4  Endocrine Fellow  For nonurgent matters (including consults or followup questions), please email [email or web address] or [email or web address]. For urgent matters, please call answering service at [phone] (weekdays), [phone] (nights/weekends).    The patient is a 59y Male with PMH of T2DM, HTN, HLD, GE junction cancer here for esophagectomy.  Endocrinology consulted for T2DM    #Uncontrolled Type 2 Diabetes Mellitus   - Follows with: Dr. Nolasco  - A1C with Estimated Average Glucose Result: 10.9 % (07-13-24)  - home regimen: Semglee 18 units, Novolog 6 units  - eGFR: 84 mL/min/1.73m2 (07-15-24)  - Weight (kg): 54 (07-12-24)  - glucose 384-101, no evidence of DKA on labs      INPATIENT PLAN:  - Received 7 units of Lantus this morning  - Currently ordered for NPH insulin 3 units q6h. Recommend switching back to Lantus 7 units in the morning  - Recommend Low dose admelog correction scale q6h. Can escalate to moderate dose correction scale if consistently above goal.   - Please check FSG before meals and QHS, or q6h while NPO  - Inpatient glucose goals: 140-180 in this critically ill patient. Recommend adding dextrose to IV fluids if glucose is consistently below 120      DISCHARGE PLANNING:  - Discharge recs pending clinical course and nutrition plan. If he resumes his normal diet, can discharge on home regimen of Semglee 18 units and Admelog 6 units with meals along with his oral medications as before.  - will need Endocrinology follow up. Patient has an appointment with Dr. Nolasco scheduled for August 8th at 8:20 AM at [address] [facility] [address]   #Hypertension  - Goal BP <130/80  - Management as per primary team  - check urine microalbumin level as outpatient    #Hyperlipidemia  - LDL goal <70  - check lipid panel as outpatient on a yearly basis    Discussed with attending physician  Kashif Low, DO PGY-4  Endocrine Fellow  For nonurgent matters (including consults or followup questions), please email [email or web address] or [email or web address]. For urgent matters, please call answering service at [phone] (weekdays), [phone] (nights/weekends).

## 2024-07-17 NOTE — PROGRESS NOTE ADULT - SUBJECTIVE AND OBJECTIVE BOX
DATE OF SERVICE: 07-17-24    Patient s/p [place] esophagectomy. Patient denies chest pain or shortness of breath.   Review of symptoms otherwise negative.    MEDICATIONS:  acetaminophen   IVPB .. 1000 milliGRAM(s) IV Intermittent every 6 hours  albuterol    0.083% 2.5 milliGRAM(s) Nebulizer every 6 hours  dextrose 50% Injectable 25 Gram(s) IV Push once  dextrose 50% Injectable 12.5 Gram(s) IV Push once  dornase malik Solution 2.5 milliGRAM(s) Inhalation daily  heparin   Injectable 5000 Unit(s) SubCutaneous every 12 hours  HYDROmorphone PCA (1 mG/mL) 30 milliLiter(s) PCA Continuous PCA Continuous  HYDROmorphone PCA (1 mG/mL) Rescue Clinician Bolus 0.5 milliGRAM(s) IV Push every 15 minutes PRN  insulin lispro (ADMELOG) corrective regimen sliding scale   SubCutaneous every 6 hours  lactated ringers. 1000 milliLiter(s) IV Continuous <Continuous>  naloxone Injectable 0.1 milliGRAM(s) IV Push every 3 minutes PRN  ondansetron Injectable 4 milliGRAM(s) IV Push every 6 hours PRN  pantoprazole  Injectable 40 milliGRAM(s) IV Push daily  sodium chloride 3%  Inhalation 4 milliLiter(s) Inhalation every 6 hours      LABS:                        9.5    9.91  )-----------( 134      ( 17 Jul 2024 03:30 )             29.0       Hemoglobin: 9.5 g/dL (07-17 @ 03:30)  Hemoglobin: 9.4 g/dL (07-16 @ 19:12)  Hemoglobin: 11.0 g/dL (07-15 @ 06:05)  Hemoglobin: 11.1 g/dL (07-14 @ 06:08)  Hemoglobin: 10.9 g/dL (07-13 @ 02:44)      07-17    138  |  105  |  27<H>  ----------------------------<  161<H>  4.6   |  21<L>  |  0.85    Ca    8.2<L>      17 Jul 2024 03:30  Phos  4.6     07-17  Mg     1.80     07-17    TPro  6.3  /  Alb  3.6  /  TBili  0.4  /  DBili  x   /  AST  136<H>  /  ALT  98<H>  /  AlkPhos  129<H>  07-16    Creatinine Trend: 0.85<--, 0.86<--, 1.02<--, 0.71<--, 0.68<--, 0.79<--    COAGS: PT/INR - ( 17 Jul 2024 03:30 )   PT: 9.6 sec;   INR: <0.90 ratio         PTT - ( 17 Jul 2024 03:30 )  PTT:32.7 sec          PHYSICAL EXAM:  T(C): 36.4 (07-17-24 @ 04:00), Max: 36.6 (07-17-24 @ 00:00)  HR: 95 (07-17-24 @ 09:34) (86 - 102)  BP: 139/79 (07-17-24 @ 05:00) (111/79 - 149/82)  RR: 16 (07-17-24 @ 09:34) (12 - 23)  SpO2: 98% (07-17-24 @ 09:34) (93% - 100%)  Wt(kg): --    I&O's Summary    16 Jul 2024 07:01  -  17 Jul 2024 07:00  --------------------------------------------------------  IN: 1350 mL / OUT: 1320 mL / NET: 30 mL            Gen: NAD  HEENT:  (-)icterus (-)pallor  CV: N S1 S2 1/6 CATHY (+)2 Pulses B/l  Resp:  Clear to auscultation B/L, normal effort  GI: (+) BS Soft, NT, ND  Lymph:  (-)Edema, (-)obvious lymphadenopathy  Skin: Warm to touch, Normal turgor  Psych: Appropriate mood and affect      TELEMETRY: 	  NSR    ECG:  	NSR      TTE 07/2024  Findings:  1. Normal left ventricular size and function.  Mild diastolic dysfunction.  2. Normal left atrial size  3. Right atrial cavity is normal in size.  4. Normal right ventricular size and function.  5. Normal trileaflet aortic valve opening.  6. Normal mitral valve opening.  7. Normal appearing tricuspid valve with mild tricuspid  regurgitation.  8. Pulmonic valve is grossly normal, yet poorly visualized  with no doppler evidence for pulmonic stenosis.  9. No evidence of significant pericardial effusion.  10. The aortic root is normal.  11. Normal pulmonary artery.  12. IVC is normal with respiratory variation.  FULL STUDY DONE INCLUDING M-MODE RECORDING,  SPECTRAL DOPPLER AND    Stress 07/2024  Normal myocardial perfusion SPECT images No evidence of stress induced ischemia or infarction.  Normal left ventricular size and function.  Calculated EF is: 68%    ASSESSMENT/PLAN: 	Pt is a 59 y.o. man with history of HTN,, DLD, DM,  GEJ adenocarcinoma (T3N0, stage 2A) s/p neoadjuvant chemotherapy admitted for optimization prior to planned [place] Esophagectomy on tuesday. Recently had a nuclear stress test in our office for evaluation of preoperative cardiac risk assessment prior to esophageal cancer surgery scheduled on 07/16/2024. The patient denies any chest pain, shortness ofbreath, or anginal symptoms. He has no palpitations, dizziness, or syncope    Pre-OP/HTN  - EKG from office with no q waves, no chest pain,  euvolemic on exam and no severely stenotic valvular disease on recent TTE  - Patient low risk for esophageal surgery for MACEE can proceed with no further  CV w/u  - restart Amlodipine and Losartan when ok from surgical standpoint  - Tele with SUNDEEP Mason MD  Pager: [phone]

## 2024-07-17 NOTE — CHART NOTE - NSCHARTNOTEFT_GEN_A_CORE
General Surgery Post op Check    Pt seen and examined without complaints. Pain is controlled. Denies SOB/CP/N/V.     Vital Signs Last 24 Hrs  T(C): 36.6 (17 Jul 2024 00:00), Max: 37.1 (16 Jul 2024 06:01)  T(F): 97.8 (17 Jul 2024 00:00), Max: 98.8 (16 Jul 2024 06:01)  HR: 86 (17 Jul 2024 01:00) (65 - 102)  BP: 118/72 (17 Jul 2024 01:00) (107/59 - 162/93)  BP(mean): 85 (17 Jul 2024 01:00) (84 - 115)  RR: 12 (17 Jul 2024 01:00) (12 - 23)  SpO2: 97% (17 Jul 2024 01:00) (93% - 98%)    Parameters below as of 17 Jul 2024 01:00  Patient On (Oxygen Delivery Method): nasal cannula w/ humidification  O2 Flow (L/min): 4      I&O's Summary    15 Jul 2024 07:01  -  16 Jul 2024 07:00  --------------------------------------------------------  IN: 945 mL / OUT: 0 mL / NET: 945 mL    16 Jul 2024 07:01  -  17 Jul 2024 03:47  --------------------------------------------------------  IN: 800 mL / OUT: 810 mL / NET: -10 mL        Physical Exam  Gen: NAD, A&Ox3  HEENT: NGT tube on suction.   Pulm: No respiratory distress, no subcostal retractions  CV: RRR, no JVD  Abd: Soft, NT, ND  Drains: VENKATA x   Extremities:  FROM, warm and well perfused, equal bilateral muscle strength      Assessment: 59y Male s/p     Plan:  -DVT prophylaxis w/ SCD.  Encouraged OOB  -Strict I&O's  -Monitor VENKATA drain output  -Analgesia and antiemetics as needed  -Diet: sips as tolerated   -Monitor overnight  -Dispo:  Discharge to home when patient's pain is controlled, tolerating diet, voiding and is passing flatus General Surgery Post op Check    Pt seen and examined without complaints. Pain is controlled. Denies SOB/CP/N/V.     Vital Signs Last 24 Hrs  T(C): 36.6 (17 Jul 2024 00:00), Max: 37.1 (16 Jul 2024 06:01)  T(F): 97.8 (17 Jul 2024 00:00), Max: 98.8 (16 Jul 2024 06:01)  HR: 86 (17 Jul 2024 01:00) (65 - 102)  BP: 118/72 (17 Jul 2024 01:00) (107/59 - 162/93)  BP(mean): 85 (17 Jul 2024 01:00) (84 - 115)  RR: 12 (17 Jul 2024 01:00) (12 - 23)  SpO2: 97% (17 Jul 2024 01:00) (93% - 98%)    Parameters below as of 17 Jul 2024 01:00  Patient On (Oxygen Delivery Method): nasal cannula w/ humidification  O2 Flow (L/min): 4      I&O's Summary    15 Jul 2024 07:01  -  16 Jul 2024 07:00  --------------------------------------------------------  IN: 945 mL / OUT: 0 mL / NET: 945 mL    16 Jul 2024 07:01  -  17 Jul 2024 03:47  --------------------------------------------------------  IN: 800 mL / OUT: 810 mL / NET: -10 mL        Physical Exam  Gen: NAD, A&Ox3  HEENT: NGT tube on suction.   Pulm: No respiratory distress, no subcostal retractions  CV: RRR, no JVD  Abd: Soft, NT, ND  Drains: VENKATA x   Extremities:  FROM, warm and well perfused, equal bilateral muscle strength      Assessment: 59y Male s/p  Robotic assisted Michael senait esophagectomy.    Plan:  -DVT prophylaxis w/ SCD.    -Strict I&O's  -Monitor VENKATA drain output  -Analgesia and antiemetics as needed  -Diet: sips as tolerated   -Monitor overnight  -Dispo:  Discharge to home when patient's pain is controlled, tolerating diet, voiding and is passing flatus General Surgery Post op Check    Pt seen and examined without complaints. Pain is controlled. Denies SOB/CP/N/V.     Vital Signs Last 24 Hrs  T(C): 36.6 (17 Jul 2024 00:00), Max: 37.1 (16 Jul 2024 06:01)  T(F): 97.8 (17 Jul 2024 00:00), Max: 98.8 (16 Jul 2024 06:01)  HR: 86 (17 Jul 2024 01:00) (65 - 102)  BP: 118/72 (17 Jul 2024 01:00) (107/59 - 162/93)  BP(mean): 85 (17 Jul 2024 01:00) (84 - 115)  RR: 12 (17 Jul 2024 01:00) (12 - 23)  SpO2: 97% (17 Jul 2024 01:00) (93% - 98%)    Parameters below as of 17 Jul 2024 01:00  Patient On (Oxygen Delivery Method): nasal cannula w/ humidification  O2 Flow (L/min): 4      I&O's Summary    15 Jul 2024 07:01  -  16 Jul 2024 07:00  --------------------------------------------------------  IN: 945 mL / OUT: 0 mL / NET: 945 mL    16 Jul 2024 07:01  -  17 Jul 2024 03:47  --------------------------------------------------------  IN: 800 mL / OUT: 810 mL / NET: -10 mL        Physical Exam  Gen: NAD, A&Ox3  HEENT: NGT tube on suction.   Pulm: No respiratory distress, no subcostal retractions  CV: RRR, no JVD  Abd: Soft, NT, ND  Drains: Chest tube to suction  Extremities:  FROM, warm and well perfused, equal bilateral muscle strength      Assessment: 59y Male s/p  Robotic assisted Michael senait esophagectomy.    Plan:  -DVT prophylaxis w/ SCD.    -Strict I&O's  - Monitor chest tube output  - Analgesia and antiemetics as needed  - Diet: NPO/NGT/IVF  - Care per [facility]

## 2024-07-17 NOTE — PROGRESS NOTE ADULT - SUBJECTIVE AND OBJECTIVE BOX
CHIEF COMPLAINT: FOLLOW UP IN ICU FOR PATIENT WITH ESOPHAGEAL CANCER    ISSUES:   Esophageal cancer  Acute post operative anemia from expected blood loss  Post op pain  Chest tube in place  Uncontrolled DM2 (HgbA1c 11)  HTN  HLD  BPH  GERD      INTERVAL EVENTS:   Chest tubes with no airleak    HISTORY:   Patient denies chest pain, cough, pleuritic pain, fever or dyspnea. Patient denies nausea, vomiting, diarrhea, melena, hematochezia.  Pt denies dysphagia or food getting stuck in chest.       PHYSICAL EXAM:   Gen: Comfortable, No acute distress  Eyes: Sclera white, Conjunctiva normal, Eyelids normal, Pupils symmetrical   ENT: Mucous membranes moist,  ,  ,  NG tube  Neck: Trachea midline,  ,  ,  ,  ,  ,    CV: Rate regular, Rhythm regular,  ,  ,    Resp: Breath sounds clear, No accessory muscles use,  ,  ,  Two chest tubes  Abd: Soft, Non-distended, Non-tender,   ,  ,  ,    Skin: Warm, No peripheral edema of lower extremities,  ,    : stallworth  Neuro: Moving all 4 extremities,    Psych: A&Ox3      ASSESSMENT AND PLAN:     NEURO:  Post-operative Pain - Stable. Pain control with PCA and Tylenol IV PRN.          RESPIRATORY:  Hypoxia - Wean nasal cannula for goal O2sat above 92. Obtain CXR. Incentive spirometry. Chest PT and frequent suctioning. Continue bronchodilators. OOB to chair & ambulate w/ assistance. Continuous pulse oximetry for support & to prevent decompensation.           CARDIOVASCULAR:  Hemodynamically stable - Not on pressors. Continue hemodynamic monitoring.  Telemetry (medical test) - Reviewed by me today independently. Normal sinus rhythm.    HTN - stable  - Hold antihypertensives for now      RENAL:  Stable - Monitor IOs and electrolytes. Keep K above 4.0 and Mg above 2.0.       BPH - stable. not on flomax. voided this admission without issues.  - Continue stallworth catheter for now      GASTROINTESTINAL:  GI prophylaxis not indicated  Zofran and Reglan IV PRN for nausea  NPO        GERD - stable. Continue pantoprazole         HEMATOLOGIC:  No signs of active bleeding. Monitor Hgb in CBC in AM  DVT prophylaxis with heparin subQ           INFECTIOUS DISEASE:  All surgical sites appear clean. No signs of active infection. Will monitor for fever and leukocytosis.             ENDOCRINE:  Uncontrolled DM2 (HgbA1c 11) – stable.  - NPH insulin  - Lispro sliding scale  - Carb control diets  - Monitor glucose fingersticks for goal 120-180. Insulin sliding scale.            ONCOLOGY:  Esophageal cancer - improved s/p resection.  - Chest tube – Pleurevac regulated suctioning. Monitor chest tube output.  - NG tube to suction  - NPO until swallow study           Pertinent clinical, laboratory, radiographic, hemodynamic, echocardiographic, respiratory data, microbiologic data and chart were reviewed by myself and analyzed frequently throughout the course of the day and night by myself.    Plan discussed at length with the CTICU staff and Attending CT Surgeon -   Dr Lewis Laureano.      Patient's status was discussed with patient at bedside.       	  I have spent 50 minutes of time with this patient monitoring hemodynamic status, respiratory status, and coordinating care in the ICU.    ________________________________________________    _________________________  VITAL SIGNS:  Vital Signs Last 24 Hrs  T(C): 36.6 (17 Jul 2024 12:00), Max: 36.6 (17 Jul 2024 00:00)  T(F): 97.9 (17 Jul 2024 12:00), Max: 97.9 (17 Jul 2024 12:00)  HR: 111 (17 Jul 2024 15:00) (86 - 111)  BP: 139/79 (17 Jul 2024 05:00) (111/79 - 149/82)  BP(mean): 98 (17 Jul 2024 05:00) (84 - 115)  RR: 17 (17 Jul 2024 15:00) (9 - 23)  SpO2: 96% (17 Jul 2024 15:00) (93% - 100%)    Parameters below as of 17 Jul 2024 15:00  Patient On (Oxygen Delivery Method): room air      I/Os:   I&O's Detail    16 Jul 2024 07:01  -  17 Jul 2024 07:00  --------------------------------------------------------  IN:    IV PiggyBack: 250 mL    Lactated Ringers: 1200 mL  Total IN: 1450 mL    OUT:    Bulb (mL): 15 mL    Chest Tube (mL): 220 mL    Indwelling Catheter - Urethral (mL): 1085 mL  Total OUT: 1320 mL    Total NET: 130 mL      17 Jul 2024 07:01  -  17 Jul 2024 15:49  --------------------------------------------------------  IN:    Albumin 5%  - 250 mL: 250 mL    Enteral Tube Flush: 90 mL    IV PiggyBack: 300 mL    Lactated Ringers: 900 mL  Total IN: 1540 mL    OUT:    Chest Tube (mL): 80 mL    Indwelling Catheter - Urethral (mL): 375 mL    Nasogastric/Oral tube (mL): 50 mL  Total OUT: 505 mL    Total NET: 1035 mL              MEDICATIONS:  MEDICATIONS  (STANDING):  acetaminophen   IVPB .. 1000 milliGRAM(s) IV Intermittent every 6 hours  albuterol    0.083% 2.5 milliGRAM(s) Nebulizer every 6 hours  dextrose 50% Injectable 25 Gram(s) IV Push once  dextrose 50% Injectable 12.5 Gram(s) IV Push once  dornase malik Solution 2.5 milliGRAM(s) Inhalation daily  heparin   Injectable 5000 Unit(s) SubCutaneous every 12 hours  HYDROmorphone PCA (1 mG/mL) 30 milliLiter(s) PCA Continuous PCA Continuous  insulin lispro (ADMELOG) corrective regimen sliding scale   SubCutaneous every 6 hours  lactated ringers. 1000 milliLiter(s) (100 mL/Hr) IV Continuous <Continuous>  lidocaine   4% Patch 1 Patch Transdermal at bedtime  pantoprazole  Injectable 40 milliGRAM(s) IV Push daily  sodium chloride 3%  Inhalation 4 milliLiter(s) Inhalation every 6 hours    MEDICATIONS  (PRN):  HYDROmorphone PCA (1 mG/mL) Rescue Clinician Bolus 0.5 milliGRAM(s) IV Push every 15 minutes PRN for Pain Scale GREATER THAN 6  naloxone Injectable 0.1 milliGRAM(s) IV Push every 3 minutes PRN For ANY of the following changes in patient status:  A. RR LESS THAN 10 breaths per minute, B. Oxygen saturation LESS THAN 90%, C. Sedation score of 6  ondansetron Injectable 4 milliGRAM(s) IV Push every 6 hours PRN Nausea      LABS:  Laboratory data was independently reviewed by me today.                           9.5    9.91  )-----------( 134      ( 17 Jul 2024 03:30 )             29.0     07-17    138  |  105  |  27<H>  ----------------------------<  161<H>  4.6   |  21<L>  |  0.85    Ca    8.2<L>      17 Jul 2024 03:30  Phos  4.6     07-17  Mg     1.80     07-17    TPro  6.3  /  Alb  3.6  /  TBili  0.4  /  DBili  x   /  AST  136<H>  /  ALT  98<H>  /  AlkPhos  129<H>  07-16    LIVER FUNCTIONS - ( 16 Jul 2024 19:12 )  Alb: 3.6 g/dL / Pro: 6.3 g/dL / ALK PHOS: 129 U/L / ALT: 98 U/L / AST: 136 U/L / GGT: x           PT/INR - ( 17 Jul 2024 03:30 )   PT: 9.6 sec;   INR: <0.90 ratio         PTT - ( 17 Jul 2024 03:30 )  PTT:32.7 sec  ABG - ( 17 Jul 2024 05:20 )  pH, Arterial: 7.31  pH, Blood: x     /  pCO2: 43    /  pO2: 102   / HCO3: 22    / Base Excess: -4.4  /  SaO2: 96.7              Urinalysis Basic - ( 17 Jul 2024 03:30 )    Color: x / Appearance: x / SG: x / pH: x  Gluc: 161 mg/dL / Ketone: x  / Bili: x / Urobili: x   Blood: x / Protein: x / Nitrite: x   Leuk Esterase: x / RBC: x / WBC x   Sq Epi: x / Non Sq Epi: x / Bacteria: x        RADIOLOGY:   Radiology images were independently reviewed by me today. Reports were reviewed by me today.    Xray Chest 1 View- PORTABLE-Urgent:   ACC: [id number] EXAM:  XR CHEST PORTABLE URGENT 1V   ORDERED BY: MINDI JOHNSON     PROCEDURE DATE:  07/16/2024          INTERPRETATION:  TECHNIQUE: Single portable view of the chest.    COMPARISON:  7/12/2024    CLINICAL HISTORY: s/p esophagectomy    FINDINGS:    Single frontal view of the chest demonstrates bibasilar atelectasis. The   cardiomediastinal silhouette is unremarkable. No acute osseous   abnormalities. Overlying EKG leads and wires are noted. Right-sided   MediPort catheter tip region. Right-sided chest tube. No pneumothorax.   Right neck base subcutaneous emphysema.    IMPRESSION: Bibasilar atelectasis.    --- End of Report ---            JOHN COPPOLA MD; Attending Radiologist  This document has been electronically signed. Jul 16 2024 11:06PM (07-16-24 @ 18:29)  Xray Chest 1 View- PORTABLE-Urgent:   ACC: [id number] EXAM:  XR CHEST PORTABLE URGENT 1V   ORDERED BY: JOSUÉ ANDRADE     PROCEDURE DATE:  07/12/2024          INTERPRETATION:  CLINICAL INFORMATION: Esophageal cancer    Time of Exam:  July 12, 2024 at 2:13 PM    EXAM:  Frontal Chest    FINDINGS:  Chemotherapy port with tip in the SVC.  Both lungs are equally aerated and free of any focal abnormalities.  The heart is not enlarged and there is no effusion or pneumothorax.  The bones show no acute findings.      COMPARISON: October 9, 2020        IMPRESSION: Clear lungs    --- End of Report ---            JUAREZ BURGOS MD; Attending Radiologist  This document has been electronically signed. Jul 12 2024  4:56PM (07-12-24 @ 14:33)     CHIEF COMPLAINT: FOLLOW UP IN ICU FOR PATIENT WITH ESOPHAGEAL CANCER    ISSUES:   Esophageal cancer  Acute post operative anemia from expected blood loss  Post op pain  Chest tube in place  Uncontrolled DM2 (HgbA1c 11)  HTN  HLD  BPH  GERD      INTERVAL EVENTS:   Chest tubes with no airleak    HISTORY:   Patient denies chest pain, cough, pleuritic pain, fever or dyspnea. Patient denies nausea, vomiting, diarrhea, melena, hematochezia.  Pt denies dysphagia or food getting stuck in chest.       PHYSICAL EXAM:   Gen: Comfortable, No acute distress  Eyes: Sclera white, Conjunctiva normal, Eyelids normal, Pupils symmetrical   ENT: Mucous membranes moist,  ,  ,  NG tube  Neck: Trachea midline,  ,  ,  ,  ,  ,    CV: Rate regular, Rhythm regular,  ,  ,    Resp: Breath sounds clear, No accessory muscles use,  ,  ,  Two chest tubes  Abd: Soft, Non-distended, Non-tender,   ,  ,  ,    Skin: Warm, No peripheral edema of lower extremities,  ,    : stallworth  Neuro: Moving all 4 extremities,    Psych: A&Ox3      ASSESSMENT AND PLAN:     NEURO:  Post-operative Pain - Stable. Pain control with PCA and Tylenol IV PRN.          RESPIRATORY:  Hypoxia - Wean nasal cannula for goal O2sat above 92. Obtain CXR. Incentive spirometry. Chest PT and frequent suctioning. Continue bronchodilators. OOB to chair & ambulate w/ assistance. Continuous pulse oximetry for support & to prevent decompensation.           CARDIOVASCULAR:  Hemodynamically stable - Not on pressors. Continue hemodynamic monitoring.  Telemetry (medical test) - Reviewed by me today independently. Normal sinus rhythm.    HTN - stable  - Hold antihypertensives for now      RENAL:  Stable - Monitor IOs and electrolytes. Keep K above 4.0 and Mg above 2.0.       BPH - stable. not on flomax. voided this admission without issues.  - Continue stallworth catheter for now      GASTROINTESTINAL:  GI prophylaxis not indicated  Zofran and Reglan IV PRN for nausea  NPO        GERD - stable. Continue pantoprazole         HEMATOLOGIC:  Acute post-operative anemia from expected blood loss - Stable.  - Monitor CBC. Transfuse PRBC as needed. Monitor chest tube output.    DVT prophylaxis with heparin subQ           INFECTIOUS DISEASE:  All surgical sites appear clean. No signs of active infection. Will monitor for fever and leukocytosis.             ENDOCRINE:  Uncontrolled DM2 (HgbA1c 11) – stable.  - NPH insulin  - Lispro sliding scale  - Carb control diets  - Monitor glucose fingersticks for goal 120-180. Insulin sliding scale.            ONCOLOGY:  Esophageal cancer - improved s/p resection.  - Chest tube – Pleurevac regulated suctioning. Monitor chest tube output.  - NG tube to suction  - NPO until swallow study           Pertinent clinical, laboratory, radiographic, hemodynamic, echocardiographic, respiratory data, microbiologic data and chart were reviewed by myself and analyzed frequently throughout the course of the day and night by myself.    Plan discussed at length with the CTICU staff and Attending CT Surgeon -   Dr Lewis Laureano.      Patient's status was discussed with patient at bedside.       	  I have spent 50 minutes of time with this patient monitoring hemodynamic status, respiratory status, and coordinating care in the ICU.    ________________________________________________    _________________________  VITAL SIGNS:  Vital Signs Last 24 Hrs  T(C): 36.6 (17 Jul 2024 12:00), Max: 36.6 (17 Jul 2024 00:00)  T(F): 97.9 (17 Jul 2024 12:00), Max: 97.9 (17 Jul 2024 12:00)  HR: 111 (17 Jul 2024 15:00) (86 - 111)  BP: 139/79 (17 Jul 2024 05:00) (111/79 - 149/82)  BP(mean): 98 (17 Jul 2024 05:00) (84 - 115)  RR: 17 (17 Jul 2024 15:00) (9 - 23)  SpO2: 96% (17 Jul 2024 15:00) (93% - 100%)    Parameters below as of 17 Jul 2024 15:00  Patient On (Oxygen Delivery Method): room air      I/Os:   I&O's Detail    16 Jul 2024 07:01  -  17 Jul 2024 07:00  --------------------------------------------------------  IN:    IV PiggyBack: 250 mL    Lactated Ringers: 1200 mL  Total IN: 1450 mL    OUT:    Bulb (mL): 15 mL    Chest Tube (mL): 220 mL    Indwelling Catheter - Urethral (mL): 1085 mL  Total OUT: 1320 mL    Total NET: 130 mL      17 Jul 2024 07:01  -  17 Jul 2024 15:49  --------------------------------------------------------  IN:    Albumin 5%  - 250 mL: 250 mL    Enteral Tube Flush: 90 mL    IV PiggyBack: 300 mL    Lactated Ringers: 900 mL  Total IN: 1540 mL    OUT:    Chest Tube (mL): 80 mL    Indwelling Catheter - Urethral (mL): 375 mL    Nasogastric/Oral tube (mL): 50 mL  Total OUT: 505 mL    Total NET: 1035 mL              MEDICATIONS:  MEDICATIONS  (STANDING):  acetaminophen   IVPB .. 1000 milliGRAM(s) IV Intermittent every 6 hours  albuterol    0.083% 2.5 milliGRAM(s) Nebulizer every 6 hours  dextrose 50% Injectable 25 Gram(s) IV Push once  dextrose 50% Injectable 12.5 Gram(s) IV Push once  dornase malik Solution 2.5 milliGRAM(s) Inhalation daily  heparin   Injectable 5000 Unit(s) SubCutaneous every 12 hours  HYDROmorphone PCA (1 mG/mL) 30 milliLiter(s) PCA Continuous PCA Continuous  insulin lispro (ADMELOG) corrective regimen sliding scale   SubCutaneous every 6 hours  lactated ringers. 1000 milliLiter(s) (100 mL/Hr) IV Continuous <Continuous>  lidocaine   4% Patch 1 Patch Transdermal at bedtime  pantoprazole  Injectable 40 milliGRAM(s) IV Push daily  sodium chloride 3%  Inhalation 4 milliLiter(s) Inhalation every 6 hours    MEDICATIONS  (PRN):  HYDROmorphone PCA (1 mG/mL) Rescue Clinician Bolus 0.5 milliGRAM(s) IV Push every 15 minutes PRN for Pain Scale GREATER THAN 6  naloxone Injectable 0.1 milliGRAM(s) IV Push every 3 minutes PRN For ANY of the following changes in patient status:  A. RR LESS THAN 10 breaths per minute, B. Oxygen saturation LESS THAN 90%, C. Sedation score of 6  ondansetron Injectable 4 milliGRAM(s) IV Push every 6 hours PRN Nausea      LABS:  Laboratory data was independently reviewed by me today.                           9.5    9.91  )-----------( 134      ( 17 Jul 2024 03:30 )             29.0     07-17    138  |  105  |  27<H>  ----------------------------<  161<H>  4.6   |  21<L>  |  0.85    Ca    8.2<L>      17 Jul 2024 03:30  Phos  4.6     07-17  Mg     1.80     07-17    TPro  6.3  /  Alb  3.6  /  TBili  0.4  /  DBili  x   /  AST  136<H>  /  ALT  98<H>  /  AlkPhos  129<H>  07-16    LIVER FUNCTIONS - ( 16 Jul 2024 19:12 )  Alb: 3.6 g/dL / Pro: 6.3 g/dL / ALK PHOS: 129 U/L / ALT: 98 U/L / AST: 136 U/L / GGT: x           PT/INR - ( 17 Jul 2024 03:30 )   PT: 9.6 sec;   INR: <0.90 ratio         PTT - ( 17 Jul 2024 03:30 )  PTT:32.7 sec  ABG - ( 17 Jul 2024 05:20 )  pH, Arterial: 7.31  pH, Blood: x     /  pCO2: 43    /  pO2: 102   / HCO3: 22    / Base Excess: -4.4  /  SaO2: 96.7              Urinalysis Basic - ( 17 Jul 2024 03:30 )    Color: x / Appearance: x / SG: x / pH: x  Gluc: 161 mg/dL / Ketone: x  / Bili: x / Urobili: x   Blood: x / Protein: x / Nitrite: x   Leuk Esterase: x / RBC: x / WBC x   Sq Epi: x / Non Sq Epi: x / Bacteria: x        RADIOLOGY:   Radiology images were independently reviewed by me today. Reports were reviewed by me today.    Xray Chest 1 View- PORTABLE-Urgent:   ACC: [id number] EXAM:  XR CHEST PORTABLE URGENT 1V   ORDERED BY: MINDI JOHNSON     PROCEDURE DATE:  07/16/2024          INTERPRETATION:  TECHNIQUE: Single portable view of the chest.    COMPARISON:  7/12/2024    CLINICAL HISTORY: s/p esophagectomy    FINDINGS:    Single frontal view of the chest demonstrates bibasilar atelectasis. The   cardiomediastinal silhouette is unremarkable. No acute osseous   abnormalities. Overlying EKG leads and wires are noted. Right-sided   MediPort catheter tip region. Right-sided chest tube. No pneumothorax.   Right neck base subcutaneous emphysema.    IMPRESSION: Bibasilar atelectasis.    --- End of Report ---            JOHN COPPOLA MD; Attending Radiologist  This document has been electronically signed. Jul 16 2024 11:06PM (07-16-24 @ 18:29)  Xray Chest 1 View- PORTABLE-Urgent:   ACC: [id number] EXAM:  XR CHEST PORTABLE URGENT 1V   ORDERED BY: JOSUÉ ANDRADE     PROCEDURE DATE:  07/12/2024          INTERPRETATION:  CLINICAL INFORMATION: Esophageal cancer    Time of Exam:  July 12, 2024 at 2:13 PM    EXAM:  Frontal Chest    FINDINGS:  Chemotherapy port with tip in the SVC.  Both lungs are equally aerated and free of any focal abnormalities.  The heart is not enlarged and there is no effusion or pneumothorax.  The bones show no acute findings.      COMPARISON: October 9, 2020        IMPRESSION: Clear lungs    --- End of Report ---            JUAREZ BURGOS MD; Attending Radiologist  This document has been electronically signed. Jul 12 2024  4:56PM (07-12-24 @ 14:33)

## 2024-07-17 NOTE — PROGRESS NOTE ADULT - SUBJECTIVE AND OBJECTIVE BOX
ENDOCRINE FOLLOW UP     Chief Complaint: GE cancer, here for esophagectomy    History: Patient underwent procedure yesterday. Seen and examined this morning on rounds, no acute complaints.   Fingersticks above goal.  Discussed with primary RN, primary nutrition plan for patient at this time is IV fluids with LR until patient can undergo barium evaluation in 7 days to confirm successful primary anastomosis. He will be NPO until this time.     MEDICATIONS  (STANDING):  acetaminophen   IVPB .. 1000 milliGRAM(s) IV Intermittent every 6 hours  albuterol    0.083% 2.5 milliGRAM(s) Nebulizer every 6 hours  dextrose 50% Injectable 25 Gram(s) IV Push once  dextrose 50% Injectable 12.5 Gram(s) IV Push once  dornase malik Solution 2.5 milliGRAM(s) Inhalation daily  heparin   Injectable 5000 Unit(s) SubCutaneous every 12 hours  HYDROmorphone PCA (1 mG/mL) 30 milliLiter(s) PCA Continuous PCA Continuous  insulin lispro (ADMELOG) corrective regimen sliding scale   SubCutaneous every 6 hours  lactated ringers. 1000 milliLiter(s) (100 mL/Hr) IV Continuous <Continuous>  pantoprazole  Injectable 40 milliGRAM(s) IV Push daily  sodium chloride 3%  Inhalation 4 milliLiter(s) Inhalation every 6 hours    MEDICATIONS  (PRN):  HYDROmorphone PCA (1 mG/mL) Rescue Clinician Bolus 0.5 milliGRAM(s) IV Push every 15 minutes PRN for Pain Scale GREATER THAN 6  naloxone Injectable 0.1 milliGRAM(s) IV Push every 3 minutes PRN For ANY of the following changes in patient status:  A. RR LESS THAN 10 breaths per minute, B. Oxygen saturation LESS THAN 90%, C. Sedation score of 6  ondansetron Injectable 4 milliGRAM(s) IV Push every 6 hours PRN Nausea      Allergies    No Known Allergies    Intolerances        ROS: All other systems reviewed and negative    PHYSICAL EXAM:  VITALS: T(C): 36.4 (07-17-24 @ 04:00)  T(F): 97.6 (07-17-24 @ 04:00), Max: 97.8 (07-17-24 @ 00:00)  HR: 104 (07-17-24 @ 11:00) (86 - 104)  BP: 139/79 (07-17-24 @ 05:00) (111/79 - 149/82)  RR:  (11 - 23)  SpO2:  (93% - 100%)  GENERAL: NAD, resting comfortably   EYES: No proptosis,  anicteric  HEENT:  Atraumatic, Normocephalic, moist mucous membranes  RESPIRATORY: Nonlabored respirations on room air, normal rate/effort   CARDIOVASCULAR: Regular rate and rhythm  GI: Soft, nontender, non distended  NEURO: AOx3, moves all extremities spontaneously   PSYCH:  reactive affect, euthymic mood    POCT Blood Glucose.: 205 mg/dL (07-17-24 @ 06:07)  POCT Blood Glucose.: 166 mg/dL (07-16-24 @ 23:36)  POCT Blood Glucose.: 267 mg/dL (07-16-24 @ 06:30)  POCT Blood Glucose.: 154 mg/dL (07-15-24 @ 22:31)  POCT Blood Glucose.: 149 mg/dL (07-15-24 @ 17:23)  POCT Blood Glucose.: 101 mg/dL (07-15-24 @ 11:46)  POCT Blood Glucose.: 384 mg/dL (07-15-24 @ 07:48)  POCT Blood Glucose.: 229 mg/dL (07-14-24 @ 22:23)  POCT Blood Glucose.: 213 mg/dL (07-14-24 @ 17:32)      07-17    138  |  105  |  27<H>  ----------------------------<  161<H>  4.6   |  21<L>  |  0.85    eGFR: 99    Ca    8.2<L>      07-17  Mg     1.80     07-17  Phos  4.6     07-17    TPro  6.3  /  Alb  3.6  /  TBili  0.4  /  DBili  x   /  AST  136<H>  /  ALT  98<H>  /  AlkPhos  129<H>  07-16      A1C with Estimated Average Glucose Result: 10.9 % (07-13-24 @ 02:44)

## 2024-07-17 NOTE — PROGRESS NOTE ADULT - SUBJECTIVE AND OBJECTIVE BOX
Anesthesia Pain Management Service    SUBJECTIVE: Patient is doing well with IV PCA and no significant problems reported.    Pain Scale Score	At rest: ___ 	With Activity: ___ 	[X ] Refer to charted pain scores    THERAPY:    [ ] IV PCA Morphine		[ ] 5 mg/mL	[ ] 1 mg/mL  [X ] IV PCA Hydromorphone	[ ] 5 mg/mL	[X ] 1 mg/mL  [ ] IV PCA Fentanyl		[ ] 50 micrograms/mL    Demand dose __0.2_ lockout __6_ (minutes) Continuous Rate _0__ Total: _6.6__  mg used (in past 24 hours)      MEDICATIONS  (STANDING):  acetaminophen   IVPB .. 1000 milliGRAM(s) IV Intermittent every 6 hours  albuterol    0.083% 2.5 milliGRAM(s) Nebulizer every 6 hours  dextrose 50% Injectable 25 Gram(s) IV Push once  dextrose 50% Injectable 12.5 Gram(s) IV Push once  dornase malik Solution 2.5 milliGRAM(s) Inhalation daily  heparin   Injectable 5000 Unit(s) SubCutaneous every 12 hours  HYDROmorphone PCA (1 mG/mL) 30 milliLiter(s) PCA Continuous PCA Continuous  insulin glargine Injectable (LANTUS) 7 Unit(s) SubCutaneous every morning  insulin lispro (ADMELOG) corrective regimen sliding scale   SubCutaneous every 6 hours  lactated ringers. 1000 milliLiter(s) (100 mL/Hr) IV Continuous <Continuous>  pantoprazole  Injectable 40 milliGRAM(s) IV Push daily  sodium chloride 3%  Inhalation 4 milliLiter(s) Inhalation every 6 hours    MEDICATIONS  (PRN):  HYDROmorphone PCA (1 mG/mL) Rescue Clinician Bolus 0.5 milliGRAM(s) IV Push every 15 minutes PRN for Pain Scale GREATER THAN 6  naloxone Injectable 0.1 milliGRAM(s) IV Push every 3 minutes PRN For ANY of the following changes in patient status:  A. RR LESS THAN 10 breaths per minute, B. Oxygen saturation LESS THAN 90%, C. Sedation score of 6  ondansetron Injectable 4 milliGRAM(s) IV Push every 6 hours PRN Nausea      OBJECTIVE: Patient sitting in chair, CTx1, NGT    Sedation Score:	[ X] Alert	[ ] Drowsy 	[ ] Arousable	[ ] Asleep	[ ] Unresponsive    Side Effects:	[X ] None	[ ] Nausea	[ ] Vomiting	[ ] Pruritus  		[ ] Other:    Vital Signs Last 24 Hrs  T(C): 36.4 (17 Jul 2024 04:00), Max: 36.6 (17 Jul 2024 00:00)  T(F): 97.6 (17 Jul 2024 04:00), Max: 97.8 (17 Jul 2024 00:00)  HR: 95 (17 Jul 2024 06:00) (86 - 102)  BP: 139/79 (17 Jul 2024 05:00) (111/79 - 149/82)  BP(mean): 98 (17 Jul 2024 05:00) (84 - 115)  RR: 12 (17 Jul 2024 06:00) (12 - 23)  SpO2: 100% (17 Jul 2024 06:00) (93% - 100%)    Parameters below as of 17 Jul 2024 06:00  Patient On (Oxygen Delivery Method): nasal cannula w/ humidification  O2 Flow (L/min): 4      ASSESSMENT/ PLAN    Therapy to  be:	[ X] Continue   [ ] Discontinued   [ ] Change to prn Analgesics    Documentation and Verification of current medications:   [X] Done	[ ] Not done, not elligible    Comments: Continue PCA. Recommend non-opioid adjuvant analgesics to be used when possible and when allowed by primary surgical team.    Progress Note written now but Patient was seen earlier.

## 2024-07-18 LAB
ANION GAP SERPL CALC-SCNC: 12 MMOL/L — SIGNIFICANT CHANGE UP (ref 7–14)
BLOOD GAS ARTERIAL - LYTES,HGB,ICA,LACT RESULT: SIGNIFICANT CHANGE UP
BUN SERPL-MCNC: 22 MG/DL — SIGNIFICANT CHANGE UP (ref 7–23)
CALCIUM SERPL-MCNC: 8 MG/DL — LOW (ref 8.4–10.5)
CHLORIDE SERPL-SCNC: 106 MMOL/L — SIGNIFICANT CHANGE UP (ref 98–107)
CO2 SERPL-SCNC: 22 MMOL/L — SIGNIFICANT CHANGE UP (ref 22–31)
CREAT SERPL-MCNC: 0.82 MG/DL — SIGNIFICANT CHANGE UP (ref 0.5–1.3)
EGFR: 100 ML/MIN/1.73M2 — SIGNIFICANT CHANGE UP
GLUCOSE BLDC GLUCOMTR-MCNC: 111 MG/DL — HIGH (ref 70–99)
GLUCOSE BLDC GLUCOMTR-MCNC: 114 MG/DL — HIGH (ref 70–99)
GLUCOSE BLDC GLUCOMTR-MCNC: 152 MG/DL — HIGH (ref 70–99)
GLUCOSE BLDC GLUCOMTR-MCNC: 166 MG/DL — HIGH (ref 70–99)
GLUCOSE BLDC GLUCOMTR-MCNC: 74 MG/DL — SIGNIFICANT CHANGE UP (ref 70–99)
GLUCOSE SERPL-MCNC: 113 MG/DL — HIGH (ref 70–99)
HCT VFR BLD CALC: 26.1 % — LOW (ref 39–50)
HCT VFR BLD CALC: 27.7 % — LOW (ref 39–50)
HGB BLD-MCNC: 8.5 G/DL — LOW (ref 13–17)
HGB BLD-MCNC: 8.8 G/DL — LOW (ref 13–17)
MAGNESIUM SERPL-MCNC: 2.3 MG/DL — SIGNIFICANT CHANGE UP (ref 1.6–2.6)
MCHC RBC-ENTMCNC: 30.3 PG — SIGNIFICANT CHANGE UP (ref 27–34)
MCHC RBC-ENTMCNC: 30.7 PG — SIGNIFICANT CHANGE UP (ref 27–34)
MCHC RBC-ENTMCNC: 31.8 GM/DL — LOW (ref 32–36)
MCHC RBC-ENTMCNC: 32.6 GM/DL — SIGNIFICANT CHANGE UP (ref 32–36)
MCV RBC AUTO: 94.2 FL — SIGNIFICANT CHANGE UP (ref 80–100)
MCV RBC AUTO: 95.5 FL — SIGNIFICANT CHANGE UP (ref 80–100)
NRBC # BLD: 0 /100 WBCS — SIGNIFICANT CHANGE UP (ref 0–0)
NRBC # BLD: 0 /100 WBCS — SIGNIFICANT CHANGE UP (ref 0–0)
NRBC # FLD: 0 K/UL — SIGNIFICANT CHANGE UP (ref 0–0)
NRBC # FLD: 0 K/UL — SIGNIFICANT CHANGE UP (ref 0–0)
PHOSPHATE SERPL-MCNC: 2.8 MG/DL — SIGNIFICANT CHANGE UP (ref 2.5–4.5)
PLATELET # BLD AUTO: 129 K/UL — LOW (ref 150–400)
PLATELET # BLD AUTO: 142 K/UL — LOW (ref 150–400)
POTASSIUM SERPL-MCNC: 3.7 MMOL/L — SIGNIFICANT CHANGE UP (ref 3.5–5.3)
POTASSIUM SERPL-SCNC: 3.7 MMOL/L — SIGNIFICANT CHANGE UP (ref 3.5–5.3)
RBC # BLD: 2.77 M/UL — LOW (ref 4.2–5.8)
RBC # BLD: 2.9 M/UL — LOW (ref 4.2–5.8)
RBC # FLD: 13.6 % — SIGNIFICANT CHANGE UP (ref 10.3–14.5)
RBC # FLD: 13.6 % — SIGNIFICANT CHANGE UP (ref 10.3–14.5)
SODIUM SERPL-SCNC: 140 MMOL/L — SIGNIFICANT CHANGE UP (ref 135–145)
WBC # BLD: 10.88 K/UL — HIGH (ref 3.8–10.5)
WBC # BLD: 9.36 K/UL — SIGNIFICANT CHANGE UP (ref 3.8–10.5)
WBC # FLD AUTO: 10.88 K/UL — HIGH (ref 3.8–10.5)
WBC # FLD AUTO: 9.36 K/UL — SIGNIFICANT CHANGE UP (ref 3.8–10.5)

## 2024-07-18 PROCEDURE — 93970 EXTREMITY STUDY: CPT | Mod: 26

## 2024-07-18 PROCEDURE — 99233 SBSQ HOSP IP/OBS HIGH 50: CPT

## 2024-07-18 PROCEDURE — 71045 X-RAY EXAM CHEST 1 VIEW: CPT | Mod: 26

## 2024-07-18 PROCEDURE — 99232 SBSQ HOSP IP/OBS MODERATE 35: CPT

## 2024-07-18 RX ORDER — DEXTROSE 4 G
12.5 TABLET,CHEWABLE ORAL ONCE
Refills: 0 | Status: COMPLETED | OUTPATIENT
Start: 2024-07-18 | End: 2024-07-18

## 2024-07-18 RX ORDER — PANTOPRAZOLE SODIUM 20 MG/1
40 TABLET, DELAYED RELEASE ORAL EVERY 12 HOURS
Refills: 0 | Status: DISCONTINUED | OUTPATIENT
Start: 2024-07-18 | End: 2024-07-28

## 2024-07-18 RX ORDER — DEXTROSE MONOHYDRATE, SODIUM CHLORIDE, SODIUM LACTATE, CALCIUM CHLORIDE, MAGNESIUM CHLORIDE 1.5; 538; 448; 18.4; 5.08 G/100ML; MG/100ML; MG/100ML; MG/100ML; MG/100ML
1000 SOLUTION INTRAPERITONEAL
Refills: 0 | Status: DISCONTINUED | OUTPATIENT
Start: 2024-07-18 | End: 2024-07-20

## 2024-07-18 RX ORDER — HYDRALAZINE HYDROCHLORIDE 100 MG/1
5 TABLET ORAL ONCE
Refills: 0 | Status: COMPLETED | OUTPATIENT
Start: 2024-07-18 | End: 2024-07-18

## 2024-07-18 RX ORDER — POTASSIUM CHLORIDE 1500 MG/1
10 TABLET, EXTENDED RELEASE ORAL
Refills: 0 | Status: COMPLETED | OUTPATIENT
Start: 2024-07-18 | End: 2024-07-18

## 2024-07-18 RX ORDER — POTASSIUM PHOSPHATE, MONOBASIC AND POTASSIUM PHOSPHATE, DIBASIC 224; 236 MG/ML; MG/ML
15 INJECTION, SOLUTION INTRAVENOUS ONCE
Refills: 0 | Status: COMPLETED | OUTPATIENT
Start: 2024-07-18 | End: 2024-07-18

## 2024-07-18 RX ADMIN — DEXTROSE MONOHYDRATE, SODIUM CHLORIDE, SODIUM LACTATE, CALCIUM CHLORIDE, MAGNESIUM CHLORIDE 100 MILLILITER(S): 1.5; 538; 448; 18.4; 5.08 SOLUTION INTRAPERITONEAL at 19:28

## 2024-07-18 RX ADMIN — Medication 4 MILLILITER(S): at 15:35

## 2024-07-18 RX ADMIN — Medication 2.5 MILLIGRAM(S): at 11:15

## 2024-07-18 RX ADMIN — CHLORHEXIDINE GLUCONATE 1 APPLICATION(S): 500 CLOTH TOPICAL at 11:15

## 2024-07-18 RX ADMIN — PANTOPRAZOLE SODIUM 40 MILLIGRAM(S): 20 TABLET, DELAYED RELEASE ORAL at 17:25

## 2024-07-18 RX ADMIN — LEVALBUTEROL HYDROCHLORIDE 0.63 MILLIGRAM(S): 0.31 SOLUTION RESPIRATORY (INHALATION) at 09:37

## 2024-07-18 RX ADMIN — Medication 2 UNIT(S): at 23:56

## 2024-07-18 RX ADMIN — HEPARIN SODIUM 5000 UNIT(S): 1000 INJECTION, SOLUTION INTRAVENOUS; SUBCUTANEOUS at 05:29

## 2024-07-18 RX ADMIN — LEVALBUTEROL HYDROCHLORIDE 0.63 MILLIGRAM(S): 0.31 SOLUTION RESPIRATORY (INHALATION) at 03:17

## 2024-07-18 RX ADMIN — Medication 30 MILLILITER(S): at 19:29

## 2024-07-18 RX ADMIN — Medication 2.5 MILLIGRAM(S): at 03:07

## 2024-07-18 RX ADMIN — HYDRALAZINE HYDROCHLORIDE 5 MILLIGRAM(S): 100 TABLET ORAL at 22:19

## 2024-07-18 RX ADMIN — Medication 2.5 MILLIGRAM(S): at 17:26

## 2024-07-18 RX ADMIN — LEVALBUTEROL HYDROCHLORIDE 0.63 MILLIGRAM(S): 0.31 SOLUTION RESPIRATORY (INHALATION) at 15:35

## 2024-07-18 RX ADMIN — LEVALBUTEROL HYDROCHLORIDE 0.63 MILLIGRAM(S): 0.31 SOLUTION RESPIRATORY (INHALATION) at 21:30

## 2024-07-18 RX ADMIN — Medication 12.5 GRAM(S): at 17:37

## 2024-07-18 RX ADMIN — Medication 1: at 23:56

## 2024-07-18 RX ADMIN — POTASSIUM CHLORIDE 100 MILLIEQUIVALENT(S): 1500 TABLET, EXTENDED RELEASE ORAL at 09:23

## 2024-07-18 RX ADMIN — Medication 2 UNIT(S): at 05:31

## 2024-07-18 RX ADMIN — Medication 2.5 MILLIGRAM(S): at 23:57

## 2024-07-18 RX ADMIN — Medication 4 MILLILITER(S): at 03:16

## 2024-07-18 RX ADMIN — Medication 4 MILLILITER(S): at 21:30

## 2024-07-18 RX ADMIN — Medication 1: at 11:16

## 2024-07-18 RX ADMIN — Medication 30 MILLILITER(S): at 10:47

## 2024-07-18 RX ADMIN — POTASSIUM PHOSPHATE, MONOBASIC AND POTASSIUM PHOSPHATE, DIBASIC 62.5 MILLIMOLE(S): 224; 236 INJECTION, SOLUTION INTRAVENOUS at 05:29

## 2024-07-18 RX ADMIN — Medication 2 UNIT(S): at 11:15

## 2024-07-18 RX ADMIN — DORNASE ALFA 2.5 MILLIGRAM(S): 1 SOLUTION RESPIRATORY (INHALATION) at 09:37

## 2024-07-18 RX ADMIN — LIDOCAINE 5% 1 PATCH: 5 CREAM TOPICAL at 21:50

## 2024-07-18 RX ADMIN — HEPARIN SODIUM 5000 UNIT(S): 1000 INJECTION, SOLUTION INTRAVENOUS; SUBCUTANEOUS at 17:26

## 2024-07-18 RX ADMIN — Medication 4 MILLILITER(S): at 09:37

## 2024-07-18 RX ADMIN — POTASSIUM CHLORIDE 100 MILLIEQUIVALENT(S): 1500 TABLET, EXTENDED RELEASE ORAL at 10:45

## 2024-07-18 RX ADMIN — POTASSIUM CHLORIDE 100 MILLIEQUIVALENT(S): 1500 TABLET, EXTENDED RELEASE ORAL at 05:29

## 2024-07-18 NOTE — PROGRESS NOTE ADULT - NS ATTEND AMEND GEN_ALL_CORE FT
Patient seen and examined. Agree with plan as detailed in PA/NP Note.      restart Amlodipine and Losartan when stable/ able to take PO      Mart Mason MD  Pager: [phone]

## 2024-07-18 NOTE — PROGRESS NOTE ADULT - ASSESSMENT
The patient is a 59y Male with PMH of T2DM, HTN, HLD, GE junction cancer here for esophagectomy now postop.  Endocrinology consulted for T2DM    #Uncontrolled Type 2 Diabetes Mellitus   - Follows with: Dr. Nolasco  - A1C with Estimated Average Glucose Result: 10.9 % (07-13-24)  - home regimen: Semglee 18 units, Novolog 6 units  - eGFR: 84 mL/min/1.73m2 (07-15-24)  - Weight (kg): 54 (07-12-24)  - glucose 384-101, no evidence of DKA on labs      INPATIENT PLAN:  - Receiving LR fluids and will be prolonged NPO. Recommend adding dextrose to LR to avoid starvation status. Insulin can be gradually increased as needed.  -CTICU team preferring to use NPH while NPO  -May continue for now with NPH 2 units q6h while NPO as glucose is well controlled today.  -Discussed that Lantus would also be an option for basal insulin in place of NPH  -Continue low Admelog scale q6h  -FS q6h  - Inpatient glucose goals: 140-180 in this critically ill patient.       DISCHARGE PLANNING:  - Discharge recs pending clinical course and nutrition plan. If he resumes his normal diet, can discharge on home regimen of Semglee 18 units and Admelog 6 units with meals along with his oral medications as before. If reduced po intake regimen may need to be lowered prior to final dc recs.  - will need Endocrinology follow up. Patient has an appointment with Dr. Nolasco scheduled for August 8th at 8:20 AM at [address] [facility] [address].   #Hypertension  - Goal BP <130/80  - Management as per primary team  - check urine microalbumin level as outpatient    #Hyperlipidemia  - LDL goal <70  - check lipid panel as outpatient on a yearly basis    Discussed with CTICU PA    Leo Rader MD  Division of Endocrinology  Pager: [phone]    If after 6PM or before 9AM, or on weekends/holidays, please call endocrine answering service for assistance ([phone]).  For nonurgent matters email [email or web address] for assistance.

## 2024-07-18 NOTE — PROGRESS NOTE ADULT - SUBJECTIVE AND OBJECTIVE BOX
BHARATH GONZALES      59y   Male   MRN-[id number]         No Known Allergies             Daily     Daily Weight in k.3 (2024 04:00)Drug Dosing Weight  Height (cm): 162.6 (2024 07:35)  Weight (kg): 54.9 (2024 07:35)  BMI (kg/m2): 20.8 (2024 07:35)  BSA (m2): 1.58 (2024 07:35)    59 year old male, went to [facility] c/o fatigue, dizziness.    pt was worked up found to be severely anemic received PRBC EGD on 2024: nodule on gastric side of the GEJ with some old heme in stomach. Possible healing Kavita Valero tear. Pathology reveal GEJ bx: fragments of adenocarcinoma, moderately differentiated. Pt received  radiation therapy & chemotherapy completed 24. Pt was reevaluated by surgeon and now present to [facility] for preop optimization for scheduled Robotic assisted Michael broderick esophagectomy. (2024 14:00)    CHIEF COMPLAINT: Follow up in ICU  for postoperative care of patient who is s/p  Esophagogastrectomy     Procedure: Michael broderick esophagectomy 2024    Issues:              Esophageal cancer              Postop pain              Chest tube in place  HLD (hyperlipidemia)  Insulin dependent type 2 diabetes mellitus  BPH (benign prostatic hyperplasia)  HTN (hypertension)  Gastroesophageal cancer  Port-A-Cath in place      Postop course:     Patient reports moderate pain at chest wall incision sites which is worse with coughing and deep breathing without associated fever or dyspnea. Pain is improved with use of PCA and  oral pain meds.         Home Medications:  amLODIPine 5 mg oral tablet: 1 tab(s) orally once a day Noon (10 Jul 2024 09:06)  atorvastatin 20 mg oral tablet: 1 tab(s) orally once a day noon (10 Jul 2024 09:06)  Jardiance 25 mg oral tablet: 1 tab(s) orally once a day Noon (10 Jul 2024 09:06)  losartan-hydrochlorothiazide 50 mg-12.5 mg oral tablet: 1 tab(s) orally once a day (10 Jul 2024 09:06)  metFORMIN 500 mg oral tablet: 1 tab(s) orally 2 times a day (10 Jul 2024 09:16)  NovoLOG 100 units/mL subcutaneous solution: subcutaneous 3 times a day (before meals) 15 units (10 Jul 2024 09:44)  pantoprazole 40 mg oral delayed release tablet: 1 tab(s) orally once a day AM (10 Jul 2024 09:16)  Sodium Chloride, 1 G, PO, 2 times Daily:  (10 Jul 2024 09:16)  tamsulosin 0.4 mg oral capsule: 1 cap(s) orally once a day (at bedtime) (10 Jul 2024 09:16)    PAST MEDICAL & SURGICAL HISTORY:  HLD (hyperlipidemia)      Insulin dependent type 2 diabetes mellitus      BPH (benign prostatic hyperplasia)      HTN (hypertension)      Gastroesophageal cancer      Gastroesophageal cancer      Port-A-Cath in place        Vital Signs Last 24 Hrs  T(C): 36.7 (2024 08:00), Max: 36.7 (2024 04:00)  T(F): 98 (2024 08:00), Max: 98 (2024 04:00)  HR: 108 (2024 10:00) (86 - 111)  BP: --  BP(mean): --  RR: 19 (2024 10:00) (7 - 23)  SpO2: 92% (2024 10:00) (92% - 100%)    Parameters below as of 2024 10:00  Patient On (Oxygen Delivery Method): nasal cannula w/ humidification  O2 Flow (L/min): 3    I&O's Detail    2024 07:01  -  2024 07:00  --------------------------------------------------------  IN:    Albumin 5%  - 250 mL: 250 mL    Enteral Tube Flush: 90 mL    IV PiggyBack: 650 mL    Lactated Ringers: 2200 mL  Total IN: 3190 mL    OUT:    Bulb (mL): 170 mL    Chest Tube (mL): 150 mL    Indwelling Catheter - Urethral (mL): 1390 mL    Nasogastric/Oral tube (mL): 250 mL  Total OUT: 1960 mL    Total NET: 1230 mL      2024 07:01  -  2024 11:29  --------------------------------------------------------  IN:    Enteral Tube Flush: 30 mL    Lactated Ringers: 300 mL  Total IN: 330 mL    OUT:    Bulb (mL): 50 mL    Indwelling Catheter - Urethral (mL): 450 mL  Total OUT: 500 mL    Total NET: -170 mL        CAPILLARY BLOOD GLUCOSE      POCT Blood Glucose.: 152 mg/dL (2024 11:12)  POCT Blood Glucose.: 114 mg/dL (2024 05:31)  POCT Blood Glucose.: 89 mg/dL (2024 23:29)  POCT Blood Glucose.: 126 mg/dL (2024 17:54)  POCT Blood Glucose.: 206 mg/dL (2024 13:29)    Home Medications:  amLODIPine 5 mg oral tablet: 1 tab(s) orally once a day Noon (10 Jul 2024 09:06)  atorvastatin 20 mg oral tablet: 1 tab(s) orally once a day noon (10 Jul 2024 09:06)  Jardiance 25 mg oral tablet: 1 tab(s) orally once a day Noon (10 Jul 2024 09:06)  losartan-hydrochlorothiazide 50 mg-12.5 mg oral tablet: 1 tab(s) orally once a day (10 Jul 2024 09:06)  metFORMIN 500 mg oral tablet: 1 tab(s) orally 2 times a day (10 Jul 2024 09:16)  NovoLOG 100 units/mL subcutaneous solution: subcutaneous 3 times a day (before meals) 15 units (10 Jul 2024 09:44)  pantoprazole 40 mg oral delayed release tablet: 1 tab(s) orally once a day AM (10 Jul 2024 09:16)  Sodium Chloride, 1 G, PO, 2 times Daily:  (10 Jul 2024 09:16)  tamsulosin 0.4 mg oral capsule: 1 cap(s) orally once a day (at bedtime) (10 Jul 2024 09:16)    MEDICATIONS  (STANDING):  chlorhexidine 2% Cloths 1 Application(s) Topical daily  dextrose 50% Injectable 25 Gram(s) IV Push once  dextrose 50% Injectable 12.5 Gram(s) IV Push once  dornase malik Solution 2.5 milliGRAM(s) Inhalation daily  heparin   Injectable 5000 Unit(s) SubCutaneous every 12 hours  HYDROmorphone PCA (1 mG/mL) 30 milliLiter(s) PCA Continuous PCA Continuous  insulin lispro (ADMELOG) corrective regimen sliding scale   SubCutaneous every 6 hours  insulin NPH human recombinant 2 Unit(s) SubCutaneous every 6 hours  lactated ringers. 1000 milliLiter(s) (100 mL/Hr) IV Continuous <Continuous>  levalbuterol Inhalation 0.63 milliGRAM(s) Inhalation every 6 hours  lidocaine   4% Patch 1 Patch Transdermal at bedtime  metoprolol tartrate Injectable 2.5 milliGRAM(s) IV Push every 6 hours  pantoprazole  Injectable 40 milliGRAM(s) IV Push every 12 hours  sodium chloride 3%  Inhalation 4 milliLiter(s) Inhalation every 6 hours    MEDICATIONS  (PRN):  HYDROmorphone PCA (1 mG/mL) Rescue Clinician Bolus 0.5 milliGRAM(s) IV Push every 15 minutes PRN for Pain Scale GREATER THAN 6  naloxone Injectable 0.1 milliGRAM(s) IV Push every 3 minutes PRN For ANY of the following changes in patient status:  A. RR LESS THAN 10 breaths per minute, B. Oxygen saturation LESS THAN 90%, C. Sedation score of 6  ondansetron Injectable 4 milliGRAM(s) IV Push every 6 hours PRN Nausea        Physical exam:   General:               Pt is awake, alert, not in any distress                                                  Neuro:                  Nonfocal                             Cardiovascular:   S1 & S2, regular                           Respiratory:         Air entry is fair and equal on both sides, has bilateral conducted sounds                           GI:                          Soft, nondistended and nontender, Bowel sounds absent                            Ext:                        No cyanosis or edema                            Labs:                                                                           8.8    10.88 )-----------( 142      ( 2024 10:43 )             27.7             07-18    140  |  106  |  22  ----------------------------<  113<H>  3.7   |  22  |  0.82    Ca    8.0<L>      2024 02:50  Phos  2.8     07-18  Mg     2.30     07-18    TPro  6.3  /  Alb  3.6  /  TBili  0.4  /  DBili  x   /  AST  136<H>  /  ALT  98<H>  /  AlkPhos  129<H>                    PT/INR - ( 2024 03:30 )   PT: 9.6 sec;   INR: <0.90 ratio         PTT - ( 2024 03:30 )  PTT:32.7 sec  LIVER FUNCTIONS - ( 2024 19:12 )  Alb: 3.6 g/dL / Pro: 6.3 g/dL / ALK PHOS: 129 U/L / ALT: 98 U/L / AST: 136 U/L / GGT: x           Urinalysis Basic - ( 2024 02:50 )    Color: x / Appearance: x / SG: x / pH: x  Gluc: 113 mg/dL / Ketone: x  / Bili: x / Urobili: x   Blood: x / Protein: x / Nitrite: x   Leuk Esterase: x / RBC: x / WBC x   Sq Epi: x / Non Sq Epi: x / Bacteria: x        CXR:    < from: Xray Chest 1 View- PORTABLE-Urgent (Xray Chest 1 View- PORTABLE-Urgent .) (24 @ 18:29) >  Single frontal view of the chest demonstrates bibasilar atelectasis. The   cardiomediastinal silhouette is unremarkable. No acute osseous   abnormalities. Overlying EKG leads and wires are noted. Right-sided   MediPort catheter tip region. Right-sided chest tube. No pneumothorax.   Right neck base subcutaneous emphysema.    IMPRESSION: Bibasilar atelectasis.      Plan: 59yMale s/p [place] Broderick Esophagogastrectomy 2024,  appears fairly comfortable, complaining of chest pain with deep breathing.                             Neuro:                                         Pain control with PCA  / IV Tylenol PRN                            Cardiovascular:                                          HTN: Continue hemodynamic monitoring. Hold Norvasc    HLD: Hold Lipitor    Continue IVF LR 100cc/hr                            Respiratory:                                         Postop hypoxemia requiring O2 via nasal cannula - Wean nasal cannula for goal O2sat above 92%.                                              CXR is clear. Incentive spirometry.                                                   Chest PT and frequent suctioning. Continue bronchodilators, pulmozyme and inhaled 3% saline                                                      OOB to chair & ambulate w/ assistance.                                                           Continuous pulse oximetry for support & to prevent decompensation.                                         Monitor chest tube output                                         Chest tube to suction                             GI                                          NPO                                           Continue GI prophylaxis with  Protonix                                          Continue Zofran / Reglan for nausea - PRN                                          NGT to low continuous wall suction                                           Flush NGT  with 30cc of sterile water Q 4hrs.  	                                                                 Renal:                                          Continue LR 100cc/hr                                          Monitor I/Os and electrolytes                                          BPH: Has Elizabeth in place                                                 Hem/ Onc:                                                                                   Monitor chest tube output &  signs of bleeding.                                           Follow CBC in AM                           Infectious disease:                                             No signs of infection. Monitor for fever / leukocytosis.                                           All surgical incision / chest tube  sites look clean                            Endocrine                                              DM-2: Continue Accu-Checks with coverage.  Continue Lantus 5u daily    Pt is on SQ Heparin and Venodyne boots for DVT prophylaxis.     Pertinent clinical, laboratory, radiographic, hemodynamic, echocardiographic, respiratory data, microbiologic data and chart were reviewed and analyzed frequently throughout the course of the day and night  Patient seen, examined and plan discussed with CT Surgeon Dr. Laureano / CTICU team during rounds.  Status discussed with patient and  updated plan of care.   I have spent 50 minutes of  time with this patient  monitoring hemodynamic status, respiratory status,and coordinating care in the ICU          Andrey Bernstein MD

## 2024-07-18 NOTE — PROGRESS NOTE ADULT - SUBJECTIVE AND OBJECTIVE BOX
Surgery Progress Note     Interval/Subjective:  Patient seen and examined. Reports passing flatus, but no BM. pain controlled.   __________________________________________________________________  Vitals:  T(C): 36.7 (04:00), Max: 36.7 (04:00)  HR: 90 (06:00) (86 - 111)  BP: --  RR: 12 (06:00) (9 - 23)  SpO2: 96% (06:00) (93% - 100%)    I & O's:  IN:    Albumin 5%  - 250 mL: 250 mL    Enteral Tube Flush: 90 mL    IV PiggyBack: 650 mL    Lactated Ringers: 2200 mL  Total IN: 3190 mL    OUT:    Bulb (mL): 170 mL    Chest Tube (mL): 150 mL    Indwelling Catheter - Urethral (mL): 1390 mL    Nasogastric/Oral tube (mL): 250 mL  Total OUT: 1960 mL    Physical Exam:  General: NAD, resting comfortably in bed  HEENT: Normocephalic atraumatic  Respiratory: Nonlabored respirations  Cardio: regular rate, normotensive  Abdomen: appropriately tender, nondistended, drain with SS output, NGT to suction    Labs:  CBC: 24 @ 02:50          8.5   9.36 >----< 129          26.1    Chemistry: 24 @ 02:50  140|106|22    ------------< 113  3.7|22|0.82    Ca: 8.0 24 @ 02:50  M.30 24 @ 02:50  Phos: 2.8 24 @ 02:50    CBC: 24 @ 03:30          9.5   9.91 >----< 134          29.0    Chemistry: 24 @ 03:30  138|105|27    ------------< 161  4.6|21|0.85    Ca: 8.2 24 @ 03:30  M.80 24 @ 03:30  Phos: 4.6 24 @ 03:30    __________________________________________________________________

## 2024-07-18 NOTE — PROGRESS NOTE ADULT - ASSESSMENT
59 y.o. man with history of GEJ adenocarcinoma (T3N0, stage 2A) s/p neoadjuvant chemotherapy, now s/p Michael-Broderick Esophagectomy on 7/16.     Plan:  - Care per CTICU  - Diet: NPO/NGT  - UGI today   - IVF @ 100  - Activity: OOB as tolerated  - DVT ppx: SCD's & SQH 59 y.o. man with history of GEJ adenocarcinoma (T3N0, stage 2A) s/p neoadjuvant chemotherapy, now s/p Michael-Broderick Esophagectomy on 7/16.     Plan:  - Care per CTICU  - Diet: NPO/NGT  - UGI POD4-5  - IVF @ 100  - Activity: OOB as tolerated  - DVT ppx: SCD's & SQH

## 2024-07-18 NOTE — PROGRESS NOTE ADULT - SUBJECTIVE AND OBJECTIVE BOX
Chief Complaint: DM2    History: s/p esophagectomy  managed in CTICU postop  NPO  awake alert, denies complaints on IV fluids with LR  No hypoglycemia events or symptoms    MEDICATIONS  (STANDING):  chlorhexidine 2% Cloths 1 Application(s) Topical daily  dextrose 50% Injectable 25 Gram(s) IV Push once  dextrose 50% Injectable 12.5 Gram(s) IV Push once  dornase malik Solution 2.5 milliGRAM(s) Inhalation daily  heparin   Injectable 5000 Unit(s) SubCutaneous every 12 hours  HYDROmorphone PCA (1 mG/mL) 30 milliLiter(s) PCA Continuous PCA Continuous  insulin lispro (ADMELOG) corrective regimen sliding scale   SubCutaneous every 6 hours  insulin NPH human recombinant 2 Unit(s) SubCutaneous every 6 hours  lactated ringers. 1000 milliLiter(s) (100 mL/Hr) IV Continuous <Continuous>  levalbuterol Inhalation 0.63 milliGRAM(s) Inhalation every 6 hours  lidocaine   4% Patch 1 Patch Transdermal at bedtime  metoprolol tartrate Injectable 2.5 milliGRAM(s) IV Push every 6 hours  pantoprazole  Injectable 40 milliGRAM(s) IV Push every 12 hours  sodium chloride 3%  Inhalation 4 milliLiter(s) Inhalation every 6 hours    MEDICATIONS  (PRN):  HYDROmorphone PCA (1 mG/mL) Rescue Clinician Bolus 0.5 milliGRAM(s) IV Push every 15 minutes PRN for Pain Scale GREATER THAN 6  naloxone Injectable 0.1 milliGRAM(s) IV Push every 3 minutes PRN For ANY of the following changes in patient status:  A. RR LESS THAN 10 breaths per minute, B. Oxygen saturation LESS THAN 90%, C. Sedation score of 6  ondansetron Injectable 4 milliGRAM(s) IV Push every 6 hours PRN Nausea      Allergies    No Known Allergies    Intolerances      Review of Systems:  ALL OTHER SYSTEMS REVIEWED AND NEGATIVE      PHYSICAL EXAM:  VITALS: T(C): 36.7 (07-18-24 @ 12:00)  T(F): 98.1 (07-18-24 @ 12:00), Max: 98.1 (07-18-24 @ 12:00)  HR: 87 (07-18-24 @ 13:00) (86 - 111)  BP: --  RR:  (7 - 23)  SpO2:  (92% - 100%)  Wt(kg): --  GENERAL: NAD, well-groomed, well-developed  EYES: No proptosis, no lid lag, anicteric  HEENT:  Atraumatic, Normocephalic, moist mucous membranes  RESPIRATORY: nonlabored respirations, no wheezing  PSYCH: Alert and oriented x 3, normal affect, normal mood    CAPILLARY BLOOD GLUCOSE      POCT Blood Glucose.: 152 mg/dL (18 Jul 2024 11:12)  POCT Blood Glucose.: 114 mg/dL (18 Jul 2024 05:31)  POCT Blood Glucose.: 89 mg/dL (17 Jul 2024 23:29)  POCT Blood Glucose.: 126 mg/dL (17 Jul 2024 17:54)      07-18    140  |  106  |  22  ----------------------------<  113<H>  3.7   |  22  |  0.82    eGFR: 100    Ca    8.0<L>      07-18  Mg     2.30     07-18  Phos  2.8     07-18    TPro  6.3  /  Alb  3.6  /  TBili  0.4  /  DBili  x   /  AST  136<H>  /  ALT  98<H>  /  AlkPhos  129<H>  07-16      A1C with Estimated Average Glucose Result: 10.9 % (07-13-24 @ 02:44)  A1C with Estimated Average Glucose Result: 12.8 % (01-01-24 @ 06:00)      Thyroid Function Tests:

## 2024-07-18 NOTE — PROGRESS NOTE ADULT - SUBJECTIVE AND OBJECTIVE BOX
Anesthesia Pain Management Service    SUBJECTIVE: Patient is doing well with IV PCA and no significant problems reported. Has been ambulating.    Pain Scale Score	At rest: _5/10__ 	With Activity: ___ 	[X ] Refer to charted pain scores    THERAPY:    [ ] IV PCA Morphine		[ ] 5 mg/mL	[ ] 1 mg/mL  [X ] IV PCA Hydromorphone	[ ] 5 mg/mL	[X ] 1 mg/mL  [ ] IV PCA Fentanyl		[ ] 50 micrograms/mL    Demand dose __0.2_ lockout __6_ (minutes) Continuous Rate _0__ Total: _4.4__  mg used (in past 24 hours)      MEDICATIONS  (STANDING):  chlorhexidine 2% Cloths 1 Application(s) Topical daily  dextrose 50% Injectable 25 Gram(s) IV Push once  dextrose 50% Injectable 12.5 Gram(s) IV Push once  dornase malik Solution 2.5 milliGRAM(s) Inhalation daily  heparin   Injectable 5000 Unit(s) SubCutaneous every 12 hours  HYDROmorphone PCA (1 mG/mL) 30 milliLiter(s) PCA Continuous PCA Continuous  insulin lispro (ADMELOG) corrective regimen sliding scale   SubCutaneous every 6 hours  insulin NPH human recombinant 2 Unit(s) SubCutaneous every 6 hours  lactated ringers. 1000 milliLiter(s) (100 mL/Hr) IV Continuous <Continuous>  levalbuterol Inhalation 0.63 milliGRAM(s) Inhalation every 6 hours  lidocaine   4% Patch 1 Patch Transdermal at bedtime  metoprolol tartrate Injectable 2.5 milliGRAM(s) IV Push every 6 hours  pantoprazole  Injectable 40 milliGRAM(s) IV Push every 12 hours  sodium chloride 3%  Inhalation 4 milliLiter(s) Inhalation every 6 hours    MEDICATIONS  (PRN):  HYDROmorphone PCA (1 mG/mL) Rescue Clinician Bolus 0.5 milliGRAM(s) IV Push every 15 minutes PRN for Pain Scale GREATER THAN 6  naloxone Injectable 0.1 milliGRAM(s) IV Push every 3 minutes PRN For ANY of the following changes in patient status:  A. RR LESS THAN 10 breaths per minute, B. Oxygen saturation LESS THAN 90%, C. Sedation score of 6  ondansetron Injectable 4 milliGRAM(s) IV Push every 6 hours PRN Nausea      OBJECTIVE: Patient sitting in bed, NGT, CTx1    Sedation Score:	[ X] Alert	[ ] Drowsy 	[ ] Arousable	[ ] Asleep	[ ] Unresponsive    Side Effects:	[X ] None	[ ] Nausea	[ ] Vomiting	[ ] Pruritus  		[ ] Other:    Vital Signs Last 24 Hrs  T(C): 36.7 (18 Jul 2024 08:00), Max: 36.7 (18 Jul 2024 04:00)  T(F): 98 (18 Jul 2024 08:00), Max: 98 (18 Jul 2024 04:00)  HR: 108 (18 Jul 2024 10:00) (86 - 111)  BP: --  BP(mean): --  RR: 19 (18 Jul 2024 10:00) (7 - 23)  SpO2: 92% (18 Jul 2024 10:00) (92% - 100%)    Parameters below as of 18 Jul 2024 10:00  Patient On (Oxygen Delivery Method): nasal cannula w/ humidification  O2 Flow (L/min): 3      ASSESSMENT/ PLAN    Therapy to  be:	[ X] Continue   [ ] Discontinued   [ ] Change to prn Analgesics    Documentation and Verification of current medications:   [X] Done	[ ] Not done, not elligible    Comments: Continue PCA. Recommend non-opioid adjuvant analgesics to be used when possible and when allowed by primary surgical team.    Progress Note written now but Patient was seen earlier.

## 2024-07-18 NOTE — PROGRESS NOTE ADULT - SUBJECTIVE AND OBJECTIVE BOX
DATE OF SERVICE: 07-18-24    Patient denies chest pain or shortness of breath.   Review of symptoms otherwise negative.    Review of Systems:   Constitutional: [ ] fevers, [ ] chills.   Skin: [ ] dry skin. [ ] rashes.  Psychiatric: [ ] depression, [ ] anxiety.   Gastrointestinal: [ ] BRBPR, [ ] melena.   Neurological: [ ] confusion. [ ] seizures. [ ] shuffling gait.   Ears,Nose,Mouth and Throat: [ ] ear pain [ ] sore throat.   Eyes: [ ] diplopia.   Respiratory: [ ] hemoptysis. [ ] shortness of breath  Cardiovascular: See HPI above  Hematologic/Lymphatic: [ ] anemia. [ ] painful nodes. [ ] prolonged bleeding.   Genitourinary: [ ] hematuria. [ ] flank pain.   Endocrine: [ ] significant change in weight. [ ] intolerance to heat and cold.     Review of systems [x ] otherwise negative, [ ] otherwise unable to obtain    FH: no family history of sudden cardiac death in first degree relatives    SH: [ ] tobacco, [ ] alcohol, [ ] drugs    chlorhexidine 2% Cloths 1 Application(s) Topical daily  dextrose 50% Injectable 25 Gram(s) IV Push once  dextrose 50% Injectable 12.5 Gram(s) IV Push once  dornase malik Solution 2.5 milliGRAM(s) Inhalation daily  heparin   Injectable 5000 Unit(s) SubCutaneous every 12 hours  HYDROmorphone PCA (1 mG/mL) 30 milliLiter(s) PCA Continuous PCA Continuous  HYDROmorphone PCA (1 mG/mL) Rescue Clinician Bolus 0.5 milliGRAM(s) IV Push every 15 minutes PRN  insulin lispro (ADMELOG) corrective regimen sliding scale   SubCutaneous every 6 hours  insulin NPH human recombinant 2 Unit(s) SubCutaneous every 6 hours  lactated ringers. 1000 milliLiter(s) IV Continuous <Continuous>  levalbuterol Inhalation 0.63 milliGRAM(s) Inhalation every 6 hours  lidocaine   4% Patch 1 Patch Transdermal at bedtime  metoprolol tartrate Injectable 2.5 milliGRAM(s) IV Push every 6 hours  naloxone Injectable 0.1 milliGRAM(s) IV Push every 3 minutes PRN  ondansetron Injectable 4 milliGRAM(s) IV Push every 6 hours PRN  pantoprazole  Injectable 40 milliGRAM(s) IV Push every 12 hours  sodium chloride 3%  Inhalation 4 milliLiter(s) Inhalation every 6 hours                            8.5    9.36  )-----------( 129      ( 18 Jul 2024 02:50 )             26.1       07-18    140  |  106  |  22  ----------------------------<  113<H>  3.7   |  22  |  0.82    Ca    8.0<L>      18 Jul 2024 02:50  Phos  2.8     07-18  Mg     2.30     07-18    TPro  6.3  /  Alb  3.6  /  TBili  0.4  /  DBili  x   /  AST  136<H>  /  ALT  98<H>  /  AlkPhos  129<H>  07-16      T(C): 36.7 (07-18-24 @ 08:00), Max: 36.7 (07-18-24 @ 04:00)  HR: 108 (07-18-24 @ 10:00) (86 - 111)  BP: --  RR: 19 (07-18-24 @ 10:00) (7 - 23)  SpO2: 92% (07-18-24 @ 10:00) (92% - 100%)  Wt(kg): --    I&O's Summary    17 Jul 2024 07:01  -  18 Jul 2024 07:00  --------------------------------------------------------  IN: 3190 mL / OUT: 1960 mL / NET: 1230 mL    18 Jul 2024 07:01  -  18 Jul 2024 11:20  --------------------------------------------------------  IN: 330 mL / OUT: 500 mL / NET: -170 mL      Gen: NAD  HEENT:  (-)icterus (-)pallor  CV: N S1 S2 1/6 CATHY (+)2 Pulses B/l  Resp:  Clear to auscultation B/L, normal effort  GI: (+) BS Soft, NT, ND  Lymph:  (-)Edema, (-)obvious lymphadenopathy  Skin: Warm to touch, Normal turgor  Psych: Appropriate mood and affect      TELEMETRY: 	 SR/ST    ECG:  	NSR      TTE 07/2024  Findings:  1. Normal left ventricular size and function.  Mild diastolic dysfunction.  2. Normal left atrial size  3. Right atrial cavity is normal in size.  4. Normal right ventricular size and function.  5. Normal trileaflet aortic valve opening.  6. Normal mitral valve opening.  7. Normal appearing tricuspid valve with mild tricuspid  regurgitation.  8. Pulmonic valve is grossly normal, yet poorly visualized  with no doppler evidence for pulmonic stenosis.  9. No evidence of significant pericardial effusion.  10. The aortic root is normal.  11. Normal pulmonary artery.  12. IVC is normal with respiratory variation.  FULL STUDY DONE INCLUDING M-MODE RECORDING,  SPECTRAL DOPPLER AND    Stress 07/2024  Normal myocardial perfusion SPECT images No evidence of stress induced ischemia or infarction.  Normal left ventricular size and function.  Calculated EF is: 68%    ASSESSMENT/PLAN: 	Pt is a 59 y.o. man with history of HTN,, DLD, DM,  GEJ adenocarcinoma (T3N0, stage 2A) s/p neoadjuvant chemotherapy admitted for optimization prior to planned [facility] Esophagectomy on tuesday. Recently had a nuclear stress test in our office for evaluation of preoperative cardiac risk assessment prior to esophageal cancer surgery scheduled on 07/16/2024. The patient denies any chest pain, shortness ofbreath, or anginal symptoms. He has no palpitations, dizziness, or syncope    Pre-OP/HTN  - tolerated procedure well from CV perspective  - restart Amlodipine and Losartan when stable/ able to take PO  - Tele with NSR/ ST probably in the setting of Duonebs

## 2024-07-19 LAB
ANION GAP SERPL CALC-SCNC: 11 MMOL/L — SIGNIFICANT CHANGE UP (ref 7–14)
ANION GAP SERPL CALC-SCNC: 13 MMOL/L — SIGNIFICANT CHANGE UP (ref 7–14)
BLD GP AB SCN SERPL QL: NEGATIVE — SIGNIFICANT CHANGE UP
BLOOD GAS ARTERIAL - LYTES,HGB,ICA,LACT RESULT: SIGNIFICANT CHANGE UP
BUN SERPL-MCNC: 15 MG/DL — SIGNIFICANT CHANGE UP (ref 7–23)
BUN SERPL-MCNC: 17 MG/DL — SIGNIFICANT CHANGE UP (ref 7–23)
CALCIUM SERPL-MCNC: 8.3 MG/DL — LOW (ref 8.4–10.5)
CALCIUM SERPL-MCNC: 8.7 MG/DL — SIGNIFICANT CHANGE UP (ref 8.4–10.5)
CHLORIDE SERPL-SCNC: 104 MMOL/L — SIGNIFICANT CHANGE UP (ref 98–107)
CHLORIDE SERPL-SCNC: 105 MMOL/L — SIGNIFICANT CHANGE UP (ref 98–107)
CO2 SERPL-SCNC: 23 MMOL/L — SIGNIFICANT CHANGE UP (ref 22–31)
CO2 SERPL-SCNC: 24 MMOL/L — SIGNIFICANT CHANGE UP (ref 22–31)
CREAT SERPL-MCNC: 0.67 MG/DL — SIGNIFICANT CHANGE UP (ref 0.5–1.3)
CREAT SERPL-MCNC: 0.68 MG/DL — SIGNIFICANT CHANGE UP (ref 0.5–1.3)
EGFR: 106 ML/MIN/1.73M2 — SIGNIFICANT CHANGE UP
EGFR: 106 ML/MIN/1.73M2 — SIGNIFICANT CHANGE UP
GLUCOSE BLDC GLUCOMTR-MCNC: 177 MG/DL — HIGH (ref 70–99)
GLUCOSE BLDC GLUCOMTR-MCNC: 179 MG/DL — HIGH (ref 70–99)
GLUCOSE BLDC GLUCOMTR-MCNC: 179 MG/DL — HIGH (ref 70–99)
GLUCOSE BLDC GLUCOMTR-MCNC: 183 MG/DL — HIGH (ref 70–99)
GLUCOSE SERPL-MCNC: 180 MG/DL — HIGH (ref 70–99)
GLUCOSE SERPL-MCNC: 188 MG/DL — HIGH (ref 70–99)
HCT VFR BLD CALC: 26.4 % — LOW (ref 39–50)
HCT VFR BLD CALC: 33.1 % — LOW (ref 39–50)
HGB BLD-MCNC: 11 G/DL — LOW (ref 13–17)
HGB BLD-MCNC: 8.8 G/DL — LOW (ref 13–17)
MAGNESIUM SERPL-MCNC: 1.9 MG/DL — SIGNIFICANT CHANGE UP (ref 1.6–2.6)
MAGNESIUM SERPL-MCNC: 2 MG/DL — SIGNIFICANT CHANGE UP (ref 1.6–2.6)
MCHC RBC-ENTMCNC: 30.9 PG — SIGNIFICANT CHANGE UP (ref 27–34)
MCHC RBC-ENTMCNC: 31.8 PG — SIGNIFICANT CHANGE UP (ref 27–34)
MCHC RBC-ENTMCNC: 33.2 GM/DL — SIGNIFICANT CHANGE UP (ref 32–36)
MCHC RBC-ENTMCNC: 33.3 GM/DL — SIGNIFICANT CHANGE UP (ref 32–36)
MCV RBC AUTO: 93 FL — SIGNIFICANT CHANGE UP (ref 80–100)
MCV RBC AUTO: 95.3 FL — SIGNIFICANT CHANGE UP (ref 80–100)
NRBC # BLD: 0 /100 WBCS — SIGNIFICANT CHANGE UP (ref 0–0)
NRBC # BLD: 0 /100 WBCS — SIGNIFICANT CHANGE UP (ref 0–0)
NRBC # FLD: 0 K/UL — SIGNIFICANT CHANGE UP (ref 0–0)
NRBC # FLD: 0 K/UL — SIGNIFICANT CHANGE UP (ref 0–0)
PHOSPHATE SERPL-MCNC: 2 MG/DL — LOW (ref 2.5–4.5)
PHOSPHATE SERPL-MCNC: 3.4 MG/DL — SIGNIFICANT CHANGE UP (ref 2.5–4.5)
PLATELET # BLD AUTO: 138 K/UL — LOW (ref 150–400)
PLATELET # BLD AUTO: 151 K/UL — SIGNIFICANT CHANGE UP (ref 150–400)
POTASSIUM SERPL-MCNC: 3.8 MMOL/L — SIGNIFICANT CHANGE UP (ref 3.5–5.3)
POTASSIUM SERPL-MCNC: 3.9 MMOL/L — SIGNIFICANT CHANGE UP (ref 3.5–5.3)
POTASSIUM SERPL-SCNC: 3.8 MMOL/L — SIGNIFICANT CHANGE UP (ref 3.5–5.3)
POTASSIUM SERPL-SCNC: 3.9 MMOL/L — SIGNIFICANT CHANGE UP (ref 3.5–5.3)
RBC # BLD: 2.77 M/UL — LOW (ref 4.2–5.8)
RBC # BLD: 3.56 M/UL — LOW (ref 4.2–5.8)
RBC # FLD: 13.4 % — SIGNIFICANT CHANGE UP (ref 10.3–14.5)
RBC # FLD: 13.8 % — SIGNIFICANT CHANGE UP (ref 10.3–14.5)
RH IG SCN BLD-IMP: POSITIVE — SIGNIFICANT CHANGE UP
SODIUM SERPL-SCNC: 140 MMOL/L — SIGNIFICANT CHANGE UP (ref 135–145)
SODIUM SERPL-SCNC: 140 MMOL/L — SIGNIFICANT CHANGE UP (ref 135–145)
WBC # BLD: 8.96 K/UL — SIGNIFICANT CHANGE UP (ref 3.8–10.5)
WBC # BLD: 9.21 K/UL — SIGNIFICANT CHANGE UP (ref 3.8–10.5)
WBC # FLD AUTO: 8.96 K/UL — SIGNIFICANT CHANGE UP (ref 3.8–10.5)
WBC # FLD AUTO: 9.21 K/UL — SIGNIFICANT CHANGE UP (ref 3.8–10.5)

## 2024-07-19 PROCEDURE — 71045 X-RAY EXAM CHEST 1 VIEW: CPT | Mod: 26

## 2024-07-19 PROCEDURE — 99233 SBSQ HOSP IP/OBS HIGH 50: CPT

## 2024-07-19 RX ORDER — POTASSIUM PHOSPHATE, MONOBASIC AND POTASSIUM PHOSPHATE, DIBASIC 224; 236 MG/ML; MG/ML
30 INJECTION, SOLUTION INTRAVENOUS ONCE
Refills: 0 | Status: COMPLETED | OUTPATIENT
Start: 2024-07-19 | End: 2024-07-19

## 2024-07-19 RX ORDER — LABETALOL HCL 200 MG
10 TABLET ORAL EVERY 6 HOURS
Refills: 0 | Status: DISCONTINUED | OUTPATIENT
Start: 2024-07-19 | End: 2024-07-20

## 2024-07-19 RX ORDER — FUROSEMIDE 10 MG/ML
10 INJECTION, SOLUTION INTRAVENOUS ONCE
Refills: 0 | Status: COMPLETED | OUTPATIENT
Start: 2024-07-19 | End: 2024-07-19

## 2024-07-19 RX ORDER — METOPROLOL TARTRATE 100 MG
5 TABLET ORAL EVERY 6 HOURS
Refills: 0 | Status: DISCONTINUED | OUTPATIENT
Start: 2024-07-19 | End: 2024-07-19

## 2024-07-19 RX ORDER — POTASSIUM CHLORIDE 1500 MG/1
20 TABLET, EXTENDED RELEASE ORAL ONCE
Refills: 0 | Status: DISCONTINUED | OUTPATIENT
Start: 2024-07-19 | End: 2024-07-19

## 2024-07-19 RX ORDER — SOD PHOS DI, MONO/K PHOS MONO 250 MG
1 TABLET ORAL ONCE
Refills: 0 | Status: DISCONTINUED | OUTPATIENT
Start: 2024-07-19 | End: 2024-07-19

## 2024-07-19 RX ORDER — HYDRALAZINE HYDROCHLORIDE 100 MG/1
10 TABLET ORAL EVERY 4 HOURS
Refills: 0 | Status: DISCONTINUED | OUTPATIENT
Start: 2024-07-19 | End: 2024-07-20

## 2024-07-19 RX ADMIN — Medication 2 UNIT(S): at 11:12

## 2024-07-19 RX ADMIN — POTASSIUM PHOSPHATE, MONOBASIC AND POTASSIUM PHOSPHATE, DIBASIC 83.33 MILLIMOLE(S): 224; 236 INJECTION, SOLUTION INTRAVENOUS at 07:35

## 2024-07-19 RX ADMIN — DEXTROSE MONOHYDRATE, SODIUM CHLORIDE, SODIUM LACTATE, CALCIUM CHLORIDE, MAGNESIUM CHLORIDE 80 MILLILITER(S): 1.5; 538; 448; 18.4; 5.08 SOLUTION INTRAPERITONEAL at 19:35

## 2024-07-19 RX ADMIN — Medication 1: at 11:12

## 2024-07-19 RX ADMIN — Medication 2 UNIT(S): at 17:49

## 2024-07-19 RX ADMIN — Medication 10 MILLIGRAM(S): at 23:52

## 2024-07-19 RX ADMIN — Medication 4 MILLILITER(S): at 09:13

## 2024-07-19 RX ADMIN — Medication 30 MILLILITER(S): at 19:35

## 2024-07-19 RX ADMIN — LIDOCAINE 5% 1 PATCH: 5 CREAM TOPICAL at 22:37

## 2024-07-19 RX ADMIN — Medication 2.5 MILLIGRAM(S): at 06:20

## 2024-07-19 RX ADMIN — HEPARIN SODIUM 5000 UNIT(S): 1000 INJECTION, SOLUTION INTRAVENOUS; SUBCUTANEOUS at 17:48

## 2024-07-19 RX ADMIN — HEPARIN SODIUM 5000 UNIT(S): 1000 INJECTION, SOLUTION INTRAVENOUS; SUBCUTANEOUS at 06:20

## 2024-07-19 RX ADMIN — CHLORHEXIDINE GLUCONATE 1 APPLICATION(S): 500 CLOTH TOPICAL at 11:12

## 2024-07-19 RX ADMIN — DEXTROSE MONOHYDRATE, SODIUM CHLORIDE, SODIUM LACTATE, CALCIUM CHLORIDE, MAGNESIUM CHLORIDE 80 MILLILITER(S): 1.5; 538; 448; 18.4; 5.08 SOLUTION INTRAPERITONEAL at 11:12

## 2024-07-19 RX ADMIN — Medication 5 MILLIGRAM(S): at 11:11

## 2024-07-19 RX ADMIN — PANTOPRAZOLE SODIUM 40 MILLIGRAM(S): 20 TABLET, DELAYED RELEASE ORAL at 06:20

## 2024-07-19 RX ADMIN — Medication 4 MILLILITER(S): at 16:00

## 2024-07-19 RX ADMIN — Medication 2 UNIT(S): at 07:00

## 2024-07-19 RX ADMIN — LEVALBUTEROL HYDROCHLORIDE 0.63 MILLIGRAM(S): 0.31 SOLUTION RESPIRATORY (INHALATION) at 16:00

## 2024-07-19 RX ADMIN — LIDOCAINE 5% 1 PATCH: 5 CREAM TOPICAL at 10:44

## 2024-07-19 RX ADMIN — LEVALBUTEROL HYDROCHLORIDE 0.63 MILLIGRAM(S): 0.31 SOLUTION RESPIRATORY (INHALATION) at 22:19

## 2024-07-19 RX ADMIN — Medication 1: at 17:49

## 2024-07-19 RX ADMIN — FUROSEMIDE 10 MILLIGRAM(S): 10 INJECTION, SOLUTION INTRAVENOUS at 11:11

## 2024-07-19 RX ADMIN — Medication 1: at 06:57

## 2024-07-19 RX ADMIN — HYDRALAZINE HYDROCHLORIDE 10 MILLIGRAM(S): 100 TABLET ORAL at 14:32

## 2024-07-19 RX ADMIN — Medication 4 MILLILITER(S): at 22:19

## 2024-07-19 RX ADMIN — DORNASE ALFA 2.5 MILLIGRAM(S): 1 SOLUTION RESPIRATORY (INHALATION) at 09:14

## 2024-07-19 RX ADMIN — LEVALBUTEROL HYDROCHLORIDE 0.63 MILLIGRAM(S): 0.31 SOLUTION RESPIRATORY (INHALATION) at 09:14

## 2024-07-19 RX ADMIN — LIDOCAINE 5% 1 PATCH: 5 CREAM TOPICAL at 07:37

## 2024-07-19 RX ADMIN — Medication 30 MILLILITER(S): at 11:13

## 2024-07-19 RX ADMIN — PANTOPRAZOLE SODIUM 40 MILLIGRAM(S): 20 TABLET, DELAYED RELEASE ORAL at 17:48

## 2024-07-19 RX ADMIN — Medication 5 MILLIGRAM(S): at 17:49

## 2024-07-19 NOTE — PROGRESS NOTE ADULT - SUBJECTIVE AND OBJECTIVE BOX
Anesthesia Pain Management Service    SUBJECTIVE: Patient is doing well with IV PCA and no significant problems reported.    Pain Scale Score	At rest: _4-5/10__ 	With Activity: ___ 	[X ] Refer to charted pain scores    THERAPY:    [ ] IV PCA Morphine		[ ] 5 mg/mL	[ ] 1 mg/mL  [X ] IV PCA Hydromorphone	[ ] 5 mg/mL	[X ] 1 mg/mL  [ ] IV PCA Fentanyl		[ ] 50 micrograms/mL    Demand dose __0.2_ lockout __6_ (minutes) Continuous Rate _0__ Total: __4.8_  mg used (in past 24 hours)      MEDICATIONS  (STANDING):  chlorhexidine 2% Cloths 1 Application(s) Topical daily  dextrose 5% + lactated ringers. 1000 milliLiter(s) (80 mL/Hr) IV Continuous <Continuous>  dextrose 50% Injectable 25 Gram(s) IV Push once  dextrose 50% Injectable 12.5 Gram(s) IV Push once  dornase malik Solution 2.5 milliGRAM(s) Inhalation daily  heparin   Injectable 5000 Unit(s) SubCutaneous every 12 hours  HYDROmorphone PCA (1 mG/mL) 30 milliLiter(s) PCA Continuous PCA Continuous  insulin lispro (ADMELOG) corrective regimen sliding scale   SubCutaneous every 6 hours  insulin NPH human recombinant 2 Unit(s) SubCutaneous every 6 hours  levalbuterol Inhalation 0.63 milliGRAM(s) Inhalation every 6 hours  lidocaine   4% Patch 1 Patch Transdermal at bedtime  metoprolol tartrate Injectable 5 milliGRAM(s) IV Push every 6 hours  pantoprazole  Injectable 40 milliGRAM(s) IV Push every 12 hours  sodium chloride 3%  Inhalation 4 milliLiter(s) Inhalation every 6 hours    MEDICATIONS  (PRN):  HYDROmorphone PCA (1 mG/mL) Rescue Clinician Bolus 0.5 milliGRAM(s) IV Push every 15 minutes PRN for Pain Scale GREATER THAN 6  naloxone Injectable 0.1 milliGRAM(s) IV Push every 3 minutes PRN For ANY of the following changes in patient status:  A. RR LESS THAN 10 breaths per minute, B. Oxygen saturation LESS THAN 90%, C. Sedation score of 6  ondansetron Injectable 4 milliGRAM(s) IV Push every 6 hours PRN Nausea      OBJECTIVE: Patient sitting up in chair. CTX1 and NG tube in place.     Sedation Score:	[ X] Alert	[ ] Drowsy 	[ ] Arousable	[ ] Asleep	[ ] Unresponsive    Side Effects:	[X ] None	[ ] Nausea	[ ] Vomiting	[ ] Pruritus  		[ ] Other:    Vital Signs Last 24 Hrs  T(C): 36.9 (19 Jul 2024 04:00), Max: 37.2 (18 Jul 2024 20:00)  T(F): 98.5 (19 Jul 2024 04:00), Max: 99 (18 Jul 2024 20:00)  HR: 108 (19 Jul 2024 11:00) (85 - 117)  BP: --  BP(mean): --  RR: 23 (19 Jul 2024 11:00) (7 - 28)  SpO2: 93% (19 Jul 2024 11:00) (93% - 98%)    Parameters below as of 19 Jul 2024 11:00  Patient On (Oxygen Delivery Method): nasal cannula w/ humidification  O2 Flow (L/min): 1      ASSESSMENT/ PLAN    Therapy to  be:	[ X] Continue   [ ] Discontinued   [ ] Change to prn Analgesics    Documentation and Verification of current medications:   [X] Done	[ ] Not done, not elligible    Comments: Continue IV PCA. Recommend non-opioid adjuvant analgesics to be used when possible and when allowed by primary surgical team.    Progress Note written now but Patient was seen earlier.

## 2024-07-19 NOTE — PROGRESS NOTE ADULT - SUBJECTIVE AND OBJECTIVE BOX
DATE OF SERVICE: 07-19-24    Patient denies chest pain or shortness of breath.   Review of symptoms otherwise negative.    MEDICATIONS:  chlorhexidine 2% Cloths 1 Application(s) Topical daily  dextrose 5% + lactated ringers. 1000 milliLiter(s) IV Continuous <Continuous>  dextrose 50% Injectable 25 Gram(s) IV Push once  dextrose 50% Injectable 12.5 Gram(s) IV Push once  dornase malik Solution 2.5 milliGRAM(s) Inhalation daily  furosemide   Injectable 10 milliGRAM(s) IV Push once  heparin   Injectable 5000 Unit(s) SubCutaneous every 12 hours  HYDROmorphone PCA (1 mG/mL) 30 milliLiter(s) PCA Continuous PCA Continuous  HYDROmorphone PCA (1 mG/mL) Rescue Clinician Bolus 0.5 milliGRAM(s) IV Push every 15 minutes PRN  insulin lispro (ADMELOG) corrective regimen sliding scale   SubCutaneous every 6 hours  insulin NPH human recombinant 2 Unit(s) SubCutaneous every 6 hours  levalbuterol Inhalation 0.63 milliGRAM(s) Inhalation every 6 hours  lidocaine   4% Patch 1 Patch Transdermal at bedtime  metoprolol tartrate Injectable 5 milliGRAM(s) IV Push every 6 hours  naloxone Injectable 0.1 milliGRAM(s) IV Push every 3 minutes PRN  ondansetron Injectable 4 milliGRAM(s) IV Push every 6 hours PRN  pantoprazole  Injectable 40 milliGRAM(s) IV Push every 12 hours  sodium chloride 3%  Inhalation 4 milliLiter(s) Inhalation every 6 hours      LABS:                        8.8    8.96  )-----------( 138      ( 19 Jul 2024 03:36 )             26.4       Hemoglobin: 8.8 g/dL (07-19 @ 03:36)  Hemoglobin: 8.8 g/dL (07-18 @ 10:43)  Hemoglobin: 8.5 g/dL (07-18 @ 02:50)  Hemoglobin: 9.5 g/dL (07-17 @ 03:30)  Hemoglobin: 9.4 g/dL (07-16 @ 19:12)      07-19    140  |  105  |  17  ----------------------------<  188<H>  3.8   |  24  |  0.67    Ca    8.3<L>      19 Jul 2024 03:36  Phos  2.0     07-19  Mg     2.00     07-19      Creatinine Trend: 0.67<--, 0.82<--, 0.85<--, 0.86<--, 1.02<--, 0.71<--    COAGS:           PHYSICAL EXAM:  T(C): 36.9 (07-19-24 @ 04:00), Max: 37.2 (07-18-24 @ 20:00)  HR: 108 (07-19-24 @ 11:00) (85 - 117)  BP: --  RR: 23 (07-19-24 @ 11:00) (7 - 28)  SpO2: 93% (07-19-24 @ 11:00) (93% - 98%)  Wt(kg): --    I&O's Summary    18 Jul 2024 07:01  -  19 Jul 2024 07:00  --------------------------------------------------------  IN: 2530 mL / OUT: 2815 mL / NET: -285 mL    19 Jul 2024 07:01  -  19 Jul 2024 11:06  --------------------------------------------------------  IN: 330 mL / OUT: 410 mL / NET: -80 mL      Gen: NAD  HEENT:  (-)icterus (-)pallor  CV: N S1 S2 1/6 CATHY (+)2 Pulses B/l  Resp:  Clear to auscultation B/L, normal effort  GI: (+) BS Soft, NT, ND  Lymph:  (-)Edema, (-)obvious lymphadenopathy  Skin: Warm to touch, Normal turgor  Psych: Appropriate mood and affect      TELEMETRY: 	 SR/ST    ECG:  	NSR      TTE 07/2024  Findings:  1. Normal left ventricular size and function.  Mild diastolic dysfunction.  2. Normal left atrial size  3. Right atrial cavity is normal in size.  4. Normal right ventricular size and function.  5. Normal trileaflet aortic valve opening.  6. Normal mitral valve opening.  7. Normal appearing tricuspid valve with mild tricuspid  regurgitation.  8. Pulmonic valve is grossly normal, yet poorly visualized  with no doppler evidence for pulmonic stenosis.  9. No evidence of significant pericardial effusion.  10. The aortic root is normal.  11. Normal pulmonary artery.  12. IVC is normal with respiratory variation.  FULL STUDY DONE INCLUDING M-MODE RECORDING,  SPECTRAL DOPPLER AND    Stress 07/2024  Normal myocardial perfusion SPECT images No evidence of stress induced ischemia or infarction.  Normal left ventricular size and function.  Calculated EF is: 68%    ASSESSMENT/PLAN: 	Pt is a 59 y.o. man with history of HTN,, DLD, DM,  GEJ adenocarcinoma (T3N0, stage 2A) s/p neoadjuvant chemotherapy admitted for optimization prior to planned [facility] Esophagectomy on tuesday. Recently had a nuclear stress test in our office for evaluation of preoperative cardiac risk assessment prior to esophageal cancer surgery scheduled on 07/16/2024. The patient denies any chest pain, shortness ofbreath, or anginal symptoms. He has no palpitations, dizziness, or syncope    Pre-OP/HTN  - tolerated procedure well from CV perspective  - restart Amlodipine and Losartan when stable/ able to take PO  - Tele with NSR/ ST probably in the setting of Percy Mason MD  Pager: [phone]

## 2024-07-19 NOTE — CHART NOTE - NSCHARTNOTEFT_GEN_A_CORE
The patient is a 59y Male with PMH of T2DM, HTN, HLD, GE junction cancer here for esophagectomy now postop.  Endocrinology consulted for T2DM    BGs mainly at target for past 24hrs    #Uncontrolled Type 2 Diabetes Mellitus   - Follows with: Dr. Nolasco  - A1C with Estimated Average Glucose Result: 10.9 % (07-13-24)  - home regimen: Semglee 18 units, Novolog 6 units  - eGFR: 84 mL/min/1.73m2 (07-15-24)  - Weight (kg): 54 (07-12-24)  - glucose 384-101, no evidence of DKA on labs      INPATIENT PLAN:  - Receiving LR fluids and will be prolonged NPO. Recommend adding dextrose to LR to avoid starvation status. Insulin can be gradually increased as needed.  -CTICU team preferring to use NPH while NPO  -May continue for now with NPH 2 units q6h while NPO as glucose is well controlled today.  -Discussed that Lantus would also be an option for basal insulin in place of NPH  -Continue low Admelog scale q6h  -FS q6h  - Inpatient glucose goals: 140-180 in this critically ill patient.       DISCHARGE PLANNING:  - Discharge recs pending clinical course and nutrition plan. If he resumes his normal diet, can discharge on home regimen of Semglee 18 units and Admelog 6 units with meals along with his oral medications as before. If reduced po intake regimen may need to be lowered prior to final dc recs.  - will need Endocrinology follow up. Patient has an appointment with Dr. Nolasco scheduled for August 8th at 8:20 AM at [address] [facility] [address].   #Hypertension  - Goal BP <130/80  - Management as per primary team  - check urine microalbumin level as outpatient    #Hyperlipidemia  - LDL goal <70  - check lipid panel as outpatient on a yearly basis

## 2024-07-19 NOTE — PROGRESS NOTE ADULT - SUBJECTIVE AND OBJECTIVE BOX
Surgery Progress Note     Interval/Subjective:  Patient seen and examined. No changes this morning  __________________________________________________________________  Vitals:  T(C): 36.9 (04:00), Max: 37.2 (20:00)  HR: 117 (06:00) (85 - 117)  BP: --  RR: 21 (06:00) (7 - 28)  SpO2: 96% (06:00) (92% - 98%)    I & O's:  IN:    dextrose 5% + lactated ringers: 1680 mL    Enteral Tube Flush: 150 mL    Lactated Ringers: 700 mL  Total IN: 2530 mL    OUT:    Bulb (mL): 170 mL    Chest Tube (mL): 100 mL    Indwelling Catheter - Urethral (mL): 2295 mL    Nasogastric/Oral tube (mL): 250 mL  Total OUT: 2815 mL    Physical Exam:  General: NAD, resting comfortably in bed  HEENT: Normocephalic atraumatic  Respiratory: Nonlabored respirations  Cardio: regular rate, normotensive  Abdomen: appropriately tender, nondistended, drain with SS output, NGT to suction    Labs:  CBC: 24 @ 03:36          8.8   8.96 >----< 138          26.4    Chemistry: 24 @ 03:36  140|105|17    ------------< 188  3.8|24|0.67    Ca: 8.3 24 @ 03:36  M.00 24 @ 03:36  Phos: 2.0 24 @ 03:36    CBC: 24 @ 10:43          8.8   10.88 >----< 142          27.7    __________________________________________________________________

## 2024-07-19 NOTE — PROGRESS NOTE ADULT - ASSESSMENT
59 y.o. man with history of GEJ adenocarcinoma (T3N0, stage 2A) s/p neoadjuvant chemotherapy, now s/p [facility] Esophagectomy on 7/16.     Plan:  - Care per CTICU  - Diet: NPO/NGT  - UGI POD5  - IVF @ 100  - Activity: OOB as tolerated  - DVT ppx: SCD's & [facility]    Surgery D Team  [id number]

## 2024-07-19 NOTE — PROGRESS NOTE ADULT - SUBJECTIVE AND OBJECTIVE BOX
CHIEF COMPLAINT: FOLLOW UP IN ICU FOR PATIENT WITH ESOPHAGEAL CANCER    ISSUES:   Esophageal cancer  Acute post operative anemia from expected blood loss  Post op pain  Chest tube in place  Uncontrolled DM2 (HgbA1c 11)  HTN  HLD  BPH  GERD      INTERVAL EVENTS:   Chest tubes with no airleak    HISTORY:   Patient denies chest pain, cough, pleuritic pain, fever or dyspnea. Patient denies nausea, vomiting, diarrhea, melena, hematochezia.  Pt denies dysphagia or food getting stuck in chest.       PHYSICAL EXAM:   Gen: Comfortable, No acute distress  Eyes: Sclera white, Conjunctiva normal, Eyelids normal, Pupils symmetrical   ENT: Mucous membranes moist,  ,  ,  NG tube  Neck: Trachea midline,  ,  ,  ,  ,  ,    CV: Rate regular, Rhythm regular,  ,  ,    Resp: Breath sounds clear, No accessory muscles use,  ,  ,  Two chest tubes  Abd: Soft, Non-distended, Non-tender,   ,  ,  ,    Skin: Warm, No peripheral edema of lower extremities,  ,    : stallworth  Neuro: Moving all 4 extremities,    Psych: A&Ox3      ASSESSMENT AND PLAN:     NEURO:  Post-operative Pain - Stable. Pain control with PCA and Tylenol IV PRN.          RESPIRATORY:  Hypoxia - Wean nasal cannula for goal O2sat above 92. Obtain CXR. Incentive spirometry. Chest PT and frequent suctioning. Continue bronchodilators. OOB to chair & ambulate w/ assistance. Continuous pulse oximetry for support & to prevent decompensation.           CARDIOVASCULAR:  Hemodynamically stable - Not on pressors. Continue hemodynamic monitoring.  Telemetry (medical test) - Reviewed by me today independently. Sinus tachycardia    HTN - worsened by post-op pain  - start hydralazine IV PRN  - continue metoprolol IV      RENAL:  Stable - Monitor IOs and electrolytes. Keep K above 4.0 and Mg above 2.0.       BPH - stable. not on flomax. voided this admission without issues.  - Continue stallworth catheter for now      GASTROINTESTINAL:  GI prophylaxis not indicated  Zofran and Reglan IV PRN for nausea  NPO        GERD - stable. Continue pantoprazole         HEMATOLOGIC:  Acute post-operative anemia from expected blood loss - Stable.  - Monitor CBC. Monitor chest tube output.  - Transfuse PRBC    DVT prophylaxis with heparin subQ           INFECTIOUS DISEASE:  All surgical sites appear clean. No signs of active infection. Will monitor for fever and leukocytosis.             ENDOCRINE:  Uncontrolled DM2 (HgbA1c 11) – stable.  - NPH insulin  - Lispro sliding scale  - Carb control diets  - Monitor glucose fingersticks for goal 120-180. Insulin sliding scale.            ONCOLOGY:  Esophageal cancer - improved s/p resection.  - Chest tube – Pleurevac regulated waterseal. Monitor chest tube output.  - Repeat CXR today  - NG tube to suction  - NPO until swallow study           Pertinent clinical, laboratory, radiographic, hemodynamic, echocardiographic, respiratory data, microbiologic data and chart were reviewed by myself and analyzed frequently throughout the course of the day and night by myself.    Plan discussed at length with the CTICU staff and Attending CT Surgeon -   Dr Lewis Laureano.      Patient's status was discussed with patient at bedside.       	  I have spent 50 minutes of time with this patient monitoring hemodynamic status, respiratory status, and coordinating care in the ICU.    ________________________________________________   CHIEF COMPLAINT: FOLLOW UP IN ICU FOR PATIENT WITH ESOPHAGEAL CANCER    ISSUES:   Esophageal cancer  Acute post operative anemia from expected blood loss  Post op pain  Chest tube in place  Uncontrolled DM2 (HgbA1c 11)  HTN  HLD  BPH  GERD      INTERVAL EVENTS:   Chest tubes with no airleak    HISTORY:   Patient denies chest pain, cough, pleuritic pain, fever or dyspnea. Patient denies nausea, vomiting, diarrhea, melena, hematochezia.  Pt denies dysphagia or food getting stuck in chest.       PHYSICAL EXAM:   Gen: Comfortable, No acute distress  Eyes: Sclera white, Conjunctiva normal, Eyelids normal, Pupils symmetrical   ENT: Mucous membranes moist,  ,  ,  NG tube  Neck: Trachea midline,  ,  ,  ,  ,  ,    CV: Rate regular, Rhythm regular,  ,  ,    Resp: Breath sounds clear, No accessory muscles use,  ,  ,  Two chest tubes  Abd: Soft, Non-distended, Non-tender,   ,  ,  ,    Skin: Warm, No peripheral edema of lower extremities,  ,    : stallworth  Neuro: Moving all 4 extremities,    Psych: A&Ox3      ASSESSMENT AND PLAN:     NEURO:  Post-operative Pain - Stable. Pain control with PCA and Tylenol IV PRN.          RESPIRATORY:  Hypoxia - Wean nasal cannula for goal O2sat above 92. Obtain CXR. Incentive spirometry. Chest PT and frequent suctioning. Continue bronchodilators. OOB to chair & ambulate w/ assistance. Continuous pulse oximetry for support & to prevent decompensation.           CARDIOVASCULAR:  Hemodynamically stable - Not on pressors. Continue hemodynamic monitoring.  Telemetry (medical test) - Reviewed by me today independently. Sinus tachycardia    HTN - worsened by post-op pain  - start hydralazine IV PRN  - continue metoprolol IV      RENAL:  Stable - Monitor IOs and electrolytes. Keep K above 4.0 and Mg above 2.0.       BPH - stable. not on flomax. voided this admission without issues.  - Continue stallworth catheter for now      GASTROINTESTINAL:  GI prophylaxis not indicated  Zofran and Reglan IV PRN for nausea  NPO        GERD - stable. Continue pantoprazole         HEMATOLOGIC:  Acute post-operative anemia from expected blood loss - Stable.  - Monitor CBC. Monitor chest tube output.  - Transfuse PRBC  - Check post transfusion Hgb    DVT prophylaxis with heparin subQ           INFECTIOUS DISEASE:  All surgical sites appear clean. No signs of active infection. Will monitor for fever and leukocytosis.             ENDOCRINE:  Uncontrolled DM2 (HgbA1c 11) – stable.  - NPH insulin  - Lispro sliding scale  - Carb control diets  - Monitor glucose fingersticks for goal 120-180. Insulin sliding scale.            ONCOLOGY:  Esophageal cancer - improved s/p resection.  - Chest tube – Pleurevac regulated waterseal. Monitor chest tube output.  - Repeat CXR today  - NG tube to suction  - NPO until swallow study           Pertinent clinical, laboratory, radiographic, hemodynamic, echocardiographic, respiratory data, microbiologic data and chart were reviewed by myself and analyzed frequently throughout the course of the day and night by myself.    Plan discussed at length with the CTICU staff and Attending CT Surgeon -   Dr Lewis Laureano.      Patient's status was discussed with patient at bedside.       	  I have spent 50 minutes of time with this patient monitoring hemodynamic status, respiratory status, and coordinating care in the ICU.    ________________________________________________      _________________________  VITAL SIGNS:  Vital Signs Last 24 Hrs  T(C): 36.9 (19 Jul 2024 04:00), Max: 37.2 (18 Jul 2024 20:00)  T(F): 98.5 (19 Jul 2024 04:00), Max: 99 (18 Jul 2024 20:00)  HR: 108 (19 Jul 2024 11:00) (85 - 117)  BP: --  BP(mean): --  RR: 23 (19 Jul 2024 11:00) (7 - 28)  SpO2: 93% (19 Jul 2024 11:00) (93% - 98%)    Parameters below as of 19 Jul 2024 11:00  Patient On (Oxygen Delivery Method): nasal cannula w/ humidification  O2 Flow (L/min): 1    I/Os:   I&O's Detail    18 Jul 2024 07:01  -  19 Jul 2024 07:00  --------------------------------------------------------  IN:    dextrose 5% + lactated ringers: 1680 mL    Enteral Tube Flush: 150 mL    Lactated Ringers: 700 mL  Total IN: 2530 mL    OUT:    Bulb (mL): 170 mL    Chest Tube (mL): 100 mL    Indwelling Catheter - Urethral (mL): 2295 mL    Nasogastric/Oral tube (mL): 250 mL  Total OUT: 2815 mL    Total NET: -285 mL      19 Jul 2024 07:01  -  19 Jul 2024 11:56  --------------------------------------------------------  IN:    dextrose 5% + lactated ringers: 320 mL    Enteral Tube Flush: 30 mL    PRBCs (Packed Red Blood Cells): 300 mL  Total IN: 650 mL    OUT:    Chest Tube (mL): 60 mL    Indwelling Catheter - Urethral (mL): 425 mL  Total OUT: 485 mL    Total NET: 165 mL              MEDICATIONS:  MEDICATIONS  (STANDING):  chlorhexidine 2% Cloths 1 Application(s) Topical daily  dextrose 5% + lactated ringers. 1000 milliLiter(s) (80 mL/Hr) IV Continuous <Continuous>  dextrose 50% Injectable 25 Gram(s) IV Push once  dextrose 50% Injectable 12.5 Gram(s) IV Push once  dornase malik Solution 2.5 milliGRAM(s) Inhalation daily  heparin   Injectable 5000 Unit(s) SubCutaneous every 12 hours  HYDROmorphone PCA (1 mG/mL) 30 milliLiter(s) PCA Continuous PCA Continuous  insulin lispro (ADMELOG) corrective regimen sliding scale   SubCutaneous every 6 hours  insulin NPH human recombinant 2 Unit(s) SubCutaneous every 6 hours  levalbuterol Inhalation 0.63 milliGRAM(s) Inhalation every 6 hours  lidocaine   4% Patch 1 Patch Transdermal at bedtime  metoprolol tartrate Injectable 5 milliGRAM(s) IV Push every 6 hours  pantoprazole  Injectable 40 milliGRAM(s) IV Push every 12 hours  sodium chloride 3%  Inhalation 4 milliLiter(s) Inhalation every 6 hours    MEDICATIONS  (PRN):  hydrALAZINE Injectable 10 milliGRAM(s) IV Push every 4 hours PRN systolic BP greater than 170  HYDROmorphone PCA (1 mG/mL) Rescue Clinician Bolus 0.5 milliGRAM(s) IV Push every 15 minutes PRN for Pain Scale GREATER THAN 6  naloxone Injectable 0.1 milliGRAM(s) IV Push every 3 minutes PRN For ANY of the following changes in patient status:  A. RR LESS THAN 10 breaths per minute, B. Oxygen saturation LESS THAN 90%, C. Sedation score of 6  ondansetron Injectable 4 milliGRAM(s) IV Push every 6 hours PRN Nausea      LABS:  Laboratory data was independently reviewed by me today.                           8.8    8.96  )-----------( 138      ( 19 Jul 2024 03:36 )             26.4     07-19    140  |  105  |  17  ----------------------------<  188<H>  3.8   |  24  |  0.67    Ca    8.3<L>      19 Jul 2024 03:36  Phos  2.0     07-19  Mg     2.00     07-19          ABG - ( 19 Jul 2024 03:36 )  pH, Arterial: 7.41  pH, Blood: x     /  pCO2: 42    /  pO2: 79    / HCO3: 27    / Base Excess: 1.8   /  SaO2: 96.4              Urinalysis Basic - ( 19 Jul 2024 03:36 )    Color: x / Appearance: x / SG: x / pH: x  Gluc: 188 mg/dL / Ketone: x  / Bili: x / Urobili: x   Blood: x / Protein: x / Nitrite: x   Leuk Esterase: x / RBC: x / WBC x   Sq Epi: x / Non Sq Epi: x / Bacteria: x        RADIOLOGY:   Radiology images were independently reviewed by me today. Reports were reviewed by me today.    Xray Chest 1 View- PORTABLE-Routine:   ACC: [id number] EXAM:  XR CHEST PORTABLE ROUTINE 1V   ORDERED BY: MINDI JOHNSON     *** ADDENDUM # 1 ***    RESULTS NOTIFICATION:  Finding of trace free air under the diaphragm discussed with and   acknowledged by Day De Luna by telephone by Dr. Luda Fonseca on   7/18/2024 12:28 PM.    Results Notification: Other Category, see above    --- End of Report ---    *** END OF ADDENDUM # 1 ***      PROCEDURE DATE:  07/17/2024          INTERPRETATION:  CLINICAL HISTORY: 59 years old Male with s/p   esophagectomy POD 1.    TECHNIQUE: Single AP view of the chest    COMPARISON: AP view of the chest on 7/16/2024.    FINDINGS:    Lines/Tubes/Support Devices: Unchanged chest tube and right-sided   MediPort catheter with tip terminating in the SVC. Overlying EKG leads.   NG tube terminating in the stomach.    Lungs/Pleura: Patchy opacifications seen around the esophagus and right   hilar regions, unchanged from prior xray. Bibasilar atelectasis. No   pneumothorax. Free air visualized bilaterally below the diaphragm.    Heart/Mediastinum: Trachea is midline The cardiomediastinal silhouette is   within normal limits.    Skeletal/soft tissues: Resolution of right neck base subcutaneous   emphysema on prior x-ray. No acute osseous abnormalities.    IMPRESSION:    Free air bilaterally below the diaphragm, likely secondary to   esophagectomy on 7/16/2024.    Unchanged patchy opacifications seen around the mid esophagus and right   hilar regions from prior x-ray on 7/16/2024.        --- End of Report ---    ***Please see the addendum at the top of this report. It may contain   additional important information or changes.****      LUDA FONSECA DO; Resident Radiologist  This document has been electronically signed.  CHRISSY HERRERA MD; Attending Radiologist  This document has been electronically signed. Jul 18 2024 12:13PM  1st Addendum: CHRISSY HERRERA MD; Attending Radiologist  The first addendum was electronically signed on: Jul 18 2024  1:23PM. (07-17-24 @ 05:42)  Xray Chest 1 View- PORTABLE-Urgent:   ACC: [id number] EXAM:  XR CHEST PORTABLE URGENT 1V   ORDERED BY: MINDI JOHNSON     PROCEDURE DATE:  07/16/2024          INTERPRETATION:  TECHNIQUE: Single portable view of the chest.    COMPARISON:  7/12/2024    CLINICAL HISTORY: s/p esophagectomy    FINDINGS:    Single frontal view of the chest demonstrates bibasilar atelectasis. The   cardiomediastinal silhouette is unremarkable. No acute osseous   abnormalities. Overlying EKG leads and wires are noted. Right-sided   MediPort catheter tip region. Right-sided chest tube. No pneumothorax.   Right neck base subcutaneous emphysema.    IMPRESSION: Bibasilar atelectasis.    --- End of Report ---            JOHN COPPOLA MD; Attending Radiologist  This document has been electronically signed. Jul 16 2024 11:06PM (07-16-24 @ 18:29)  Xray Chest 1 View- PORTABLE-Urgent:   ACC: [id number] EXAM:  XR CHEST PORTABLE URGENT 1V   ORDERED BY: JOSUÉ ANDRADE     PROCEDURE DATE:  07/12/2024          INTERPRETATION:  CLINICAL INFORMATION: Esophageal cancer    Time of Exam:  July 12, 2024 at 2:13 PM    EXAM:  Frontal Chest    FINDINGS:  Chemotherapy port with tip in the SVC.  Both lungs are equally aerated and free of any focal abnormalities.  The heart is not enlarged and there is no effusion or pneumothorax.  The bones show no acute findings.      COMPARISON: October 9, 2020        IMPRESSION: Clear lungs    --- End of Report ---            JUAREZ BURGOS MD; Attending Radiologist  This document has been electronically signed. Jul 12 2024  4:56PM (07-12-24 @ 14:33)

## 2024-07-20 ENCOUNTER — RESULT REVIEW (OUTPATIENT)
Age: 59
End: 2024-07-20

## 2024-07-20 LAB
ANION GAP SERPL CALC-SCNC: 11 MMOL/L — SIGNIFICANT CHANGE UP (ref 7–14)
ANION GAP SERPL CALC-SCNC: 12 MMOL/L — SIGNIFICANT CHANGE UP (ref 7–14)
APTT BLD: 31.5 SEC — SIGNIFICANT CHANGE UP (ref 24.5–35.6)
BUN SERPL-MCNC: 14 MG/DL — SIGNIFICANT CHANGE UP (ref 7–23)
BUN SERPL-MCNC: 16 MG/DL — SIGNIFICANT CHANGE UP (ref 7–23)
CALCIUM SERPL-MCNC: 8.2 MG/DL — LOW (ref 8.4–10.5)
CALCIUM SERPL-MCNC: 8.6 MG/DL — SIGNIFICANT CHANGE UP (ref 8.4–10.5)
CHLORIDE SERPL-SCNC: 106 MMOL/L — SIGNIFICANT CHANGE UP (ref 98–107)
CHLORIDE SERPL-SCNC: 106 MMOL/L — SIGNIFICANT CHANGE UP (ref 98–107)
CO2 SERPL-SCNC: 22 MMOL/L — SIGNIFICANT CHANGE UP (ref 22–31)
CO2 SERPL-SCNC: 23 MMOL/L — SIGNIFICANT CHANGE UP (ref 22–31)
CREAT SERPL-MCNC: 0.67 MG/DL — SIGNIFICANT CHANGE UP (ref 0.5–1.3)
CREAT SERPL-MCNC: 0.72 MG/DL — SIGNIFICANT CHANGE UP (ref 0.5–1.3)
EGFR: 104 ML/MIN/1.73M2 — SIGNIFICANT CHANGE UP
EGFR: 106 ML/MIN/1.73M2 — SIGNIFICANT CHANGE UP
GLUCOSE BLDC GLUCOMTR-MCNC: 149 MG/DL — HIGH (ref 70–99)
GLUCOSE BLDC GLUCOMTR-MCNC: 166 MG/DL — HIGH (ref 70–99)
GLUCOSE BLDC GLUCOMTR-MCNC: 189 MG/DL — HIGH (ref 70–99)
GLUCOSE BLDC GLUCOMTR-MCNC: 206 MG/DL — HIGH (ref 70–99)
GLUCOSE BLDC GLUCOMTR-MCNC: 85 MG/DL — SIGNIFICANT CHANGE UP (ref 70–99)
GLUCOSE SERPL-MCNC: 206 MG/DL — HIGH (ref 70–99)
GLUCOSE SERPL-MCNC: 209 MG/DL — HIGH (ref 70–99)
HCT VFR BLD CALC: 29.5 % — LOW (ref 39–50)
HCT VFR BLD CALC: 31.5 % — LOW (ref 39–50)
HGB BLD-MCNC: 10.4 G/DL — LOW (ref 13–17)
HGB BLD-MCNC: 9.6 G/DL — LOW (ref 13–17)
INR BLD: <0.9 RATIO — SIGNIFICANT CHANGE UP (ref 0.85–1.18)
MAGNESIUM SERPL-MCNC: 1.7 MG/DL — SIGNIFICANT CHANGE UP (ref 1.6–2.6)
MAGNESIUM SERPL-MCNC: 2.2 MG/DL — SIGNIFICANT CHANGE UP (ref 1.6–2.6)
MCHC RBC-ENTMCNC: 30.7 PG — SIGNIFICANT CHANGE UP (ref 27–34)
MCHC RBC-ENTMCNC: 30.7 PG — SIGNIFICANT CHANGE UP (ref 27–34)
MCHC RBC-ENTMCNC: 32.5 GM/DL — SIGNIFICANT CHANGE UP (ref 32–36)
MCHC RBC-ENTMCNC: 33 GM/DL — SIGNIFICANT CHANGE UP (ref 32–36)
MCV RBC AUTO: 92.9 FL — SIGNIFICANT CHANGE UP (ref 80–100)
MCV RBC AUTO: 94.2 FL — SIGNIFICANT CHANGE UP (ref 80–100)
NRBC # BLD: 0 /100 WBCS — SIGNIFICANT CHANGE UP (ref 0–0)
NRBC # BLD: 0 /100 WBCS — SIGNIFICANT CHANGE UP (ref 0–0)
NRBC # FLD: 0 K/UL — SIGNIFICANT CHANGE UP (ref 0–0)
NRBC # FLD: 0 K/UL — SIGNIFICANT CHANGE UP (ref 0–0)
PHOSPHATE SERPL-MCNC: 2.4 MG/DL — LOW (ref 2.5–4.5)
PHOSPHATE SERPL-MCNC: 2.5 MG/DL — SIGNIFICANT CHANGE UP (ref 2.5–4.5)
PLATELET # BLD AUTO: 136 K/UL — LOW (ref 150–400)
PLATELET # BLD AUTO: 144 K/UL — LOW (ref 150–400)
POTASSIUM SERPL-MCNC: 3.2 MMOL/L — LOW (ref 3.5–5.3)
POTASSIUM SERPL-MCNC: 3.4 MMOL/L — LOW (ref 3.5–5.3)
POTASSIUM SERPL-SCNC: 3.2 MMOL/L — LOW (ref 3.5–5.3)
POTASSIUM SERPL-SCNC: 3.4 MMOL/L — LOW (ref 3.5–5.3)
PROTHROM AB SERPL-ACNC: 10 SEC — SIGNIFICANT CHANGE UP (ref 9.5–13)
RBC # BLD: 3.13 M/UL — LOW (ref 4.2–5.8)
RBC # BLD: 3.39 M/UL — LOW (ref 4.2–5.8)
RBC # FLD: 13.9 % — SIGNIFICANT CHANGE UP (ref 10.3–14.5)
RBC # FLD: 14 % — SIGNIFICANT CHANGE UP (ref 10.3–14.5)
SODIUM SERPL-SCNC: 140 MMOL/L — SIGNIFICANT CHANGE UP (ref 135–145)
SODIUM SERPL-SCNC: 140 MMOL/L — SIGNIFICANT CHANGE UP (ref 135–145)
WBC # BLD: 7.13 K/UL — SIGNIFICANT CHANGE UP (ref 3.8–10.5)
WBC # BLD: 7.52 K/UL — SIGNIFICANT CHANGE UP (ref 3.8–10.5)
WBC # FLD AUTO: 7.13 K/UL — SIGNIFICANT CHANGE UP (ref 3.8–10.5)
WBC # FLD AUTO: 7.52 K/UL — SIGNIFICANT CHANGE UP (ref 3.8–10.5)

## 2024-07-20 PROCEDURE — 71045 X-RAY EXAM CHEST 1 VIEW: CPT | Mod: 26

## 2024-07-20 PROCEDURE — 99233 SBSQ HOSP IP/OBS HIGH 50: CPT

## 2024-07-20 PROCEDURE — 93306 TTE W/DOPPLER COMPLETE: CPT | Mod: 26

## 2024-07-20 RX ORDER — POTASSIUM CHLORIDE 1500 MG/1
10 TABLET, EXTENDED RELEASE ORAL
Refills: 0 | Status: COMPLETED | OUTPATIENT
Start: 2024-07-20 | End: 2024-07-20

## 2024-07-20 RX ORDER — ACETAMINOPHEN 500 MG
1000 TABLET ORAL ONCE
Refills: 0 | Status: DISCONTINUED | OUTPATIENT
Start: 2024-07-20 | End: 2024-07-25

## 2024-07-20 RX ORDER — HYDRALAZINE HYDROCHLORIDE 100 MG/1
10 TABLET ORAL EVERY 6 HOURS
Refills: 0 | Status: DISCONTINUED | OUTPATIENT
Start: 2024-07-20 | End: 2024-07-22

## 2024-07-20 RX ORDER — MAGNESIUM SULFATE 500 MG/ML
2 VIAL (ML) INJECTION ONCE
Refills: 0 | Status: COMPLETED | OUTPATIENT
Start: 2024-07-20 | End: 2024-07-20

## 2024-07-20 RX ORDER — LABETALOL HCL 200 MG
10 TABLET ORAL EVERY 4 HOURS
Refills: 0 | Status: DISCONTINUED | OUTPATIENT
Start: 2024-07-20 | End: 2024-07-20

## 2024-07-20 RX ORDER — METOPROLOL TARTRATE 100 MG
5 TABLET ORAL EVERY 6 HOURS
Refills: 0 | Status: DISCONTINUED | OUTPATIENT
Start: 2024-07-20 | End: 2024-07-22

## 2024-07-20 RX ORDER — DEXTROSE MONOHYDRATE, SODIUM CHLORIDE, SODIUM LACTATE, CALCIUM CHLORIDE, MAGNESIUM CHLORIDE 1.5; 538; 448; 18.4; 5.08 G/100ML; MG/100ML; MG/100ML; MG/100ML; MG/100ML
1000 SOLUTION INTRAPERITONEAL
Refills: 0 | Status: DISCONTINUED | OUTPATIENT
Start: 2024-07-20 | End: 2024-07-20

## 2024-07-20 RX ORDER — DEXTROSE MONOHYDRATE, SODIUM CHLORIDE, SODIUM LACTATE, CALCIUM CHLORIDE, MAGNESIUM CHLORIDE 1.5; 538; 448; 18.4; 5.08 G/100ML; MG/100ML; MG/100ML; MG/100ML; MG/100ML
1000 SOLUTION INTRAPERITONEAL
Refills: 0 | Status: DISCONTINUED | OUTPATIENT
Start: 2024-07-20 | End: 2024-07-21

## 2024-07-20 RX ADMIN — Medication 4 MILLILITER(S): at 15:36

## 2024-07-20 RX ADMIN — CHLORHEXIDINE GLUCONATE 1 APPLICATION(S): 500 CLOTH TOPICAL at 11:30

## 2024-07-20 RX ADMIN — DORNASE ALFA 2.5 MILLIGRAM(S): 1 SOLUTION RESPIRATORY (INHALATION) at 10:48

## 2024-07-20 RX ADMIN — LEVALBUTEROL HYDROCHLORIDE 0.63 MILLIGRAM(S): 0.31 SOLUTION RESPIRATORY (INHALATION) at 15:36

## 2024-07-20 RX ADMIN — Medication 2 UNIT(S): at 06:50

## 2024-07-20 RX ADMIN — Medication 5 MILLIGRAM(S): at 16:04

## 2024-07-20 RX ADMIN — LEVALBUTEROL HYDROCHLORIDE 0.63 MILLIGRAM(S): 0.31 SOLUTION RESPIRATORY (INHALATION) at 03:35

## 2024-07-20 RX ADMIN — LIDOCAINE 5% 1 PATCH: 5 CREAM TOPICAL at 22:11

## 2024-07-20 RX ADMIN — Medication 10 MILLIGRAM(S): at 09:49

## 2024-07-20 RX ADMIN — DEXTROSE MONOHYDRATE, SODIUM CHLORIDE, SODIUM LACTATE, CALCIUM CHLORIDE, MAGNESIUM CHLORIDE 60 MILLILITER(S): 1.5; 538; 448; 18.4; 5.08 SOLUTION INTRAPERITONEAL at 12:16

## 2024-07-20 RX ADMIN — LIDOCAINE 5% 1 PATCH: 5 CREAM TOPICAL at 10:30

## 2024-07-20 RX ADMIN — POTASSIUM CHLORIDE 100 MILLIEQUIVALENT(S): 1500 TABLET, EXTENDED RELEASE ORAL at 11:30

## 2024-07-20 RX ADMIN — Medication 2 UNIT(S): at 11:35

## 2024-07-20 RX ADMIN — POTASSIUM CHLORIDE 100 MILLIEQUIVALENT(S): 1500 TABLET, EXTENDED RELEASE ORAL at 06:13

## 2024-07-20 RX ADMIN — HEPARIN SODIUM 5000 UNIT(S): 1000 INJECTION, SOLUTION INTRAVENOUS; SUBCUTANEOUS at 06:13

## 2024-07-20 RX ADMIN — Medication 1: at 06:49

## 2024-07-20 RX ADMIN — POTASSIUM CHLORIDE 100 MILLIEQUIVALENT(S): 1500 TABLET, EXTENDED RELEASE ORAL at 08:30

## 2024-07-20 RX ADMIN — LEVALBUTEROL HYDROCHLORIDE 0.63 MILLIGRAM(S): 0.31 SOLUTION RESPIRATORY (INHALATION) at 10:47

## 2024-07-20 RX ADMIN — Medication 10 MILLIGRAM(S): at 06:14

## 2024-07-20 RX ADMIN — DEXTROSE MONOHYDRATE, SODIUM CHLORIDE, SODIUM LACTATE, CALCIUM CHLORIDE, MAGNESIUM CHLORIDE 60 MILLILITER(S): 1.5; 538; 448; 18.4; 5.08 SOLUTION INTRAPERITONEAL at 11:38

## 2024-07-20 RX ADMIN — DEXTROSE MONOHYDRATE, SODIUM CHLORIDE, SODIUM LACTATE, CALCIUM CHLORIDE, MAGNESIUM CHLORIDE 60 MILLILITER(S): 1.5; 538; 448; 18.4; 5.08 SOLUTION INTRAPERITONEAL at 19:25

## 2024-07-20 RX ADMIN — POTASSIUM CHLORIDE 100 MILLIEQUIVALENT(S): 1500 TABLET, EXTENDED RELEASE ORAL at 12:18

## 2024-07-20 RX ADMIN — Medication 2 UNIT(S): at 00:32

## 2024-07-20 RX ADMIN — Medication 4 MILLILITER(S): at 03:35

## 2024-07-20 RX ADMIN — Medication 30 MILLILITER(S): at 19:10

## 2024-07-20 RX ADMIN — Medication 5 MILLIGRAM(S): at 22:12

## 2024-07-20 RX ADMIN — Medication 4 MILLILITER(S): at 10:47

## 2024-07-20 RX ADMIN — LIDOCAINE 5% 1 PATCH: 5 CREAM TOPICAL at 07:29

## 2024-07-20 RX ADMIN — HYDRALAZINE HYDROCHLORIDE 10 MILLIGRAM(S): 100 TABLET ORAL at 11:02

## 2024-07-20 RX ADMIN — Medication 4 MILLILITER(S): at 22:13

## 2024-07-20 RX ADMIN — HYDRALAZINE HYDROCHLORIDE 10 MILLIGRAM(S): 100 TABLET ORAL at 17:15

## 2024-07-20 RX ADMIN — Medication 2: at 11:35

## 2024-07-20 RX ADMIN — PANTOPRAZOLE SODIUM 40 MILLIGRAM(S): 20 TABLET, DELAYED RELEASE ORAL at 06:13

## 2024-07-20 RX ADMIN — Medication 1: at 00:31

## 2024-07-20 RX ADMIN — HEPARIN SODIUM 5000 UNIT(S): 1000 INJECTION, SOLUTION INTRAVENOUS; SUBCUTANEOUS at 17:15

## 2024-07-20 RX ADMIN — PANTOPRAZOLE SODIUM 40 MILLIGRAM(S): 20 TABLET, DELAYED RELEASE ORAL at 17:15

## 2024-07-20 RX ADMIN — LEVALBUTEROL HYDROCHLORIDE 0.63 MILLIGRAM(S): 0.31 SOLUTION RESPIRATORY (INHALATION) at 22:13

## 2024-07-20 RX ADMIN — Medication 25 GRAM(S): at 06:13

## 2024-07-20 NOTE — PROGRESS NOTE ADULT - SUBJECTIVE AND OBJECTIVE BOX
BHARATH GONZALES      59y   Male   MRN-[id number]         No Known Allergies             Daily     Daily Weight in k.5 (2024 06:00)Drug Dosing Weight  Height (cm): 162.6 (2024 07:35)  Weight (kg): 54.9 (2024 07:35)  BMI (kg/m2): 20.8 (2024 07:35)  BSA (m2): 1.58 (2024 07:35)    59 year old male, went to [facility] c/o fatigue, dizziness.    pt was worked up found to be severely anemic received PRBC EGD on 2024: nodule on gastric side of the GEJ with some old heme in stomach. Possible healing Kavita Valero tear. Pathology reveal GEJ bx: fragments of adenocarcinoma, moderately differentiated. Pt received  radiation therapy & chemotherapy completed 24. Pt was reevaluated by surgeon and now present to [facility] for preop optimization for scheduled Robotic assisted Michael broderick esophagectomy. (2024 14:00)    CHIEF COMPLAINT: Follow up in ICU  for postoperative care of patient who is s/p  Esophagogastrectomy     Procedure: Michael broderick esophagectomy 2024    Issues:              Esophageal cancer              Postop pain              Chest tube in place  HLD (hyperlipidemia)  Insulin dependent type 2 diabetes mellitus  BPH (benign prostatic hyperplasia)  HTN (hypertension)  Gastroesophageal cancer  Hypokalemia      Postop course:     Patient reports moderate pain at chest wall incision sites which is worse with coughing and deep breathing without associated fever or dyspnea. Pain is improved with use of PCA and  oral pain meds.         Home Medications:  amLODIPine 5 mg oral tablet: 1 tab(s) orally once a day Noon (10 Jul 2024 09:06)  atorvastatin 20 mg oral tablet: 1 tab(s) orally once a day noon (10 Jul 2024 09:06)  Jardiance 25 mg oral tablet: 1 tab(s) orally once a day Noon (10 Jul 2024 09:06)  losartan-hydrochlorothiazide 50 mg-12.5 mg oral tablet: 1 tab(s) orally once a day (10 Jul 2024 09:06)  metFORMIN 500 mg oral tablet: 1 tab(s) orally 2 times a day (10 Jul 2024 09:16)  NovoLOG 100 units/mL subcutaneous solution: subcutaneous 3 times a day (before meals) 15 units (10 Jul 2024 09:44)  pantoprazole 40 mg oral delayed release tablet: 1 tab(s) orally once a day AM (10 Jul 2024 09:16)  Sodium Chloride, 1 G, PO, 2 times Daily:  (10 Jul 2024 09:16)  tamsulosin 0.4 mg oral capsule: 1 cap(s) orally once a day (at bedtime) (10 Jul 2024 09:16)    PAST MEDICAL & SURGICAL HISTORY:  HLD (hyperlipidemia)      Insulin dependent type 2 diabetes mellitus      BPH (benign prostatic hyperplasia)      HTN (hypertension)      Gastroesophageal cancer      Gastroesophageal cancer      Port-A-Cath in place        Vital Signs Last 24 Hrs  T(C): 36.7 (2024 12:00), Max: 37.2 (2024 00:00)  T(F): 98.1 (2024 12:00), Max: 98.9 (2024 00:00)  HR: 105 (2024 13:00) (87 - 109)  BP: --  BP(mean): --  RR: 22 (2024 13:00) (11 - 26)  SpO2: 94% (2024 13:00) (90% - 98%)    Parameters below as of 2024 13:00  Patient On (Oxygen Delivery Method): nasal cannula w/ humidification  O2 Flow (L/min): 2    I&O's Detail    2024 07:01  -  2024 07:00  --------------------------------------------------------  IN:    dextrose 5% + lactated ringers: 1920 mL    Enteral Tube Flush: 180 mL    IV PiggyBack: 150 mL    PRBCs (Packed Red Blood Cells): 300 mL  Total IN: 2550 mL    OUT:    Bulb (mL): 40 mL    Chest Tube (mL): 90 mL    Indwelling Catheter - Urethral (mL): 2380 mL    Nasogastric/Oral tube (mL): 250 mL  Total OUT: 2760 mL    Total NET: -210 mL      2024 07:01  -  2024 14:18  --------------------------------------------------------  IN:    dextrose 5% + lactated ringers: 300 mL    Enteral Tube Flush: 60 mL    IV PiggyBack: 300 mL    Lactated Ringers: 120 mL  Total IN: 780 mL    OUT:    Bulb (mL): 35 mL    Chest Tube (mL): 30 mL    Indwelling Catheter - Urethral (mL): 660 mL  Total OUT: 725 mL    Total NET: 55 mL        CAPILLARY BLOOD GLUCOSE      POCT Blood Glucose.: 206 mg/dL (2024 11:34)  POCT Blood Glucose.: 189 mg/dL (2024 06:47)  POCT Blood Glucose.: 166 mg/dL (2024 23:49)  POCT Blood Glucose.: 177 mg/dL (2024 17:47)    Home Medications:  amLODIPine 5 mg oral tablet: 1 tab(s) orally once a day Noon (10 Jul 2024 09:06)  atorvastatin 20 mg oral tablet: 1 tab(s) orally once a day noon (10 Jul 2024 09:06)  Jardiance 25 mg oral tablet: 1 tab(s) orally once a day Noon (10 Jul 2024 09:06)  losartan-hydrochlorothiazide 50 mg-12.5 mg oral tablet: 1 tab(s) orally once a day (10 Jul 2024 09:06)  metFORMIN 500 mg oral tablet: 1 tab(s) orally 2 times a day (10 Jul 2024 09:16)  NovoLOG 100 units/mL subcutaneous solution: subcutaneous 3 times a day (before meals) 15 units (10 Jul 2024 09:44)  pantoprazole 40 mg oral delayed release tablet: 1 tab(s) orally once a day AM (10 Jul 2024 09:16)  Sodium Chloride, 1 G, PO, 2 times Daily:  (10 Jul 2024 09:16)  tamsulosin 0.4 mg oral capsule: 1 cap(s) orally once a day (at bedtime) (10 Jul 2024 09:16)    MEDICATIONS  (STANDING):  chlorhexidine 2% Cloths 1 Application(s) Topical daily  dextrose 50% Injectable 25 Gram(s) IV Push once  dextrose 50% Injectable 12.5 Gram(s) IV Push once  dornase malik Solution 2.5 milliGRAM(s) Inhalation daily  heparin   Injectable 5000 Unit(s) SubCutaneous every 12 hours  hydrALAZINE Injectable 10 milliGRAM(s) IV Push every 6 hours  HYDROmorphone PCA (1 mG/mL) 30 milliLiter(s) PCA Continuous PCA Continuous  insulin lispro (ADMELOG) corrective regimen sliding scale   SubCutaneous every 6 hours  insulin NPH human recombinant 2 Unit(s) SubCutaneous every 6 hours  lactated ringers. 1000 milliLiter(s) (60 mL/Hr) IV Continuous <Continuous>  levalbuterol Inhalation 0.63 milliGRAM(s) Inhalation every 6 hours  lidocaine   4% Patch 1 Patch Transdermal at bedtime  metoprolol tartrate Injectable 5 milliGRAM(s) IV Push every 6 hours  pantoprazole  Injectable 40 milliGRAM(s) IV Push every 12 hours  sodium chloride 3%  Inhalation 4 milliLiter(s) Inhalation every 6 hours    MEDICATIONS  (PRN):  HYDROmorphone PCA (1 mG/mL) Rescue Clinician Bolus 0.5 milliGRAM(s) IV Push every 15 minutes PRN for Pain Scale GREATER THAN 6  naloxone Injectable 0.1 milliGRAM(s) IV Push every 3 minutes PRN For ANY of the following changes in patient status:  A. RR LESS THAN 10 breaths per minute, B. Oxygen saturation LESS THAN 90%, C. Sedation score of 6  ondansetron Injectable 4 milliGRAM(s) IV Push every 6 hours PRN Nausea        Physical exam:   General:               Pt is awake, alert, not in any distress                                                  Neuro:                  Nonfocal                             Cardiovascular:   S1 & S2, regular                           Respiratory:         Air entry is fair and equal on both sides, has bilateral conducted sounds                           GI:                          Soft, nondistended and nontender, Bowel sounds absent                            Ext:                        No cyanosis or edema                            Labs:                                                                           10.4   7.52  )-----------( 144      ( 2024 09:55 )             31.5             07-20    140  |  106  |  14  ----------------------------<  209<H>  3.4<L>   |  23  |  0.67    Ca    8.6      2024 09:55  Phos  2.4     07-20  Mg     2.20     07-20                    PT/INR - ( 2024 03:35 )   PT: 10.0 sec;   INR: <0.90 ratio         PTT - ( 2024 03:35 )  PTT:31.5 sec    Urinalysis Basic - ( 2024 09:55 )    Color: x / Appearance: x / SG: x / pH: x  Gluc: 209 mg/dL / Ketone: x  / Bili: x / Urobili: x   Blood: x / Protein: x / Nitrite: x   Leuk Esterase: x / RBC: x / WBC x   Sq Epi: x / Non Sq Epi: x / Bacteria: x      CXR:  < from: Xray Chest 1 View- PORTABLE-Routine (24 @ 06:20) >  Frontal expiratory view of the chest shows the heart to be similar in   size. Right chest port, right chest tubes and nasogastric tube remain   present.    The lungs show mild right atelectasis and there is no evidence of   pneumothorax nor definite pleural effusion.    Chest one view 2024 5:05 AM  Compared to the prior study, there is progression of right lung   atelectasis or infiltrate. No pneumothorax.    IMPRESSION:  No pneumothorax.      Plan:  General:   59yMale s/p [place] Broderick Esophagogastrectomy 2024,  appears fairly comfortable, complaining of chest pain with deep breathing.                             Neuro:                                         Pain control with PCA  / IV Tylenol PRN                            Cardiovascular:                                          HTN: Continue hemodynamic monitoring. Hold Norvasc    HLD: Hold Lipitor    Continue IVF D5LR 60cc/hr                            Respiratory:                                         Postop hypoxemia requiring O2 via nasal cannula - Wean nasal cannula for goal O2sat above 92%.                                              CXR is clear. Incentive spirometry.                                                   Chest PT and frequent suctioning. Continue bronchodilators, pulmozyme and inhaled 3% saline                                                      OOB to chair & ambulate w/ assistance.                                                           Continuous pulse oximetry for support & to prevent decompensation.                                         Monitor chest tube output and d/c today                            GI                                          NPO                                           Continue GI prophylaxis with  Protonix                                          Continue Zofran / Reglan for nausea - PRN                                          NGT to low continuous wall suction                                           Flush NGT  with 30cc of sterile water Q 4hrs.  	                                                                 Renal:                                          Continue D5LR 60cc/hr                                          Monitor I/Os and electrolytes                                          BPH: Has Elizabeth in place                                                 Hem/ Onc:                                                                                   Monitor chest tube output &  signs of bleeding.                                           Follow CBC in AM                           Infectious disease:                                             No signs of infection. Monitor for fever / leukocytosis.                                           All surgical incision / chest tube  sites look clean                            Endocrine                                              DM-2: Continue Accu-Checks with coverage.  Continue Lantus 5u daily    Pt is on SQ Heparin and Venodyne boots for DVT prophylaxis.     Pertinent clinical, laboratory, radiographic, hemodynamic, echocardiographic, respiratory data, microbiologic data and chart were reviewed and analyzed frequently throughout the course of the day and night  Patient seen, examined and plan discussed with CT Surgeon Dr. Laureano / CTICU team during rounds.  Status discussed with patient and  updated plan of care.   I have spent 50 minutes of  time with this patient  monitoring hemodynamic status, respiratory status ,and coordinating care in the ICU          Andrey Bernstein MD                                                                     BHARATH GONZALES      59y   Male   MRN-[id number]         No Known Allergies             Daily     Daily Weight in k.5 (2024 06:00)Drug Dosing Weight  Height (cm): 162.6 (2024 07:35)  Weight (kg): 54.9 (2024 07:35)  BMI (kg/m2): 20.8 (2024 07:35)  BSA (m2): 1.58 (2024 07:35)    59 year old male, went to [facility] c/o fatigue, dizziness.    pt was worked up found to be severely anemic received PRBC EGD on 2024: nodule on gastric side of the GEJ with some old heme in stomach. Possible healing Kavita Valero tear. Pathology reveal GEJ bx: fragments of adenocarcinoma, moderately differentiated. Pt received  radiation therapy & chemotherapy completed 24. Pt was reevaluated by surgeon and now present to [facility] for preop optimization for scheduled Robotic assisted Michael broderick esophagectomy. (2024 14:00)    CHIEF COMPLAINT: Follow up in ICU  for postoperative care of patient who is s/p  Esophagogastrectomy     Procedure: Michael broderick esophagectomy 2024    Issues:              Esophageal cancer              Postop pain              Chest tube in place  HLD (hyperlipidemia)  Insulin dependent type 2 diabetes mellitus  BPH (benign prostatic hyperplasia)  HTN (hypertension)  Gastroesophageal cancer  Hypokalemia      Postop course:     Patient reports moderate pain at chest wall incision sites which is worse with coughing and deep breathing without associated fever or dyspnea. Pain is improved with use of PCA and  oral pain meds.         Home Medications:  amLODIPine 5 mg oral tablet: 1 tab(s) orally once a day Noon (10 Jul 2024 09:06)  atorvastatin 20 mg oral tablet: 1 tab(s) orally once a day noon (10 Jul 2024 09:06)  Jardiance 25 mg oral tablet: 1 tab(s) orally once a day Noon (10 Jul 2024 09:06)  losartan-hydrochlorothiazide 50 mg-12.5 mg oral tablet: 1 tab(s) orally once a day (10 Jul 2024 09:06)  metFORMIN 500 mg oral tablet: 1 tab(s) orally 2 times a day (10 Jul 2024 09:16)  NovoLOG 100 units/mL subcutaneous solution: subcutaneous 3 times a day (before meals) 15 units (10 Jul 2024 09:44)  pantoprazole 40 mg oral delayed release tablet: 1 tab(s) orally once a day AM (10 Jul 2024 09:16)  Sodium Chloride, 1 G, PO, 2 times Daily:  (10 Jul 2024 09:16)  tamsulosin 0.4 mg oral capsule: 1 cap(s) orally once a day (at bedtime) (10 Jul 2024 09:16)    PAST MEDICAL & SURGICAL HISTORY:  HLD (hyperlipidemia)      Insulin dependent type 2 diabetes mellitus      BPH (benign prostatic hyperplasia)      HTN (hypertension)      Gastroesophageal cancer      Gastroesophageal cancer      Port-A-Cath in place        Vital Signs Last 24 Hrs  T(C): 36.7 (2024 12:00), Max: 37.2 (2024 00:00)  T(F): 98.1 (2024 12:00), Max: 98.9 (2024 00:00)  HR: 105 (2024 13:00) (87 - 109)  BP: --  BP(mean): --  RR: 22 (2024 13:00) (11 - 26)  SpO2: 94% (2024 13:00) (90% - 98%)    Parameters below as of 2024 13:00  Patient On (Oxygen Delivery Method): nasal cannula w/ humidification  O2 Flow (L/min): 2    I&O's Detail    2024 07:01  -  2024 07:00  --------------------------------------------------------  IN:    dextrose 5% + lactated ringers: 1920 mL    Enteral Tube Flush: 180 mL    IV PiggyBack: 150 mL    PRBCs (Packed Red Blood Cells): 300 mL  Total IN: 2550 mL    OUT:    Bulb (mL): 40 mL    Chest Tube (mL): 90 mL    Indwelling Catheter - Urethral (mL): 2380 mL    Nasogastric/Oral tube (mL): 250 mL  Total OUT: 2760 mL    Total NET: -210 mL      2024 07:01  -  2024 14:18  --------------------------------------------------------  IN:    dextrose 5% + lactated ringers: 300 mL    Enteral Tube Flush: 60 mL    IV PiggyBack: 300 mL    Lactated Ringers: 120 mL  Total IN: 780 mL    OUT:    Bulb (mL): 35 mL    Chest Tube (mL): 30 mL    Indwelling Catheter - Urethral (mL): 660 mL  Total OUT: 725 mL    Total NET: 55 mL        CAPILLARY BLOOD GLUCOSE      POCT Blood Glucose.: 206 mg/dL (2024 11:34)  POCT Blood Glucose.: 189 mg/dL (2024 06:47)  POCT Blood Glucose.: 166 mg/dL (2024 23:49)  POCT Blood Glucose.: 177 mg/dL (2024 17:47)    Home Medications:  amLODIPine 5 mg oral tablet: 1 tab(s) orally once a day Noon (10 Jul 2024 09:06)  atorvastatin 20 mg oral tablet: 1 tab(s) orally once a day noon (10 Jul 2024 09:06)  Jardiance 25 mg oral tablet: 1 tab(s) orally once a day Noon (10 Jul 2024 09:06)  losartan-hydrochlorothiazide 50 mg-12.5 mg oral tablet: 1 tab(s) orally once a day (10 Jul 2024 09:06)  metFORMIN 500 mg oral tablet: 1 tab(s) orally 2 times a day (10 Jul 2024 09:16)  NovoLOG 100 units/mL subcutaneous solution: subcutaneous 3 times a day (before meals) 15 units (10 Jul 2024 09:44)  pantoprazole 40 mg oral delayed release tablet: 1 tab(s) orally once a day AM (10 Jul 2024 09:16)  Sodium Chloride, 1 G, PO, 2 times Daily:  (10 Jul 2024 09:16)  tamsulosin 0.4 mg oral capsule: 1 cap(s) orally once a day (at bedtime) (10 Jul 2024 09:16)    MEDICATIONS  (STANDING):  chlorhexidine 2% Cloths 1 Application(s) Topical daily  dextrose 50% Injectable 25 Gram(s) IV Push once  dextrose 50% Injectable 12.5 Gram(s) IV Push once  dornase malik Solution 2.5 milliGRAM(s) Inhalation daily  heparin   Injectable 5000 Unit(s) SubCutaneous every 12 hours  hydrALAZINE Injectable 10 milliGRAM(s) IV Push every 6 hours  HYDROmorphone PCA (1 mG/mL) 30 milliLiter(s) PCA Continuous PCA Continuous  insulin lispro (ADMELOG) corrective regimen sliding scale   SubCutaneous every 6 hours  insulin NPH human recombinant 2 Unit(s) SubCutaneous every 6 hours  lactated ringers. 1000 milliLiter(s) (60 mL/Hr) IV Continuous <Continuous>  levalbuterol Inhalation 0.63 milliGRAM(s) Inhalation every 6 hours  lidocaine   4% Patch 1 Patch Transdermal at bedtime  metoprolol tartrate Injectable 5 milliGRAM(s) IV Push every 6 hours  pantoprazole  Injectable 40 milliGRAM(s) IV Push every 12 hours  sodium chloride 3%  Inhalation 4 milliLiter(s) Inhalation every 6 hours    MEDICATIONS  (PRN):  HYDROmorphone PCA (1 mG/mL) Rescue Clinician Bolus 0.5 milliGRAM(s) IV Push every 15 minutes PRN for Pain Scale GREATER THAN 6  naloxone Injectable 0.1 milliGRAM(s) IV Push every 3 minutes PRN For ANY of the following changes in patient status:  A. RR LESS THAN 10 breaths per minute, B. Oxygen saturation LESS THAN 90%, C. Sedation score of 6  ondansetron Injectable 4 milliGRAM(s) IV Push every 6 hours PRN Nausea        Physical exam:   General:               Pt is awake, alert, not in any distress                                                  Neuro:                  Nonfocal                             Cardiovascular:   S1 & S2, regular                           Respiratory:         Air entry is fair and equal on both sides, has bilateral conducted sounds                           GI:                          Soft, nondistended and nontender, Bowel sounds absent                            Ext:                        No cyanosis or edema                            Labs:                                                                           10.4   7.52  )-----------( 144      ( 2024 09:55 )             31.5             07-20    140  |  106  |  14  ----------------------------<  209<H>  3.4<L>   |  23  |  0.67    Ca    8.6      2024 09:55  Phos  2.4     07-20  Mg     2.20     07-20                    PT/INR - ( 2024 03:35 )   PT: 10.0 sec;   INR: <0.90 ratio         PTT - ( 2024 03:35 )  PTT:31.5 sec    Urinalysis Basic - ( 2024 09:55 )    Color: x / Appearance: x / SG: x / pH: x  Gluc: 209 mg/dL / Ketone: x  / Bili: x / Urobili: x   Blood: x / Protein: x / Nitrite: x   Leuk Esterase: x / RBC: x / WBC x   Sq Epi: x / Non Sq Epi: x / Bacteria: x      CXR:  < from: Xray Chest 1 View- PORTABLE-Routine (24 @ 06:20) >  Frontal expiratory view of the chest shows the heart to be similar in   size. Right chest port, right chest tubes and nasogastric tube remain   present.    The lungs show mild right atelectasis and there is no evidence of   pneumothorax nor definite pleural effusion.    Chest one view 2024 5:05 AM  Compared to the prior study, there is progression of right lung   atelectasis or infiltrate. No pneumothorax.    IMPRESSION:  No pneumothorax.      Plan:  General:   59yMale s/p [place] Broderick Esophagogastrectomy 2024,  appears fairly comfortable, complaining of chest pain with deep breathing.                             Neuro:                                         Pain control with PCA  / IV Tylenol PRN                            Cardiovascular:                                          HTN: Continue Invasive hemodynamic monitoring.   Better response to a combination of Hydralazine and Lopressor   May resume  Norvasc / Losartan after swallow study    HLD: Hold Lipitor    Continue IVF D5LR 60cc/hr                            Respiratory:                                         Postop hypoxemia requiring O2 via nasal cannula - Wean nasal cannula for goal O2sat above 92%.                                              CXR is clear. Incentive spirometry.                                                   Chest PT and frequent suctioning. Continue bronchodilators, pulmozyme and inhaled 3% saline                                                      OOB to chair & ambulate w/ assistance.                                                           Continuous pulse oximetry for support & to prevent decompensation.                                         Monitor chest tube output and d/c today                            GI                                          NPO                                           Continue GI prophylaxis with  Protonix                                          Continue Zofran / Reglan for nausea - PRN                                          NGT to low continuous wall suction                                           Flush NGT  with 30cc of sterile water Q 4hrs.  	                                                                 Renal:                                          Continue D5LR 60cc/hr                                          Monitor I/Os and electrolytes                                          BPH: Has Elizabeth in place                                                 Hem/ Onc:                                                                                   Monitor chest tube output &  signs of bleeding.                                           Follow CBC in AM                           Infectious disease:                                             No signs of infection. Monitor for fever / leukocytosis.                                           All surgical incision / chest tube  sites look clean                            Endocrine                                              DM-2: Continue NPH 2U Q6hrs and Accu-Checks with coverage.  Continue IVF D5LR 60cc/hr  Endocrine f/u    Pt is on SQ Heparin and Venodyne boots for DVT prophylaxis.     Pertinent clinical, laboratory, radiographic, hemodynamic, echocardiographic, respiratory data, microbiologic data and chart were reviewed and analyzed frequently throughout the course of the day and night  Patient seen, examined and plan discussed with CT Surgeon Dr. Laureano / CTICU team during rounds.  Status discussed with patient and  updated plan of care.   I have spent 50 minutes of  time with this patient  monitoring hemodynamic status, respiratory status ,and coordinating care in the ICU          Andrey Bernstein MD

## 2024-07-20 NOTE — PROGRESS NOTE ADULT - ASSESSMENT
Patient seen and examined, agree with above assessment and plan as transcribed above.    - tolerated procedure supportive care per CTICU    Guerreor Lee MD, [facility]  BEEPER [phone]

## 2024-07-20 NOTE — PROGRESS NOTE ADULT - SUBJECTIVE AND OBJECTIVE BOX
DATE OF SERVICE: 07-20-24      no events overnight  Tele stable        chlorhexidine 2% Cloths 1 Application(s) Topical daily  dextrose 5% + lactated ringers. 1000 milliLiter(s) IV Continuous <Continuous>  dextrose 50% Injectable 12.5 Gram(s) IV Push once  dextrose 50% Injectable 25 Gram(s) IV Push once  dornase malik Solution 2.5 milliGRAM(s) Inhalation daily  heparin   Injectable 5000 Unit(s) SubCutaneous every 12 hours  hydrALAZINE Injectable 10 milliGRAM(s) IV Push every 4 hours PRN  HYDROmorphone PCA (1 mG/mL) 30 milliLiter(s) PCA Continuous PCA Continuous  HYDROmorphone PCA (1 mG/mL) Rescue Clinician Bolus 0.5 milliGRAM(s) IV Push every 15 minutes PRN  insulin lispro (ADMELOG) corrective regimen sliding scale   SubCutaneous every 6 hours  insulin NPH human recombinant 2 Unit(s) SubCutaneous every 6 hours  labetalol Injectable 10 milliGRAM(s) IV Push every 4 hours  levalbuterol Inhalation 0.63 milliGRAM(s) Inhalation every 6 hours  lidocaine   4% Patch 1 Patch Transdermal at bedtime  naloxone Injectable 0.1 milliGRAM(s) IV Push every 3 minutes PRN  ondansetron Injectable 4 milliGRAM(s) IV Push every 6 hours PRN  pantoprazole  Injectable 40 milliGRAM(s) IV Push every 12 hours  sodium chloride 3%  Inhalation 4 milliLiter(s) Inhalation every 6 hours                            9.6    7.13  )-----------( 136      ( 20 Jul 2024 03:35 )             29.5       Hemoglobin: 9.6 g/dL (07-20 @ 03:35)  Hemoglobin: 11.0 g/dL (07-19 @ 15:30)  Hemoglobin: 8.8 g/dL (07-19 @ 03:36)  Hemoglobin: 8.8 g/dL (07-18 @ 10:43)  Hemoglobin: 8.5 g/dL (07-18 @ 02:50)      07-20    140  |  106  |  16  ----------------------------<  206<H>  3.2<L>   |  22  |  0.72    Ca    8.2<L>      20 Jul 2024 03:35  Phos  2.5     07-20  Mg     1.70     07-20      Creatinine Trend: 0.72<--, 0.68<--, 0.67<--, 0.82<--, 0.85<--, 0.86<--    COAGS: PT/INR - ( 20 Jul 2024 03:35 )   PT: 10.0 sec;   INR: <0.90 ratio         PTT - ( 20 Jul 2024 03:35 )  PTT:31.5 sec          T(C): 36.8 (07-20-24 @ 08:00), Max: 37.2 (07-20-24 @ 00:00)  HR: 94 (07-20-24 @ 09:00) (80 - 109)  BP: --  RR: 15 (07-20-24 @ 09:00) (11 - 26)  SpO2: 92% (07-20-24 @ 09:00) (90% - 98%)  Wt(kg): --    I&O's Summary    19 Jul 2024 07:01  -  20 Jul 2024 07:00  --------------------------------------------------------  IN: 2550 mL / OUT: 2760 mL / NET: -210 mL    20 Jul 2024 07:01  -  20 Jul 2024 10:13  --------------------------------------------------------  IN: 190 mL / OUT: 225 mL / NET: -35 mL          Gen: NAD  HEENT:  (-)icterus (-)pallor  CV: N S1 S2 1/6 CATHY (+)2 Pulses B/l  Resp:  Clear to auscultation B/L, normal effort  GI: (+) BS Soft, NT, ND  Lymph:  (-)Edema, (-)obvious lymphadenopathy  Skin: Warm to touch, Normal turgor  Psych: Appropriate mood and affect      TELEMETRY: 	 SR/ST    ECG:  	NSR      TTE 07/2024  Findings:  1. Normal left ventricular size and function.  Mild diastolic dysfunction.  2. Normal left atrial size  3. Right atrial cavity is normal in size.  4. Normal right ventricular size and function.  5. Normal trileaflet aortic valve opening.  6. Normal mitral valve opening.  7. Normal appearing tricuspid valve with mild tricuspid  regurgitation.  8. Pulmonic valve is grossly normal, yet poorly visualized  with no doppler evidence for pulmonic stenosis.  9. No evidence of significant pericardial effusion.  10. The aortic root is normal.  11. Normal pulmonary artery.  12. IVC is normal with respiratory variation.  FULL STUDY DONE INCLUDING M-MODE RECORDING,  SPECTRAL DOPPLER AND    Stress 07/2024  Normal myocardial perfusion SPECT images No evidence of stress induced ischemia or infarction.  Normal left ventricular size and function.  Calculated EF is: 68%    ASSESSMENT/PLAN: 	Pt is a 59 y.o. man with history of HTN,, DLD, DM,  GEJ adenocarcinoma (T3N0, stage 2A) s/p neoadjuvant chemotherapy admitted for optimization prior to planned [place] Esophagectomy on tuesday. Recently had a nuclear stress test in our office for evaluation of preoperative cardiac risk assessment prior to esophageal cancer surgery scheduled on 07/16/2024. The patient denies any chest pain, shortness ofbreath, or anginal symptoms. He has no palpitations, dizziness, or syncope    Pre-OP/HTN  - tolerated procedure well from CV perspective  - tolerating Hydralizine / BB  - pain management   - restart Amlodipine and Losartan when stable/ able to take PO  - Tele stable - on Xopenex.

## 2024-07-20 NOTE — PROGRESS NOTE ADULT - SUBJECTIVE AND OBJECTIVE BOX
Anesthesia Pain Management Service    SUBJECTIVE: Patient is doing well with IV PCA and no significant problems reported.    Pain Scale Score	At rest: 4/10___ 	With Activity: ___ 	[X ] Refer to charted pain scores    THERAPY:    [ ] IV PCA Morphine		[ ] 5 mg/mL	[ ] 1 mg/mL  [X ] IV PCA Hydromorphone	[ ] 5 mg/mL	[X ] 1 mg/mL  [ ] IV PCA Fentanyl		[ ] 50 micrograms/mL    Demand dose __0.2_ lockout __6_ (minutes) Continuous Rate _0__ Total: 1.8___  mg used (in past 24 hours)      MEDICATIONS  (STANDING):  chlorhexidine 2% Cloths 1 Application(s) Topical daily  dextrose 5% + lactated ringers. 1000 milliLiter(s) (60 mL/Hr) IV Continuous <Continuous>  dextrose 50% Injectable 25 Gram(s) IV Push once  dextrose 50% Injectable 12.5 Gram(s) IV Push once  dornase malik Solution 2.5 milliGRAM(s) Inhalation daily  heparin   Injectable 5000 Unit(s) SubCutaneous every 12 hours  HYDROmorphone PCA (1 mG/mL) 30 milliLiter(s) PCA Continuous PCA Continuous  insulin lispro (ADMELOG) corrective regimen sliding scale   SubCutaneous every 6 hours  insulin NPH human recombinant 2 Unit(s) SubCutaneous every 6 hours  labetalol Injectable 10 milliGRAM(s) IV Push every 4 hours  levalbuterol Inhalation 0.63 milliGRAM(s) Inhalation every 6 hours  lidocaine   4% Patch 1 Patch Transdermal at bedtime  pantoprazole  Injectable 40 milliGRAM(s) IV Push every 12 hours  potassium chloride  10 mEq/100 mL IVPB 10 milliEquivalent(s) IV Intermittent every 1 hour  sodium chloride 3%  Inhalation 4 milliLiter(s) Inhalation every 6 hours    MEDICATIONS  (PRN):  hydrALAZINE Injectable 10 milliGRAM(s) IV Push every 4 hours PRN systolic BP greater than 170  HYDROmorphone PCA (1 mG/mL) Rescue Clinician Bolus 0.5 milliGRAM(s) IV Push every 15 minutes PRN for Pain Scale GREATER THAN 6  naloxone Injectable 0.1 milliGRAM(s) IV Push every 3 minutes PRN For ANY of the following changes in patient status:  A. RR LESS THAN 10 breaths per minute, B. Oxygen saturation LESS THAN 90%, C. Sedation score of 6  ondansetron Injectable 4 milliGRAM(s) IV Push every 6 hours PRN Nausea      OBJECTIVE: Patient sitting up in chair. CTX1 and NGF tube in place.    Sedation Score:	[ X] Alert	[ ] Drowsy 	[ ] Arousable	[ ] Asleep	[ ] Unresponsive    Side Effects:	[X ] None	[ ] Nausea	[ ] Vomiting	[ ] Pruritus  		[ ] Other:    Vital Signs Last 24 Hrs  T(C): 36.8 (20 Jul 2024 08:00), Max: 37.2 (20 Jul 2024 00:00)  T(F): 98.2 (20 Jul 2024 08:00), Max: 98.9 (20 Jul 2024 00:00)  HR: 94 (20 Jul 2024 09:00) (80 - 109)  BP: --  BP(mean): --  RR: 15 (20 Jul 2024 09:00) (11 - 26)  SpO2: 92% (20 Jul 2024 09:00) (90% - 98%)    Parameters below as of 20 Jul 2024 09:00  Patient On (Oxygen Delivery Method): room air        ASSESSMENT/ PLAN    Therapy to  be:	[ X] Continue   [ ] Discontinued   [ ] Change to prn Analgesics    Documentation and Verification of current medications:   [X] Done	[ ] Not done, not elligible    Comments: Continue IV PCA. Recommend non-opioid adjuvant analgesics to be used when possible and when allowed by primary surgical team.    Progress Note written now but Patient was seen earlier.

## 2024-07-21 LAB
ANION GAP SERPL CALC-SCNC: 15 MMOL/L — HIGH (ref 7–14)
ANION GAP SERPL CALC-SCNC: 15 MMOL/L — HIGH (ref 7–14)
BUN SERPL-MCNC: 15 MG/DL — SIGNIFICANT CHANGE UP (ref 7–23)
BUN SERPL-MCNC: 15 MG/DL — SIGNIFICANT CHANGE UP (ref 7–23)
CALCIUM SERPL-MCNC: 8.4 MG/DL — SIGNIFICANT CHANGE UP (ref 8.4–10.5)
CALCIUM SERPL-MCNC: 8.5 MG/DL — SIGNIFICANT CHANGE UP (ref 8.4–10.5)
CHLORIDE SERPL-SCNC: 104 MMOL/L — SIGNIFICANT CHANGE UP (ref 98–107)
CHLORIDE SERPL-SCNC: 105 MMOL/L — SIGNIFICANT CHANGE UP (ref 98–107)
CO2 SERPL-SCNC: 21 MMOL/L — LOW (ref 22–31)
CO2 SERPL-SCNC: 22 MMOL/L — SIGNIFICANT CHANGE UP (ref 22–31)
CREAT SERPL-MCNC: 0.71 MG/DL — SIGNIFICANT CHANGE UP (ref 0.5–1.3)
CREAT SERPL-MCNC: 0.73 MG/DL — SIGNIFICANT CHANGE UP (ref 0.5–1.3)
EGFR: 104 ML/MIN/1.73M2 — SIGNIFICANT CHANGE UP
EGFR: 104 ML/MIN/1.73M2 — SIGNIFICANT CHANGE UP
GLUCOSE BLDC GLUCOMTR-MCNC: 123 MG/DL — HIGH (ref 70–99)
GLUCOSE BLDC GLUCOMTR-MCNC: 132 MG/DL — HIGH (ref 70–99)
GLUCOSE BLDC GLUCOMTR-MCNC: 148 MG/DL — HIGH (ref 70–99)
GLUCOSE BLDC GLUCOMTR-MCNC: 174 MG/DL — HIGH (ref 70–99)
GLUCOSE BLDC GLUCOMTR-MCNC: 189 MG/DL — HIGH (ref 70–99)
GLUCOSE SERPL-MCNC: 165 MG/DL — HIGH (ref 70–99)
GLUCOSE SERPL-MCNC: 199 MG/DL — HIGH (ref 70–99)
HCT VFR BLD CALC: 31.1 % — LOW (ref 39–50)
HGB BLD-MCNC: 10.4 G/DL — LOW (ref 13–17)
MAGNESIUM SERPL-MCNC: 1.8 MG/DL — SIGNIFICANT CHANGE UP (ref 1.6–2.6)
MAGNESIUM SERPL-MCNC: 2.3 MG/DL — SIGNIFICANT CHANGE UP (ref 1.6–2.6)
MCHC RBC-ENTMCNC: 30.7 PG — SIGNIFICANT CHANGE UP (ref 27–34)
MCHC RBC-ENTMCNC: 33.4 GM/DL — SIGNIFICANT CHANGE UP (ref 32–36)
MCV RBC AUTO: 91.7 FL — SIGNIFICANT CHANGE UP (ref 80–100)
NRBC # BLD: 0 /100 WBCS — SIGNIFICANT CHANGE UP (ref 0–0)
NRBC # FLD: 0 K/UL — SIGNIFICANT CHANGE UP (ref 0–0)
PHOSPHATE SERPL-MCNC: 2.6 MG/DL — SIGNIFICANT CHANGE UP (ref 2.5–4.5)
PHOSPHATE SERPL-MCNC: 2.7 MG/DL — SIGNIFICANT CHANGE UP (ref 2.5–4.5)
PLATELET # BLD AUTO: 152 K/UL — SIGNIFICANT CHANGE UP (ref 150–400)
POTASSIUM SERPL-MCNC: 3.3 MMOL/L — LOW (ref 3.5–5.3)
POTASSIUM SERPL-MCNC: 3.6 MMOL/L — SIGNIFICANT CHANGE UP (ref 3.5–5.3)
POTASSIUM SERPL-SCNC: 3.3 MMOL/L — LOW (ref 3.5–5.3)
POTASSIUM SERPL-SCNC: 3.6 MMOL/L — SIGNIFICANT CHANGE UP (ref 3.5–5.3)
RBC # BLD: 3.39 M/UL — LOW (ref 4.2–5.8)
RBC # FLD: 13.9 % — SIGNIFICANT CHANGE UP (ref 10.3–14.5)
SODIUM SERPL-SCNC: 141 MMOL/L — SIGNIFICANT CHANGE UP (ref 135–145)
SODIUM SERPL-SCNC: 141 MMOL/L — SIGNIFICANT CHANGE UP (ref 135–145)
WBC # BLD: 6.61 K/UL — SIGNIFICANT CHANGE UP (ref 3.8–10.5)
WBC # FLD AUTO: 6.61 K/UL — SIGNIFICANT CHANGE UP (ref 3.8–10.5)

## 2024-07-21 PROCEDURE — 99233 SBSQ HOSP IP/OBS HIGH 50: CPT

## 2024-07-21 PROCEDURE — 71045 X-RAY EXAM CHEST 1 VIEW: CPT | Mod: 26

## 2024-07-21 RX ORDER — MAGNESIUM SULFATE 500 MG/ML
2 VIAL (ML) INJECTION ONCE
Refills: 0 | Status: COMPLETED | OUTPATIENT
Start: 2024-07-21 | End: 2024-07-21

## 2024-07-21 RX ORDER — HYDRALAZINE HYDROCHLORIDE 100 MG/1
5 TABLET ORAL ONCE
Refills: 0 | Status: COMPLETED | OUTPATIENT
Start: 2024-07-21 | End: 2024-07-21

## 2024-07-21 RX ORDER — POTASSIUM CHLORIDE 1500 MG/1
10 TABLET, EXTENDED RELEASE ORAL
Refills: 0 | Status: COMPLETED | OUTPATIENT
Start: 2024-07-21 | End: 2024-07-21

## 2024-07-21 RX ORDER — SODIUM CHLORIDE AND POTASSIUM CHLORIDE 150; 450 MG/100ML; MG/100ML
1000 INJECTION, SOLUTION INTRAVENOUS
Refills: 0 | Status: DISCONTINUED | OUTPATIENT
Start: 2024-07-21 | End: 2024-07-22

## 2024-07-21 RX ADMIN — DORNASE ALFA 2.5 MILLIGRAM(S): 1 SOLUTION RESPIRATORY (INHALATION) at 09:31

## 2024-07-21 RX ADMIN — HYDRALAZINE HYDROCHLORIDE 10 MILLIGRAM(S): 100 TABLET ORAL at 12:01

## 2024-07-21 RX ADMIN — LEVALBUTEROL HYDROCHLORIDE 0.63 MILLIGRAM(S): 0.31 SOLUTION RESPIRATORY (INHALATION) at 21:43

## 2024-07-21 RX ADMIN — Medication 4 MILLILITER(S): at 04:12

## 2024-07-21 RX ADMIN — Medication 5 MILLIGRAM(S): at 04:15

## 2024-07-21 RX ADMIN — HYDRALAZINE HYDROCHLORIDE 10 MILLIGRAM(S): 100 TABLET ORAL at 17:11

## 2024-07-21 RX ADMIN — LEVALBUTEROL HYDROCHLORIDE 0.63 MILLIGRAM(S): 0.31 SOLUTION RESPIRATORY (INHALATION) at 04:11

## 2024-07-21 RX ADMIN — CHLORHEXIDINE GLUCONATE 1 APPLICATION(S): 500 CLOTH TOPICAL at 12:04

## 2024-07-21 RX ADMIN — Medication 2 UNIT(S): at 12:00

## 2024-07-21 RX ADMIN — POTASSIUM CHLORIDE 100 MILLIEQUIVALENT(S): 1500 TABLET, EXTENDED RELEASE ORAL at 07:05

## 2024-07-21 RX ADMIN — PANTOPRAZOLE SODIUM 40 MILLIGRAM(S): 20 TABLET, DELAYED RELEASE ORAL at 17:12

## 2024-07-21 RX ADMIN — HYDRALAZINE HYDROCHLORIDE 10 MILLIGRAM(S): 100 TABLET ORAL at 23:27

## 2024-07-21 RX ADMIN — HEPARIN SODIUM 5000 UNIT(S): 1000 INJECTION, SOLUTION INTRAVENOUS; SUBCUTANEOUS at 17:48

## 2024-07-21 RX ADMIN — Medication 4 MILLILITER(S): at 21:44

## 2024-07-21 RX ADMIN — HEPARIN SODIUM 5000 UNIT(S): 1000 INJECTION, SOLUTION INTRAVENOUS; SUBCUTANEOUS at 06:00

## 2024-07-21 RX ADMIN — DEXTROSE MONOHYDRATE, SODIUM CHLORIDE, SODIUM LACTATE, CALCIUM CHLORIDE, MAGNESIUM CHLORIDE 60 MILLILITER(S): 1.5; 538; 448; 18.4; 5.08 SOLUTION INTRAPERITONEAL at 07:22

## 2024-07-21 RX ADMIN — POTASSIUM CHLORIDE 100 MILLIEQUIVALENT(S): 1500 TABLET, EXTENDED RELEASE ORAL at 08:20

## 2024-07-21 RX ADMIN — POTASSIUM CHLORIDE 100 MILLIEQUIVALENT(S): 1500 TABLET, EXTENDED RELEASE ORAL at 14:44

## 2024-07-21 RX ADMIN — Medication 2 UNIT(S): at 17:09

## 2024-07-21 RX ADMIN — Medication 30 MILLILITER(S): at 19:53

## 2024-07-21 RX ADMIN — HYDRALAZINE HYDROCHLORIDE 10 MILLIGRAM(S): 100 TABLET ORAL at 06:00

## 2024-07-21 RX ADMIN — HYDRALAZINE HYDROCHLORIDE 5 MILLIGRAM(S): 100 TABLET ORAL at 21:09

## 2024-07-21 RX ADMIN — SODIUM CHLORIDE AND POTASSIUM CHLORIDE 50 MILLILITER(S): 150; 450 INJECTION, SOLUTION INTRAVENOUS at 19:52

## 2024-07-21 RX ADMIN — LEVALBUTEROL HYDROCHLORIDE 0.63 MILLIGRAM(S): 0.31 SOLUTION RESPIRATORY (INHALATION) at 09:32

## 2024-07-21 RX ADMIN — POTASSIUM CHLORIDE 100 MILLIEQUIVALENT(S): 1500 TABLET, EXTENDED RELEASE ORAL at 15:50

## 2024-07-21 RX ADMIN — PANTOPRAZOLE SODIUM 40 MILLIGRAM(S): 20 TABLET, DELAYED RELEASE ORAL at 06:00

## 2024-07-21 RX ADMIN — Medication 5 MILLIGRAM(S): at 16:03

## 2024-07-21 RX ADMIN — Medication 4 MILLILITER(S): at 09:31

## 2024-07-21 RX ADMIN — POTASSIUM CHLORIDE 100 MILLIEQUIVALENT(S): 1500 TABLET, EXTENDED RELEASE ORAL at 06:10

## 2024-07-21 RX ADMIN — Medication 5 MILLIGRAM(S): at 21:08

## 2024-07-21 RX ADMIN — Medication 2 UNIT(S): at 23:26

## 2024-07-21 RX ADMIN — Medication 5 MILLIGRAM(S): at 10:32

## 2024-07-21 RX ADMIN — Medication 1: at 06:00

## 2024-07-21 RX ADMIN — Medication 2 UNIT(S): at 00:02

## 2024-07-21 RX ADMIN — Medication 25 GRAM(S): at 06:12

## 2024-07-21 RX ADMIN — SODIUM CHLORIDE AND POTASSIUM CHLORIDE 50 MILLILITER(S): 150; 450 INJECTION, SOLUTION INTRAVENOUS at 10:32

## 2024-07-21 RX ADMIN — Medication 30 MILLILITER(S): at 07:20

## 2024-07-21 RX ADMIN — HYDRALAZINE HYDROCHLORIDE 10 MILLIGRAM(S): 100 TABLET ORAL at 00:03

## 2024-07-21 RX ADMIN — Medication 2 UNIT(S): at 06:00

## 2024-07-21 RX ADMIN — Medication 1: at 17:10

## 2024-07-21 RX ADMIN — Medication 4 MILLILITER(S): at 15:08

## 2024-07-21 RX ADMIN — LIDOCAINE 5% 1 PATCH: 5 CREAM TOPICAL at 21:07

## 2024-07-21 RX ADMIN — LEVALBUTEROL HYDROCHLORIDE 0.63 MILLIGRAM(S): 0.31 SOLUTION RESPIRATORY (INHALATION) at 15:07

## 2024-07-21 RX ADMIN — LIDOCAINE 5% 1 PATCH: 5 CREAM TOPICAL at 10:40

## 2024-07-21 RX ADMIN — LIDOCAINE 5% 1 PATCH: 5 CREAM TOPICAL at 07:35

## 2024-07-21 NOTE — PROGRESS NOTE ADULT - SUBJECTIVE AND OBJECTIVE BOX
DATE OF SERVICE: 07-21-24       no chest pain   tele NSR     acetaminophen   IVPB .. 1000 milliGRAM(s) IV Intermittent once  chlorhexidine 2% Cloths 1 Application(s) Topical daily  dextrose 5% + sodium chloride 0.45% with potassium chloride 40 mEq/L 1000 milliLiter(s) IV Continuous <Continuous>  dextrose 50% Injectable 25 Gram(s) IV Push once  dextrose 50% Injectable 12.5 Gram(s) IV Push once  dornase malik Solution 2.5 milliGRAM(s) Inhalation daily  heparin   Injectable 5000 Unit(s) SubCutaneous every 12 hours  hydrALAZINE Injectable 10 milliGRAM(s) IV Push every 6 hours  HYDROmorphone PCA (1 mG/mL) 30 milliLiter(s) PCA Continuous PCA Continuous  HYDROmorphone PCA (1 mG/mL) Rescue Clinician Bolus 0.5 milliGRAM(s) IV Push every 15 minutes PRN  insulin lispro (ADMELOG) corrective regimen sliding scale   SubCutaneous every 6 hours  insulin NPH human recombinant 2 Unit(s) SubCutaneous every 6 hours  levalbuterol Inhalation 0.63 milliGRAM(s) Inhalation every 6 hours  lidocaine   4% Patch 1 Patch Transdermal at bedtime  metoprolol tartrate Injectable 5 milliGRAM(s) IV Push every 6 hours  naloxone Injectable 0.1 milliGRAM(s) IV Push every 3 minutes PRN  ondansetron Injectable 4 milliGRAM(s) IV Push every 6 hours PRN  pantoprazole  Injectable 40 milliGRAM(s) IV Push every 12 hours  sodium chloride 3%  Inhalation 4 milliLiter(s) Inhalation every 6 hours                            10.4   6.61  )-----------( 152      ( 21 Jul 2024 04:15 )             31.1       Hemoglobin: 10.4 g/dL (07-21 @ 04:15)  Hemoglobin: 10.4 g/dL (07-20 @ 09:55)  Hemoglobin: 9.6 g/dL (07-20 @ 03:35)  Hemoglobin: 11.0 g/dL (07-19 @ 15:30)  Hemoglobin: 8.8 g/dL (07-19 @ 03:36)      07-21    141  |  104  |  15  ----------------------------<  199<H>  3.3<L>   |  22  |  0.73    Ca    8.4      21 Jul 2024 04:15  Phos  2.7     07-21  Mg     1.80     07-21      Creatinine Trend: 0.73<--, 0.67<--, 0.72<--, 0.68<--, 0.67<--, 0.82<--    COAGS:           T(C): 36.9 (07-21-24 @ 08:00), Max: 37.1 (07-21-24 @ 00:00)  HR: 98 (07-21-24 @ 09:31) (87 - 107)  BP: 151/84 (07-21-24 @ 09:00) (147/85 - 151/84)  RR: 17 (07-21-24 @ 09:00) (10 - 24)  SpO2: 95% (07-21-24 @ 09:31) (90% - 99%)  Wt(kg): --    I&O's Summary    20 Jul 2024 07:01  -  21 Jul 2024 07:00  --------------------------------------------------------  IN: 1920 mL / OUT: 2475 mL / NET: -555 mL    21 Jul 2024 07:01  -  21 Jul 2024 10:14  --------------------------------------------------------  IN: 30 mL / OUT: 100 mL / NET: -70 mL      Gen: NAD  HEENT:  (-)icterus (-)pallor  CV: N S1 S2 1/6 CATHY (+)2 Pulses B/l  Resp:  Clear to auscultation B/L, normal effort  GI: (+) BS Soft, NT, ND  Lymph:  (-)Edema, (-)obvious lymphadenopathy  Skin: Warm to touch, Normal turgor  Psych: Appropriate mood and affect      TELEMETRY: 	 SR/ST    ECG:  	NSR      TTE 07/2024  Findings:  1. Normal left ventricular size and function.  Mild diastolic dysfunction.  2. Normal left atrial size  3. Right atrial cavity is normal in size.  4. Normal right ventricular size and function.  5. Normal trileaflet aortic valve opening.  6. Normal mitral valve opening.  7. Normal appearing tricuspid valve with mild tricuspid  regurgitation.  8. Pulmonic valve is grossly normal, yet poorly visualized  with no doppler evidence for pulmonic stenosis.  9. No evidence of significant pericardial effusion.  10. The aortic root is normal.  11. Normal pulmonary artery.  12. IVC is normal with respiratory variation.  FULL STUDY DONE INCLUDING M-MODE RECORDING,  SPECTRAL DOPPLER AND    Stress 07/2024  Normal myocardial perfusion SPECT images No evidence of stress induced ischemia or infarction.  Normal left ventricular size and function.  Calculated EF is: 68%    ASSESSMENT/PLAN: 	Pt is a 59 y.o. man with history of HTN,, DLD, DM,  GEJ adenocarcinoma (T3N0, stage 2A) s/p neoadjuvant chemotherapy admitted for optimization prior to planned [place] Esophagectomy on tuesday. Recently had a nuclear stress test in our office for evaluation of preoperative cardiac risk assessment prior to esophageal cancer surgery scheduled on 07/16/2024. The patient denies any chest pain, shortness ofbreath, or anginal symptoms. He has no palpitations, dizziness, or syncope.  s/p [place] [facility] Esophagogastrectomy 7/16/2024,    Pre-OP/HTN  - tolerated procedure well from CV perspective  - tolerating Hydralizine / BB  - pain management   - restart Amlodipine and Losartan when stable/ able to take PO  - Tele stable - on Xopenex.

## 2024-07-21 NOTE — PROGRESS NOTE ADULT - SUBJECTIVE AND OBJECTIVE BOX
CHIEF COMPLAINT: FOLLOW UP IN ICU FOR PATIENT WITH ESOPHAGEAL CANCER    ISSUES:   Esophageal cancer  Acute post operative anemia from expected blood loss  Post op pain  Chest tube in place  Uncontrolled DM2 (HgbA1c 11)  HTN  HLD  BPH  GERD      INTERVAL EVENTS:   NG tube removed  Bulb output stable    HISTORY:   Patient denies chest pain, cough, pleuritic pain, fever or dyspnea. Patient denies nausea, vomiting, diarrhea, melena, hematochezia.  Pt denies dysphagia or food getting stuck in chest.       PHYSICAL EXAM:   Gen: Comfortable, No acute distress  Eyes: Sclera white, Conjunctiva normal, Eyelids normal, Pupils symmetrical   ENT: Mucous membranes moist,    Neck: Trachea midline,  ,  ,  ,  ,  ,    CV: Rate regular, Rhythm regular,  ,  ,    Resp: Breath sounds clear, No accessory muscles use,  ,  ,  chest drain in place  Abd: Soft, Non-distended, Non-tender,   ,  ,  ,    Skin: Warm, No peripheral edema of lower extremities,  ,    : stallworth  Neuro: Moving all 4 extremities,    Psych: A&Ox3      ASSESSMENT AND PLAN:     NEURO:  Post-operative Pain - Stable. Pain control with PCA and Tylenol IV PRN.          RESPIRATORY:  Hypoxia - Wean nasal cannula for goal O2sat above 92. Obtain CXR. Incentive spirometry. Chest PT and frequent suctioning. Continue bronchodilators. OOB to chair & ambulate w/ assistance. Continuous pulse oximetry for support & to prevent decompensation.           CARDIOVASCULAR:  Hemodynamically stable - Not on pressors. Continue hemodynamic monitoring.  Telemetry (medical test) - Reviewed by me today independently. Sinus tachycardia    HTN - worsened by post-op pain  - Continue hydralazine IV  - continue metoprolol IV      RENAL:  Stable - Monitor IOs and electrolytes. Keep K above 4.0 and Mg above 2.0.       BPH - stable. not on flomax. voided this admission without issues.  - Continue stallworth catheter for now      GASTROINTESTINAL:  GI prophylaxis not indicated  Zofran and Reglan IV PRN for nausea  NPO until swallow study        GERD - stable. Continue pantoprazole         HEMATOLOGIC:  Acute post-operative anemia from expected blood loss - Stable.  - Monitor CBC. Monitor chest tube output.  - Transfuse PRBC PRN      DVT prophylaxis with heparin subQ           INFECTIOUS DISEASE:  All surgical sites appear clean. No signs of active infection. Will monitor for fever and leukocytosis.             ENDOCRINE:  Uncontrolled DM2 (HgbA1c 11) – stable.  - NPH insulin  - Lispro sliding scale  - Carb control diets  - Monitor glucose fingersticks for goal 120-180. Insulin sliding scale.            ONCOLOGY:  Esophageal cancer - improved s/p resection.  - Drain to bulb suction. Monitor bulb output.  - Repeat CXR today  - NG tube to suction  - NPO until swallow study           Pertinent clinical, laboratory, radiographic, hemodynamic, echocardiographic, respiratory data, microbiologic data and chart were reviewed by myself and analyzed frequently throughout the course of the day and night by myself.    Plan discussed at length with the CTICU staff and Attending CT Surgeon -   Dr Kathy Yates       Patient's status was discussed with patient at bedside.       	  I have spent 50 minutes of time with this patient monitoring hemodynamic status, respiratory status, and coordinating care in the ICU.    ________________________________________________    _________________________  VITAL SIGNS:  Vital Signs Last 24 Hrs  T(C): 36.8 (21 Jul 2024 12:00), Max: 37.1 (21 Jul 2024 00:00)  T(F): 98.2 (21 Jul 2024 12:00), Max: 98.7 (21 Jul 2024 00:00)  HR: 97 (21 Jul 2024 12:00) (78 - 107)  BP: 158/85 (21 Jul 2024 12:00) (145/74 - 158/85)  BP(mean): 104 (21 Jul 2024 12:00) (95 - 104)  RR: 20 (21 Jul 2024 11:00) (10 - 22)  SpO2: 93% (21 Jul 2024 12:00) (92% - 99%)    Parameters below as of 21 Jul 2024 12:00  Patient On (Oxygen Delivery Method): room air      I/Os:   I&O's Detail    20 Jul 2024 07:01  -  21 Jul 2024 07:00  --------------------------------------------------------  IN:    dextrose 5% + lactated ringers: 300 mL    dextrose 5% + lactated ringers: 720 mL    Enteral Tube Flush: 180 mL    IV PiggyBack: 300 mL    Lactated Ringers: 420 mL  Total IN: 1920 mL    OUT:    Bulb (mL): 90 mL    Chest Tube (mL): 30 mL    Indwelling Catheter - Urethral (mL): 1905 mL    Nasogastric/Oral tube (mL): 450 mL  Total OUT: 2475 mL    Total NET: -555 mL      21 Jul 2024 07:01  -  21 Jul 2024 13:17  --------------------------------------------------------  IN:    dextrose 5% + sodium chloride 0.45% w/ Additives: 150 mL    Enteral Tube Flush: 30 mL    IV PiggyBack: 100 mL  Total IN: 280 mL    OUT:    Bulb (mL): 30 mL    Indwelling Catheter - Urethral (mL): 320 mL  Total OUT: 350 mL    Total NET: -70 mL              MEDICATIONS:  MEDICATIONS  (STANDING):  acetaminophen   IVPB .. 1000 milliGRAM(s) IV Intermittent once  chlorhexidine 2% Cloths 1 Application(s) Topical daily  dextrose 5% + sodium chloride 0.45% with potassium chloride 40 mEq/L 1000 milliLiter(s) (50 mL/Hr) IV Continuous <Continuous>  dextrose 50% Injectable 12.5 Gram(s) IV Push once  dextrose 50% Injectable 25 Gram(s) IV Push once  dornase malik Solution 2.5 milliGRAM(s) Inhalation daily  heparin   Injectable 5000 Unit(s) SubCutaneous every 12 hours  hydrALAZINE Injectable 10 milliGRAM(s) IV Push every 6 hours  HYDROmorphone PCA (1 mG/mL) 30 milliLiter(s) PCA Continuous PCA Continuous  insulin lispro (ADMELOG) corrective regimen sliding scale   SubCutaneous every 6 hours  insulin NPH human recombinant 2 Unit(s) SubCutaneous every 6 hours  levalbuterol Inhalation 0.63 milliGRAM(s) Inhalation every 6 hours  lidocaine   4% Patch 1 Patch Transdermal at bedtime  metoprolol tartrate Injectable 5 milliGRAM(s) IV Push every 6 hours  pantoprazole  Injectable 40 milliGRAM(s) IV Push every 12 hours  potassium chloride  10 mEq/100 mL IVPB 10 milliEquivalent(s) IV Intermittent every 1 hour  sodium chloride 3%  Inhalation 4 milliLiter(s) Inhalation every 6 hours    MEDICATIONS  (PRN):  HYDROmorphone PCA (1 mG/mL) Rescue Clinician Bolus 0.5 milliGRAM(s) IV Push every 15 minutes PRN for Pain Scale GREATER THAN 6  naloxone Injectable 0.1 milliGRAM(s) IV Push every 3 minutes PRN For ANY of the following changes in patient status:  A. RR LESS THAN 10 breaths per minute, B. Oxygen saturation LESS THAN 90%, C. Sedation score of 6  ondansetron Injectable 4 milliGRAM(s) IV Push every 6 hours PRN Nausea      LABS:  Laboratory data was independently reviewed by me today.                           10.4   6.61  )-----------( 152      ( 21 Jul 2024 04:15 )             31.1     07-21    141  |  105  |  15  ----------------------------<  165<H>  3.6   |  21<L>  |  0.71    Ca    8.5      21 Jul 2024 12:05  Phos  2.6     07-21  Mg     2.30     07-21        PT/INR - ( 20 Jul 2024 03:35 )   PT: 10.0 sec;   INR: <0.90 ratio         PTT - ( 20 Jul 2024 03:35 )  PTT:31.5 sec    Urinalysis Basic - ( 21 Jul 2024 12:05 )    Color: x / Appearance: x / SG: x / pH: x  Gluc: 165 mg/dL / Ketone: x  / Bili: x / Urobili: x   Blood: x / Protein: x / Nitrite: x   Leuk Esterase: x / RBC: x / WBC x   Sq Epi: x / Non Sq Epi: x / Bacteria: x        RADIOLOGY:   Radiology images were independently reviewed by me today. Reports were reviewed by me today.    Xray Chest 1 View- PORTABLE-Urgent:   ACC: [id number] EXAM:  XR CHEST PORTABLE URGENT 1V   ORDERED BY: CAM VARMA     ACC: [id number] EXAM:  XR CHEST PORTABLE ROUTINE 1V   ORDERED BY: JOSUÉ ANDRADE     PROCEDURE DATE:  07/20/2024          INTERPRETATION:  Chest one view 7/20/2024 6:49 AM    HISTORY: Postop    COMPARISON STUDY: 7/19/2024    Frontal expiratory view of the chest shows the heart to be normal in   size. Right chest port, 2 right chest tubes and nasogastric tube are   present.    The lungs show less pulmonary congestion with partial clearing of the   right lung and there is no evidence of pneumothorax nor pleural effusion.    Chest one view 7/20/2024 1:18 PM  Compared to the prior study, one right chest tube has been removed. No   pneumothorax.    IMPRESSION:  Right lung clearing. No pneumothorax.        Thank you for the courtesy of this referral.    --- End of Report ---            JAMES VALLECILLO MD; Attending Interventional Radiologist  This document has been electronically signed. Jul 20 2024  3:21PM (07-20-24 @ 13:43)  Xray Chest 1 View- PORTABLE-Routine:   ACC: [id number] EXAM:  XR CHEST PORTABLE URGENT 1V   ORDERED BY: CAM VARMA     ACC: [id number] EXAM:  XR CHEST PORTABLE ROUTINE 1V   ORDERED BY: JOSUÉ ANDRADE     PROCEDURE DATE:  07/20/2024          INTERPRETATION:  Chest one view 7/20/2024 6:49 AM    HISTORY: Postop    COMPARISON STUDY: 7/19/2024    Frontal expiratory view of the chest shows the heart to be normal in   size. Right chest port, 2 right chest tubes and nasogastric tube are   present.    The lungs show less pulmonary congestion with partial clearing of the   right lung and there is no evidence of pneumothorax nor pleural effusion.    Chest one view 7/20/2024 1:18 PM  Compared to the prior study, one right chest tube has been removed. No   pneumothorax.    IMPRESSION:  Right lung clearing. No pneumothorax.        Thank you for the courtesy of this referral.    --- End of Report ---            JAMES VALLECILLO MD; Attending Interventional Radiologist  This document has been electronically signed. Jul 20 2024  3:21PM (07-20-24 @ 06:59)  Xray Chest 1 View- PORTABLE-Routine:   ACC: [id number] EXAM:  XR CHEST PORTABLE ROUTINE 1V   ORDERED BY: MINDI JOHNSON     ACC: [id number] EXAM:  XR CHEST PORTABLE ROUTINE 1V   ORDERED BY: MINDI JOHNSON     PROCEDURE DATE:  07/18/2024          INTERPRETATION:  Chest one view 7/18/2024 5:01 AM    HISTORY: Postop esophagectomy    COMPARISON STUDY: 7/17/2024    Frontal expiratory view of the chest shows the heart to be similar in   size. Right chest port, right chest tubes and nasogastric tube remain   present.    The lungs show mild right atelectasis and there is no evidence of   pneumothorax nor definite pleural effusion.    Chest one view 7/19/2024 5:05 AM  Compared to the prior study, there is progression of right lung   atelectasis or infiltrate. No pneumothorax.    IMPRESSION:  No pneumothorax.        Thank you for the courtesy of this referral.    --- End of Report ---            JAMES VALLECILLO MD; Attending Interventional Radiologist  This document has been electronically signed. Jul 19 2024  1:51PM (07-19-24 @ 06:20)

## 2024-07-21 NOTE — PROGRESS NOTE ADULT - ASSESSMENT
Patient seen and examined, agree with above assessment and plan as transcribed above.    - cont supportive care per CTICU    Guerrero Lee MD, [facility]  [place]EEPER [phone]

## 2024-07-21 NOTE — PROGRESS NOTE ADULT - SUBJECTIVE AND OBJECTIVE BOX
Anesthesia Pain Management Service    SUBJECTIVE: Patient is doing well with IV PCA and no significant problems reported.    Pain Scale Score	At rest: ___ 	With Activity: ___ 	[X ] Refer to charted pain scores    THERAPY:    [ ] IV PCA Morphine		[ ] 5 mg/mL	[ ] 1 mg/mL  [X ] IV PCA Hydromorphone	[ ] 5 mg/mL	[X ] 1 mg/mL  [ ] IV PCA Fentanyl		[ ] 50 micrograms/mL    Demand dose __0.2_ lockout __6_ (minutes) Continuous Rate _0__ Total: _0.8__  mg used (in past 24 hours)      MEDICATIONS  (STANDING):  acetaminophen   IVPB .. 1000 milliGRAM(s) IV Intermittent once  chlorhexidine 2% Cloths 1 Application(s) Topical daily  dextrose 5% + sodium chloride 0.45% with potassium chloride 40 mEq/L 1000 milliLiter(s) (50 mL/Hr) IV Continuous <Continuous>  dextrose 50% Injectable 25 Gram(s) IV Push once  dextrose 50% Injectable 12.5 Gram(s) IV Push once  dornase malik Solution 2.5 milliGRAM(s) Inhalation daily  heparin   Injectable 5000 Unit(s) SubCutaneous every 12 hours  hydrALAZINE Injectable 10 milliGRAM(s) IV Push every 6 hours  HYDROmorphone PCA (1 mG/mL) 30 milliLiter(s) PCA Continuous PCA Continuous  insulin lispro (ADMELOG) corrective regimen sliding scale   SubCutaneous every 6 hours  insulin NPH human recombinant 2 Unit(s) SubCutaneous every 6 hours  levalbuterol Inhalation 0.63 milliGRAM(s) Inhalation every 6 hours  lidocaine   4% Patch 1 Patch Transdermal at bedtime  metoprolol tartrate Injectable 5 milliGRAM(s) IV Push every 6 hours  pantoprazole  Injectable 40 milliGRAM(s) IV Push every 12 hours  sodium chloride 3%  Inhalation 4 milliLiter(s) Inhalation every 6 hours    MEDICATIONS  (PRN):  HYDROmorphone PCA (1 mG/mL) Rescue Clinician Bolus 0.5 milliGRAM(s) IV Push every 15 minutes PRN for Pain Scale GREATER THAN 6  naloxone Injectable 0.1 milliGRAM(s) IV Push every 3 minutes PRN For ANY of the following changes in patient status:  A. RR LESS THAN 10 breaths per minute, B. Oxygen saturation LESS THAN 90%, C. Sedation score of 6  ondansetron Injectable 4 milliGRAM(s) IV Push every 6 hours PRN Nausea      OBJECTIVE:    Sedation Score:	[ X] Alert	[ ] Drowsy 	[ ] Arousable	[ ] Asleep	[ ] Unresponsive    Side Effects:	[X ] None	[ ] Nausea	[ ] Vomiting	[ ] Pruritus  		[ ] Other:    Vital Signs Last 24 Hrs  T(C): 36.9 (21 Jul 2024 08:00), Max: 37.1 (21 Jul 2024 00:00)  T(F): 98.5 (21 Jul 2024 08:00), Max: 98.7 (21 Jul 2024 00:00)  HR: 98 (21 Jul 2024 09:31) (87 - 107)  BP: 151/84 (21 Jul 2024 09:00) (147/85 - 151/84)  BP(mean): 104 (21 Jul 2024 09:00) (103 - 104)  RR: 17 (21 Jul 2024 09:00) (10 - 24)  SpO2: 95% (21 Jul 2024 09:31) (90% - 99%)    Parameters below as of 21 Jul 2024 09:31  Patient On (Oxygen Delivery Method): nasal cannula        ASSESSMENT/ PLAN    Therapy to  be:	[ X] Continue   [ ] Discontinued   [ ] Change to prn Analgesics    Documentation and Verification of current medications:   [X] Done	[ ] Not done, not elligible    Comments: Recommend non-opioid adjuvant analgesics to be used when possible and when allowed by primary surgical team.    Progress Note written now but Patient was seen earlier.

## 2024-07-22 LAB
ANION GAP SERPL CALC-SCNC: 14 MMOL/L — SIGNIFICANT CHANGE UP (ref 7–14)
BLD GP AB SCN SERPL QL: NEGATIVE — SIGNIFICANT CHANGE UP
BUN SERPL-MCNC: 15 MG/DL — SIGNIFICANT CHANGE UP (ref 7–23)
CALCIUM SERPL-MCNC: 8.2 MG/DL — LOW (ref 8.4–10.5)
CHLORIDE SERPL-SCNC: 106 MMOL/L — SIGNIFICANT CHANGE UP (ref 98–107)
CO2 SERPL-SCNC: 22 MMOL/L — SIGNIFICANT CHANGE UP (ref 22–31)
CREAT SERPL-MCNC: 0.83 MG/DL — SIGNIFICANT CHANGE UP (ref 0.5–1.3)
EGFR: 100 ML/MIN/1.73M2 — SIGNIFICANT CHANGE UP
GLUCOSE BLDC GLUCOMTR-MCNC: 140 MG/DL — HIGH (ref 70–99)
GLUCOSE BLDC GLUCOMTR-MCNC: 147 MG/DL — HIGH (ref 70–99)
GLUCOSE BLDC GLUCOMTR-MCNC: 219 MG/DL — HIGH (ref 70–99)
GLUCOSE BLDC GLUCOMTR-MCNC: 228 MG/DL — HIGH (ref 70–99)
GLUCOSE SERPL-MCNC: 146 MG/DL — HIGH (ref 70–99)
HCT VFR BLD CALC: 30.4 % — LOW (ref 39–50)
HGB BLD-MCNC: 10 G/DL — LOW (ref 13–17)
MAGNESIUM SERPL-MCNC: 2 MG/DL — SIGNIFICANT CHANGE UP (ref 1.6–2.6)
MCHC RBC-ENTMCNC: 30.3 PG — SIGNIFICANT CHANGE UP (ref 27–34)
MCHC RBC-ENTMCNC: 32.9 GM/DL — SIGNIFICANT CHANGE UP (ref 32–36)
MCV RBC AUTO: 92.1 FL — SIGNIFICANT CHANGE UP (ref 80–100)
NRBC # BLD: 0 /100 WBCS — SIGNIFICANT CHANGE UP (ref 0–0)
NRBC # FLD: 0 K/UL — SIGNIFICANT CHANGE UP (ref 0–0)
PHOSPHATE SERPL-MCNC: 2.8 MG/DL — SIGNIFICANT CHANGE UP (ref 2.5–4.5)
PLATELET # BLD AUTO: 160 K/UL — SIGNIFICANT CHANGE UP (ref 150–400)
POTASSIUM SERPL-MCNC: 3.4 MMOL/L — LOW (ref 3.5–5.3)
POTASSIUM SERPL-SCNC: 3.4 MMOL/L — LOW (ref 3.5–5.3)
RBC # BLD: 3.3 M/UL — LOW (ref 4.2–5.8)
RBC # FLD: 13.8 % — SIGNIFICANT CHANGE UP (ref 10.3–14.5)
RH IG SCN BLD-IMP: POSITIVE — SIGNIFICANT CHANGE UP
SODIUM SERPL-SCNC: 142 MMOL/L — SIGNIFICANT CHANGE UP (ref 135–145)
WBC # BLD: 14.75 K/UL — HIGH (ref 3.8–10.5)
WBC # FLD AUTO: 14.75 K/UL — HIGH (ref 3.8–10.5)

## 2024-07-22 PROCEDURE — 99233 SBSQ HOSP IP/OBS HIGH 50: CPT

## 2024-07-22 PROCEDURE — 99232 SBSQ HOSP IP/OBS MODERATE 35: CPT

## 2024-07-22 PROCEDURE — 74220 X-RAY XM ESOPHAGUS 1CNTRST: CPT | Mod: 26

## 2024-07-22 PROCEDURE — 71045 X-RAY EXAM CHEST 1 VIEW: CPT | Mod: 26

## 2024-07-22 RX ORDER — SODIUM CHLORIDE AND POTASSIUM CHLORIDE 150; 450 MG/100ML; MG/100ML
1000 INJECTION, SOLUTION INTRAVENOUS
Refills: 0 | Status: DISCONTINUED | OUTPATIENT
Start: 2024-07-22 | End: 2024-07-23

## 2024-07-22 RX ORDER — ENOXAPARIN SODIUM 120 MG/.8ML
40 INJECTION SUBCUTANEOUS EVERY 24 HOURS
Refills: 0 | Status: DISCONTINUED | OUTPATIENT
Start: 2024-07-22 | End: 2024-08-09

## 2024-07-22 RX ORDER — OXYCODONE HYDROCHLORIDE 30 MG/1
5 TABLET ORAL EVERY 4 HOURS
Refills: 0 | Status: DISCONTINUED | OUTPATIENT
Start: 2024-07-22 | End: 2024-07-25

## 2024-07-22 RX ORDER — TAMSULOSIN HCL 0.4 MG
0.4 CAPSULE ORAL AT BEDTIME
Refills: 0 | Status: DISCONTINUED | OUTPATIENT
Start: 2024-07-22 | End: 2024-07-23

## 2024-07-22 RX ORDER — LOSARTAN POTASSIUM 50 MG/1
50 TABLET, FILM COATED ORAL DAILY
Refills: 0 | Status: DISCONTINUED | OUTPATIENT
Start: 2024-07-22 | End: 2024-07-23

## 2024-07-22 RX ORDER — POTASSIUM CHLORIDE 1500 MG/1
10 TABLET, EXTENDED RELEASE ORAL
Refills: 0 | Status: COMPLETED | OUTPATIENT
Start: 2024-07-22 | End: 2024-07-22

## 2024-07-22 RX ORDER — METOPROLOL TARTRATE 100 MG
25 TABLET ORAL
Refills: 0 | Status: DISCONTINUED | OUTPATIENT
Start: 2024-07-22 | End: 2024-07-23

## 2024-07-22 RX ORDER — OXYCODONE HYDROCHLORIDE 30 MG/1
7.5 TABLET ORAL EVERY 6 HOURS
Refills: 0 | Status: DISCONTINUED | OUTPATIENT
Start: 2024-07-22 | End: 2024-07-25

## 2024-07-22 RX ADMIN — LEVALBUTEROL HYDROCHLORIDE 0.63 MILLIGRAM(S): 0.31 SOLUTION RESPIRATORY (INHALATION) at 22:39

## 2024-07-22 RX ADMIN — HYDRALAZINE HYDROCHLORIDE 10 MILLIGRAM(S): 100 TABLET ORAL at 05:14

## 2024-07-22 RX ADMIN — Medication 2 UNIT(S): at 11:52

## 2024-07-22 RX ADMIN — Medication 0.4 MILLIGRAM(S): at 21:11

## 2024-07-22 RX ADMIN — Medication 2: at 11:52

## 2024-07-22 RX ADMIN — POTASSIUM CHLORIDE 100 MILLIEQUIVALENT(S): 1500 TABLET, EXTENDED RELEASE ORAL at 10:00

## 2024-07-22 RX ADMIN — PANTOPRAZOLE SODIUM 40 MILLIGRAM(S): 20 TABLET, DELAYED RELEASE ORAL at 05:14

## 2024-07-22 RX ADMIN — Medication 2 UNIT(S): at 05:14

## 2024-07-22 RX ADMIN — HEPARIN SODIUM 5000 UNIT(S): 1000 INJECTION, SOLUTION INTRAVENOUS; SUBCUTANEOUS at 05:14

## 2024-07-22 RX ADMIN — LIDOCAINE 5% 1 PATCH: 5 CREAM TOPICAL at 21:11

## 2024-07-22 RX ADMIN — Medication 4 MILLILITER(S): at 04:11

## 2024-07-22 RX ADMIN — Medication 4 MILLILITER(S): at 15:20

## 2024-07-22 RX ADMIN — SODIUM CHLORIDE AND POTASSIUM CHLORIDE 30 MILLILITER(S): 150; 450 INJECTION, SOLUTION INTRAVENOUS at 21:11

## 2024-07-22 RX ADMIN — Medication 4 MILLILITER(S): at 10:32

## 2024-07-22 RX ADMIN — LIDOCAINE 5% 1 PATCH: 5 CREAM TOPICAL at 10:20

## 2024-07-22 RX ADMIN — Medication 2 UNIT(S): at 17:13

## 2024-07-22 RX ADMIN — Medication 5 MILLIGRAM(S): at 04:38

## 2024-07-22 RX ADMIN — LEVALBUTEROL HYDROCHLORIDE 0.63 MILLIGRAM(S): 0.31 SOLUTION RESPIRATORY (INHALATION) at 04:11

## 2024-07-22 RX ADMIN — LEVALBUTEROL HYDROCHLORIDE 0.63 MILLIGRAM(S): 0.31 SOLUTION RESPIRATORY (INHALATION) at 10:32

## 2024-07-22 RX ADMIN — POTASSIUM CHLORIDE 100 MILLIEQUIVALENT(S): 1500 TABLET, EXTENDED RELEASE ORAL at 06:41

## 2024-07-22 RX ADMIN — CHLORHEXIDINE GLUCONATE 1 APPLICATION(S): 500 CLOTH TOPICAL at 13:38

## 2024-07-22 RX ADMIN — LOSARTAN POTASSIUM 50 MILLIGRAM(S): 50 TABLET, FILM COATED ORAL at 11:51

## 2024-07-22 RX ADMIN — SODIUM CHLORIDE AND POTASSIUM CHLORIDE 70 MILLILITER(S): 150; 450 INJECTION, SOLUTION INTRAVENOUS at 06:41

## 2024-07-22 RX ADMIN — SODIUM CHLORIDE AND POTASSIUM CHLORIDE 50 MILLILITER(S): 150; 450 INJECTION, SOLUTION INTRAVENOUS at 05:14

## 2024-07-22 RX ADMIN — Medication 5 MILLIGRAM(S): at 10:31

## 2024-07-22 RX ADMIN — Medication 2 UNIT(S): at 23:17

## 2024-07-22 RX ADMIN — PANTOPRAZOLE SODIUM 40 MILLIGRAM(S): 20 TABLET, DELAYED RELEASE ORAL at 17:12

## 2024-07-22 RX ADMIN — Medication 2: at 23:18

## 2024-07-22 RX ADMIN — Medication 4 MILLILITER(S): at 22:40

## 2024-07-22 RX ADMIN — LIDOCAINE 5% 1 PATCH: 5 CREAM TOPICAL at 10:21

## 2024-07-22 RX ADMIN — ENOXAPARIN SODIUM 40 MILLIGRAM(S): 120 INJECTION SUBCUTANEOUS at 11:51

## 2024-07-22 RX ADMIN — DORNASE ALFA 2.5 MILLIGRAM(S): 1 SOLUTION RESPIRATORY (INHALATION) at 10:32

## 2024-07-22 RX ADMIN — Medication 25 MILLIGRAM(S): at 17:13

## 2024-07-22 RX ADMIN — LEVALBUTEROL HYDROCHLORIDE 0.63 MILLIGRAM(S): 0.31 SOLUTION RESPIRATORY (INHALATION) at 15:20

## 2024-07-22 NOTE — PROGRESS NOTE ADULT - ASSESSMENT
The patient is a 59y Male with PMH of T2DM, HTN, HLD, GE junction cancer here for esophagectomy now postop.  Endocrinology consulted for T2DM    #Uncontrolled Type 2 Diabetes Mellitus   - Follows with: Dr. Nolasco  - A1C with Estimated Average Glucose Result: 10.9 % (07-13-24)  - home regimen: Semglee 18 units, Novolog 6 units  - eGFR: 84 mL/min/1.73m2 (07-15-24)  - Weight (kg): 54 (07-12-24)  - glucose 384-101, no evidence of DKA on labs      INPATIENT PLAN:  -Patient to start clear liquid diet today  -Recommend stop NPH  -Start Lantus 11 units qhs  -Start Admelog 2 units TID premeal, hold if NPO  -Low Admelog scale premeal and low bedtime scale  -FS premeal and bedtime  - Inpatient glucose goals: 140-180 mg/dl in [facility]ICU      DISCHARGE PLANNING:  - Discharge recs pending clinical course and nutrition plan. If he resumes his normal diet, can discharge on home regimen of Semglee 18 units and Admelog 6 units with meals along with his oral medications as before. If reduced po intake regimen may need to be lowered prior to final dc recs.  - will need Endocrinology follow up. Patient has an appointment with Dr. Nolasco scheduled for August 8th at 8:20 AM at [address] [facility] [address].   #Hypertension  - Goal BP <130/80  - Management as per primary team  - check urine microalbumin level as outpatient    #Hyperlipidemia  - LDL goal <70  - check lipid panel as outpatient on a yearly basis      Leo Rader MD  Division of Endocrinology  Pager: [phone]    If after 6PM or before 9AM, or on weekends/holidays, please call endocrine answering service for assistance ([phone]).  For nonurgent matters email [email or web address] for assistance.

## 2024-07-22 NOTE — PROGRESS NOTE ADULT - SUBJECTIVE AND OBJECTIVE BOX
DATE OF SERVICE: 07-22-24    Patient denies chest pain or shortness of breath.   Review of symptoms otherwise negative.    Review of Systems:   Constitutional: [ ] fevers, [ ] chills.   Skin: [ ] dry skin. [ ] rashes.  Psychiatric: [ ] depression, [ ] anxiety.   Gastrointestinal: [ ] BRBPR, [ ] melena.   Neurological: [ ] confusion. [ ] seizures. [ ] shuffling gait.   Ears,Nose,Mouth and Throat: [ ] ear pain [ ] sore throat.   Eyes: [ ] diplopia.   Respiratory: [ ] hemoptysis. [ ] shortness of breath  Cardiovascular: See HPI above  Hematologic/Lymphatic: [ ] anemia. [ ] painful nodes. [ ] prolonged bleeding.   Genitourinary: [ ] hematuria. [ ] flank pain.   Endocrine: [ ] significant change in weight. [ ] intolerance to heat and cold.     Review of systems [x ] otherwise negative, [ ] otherwise unable to obtain    FH: no family history of sudden cardiac death in first degree relatives    SH: [ ] tobacco, [ ] alcohol, [ ] drugs    acetaminophen   IVPB .. 1000 milliGRAM(s) IV Intermittent once  chlorhexidine 2% Cloths 1 Application(s) Topical daily  dextrose 5% + sodium chloride 0.45% with potassium chloride 40 mEq/L 1000 milliLiter(s) IV Continuous <Continuous>  dextrose 50% Injectable 25 Gram(s) IV Push once  dextrose 50% Injectable 12.5 Gram(s) IV Push once  dornase malik Solution 2.5 milliGRAM(s) Inhalation daily  enoxaparin Injectable 40 milliGRAM(s) SubCutaneous every 24 hours  insulin lispro (ADMELOG) corrective regimen sliding scale   SubCutaneous every 6 hours  insulin NPH human recombinant 2 Unit(s) SubCutaneous every 6 hours  levalbuterol Inhalation 0.63 milliGRAM(s) Inhalation every 6 hours  lidocaine   4% Patch 1 Patch Transdermal at bedtime  losartan 50 milliGRAM(s) Oral daily  metoprolol tartrate 25 milliGRAM(s) Oral two times a day  naloxone Injectable 0.1 milliGRAM(s) IV Push every 3 minutes PRN  ondansetron Injectable 4 milliGRAM(s) IV Push every 6 hours PRN  oxyCODONE    Solution 7.5 milliGRAM(s) Oral every 6 hours PRN  oxyCODONE    Solution 5 milliGRAM(s) Oral every 4 hours PRN  pantoprazole  Injectable 40 milliGRAM(s) IV Push every 12 hours  sodium chloride 3%  Inhalation 4 milliLiter(s) Inhalation every 6 hours  tamsulosin 0.4 milliGRAM(s) Oral at bedtime                            10.0   14.75 )-----------( 160      ( 22 Jul 2024 04:53 )             30.4       142  |  106  |  15  ----------------------------<  146<H>  3.4<L>   |  22  |  0.83    Ca    8.2<L>      22 Jul 2024 04:53  Phos  2.8     07-22  Mg     2.00     07-22        T(C): 36.6 (07-22-24 @ 12:00), Max: 37.2 (07-21-24 @ 20:00)  HR: 99 (07-22-24 @ 12:00) (78 - 108)  BP: 149/74 (07-22-24 @ 12:00) (138/69 - 160/86)  RR: 18 (07-22-24 @ 12:00) (16 - 22)  SpO2: 94% (07-22-24 @ 12:00) (90% - 97%)  Wt(kg): --    I&O's Summary    21 Jul 2024 07:01  -  22 Jul 2024 07:00  --------------------------------------------------------  IN: 1430 mL / OUT: 1280 mL / NET: 150 mL    22 Jul 2024 07:01  -  22 Jul 2024 14:50  --------------------------------------------------------  IN: 720 mL / OUT: 455 mL / NET: 265 mL      Gen: NAD  HEENT:  (-)icterus (-)pallor  CV: N S1 S2 1/6 CATHY (+)2 Pulses B/l  Resp:  Clear to auscultation B/L, normal effort  GI: (+) BS Soft, NT, ND  Lymph:  (-)Edema, (-)obvious lymphadenopathy  Skin: Warm to touch, Normal turgor  Psych: Appropriate mood and affect      TELEMETRY: 	 SR/ST    ECG:  	NSR      TTE 07/2024  Findings:  1. Normal left ventricular size and function.  Mild diastolic dysfunction.  2. Normal left atrial size  3. Right atrial cavity is normal in size.  4. Normal right ventricular size and function.  5. Normal trileaflet aortic valve opening.  6. Normal mitral valve opening.  7. Normal appearing tricuspid valve with mild tricuspid  regurgitation.  8. Pulmonic valve is grossly normal, yet poorly visualized  with no doppler evidence for pulmonic stenosis.  9. No evidence of significant pericardial effusion.  10. The aortic root is normal.  11. Normal pulmonary artery.  12. IVC is normal with respiratory variation.  FULL STUDY DONE INCLUDING M-MODE RECORDING,  SPECTRAL DOPPLER AND    Stress 07/2024  Normal myocardial perfusion SPECT images No evidence of stress induced ischemia or infarction.  Normal left ventricular size and function.  Calculated EF is: 68%    ASSESSMENT/PLAN: 	Pt is a 59 y.o. man with history of HTN,, DLD, DM,  GEJ adenocarcinoma (T3N0, stage 2A) s/p neoadjuvant chemotherapy admitted for optimization prior to planned [facility] Esophagectomy on tuesday. Recently had a nuclear stress test in our office for evaluation of preoperative cardiac risk assessment prior to esophageal cancer surgery scheduled on 07/16/2024. The patient denies any chest pain, shortness ofbreath, or anginal symptoms. He has no palpitations, dizziness, or syncope    Pre-OP/HTN  - tolerated procedure well from CV perspective  - diet advanced, Losartan and Metoprolol started  - no events on tele thus far    Nkechi TATE  [phone]

## 2024-07-22 NOTE — PROGRESS NOTE ADULT - SUBJECTIVE AND OBJECTIVE BOX
Anesthesia Pain Management Service    SUBJECTIVE: Patient is doing well with IV PCA and no significant problems reported.    Pain Scale Score	At rest: __4/10 	With Activity: ___ 	[X ] Refer to charted pain scores    THERAPY:    [ ] IV PCA Morphine		[ ] 5 mg/mL	[ ] 1 mg/mL  [X ] IV PCA Hydromorphone	[ ] 5 mg/mL	[X ] 1 mg/mL  [ ] IV PCA Fentanyl		[ ] 50 micrograms/mL    Demand dose __0.2_ lockout __6_ (minutes) Continuous Rate _0__ Total: 1___   mg used (in past 24 hrs)      MEDICATIONS  (STANDING):  acetaminophen   IVPB .. 1000 milliGRAM(s) IV Intermittent once  chlorhexidine 2% Cloths 1 Application(s) Topical daily  dextrose 5% + sodium chloride 0.45% with potassium chloride 40 mEq/L 1000 milliLiter(s) (70 mL/Hr) IV Continuous <Continuous>  dextrose 50% Injectable 25 Gram(s) IV Push once  dextrose 50% Injectable 12.5 Gram(s) IV Push once  dornase malik Solution 2.5 milliGRAM(s) Inhalation daily  enoxaparin Injectable 40 milliGRAM(s) SubCutaneous every 24 hours  insulin lispro (ADMELOG) corrective regimen sliding scale   SubCutaneous every 6 hours  insulin NPH human recombinant 2 Unit(s) SubCutaneous every 6 hours  levalbuterol Inhalation 0.63 milliGRAM(s) Inhalation every 6 hours  lidocaine   4% Patch 1 Patch Transdermal at bedtime  losartan 50 milliGRAM(s) Oral daily  metoprolol tartrate 25 milliGRAM(s) Oral two times a day  pantoprazole  Injectable 40 milliGRAM(s) IV Push every 12 hours  sodium chloride 3%  Inhalation 4 milliLiter(s) Inhalation every 6 hours  tamsulosin 0.4 milliGRAM(s) Oral at bedtime    MEDICATIONS  (PRN):  naloxone Injectable 0.1 milliGRAM(s) IV Push every 3 minutes PRN For ANY of the following changes in patient status:  A. RR LESS THAN 10 breaths per minute, B. Oxygen saturation LESS THAN 90%, C. Sedation score of 6  ondansetron Injectable 4 milliGRAM(s) IV Push every 6 hours PRN Nausea  oxyCODONE    Solution 7.5 milliGRAM(s) Oral every 6 hours PRN Severe Pain (7 - 10)  oxyCODONE    Solution 5 milliGRAM(s) Oral every 4 hours PRN Moderate Pain (4 - 6)      OBJECTIVE: Patient sitting up in chair.    Sedation Score:	[ X] Alert	[ ] Drowsy 	[ ] Arousable	[ ] Asleep	[ ] Unresponsive    Side Effects:	[X ] None	[ ] Nausea	[ ] Vomiting	[ ] Pruritus  		[ ] Other:    Vital Signs Last 24 Hrs  T(C): 36.4 (22 Jul 2024 08:00), Max: 37.2 (21 Jul 2024 20:00)  T(F): 97.6 (22 Jul 2024 08:00), Max: 99 (21 Jul 2024 20:00)  HR: 97 (22 Jul 2024 10:35) (78 - 108)  BP: 143/71 (22 Jul 2024 08:00) (135/72 - 160/86)  BP(mean): 91 (22 Jul 2024 08:00) (88 - 114)  RR: 20 (22 Jul 2024 10:35) (16 - 22)  SpO2: 94% (22 Jul 2024 10:35) (90% - 97%)    Parameters below as of 22 Jul 2024 10:35  Patient On (Oxygen Delivery Method): room air        ASSESSMENT/ PLAN    Therapy to  be:	[ ] Continue   [ X] Discontinued   [X ] Change to prn Analgesics    Documentation and Verification of current medications:   [X] Done	[ ] Not done, not elligible    Comments: IV PCA discontinued and  PRN Oral/IV opioids and/or Adjuvant non-opioid medication ordered by primary team.  Progress Note written now but Patient was seen earlier.

## 2024-07-22 NOTE — PROGRESS NOTE ADULT - NS ATTEND AMEND GEN_ALL_CORE FT
Patient seen and examined. Agree with plan as detailed in PA/NP Note. \    No events, tele, c/w losartan and BB for now    Mart Mason MD  Pager: [phone]

## 2024-07-22 NOTE — PROGRESS NOTE ADULT - SUBJECTIVE AND OBJECTIVE BOX
Chief Complaint: DM2    History: prolonged NPO  to start clear liquids today  has been on D5 1/2 NS  no hypoglycemia events  patient feels tired    MEDICATIONS  (STANDING):  acetaminophen   IVPB .. 1000 milliGRAM(s) IV Intermittent once  chlorhexidine 2% Cloths 1 Application(s) Topical daily  dextrose 5% + sodium chloride 0.45% with potassium chloride 40 mEq/L 1000 milliLiter(s) (30 mL/Hr) IV Continuous <Continuous>  dextrose 50% Injectable 25 Gram(s) IV Push once  dextrose 50% Injectable 12.5 Gram(s) IV Push once  dornase malik Solution 2.5 milliGRAM(s) Inhalation daily  enoxaparin Injectable 40 milliGRAM(s) SubCutaneous every 24 hours  insulin lispro (ADMELOG) corrective regimen sliding scale   SubCutaneous every 6 hours  insulin NPH human recombinant 2 Unit(s) SubCutaneous every 6 hours  levalbuterol Inhalation 0.63 milliGRAM(s) Inhalation every 6 hours  lidocaine   4% Patch 1 Patch Transdermal at bedtime  losartan 50 milliGRAM(s) Oral daily  metoprolol tartrate 25 milliGRAM(s) Oral two times a day  pantoprazole  Injectable 40 milliGRAM(s) IV Push every 12 hours  sodium chloride 3%  Inhalation 4 milliLiter(s) Inhalation every 6 hours  tamsulosin 0.4 milliGRAM(s) Oral at bedtime    MEDICATIONS  (PRN):  naloxone Injectable 0.1 milliGRAM(s) IV Push every 3 minutes PRN For ANY of the following changes in patient status:  A. RR LESS THAN 10 breaths per minute, B. Oxygen saturation LESS THAN 90%, C. Sedation score of 6  ondansetron Injectable 4 milliGRAM(s) IV Push every 6 hours PRN Nausea  oxyCODONE    Solution 5 milliGRAM(s) Oral every 4 hours PRN Moderate Pain (4 - 6)  oxyCODONE    Solution 7.5 milliGRAM(s) Oral every 6 hours PRN Severe Pain (7 - 10)      Allergies    No Known Allergies    Intolerances      Review of Systems:    ALL OTHER SYSTEMS REVIEWED AND NEGATIVE      PHYSICAL EXAM:  VITALS: T(C): 36.6 (07-22-24 @ 12:00)  T(F): 97.9 (07-22-24 @ 12:00), Max: 99 (07-21-24 @ 20:00)  HR: 102 (07-22-24 @ 15:21) (78 - 108)  BP: 144/72 (07-22-24 @ 16:00) (138/69 - 152/98)  RR:  (17 - 22)  SpO2:  (90% - 97%)  Wt(kg): --  GENERAL: NAD, well-groomed, well-developed  EYES: No proptosis, no lid lag, anicteric  HEENT:  Atraumatic, Normocephalic, moist mucous membranes  RESPIRATORY: nonlabored respirations, no wheezing  PSYCH: Alert and oriented x 3, normal affect, normal mood    CAPILLARY BLOOD GLUCOSE      POCT Blood Glucose.: 219 mg/dL (22 Jul 2024 11:45)  POCT Blood Glucose.: 140 mg/dL (22 Jul 2024 05:13)  POCT Blood Glucose.: 132 mg/dL (21 Jul 2024 23:26)  POCT Blood Glucose.: 123 mg/dL (21 Jul 2024 21:05)  POCT Blood Glucose.: 174 mg/dL (21 Jul 2024 17:08)      07-22    142  |  106  |  15  ----------------------------<  146<H>  3.4<L>   |  22  |  0.83    eGFR: 100    Ca    8.2<L>      07-22  Mg     2.00     07-22  Phos  2.8     07-22        A1C with Estimated Average Glucose Result: 10.9 % (07-13-24 @ 02:44)  A1C with Estimated Average Glucose Result: 12.8 % (01-01-24 @ 06:00)      Thyroid Function Tests:

## 2024-07-22 NOTE — PROGRESS NOTE ADULT - SUBJECTIVE AND OBJECTIVE BOX
BHARATH GONZALES      59y   Male   MRN-[id number]         No Known Allergies             Daily     Daily Drug Dosing Weight  Height (cm): 162.6 (16 Jul 2024 07:35)  Weight (kg): 54.9 (16 Jul 2024 07:35)  BMI (kg/m2): 20.8 (16 Jul 2024 07:35)  BSA (m2): 1.58 (16 Jul 2024 07:35)    59 year old male, went to [facility] c/o fatigue, dizziness.  1/1/20240  pt was worked up found to be severely anemic received PRBC EGD on 01/02/2024: nodule on gastric side of the GEJ with some old heme in stomach. Possible healing Kavita Valero tear. Pathology reveal GEJ bx: fragments of adenocarcinoma, moderately differentiated. Pt received  radiation therapy & chemotherapy completed 6/25/24. Pt was reevaluated by surgeon and now present to [facility] for preop optimization for scheduled Robotic assisted [place] broderick esophagectomy. (12 Jul 2024 14:00)    CHIEF COMPLAINT: Follow up in ICU  for postoperative care of patient who is s/p  Esophagogastrectomy     Procedure: [place] broderick esophagectomy 7/16/2024    Issues:              Esophageal cancer              Postop pain              Chest tube in place  HLD (hyperlipidemia)  Insulin dependent type 2 diabetes mellitus  BPH (benign prostatic hyperplasia)  HTN (hypertension)  Gastroesophageal cancer  Hypokalemia      Postop course:     Patient reports moderate pain at chest wall incision sites which is worse with coughing and deep breathing without associated fever or dyspnea. Pain is improved with use of PCA and  oral pain meds.         Home Medications:  amLODIPine 5 mg oral tablet: 1 tab(s) orally once a day Noon (10 Jul 2024 09:06)  atorvastatin 20 mg oral tablet: 1 tab(s) orally once a day noon (10 Jul 2024 09:06)  Jardiance 25 mg oral tablet: 1 tab(s) orally once a day Noon (10 Jul 2024 09:06)  losartan-hydrochlorothiazide 50 mg-12.5 mg oral tablet: 1 tab(s) orally once a day (10 Jul 2024 09:06)  metFORMIN 500 mg oral tablet: 1 tab(s) orally 2 times a day (10 Jul 2024 09:16)  NovoLOG 100 units/mL subcutaneous solution: subcutaneous 3 times a day (before meals) 15 units (10 Jul 2024 09:44)  pantoprazole 40 mg oral delayed release tablet: 1 tab(s) orally once a day AM (10 Jul 2024 09:16)  Sodium Chloride, 1 G, PO, 2 times Daily:  (10 Jul 2024 09:16)  tamsulosin 0.4 mg oral capsule: 1 cap(s) orally once a day (at bedtime) (10 Jul 2024 09:16)    PAST MEDICAL & SURGICAL HISTORY:  HLD (hyperlipidemia)      Insulin dependent type 2 diabetes mellitus      BPH (benign prostatic hyperplasia)      HTN (hypertension)      Gastroesophageal cancer      Gastroesophageal cancer      Port-A-Cath in place        Vital Signs Last 24 Hrs  T(C): 36.4 (22 Jul 2024 08:00), Max: 37.2 (21 Jul 2024 20:00)  T(F): 97.6 (22 Jul 2024 08:00), Max: 99 (21 Jul 2024 20:00)  HR: 97 (22 Jul 2024 10:35) (78 - 108)  BP: 143/71 (22 Jul 2024 08:00) (135/72 - 160/86)  BP(mean): 91 (22 Jul 2024 08:00) (88 - 114)  RR: 20 (22 Jul 2024 10:35) (16 - 22)  SpO2: 94% (22 Jul 2024 10:35) (90% - 97%)    Parameters below as of 22 Jul 2024 10:35  Patient On (Oxygen Delivery Method): room air      I&O's Detail    21 Jul 2024 07:01  -  22 Jul 2024 07:00  --------------------------------------------------------  IN:    dextrose 5% + sodium chloride 0.45% w/ Additives: 1100 mL    Enteral Tube Flush: 30 mL    IV PiggyBack: 300 mL  Total IN: 1430 mL    OUT:    Bulb (mL): 55 mL    Indwelling Catheter - Urethral (mL): 1225 mL  Total OUT: 1280 mL    Total NET: 150 mL      22 Jul 2024 07:01  -  22 Jul 2024 11:28  --------------------------------------------------------  IN:    dextrose 5% + sodium chloride 0.45% w/ Additives: 140 mL  Total IN: 140 mL    OUT:    Indwelling Catheter - Urethral (mL): 125 mL  Total OUT: 125 mL    Total NET: 15 mL        CAPILLARY BLOOD GLUCOSE      POCT Blood Glucose.: 140 mg/dL (22 Jul 2024 05:13)  POCT Blood Glucose.: 132 mg/dL (21 Jul 2024 23:26)  POCT Blood Glucose.: 123 mg/dL (21 Jul 2024 21:05)  POCT Blood Glucose.: 174 mg/dL (21 Jul 2024 17:08)  POCT Blood Glucose.: 148 mg/dL (21 Jul 2024 11:59)    Home Medications:  amLODIPine 5 mg oral tablet: 1 tab(s) orally once a day Noon (10 Jul 2024 09:06)  atorvastatin 20 mg oral tablet: 1 tab(s) orally once a day noon (10 Jul 2024 09:06)  Jardiance 25 mg oral tablet: 1 tab(s) orally once a day Noon (10 Jul 2024 09:06)  losartan-hydrochlorothiazide 50 mg-12.5 mg oral tablet: 1 tab(s) orally once a day (10 Jul 2024 09:06)  metFORMIN 500 mg oral tablet: 1 tab(s) orally 2 times a day (10 Jul 2024 09:16)  NovoLOG 100 units/mL subcutaneous solution: subcutaneous 3 times a day (before meals) 15 units (10 Jul 2024 09:44)  pantoprazole 40 mg oral delayed release tablet: 1 tab(s) orally once a day AM (10 Jul 2024 09:16)  Sodium Chloride, 1 G, PO, 2 times Daily:  (10 Jul 2024 09:16)  tamsulosin 0.4 mg oral capsule: 1 cap(s) orally once a day (at bedtime) (10 Jul 2024 09:16)    MEDICATIONS  (STANDING):  acetaminophen   IVPB .. 1000 milliGRAM(s) IV Intermittent once  chlorhexidine 2% Cloths 1 Application(s) Topical daily  dextrose 5% + sodium chloride 0.45% with potassium chloride 40 mEq/L 1000 milliLiter(s) (70 mL/Hr) IV Continuous <Continuous>  dextrose 50% Injectable 25 Gram(s) IV Push once  dextrose 50% Injectable 12.5 Gram(s) IV Push once  dornase malik Solution 2.5 milliGRAM(s) Inhalation daily  enoxaparin Injectable 40 milliGRAM(s) SubCutaneous every 24 hours  insulin lispro (ADMELOG) corrective regimen sliding scale   SubCutaneous every 6 hours  insulin NPH human recombinant 2 Unit(s) SubCutaneous every 6 hours  levalbuterol Inhalation 0.63 milliGRAM(s) Inhalation every 6 hours  lidocaine   4% Patch 1 Patch Transdermal at bedtime  losartan 50 milliGRAM(s) Oral daily  metoprolol tartrate 25 milliGRAM(s) Oral two times a day  pantoprazole  Injectable 40 milliGRAM(s) IV Push every 12 hours  sodium chloride 3%  Inhalation 4 milliLiter(s) Inhalation every 6 hours  tamsulosin 0.4 milliGRAM(s) Oral at bedtime    MEDICATIONS  (PRN):  naloxone Injectable 0.1 milliGRAM(s) IV Push every 3 minutes PRN For ANY of the following changes in patient status:  A. RR LESS THAN 10 breaths per minute, B. Oxygen saturation LESS THAN 90%, C. Sedation score of 6  ondansetron Injectable 4 milliGRAM(s) IV Push every 6 hours PRN Nausea  oxyCODONE    Solution 7.5 milliGRAM(s) Oral every 6 hours PRN Severe Pain (7 - 10)  oxyCODONE    Solution 5 milliGRAM(s) Oral every 4 hours PRN Moderate Pain (4 - 6)        Physical exam:   General:               Pt is awake, alert, not in any distress                                                  Neuro:                  Nonfocal                             Cardiovascular:   S1 & S2, regular                           Respiratory:         Air entry is fair and equal on both sides, has bilateral conducted sounds                           GI:                          Soft, nondistended and nontender, Bowel sounds active                          Ext:                        No cyanosis or edema                              Labs:                                                                           10.0   14.75 )-----------( 160      ( 22 Jul 2024 04:53 )             30.4             07-22    142  |  106  |  15  ----------------------------<  146<H>  3.4<L>   |  22  |  0.83    Ca    8.2<L>      22 Jul 2024 04:53  Phos  2.8     07-22  Mg     2.00     07-22                      Urinalysis Basic - ( 22 Jul 2024 04:53 )    Color: x / Appearance: x / SG: x / pH: x  Gluc: 146 mg/dL / Ketone: x  / Bili: x / Urobili: x   Blood: x / Protein: x / Nitrite: x   Leuk Esterase: x / RBC: x / WBC x   Sq Epi: x / Non Sq Epi: x / Bacteria: x      CXR:  < from: Xray Chest 1 View- PORTABLE-Routine (07.22.24 @ 05:55) >  Onthis morning's examination:  NG tube removed.  No pneumothorax.  Partial clearing at the bases with findings still more significant on the   right      Plan:  General: 59yMale s/p [place] Bro[place] Esophagogastrectomy 7/16/2024,  appears fairly comfortable, complaining of chest pain with deep breathing.                             Neuro:                                         Pain control with Oxy  /  Tylenol PRN                            Cardiovascular:                                          HTN: Continue Invasive hemodynamic monitoring.   Restart Losartan and continue Lopressor     HLD: Restart Lipitor                              Respiratory:                                         Postop hypoxemia requiring O2 via nasal cannula - Resolved                                             CXR is clear. Incentive spirometry.                                                   Chest PT and frequent suctioning. Continue bronchodilators, Pulmozyme and inhaled 3% saline                                                      OOB to chair & ambulate w/ assistance.                                                           Continuous pulse oximetry for support & to prevent decompensation.                                         Chest tube d/cd                            GI                                          BS looks OK, no leak  Start Clears                                          Continue GI prophylaxis with  Protonix                                          Continue Zofran / Reglan for nausea - PRN	                                                                 Renal:                                          Monitor I/Os and electrolytes                                          BPH: Has Elizabeth in place. Start Flomax and d/c Elizabeth tomorrow                                                 Hem/ Onc:                                                                                   Monitor chest tube output &  signs of bleeding.                                           Follow CBC in AM                           Infectious disease:                                             No signs of infection. Monitor for fever / leukocytosis.                                           All surgical incision / chest tube  sites look clean                            Endocrine                                              DM-2: Continue NPH 2U Q6hrs and Accu-Checks with coverage.      Pt is on SQ Heparin and Venodyne boots for DVT prophylaxis.     Pertinent clinical, laboratory, radiographic, hemodynamic, echocardiographic, respiratory data, microbiologic data and chart were reviewed and analyzed frequently throughout the course of the day and night  Patient seen, examined and plan discussed with CT Surgeon Dr. Laureano / CTICU team during rounds.  Status discussed with patient and  updated plan of care.   I have spent 50 minutes of  time with this patient  monitoring hemodynamic status, respiratory status ,and coordinating care in the ICU          Andrey Bernstein MD

## 2024-07-23 LAB
ANION GAP SERPL CALC-SCNC: 13 MMOL/L — SIGNIFICANT CHANGE UP (ref 7–14)
BUN SERPL-MCNC: 17 MG/DL — SIGNIFICANT CHANGE UP (ref 7–23)
CALCIUM SERPL-MCNC: 7.8 MG/DL — LOW (ref 8.4–10.5)
CHLORIDE SERPL-SCNC: 103 MMOL/L — SIGNIFICANT CHANGE UP (ref 98–107)
CO2 SERPL-SCNC: 21 MMOL/L — LOW (ref 22–31)
CREAT SERPL-MCNC: 0.88 MG/DL — SIGNIFICANT CHANGE UP (ref 0.5–1.3)
EGFR: 98 ML/MIN/1.73M2 — SIGNIFICANT CHANGE UP
GLUCOSE BLDC GLUCOMTR-MCNC: 162 MG/DL — HIGH (ref 70–99)
GLUCOSE BLDC GLUCOMTR-MCNC: 171 MG/DL — HIGH (ref 70–99)
GLUCOSE BLDC GLUCOMTR-MCNC: 187 MG/DL — HIGH (ref 70–99)
GLUCOSE BLDC GLUCOMTR-MCNC: 209 MG/DL — HIGH (ref 70–99)
GLUCOSE BLDC GLUCOMTR-MCNC: 210 MG/DL — HIGH (ref 70–99)
GLUCOSE SERPL-MCNC: 186 MG/DL — HIGH (ref 70–99)
HCT VFR BLD CALC: 31 % — LOW (ref 39–50)
HGB BLD-MCNC: 10.5 G/DL — LOW (ref 13–17)
INR BLD: 1.01 RATIO — SIGNIFICANT CHANGE UP (ref 0.85–1.18)
MAGNESIUM SERPL-MCNC: 1.8 MG/DL — SIGNIFICANT CHANGE UP (ref 1.6–2.6)
MCHC RBC-ENTMCNC: 30.4 PG — SIGNIFICANT CHANGE UP (ref 27–34)
MCHC RBC-ENTMCNC: 33.9 GM/DL — SIGNIFICANT CHANGE UP (ref 32–36)
MCV RBC AUTO: 89.9 FL — SIGNIFICANT CHANGE UP (ref 80–100)
NRBC # BLD: 0 /100 WBCS — SIGNIFICANT CHANGE UP (ref 0–0)
NRBC # FLD: 0 K/UL — SIGNIFICANT CHANGE UP (ref 0–0)
PHOSPHATE SERPL-MCNC: 2.4 MG/DL — LOW (ref 2.5–4.5)
PLATELET # BLD AUTO: 185 K/UL — SIGNIFICANT CHANGE UP (ref 150–400)
POTASSIUM SERPL-MCNC: 3.4 MMOL/L — LOW (ref 3.5–5.3)
POTASSIUM SERPL-SCNC: 3.4 MMOL/L — LOW (ref 3.5–5.3)
PROTHROM AB SERPL-ACNC: 11.4 SEC — SIGNIFICANT CHANGE UP (ref 9.5–13)
RBC # BLD: 3.45 M/UL — LOW (ref 4.2–5.8)
RBC # FLD: 13.8 % — SIGNIFICANT CHANGE UP (ref 10.3–14.5)
SODIUM SERPL-SCNC: 137 MMOL/L — SIGNIFICANT CHANGE UP (ref 135–145)
WBC # BLD: 11.81 K/UL — HIGH (ref 3.8–10.5)
WBC # FLD AUTO: 11.81 K/UL — HIGH (ref 3.8–10.5)

## 2024-07-23 PROCEDURE — 99232 SBSQ HOSP IP/OBS MODERATE 35: CPT

## 2024-07-23 PROCEDURE — 71045 X-RAY EXAM CHEST 1 VIEW: CPT | Mod: 26

## 2024-07-23 PROCEDURE — 99233 SBSQ HOSP IP/OBS HIGH 50: CPT

## 2024-07-23 RX ORDER — ALBUMIN HUMAN 25 %
250 INTRAVENOUS SOLUTION INTRAVENOUS ONCE
Refills: 0 | Status: COMPLETED | OUTPATIENT
Start: 2024-07-23 | End: 2024-07-23

## 2024-07-23 RX ORDER — INSULIN LISPRO 100/ML
VIAL (ML) SUBCUTANEOUS AT BEDTIME
Refills: 0 | Status: DISCONTINUED | OUTPATIENT
Start: 2024-07-23 | End: 2024-07-23

## 2024-07-23 RX ORDER — ONDANSETRON HCL/PF 4 MG/2 ML
4 VIAL (ML) INJECTION EVERY 6 HOURS
Refills: 0 | Status: COMPLETED | OUTPATIENT
Start: 2024-07-23 | End: 2024-07-25

## 2024-07-23 RX ORDER — SOD PHOS DI, MONO/K PHOS MONO 250 MG
1 TABLET ORAL ONCE
Refills: 0 | Status: COMPLETED | OUTPATIENT
Start: 2024-07-23 | End: 2024-07-23

## 2024-07-23 RX ORDER — ENOXAPARIN SODIUM 120 MG/.8ML
40 INJECTION SUBCUTANEOUS
Qty: 1 | Refills: 0
Start: 2024-07-23 | End: 2024-08-21

## 2024-07-23 RX ORDER — HYDRALAZINE HYDROCHLORIDE 100 MG/1
10 TABLET ORAL EVERY 6 HOURS
Refills: 0 | Status: DISCONTINUED | OUTPATIENT
Start: 2024-07-23 | End: 2024-07-28

## 2024-07-23 RX ORDER — SODIUM CHLORIDE AND POTASSIUM CHLORIDE 150; 450 MG/100ML; MG/100ML
1000 INJECTION, SOLUTION INTRAVENOUS
Refills: 0 | Status: DISCONTINUED | OUTPATIENT
Start: 2024-07-23 | End: 2024-07-25

## 2024-07-23 RX ORDER — INSULIN GLARGINE-YFGN 100 [IU]/ML
8 INJECTION, SOLUTION SUBCUTANEOUS ONCE
Refills: 0 | Status: DISCONTINUED | OUTPATIENT
Start: 2024-07-23 | End: 2024-07-23

## 2024-07-23 RX ORDER — METOCLOPRAMIDE HCL 5 MG/ML
10 VIAL (ML) INJECTION EVERY 6 HOURS
Refills: 0 | Status: COMPLETED | OUTPATIENT
Start: 2024-07-23 | End: 2024-07-25

## 2024-07-23 RX ORDER — METOCLOPRAMIDE HCL 5 MG/ML
10 VIAL (ML) INJECTION ONCE
Refills: 0 | Status: COMPLETED | OUTPATIENT
Start: 2024-07-23 | End: 2024-07-23

## 2024-07-23 RX ORDER — POTASSIUM CHLORIDE 1500 MG/1
10 TABLET, EXTENDED RELEASE ORAL ONCE
Refills: 0 | Status: COMPLETED | OUTPATIENT
Start: 2024-07-23 | End: 2024-07-23

## 2024-07-23 RX ORDER — INSULIN LISPRO 100/ML
VIAL (ML) SUBCUTANEOUS
Refills: 0 | Status: DISCONTINUED | OUTPATIENT
Start: 2024-07-23 | End: 2024-07-23

## 2024-07-23 RX ORDER — INSULIN LISPRO 100/ML
VIAL (ML) SUBCUTANEOUS EVERY 6 HOURS
Refills: 0 | Status: DISCONTINUED | OUTPATIENT
Start: 2024-07-23 | End: 2024-07-24

## 2024-07-23 RX ORDER — METOPROLOL TARTRATE 100 MG
5 TABLET ORAL EVERY 6 HOURS
Refills: 0 | Status: DISCONTINUED | OUTPATIENT
Start: 2024-07-23 | End: 2024-07-29

## 2024-07-23 RX ORDER — MAGNESIUM SULFATE 500 MG/ML
2 VIAL (ML) INJECTION ONCE
Refills: 0 | Status: COMPLETED | OUTPATIENT
Start: 2024-07-23 | End: 2024-07-23

## 2024-07-23 RX ORDER — POTASSIUM CHLORIDE 1500 MG/1
40 TABLET, EXTENDED RELEASE ORAL ONCE
Refills: 0 | Status: COMPLETED | OUTPATIENT
Start: 2024-07-23 | End: 2024-07-23

## 2024-07-23 RX ADMIN — LIDOCAINE 5% 1 PATCH: 5 CREAM TOPICAL at 09:00

## 2024-07-23 RX ADMIN — Medication 2: at 11:26

## 2024-07-23 RX ADMIN — Medication 10 MILLIGRAM(S): at 15:45

## 2024-07-23 RX ADMIN — PANTOPRAZOLE SODIUM 40 MILLIGRAM(S): 20 TABLET, DELAYED RELEASE ORAL at 17:38

## 2024-07-23 RX ADMIN — Medication 25 GRAM(S): at 05:24

## 2024-07-23 RX ADMIN — Medication 125 MILLILITER(S): at 22:14

## 2024-07-23 RX ADMIN — LIDOCAINE 5% 1 PATCH: 5 CREAM TOPICAL at 07:00

## 2024-07-23 RX ADMIN — Medication 4 MILLILITER(S): at 09:44

## 2024-07-23 RX ADMIN — HYDRALAZINE HYDROCHLORIDE 10 MILLIGRAM(S): 100 TABLET ORAL at 11:36

## 2024-07-23 RX ADMIN — Medication 1: at 05:25

## 2024-07-23 RX ADMIN — LEVALBUTEROL HYDROCHLORIDE 0.63 MILLIGRAM(S): 0.31 SOLUTION RESPIRATORY (INHALATION) at 09:44

## 2024-07-23 RX ADMIN — Medication 10 MILLIGRAM(S): at 22:14

## 2024-07-23 RX ADMIN — OXYCODONE HYDROCHLORIDE 5 MILLIGRAM(S): 30 TABLET ORAL at 00:37

## 2024-07-23 RX ADMIN — DORNASE ALFA 2.5 MILLIGRAM(S): 1 SOLUTION RESPIRATORY (INHALATION) at 09:44

## 2024-07-23 RX ADMIN — Medication 25 MILLIGRAM(S): at 05:24

## 2024-07-23 RX ADMIN — PANTOPRAZOLE SODIUM 40 MILLIGRAM(S): 20 TABLET, DELAYED RELEASE ORAL at 05:24

## 2024-07-23 RX ADMIN — OXYCODONE HYDROCHLORIDE 5 MILLIGRAM(S): 30 TABLET ORAL at 00:07

## 2024-07-23 RX ADMIN — Medication 4 MILLIGRAM(S): at 19:30

## 2024-07-23 RX ADMIN — POTASSIUM CHLORIDE 40 MILLIEQUIVALENT(S): 1500 TABLET, EXTENDED RELEASE ORAL at 05:24

## 2024-07-23 RX ADMIN — Medication 2 UNIT(S): at 05:25

## 2024-07-23 RX ADMIN — Medication 1: at 17:40

## 2024-07-23 RX ADMIN — LEVALBUTEROL HYDROCHLORIDE 0.63 MILLIGRAM(S): 0.31 SOLUTION RESPIRATORY (INHALATION) at 03:41

## 2024-07-23 RX ADMIN — LOSARTAN POTASSIUM 50 MILLIGRAM(S): 50 TABLET, FILM COATED ORAL at 05:24

## 2024-07-23 RX ADMIN — Medication 5 MILLIGRAM(S): at 19:34

## 2024-07-23 RX ADMIN — Medication 4 MILLILITER(S): at 03:41

## 2024-07-23 RX ADMIN — HYDRALAZINE HYDROCHLORIDE 10 MILLIGRAM(S): 100 TABLET ORAL at 17:38

## 2024-07-23 RX ADMIN — Medication 2 UNIT(S): at 17:41

## 2024-07-23 RX ADMIN — Medication 2 UNIT(S): at 12:59

## 2024-07-23 RX ADMIN — Medication 1 PACKET(S): at 05:26

## 2024-07-23 RX ADMIN — Medication 5 MILLIGRAM(S): at 12:54

## 2024-07-23 RX ADMIN — POTASSIUM CHLORIDE 100 MILLIEQUIVALENT(S): 1500 TABLET, EXTENDED RELEASE ORAL at 05:23

## 2024-07-23 RX ADMIN — Medication 4 MILLIGRAM(S): at 12:54

## 2024-07-23 RX ADMIN — ENOXAPARIN SODIUM 40 MILLIGRAM(S): 120 INJECTION SUBCUTANEOUS at 11:18

## 2024-07-23 RX ADMIN — CHLORHEXIDINE GLUCONATE 1 APPLICATION(S): 500 CLOTH TOPICAL at 11:19

## 2024-07-23 NOTE — PROGRESS NOTE ADULT - ASSESSMENT
The patient is a 59y Male with PMH of T2DM, HTN, HLD, GE junction cancer here for esophagectomy now postop.  Endocrinology consulted for T2DM    #Uncontrolled Type 2 Diabetes Mellitus   - Follows with: Dr. Nolasco  - A1C with Estimated Average Glucose Result: 10.9 % (07-13-24)  - home regimen: Semglee 18 units, Novolog 6 units with meals      INPATIENT PLAN:  -Patient started clear liquid diet, tolerating small amount so far.  -Recommend stop NPH  -Start Lantus 11 units qhs  -Start Admelog 2 units TID premeal, hold if NPO  -Low Admelog scale premeal and low bedtime scale  -FS premeal and bedtime  - Inpatient glucose goals: 140-180 mg/dl in CTICU      DISCHARGE PLANNING:  - Discharge recs pending clinical course and nutrition plan. Plan to resume Semglee and Admelog will determine final doses based on hospital requirements/ po intake at time of discharge.   - will need Endocrinology follow up. Patient has an appointment with Dr. Nolasco scheduled for August 8th at 8:20 AM at [address] [facility]   [address] [facility].     #Hypertension  - Goal BP <130/80  - Management as per primary team  - check urine microalbumin level as outpatient    #Hyperlipidemia  - LDL goal <70  - check lipid panel as outpatient on a yearly basis    Plan discussed with CTICU provider    Leo Rader MD  Division of Endocrinology  Pager: [phone]    If after 6PM or before 9AM, or on weekends/holidays, please call endocrine answering service for assistance ([phone]).  For nonurgent matters email [email or web address] for assistance.

## 2024-07-23 NOTE — CHART NOTE - NSCHARTNOTEFT_GEN_A_CORE
Patient being seen for malnutrition follow up. Spoke with pt and obtained subjective information from extensive chart review.     Current Diet : Diet, Clear Liquid:   Consistent Carbohydrate {No Snacks} (CSTCHO)  DASH/TLC {Sodium & Cholesterol Restricted} (DASH) (07-22-24 @ 10:14)    PO intake:  < 50%    Current Weight Trend: 58.6 kg (7/21), 58.2 kg (7/19), 54.5 kg (7/17)  Height (cm): 162.6   Admit Weight (kg): 54.9   BMI (kg/m2): 20.8     Interval Events: Pt s/p esophagectomy 7/16. He has been NPO with Barium Swallow study performed 7/22 and oral diet then initiated. Pt tolerating clear liquids, however consuming small amounts at this time. He said he has been having some diarrhea which has affected his diminished appetite. Last BM 7/22. K+/Phos lab values are diminished possibly due to extended NPO status/Refeeding Syndrome. Consider thiamine to facilitate carbohydrate/electrolyte metabolism as diet advances. Pt amenable to having Glucerna oral supplement again once diet is advanced beyond clear liquids. Weight trend reflective of edema presently 1+ generalized. FS over the past 24 hrs 123 - 228 mg/dl with NPH and Admelog insulins ordered for coverage. Pt remains at moderate risk for malnutrition based on continued physical evidence of muscle and fat loss as previously identified. Continue to advance diet as tolerated with Glucerna oral supplement as previously ordered. RDN services to remain available as needed.     __________________ Pertinent Medications__________________   MEDICATIONS  (STANDING):  acetaminophen   IVPB .. 1000 milliGRAM(s) IV Intermittent once  chlorhexidine 2% Cloths 1 Application(s) Topical daily  dextrose 5% + sodium chloride 0.45% with potassium chloride 40 mEq/L 1000 milliLiter(s) (50 mL/Hr) IV Continuous <Continuous>  dextrose 50% Injectable 25 Gram(s) IV Push once  dextrose 50% Injectable 12.5 Gram(s) IV Push once  dornase malik Solution 2.5 milliGRAM(s) Inhalation daily  enoxaparin Injectable 40 milliGRAM(s) SubCutaneous every 24 hours  insulin lispro (ADMELOG) corrective regimen sliding scale   SubCutaneous every 6 hours  insulin NPH human recombinant 2 Unit(s) SubCutaneous every 6 hours  levalbuterol Inhalation 0.63 milliGRAM(s) Inhalation every 6 hours  lidocaine   4% Patch 1 Patch Transdermal at bedtime  losartan 50 milliGRAM(s) Oral daily  metoprolol tartrate 25 milliGRAM(s) Oral two times a day  pantoprazole  Injectable 40 milliGRAM(s) IV Push every 12 hours  sodium chloride 3%  Inhalation 4 milliLiter(s) Inhalation every 6 hours  tamsulosin 0.4 milliGRAM(s) Oral at bedtime    __________________ Pertinent Labs__________________   07-23 Na137 mmol/L Glu 186 mg/dL<H> K+ 3.4 mmol/L<L> Cr  0.88 mg/dL BUN 17 mg/dL 07-23 Phos 2.4 mg/dL<L> 07-16 Alb 3.6 g/dL 07-12 PAB 21 mg/dL      Skin: No skin injuries identified as per nursing flow sheet records     Estimated Needs:   [x] no change since previous assessment      Previous Nutrition Diagnoses:     Altered Nutrition Related Labs  Moderate Malnutrition     Nutrition Diagnoses are: [x] ongoing        Goal(s):  1. Patient to meet > 75% estimated energy needs    Recommendations:   1. Continue to advance diet as tolerated.  2. Resume oral supplementation of Glucerna Therapeutic Nutrition Shake 240mls 2x daily (440kcals, 20g protein).    Monitoring and Evaluation:   1. Monitor weights, labs, BMs, skin integrity, PO intake and edema.  2. RD services to remain available.     Education:    [x] Given on previous assessment by RD    Queta Clarke, MS, RDN, CDN, CNSC on MS TEAMS PREFERRED or Pager #[phone]

## 2024-07-23 NOTE — PROGRESS NOTE ADULT - SUBJECTIVE AND OBJECTIVE BOX
Chief Complaint: DM2    History: tolerating small amount of clear liquids  no hypoglycemia events  some mild hyperglycemia noted    MEDICATIONS  (STANDING):  acetaminophen   IVPB .. 1000 milliGRAM(s) IV Intermittent once  chlorhexidine 2% Cloths 1 Application(s) Topical daily  dextrose 5% + sodium chloride 0.45% with potassium chloride 40 mEq/L 1000 milliLiter(s) (50 mL/Hr) IV Continuous <Continuous>  dextrose 50% Injectable 25 Gram(s) IV Push once  dextrose 50% Injectable 12.5 Gram(s) IV Push once  dornase malik Solution 2.5 milliGRAM(s) Inhalation daily  enoxaparin Injectable 40 milliGRAM(s) SubCutaneous every 24 hours  hydrALAZINE Injectable 10 milliGRAM(s) IV Push every 6 hours  insulin lispro (ADMELOG) corrective regimen sliding scale   SubCutaneous every 6 hours  insulin NPH human recombinant 2 Unit(s) SubCutaneous every 6 hours  levalbuterol Inhalation 0.63 milliGRAM(s) Inhalation every 6 hours  lidocaine   4% Patch 1 Patch Transdermal at bedtime  metoprolol tartrate Injectable 5 milliGRAM(s) IV Push every 6 hours  pantoprazole  Injectable 40 milliGRAM(s) IV Push every 12 hours  sodium chloride 3%  Inhalation 4 milliLiter(s) Inhalation every 6 hours  tamsulosin 0.4 milliGRAM(s) Oral at bedtime    MEDICATIONS  (PRN):  naloxone Injectable 0.1 milliGRAM(s) IV Push every 3 minutes PRN For ANY of the following changes in patient status:  A. RR LESS THAN 10 breaths per minute, B. Oxygen saturation LESS THAN 90%, C. Sedation score of 6  ondansetron Injectable 4 milliGRAM(s) IV Push every 6 hours PRN Nausea  oxyCODONE    Solution 7.5 milliGRAM(s) Oral every 6 hours PRN Severe Pain (7 - 10)  oxyCODONE    Solution 5 milliGRAM(s) Oral every 4 hours PRN Moderate Pain (4 - 6)      Allergies    No Known Allergies    Intolerances      Review of Systems:    ALL OTHER SYSTEMS REVIEWED AND NEGATIVE      PHYSICAL EXAM:  VITALS: T(C): 36.7 (07-23-24 @ 08:00)  T(F): 98.1 (07-23-24 @ 08:00), Max: 98.6 (07-23-24 @ 04:00)  HR: 82 (07-23-24 @ 11:35) (70 - 104)  BP: 164/81 (07-23-24 @ 11:35) (128/66 - 164/81)  RR:  (13 - 23)  SpO2:  (91% - 98%)  Wt(kg): --  GENERAL: NAD, well-groomed, well-developed  EYES: No proptosis, no lid lag, anicteric  HEENT:  Atraumatic, Normocephalic, moist mucous membranes  RESPIRATORY: nonlabored respirations, no wheezing  PSYCH: Alert and oriented x 3, normal affect, normal mood    CAPILLARY BLOOD GLUCOSE      POCT Blood Glucose.: 210 mg/dL (23 Jul 2024 11:18)  POCT Blood Glucose.: 187 mg/dL (23 Jul 2024 05:21)  POCT Blood Glucose.: 228 mg/dL (22 Jul 2024 23:17)  POCT Blood Glucose.: 147 mg/dL (22 Jul 2024 17:06)      07-23    137  |  103  |  17  ----------------------------<  186<H>  3.4<L>   |  21<L>  |  0.88    eGFR: 98    Ca    7.8<L>      07-23  Mg     1.80     07-23  Phos  2.4     07-23        A1C with Estimated Average Glucose Result: 10.9 % (07-13-24 @ 02:44)  A1C with Estimated Average Glucose Result: 12.8 % (01-01-24 @ 06:00)      Thyroid Function Tests:

## 2024-07-23 NOTE — PROGRESS NOTE ADULT - SUBJECTIVE AND OBJECTIVE BOX
DATE OF SERVICE: 07-23-24    Patient denies chest pain or shortness of breath.   Review of symptoms otherwise negative.    Review of Systems:   Constitutional: [ ] fevers, [ ] chills.   Skin: [ ] dry skin. [ ] rashes.  Psychiatric: [ ] depression, [ ] anxiety.   Gastrointestinal: [ ] BRBPR, [ ] melena.   Neurological: [ ] confusion. [ ] seizures. [ ] shuffling gait.   Ears,Nose,Mouth and Throat: [ ] ear pain [ ] sore throat.   Eyes: [ ] diplopia.   Respiratory: [ ] hemoptysis. [ ] shortness of breath  Cardiovascular: See HPI above  Hematologic/Lymphatic: [ ] anemia. [ ] painful nodes. [ ] prolonged bleeding.   Genitourinary: [ ] hematuria. [ ] flank pain.   Endocrine: [ ] significant change in weight. [ ] intolerance to heat and cold.     Review of systems [x ] otherwise negative, [ ] otherwise unable to obtain    FH: no family history of sudden cardiac death in first degree relatives    SH: [ ] tobacco, [ ] alcohol, [ ] drugs    acetaminophen   IVPB .. 1000 milliGRAM(s) IV Intermittent once  chlorhexidine 2% Cloths 1 Application(s) Topical daily  dextrose 5% + sodium chloride 0.45% with potassium chloride 40 mEq/L 1000 milliLiter(s) IV Continuous <Continuous>  dextrose 50% Injectable 25 Gram(s) IV Push once  dextrose 50% Injectable 12.5 Gram(s) IV Push once  dornase malik Solution 2.5 milliGRAM(s) Inhalation daily  enoxaparin Injectable 40 milliGRAM(s) SubCutaneous every 24 hours  hydrALAZINE Injectable 10 milliGRAM(s) IV Push every 6 hours  insulin lispro (ADMELOG) corrective regimen sliding scale   SubCutaneous every 6 hours  insulin NPH human recombinant 2 Unit(s) SubCutaneous every 6 hours  levalbuterol Inhalation 0.63 milliGRAM(s) Inhalation every 6 hours  lidocaine   4% Patch 1 Patch Transdermal at bedtime  metoprolol tartrate Injectable 5 milliGRAM(s) IV Push every 6 hours  naloxone Injectable 0.1 milliGRAM(s) IV Push every 3 minutes PRN  ondansetron Injectable 4 milliGRAM(s) IV Push every 6 hours PRN  oxyCODONE    Solution 7.5 milliGRAM(s) Oral every 6 hours PRN  oxyCODONE    Solution 5 milliGRAM(s) Oral every 4 hours PRN  pantoprazole  Injectable 40 milliGRAM(s) IV Push every 12 hours  sodium chloride 3%  Inhalation 4 milliLiter(s) Inhalation every 6 hours  tamsulosin 0.4 milliGRAM(s) Oral at bedtime                            10.5   11.81 )-----------( 185      ( 23 Jul 2024 02:48 )             31.0     137  |  103  |  17  ----------------------------<  186<H>  3.4<L>   |  21<L>  |  0.88    Ca    7.8<L>      23 Jul 2024 02:48  Phos  2.4     07-23  Mg     1.80     07-23      T(C): 36.7 (07-23-24 @ 08:00), Max: 37 (07-23-24 @ 04:00)  HR: 82 (07-23-24 @ 11:35) (70 - 104)  BP: 164/81 (07-23-24 @ 11:35) (128/66 - 164/81)  RR: 20 (07-23-24 @ 11:35) (13 - 23)  SpO2: 91% (07-23-24 @ 11:35) (91% - 98%)  Wt(kg): --    I&O's Summary    22 Jul 2024 07:01  -  23 Jul 2024 07:00  --------------------------------------------------------  IN: 2170 mL / OUT: 1485 mL / NET: 685 mL    23 Jul 2024 07:01  -  23 Jul 2024 11:50  --------------------------------------------------------  IN: 100 mL / OUT: 125 mL / NET: -25 mL    Gen: NAD  HEENT:  (-)icterus (-)pallor  CV: N S1 S2 1/6 CATHY (+)2 Pulses B/l  Resp:  Clear to auscultation B/L, normal effort  GI: (+) BS Soft, NT, ND  Lymph:  (-)Edema, (-)obvious lymphadenopathy  Skin: Warm to touch, Normal turgor  Psych: Appropriate mood and affect      TELEMETRY: 	 SR/ST    ECG:  	NSR      TTE 07/2024  Findings:  1. Normal left ventricular size and function.  Mild diastolic dysfunction.  2. Normal left atrial size  3. Right atrial cavity is normal in size.  4. Normal right ventricular size and function.  5. Normal trileaflet aortic valve opening.  6. Normal mitral valve opening.  7. Normal appearing tricuspid valve with mild tricuspid  regurgitation.  8. Pulmonic valve is grossly normal, yet poorly visualized  with no doppler evidence for pulmonic stenosis.  9. No evidence of significant pericardial effusion.  10. The aortic root is normal.  11. Normal pulmonary artery.  12. IVC is normal with respiratory variation.  FULL STUDY DONE INCLUDING M-MODE RECORDING,  SPECTRAL DOPPLER AND    Stress 07/2024  Normal myocardial perfusion SPECT images No evidence of stress induced ischemia or infarction.  Normal left ventricular size and function.  Calculated EF is: 68%    ASSESSMENT/PLAN: 	Pt is a 59 y.o. man with history of HTN,, DLD, DM,  GEJ adenocarcinoma (T3N0, stage 2A) s/p neoadjuvant chemotherapy admitted for optimization prior to planned [place]Bro[place] Esophagectomy on tuesday. Recently had a nuclear stress test in our office for evaluation of preoperative cardiac risk assessment prior to esophageal cancer surgery scheduled on 07/16/2024. The patient denies any chest pain, shortness ofbreath, or anginal symptoms. He has no palpitations, dizziness, or syncope    Pre-OP/HTN  - tolerated procedure well from CV perspective  - diet advanced, Losartan and Metoprolol started  - no events on tele thus far    Nkechi TATE  [phone]

## 2024-07-23 NOTE — PROGRESS NOTE ADULT - NS ATTEND AMEND GEN_ALL_CORE FT
Patient seen and examined. Agree with plan as detailed in PA/NP Note.     Tele NSR, c/w ARB for now for BP    Mart Mason MD  Pager: [phone]

## 2024-07-23 NOTE — PROGRESS NOTE ADULT - SUBJECTIVE AND OBJECTIVE BOX
CHIEF COMPLAINT: FOLLOW UP IN ICU FOR PATIENT WITH ESOPHAGEAL CANCER    ISSUES:   Esophageal cancer  Acute post operative anemia from expected blood loss  Post op pain  Chest tube in place  Uncontrolled DM2 (HgbA1c 11)  HTN  HLD  BPH  GERD      INTERVAL EVENTS:   Not tolerating clear liquid diet -- regurgitating after drinking.  Discussed with Thoracic Surgeon who is recommending continuing sips        HISTORY:   Patient denies chest pain, cough, pleuritic pain, fever or dyspnea. Patient denies nausea, vomiting, diarrhea, melena, hematochezia.  Pt denies dysphagia or food getting stuck in chest.       PHYSICAL EXAM:   Gen: Comfortable, No acute distress  Eyes: Sclera white, Conjunctiva normal, Eyelids normal, Pupils symmetrical   ENT: Mucous membranes moist,    Neck: Trachea midline,  ,  ,  ,  ,  ,    CV: Rate regular, Rhythm regular,  ,  ,    Resp: Breath sounds clear, No accessory muscles use,  ,  ,  chest drain in place  Abd: Soft, Non-distended, Non-tender,   ,  ,  ,    Skin: Warm, No peripheral edema of lower extremities,  ,    : stallworth  Neuro: Moving all 4 extremities,    Psych: A&Ox3      ASSESSMENT AND PLAN:     NEURO:  Post-operative Pain - Stable. Pain control with dilaudid IV PRN and Tylenol IV PRN.          RESPIRATORY:  Stable on room air - Incentive spirometry. Chest PT and suctioning of secretions. Out of bed to chair and ambulate with assistance. Continuous pulse oximetry for support & to prevent decompensation.        CARDIOVASCULAR:  Hemodynamically stable - Not on pressors. Continue hemodynamic monitoring.  Telemetry (medical test) - Reviewed by me today independently. Sinus tachycardia    HTN - worsened by post-op pain  - Continue hydralazine IV  - continue metoprolol IV      RENAL:  Stable - Monitor IOs and electrolytes. Keep K above 4.0 and Mg above 2.0.       BPH - stable. not on flomax. voided this admission without issues.  - Continue stallworth catheter for now  - Start flomax when able to tolerate PO      GASTROINTESTINAL:  GI prophylaxis not indicated  Zofran and Reglan IV PRN for nausea    Clear liquid diet sips        GERD - stable. Continue pantoprazole IV         HEMATOLOGIC:  Acute post-operative anemia from expected blood loss - Stable.  - Monitor CBC. Monitor chest tube output.  - Transfuse PRBC PRN      DVT prophylaxis with heparin subQ           INFECTIOUS DISEASE:  All surgical sites appear clean. No signs of active infection. Will monitor for fever and leukocytosis.             ENDOCRINE:  Uncontrolled DM2 (HgbA1c 11) – stable.  - NPH insulin  - Lispro sliding scale  - Carb control diets  - Monitor glucose fingersticks for goal 120-180. Insulin sliding scale.            ONCOLOGY:  Esophageal cancer - improved s/p resection.  - Drain to bulb suction. Monitor bulb output.  - Repeat CXR         Pertinent clinical, laboratory, radiographic, hemodynamic, echocardiographic, respiratory data, microbiologic data and chart were reviewed by myself and analyzed frequently throughout the course of the day and night by myself.    Plan discussed at length with the CTICU staff and Attending CT Surgeon -   Dr Lewis Laureano       Patient's status was discussed with patient at bedside.       	  I have spent 50 minutes of time with this patient monitoring hemodynamic status, respiratory status, and coordinating care in the ICU.    ________________________________________________      _________________________  VITAL SIGNS:  Vital Signs Last 24 Hrs  T(C): 36.7 (23 Jul 2024 08:00), Max: 37 (23 Jul 2024 04:00)  T(F): 98.1 (23 Jul 2024 08:00), Max: 98.6 (23 Jul 2024 04:00)  HR: 82 (23 Jul 2024 11:35) (70 - 104)  BP: 164/81 (23 Jul 2024 11:35) (128/66 - 164/81)  BP(mean): 105 (23 Jul 2024 11:35) (83 - 111)  RR: 20 (23 Jul 2024 11:35) (13 - 23)  SpO2: 91% (23 Jul 2024 11:35) (91% - 98%)    Parameters below as of 23 Jul 2024 12:00  Patient On (Oxygen Delivery Method): room air      I/Os:   I&O's Detail    22 Jul 2024 07:01  -  23 Jul 2024 07:00  --------------------------------------------------------  IN:    dextrose 5% + sodium chloride 0.45% w/ Additives: 490 mL    dextrose 5% + sodium chloride 0.45% w/ Additives: 450 mL    IV PiggyBack: 250 mL    Oral Fluid: 980 mL  Total IN: 2170 mL    OUT:    Bulb (mL): 245 mL    Indwelling Catheter - Urethral (mL): 1240 mL  Total OUT: 1485 mL    Total NET: 685 mL      23 Jul 2024 07:01  -  23 Jul 2024 12:34  --------------------------------------------------------  IN:    dextrose 5% + sodium chloride 0.45% w/ Additives: 100 mL  Total IN: 100 mL    OUT:    Indwelling Catheter - Urethral (mL): 125 mL  Total OUT: 125 mL    Total NET: -25 mL              MEDICATIONS:  MEDICATIONS  (STANDING):  acetaminophen   IVPB .. 1000 milliGRAM(s) IV Intermittent once  chlorhexidine 2% Cloths 1 Application(s) Topical daily  dextrose 5% + sodium chloride 0.45% with potassium chloride 40 mEq/L 1000 milliLiter(s) (50 mL/Hr) IV Continuous <Continuous>  dextrose 50% Injectable 25 Gram(s) IV Push once  dextrose 50% Injectable 12.5 Gram(s) IV Push once  dornase malik Solution 2.5 milliGRAM(s) Inhalation daily  enoxaparin Injectable 40 milliGRAM(s) SubCutaneous every 24 hours  hydrALAZINE Injectable 10 milliGRAM(s) IV Push every 6 hours  insulin lispro (ADMELOG) corrective regimen sliding scale   SubCutaneous every 6 hours  insulin NPH human recombinant 2 Unit(s) SubCutaneous every 6 hours  levalbuterol Inhalation 0.63 milliGRAM(s) Inhalation every 6 hours  lidocaine   4% Patch 1 Patch Transdermal at bedtime  metoprolol tartrate Injectable 5 milliGRAM(s) IV Push every 6 hours  pantoprazole  Injectable 40 milliGRAM(s) IV Push every 12 hours  sodium chloride 3%  Inhalation 4 milliLiter(s) Inhalation every 6 hours  tamsulosin 0.4 milliGRAM(s) Oral at bedtime    MEDICATIONS  (PRN):  naloxone Injectable 0.1 milliGRAM(s) IV Push every 3 minutes PRN For ANY of the following changes in patient status:  A. RR LESS THAN 10 breaths per minute, B. Oxygen saturation LESS THAN 90%, C. Sedation score of 6  ondansetron Injectable 4 milliGRAM(s) IV Push every 6 hours PRN Nausea  oxyCODONE    Solution 7.5 milliGRAM(s) Oral every 6 hours PRN Severe Pain (7 - 10)  oxyCODONE    Solution 5 milliGRAM(s) Oral every 4 hours PRN Moderate Pain (4 - 6)      LABS:  Laboratory data was independently reviewed by me today.                           10.5   11.81 )-----------( 185      ( 23 Jul 2024 02:48 )             31.0     07-23    137  |  103  |  17  ----------------------------<  186<H>  3.4<L>   |  21<L>  |  0.88    Ca    7.8<L>      23 Jul 2024 02:48  Phos  2.4     07-23  Mg     1.80     07-23        PT/INR - ( 23 Jul 2024 02:48 )   PT: 11.4 sec;   INR: 1.01 ratio             Urinalysis Basic - ( 23 Jul 2024 02:48 )    Color: x / Appearance: x / SG: x / pH: x  Gluc: 186 mg/dL / Ketone: x  / Bili: x / Urobili: x   Blood: x / Protein: x / Nitrite: x   Leuk Esterase: x / RBC: x / WBC x   Sq Epi: x / Non Sq Epi: x / Bacteria: x        RADIOLOGY:   Radiology images were independently reviewed by me today. Reports were reviewed by me today.    Xray Chest 1 View- PORTABLE-Routine:   ACC: [id number] EXAM:  XR CHEST PORTABLE ROUTINE 1V   ORDERED BY: LENORE YEPEZ     PROCEDURE DATE:  07/23/2024          INTERPRETATION:  CLINICAL INFORMATION: Post esophagectomy    TIME OF EXAMINATION: July 23 at 5:30 AM    EXAM: Portable chest    FINDINGS:    Right-sided chest tube again seen along with right-sided port.  Chain sutures overlie the right midlung field.  Trace right effusion but no pneumothorax.  Left lung and right upper lobe are clear.        COMPARISON: July 22        IMPRESSION: Follow-up post esophagectomy with right chest tube and small   right effusion.    --- End of Report ---            JUAREZ BURGOS MD; Attending Radiologist  This document has been electronically signed. Jul 23 2024 10:20AM (07-23-24 @ 05:39)  Xray Chest 1 View- PORTABLE-Routine:   ACC: [id number] EXAM:  XR CHEST PORTABLE ROUTINE 1V   ORDERED BY: JOSUÉ ANDRADE     ACC: [id number] EXAM:  XR CHEST PORTABLE ROUTINE 1V   ORDERED BY: CAM VARMA     PROCEDURE DATE:  07/21/2024          INTERPRETATION:  INDICATION: Status post esophagectomy    Portable chest 7/21/2024    COMPARISON: 7/20/2024    FINDINGS:  Heart/Vascular: The heart size, mediastinum, hilum and aorta are within   normal limits for projection.  Pulmonary: Midline trachea. There are small bilateral pleural effusions   and likely atelectasis at the bases. No pneumothorax.  Bones: There is no fracture.  Lines and catheter: The more superior positioned right chest tube was   removed Right lower chest tube unchanged position. NG tube unchanged in   position. Theright Port-A-Cath in position.    Portable chest 7/22/2024    FINDINGS: NG tube removed. Right chest tube and right Port-A-Cath in   place. No pneumothorax. Partial clearing at the lung bases. Heart size   and mediastinum stable.    Impression:    Onthis morning's examination:  NG tube removed.  No pneumothorax.  Partial clearing at the bases with findings still more significant on the   right    --- End of Report ---            DAMARIS ZHU DO; Attending Radiologist  This document has been electronically signed. Jul 22 2024 11:14AM (07-22-24 @ 05:55)  Xray Chest 1 View- PORTABLE-Routine:   ACC: [id number] EXAM:  XR CHEST PORTABLE ROUTINE 1V   ORDERED BY: JOSUÉ ANDRADE     ACC: [id number] EXAM:  XR CHEST PORTABLE ROUTINE 1V   ORDERED BY: CAM VARMA     PROCEDURE DATE:  07/21/2024          INTERPRETATION:  INDICATION: Status post esophagectomy    Portable chest 7/21/2024    COMPARISON: 7/20/2024    FINDINGS:  Heart/Vascular: The heart size, mediastinum, hilum and aorta are within   normal limits for projection.  Pulmonary: Midline trachea. There are small bilateral pleural effusions   and likely atelectasis at the bases. No pneumothorax.  Bones: There is no fracture.  Lines and catheter: The more superior positioned right chest tube was   removed Right lower chest tube unchanged position. NG tube unchanged in   position. Theright Port-A-Cath in position.    Portable chest 7/22/2024    FINDINGS: NG tube removed. Right chest tube and right Port-A-Cath in   place. No pneumothorax. Partial clearing at the lung bases. Heart size   and mediastinum stable.    Impression:    Onthis morning's examination:  NG tube removed.  No pneumothorax.  Partial clearing at the bases with findings still more significant on the   right    --- End of Report ---            DAMARIS ZHU DO; Attending Radiologist  This document has been electronically signed. Jul 22 2024 11:14AM (07-21-24 @ 06:35)

## 2024-07-24 LAB
ANION GAP SERPL CALC-SCNC: 12 MMOL/L — SIGNIFICANT CHANGE UP (ref 7–14)
BUN SERPL-MCNC: 16 MG/DL — SIGNIFICANT CHANGE UP (ref 7–23)
CALCIUM SERPL-MCNC: 7.6 MG/DL — LOW (ref 8.4–10.5)
CHLORIDE SERPL-SCNC: 100 MMOL/L — SIGNIFICANT CHANGE UP (ref 98–107)
CO2 SERPL-SCNC: 21 MMOL/L — LOW (ref 22–31)
CREAT SERPL-MCNC: 0.97 MG/DL — SIGNIFICANT CHANGE UP (ref 0.5–1.3)
EGFR: 89 ML/MIN/1.73M2 — SIGNIFICANT CHANGE UP
GLUCOSE BLDC GLUCOMTR-MCNC: 129 MG/DL — HIGH (ref 70–99)
GLUCOSE BLDC GLUCOMTR-MCNC: 197 MG/DL — HIGH (ref 70–99)
GLUCOSE BLDC GLUCOMTR-MCNC: 210 MG/DL — HIGH (ref 70–99)
GLUCOSE BLDC GLUCOMTR-MCNC: 210 MG/DL — HIGH (ref 70–99)
GLUCOSE BLDC GLUCOMTR-MCNC: 244 MG/DL — HIGH (ref 70–99)
GLUCOSE BLDC GLUCOMTR-MCNC: 253 MG/DL — HIGH (ref 70–99)
GLUCOSE SERPL-MCNC: 221 MG/DL — HIGH (ref 70–99)
HCT VFR BLD CALC: 30.7 % — LOW (ref 39–50)
HGB BLD-MCNC: 10.1 G/DL — LOW (ref 13–17)
MAGNESIUM SERPL-MCNC: 2.2 MG/DL — SIGNIFICANT CHANGE UP (ref 1.6–2.6)
MCHC RBC-ENTMCNC: 29.7 PG — SIGNIFICANT CHANGE UP (ref 27–34)
MCHC RBC-ENTMCNC: 32.9 GM/DL — SIGNIFICANT CHANGE UP (ref 32–36)
MCV RBC AUTO: 90.3 FL — SIGNIFICANT CHANGE UP (ref 80–100)
NRBC # BLD: 0 /100 WBCS — SIGNIFICANT CHANGE UP (ref 0–0)
NRBC # FLD: 0 K/UL — SIGNIFICANT CHANGE UP (ref 0–0)
PHOSPHATE SERPL-MCNC: 2.3 MG/DL — LOW (ref 2.5–4.5)
PLATELET # BLD AUTO: 191 K/UL — SIGNIFICANT CHANGE UP (ref 150–400)
POTASSIUM SERPL-MCNC: 3.7 MMOL/L — SIGNIFICANT CHANGE UP (ref 3.5–5.3)
POTASSIUM SERPL-SCNC: 3.7 MMOL/L — SIGNIFICANT CHANGE UP (ref 3.5–5.3)
RBC # BLD: 3.4 M/UL — LOW (ref 4.2–5.8)
RBC # FLD: 13.2 % — SIGNIFICANT CHANGE UP (ref 10.3–14.5)
SODIUM SERPL-SCNC: 133 MMOL/L — LOW (ref 135–145)
WBC # BLD: 13.81 K/UL — HIGH (ref 3.8–10.5)
WBC # FLD AUTO: 13.81 K/UL — HIGH (ref 3.8–10.5)

## 2024-07-24 PROCEDURE — 99233 SBSQ HOSP IP/OBS HIGH 50: CPT

## 2024-07-24 PROCEDURE — 71045 X-RAY EXAM CHEST 1 VIEW: CPT | Mod: 26

## 2024-07-24 RX ORDER — DEXTROSE MONOHYDRATE, SODIUM CHLORIDE, SODIUM LACTATE, CALCIUM CHLORIDE, MAGNESIUM CHLORIDE 1.5; 538; 448; 18.4; 5.08 G/100ML; MG/100ML; MG/100ML; MG/100ML; MG/100ML
500 SOLUTION INTRAPERITONEAL ONCE
Refills: 0 | Status: COMPLETED | OUTPATIENT
Start: 2024-07-24 | End: 2024-07-24

## 2024-07-24 RX ORDER — GLUCAGON INJECTION, SOLUTION 0.5 MG/.1ML
1 INJECTION, SOLUTION SUBCUTANEOUS ONCE
Refills: 0 | Status: DISCONTINUED | OUTPATIENT
Start: 2024-07-24 | End: 2024-07-25

## 2024-07-24 RX ORDER — DEXTROSE 4 G
15 TABLET,CHEWABLE ORAL ONCE
Refills: 0 | Status: DISCONTINUED | OUTPATIENT
Start: 2024-07-24 | End: 2024-07-25

## 2024-07-24 RX ORDER — POTASSIUM CHLORIDE 1500 MG/1
10 TABLET, EXTENDED RELEASE ORAL
Refills: 0 | Status: DISCONTINUED | OUTPATIENT
Start: 2024-07-24 | End: 2024-07-25

## 2024-07-24 RX ORDER — INSULIN LISPRO 100/ML
2 VIAL (ML) SUBCUTANEOUS
Refills: 0 | Status: DISCONTINUED | OUTPATIENT
Start: 2024-07-24 | End: 2024-07-25

## 2024-07-24 RX ORDER — ALBUMIN HUMAN 25 %
250 INTRAVENOUS SOLUTION INTRAVENOUS ONCE
Refills: 0 | Status: COMPLETED | OUTPATIENT
Start: 2024-07-24 | End: 2024-07-24

## 2024-07-24 RX ORDER — DEXTROSE MONOHYDRATE, SODIUM CHLORIDE, SODIUM LACTATE, CALCIUM CHLORIDE, MAGNESIUM CHLORIDE 1.5; 538; 448; 18.4; 5.08 G/100ML; MG/100ML; MG/100ML; MG/100ML; MG/100ML
1000 SOLUTION INTRAPERITONEAL
Refills: 0 | Status: DISCONTINUED | OUTPATIENT
Start: 2024-07-24 | End: 2024-07-25

## 2024-07-24 RX ORDER — INSULIN GLARGINE-YFGN 100 [IU]/ML
5 INJECTION, SOLUTION SUBCUTANEOUS AT BEDTIME
Refills: 0 | Status: DISCONTINUED | OUTPATIENT
Start: 2024-07-24 | End: 2024-08-01

## 2024-07-24 RX ORDER — INSULIN LISPRO 100/ML
VIAL (ML) SUBCUTANEOUS
Refills: 0 | Status: DISCONTINUED | OUTPATIENT
Start: 2024-07-24 | End: 2024-07-25

## 2024-07-24 RX ORDER — SODIUM PHOSPHATE, MONOBASIC, MONOHYDRATE 276; 142 MG/ML; MG/ML
15 INJECTION, SOLUTION INTRAVENOUS ONCE
Refills: 0 | Status: COMPLETED | OUTPATIENT
Start: 2024-07-24 | End: 2024-07-24

## 2024-07-24 RX ADMIN — Medication 2 UNIT(S): at 12:41

## 2024-07-24 RX ADMIN — Medication 2 UNIT(S): at 18:46

## 2024-07-24 RX ADMIN — HYDRALAZINE HYDROCHLORIDE 10 MILLIGRAM(S): 100 TABLET ORAL at 00:09

## 2024-07-24 RX ADMIN — DEXTROSE MONOHYDRATE, SODIUM CHLORIDE, SODIUM LACTATE, CALCIUM CHLORIDE, MAGNESIUM CHLORIDE 1000 MILLILITER(S): 1.5; 538; 448; 18.4; 5.08 SOLUTION INTRAPERITONEAL at 23:14

## 2024-07-24 RX ADMIN — Medication 10 MILLIGRAM(S): at 18:56

## 2024-07-24 RX ADMIN — Medication 1: at 00:02

## 2024-07-24 RX ADMIN — Medication 10 MILLIGRAM(S): at 12:39

## 2024-07-24 RX ADMIN — HYDRALAZINE HYDROCHLORIDE 10 MILLIGRAM(S): 100 TABLET ORAL at 12:39

## 2024-07-24 RX ADMIN — Medication 1: at 18:49

## 2024-07-24 RX ADMIN — HYDRALAZINE HYDROCHLORIDE 10 MILLIGRAM(S): 100 TABLET ORAL at 05:43

## 2024-07-24 RX ADMIN — Medication 3: at 12:36

## 2024-07-24 RX ADMIN — ENOXAPARIN SODIUM 40 MILLIGRAM(S): 120 INJECTION SUBCUTANEOUS at 12:39

## 2024-07-24 RX ADMIN — POTASSIUM CHLORIDE 100 MILLIEQUIVALENT(S): 1500 TABLET, EXTENDED RELEASE ORAL at 06:34

## 2024-07-24 RX ADMIN — HYDRALAZINE HYDROCHLORIDE 10 MILLIGRAM(S): 100 TABLET ORAL at 18:45

## 2024-07-24 RX ADMIN — Medication 5 MILLIGRAM(S): at 02:17

## 2024-07-24 RX ADMIN — SODIUM CHLORIDE AND POTASSIUM CHLORIDE 75 MILLILITER(S): 150; 450 INJECTION, SOLUTION INTRAVENOUS at 10:51

## 2024-07-24 RX ADMIN — POTASSIUM CHLORIDE 100 MILLIEQUIVALENT(S): 1500 TABLET, EXTENDED RELEASE ORAL at 08:07

## 2024-07-24 RX ADMIN — SODIUM CHLORIDE AND POTASSIUM CHLORIDE 75 MILLILITER(S): 150; 450 INJECTION, SOLUTION INTRAVENOUS at 08:07

## 2024-07-24 RX ADMIN — HYDRALAZINE HYDROCHLORIDE 10 MILLIGRAM(S): 100 TABLET ORAL at 23:51

## 2024-07-24 RX ADMIN — Medication 4 MILLIGRAM(S): at 07:17

## 2024-07-24 RX ADMIN — INSULIN GLARGINE-YFGN 5 UNIT(S): 100 INJECTION, SOLUTION SUBCUTANEOUS at 22:36

## 2024-07-24 RX ADMIN — Medication 10 MILLIGRAM(S): at 23:52

## 2024-07-24 RX ADMIN — Medication 4 MILLIGRAM(S): at 21:03

## 2024-07-24 RX ADMIN — Medication 5 MILLIGRAM(S): at 07:17

## 2024-07-24 RX ADMIN — PANTOPRAZOLE SODIUM 40 MILLIGRAM(S): 20 TABLET, DELAYED RELEASE ORAL at 18:45

## 2024-07-24 RX ADMIN — Medication 10 MILLIGRAM(S): at 05:43

## 2024-07-24 RX ADMIN — DEXTROSE MONOHYDRATE, SODIUM CHLORIDE, SODIUM LACTATE, CALCIUM CHLORIDE, MAGNESIUM CHLORIDE 1000 MILLILITER(S): 1.5; 538; 448; 18.4; 5.08 SOLUTION INTRAPERITONEAL at 21:31

## 2024-07-24 RX ADMIN — Medication 250 MILLILITER(S): at 09:04

## 2024-07-24 RX ADMIN — Medication 5 MILLIGRAM(S): at 20:55

## 2024-07-24 RX ADMIN — SODIUM PHOSPHATE, MONOBASIC, MONOHYDRATE 63.75 MILLIMOLE(S): 276; 142 INJECTION, SOLUTION INTRAVENOUS at 04:35

## 2024-07-24 RX ADMIN — Medication 5 MILLIGRAM(S): at 14:50

## 2024-07-24 RX ADMIN — Medication 2: at 05:58

## 2024-07-24 RX ADMIN — CHLORHEXIDINE GLUCONATE 1 APPLICATION(S): 500 CLOTH TOPICAL at 12:46

## 2024-07-24 RX ADMIN — Medication 4 MILLIGRAM(S): at 02:13

## 2024-07-24 RX ADMIN — Medication 2 UNIT(S): at 00:04

## 2024-07-24 RX ADMIN — PANTOPRAZOLE SODIUM 40 MILLIGRAM(S): 20 TABLET, DELAYED RELEASE ORAL at 05:42

## 2024-07-24 RX ADMIN — Medication 2 UNIT(S): at 05:58

## 2024-07-24 NOTE — PROGRESS NOTE ADULT - ASSESSMENT
· Assessment	  59 y.o. man with history of GEJ adenocarcinoma (T3N0, stage 2A) s/p neoadjuvant chemotherapy, now s/p [facility]-Broderick Esophagectomy on 7/16.     Thoracic Sx f/up appreciated  PT f/up  Pain control Oxycodone  Clear liquid diet    DM II:    FSSS  NPH insulin  Endo f/up appreciated    HTN:    On IV Metoprolol/Hydralazine  Cardio f/up appreciated

## 2024-07-24 NOTE — PROGRESS NOTE ADULT - ASSESSMENT
59 y.o. man with history of GEJ adenocarcinoma (T3N0, stage 2A) s/p neoadjuvant chemotherapy, now s/p [facility]-Broderick Esophagectomy on 7/16.     Plan:  - Diet: CLD  - passed UGI, now with PO intolerance --> per thoracic team, continue with small/slow PO intake to allow for pyloric relaxing. If no improvement, consider repeat swallow study  - Maintenance IVF  - Activity: OOB as tolerated  - ISS  - Pain control prn  - DVT ppx: SCD's & Lovenox  - nigel AYALA @ 16:00    Surgery D Team  [phone]

## 2024-07-24 NOTE — PROGRESS NOTE ADULT - SUBJECTIVE AND OBJECTIVE BOX
DATE OF SERVICE: 07-24-24    Patient denies chest pain or shortness of breath.   Review of symptoms otherwise negative.    Date of service mm/dd/year    chief complaint:    extended hpi:     Review of Systems:   Constitutional: [ ] fevers, [ ] chills.   Skin: [ ] dry skin. [ ] rashes.  Psychiatric: [ ] depression, [ ] anxiety.   Gastrointestinal: [ ] BRBPR, [ ] melena.   Neurological: [ ] confusion. [ ] seizures. [ ] shuffling gait.   Ears,Nose,Mouth and Throat: [ ] ear pain [ ] sore throat.   Eyes: [ ] diplopia.   Respiratory: [ ] hemoptysis. [ ] shortness of breath  Cardiovascular: See HPI above  Hematologic/Lymphatic: [ ] anemia. [ ] painful nodes. [ ] prolonged bleeding.   Genitourinary: [ ] hematuria. [ ] flank pain.   Endocrine: [ ] significant change in weight. [ ] intolerance to heat and cold.     Review of systems [ ] otherwise negative, [ ] otherwise unable to obtain    FH: no family history of sudden cardiac death in first degree relatives    SH: [ ] tobacco, [ ] alcohol, [ ] drugs    acetaminophen   IVPB .. 1000 milliGRAM(s) IV Intermittent once  chlorhexidine 2% Cloths 1 Application(s) Topical daily  dextrose 5% + sodium chloride 0.45% with potassium chloride 40 mEq/L 1000 milliLiter(s) IV Continuous <Continuous>  dextrose 50% Injectable 25 Gram(s) IV Push once  dextrose 50% Injectable 12.5 Gram(s) IV Push once  dornase malik Solution 2.5 milliGRAM(s) Inhalation daily  enoxaparin Injectable 40 milliGRAM(s) SubCutaneous every 24 hours  hydrALAZINE Injectable 10 milliGRAM(s) IV Push every 6 hours  insulin lispro (ADMELOG) corrective regimen sliding scale   SubCutaneous every 6 hours  insulin NPH human recombinant 2 Unit(s) SubCutaneous every 6 hours  levalbuterol Inhalation 0.63 milliGRAM(s) Inhalation every 6 hours  lidocaine   4% Patch 1 Patch Transdermal at bedtime  metoclopramide Injectable 10 milliGRAM(s) IV Push every 6 hours  metoprolol tartrate Injectable 5 milliGRAM(s) IV Push every 6 hours  naloxone Injectable 0.1 milliGRAM(s) IV Push every 3 minutes PRN  ondansetron Injectable 4 milliGRAM(s) IV Push every 6 hours  oxyCODONE    Solution 5 milliGRAM(s) Oral every 4 hours PRN  oxyCODONE    Solution 7.5 milliGRAM(s) Oral every 6 hours PRN  pantoprazole  Injectable 40 milliGRAM(s) IV Push every 12 hours  potassium chloride  10 mEq/100 mL IVPB 10 milliEquivalent(s) IV Intermittent every 1 hour  sodium chloride 3%  Inhalation 4 milliLiter(s) Inhalation every 6 hours                            10.1   13.81 )-----------( 191      ( 24 Jul 2024 02:58 )             30.7       07-24    133<L>  |  100  |  16  ----------------------------<  221<H>  3.7   |  21<L>  |  0.97    Ca    7.6<L>      24 Jul 2024 02:58  Phos  2.3     07-24  Mg     2.20     07-24              T(C): 36.5 (07-24-24 @ 12:32), Max: 37.2 (07-23-24 @ 20:00)  HR: 99 (07-24-24 @ 12:32) (82 - 103)  BP: 157/88 (07-24-24 @ 12:32) (123/73 - 157/88)  RR: 18 (07-24-24 @ 12:32) (0 - 25)  SpO2: 94% (07-24-24 @ 12:32) (92% - 96%)  Wt(kg): --    I&O's Summary    23 Jul 2024 07:01  -  24 Jul 2024 07:00  --------------------------------------------------------  IN: 1230 mL / OUT: 1310 mL / NET: -80 mL    24 Jul 2024 07:01  -  24 Jul 2024 14:12  --------------------------------------------------------  IN: 500 mL / OUT: 95 mL / NET: 405 mL      Gen: NAD  HEENT:  (-)icterus (-)pallor  CV: N S1 S2 1/6 CATHY (+)2 Pulses B/l  Resp:  Clear to auscultation B/L, normal effort  GI: (+) BS Soft, NT, ND  Lymph:  (-)Edema, (-)obvious lymphadenopathy  Skin: Warm to touch, Normal turgor  Psych: Appropriate mood and affect      TELEMETRY: 	 SR/ST    ECG:  	NSR      TTE 07/2024  Findings:  1. Normal left ventricular size and function.  Mild diastolic dysfunction.  2. Normal left atrial size  3. Right atrial cavity is normal in size.  4. Normal right ventricular size and function.  5. Normal trileaflet aortic valve opening.  6. Normal mitral valve opening.  7. Normal appearing tricuspid valve with mild tricuspid  regurgitation.  8. Pulmonic valve is grossly normal, yet poorly visualized  with no doppler evidence for pulmonic stenosis.  9. No evidence of significant pericardial effusion.  10. The aortic root is normal.  11. Normal pulmonary artery.  12. IVC is normal with respiratory variation.  FULL STUDY DONE INCLUDING M-MODE RECORDING,  SPECTRAL DOPPLER AND    Stress 07/2024  Normal myocardial perfusion SPECT images No evidence of stress induced ischemia or infarction.  Normal left ventricular size and function.  Calculated EF is: 68%    ASSESSMENT/PLAN: 	Pt is a 59 y.o. man with history of HTN,, DLD, DM,  GEJ adenocarcinoma (T3N0, stage 2A) s/p neoadjuvant chemotherapy admitted for optimization prior to planned [place]-Bro[facility] Esophagectomy on tuesday. Recently had a nuclear stress test in our office for evaluation of preoperative cardiac risk assessment prior to esophageal cancer surgery scheduled on 07/16/2024. The patient denies any chest pain, shortness ofbreath, or anginal symptoms. He has no palpitations, dizziness, or syncope    Pre-OP/HTN  - tolerated procedure well from CV perspective  - diet advanced, Losartan and Metoprolol started but now changed back to IV meds as he reports fluids not staying down  - no events on tele thus far    Nkechi TATE  [phone]              DATE OF SERVICE: 07-24-24    Patient denies chest pain or shortness of breath.   Review of symptoms otherwise negative.      Review of Systems:   Constitutional: [ ] fevers, [ ] chills.   Skin: [ ] dry skin. [ ] rashes.  Psychiatric: [ ] depression, [ ] anxiety.   Gastrointestinal: [ ] BRBPR, [ ] melena.   Neurological: [ ] confusion. [ ] seizures. [ ] shuffling gait.   Ears,Nose,Mouth and Throat: [ ] ear pain [ ] sore throat.   Eyes: [ ] diplopia.   Respiratory: [ ] hemoptysis. [ ] shortness of breath  Cardiovascular: See HPI above  Hematologic/Lymphatic: [ ] anemia. [ ] painful nodes. [ ] prolonged bleeding.   Genitourinary: [ ] hematuria. [ ] flank pain.   Endocrine: [ ] significant change in weight. [ ] intolerance to heat and cold.     Review of systems [ ] otherwise negative, [ ] otherwise unable to obtain    FH: no family history of sudden cardiac death in first degree relatives    SH: [ ] tobacco, [ ] alcohol, [ ] drugs    acetaminophen   IVPB .. 1000 milliGRAM(s) IV Intermittent once  chlorhexidine 2% Cloths 1 Application(s) Topical daily  dextrose 5% + sodium chloride 0.45% with potassium chloride 40 mEq/L 1000 milliLiter(s) IV Continuous <Continuous>  dextrose 50% Injectable 25 Gram(s) IV Push once  dextrose 50% Injectable 12.5 Gram(s) IV Push once  dornase malik Solution 2.5 milliGRAM(s) Inhalation daily  enoxaparin Injectable 40 milliGRAM(s) SubCutaneous every 24 hours  hydrALAZINE Injectable 10 milliGRAM(s) IV Push every 6 hours  insulin lispro (ADMELOG) corrective regimen sliding scale   SubCutaneous every 6 hours  insulin NPH human recombinant 2 Unit(s) SubCutaneous every 6 hours  levalbuterol Inhalation 0.63 milliGRAM(s) Inhalation every 6 hours  lidocaine   4% Patch 1 Patch Transdermal at bedtime  metoclopramide Injectable 10 milliGRAM(s) IV Push every 6 hours  metoprolol tartrate Injectable 5 milliGRAM(s) IV Push every 6 hours  naloxone Injectable 0.1 milliGRAM(s) IV Push every 3 minutes PRN  ondansetron Injectable 4 milliGRAM(s) IV Push every 6 hours  oxyCODONE    Solution 5 milliGRAM(s) Oral every 4 hours PRN  oxyCODONE    Solution 7.5 milliGRAM(s) Oral every 6 hours PRN  pantoprazole  Injectable 40 milliGRAM(s) IV Push every 12 hours  potassium chloride  10 mEq/100 mL IVPB 10 milliEquivalent(s) IV Intermittent every 1 hour  sodium chloride 3%  Inhalation 4 milliLiter(s) Inhalation every 6 hours                            10.1   13.81 )-----------( 191      ( 24 Jul 2024 02:58 )             30.7       07-24    133<L>  |  100  |  16  ----------------------------<  221<H>  3.7   |  21<L>  |  0.97    Ca    7.6<L>      24 Jul 2024 02:58  Phos  2.3     07-24  Mg     2.20     07-24              T(C): 36.5 (07-24-24 @ 12:32), Max: 37.2 (07-23-24 @ 20:00)  HR: 99 (07-24-24 @ 12:32) (82 - 103)  BP: 157/88 (07-24-24 @ 12:32) (123/73 - 157/88)  RR: 18 (07-24-24 @ 12:32) (0 - 25)  SpO2: 94% (07-24-24 @ 12:32) (92% - 96%)  Wt(kg): --    I&O's Summary    23 Jul 2024 07:01  -  24 Jul 2024 07:00  --------------------------------------------------------  IN: 1230 mL / OUT: 1310 mL / NET: -80 mL    24 Jul 2024 07:01  -  24 Jul 2024 14:12  --------------------------------------------------------  IN: 500 mL / OUT: 95 mL / NET: 405 mL      Gen: NAD  HEENT:  (-)icterus (-)pallor  CV: N S1 S2 1/6 CATHY (+)2 Pulses B/l  Resp:  Clear to auscultation B/L, normal effort  GI: (+) BS Soft, NT, ND  Lymph:  (-)Edema, (-)obvious lymphadenopathy  Skin: Warm to touch, Normal turgor  Psych: Appropriate mood and affect      TELEMETRY: 	 SR/ST    ECG:  	NSR      TTE 07/2024  Findings:  1. Normal left ventricular size and function.  Mild diastolic dysfunction.  2. Normal left atrial size  3. Right atrial cavity is normal in size.  4. Normal right ventricular size and function.  5. Normal trileaflet aortic valve opening.  6. Normal mitral valve opening.  7. Normal appearing tricuspid valve with mild tricuspid  regurgitation.  8. Pulmonic valve is grossly normal, yet poorly visualized  with no doppler evidence for pulmonic stenosis.  9. No evidence of significant pericardial effusion.  10. The aortic root is normal.  11. Normal pulmonary artery.  12. IVC is normal with respiratory variation.  FULL STUDY DONE INCLUDING M-MODE RECORDING,  SPECTRAL DOPPLER AND    Stress 07/2024  Normal myocardial perfusion SPECT images No evidence of stress induced ischemia or infarction.  Normal left ventricular size and function.  Calculated EF is: 68%    ASSESSMENT/PLAN: 	Pt is a 59 y.o. man with history of HTN,, DLD, DM,  GEJ adenocarcinoma (T3N0, stage 2A) s/p neoadjuvant chemotherapy admitted for optimization prior to planned [place]-[facility] Esophagectomy on tuesday. Recently had a nuclear stress test in our office for evaluation of preoperative cardiac risk assessment prior to esophageal cancer surgery scheduled on 07/16/2024. The patient denies any chest pain, shortness ofbreath, or anginal symptoms. He has no palpitations, dizziness, or syncope    Pre-OP/HTN  - tolerated procedure well from CV perspective  - diet advanced, Losartan and Metoprolol started but now changed back to IV meds as he reports fluids not staying down  - no events on tele thus far    Nkechi TATE  [phone]

## 2024-07-24 NOTE — PROGRESS NOTE ADULT - SUBJECTIVE AND OBJECTIVE BOX
TEAM [ D ] Surgery Daily Progress Note  =====================================================    SUBJECTIVE: Patient seen and examined at bedside on AM rounds. Patient reports spitting up oral intake contents within a few minutes after swallowing. Drinking fluids but not keeping them down. OOB/Amublating as tolerated. Denies fever, chills.    ALLERGIES:  No Known Allergies    --------------------------------------------------------------------------------------    MEDICATIONS:    Neurologic Medications  acetaminophen   IVPB .. 1000 milliGRAM(s) IV Intermittent once  metoclopramide Injectable 10 milliGRAM(s) IV Push every 6 hours  ondansetron Injectable 4 milliGRAM(s) IV Push every 6 hours  oxyCODONE    Solution 7.5 milliGRAM(s) Oral every 6 hours PRN Severe Pain (7 - 10)  oxyCODONE    Solution 5 milliGRAM(s) Oral every 4 hours PRN Moderate Pain (4 - 6)    Respiratory Medications  dornase malik Solution 2.5 milliGRAM(s) Inhalation daily  levalbuterol Inhalation 0.63 milliGRAM(s) Inhalation every 6 hours  sodium chloride 3%  Inhalation 4 milliLiter(s) Inhalation every 6 hours    Cardiovascular Medications  hydrALAZINE Injectable 10 milliGRAM(s) IV Push every 6 hours  metoprolol tartrate Injectable 5 milliGRAM(s) IV Push every 6 hours    Gastrointestinal Medications  dextrose 5% + sodium chloride 0.45% with potassium chloride 40 mEq/L 1000 milliLiter(s) IV Continuous <Continuous>  pantoprazole  Injectable 40 milliGRAM(s) IV Push every 12 hours  potassium chloride  10 mEq/100 mL IVPB 10 milliEquivalent(s) IV Intermittent every 1 hour    Genitourinary Medications    Hematologic/Oncologic Medications  enoxaparin Injectable 40 milliGRAM(s) SubCutaneous every 24 hours    Antimicrobial/Immunologic Medications    Endocrine/Metabolic Medications  dextrose 50% Injectable 25 Gram(s) IV Push once  dextrose 50% Injectable 12.5 Gram(s) IV Push once  insulin lispro (ADMELOG) corrective regimen sliding scale   SubCutaneous every 6 hours  insulin NPH human recombinant 2 Unit(s) SubCutaneous every 6 hours    Topical/Other Medications  chlorhexidine 2% Cloths 1 Application(s) Topical daily  lidocaine   4% Patch 1 Patch Transdermal at bedtime  naloxone Injectable 0.1 milliGRAM(s) IV Push every 3 minutes PRN For ANY of the following changes in patient status:  A. RR LESS THAN 10 breaths per minute, B. Oxygen saturation LESS THAN 90%, C. Sedation score of 6    --------------------------------------------------------------------------------------    VITAL SIGNS:  T(C): 36.3 (07-24-24 @ 10:30), Max: 37.2 (07-23-24 @ 20:00)  HR: 100 (07-24-24 @ 10:30) (82 - 103)  BP: 144/71 (07-24-24 @ 10:30) (123/73 - 164/81)  RR: 16 (07-24-24 @ 10:30) (0 - 25)  SpO2: 95% (07-24-24 @ 10:30) (91% - 96%)  --------------------------------------------------------------------------------------    EXAM    General: NAD, resting in bed comfortably.  Cardiac: regular rate, warm and well perfused  Respiratory: Nonlabored respirations, normal cw expansion.  Abdomen: soft, nontender, nondistended. Drain in place - ss.  Extremities: normal strength, FROM, no deformities    --------------------------------------------------------------------------------------    LABS                        10.1   13.81 )-----------( 191      ( 24 Jul 2024 02:58 )             30.7   07-24    133<L>  |  100  |  16  ----------------------------<  221<H>  3.7   |  21<L>  |  0.97    Ca    7.6<L>      24 Jul 2024 02:58  Phos  2.3     07-24  Mg     2.20     07-24      --------------------------------------------------------------------------------------    INS AND OUTS:    07-23-24 @ 07:01  -  07-24-24 @ 07:00  --------------------------------------------------------  IN: 1230 mL / OUT: 1310 mL / NET: -80 mL    07-24-24 @ 07:01  -  07-24-24 @ 10:53  --------------------------------------------------------  IN: 500 mL / OUT: 95 mL / NET: 405 mL      --------------------------------------------------------------------------------------

## 2024-07-24 NOTE — PROGRESS NOTE ADULT - SUBJECTIVE AND OBJECTIVE BOX
Subjective:  seen and examined with family at bedside  they c/o pt is not feeling well, asked RN to check FS stat.   no hypoglycemic episodes in the last 24 hours.   ++ nausea, no vomiting  barely eating clear liquid diet     MEDICATIONS  (STANDING):  acetaminophen   IVPB .. 1000 milliGRAM(s) IV Intermittent once  chlorhexidine 2% Cloths 1 Application(s) Topical daily  dextrose 5% + sodium chloride 0.45% with potassium chloride 40 mEq/L 1000 milliLiter(s) (75 mL/Hr) IV Continuous <Continuous>  dextrose 50% Injectable 25 Gram(s) IV Push once  dextrose 50% Injectable 12.5 Gram(s) IV Push once  dornase malik Solution 2.5 milliGRAM(s) Inhalation daily  enoxaparin Injectable 40 milliGRAM(s) SubCutaneous every 24 hours  hydrALAZINE Injectable 10 milliGRAM(s) IV Push every 6 hours  insulin lispro (ADMELOG) corrective regimen sliding scale   SubCutaneous every 6 hours  insulin NPH human recombinant 2 Unit(s) SubCutaneous every 6 hours  levalbuterol Inhalation 0.63 milliGRAM(s) Inhalation every 6 hours  lidocaine   4% Patch 1 Patch Transdermal at bedtime  metoclopramide Injectable 10 milliGRAM(s) IV Push every 6 hours  metoprolol tartrate Injectable 5 milliGRAM(s) IV Push every 6 hours  ondansetron Injectable 4 milliGRAM(s) IV Push every 6 hours  pantoprazole  Injectable 40 milliGRAM(s) IV Push every 12 hours  potassium chloride  10 mEq/100 mL IVPB 10 milliEquivalent(s) IV Intermittent every 1 hour  sodium chloride 3%  Inhalation 4 milliLiter(s) Inhalation every 6 hours    MEDICATIONS  (PRN):  naloxone Injectable 0.1 milliGRAM(s) IV Push every 3 minutes PRN For ANY of the following changes in patient status:  A. RR LESS THAN 10 breaths per minute, B. Oxygen saturation LESS THAN 90%, C. Sedation score of 6  oxyCODONE    Solution 7.5 milliGRAM(s) Oral every 6 hours PRN Severe Pain (7 - 10)  oxyCODONE    Solution 5 milliGRAM(s) Oral every 4 hours PRN Moderate Pain (4 - 6)      PHYSICAL EXAM:  VITALS: T(C): 36.5 (07-24-24 @ 12:32)  T(F): 97.7 (07-24-24 @ 12:32), Max: 98.9 (07-23-24 @ 20:00)  HR: 99 (07-24-24 @ 12:32) (82 - 103)  BP: 157/88 (07-24-24 @ 12:32) (123/73 - 157/88)  RR:  (0 - 25)  SpO2:  (92% - 96%)  Wt(kg): --  GENERAL: +distressed  EYES: No proptosis, no injection  HEENT:  Atraumatic, Normocephalic, moist mucous membranes  THYROID: Normal size, no palpable nodules  CUSHING'S SIGNS: no striae    POCT Blood Glucose.: 253 mg/dL (07-24-24 @ 12:15)  POCT Blood Glucose.: 244 mg/dL (07-24-24 @ 05:55)  POCT Blood Glucose.: 162 mg/dL (07-23-24 @ 23:09)  POCT Blood Glucose.: 171 mg/dL (07-23-24 @ 17:39)  POCT Blood Glucose.: 209 mg/dL (07-23-24 @ 12:58)  POCT Blood Glucose.: 210 mg/dL (07-23-24 @ 11:18)  POCT Blood Glucose.: 187 mg/dL (07-23-24 @ 05:21)  POCT Blood Glucose.: 228 mg/dL (07-22-24 @ 23:17)  POCT Blood Glucose.: 147 mg/dL (07-22-24 @ 17:06)  POCT Blood Glucose.: 219 mg/dL (07-22-24 @ 11:45)  POCT Blood Glucose.: 140 mg/dL (07-22-24 @ 05:13)  POCT Blood Glucose.: 132 mg/dL (07-21-24 @ 23:26)  POCT Blood Glucose.: 123 mg/dL (07-21-24 @ 21:05)  POCT Blood Glucose.: 174 mg/dL (07-21-24 @ 17:08)    07-24    133<L>  |  100  |  16  ----------------------------<  221<H>  3.7   |  21<L>  |  0.97    eGFR: 89    Ca    7.6<L>      07-24  Mg     2.20     07-24  Phos  2.3     07-24        Thyroid Function Tests:

## 2024-07-24 NOTE — PROGRESS NOTE ADULT - NS ATTEND AMEND GEN_ALL_CORE FT
Patient seen and examined. Agree with plan as detailed in PA/NP Note.     follow BP if consistently above 180 can give IV hydralazine    Mart Mason MD  Pager: [phone]

## 2024-07-24 NOTE — PROGRESS NOTE ADULT - SUBJECTIVE AND OBJECTIVE BOX
CHIEF COMPLAINT: FOLLOW UP IN ICU FOR PATIENT WITH ESOPHAGEAL CANCER    ISSUES:   Esophageal cancer  Acute post operative anemia from expected blood loss  Post op pain  Chest tube in place  Uncontrolled DM2 (HgbA1c 11)  HTN  HLD  BPH  GERD      INTERVAL EVENTS:   Still not tolerating clear liquid diet -- regurgitating after drinking.  Discussed with Thoracic Surgeon who is recommending continuing sips        HISTORY:   Patient reports mild pain at surgical incision sites which is worse with coughing and deep breathing without associated fever or dyspnea. Pain is improved with use of pain meds.       PHYSICAL EXAM:   Gen: Comfortable, No acute distress  Eyes: Sclera white, Conjunctiva normal, Eyelids normal, Pupils symmetrical   ENT: Mucous membranes moist,    Neck: Trachea midline,  ,  ,  ,  ,  ,    CV: Rate regular, Rhythm regular,  ,  ,    Resp: Breath sounds clear, No accessory muscles use,  ,  ,  chest drain in place  Abd: Soft, Non-distended, Non-tender,   ,  ,  ,    Skin: Warm, No peripheral edema of lower extremities,  ,    : stallworth  Neuro: Moving all 4 extremities,    Psych: A&Ox3      ASSESSMENT AND PLAN:     NEURO:  Post-operative Pain - Stable. Pain control with dilaudid IV PRN and Tylenol IV PRN.          RESPIRATORY:  Stable on room air - Incentive spirometry. Chest PT and suctioning of secretions. Out of bed to chair and ambulate with assistance. Continuous pulse oximetry for support & to prevent decompensation.        CARDIOVASCULAR:  Hemodynamically stable - Not on pressors. Continue hemodynamic monitoring.  Telemetry (medical test) - Reviewed by me today independently. normal sinus rhythm    HTN - worsened by post-op pain  - Continue hydralazine IV  - continue metoprolol IV      RENAL:  Stable - Monitor IOs and electrolytes. Keep K above 4.0 and Mg above 2.0.       BPH - stable. not on flomax. voided this admission without issues.  - Continue stallworth catheter for now  - Start flomax when able to tolerate PO      GASTROINTESTINAL:  GI prophylaxis not indicated  Zofran and Reglan IV PRN for nausea    Clear liquid diet sips        GERD - stable. Continue pantoprazole IV         HEMATOLOGIC:  Acute post-operative anemia from expected blood loss - Stable.  - Monitor CBC. Monitor chest tube output.  - Transfuse PRBC PRN      DVT prophylaxis with heparin subQ           INFECTIOUS DISEASE:  All surgical sites appear clean. No signs of active infection. Will monitor for fever and leukocytosis.             ENDOCRINE:  Uncontrolled DM2 (HgbA1c 11) – stable.  - NPH insulin  - Lispro sliding scale  - Carb control diets  - Monitor glucose fingersticks for goal 120-180. Insulin sliding scale.   - Transition to Lantus when taking more consistent PO diet  - Endocrinology recs           ONCOLOGY:  Esophageal cancer - improved s/p resection.  - Drain to bulb suction. Monitor bulb output.  - Repeat CXR         Pertinent clinical, laboratory, radiographic, hemodynamic, echocardiographic, respiratory data, microbiologic data and chart were reviewed by myself and analyzed frequently throughout the course of the day and night by myself.    Plan discussed at length with the CTICU staff and Attending CT Surgeon -   Dr Lewis Laureano       Patient's status was discussed with patient at bedside.       	  I have spent 50 minutes of time with this patient monitoring hemodynamic status, respiratory status, and coordinating care in the ICU.    ________________________________________________      _________________________  VITAL SIGNS:  Vital Signs Last 24 Hrs  T(C): 37.2 (24 Jul 2024 04:00), Max: 37.2 (23 Jul 2024 20:00)  T(F): 98.9 (24 Jul 2024 04:00), Max: 98.9 (23 Jul 2024 20:00)  HR: 103 (24 Jul 2024 07:00) (82 - 103)  BP: 153/79 (24 Jul 2024 04:00) (132/77 - 164/81)  BP(mean): 102 (24 Jul 2024 04:00) (80 - 105)  RR: 23 (24 Jul 2024 07:00) (0 - 25)  SpO2: 93% (24 Jul 2024 07:00) (91% - 98%)    Parameters below as of 24 Jul 2024 08:00  Patient On (Oxygen Delivery Method): room air      I/Os:   I&O's Detail    23 Jul 2024 07:01  -  24 Jul 2024 07:00  --------------------------------------------------------  IN:    Albumin 5%  - 250 mL: 250 mL    dextrose 5% + sodium chloride 0.45% w/ Additives: 150 mL    dextrose 5% + sodium chloride 0.45% w/ Additives: 830 mL  Total IN: 1230 mL    OUT:    Bulb (mL): 250 mL    Indwelling Catheter - Urethral (mL): 1060 mL  Total OUT: 1310 mL    Total NET: -80 mL              MEDICATIONS:  MEDICATIONS  (STANDING):  acetaminophen   IVPB .. 1000 milliGRAM(s) IV Intermittent once  chlorhexidine 2% Cloths 1 Application(s) Topical daily  dextrose 5% + sodium chloride 0.45% with potassium chloride 40 mEq/L 1000 milliLiter(s) (75 mL/Hr) IV Continuous <Continuous>  dextrose 50% Injectable 25 Gram(s) IV Push once  dextrose 50% Injectable 12.5 Gram(s) IV Push once  dornase malik Solution 2.5 milliGRAM(s) Inhalation daily  enoxaparin Injectable 40 milliGRAM(s) SubCutaneous every 24 hours  hydrALAZINE Injectable 10 milliGRAM(s) IV Push every 6 hours  insulin lispro (ADMELOG) corrective regimen sliding scale   SubCutaneous every 6 hours  insulin NPH human recombinant 2 Unit(s) SubCutaneous every 6 hours  levalbuterol Inhalation 0.63 milliGRAM(s) Inhalation every 6 hours  lidocaine   4% Patch 1 Patch Transdermal at bedtime  metoclopramide Injectable 10 milliGRAM(s) IV Push every 6 hours  metoprolol tartrate Injectable 5 milliGRAM(s) IV Push every 6 hours  ondansetron Injectable 4 milliGRAM(s) IV Push every 6 hours  pantoprazole  Injectable 40 milliGRAM(s) IV Push every 12 hours  potassium chloride  10 mEq/100 mL IVPB 10 milliEquivalent(s) IV Intermittent every 1 hour  sodium chloride 3%  Inhalation 4 milliLiter(s) Inhalation every 6 hours    MEDICATIONS  (PRN):  naloxone Injectable 0.1 milliGRAM(s) IV Push every 3 minutes PRN For ANY of the following changes in patient status:  A. RR LESS THAN 10 breaths per minute, B. Oxygen saturation LESS THAN 90%, C. Sedation score of 6  oxyCODONE    Solution 7.5 milliGRAM(s) Oral every 6 hours PRN Severe Pain (7 - 10)  oxyCODONE    Solution 5 milliGRAM(s) Oral every 4 hours PRN Moderate Pain (4 - 6)      LABS:  Laboratory data was independently reviewed by me today.                           10.1   13.81 )-----------( 191      ( 24 Jul 2024 02:58 )             30.7     07-24    133<L>  |  100  |  16  ----------------------------<  221<H>  3.7   |  21<L>  |  0.97    Ca    7.6<L>      24 Jul 2024 02:58  Phos  2.3     07-24  Mg     2.20     07-24        PT/INR - ( 23 Jul 2024 02:48 )   PT: 11.4 sec;   INR: 1.01 ratio             Urinalysis Basic - ( 24 Jul 2024 02:58 )    Color: x / Appearance: x / SG: x / pH: x  Gluc: 221 mg/dL / Ketone: x  / Bili: x / Urobili: x   Blood: x / Protein: x / Nitrite: x   Leuk Esterase: x / RBC: x / WBC x   Sq Epi: x / Non Sq Epi: x / Bacteria: x        RADIOLOGY:   Radiology images were independently reviewed by me today. Reports were reviewed by me today.    Xray Chest 1 View- PORTABLE-Routine:   ACC: [id number] EXAM:  XR CHEST PORTABLE ROUTINE 1V   ORDERED BY: LENORE YEPEZ     PROCEDURE DATE:  07/23/2024          INTERPRETATION:  CLINICAL INFORMATION: Post esophagectomy    TIME OF EXAMINATION: July 23 at 5:30 AM    EXAM: Portable chest    FINDINGS:    Right-sided chest tube again seen along with right-sided port.  Chain sutures overlie the right midlung field.  Trace right effusion but no pneumothorax.  Left lung and right upper lobe are clear.        COMPARISON: July 22        IMPRESSION: Follow-up post esophagectomy with right chest tube and small   right effusion.    --- End of Report ---            JUAREZ BRUGOS MD; Attending Radiologist  This document has been electronically signed. Jul 23 2024 10:20AM (07-23-24 @ 05:39)  Xray Chest 1 View- PORTABLE-Routine:   ACC: [id number] EXAM:  XR CHEST PORTABLE ROUTINE 1V   ORDERED BY: JOSUÉ ANDRADE     ACC: [id number] EXAM:  XR CHEST PORTABLE ROUTINE 1V   ORDERED BY: CAM VARMA     PROCEDURE DATE:  07/21/2024          INTERPRETATION:  INDICATION: Status post esophagectomy    Portable chest 7/21/2024    COMPARISON: 7/20/2024    FINDINGS:  Heart/Vascular: The heart size, mediastinum, hilum and aorta are within   normal limits for projection.  Pulmonary: Midline trachea. There are small bilateral pleural effusions   and likely atelectasis at the bases. No pneumothorax.  Bones: There is no fracture.  Lines and catheter: The more superior positioned right chest tube was   removed Right lower chest tube unchanged position. NG tube unchanged in   position. Theright Port-A-Cath in position.    Portable chest 7/22/2024    FINDINGS: NG tube removed. Right chest tube and right Port-A-Cath in   place. No pneumothorax. Partial clearing at the lung bases. Heart size   and mediastinum stable.    Impression:    Onthis morning's examination:  NG tube removed.  No pneumothorax.  Partial clearing at the bases with findings still more significant on the   right    --- End of Report ---            DAMARIS ZUH DO; Attending Radiologist  This document has been electronically signed. Jul 22 2024 11:14AM (07-22-24 @ 05:55)  Xray Chest 1 View- PORTABLE-Routine:   ACC: [id number] EXAM:  XR CHEST PORTABLE ROUTINE 1V   ORDERED BY: JOSUÉ ANDRADE     ACC: [id number] EXAM:  XR CHEST PORTABLE ROUTINE 1V   ORDERED BY: CAM VARMA     PROCEDURE DATE:  07/21/2024          INTERPRETATION:  INDICATION: Status post esophagectomy    Portable chest 7/21/2024    COMPARISON: 7/20/2024    FINDINGS:  Heart/Vascular: The heart size, mediastinum, hilum and aorta are within   normal limits for projection.  Pulmonary: Midline trachea. There are small bilateral pleural effusions   and likely atelectasis at the bases. No pneumothorax.  Bones: There is no fracture.  Lines and catheter: The more superior positioned right chest tube was   removed Right lower chest tube unchanged position. NG tube unchanged in   position. Theright Port-A-Cath in position.    Portable chest 7/22/2024    FINDINGS: NG tube removed. Right chest tube and right Port-A-Cath in   place. No pneumothorax. Partial clearing at the lung bases. Heart size   and mediastinum stable.    Impression:    Onthis morning's examination:  NG tube removed.  No pneumothorax.  Partial clearing at the bases with findings still more significant on the   right    --- End of Report ---            DAMARIS ZHU DO; Attending Radiologist  This document has been electronically signed. Jul 22 2024 11:14AM (07-21-24 @ 06:35)

## 2024-07-24 NOTE — PROGRESS NOTE ADULT - ASSESSMENT
The patient is a 59y Male with PMH of T2DM, HTN, HLD, GE junction cancer here for esophagectomy now postop.  Endocrinology consulted for T2DM    #Uncontrolled Type 2 Diabetes Mellitus   - Follows with: Dr. Nolasco  - A1C with Estimated Average Glucose Result: 10.9 % (07-13-24)  - home regimen: Semglee 18 units, Novolog 6 units with meals      INPATIENT PLAN:  -Patient started clear liquid diet, tolerating small amount so far.  -Recommend stop NPH  -Start Lantus 5 units qhs  -Start Admelog 2 units TID premeal, hold if NPO  -Low Admelog scale premeal and low bedtime scale  -FS premeal and bedtime  - Inpatient glucose goals: 140-180 mg/dl in CTICU      DISCHARGE PLANNING:  - Discharge recs pending clinical course and nutrition plan. Plan to resume Semglee and Admelog will determine final doses based on hospital requirements/ po intake at time of discharge.   - will need Endocrinology follow up. Patient has an appointment with Dr. Nolasco scheduled for August 8th at 8:20 AM at [address] [facility]   [address].     #Hypertension  - Goal BP <130/80  - Management as per primary team  - check urine microalbumin level as outpatient    #Hyperlipidemia  - LDL goal <70  - check lipid panel as outpatient on a yearly basis    Plan discussed with pt, family at bedside and primary team   If after 6PM or before 9AM, or on weekends/holidays, please call endocrine answering service for assistance ([phone]).  For nonurgent matters email [email or web address] for [email or web address].

## 2024-07-24 NOTE — PROGRESS NOTE ADULT - SUBJECTIVE AND OBJECTIVE BOX
Patient is a 59y old  Male who presents with a chief complaint of Esophagectomy (24 Jul 2024 15:22)      SUBJECTIVE / OVERNIGHT EVENTS:    Events noted.  CONSTITUTIONAL: No fever,  or fatigue  RESPIRATORY: No cough, wheezing,  No shortness of breath  CARDIOVASCULAR: No chest pain, palpitations, dizziness, or leg swelling  GASTROINTESTINAL: No abdominal or epigastric pain. No nausea, vomiting.  NEUROLOGICAL: No headache    MEDICATIONS  (STANDING):  acetaminophen   IVPB .. 1000 milliGRAM(s) IV Intermittent once  chlorhexidine 2% Cloths 1 Application(s) Topical daily  dextrose 5% + sodium chloride 0.45% with potassium chloride 40 mEq/L 1000 milliLiter(s) (75 mL/Hr) IV Continuous <Continuous>  dextrose 50% Injectable 25 Gram(s) IV Push once  dextrose 50% Injectable 12.5 Gram(s) IV Push once  dornase malik Solution 2.5 milliGRAM(s) Inhalation daily  enoxaparin Injectable 40 milliGRAM(s) SubCutaneous every 24 hours  hydrALAZINE Injectable 10 milliGRAM(s) IV Push every 6 hours  insulin lispro (ADMELOG) corrective regimen sliding scale   SubCutaneous every 6 hours  insulin NPH human recombinant 2 Unit(s) SubCutaneous every 6 hours  levalbuterol Inhalation 0.63 milliGRAM(s) Inhalation every 6 hours  lidocaine   4% Patch 1 Patch Transdermal at bedtime  metoclopramide Injectable 10 milliGRAM(s) IV Push every 6 hours  metoprolol tartrate Injectable 5 milliGRAM(s) IV Push every 6 hours  ondansetron Injectable 4 milliGRAM(s) IV Push every 6 hours  pantoprazole  Injectable 40 milliGRAM(s) IV Push every 12 hours  potassium chloride  10 mEq/100 mL IVPB 10 milliEquivalent(s) IV Intermittent every 1 hour  sodium chloride 3%  Inhalation 4 milliLiter(s) Inhalation every 6 hours    MEDICATIONS  (PRN):  naloxone Injectable 0.1 milliGRAM(s) IV Push every 3 minutes PRN For ANY of the following changes in patient status:  A. RR LESS THAN 10 breaths per minute, B. Oxygen saturation LESS THAN 90%, C. Sedation score of 6  oxyCODONE    Solution 5 milliGRAM(s) Oral every 4 hours PRN Moderate Pain (4 - 6)  oxyCODONE    Solution 7.5 milliGRAM(s) Oral every 6 hours PRN Severe Pain (7 - 10)        CAPILLARY BLOOD GLUCOSE      POCT Blood Glucose.: 197 mg/dL (24 Jul 2024 18:45)  POCT Blood Glucose.: 210 mg/dL (24 Jul 2024 17:16)  POCT Blood Glucose.: 210 mg/dL (24 Jul 2024 16:03)  POCT Blood Glucose.: 253 mg/dL (24 Jul 2024 12:15)  POCT Blood Glucose.: 244 mg/dL (24 Jul 2024 05:55)  POCT Blood Glucose.: 162 mg/dL (23 Jul 2024 23:09)    I&O's Summary    23 Jul 2024 07:01  -  24 Jul 2024 07:00  --------------------------------------------------------  IN: 1230 mL / OUT: 1310 mL / NET: -80 mL    24 Jul 2024 07:01  -  24 Jul 2024 19:15  --------------------------------------------------------  IN: 500 mL / OUT: 95 mL / NET: 405 mL        T(C): 37.3 (07-24-24 @ 17:29), Max: 37.3 (07-24-24 @ 17:29)  HR: 108 (07-24-24 @ 17:29) (82 - 108)  BP: 148/65 (07-24-24 @ 17:29) (123/73 - 157/88)  RR: 18 (07-24-24 @ 17:29) (0 - 25)  SpO2: 97% (07-24-24 @ 17:29) (93% - 97%)    PHYSICAL EXAM:  GENERAL: NAD  NECK: Supple, No JVD  CHEST/LUNG: Clear to auscultation bilaterally; No wheezing.  HEART: Regular rate and rhythm; No murmurs, rubs, or gallops  ABDOMEN: Soft, Nontender, Nondistended; Bowel sounds present  EXTREMITIES:   No edema  NEUROLOGY: AAO X 3      LABS:                        10.1   13.81 )-----------( 191      ( 24 Jul 2024 02:58 )             30.7     07-24    133<L>  |  100  |  16  ----------------------------<  221<H>  3.7   |  21<L>  |  0.97    Ca    7.6<L>      24 Jul 2024 02:58  Phos  2.3     07-24  Mg     2.20     07-24      PT/INR - ( 23 Jul 2024 02:48 )   PT: 11.4 sec;   INR: 1.01 ratio               Urinalysis Basic - ( 24 Jul 2024 02:58 )    Color: x / Appearance: x / SG: x / pH: x  Gluc: 221 mg/dL / Ketone: x  / Bili: x / Urobili: x   Blood: x / Protein: x / Nitrite: x   Leuk Esterase: x / RBC: x / WBC x   Sq Epi: x / Non Sq Epi: x / Bacteria: x      CAPILLARY BLOOD GLUCOSE      POCT Blood Glucose.: 197 mg/dL (24 Jul 2024 18:45)  POCT Blood Glucose.: 210 mg/dL (24 Jul 2024 17:16)  POCT Blood Glucose.: 210 mg/dL (24 Jul 2024 16:03)  POCT Blood Glucose.: 253 mg/dL (24 Jul 2024 12:15)  POCT Blood Glucose.: 244 mg/dL (24 Jul 2024 05:55)  POCT Blood Glucose.: 162 mg/dL (23 Jul 2024 23:09)        RADIOLOGY & ADDITIONAL TESTS:    Imaging Personally Reviewed:    Consultant(s) Notes Reviewed:      Care Discussed with Consultants/Other Providers:    Anish Goodrich MD, CMD, FACP    [address] [place] [address]  Office Tel: [phone]  Cell: [phone]

## 2024-07-25 LAB
AMYLASE FLD-CCNC: 8 U/L — SIGNIFICANT CHANGE UP
ANION GAP SERPL CALC-SCNC: 12 MMOL/L — SIGNIFICANT CHANGE UP (ref 7–14)
BLD GP AB SCN SERPL QL: NEGATIVE — SIGNIFICANT CHANGE UP
BUN SERPL-MCNC: 11 MG/DL — SIGNIFICANT CHANGE UP (ref 7–23)
CALCIUM SERPL-MCNC: 7.4 MG/DL — LOW (ref 8.4–10.5)
CHLORIDE SERPL-SCNC: 99 MMOL/L — SIGNIFICANT CHANGE UP (ref 98–107)
CO2 SERPL-SCNC: 19 MMOL/L — LOW (ref 22–31)
CREAT SERPL-MCNC: 0.79 MG/DL — SIGNIFICANT CHANGE UP (ref 0.5–1.3)
EGFR: 101 ML/MIN/1.73M2 — SIGNIFICANT CHANGE UP
GLUCOSE BLDC GLUCOMTR-MCNC: 115 MG/DL — HIGH (ref 70–99)
GLUCOSE BLDC GLUCOMTR-MCNC: 192 MG/DL — HIGH (ref 70–99)
GLUCOSE BLDC GLUCOMTR-MCNC: 214 MG/DL — HIGH (ref 70–99)
GLUCOSE SERPL-MCNC: 183 MG/DL — HIGH (ref 70–99)
HCT VFR BLD CALC: 29.3 % — LOW (ref 39–50)
HGB BLD-MCNC: 10 G/DL — LOW (ref 13–17)
MAGNESIUM SERPL-MCNC: 1.8 MG/DL — SIGNIFICANT CHANGE UP (ref 1.6–2.6)
MCHC RBC-ENTMCNC: 30.6 PG — SIGNIFICANT CHANGE UP (ref 27–34)
MCHC RBC-ENTMCNC: 34.1 GM/DL — SIGNIFICANT CHANGE UP (ref 32–36)
MCV RBC AUTO: 89.6 FL — SIGNIFICANT CHANGE UP (ref 80–100)
NRBC # BLD: 0 /100 WBCS — SIGNIFICANT CHANGE UP (ref 0–0)
NRBC # FLD: 0 K/UL — SIGNIFICANT CHANGE UP (ref 0–0)
PHOSPHATE SERPL-MCNC: 1.8 MG/DL — LOW (ref 2.5–4.5)
PLATELET # BLD AUTO: 198 K/UL — SIGNIFICANT CHANGE UP (ref 150–400)
POTASSIUM SERPL-MCNC: 4 MMOL/L — SIGNIFICANT CHANGE UP (ref 3.5–5.3)
POTASSIUM SERPL-SCNC: 4 MMOL/L — SIGNIFICANT CHANGE UP (ref 3.5–5.3)
RBC # BLD: 3.27 M/UL — LOW (ref 4.2–5.8)
RBC # FLD: 13 % — SIGNIFICANT CHANGE UP (ref 10.3–14.5)
RH IG SCN BLD-IMP: POSITIVE — SIGNIFICANT CHANGE UP
SODIUM SERPL-SCNC: 130 MMOL/L — LOW (ref 135–145)
TRIGL FLD-MCNC: 47 MG/DL — SIGNIFICANT CHANGE UP
WBC # BLD: 18.49 K/UL — HIGH (ref 3.8–10.5)
WBC # FLD AUTO: 18.49 K/UL — HIGH (ref 3.8–10.5)

## 2024-07-25 PROCEDURE — 74177 CT ABD & PELVIS W/CONTRAST: CPT | Mod: 26

## 2024-07-25 PROCEDURE — 71260 CT THORAX DX C+: CPT | Mod: 26

## 2024-07-25 PROCEDURE — 71045 X-RAY EXAM CHEST 1 VIEW: CPT | Mod: 26

## 2024-07-25 PROCEDURE — 99232 SBSQ HOSP IP/OBS MODERATE 35: CPT

## 2024-07-25 RX ORDER — SODIUM PHOSPHATE, MONOBASIC, MONOHYDRATE 276; 142 MG/ML; MG/ML
30 INJECTION, SOLUTION INTRAVENOUS ONCE
Refills: 0 | Status: COMPLETED | OUTPATIENT
Start: 2024-07-25 | End: 2024-07-25

## 2024-07-25 RX ORDER — PIPERACILLIN SODIUM, TAZOBACTAM SODIUM 3; .375 G/15ML; G/15ML
3.38 INJECTION, POWDER, LYOPHILIZED, FOR SOLUTION INTRAVENOUS ONCE
Refills: 0 | Status: COMPLETED | OUTPATIENT
Start: 2024-07-25 | End: 2024-07-25

## 2024-07-25 RX ORDER — PIPERACILLIN SODIUM, TAZOBACTAM SODIUM 3; .375 G/15ML; G/15ML
3.38 INJECTION, POWDER, LYOPHILIZED, FOR SOLUTION INTRAVENOUS EVERY 8 HOURS
Refills: 0 | Status: DISCONTINUED | OUTPATIENT
Start: 2024-07-25 | End: 2024-07-25

## 2024-07-25 RX ORDER — DEXTROSE MONOHYDRATE, SODIUM CHLORIDE, SODIUM LACTATE, CALCIUM CHLORIDE, MAGNESIUM CHLORIDE 1.5; 538; 448; 18.4; 5.08 G/100ML; MG/100ML; MG/100ML; MG/100ML; MG/100ML
1000 SOLUTION INTRAPERITONEAL
Refills: 0 | Status: DISCONTINUED | OUTPATIENT
Start: 2024-07-25 | End: 2024-08-09

## 2024-07-25 RX ORDER — PIPERACILLIN SODIUM, TAZOBACTAM SODIUM 3; .375 G/15ML; G/15ML
3.38 INJECTION, POWDER, LYOPHILIZED, FOR SOLUTION INTRAVENOUS EVERY 8 HOURS
Refills: 0 | Status: COMPLETED | OUTPATIENT
Start: 2024-07-25 | End: 2024-08-01

## 2024-07-25 RX ORDER — GLUCAGON INJECTION, SOLUTION 0.5 MG/.1ML
1 INJECTION, SOLUTION SUBCUTANEOUS ONCE
Refills: 0 | Status: DISCONTINUED | OUTPATIENT
Start: 2024-07-25 | End: 2024-07-26

## 2024-07-25 RX ORDER — INSULIN LISPRO 100/ML
VIAL (ML) SUBCUTANEOUS EVERY 6 HOURS
Refills: 0 | Status: DISCONTINUED | OUTPATIENT
Start: 2024-07-25 | End: 2024-07-25

## 2024-07-25 RX ORDER — DEXTROSE 4 G
12.5 TABLET,CHEWABLE ORAL ONCE
Refills: 0 | Status: DISCONTINUED | OUTPATIENT
Start: 2024-07-25 | End: 2024-08-09

## 2024-07-25 RX ORDER — INSULIN LISPRO 100/ML
VIAL (ML) SUBCUTANEOUS EVERY 6 HOURS
Refills: 0 | Status: DISCONTINUED | OUTPATIENT
Start: 2024-07-25 | End: 2024-07-26

## 2024-07-25 RX ORDER — DEXTROSE 4 G
25 TABLET,CHEWABLE ORAL ONCE
Refills: 0 | Status: DISCONTINUED | OUTPATIENT
Start: 2024-07-25 | End: 2024-08-09

## 2024-07-25 RX ORDER — HYDROMORPHONE HCL IN 0.9% NACL 0.2 MG/ML
0.2 PLASTIC BAG, INJECTION (ML) INTRAVENOUS EVERY 4 HOURS
Refills: 0 | Status: DISCONTINUED | OUTPATIENT
Start: 2024-07-25 | End: 2024-07-27

## 2024-07-25 RX ORDER — ACETAMINOPHEN 500 MG
825 TABLET ORAL EVERY 6 HOURS
Refills: 0 | Status: DISCONTINUED | OUTPATIENT
Start: 2024-07-25 | End: 2024-07-25

## 2024-07-25 RX ORDER — DEXTROSE 4 G
15 TABLET,CHEWABLE ORAL ONCE
Refills: 0 | Status: DISCONTINUED | OUTPATIENT
Start: 2024-07-25 | End: 2024-08-09

## 2024-07-25 RX ORDER — PIPERACILLIN SODIUM, TAZOBACTAM SODIUM 3; .375 G/15ML; G/15ML
3.38 INJECTION, POWDER, LYOPHILIZED, FOR SOLUTION INTRAVENOUS ONCE
Refills: 0 | Status: DISCONTINUED | OUTPATIENT
Start: 2024-07-25 | End: 2024-07-25

## 2024-07-25 RX ORDER — SODIUM CHLORIDE AND POTASSIUM CHLORIDE 150; 450 MG/100ML; MG/100ML
1000 INJECTION, SOLUTION INTRAVENOUS
Refills: 0 | Status: DISCONTINUED | OUTPATIENT
Start: 2024-07-25 | End: 2024-07-29

## 2024-07-25 RX ORDER — HYDROMORPHONE HCL IN 0.9% NACL 0.2 MG/ML
0.5 PLASTIC BAG, INJECTION (ML) INTRAVENOUS EVERY 4 HOURS
Refills: 0 | Status: DISCONTINUED | OUTPATIENT
Start: 2024-07-25 | End: 2024-07-27

## 2024-07-25 RX ORDER — ACETAMINOPHEN 500 MG
825 TABLET ORAL EVERY 6 HOURS
Refills: 0 | Status: COMPLETED | OUTPATIENT
Start: 2024-07-25 | End: 2024-07-26

## 2024-07-25 RX ADMIN — PIPERACILLIN SODIUM, TAZOBACTAM SODIUM 25 GRAM(S): 3; .375 INJECTION, POWDER, LYOPHILIZED, FOR SOLUTION INTRAVENOUS at 22:34

## 2024-07-25 RX ADMIN — Medication 4 MILLILITER(S): at 08:12

## 2024-07-25 RX ADMIN — LEVALBUTEROL HYDROCHLORIDE 0.63 MILLIGRAM(S): 0.31 SOLUTION RESPIRATORY (INHALATION) at 08:13

## 2024-07-25 RX ADMIN — Medication 4 MILLIGRAM(S): at 11:04

## 2024-07-25 RX ADMIN — Medication 5 MILLIGRAM(S): at 08:47

## 2024-07-25 RX ADMIN — Medication 4 MILLIGRAM(S): at 03:03

## 2024-07-25 RX ADMIN — SODIUM PHOSPHATE, MONOBASIC, MONOHYDRATE 85 MILLIMOLE(S): 276; 142 INJECTION, SOLUTION INTRAVENOUS at 13:53

## 2024-07-25 RX ADMIN — Medication 825 MILLIGRAM(S): at 06:42

## 2024-07-25 RX ADMIN — Medication 5 MILLIGRAM(S): at 22:35

## 2024-07-25 RX ADMIN — SODIUM CHLORIDE AND POTASSIUM CHLORIDE 75 MILLILITER(S): 150; 450 INJECTION, SOLUTION INTRAVENOUS at 03:01

## 2024-07-25 RX ADMIN — Medication 5 MILLIGRAM(S): at 13:26

## 2024-07-25 RX ADMIN — PIPERACILLIN SODIUM, TAZOBACTAM SODIUM 25 GRAM(S): 3; .375 INJECTION, POWDER, LYOPHILIZED, FOR SOLUTION INTRAVENOUS at 13:30

## 2024-07-25 RX ADMIN — Medication 4: at 08:46

## 2024-07-25 RX ADMIN — HYDRALAZINE HYDROCHLORIDE 10 MILLIGRAM(S): 100 TABLET ORAL at 13:23

## 2024-07-25 RX ADMIN — ENOXAPARIN SODIUM 40 MILLIGRAM(S): 120 INJECTION SUBCUTANEOUS at 13:22

## 2024-07-25 RX ADMIN — HYDRALAZINE HYDROCHLORIDE 10 MILLIGRAM(S): 100 TABLET ORAL at 17:55

## 2024-07-25 RX ADMIN — LEVALBUTEROL HYDROCHLORIDE 0.63 MILLIGRAM(S): 0.31 SOLUTION RESPIRATORY (INHALATION) at 20:29

## 2024-07-25 RX ADMIN — Medication 330 MILLIGRAM(S): at 17:55

## 2024-07-25 RX ADMIN — Medication 10 MILLIGRAM(S): at 13:25

## 2024-07-25 RX ADMIN — PIPERACILLIN SODIUM, TAZOBACTAM SODIUM 200 GRAM(S): 3; .375 INJECTION, POWDER, LYOPHILIZED, FOR SOLUTION INTRAVENOUS at 08:28

## 2024-07-25 RX ADMIN — Medication 10 MILLIGRAM(S): at 17:55

## 2024-07-25 RX ADMIN — LEVALBUTEROL HYDROCHLORIDE 0.63 MILLIGRAM(S): 0.31 SOLUTION RESPIRATORY (INHALATION) at 14:10

## 2024-07-25 RX ADMIN — DORNASE ALFA 2.5 MILLIGRAM(S): 1 SOLUTION RESPIRATORY (INHALATION) at 08:12

## 2024-07-25 RX ADMIN — Medication 4 MILLILITER(S): at 20:29

## 2024-07-25 RX ADMIN — HYDRALAZINE HYDROCHLORIDE 10 MILLIGRAM(S): 100 TABLET ORAL at 05:14

## 2024-07-25 RX ADMIN — SODIUM CHLORIDE AND POTASSIUM CHLORIDE 75 MILLILITER(S): 150; 450 INJECTION, SOLUTION INTRAVENOUS at 08:31

## 2024-07-25 RX ADMIN — Medication 4 MILLILITER(S): at 14:11

## 2024-07-25 RX ADMIN — Medication 1: at 17:55

## 2024-07-25 RX ADMIN — Medication 330 MILLIGRAM(S): at 13:19

## 2024-07-25 RX ADMIN — Medication 825 MILLIGRAM(S): at 13:10

## 2024-07-25 RX ADMIN — Medication 10 MILLIGRAM(S): at 05:16

## 2024-07-25 RX ADMIN — Medication 5 MILLIGRAM(S): at 02:58

## 2024-07-25 RX ADMIN — PANTOPRAZOLE SODIUM 40 MILLIGRAM(S): 20 TABLET, DELAYED RELEASE ORAL at 05:09

## 2024-07-25 RX ADMIN — Medication 4 MILLIGRAM(S): at 15:49

## 2024-07-25 RX ADMIN — PANTOPRAZOLE SODIUM 40 MILLIGRAM(S): 20 TABLET, DELAYED RELEASE ORAL at 17:55

## 2024-07-25 NOTE — DISCHARGE NOTE PROVIDER - NSDCFUSCHEDAPPT_GEN_ALL_CORE_FT
Fabian Nolasco  [facility]  [facility] [address]  Scheduled Appointment: 08/08/2024    Donato Mota  [facility]  [facility] [address]  Scheduled Appointment: 10/16/2024     Donato Mota  [facility]  [facility] [address] [address] [facility]  Scheduled Appointment: 10/16/2024     Fabian Nolasco  [facility]  [facility] [address]  Scheduled Appointment: 08/21/2024    Donato Mota  [facility]  [facility] [address]  Scheduled Appointment: 10/16/2024

## 2024-07-25 NOTE — DISCHARGE NOTE PROVIDER - NSDCCPCAREPLAN_GEN_ALL_CORE_FT
PRINCIPAL DISCHARGE DIAGNOSIS  Diagnosis: Esophageal adenocarcinoma  Assessment and Plan of Treatment:

## 2024-07-25 NOTE — PROGRESS NOTE ADULT - SUBJECTIVE AND OBJECTIVE BOX
Patient is a 59y old  Male who presents with a chief complaint of Esophagectomy (25 Jul 2024 07:40)      SUBJECTIVE / OVERNIGHT EVENTS:    Events noted.  CONSTITUTIONAL: Unable to tolerate po  RESPIRATORY: No cough, wheezing,  No shortness of breath  CARDIOVASCULAR: No chest pain, palpitations, dizziness, or leg swelling  GASTROINTESTINAL: No abdominal or epigastric pain.   NEUROLOGICAL: No headache    MEDICATIONS  (STANDING):  acetaminophen   IVPB .. 825 milliGRAM(s) IV Intermittent every 6 hours  dextrose 5% + sodium chloride 0.9% with potassium chloride 20 mEq/L 1000 milliLiter(s) (75 mL/Hr) IV Continuous <Continuous>  dornase malik Solution 2.5 milliGRAM(s) Inhalation daily  enoxaparin Injectable 40 milliGRAM(s) SubCutaneous every 24 hours  hydrALAZINE Injectable 10 milliGRAM(s) IV Push every 6 hours  insulin glargine Injectable (LANTUS) 5 Unit(s) SubCutaneous at bedtime  insulin lispro (ADMELOG) corrective regimen sliding scale   SubCutaneous every 6 hours  levalbuterol Inhalation 0.63 milliGRAM(s) Inhalation every 6 hours  lidocaine   4% Patch 1 Patch Transdermal at bedtime  metoclopramide Injectable 10 milliGRAM(s) IV Push every 6 hours  metoprolol tartrate Injectable 5 milliGRAM(s) IV Push every 6 hours  ondansetron Injectable 4 milliGRAM(s) IV Push every 6 hours  pantoprazole  Injectable 40 milliGRAM(s) IV Push every 12 hours  piperacillin/tazobactam IVPB.- 3.375 Gram(s) IV Intermittent once  piperacillin/tazobactam IVPB.. 3.375 Gram(s) IV Intermittent every 8 hours  sodium chloride 3%  Inhalation 4 milliLiter(s) Inhalation every 6 hours  sodium phosphate 30 milliMole(s)/500 mL IVPB 30 milliMole(s) IV Intermittent once    MEDICATIONS  (PRN):  HYDROmorphone  Injectable 0.5 milliGRAM(s) IV Push every 4 hours PRN Severe Pain (7 - 10)  HYDROmorphone  Injectable 0.2 milliGRAM(s) IV Push every 4 hours PRN Moderate Pain (4 - 6)  naloxone Injectable 0.1 milliGRAM(s) IV Push every 3 minutes PRN For ANY of the following changes in patient status:  A. RR LESS THAN 10 breaths per minute, B. Oxygen saturation LESS THAN 90%, C. Sedation score of 6        CAPILLARY BLOOD GLUCOSE      POCT Blood Glucose.: 214 mg/dL (25 Jul 2024 08:11)  POCT Blood Glucose.: 129 mg/dL (24 Jul 2024 22:04)  POCT Blood Glucose.: 197 mg/dL (24 Jul 2024 18:45)  POCT Blood Glucose.: 210 mg/dL (24 Jul 2024 17:16)  POCT Blood Glucose.: 210 mg/dL (24 Jul 2024 16:03)  POCT Blood Glucose.: 253 mg/dL (24 Jul 2024 12:15)    I&O's Summary    24 Jul 2024 07:01  -  25 Jul 2024 07:00  --------------------------------------------------------  IN: 2380 mL / OUT: 670 mL / NET: 1710 mL        T(C): 36.6 (07-25-24 @ 08:15), Max: 37.4 (07-24-24 @ 20:45)  HR: 105 (07-25-24 @ 08:32) (99 - 113)  BP: 135/66 (07-25-24 @ 08:15) (132/72 - 157/88)  RR: 16 (07-25-24 @ 08:15) (15 - 19)  SpO2: 94% (07-25-24 @ 08:32) (94% - 97%)    PHYSICAL EXAM:    NECK: Supple, No JVD  CHEST/LUNG: Clear to auscultation bilaterally; No wheezing.  HEART: Regular rate and rhythm; No murmurs, rubs, or gallops  ABDOMEN: Soft, Nontender, Nondistended; Bowel sounds present  EXTREMITIES:   No edema  NEUROLOGY: AAO X 3      LABS:                        10.0   18.49 )-----------( 198      ( 25 Jul 2024 05:45 )             29.3     07-25    130<L>  |  99  |  11  ----------------------------<  183<H>  4.0   |  19<L>  |  0.79    Ca    7.4<L>      25 Jul 2024 05:45  Phos  1.8     07-25  Mg     1.80     07-25            Urinalysis Basic - ( 25 Jul 2024 05:45 )    Color: x / Appearance: x / SG: x / pH: x  Gluc: 183 mg/dL / Ketone: x  / Bili: x / Urobili: x   Blood: x / Protein: x / Nitrite: x   Leuk Esterase: x / RBC: x / WBC x   Sq Epi: x / Non Sq Epi: x / Bacteria: x      CAPILLARY BLOOD GLUCOSE      POCT Blood Glucose.: 214 mg/dL (25 Jul 2024 08:11)  POCT Blood Glucose.: 129 mg/dL (24 Jul 2024 22:04)  POCT Blood Glucose.: 197 mg/dL (24 Jul 2024 18:45)  POCT Blood Glucose.: 210 mg/dL (24 Jul 2024 17:16)  POCT Blood Glucose.: 210 mg/dL (24 Jul 2024 16:03)  POCT Blood Glucose.: 253 mg/dL (24 Jul 2024 12:15)        RADIOLOGY & ADDITIONAL TESTS:    Imaging Personally Reviewed:    Consultant(s) Notes Reviewed:      Care Discussed with Consultants/Other Providers:    Anish Goodrich MD, CMD, FACP    [address] [place] [address]  Office Tel: [phone]  Cell: [phone]

## 2024-07-25 NOTE — DISCHARGE NOTE PROVIDER - NSDCMRMEDTOKEN_GEN_ALL_CORE_FT
amLODIPine 5 mg oral tablet: 1 tab(s) orally once a day Noon  atorvastatin 20 mg oral tablet: 1 tab(s) orally once a day noon  Jardiance 25 mg oral tablet: 1 tab(s) orally once a day Noon  losartan-hydrochlorothiazide 50 mg-12.5 mg oral tablet: 1 tab(s) orally once a day  Lovenox 40 mg/0.4 mL injectable solution: 40 milligram(s) subcutaneously once a day  metFORMIN 500 mg oral tablet: 1 tab(s) orally 2 times a day  NovoLOG 100 units/mL subcutaneous solution: subcutaneous 3 times a day (before meals) 15 units  pantoprazole 40 mg oral delayed release tablet: 1 tab(s) orally once a day AM  Sodium Chloride, 1 G, PO, 2 times Daily:   tamsulosin 0.4 mg oral capsule: 1 cap(s) orally once a day (at bedtime)   amLODIPine 5 mg oral tablet: 1 tab(s) orally once a day Noon  atorvastatin 20 mg oral tablet: 1 tab(s) orally once a day noon  CXR PA &amp; LATERAL view: Please, obtain a CXR 1-2 days prior to your appointment and bring a copy with you. Call Thoracic Surgery office [phone] to schedule an appointment. Dx: ICD C15.9  Jardiance 25 mg oral tablet: 1 tab(s) orally once a day Noon  losartan-hydrochlorothiazide 50 mg-12.5 mg oral tablet: 1 tab(s) orally once a day  Lovenox 40 mg/0.4 mL injectable solution: 40 milligram(s) subcutaneously once a day  metFORMIN 500 mg oral tablet: 1 tab(s) orally 2 times a day  NovoLIN N 100 units/mL subcutaneous suspension: 5 unit(s) subcutaneous every 6 hours  NovoLOG 100 units/mL subcutaneous solution: subcutaneous 3 times a day (before meals) 15 units  pantoprazole 40 mg oral delayed release tablet: 1 tab(s) orally once a day AM  Sodium Chloride, 1 G, PO, 2 times Daily:   tamsulosin 0.4 mg oral capsule: 1 cap(s) orally once a day (at bedtime)   amLODIPine 5 mg oral tablet: 1 tab(s) orally once a day Noon  amoxicillin-clavulanate 875 mg-125 mg oral tablet: 875 milligram(s) by jejunostomy tube 2 times a day As per infectious disease recommendations, take medication until 08/21/24.  atorvastatin 20 mg oral tablet: 1 tab(s) orally once a day noon  Jardiance 25 mg oral tablet: 1 tab(s) orally once a day Noon  losartan-hydrochlorothiazide 50 mg-12.5 mg oral tablet: 1 tab(s) orally once a day  Lovenox 40 mg/0.4 mL injectable solution: 40 milligram(s) subcutaneously once a day  metFORMIN 500 mg oral tablet: 1 tab(s) orally 2 times a day  NovoLIN N 100 units/mL subcutaneous suspension: 5 unit(s) subcutaneous every 6 hours  NovoLOG 100 units/mL subcutaneous solution: subcutaneous 3 times a day (before meals) 15 units  Sodium Chloride, 1 G, PO, 2 times Daily:   tamsulosin 0.4 mg oral capsule: 1 cap(s) orally once a day (at bedtime)

## 2024-07-25 NOTE — PROGRESS NOTE ADULT - ASSESSMENT
59 year old male with PMH HTN and DM presents with GE junction adenocarcinoma s/p chemo and radiation now s/p [place] Broderick Esophagectomy on 7/16. POD #6 UGI without anastomotic leak, however, patient now experiencing PO intolerance    Plan  - Diet: CLD  - Maintenance IVF 75 cc/hour  - Lantus 5 units, admelog 2 TID, appreciate endo recs  - IV metoprolol and hydralazine, home PO meds held  - Zofran and reglan  - Activity: OOB as tolerated  - ISS  - Pain control with oral solution  - DVT ppx: SCD's & Lovenox    Surgery D Team  [id number]    59 year old male with PMH HTN and DM presents with GE junction adenocarcinoma s/p chemo and radiation now s/p [place] Broderick Esophagectomy on 7/16. POD #6 UGI without anastomotic leak, however, patient now experiencing PO intolerance    Plan  - Chest xray with R sided infiltrates  - Diet: clear liquid  - Maintenance IVF 75 cc/hour  - Lantus 5 units, admelog 2 TID, appreciate endo recs  - IV metoprolol and hydralazine, home PO meds held  - Zofran and reglan  - Activity: OOB as tolerated  - ISS  - Pain control with oral solution  - [facility]    Surgery D Team  [id number]    59 year old male with PMH HTN and DM presents with GE junction adenocarcinoma s/p chemo and radiation now s/p [place] Broderick Esophagectomy on 7/16. POD #6 UGI without anastomotic leak, however, patient now experiencing PO intolerance    Plan  - CT chest, A/P with IV and PO contrast  - Diet: clear liquids  - Maintenance IVF 75 cc/hour  - Lantus 5 units, admelog 2 TID, appreciate endo recs  - IV metoprolol and hydralazine, home PO meds held  - Zofran and reglan  - Activity: OOB as tolerated  - ISS  - Pain control with oral solution  - DVT ppx: SCD's & Lovenox    Surgery D Team  [id number]    59 year old male with PMH HTN and DM presents with GE junction adenocarcinoma s/p chemo and radiation now s/p [place] Broderick Esophagectomy on 7/16. POD #6 UGI without anastomotic leak, however, patient now experiencing PO intolerance    Plan  - CT chest, A/P with IV and PO contrast  - Diet: NPO  - Maintenance IVF 75 cc/hour  - Lantus 5 units, admelog 2 TID, appreciate endo recs  - IV metoprolol and hydralazine, home PO meds held  - Zofran and reglan  - Activity: OOB as tolerated  - ISS  - Pain control with oral solution  - DVT ppx: SCD's & Lovenox    Surgery D Team  [id number]

## 2024-07-25 NOTE — DISCHARGE NOTE PROVIDER - INSTRUCTIONS
Stay on tube feeds as recommended.    Diet, NPO with Tube Feed:   Tube Feeding Modality: Jejunostomy  Glucerna 1.5 Hood (GLUCERNA1.5RTH)  Total Volume for 24 Hours (mL): 1080  Continuous  Starting Tube Feed Rate {mL per Hour}: 40  Increase Tube Feed Rate by (mL): 10     Every 4 hours  Until Goal Tube Feed Rate (mL per Hour): 60  Tube Feed Duration (in Hours): 18  Tube Feed Start Time: 20:00 (08-08-24 @ 14:40) [Active]

## 2024-07-25 NOTE — DISCHARGE NOTE PROVIDER - NSDCFUADDINST_GEN_ALL_CORE_FT
WOUND CARE:  Please keep incisions clean and dry. Please do not Scrub or rub incisions. Do not use lotion or powder on incisions.   BATHING: You may shower and/or sponge bathe. You may use warm soapy water in the shower and rinse, pat dry.  ACTIVITY: No heavy lifting or straining. Otherwise, you may return to your usual level of physical activity. If you are taking narcotic pain medication DO NOT drive a car, operate machinery or make important decisions.  DIET: Return to your usual diet.  NOTIFY YOUR SURGEON IF YOU HAVE: any bleeding that does not stop, any pus draining from your wound(s), any fever (over 100.4 F) persistent nausea/vomiting, or if your pain is not controlled on your discharge pain medications, unable to urinate.  Please follow up with your primary care physician in one week regarding your hospitalization, bring copies of your discharge paperwork.  Please follow up with your surgeon, Dr. Agustin. Call [phone] to make an appointment.  WOUND CARE:  Please keep incisions clean and dry. Please do not Scrub or rub incisions. Do not use lotion or powder on incisions.   BATHING: You may shower and/or sponge bathe. You may use warm soapy water in the shower and rinse, pat dry.  ACTIVITY: No heavy lifting or straining. Otherwise, you may return to your usual level of physical activity. If you are taking narcotic pain medication DO NOT drive a car, operate machinery or make important decisions.  NOTIFY YOUR SURGEON IF YOU HAVE: any bleeding that does not stop, any pus draining from your wound(s), any fever (over 100.4 F) persistent nausea/vomiting, or if your pain is not controlled on your discharge pain medications, unable to urinate.  Please follow up with your primary care physician in one week regarding your hospitalization, bring copies of your discharge paperwork.  Please follow up with your surgeon, Dr. Agustin. Call [phone] to make an appointment.   Please control your blood sugar and follow up with your endocrinologist.

## 2024-07-25 NOTE — DISCHARGE NOTE PROVIDER - NSDCFUADDAPPT_GEN_ALL_CORE_FT
Please follow up with Dr. Laureano in 2 weeks, please call for an appointment [phone]. Obtain a new Chest XRay within 1-2 days of your appointment and bring it with you.    Please follow up with Dr. Agustin in 2-3 weeks or as needed, call for an appointment.    Please follow up with your PCP in 2-3 weeks or as needed, call for an appointment. Please follow up with Dr. Laureano in 2 weeks, please call for an appointment [phone]. Obtain a new Chest XRay within 1-2 days of your appointment and bring it with you.  Please follow up with Dr. Agustin in 2-3 weeks or as needed, call for an appointment.  Please follow up with your PCP in 2-3 weeks or as needed, call for an appointment.

## 2024-07-25 NOTE — PROGRESS NOTE ADULT - ASSESSMENT
· Assessment	  59 y.o. man with history of GEJ adenocarcinoma (T3N0, stage 2A) s/p neoadjuvant chemotherapy, now s/p Michael-Broderick Esophagectomy on 7/16.      Sx f/up appreciated  PT f/up  Pain control Oxycodone  NPO    DM II:    FSSS  NPH insulin  Endo f/up appreciated    HTN:    On IV Metoprolol/Hydralazine  Cardio f/up appreciated    Leukocytosis:    On IV Zosyn  Will monitor

## 2024-07-25 NOTE — PROGRESS NOTE ADULT - SUBJECTIVE AND OBJECTIVE BOX
DATE OF SERVICE: 07-25-24    Patient denies chest pain or shortness of breath.   Review of symptoms otherwise negative.    Review of Systems:   Constitutional: [ ] fevers, [ ] chills.   Skin: [ ] dry skin. [ ] rashes.  Psychiatric: [ ] depression, [ ] anxiety.   Gastrointestinal: [ ] BRBPR, [ ] melena.   Neurological: [ ] confusion. [ ] seizures. [ ] shuffling gait.   Ears,Nose,Mouth and Throat: [ ] ear pain [ ] sore throat.   Eyes: [ ] diplopia.   Respiratory: [ ] hemoptysis. [ ] shortness of breath  Cardiovascular: See HPI above  Hematologic/Lymphatic: [ ] anemia. [ ] painful nodes. [ ] prolonged bleeding.   Genitourinary: [ ] hematuria. [ ] flank pain.   Endocrine: [ ] significant change in weight. [ ] intolerance to heat and cold.     Review of systems [x ] otherwise negative, [ ] otherwise unable to obtain    FH: no family history of sudden cardiac death in first degree relatives    SH: [ ] tobacco, [ ] alcohol, [ ] drugs    acetaminophen   IVPB .. 825 milliGRAM(s) IV Intermittent every 6 hours  dextrose 5% + sodium chloride 0.9% with potassium chloride 20 mEq/L 1000 milliLiter(s) IV Continuous <Continuous>  dornase malik Solution 2.5 milliGRAM(s) Inhalation daily  enoxaparin Injectable 40 milliGRAM(s) SubCutaneous every 24 hours  hydrALAZINE Injectable 10 milliGRAM(s) IV Push every 6 hours  HYDROmorphone  Injectable 0.5 milliGRAM(s) IV Push every 4 hours PRN  HYDROmorphone  Injectable 0.2 milliGRAM(s) IV Push every 4 hours PRN  insulin glargine Injectable (LANTUS) 5 Unit(s) SubCutaneous at bedtime  insulin lispro (ADMELOG) corrective regimen sliding scale   SubCutaneous every 6 hours  levalbuterol Inhalation 0.63 milliGRAM(s) Inhalation every 6 hours  lidocaine   4% Patch 1 Patch Transdermal at bedtime  metoclopramide Injectable 10 milliGRAM(s) IV Push every 6 hours  metoprolol tartrate Injectable 5 milliGRAM(s) IV Push every 6 hours  naloxone Injectable 0.1 milliGRAM(s) IV Push every 3 minutes PRN  ondansetron Injectable 4 milliGRAM(s) IV Push every 6 hours  pantoprazole  Injectable 40 milliGRAM(s) IV Push every 12 hours  piperacillin/tazobactam IVPB.- 3.375 Gram(s) IV Intermittent once  piperacillin/tazobactam IVPB.. 3.375 Gram(s) IV Intermittent every 8 hours  sodium chloride 3%  Inhalation 4 milliLiter(s) Inhalation every 6 hours  sodium phosphate 30 milliMole(s)/500 mL IVPB 30 milliMole(s) IV Intermittent once                            10.0   18.49 )-----------( 198      ( 25 Jul 2024 05:45 )             29.3       130<L>  |  99  |  11  ----------------------------<  183<H>  4.0   |  19<L>  |  0.79    Ca    7.4<L>      25 Jul 2024 05:45  Phos  1.8     07-25  Mg     1.80     07-25      T(C): 36.6 (07-25-24 @ 11:50), Max: 37.4 (07-24-24 @ 20:45)  HR: 92 (07-25-24 @ 11:50) (92 - 113)  BP: 127/69 (07-25-24 @ 11:50) (127/69 - 150/73)  RR: 18 (07-25-24 @ 11:50) (15 - 19)  SpO2: 96% (07-25-24 @ 11:50) (94% - 97%)  Wt(kg): --    I&O's Summary    24 Jul 2024 07:01  -  25 Jul 2024 07:00  --------------------------------------------------------  IN: 2380 mL / OUT: 670 mL / NET: 1710 mL    Gen: NAD  HEENT:  (-)icterus (-)pallor  CV: N S1 S2 1/6 CATHY (+)2 Pulses B/l  Resp:  Clear to auscultation B/L, normal effort  GI: (+) BS Soft, NT, ND  Lymph:  (-)Edema, (-)obvious lymphadenopathy  Skin: Warm to touch, Normal turgor  Psych: Appropriate mood and affect      TELEMETRY: 	 SR/ST    ECG:  	NSR      TTE 07/2024  Findings:  1. Normal left ventricular size and function.  Mild diastolic dysfunction.  2. Normal left atrial size  3. Right atrial cavity is normal in size.  4. Normal right ventricular size and function.  5. Normal trileaflet aortic valve opening.  6. Normal mitral valve opening.  7. Normal appearing tricuspid valve with mild tricuspid  regurgitation.  8. Pulmonic valve is grossly normal, yet poorly visualized  with no doppler evidence for pulmonic stenosis.  9. No evidence of significant pericardial effusion.  10. The aortic root is normal.  11. Normal pulmonary artery.  12. IVC is normal with respiratory variation.  FULL STUDY DONE INCLUDING M-MODE RECORDING,  SPECTRAL DOPPLER AND    Stress 07/2024  Normal myocardial perfusion SPECT images No evidence of stress induced ischemia or infarction.  Normal left ventricular size and function.  Calculated EF is: 68%    ASSESSMENT/PLAN: 	Pt is a 59 y.o. man with history of HTN,, DLD, DM,  GEJ adenocarcinoma (T3N0, stage 2A) s/p neoadjuvant chemotherapy admitted for optimization prior to planned Michael-Broderick Esophagectomy on tuesday. Recently had a nuclear stress test in our office for evaluation of preoperative cardiac risk assessment prior to esophageal cancer surgery scheduled on 07/16/2024. The patient denies any chest pain, shortness ofbreath, or anginal symptoms. He has no palpitations, dizziness, or syncope    Pre-OP/HTN  - tolerated procedure well from CV perspective  - diet advanced, Losartan and Metoprolol started but now changed back to IV meds as he reports fluids not staying down  - f/u CT, eval for asp PNA    Nkechi TATE  [phone]

## 2024-07-25 NOTE — PROGRESS NOTE ADULT - SUBJECTIVE AND OBJECTIVE BOX
pt seen and examined w Dr Laureano this morning  d/w Dr Santos on morning TEAMS report  pt feels well   afebrile  tolerating clears    Vital Signs Last 24 Hrs  T(C): 36.6 (25 Jul 2024 11:50), Max: 37.4 (24 Jul 2024 20:45)  T(F): 97.8 (25 Jul 2024 11:50), Max: 99.3 (24 Jul 2024 20:45)  HR: 92 (25 Jul 2024 11:50) (92 - 113)  BP: 127/69 (25 Jul 2024 11:50) (127/69 - 150/73)  BP(mean): --  RR: 18 (25 Jul 2024 11:50) (15 - 19)  SpO2: 96% (25 Jul 2024 11:50) (94% - 97%)    Parameters below as of 25 Jul 2024 11:50  Patient On (Oxygen Delivery Method): nasal cannula  O2 Flow (L/min): 2      PHYSICAL EXAM:  Gen: NAD  Resp: Respirations unlabored. Bilateral chest rise.   Card: RRR.   GI: Soft. Nontender. Nondistended.   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 Pt s/p barium swallow and started on CLD. Today WBC increased to 18.  - f/u imaging reports   - started on Zosyn by Surg D  - monitor and trend WBC  - continue lavern to suction monitoring quality  - pt NPO on IVF fluids   - will follow    Molly TATE [id number]

## 2024-07-25 NOTE — DISCHARGE NOTE PROVIDER - HOSPITAL COURSE
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     7/25 Endocrinology consulted for uncontrolled type 2 DM  7/22 UGI performed demonstrating no evidence of leak, contrast passed freely into stomach. Patient started on clear liquid diet. Chest tube d/c  7/24 Patient downgraded from CTICU, transferred to D team surgery as primary. Patient endorses PO intolerance. DC stallworth, passed TOV.  7/25 Chest xray with R sided infiltrates. Repeat UGI demonstrates... CT chest with IV contrast shows .....    Diet was restarted and advanced as tolerated. Pain control was transitioned from IV to PO pain meds. At this time, patient is currently ambulating, voiding, tolerating a regular diet. Patient has been deemed stable for discharge _______ with follow up as an outpatient. 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     7/25 Endocrinology consulted for uncontrolled type 2 DM  7/22 UGI performed demonstrating no evidence of leak, contrast passed freely into stomach. Patient started on clear liquid diet. Chest tube d/c  7/24 Patient downgraded from CTICU, transferred to D team surgery as primary. Patient endorses PO intolerance. DC stallworth, passed TOV.  7/25 Chest xray with R sided infiltrates. Diet backed down to NPO. CT chest A/P with IV and oral contrast demonstrates ........    Diet was restarted and advanced as tolerated. Pain control was transitioned from IV to PO pain meds. At this time, patient is currently ambulating, voiding, tolerating a regular diet. Patient has been deemed stable for discharge _______ with follow up as an outpatient. 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     7/25 Endocrinology consulted for uncontrolled type 2 DM  7/22 UGI performed demonstrating no evidence of leak, contrast passed freely into stomach. Patient started on clear liquid diet. Chest tube d/c  7/24 Patient downgraded from CTICU, transferred to D team surgery as primary. Patient endorses PO intolerance. DC stallworth, passed TOV.  7/25 Chest xray with R sided infiltrates. Diet backed down to NPO. CT chest A/P with IV and oral contrast demonstrates small loculated right pleural effusion with foci of air and pleural cath.  Small left pleural effusion, splenic infarcts, small volume ascites.  WBC increasing and lavern with murky output, Zosyn started.    7/26 As per Dr. Laureano, switched back to CLD, monitor right lavern output. WBC trending up.   7/27 Adv to fulls   7/28 new sinus tach, req nasal cannula, worsening RLL opacification;  pt advanced to soft diet, tachycardic CTA neg PE, increasing R hydroPTX   7/29: Vomiting-> NPO. High murky Lavern drainage- 350 x 12h. Productive cough.  Cont abx  7/30 CT chest PO: no leak; VS said resume clears; added reglan  7/31 adv to fulls; plan to DC home on augmentin   Diet was restarted and advanced as tolerated. Pain control was transitioned from IV to PO pain meds. At this time, patient is currently ambulating, voiding, tolerating diet. Patient has been deemed stable for discharge _______ with follow up as an outpatient. 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     7/25 Endocrinology consulted for uncontrolled type 2 DM  7/22 UGI performed demonstrating no evidence of leak, contrast passed freely into stomach. Patient started on clear liquid diet. Chest tube d/c  7/24 Patient downgraded from CTICU, transferred to D team surgery as primary. Patient endorses PO intolerance. DC stallworth, passed TOV.  7/25 Chest xray with R sided infiltrates. Diet backed down to NPO. CT chest A/P with IV and oral contrast demonstrates small loculated right pleural effusion with foci of air and pleural cath.  Small left pleural effusion, splenic infarcts, small volume ascites.  WBC increasing and lavern with murky output, Zosyn started.    7/26 As per Dr. Laureano, switched back to CLD, monitor right lavern output. WBC trending up.   7/27 Adv to fulls   7/28 new sinus tach, req nasal cannula, worsening RLL opacification;  pt advanced to soft diet, tachycardic CTA neg PE, increasing R hydroPTX   7/29: Vomiting-> NPO. High murky Lavern drainage- 350 x 12h. Productive cough.  Cont abx  7/30 CT chest PO: no leak; VS said resume clears; added reglan  7/31 adv to fulls; plan to DC home on augmentin   8/1 CT abd non-con  8/2 J-tube placement by IR  8/3 Tube Feeds started  8/4 CT abd/pelv w/ contrast showing leak  8/5 Murky output from lavern  8/6 EGD and stent w/ GI. GI to remove in 4-6 weeks.  8/7 TFs approved and delivered to home, ID rec d/c home on 4 weeks of PO augmentin  Diet was restarted and advanced as tolerated. Pain control was transitioned from IV to PO pain meds. At this time, patient is currently ambulating, voiding, tolerating diet. Patient has been deemed stable for discharge _______ with follow up as an outpatient. 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     7/25 Endocrinology consulted for uncontrolled type 2 DM  7/22 UGI performed demonstrating no evidence of leak, contrast passed freely into stomach. Patient started on clear liquid diet. Chest tube d/c  7/24 Patient downgraded from CTICU, transferred to D team surgery as primary. Patient endorses PO intolerance. DC stallworth, passed TOV.  7/25 Chest xray with R sided infiltrates. Diet backed down to NPO. CT chest A/P with IV and oral contrast demonstrates small loculated right pleural effusion with foci of air and pleural cath.  Small left pleural effusion, splenic infarcts, small volume ascites.  WBC increasing and lavern with murky output, Zosyn started.    7/26 As per Dr. Laureano, switched back to CLD, monitor right lavern output. WBC trending up.   7/27 Adv to fulls   7/28 new sinus tach, req nasal cannula, worsening RLL opacification;  pt advanced to soft diet, tachycardic CTA neg PE, increasing R hydroPTX   7/29: Vomiting-> NPO. High murky Lavern drainage- 350 x 12h. Productive cough.  Cont abx  7/30 CT chest PO: no leak; VS said resume clears; added reglan  7/31 adv to fulls; plan to DC home on augmentin   8/1 CT abd non-con  8/2 J-tube placement by IR  8/3 Tube Feeds started  8/4 CT abd/pelv w/ contrast showing leak  8/5 Murky output from lavern  8/6 EGD and stent w/ GI. GI to remove in 4-6 weeks.  8/7 TFs approved and delivered to home, ID rec d/c home on 4 weeks of PO augmentin  8/8 Output improving from right lavern to bulb  8/9 Diet was restarted and advanced as tolerated. Pain control was transitioned from IV to PO pain meds. At this time, patient is currently ambulating, voiding, tolerating diet. Patient has been deemed stable for discharge today with follow up as an outpatient with Dr. Laureano on 08/20/24. Plan above as per Dr. Laureano.

## 2024-07-25 NOTE — PROGRESS NOTE ADULT - SUBJECTIVE AND OBJECTIVE BOX
Chief Complaint: DM2    History: had eaten jello and water  no made NPO again  on dextrose fluids    MEDICATIONS  (STANDING):  acetaminophen   IVPB .. 825 milliGRAM(s) IV Intermittent every 6 hours  dextrose 5% + sodium chloride 0.9% with potassium chloride 20 mEq/L 1000 milliLiter(s) (75 mL/Hr) IV Continuous <Continuous>  dornase malik Solution 2.5 milliGRAM(s) Inhalation daily  enoxaparin Injectable 40 milliGRAM(s) SubCutaneous every 24 hours  hydrALAZINE Injectable 10 milliGRAM(s) IV Push every 6 hours  insulin glargine Injectable (LANTUS) 5 Unit(s) SubCutaneous at bedtime  insulin lispro (ADMELOG) corrective regimen sliding scale   SubCutaneous every 6 hours  levalbuterol Inhalation 0.63 milliGRAM(s) Inhalation every 6 hours  lidocaine   4% Patch 1 Patch Transdermal at bedtime  metoclopramide Injectable 10 milliGRAM(s) IV Push every 6 hours  metoprolol tartrate Injectable 5 milliGRAM(s) IV Push every 6 hours  pantoprazole  Injectable 40 milliGRAM(s) IV Push every 12 hours  piperacillin/tazobactam IVPB.. 3.375 Gram(s) IV Intermittent every 8 hours  sodium chloride 3%  Inhalation 4 milliLiter(s) Inhalation every 6 hours    MEDICATIONS  (PRN):  HYDROmorphone  Injectable 0.5 milliGRAM(s) IV Push every 4 hours PRN Severe Pain (7 - 10)  HYDROmorphone  Injectable 0.2 milliGRAM(s) IV Push every 4 hours PRN Moderate Pain (4 - 6)  naloxone Injectable 0.1 milliGRAM(s) IV Push every 3 minutes PRN For ANY of the following changes in patient status:  A. RR LESS THAN 10 breaths per minute, B. Oxygen saturation LESS THAN 90%, C. Sedation score of 6      Allergies    No Known Allergies    Intolerances      Review of Systems:    ALL OTHER SYSTEMS REVIEWED AND NEGATIVE      PHYSICAL EXAM:  VITALS: T(C): 36.6 (07-25-24 @ 11:50)  T(F): 97.8 (07-25-24 @ 11:50), Max: 99.3 (07-24-24 @ 20:45)  HR: 92 (07-25-24 @ 11:50) (92 - 113)  BP: 127/69 (07-25-24 @ 11:50) (127/69 - 150/73)  RR:  (15 - 19)  SpO2:  (94% - 97%)  Wt(kg): --  GENERAL: NAD, well-groomed, well-developed  EYES: No proptosis, no lid lag, anicteric  HEENT:  Atraumatic, Normocephalic, moist mucous membranes  RESPIRATORY: nonlabored respirations, no wheezing  PSYCH: Alert and oriented x 3, normal affect, normal mood    CAPILLARY BLOOD GLUCOSE      POCT Blood Glucose.: 115 mg/dL (25 Jul 2024 12:14)  POCT Blood Glucose.: 214 mg/dL (25 Jul 2024 08:11)  POCT Blood Glucose.: 129 mg/dL (24 Jul 2024 22:04)  POCT Blood Glucose.: 197 mg/dL (24 Jul 2024 18:45)  POCT Blood Glucose.: 210 mg/dL (24 Jul 2024 17:16)  POCT Blood Glucose.: 210 mg/dL (24 Jul 2024 16:03)      07-25    130<L>  |  99  |  11  ----------------------------<  183<H>  4.0   |  19<L>  |  0.79    eGFR: 101    Ca    7.4<L>      07-25  Mg     1.80     07-25  Phos  1.8     07-25        A1C with Estimated Average Glucose Result: 10.9 % (07-13-24 @ 02:44)  A1C with Estimated Average Glucose Result: 12.8 % (01-01-24 @ 06:00)      Thyroid Function Tests:

## 2024-07-25 NOTE — DISCHARGE NOTE PROVIDER - PROVIDER TOKENS
PROVIDER:[TOKEN:[[id number]],FOLLOWUP:[1 week]],PROVIDER:[TOKEN:[[id number]],FOLLOWUP:[1 week]]

## 2024-07-25 NOTE — DISCHARGE NOTE PROVIDER - CARE PROVIDERS DIRECT ADDRESSES
,[email or web address]

## 2024-07-25 NOTE — PROGRESS NOTE ADULT - SUBJECTIVE AND OBJECTIVE BOX
TEAM [ D ] Surgery Daily Progress Note  =====================================================    SUBJECTIVE: Patient seen and examined at bedside on AM rounds. Patient reports that they're feeling well. Denies fever, chills, N/V, chest pain, SOB    ALLERGIES:  No Known Allergies    -------------------------------------------------------------------------------------    MEDICATIONS:  acetaminophen   Oral Liquid .. 825 milliGRAM(s) Oral every 6 hours  chlorhexidine 2% Cloths 1 Application(s) Topical daily  dextrose 5% + sodium chloride 0.45% with potassium chloride 40 mEq/L 1000 milliLiter(s) IV Continuous <Continuous>  dornase malik Solution 2.5 milliGRAM(s) Inhalation daily  enoxaparin Injectable 40 milliGRAM(s) SubCutaneous every 24 hours  hydrALAZINE Injectable 10 milliGRAM(s) IV Push every 6 hours  insulin glargine Injectable (LANTUS) 5 Unit(s) SubCutaneous at bedtime  insulin lispro (ADMELOG) corrective regimen sliding scale   SubCutaneous Before meals and at bedtime  insulin lispro Injectable (ADMELOG) 2 Unit(s) SubCutaneous three times a day before meals  levalbuterol Inhalation 0.63 milliGRAM(s) Inhalation every 6 hours  lidocaine   4% Patch 1 Patch Transdermal at bedtime  metoclopramide Injectable 10 milliGRAM(s) IV Push every 6 hours  metoprolol tartrate Injectable 5 milliGRAM(s) IV Push every 6 hours  naloxone Injectable 0.1 milliGRAM(s) IV Push every 3 minutes PRN  ondansetron Injectable 4 milliGRAM(s) IV Push every 6 hours  oxyCODONE    Solution 5 milliGRAM(s) Oral every 4 hours PRN  oxyCODONE    Solution 7.5 milliGRAM(s) Oral every 6 hours PRN  pantoprazole  Injectable 40 milliGRAM(s) IV Push every 12 hours  sodium chloride 3%  Inhalation 4 milliLiter(s) Inhalation every 6 hours    --------------------------------------------------------------------------------------    VITAL SIGNS:  T(C): 36.7 (07-25-24 @ 04:25), Max: 37.4 (07-24-24 @ 20:45)  HR: 101 (07-25-24 @ 04:25) (82 - 113)  BP: 137/78 (07-25-24 @ 04:25) (123/73 - 157/88)  RR: 15 (07-25-24 @ 04:25) (10 - 23)  SpO2: 97% (07-25-24 @ 04:25) (93% - 97%)  --------------------------------------------------------------------------------------    INS AND OUTS:    07-23-24 @ 07:01  -  07-24-24 @ 07:00  --------------------------------------------------------  IN: 1230 mL / OUT: 1310 mL / NET: -80 mL    07-24-24 @ 07:01  -  07-25-24 @ 05:47  --------------------------------------------------------  IN: 2380 mL / OUT: 670 mL / NET: 1710 mL      --------------------------------------------------------------------------------------      EXAM    General: NAD, resting in bed comfortably.  Cardiac: regular rate, warm and well perfused  Respiratory: Nonlabored respirations, normal cw expansion.  Abdomen: soft, nontender, nondistended  Extremities: normal strength, FROM, no deformities    --------------------------------------------------------------------------------------    LABS                        10.1   13.81 )-----------( 191      ( 24 Jul 2024 02:58 )             30.7   07-24    133<L>  |  100  |  16  ----------------------------<  221<H>  3.7   |  21<L>  |  0.97    Ca    7.6<L>      24 Jul 2024 02:58  Phos  2.3     07-24  Mg     2.20     07-24         TEAM [ D ] Surgery Daily Progress Note  =====================================================    SUBJECTIVE: Patient seen and examined at bedside on AM rounds. Patient reports that they're feeling well. He is tolerating clear liquids, but "spitting up" slightly' overall improved from days prior. Endorses intermittent hiccups. He is passing gas and having bowel movements. He is ambulating. Denies fever, chills, N/V, chest pain, SOB    ALLERGIES:  No Known Allergies    -------------------------------------------------------------------------------------    MEDICATIONS:  acetaminophen   Oral Liquid .. 825 milliGRAM(s) Oral every 6 hours  chlorhexidine 2% Cloths 1 Application(s) Topical daily  dextrose 5% + sodium chloride 0.45% with potassium chloride 40 mEq/L 1000 milliLiter(s) IV Continuous <Continuous>  dornase malik Solution 2.5 milliGRAM(s) Inhalation daily  enoxaparin Injectable 40 milliGRAM(s) SubCutaneous every 24 hours  hydrALAZINE Injectable 10 milliGRAM(s) IV Push every 6 hours  insulin glargine Injectable (LANTUS) 5 Unit(s) SubCutaneous at bedtime  insulin lispro (ADMELOG) corrective regimen sliding scale   SubCutaneous Before meals and at bedtime  insulin lispro Injectable (ADMELOG) 2 Unit(s) SubCutaneous three times a day before meals  levalbuterol Inhalation 0.63 milliGRAM(s) Inhalation every 6 hours  lidocaine   4% Patch 1 Patch Transdermal at bedtime  metoclopramide Injectable 10 milliGRAM(s) IV Push every 6 hours  metoprolol tartrate Injectable 5 milliGRAM(s) IV Push every 6 hours  naloxone Injectable 0.1 milliGRAM(s) IV Push every 3 minutes PRN  ondansetron Injectable 4 milliGRAM(s) IV Push every 6 hours  oxyCODONE    Solution 5 milliGRAM(s) Oral every 4 hours PRN  oxyCODONE    Solution 7.5 milliGRAM(s) Oral every 6 hours PRN  pantoprazole  Injectable 40 milliGRAM(s) IV Push every 12 hours  sodium chloride 3%  Inhalation 4 milliLiter(s) Inhalation every 6 hours    --------------------------------------------------------------------------------------    VITAL SIGNS:  T(C): 36.7 (07-25-24 @ 04:25), Max: 37.4 (07-24-24 @ 20:45)  HR: 101 (07-25-24 @ 04:25) (82 - 113)  BP: 137/78 (07-25-24 @ 04:25) (123/73 - 157/88)  RR: 15 (07-25-24 @ 04:25) (10 - 23)  SpO2: 97% (07-25-24 @ 04:25) (93% - 97%)  --------------------------------------------------------------------------------------    INS AND OUTS:    07-23-24 @ 07:01  -  07-24-24 @ 07:00  --------------------------------------------------------  IN: 1230 mL / OUT: 1310 mL / NET: -80 mL    07-24-24 @ 07:01  -  07-25-24 @ 05:47  --------------------------------------------------------  IN: 2380 mL / OUT: 670 mL / NET: 1710 mL      --------------------------------------------------------------------------------------      EXAM    General: NAD, resting in bed comfortably.  Cardiac: regular rate, warm and well perfused  Respiratory: Nonlabored respirations, normal cw expansion.  Abdomen: soft, nontender, nondistended, surgical incisions c/d/i, VENKATA yan, bilateral flank ecchymosis  Extremities: normal strength, FROM, no deformities    --------------------------------------------------------------------------------------    LABS                        10.1   13.81 )-----------( 191      ( 24 Jul 2024 02:58 )             30.7   07-24    133<L>  |  100  |  16  ----------------------------<  221<H>  3.7   |  21<L>  |  0.97    Ca    7.6<L>      24 Jul 2024 02:58  Phos  2.3     07-24  Mg     2.20     07-24

## 2024-07-25 NOTE — DISCHARGE NOTE PROVIDER - NSDCACTIVITY_GEN_ALL_CORE
Showering allowed/Stairs allowed/Walking - Indoors allowed/Walking - Outdoors allowed Do not drive or operate machinery/Showering allowed/Stairs allowed/Walking - Indoors allowed/No heavy lifting/straining/Walking - Outdoors allowed

## 2024-07-25 NOTE — DISCHARGE NOTE PROVIDER - CARE PROVIDER_API CALL
Nikole Love, [facility]  Surgery  [address], [place] [address]  Phone: [phone]  Fax: [phone]  Follow Up Time: 1 week    Lewis Laureano  Thoracic Surgery  [address] [place] [id number]  Phone: [phone]  Fax: [phone]  Follow Up Time: 1 week

## 2024-07-25 NOTE — PROGRESS NOTE ADULT - NS ATTEND AMEND GEN_ALL_CORE FT
Patient seen and examined. Agree with plan as detailed in PA/NP Note.     follow BP if consistently above 180 can give IV hydralazine      Mart Mason MD  Pager: [phone]

## 2024-07-25 NOTE — PROGRESS NOTE ADULT - ASSESSMENT
The patient is a 59y Male with PMH of T2DM, HTN, HLD, GE junction cancer here for esophagectomy now postop.  Endocrinology consulted for T2DM    #Uncontrolled Type 2 Diabetes Mellitus   - Follows with: Dr. Nolacso  - A1C with Estimated Average Glucose Result: 10.9 % (07-13-24)  - home regimen: Semglee 18 units, Novolog 6 units with meals      INPATIENT PLAN:  -Patient started clear liquid diet, tolerating small amount so far. Now made NPO again.  -Continue Lantus 5 units qhs  -While NPO can hold Admelog 2 units TID premeal, hold if NPO  -Recommend scale to Low q6h while NPO  -Currently on dextrose fluids  once diet removed and tolerating would remove dextrose at that point  -FS premeal and bedtime  - Inpatient glucose goals: 100-180 mg/sl  -add hypoglycemia protocol      DISCHARGE PLANNING:  - Discharge recs pending clinical course and nutrition plan. Plan to resume Semglee and Admelog will determine final doses based on hospital requirements/ po intake at time of discharge.   - will need Endocrinology follow up. Patient has an appointment with Dr. Nolasco scheduled for August 8th at 8:20 AM at [address] [facility]   [address].     #Hypertension  - Goal BP <130/80  - Management as per primary team  - check urine microalbumin level as outpatient    #Hyperlipidemia  - LDL goal <70  - check lipid panel as outpatient on a yearly basis    Leo Rader MD  Division of Endocrinology  Pager: [phone]    If after 6PM or before 9AM, or on weekends/holidays, please call endocrine answering service for assistance ([phone]).  For nonurgent matters email [email or web address] for [email or web address].

## 2024-07-25 NOTE — DISCHARGE NOTE PROVIDER - DETAILS OF MALNUTRITION DIAGNOSIS/DIAGNOSES
This patient has been assessed with a concern for Malnutrition and was treated during this hospitalization for the following Nutrition diagnosis/diagnoses:     -  07/14/2024: Moderate protein-calorie malnutrition   This patient has been assessed with a concern for Malnutrition and was treated during this hospitalization for the following Nutrition diagnosis/diagnoses:     -  08/08/2024: Severe protein-calorie malnutrition   -  07/14/2024: Moderate protein-calorie malnutrition

## 2024-07-26 LAB
ANION GAP SERPL CALC-SCNC: 11 MMOL/L — SIGNIFICANT CHANGE UP (ref 7–14)
BUN SERPL-MCNC: 10 MG/DL — SIGNIFICANT CHANGE UP (ref 7–23)
CALCIUM SERPL-MCNC: 7.3 MG/DL — LOW (ref 8.4–10.5)
CHLORIDE SERPL-SCNC: 100 MMOL/L — SIGNIFICANT CHANGE UP (ref 98–107)
CO2 SERPL-SCNC: 20 MMOL/L — LOW (ref 22–31)
CREAT SERPL-MCNC: 0.91 MG/DL — SIGNIFICANT CHANGE UP (ref 0.5–1.3)
EGFR: 96 ML/MIN/1.73M2 — SIGNIFICANT CHANGE UP
GLUCOSE BLDC GLUCOMTR-MCNC: 183 MG/DL — HIGH (ref 70–99)
GLUCOSE BLDC GLUCOMTR-MCNC: 189 MG/DL — HIGH (ref 70–99)
GLUCOSE BLDC GLUCOMTR-MCNC: 199 MG/DL — HIGH (ref 70–99)
GLUCOSE BLDC GLUCOMTR-MCNC: 199 MG/DL — HIGH (ref 70–99)
GLUCOSE BLDC GLUCOMTR-MCNC: 246 MG/DL — HIGH (ref 70–99)
GLUCOSE SERPL-MCNC: 205 MG/DL — HIGH (ref 70–99)
HCT VFR BLD CALC: 30.5 % — LOW (ref 39–50)
HGB BLD-MCNC: 10.4 G/DL — LOW (ref 13–17)
MAGNESIUM SERPL-MCNC: 1.8 MG/DL — SIGNIFICANT CHANGE UP (ref 1.6–2.6)
MCHC RBC-ENTMCNC: 30.1 PG — SIGNIFICANT CHANGE UP (ref 27–34)
MCHC RBC-ENTMCNC: 34.1 GM/DL — SIGNIFICANT CHANGE UP (ref 32–36)
MCV RBC AUTO: 88.2 FL — SIGNIFICANT CHANGE UP (ref 80–100)
NRBC # BLD: 0 /100 WBCS — SIGNIFICANT CHANGE UP (ref 0–0)
NRBC # FLD: 0 K/UL — SIGNIFICANT CHANGE UP (ref 0–0)
PHOSPHATE SERPL-MCNC: 2.7 MG/DL — SIGNIFICANT CHANGE UP (ref 2.5–4.5)
PLATELET # BLD AUTO: 203 K/UL — SIGNIFICANT CHANGE UP (ref 150–400)
POTASSIUM SERPL-MCNC: 3.6 MMOL/L — SIGNIFICANT CHANGE UP (ref 3.5–5.3)
POTASSIUM SERPL-SCNC: 3.6 MMOL/L — SIGNIFICANT CHANGE UP (ref 3.5–5.3)
RBC # BLD: 3.46 M/UL — LOW (ref 4.2–5.8)
RBC # FLD: 13.5 % — SIGNIFICANT CHANGE UP (ref 10.3–14.5)
SODIUM SERPL-SCNC: 131 MMOL/L — LOW (ref 135–145)
WBC # BLD: 21.36 K/UL — HIGH (ref 3.8–10.5)
WBC # FLD AUTO: 21.36 K/UL — HIGH (ref 3.8–10.5)

## 2024-07-26 PROCEDURE — 99232 SBSQ HOSP IP/OBS MODERATE 35: CPT

## 2024-07-26 PROCEDURE — 99254 IP/OBS CNSLTJ NEW/EST MOD 60: CPT | Mod: GC

## 2024-07-26 PROCEDURE — 71045 X-RAY EXAM CHEST 1 VIEW: CPT | Mod: 26

## 2024-07-26 RX ORDER — INSULIN LISPRO 100/ML
VIAL (ML) SUBCUTANEOUS
Refills: 0 | Status: DISCONTINUED | OUTPATIENT
Start: 2024-07-26 | End: 2024-07-29

## 2024-07-26 RX ORDER — INSULIN LISPRO 100/ML
2 VIAL (ML) SUBCUTANEOUS
Refills: 0 | Status: DISCONTINUED | OUTPATIENT
Start: 2024-07-26 | End: 2024-07-29

## 2024-07-26 RX ORDER — POTASSIUM CHLORIDE 1500 MG/1
10 TABLET, EXTENDED RELEASE ORAL
Refills: 0 | Status: COMPLETED | OUTPATIENT
Start: 2024-07-26 | End: 2024-07-26

## 2024-07-26 RX ORDER — INSULIN LISPRO 100/ML
VIAL (ML) SUBCUTANEOUS AT BEDTIME
Refills: 0 | Status: DISCONTINUED | OUTPATIENT
Start: 2024-07-26 | End: 2024-07-29

## 2024-07-26 RX ORDER — MAGNESIUM SULFATE 500 MG/ML
2 VIAL (ML) INJECTION ONCE
Refills: 0 | Status: COMPLETED | OUTPATIENT
Start: 2024-07-26 | End: 2024-07-26

## 2024-07-26 RX ADMIN — POTASSIUM CHLORIDE 100 MILLIEQUIVALENT(S): 1500 TABLET, EXTENDED RELEASE ORAL at 13:55

## 2024-07-26 RX ADMIN — INSULIN GLARGINE-YFGN 5 UNIT(S): 100 INJECTION, SOLUTION SUBCUTANEOUS at 22:23

## 2024-07-26 RX ADMIN — POTASSIUM CHLORIDE 100 MILLIEQUIVALENT(S): 1500 TABLET, EXTENDED RELEASE ORAL at 15:04

## 2024-07-26 RX ADMIN — DORNASE ALFA 2.5 MILLIGRAM(S): 1 SOLUTION RESPIRATORY (INHALATION) at 09:30

## 2024-07-26 RX ADMIN — LEVALBUTEROL HYDROCHLORIDE 0.63 MILLIGRAM(S): 0.31 SOLUTION RESPIRATORY (INHALATION) at 22:38

## 2024-07-26 RX ADMIN — Medication 4 MILLILITER(S): at 22:37

## 2024-07-26 RX ADMIN — HYDRALAZINE HYDROCHLORIDE 10 MILLIGRAM(S): 100 TABLET ORAL at 00:46

## 2024-07-26 RX ADMIN — Medication 4 MILLILITER(S): at 03:54

## 2024-07-26 RX ADMIN — HYDRALAZINE HYDROCHLORIDE 10 MILLIGRAM(S): 100 TABLET ORAL at 20:29

## 2024-07-26 RX ADMIN — PIPERACILLIN SODIUM, TAZOBACTAM SODIUM 25 GRAM(S): 3; .375 INJECTION, POWDER, LYOPHILIZED, FOR SOLUTION INTRAVENOUS at 13:56

## 2024-07-26 RX ADMIN — SODIUM CHLORIDE AND POTASSIUM CHLORIDE 75 MILLILITER(S): 150; 450 INJECTION, SOLUTION INTRAVENOUS at 00:09

## 2024-07-26 RX ADMIN — PIPERACILLIN SODIUM, TAZOBACTAM SODIUM 25 GRAM(S): 3; .375 INJECTION, POWDER, LYOPHILIZED, FOR SOLUTION INTRAVENOUS at 06:34

## 2024-07-26 RX ADMIN — POTASSIUM CHLORIDE 100 MILLIEQUIVALENT(S): 1500 TABLET, EXTENDED RELEASE ORAL at 11:41

## 2024-07-26 RX ADMIN — Medication 5 MILLIGRAM(S): at 03:30

## 2024-07-26 RX ADMIN — LEVALBUTEROL HYDROCHLORIDE 0.63 MILLIGRAM(S): 0.31 SOLUTION RESPIRATORY (INHALATION) at 09:26

## 2024-07-26 RX ADMIN — Medication 1: at 18:35

## 2024-07-26 RX ADMIN — Medication 4 MILLILITER(S): at 09:26

## 2024-07-26 RX ADMIN — ENOXAPARIN SODIUM 40 MILLIGRAM(S): 120 INJECTION SUBCUTANEOUS at 12:19

## 2024-07-26 RX ADMIN — Medication 5 MILLIGRAM(S): at 11:41

## 2024-07-26 RX ADMIN — LEVALBUTEROL HYDROCHLORIDE 0.63 MILLIGRAM(S): 0.31 SOLUTION RESPIRATORY (INHALATION) at 17:19

## 2024-07-26 RX ADMIN — LEVALBUTEROL HYDROCHLORIDE 0.63 MILLIGRAM(S): 0.31 SOLUTION RESPIRATORY (INHALATION) at 03:54

## 2024-07-26 RX ADMIN — Medication 5 MILLIGRAM(S): at 18:34

## 2024-07-26 RX ADMIN — Medication 4 MILLILITER(S): at 17:19

## 2024-07-26 RX ADMIN — Medication 2 UNIT(S): at 18:36

## 2024-07-26 RX ADMIN — PANTOPRAZOLE SODIUM 40 MILLIGRAM(S): 20 TABLET, DELAYED RELEASE ORAL at 06:38

## 2024-07-26 RX ADMIN — Medication 2: at 00:45

## 2024-07-26 RX ADMIN — Medication 25 GRAM(S): at 19:30

## 2024-07-26 RX ADMIN — HYDRALAZINE HYDROCHLORIDE 10 MILLIGRAM(S): 100 TABLET ORAL at 13:59

## 2024-07-26 RX ADMIN — PANTOPRAZOLE SODIUM 40 MILLIGRAM(S): 20 TABLET, DELAYED RELEASE ORAL at 18:36

## 2024-07-26 RX ADMIN — Medication 1: at 06:37

## 2024-07-26 RX ADMIN — INSULIN GLARGINE-YFGN 5 UNIT(S): 100 INJECTION, SOLUTION SUBCUTANEOUS at 00:46

## 2024-07-26 NOTE — PROGRESS NOTE ADULT - ASSESSMENT
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 POD #6 UGI without anastomotic leak. Repeat CT CAP on 7/25 for increased WBC, tachycardia, and change in drain quality without signs of leak. Shows fluid collections in R lung. Started on zosyn.     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 POD #6 UGI without anastomotic leak. Repeat CT CAP on 7/25 for increased WBC, tachycardia, and change in drain quality without signs of leak. Shows fluid collections in R lung. Started on zosyn. Will discuss with thoracic about repeating esophagram/possibility of washing out chest.     Plan  - Zosyn, will discuss with ID  - Diet: NPO  - Maintenance IVF 75 cc/hour  - Lantus 5 units, admelog 2 TID, appreciate endo recs  - IV metoprolol and hydralazine, home PO meds held  - Zofran and reglan  - Activity: OOB as tolerated  - ISS  - Pain control with oral solution  - DVT ppx: SCD's & Lovenox    Surgery D Team  [id number]

## 2024-07-26 NOTE — PROGRESS NOTE ADULT - SUBJECTIVE AND OBJECTIVE BOX
Chief Complaint: DM2    History: has been NPO now initiated back on a clear liquid diet  no hypoglycemia events    MEDICATIONS  (STANDING):  dextrose 5% + sodium chloride 0.9% with potassium chloride 20 mEq/L 1000 milliLiter(s) (75 mL/Hr) IV Continuous <Continuous>  dextrose 5%. 1000 milliLiter(s) (100 mL/Hr) IV Continuous <Continuous>  dextrose 5%. 1000 milliLiter(s) (50 mL/Hr) IV Continuous <Continuous>  dextrose 50% Injectable 12.5 Gram(s) IV Push once  dextrose 50% Injectable 25 Gram(s) IV Push once  dextrose 50% Injectable 25 Gram(s) IV Push once  dextrose Oral Gel 15 Gram(s) Oral once  dornase malik Solution 2.5 milliGRAM(s) Inhalation daily  enoxaparin Injectable 40 milliGRAM(s) SubCutaneous every 24 hours  hydrALAZINE Injectable 10 milliGRAM(s) IV Push every 6 hours  insulin glargine Injectable (LANTUS) 5 Unit(s) SubCutaneous at bedtime  insulin lispro (ADMELOG) corrective regimen sliding scale   SubCutaneous at bedtime  insulin lispro (ADMELOG) corrective regimen sliding scale   SubCutaneous three times a day before meals  insulin lispro Injectable (ADMELOG) 2 Unit(s) SubCutaneous three times a day before meals  levalbuterol Inhalation 0.63 milliGRAM(s) Inhalation every 6 hours  lidocaine   4% Patch 1 Patch Transdermal at bedtime  magnesium sulfate  IVPB 2 Gram(s) IV Intermittent once  metoprolol tartrate Injectable 5 milliGRAM(s) IV Push every 6 hours  pantoprazole  Injectable 40 milliGRAM(s) IV Push every 12 hours  piperacillin/tazobactam IVPB.. 3.375 Gram(s) IV Intermittent every 8 hours  sodium chloride 3%  Inhalation 4 milliLiter(s) Inhalation every 6 hours    MEDICATIONS  (PRN):  HYDROmorphone  Injectable 0.2 milliGRAM(s) IV Push every 4 hours PRN Moderate Pain (4 - 6)  HYDROmorphone  Injectable 0.5 milliGRAM(s) IV Push every 4 hours PRN Severe Pain (7 - 10)  naloxone Injectable 0.1 milliGRAM(s) IV Push every 3 minutes PRN For ANY of the following changes in patient status:  A. RR LESS THAN 10 breaths per minute, B. Oxygen saturation LESS THAN 90%, C. Sedation score of 6      Allergies    No Known Allergies    Intolerances      Review of Systems:    ALL OTHER SYSTEMS REVIEWED AND NEGATIVE      PHYSICAL EXAM:  VITALS: T(C): 36.6 (07-26-24 @ 16:18)  T(F): 97.8 (07-26-24 @ 16:18), Max: 98.2 (07-26-24 @ 05:07)  HR: 102 (07-26-24 @ 16:18) (91 - 107)  BP: 140/70 (07-26-24 @ 16:18) (125/79 - 149/73)  RR:  (16 - 18)  SpO2:  (92% - 97%)  Wt(kg): --  GENERAL: NAD, well-groomed, well-developed  EYES: No proptosis, no lid lag, anicteric  HEENT:  Atraumatic, Normocephalic, moist mucous membranes  RESPIRATORY: nonlabored respirations  PSYCH: Alert and oriented x 3, normal affect, normal mood    CAPILLARY BLOOD GLUCOSE      POCT Blood Glucose.: 189 mg/dL (26 Jul 2024 12:04)  POCT Blood Glucose.: 199 mg/dL (26 Jul 2024 06:32)  POCT Blood Glucose.: 246 mg/dL (26 Jul 2024 00:40)      07-26    131<L>  |  100  |  10  ----------------------------<  205<H>  3.6   |  20<L>  |  0.91    eGFR: 96    Ca    7.3<L>      07-26  Mg     1.80     07-26  Phos  2.7     07-26        A1C with Estimated Average Glucose Result: 10.9 % (07-13-24 @ 02:44)  A1C with Estimated Average Glucose Result: 12.8 % (01-01-24 @ 06:00)      Thyroid Function Tests:

## 2024-07-26 NOTE — PROGRESS NOTE ADULT - SUBJECTIVE AND OBJECTIVE BOX
DATE OF SERVICE: 07-26-24    Patient denies chest pain or shortness of breath.   Review of symptoms otherwise negative.    MEDICATIONS:  dextrose 5% + sodium chloride 0.9% with potassium chloride 20 mEq/L 1000 milliLiter(s) IV Continuous <Continuous>  dextrose 5%. 1000 milliLiter(s) IV Continuous <Continuous>  dextrose 5%. 1000 milliLiter(s) IV Continuous <Continuous>  dextrose 50% Injectable 12.5 Gram(s) IV Push once  dextrose 50% Injectable 25 Gram(s) IV Push once  dextrose 50% Injectable 25 Gram(s) IV Push once  dextrose Oral Gel 15 Gram(s) Oral once  dornase malik Solution 2.5 milliGRAM(s) Inhalation daily  enoxaparin Injectable 40 milliGRAM(s) SubCutaneous every 24 hours  hydrALAZINE Injectable 10 milliGRAM(s) IV Push every 6 hours  HYDROmorphone  Injectable 0.5 milliGRAM(s) IV Push every 4 hours PRN  HYDROmorphone  Injectable 0.2 milliGRAM(s) IV Push every 4 hours PRN  insulin glargine Injectable (LANTUS) 5 Unit(s) SubCutaneous at bedtime  insulin lispro (ADMELOG) corrective regimen sliding scale   SubCutaneous every 6 hours  levalbuterol Inhalation 0.63 milliGRAM(s) Inhalation every 6 hours  lidocaine   4% Patch 1 Patch Transdermal at bedtime  magnesium sulfate  IVPB 2 Gram(s) IV Intermittent once  metoprolol tartrate Injectable 5 milliGRAM(s) IV Push every 6 hours  naloxone Injectable 0.1 milliGRAM(s) IV Push every 3 minutes PRN  pantoprazole  Injectable 40 milliGRAM(s) IV Push every 12 hours  piperacillin/tazobactam IVPB.. 3.375 Gram(s) IV Intermittent every 8 hours  potassium chloride  10 mEq/100 mL IVPB 10 milliEquivalent(s) IV Intermittent every 1 hour  sodium chloride 3%  Inhalation 4 milliLiter(s) Inhalation every 6 hours      LABS:                        10.4   21.36 )-----------( 203 ( 26 Jul 2024 08:00 )             30.5       Hemoglobin: 10.4 g/dL (07-26 @ 08:00)  Hemoglobin: 10.0 g/dL (07-25 @ 05:45)  Hemoglobin: 10.1 g/dL (07-24 @ 02:58)  Hemoglobin: 10.5 g/dL (07-23 @ 02:48)  Hemoglobin: 10.0 g/dL (07-22 @ 04:53)      07-26    131<L>  |  100  |  10  ----------------------------<  205<H>  3.6   |  20<L>  |  0.91    Ca    7.3<L>      26 Jul 2024 08:00  Phos  2.7     07-26  Mg     1.80     07-26    Creatinine Trend: 0.91<--, 0.79<--, 0.97<--, 0.88<--, 0.83<--, 0.71<--    PHYSICAL EXAM:  T(C): 36.8 (07-26-24 @ 05:07), Max: 37.1 (07-25-24 @ 16:00)  HR: 106 (07-26-24 @ 09:31) (91 - 106)  BP: 125/79 (07-26-24 @ 05:07) (125/79 - 157/61)  RR: 18 (07-26-24 @ 05:07) (18 - 18)  SpO2: 96% (07-26-24 @ 09:31) (93% - 97%)  Wt(kg): --    I&O's Summary    25 Jul 2024 07:01  -  26 Jul 2024 07:00  --------------------------------------------------------  IN: 1000 mL / OUT: 805 mL / NET: 195 mL            Gen: NAD  HEENT:  (-)icterus (-)pallor  CV: N S1 S2 1/6 CATHY (+)2 Pulses B/l  Resp:  Clear to auscultation B/L, normal effort  GI: (+) BS Soft, NT, ND  Lymph:  (-)Edema, (-)obvious lymphadenopathy  Skin: Warm to touch, Normal turgor  Psych: Appropriate mood and affect      TELEMETRY: 	 SR/ST    ECG:  	NSR      TTE 07/2024  Findings:  1. Normal left ventricular size and function.  Mild diastolic dysfunction.  2. Normal left atrial size  3. Right atrial cavity is normal in size.  4. Normal right ventricular size and function.  5. Normal trileaflet aortic valve opening.  6. Normal mitral valve opening.  7. Normal appearing tricuspid valve with mild tricuspid  regurgitation.  8. Pulmonic valve is grossly normal, yet poorly visualized  with no doppler evidence for pulmonic stenosis.  9. No evidence of significant pericardial effusion.  10. The aortic root is normal.  11. Normal pulmonary artery.  12. IVC is normal with respiratory variation.  FULL STUDY DONE INCLUDING M-MODE RECORDING,  SPECTRAL DOPPLER AND    Stress 07/2024  Normal myocardial perfusion SPECT images No evidence of stress induced ischemia or infarction.  Normal left ventricular size and function.  Calculated EF is: 68%    CT  IMPRESSION:  Small loculated right pleural effusion with foci of air and pleural   catheter. Small left pleural effusion.    Splenic infarcts.    Small volume ascites.    ASSESSMENT/PLAN: 	Pt is a 59 y.o. man with history of HTN,, DLD, DM,  GEJ adenocarcinoma (T3N0, stage 2A) s/p neoadjuvant chemotherapy admitted for optimization prior to planned [facility]-Broderick Esophagectomy on tuesday. Recently had a nuclear stress test in our office for evaluation of preoperative cardiac risk assessment prior to esophageal cancer surgery scheduled on 07/16/2024. The patient denies any chest pain, shortness ofbreath, or anginal symptoms. He has no palpitations, dizziness, or syncope    Pre-OP/HTN  - tolerated procedure well from CV perspective  - diet advanced, Losartan and Metoprolol started but now changed back to IV meds, follow BP if consistently above 180 can give IV hydralazine, no signs of CHF  and no chest pain  - On Karen Mason MD  Pager: [phone]

## 2024-07-26 NOTE — CONSULT NOTE ADULT - SUBJECTIVE AND OBJECTIVE BOX
Patient is a 59 year old male with PMH of HTN, HLD, type 2 DM, B[H, stage 2a GEJ adenocarcinoma diagnosed in 1/2024 s/p neoadjuvant chemo and radiation therapy on 6/25/24 sent in for planned robotic assisted esophagectomy.  Patient s/p esophagectomy on 7/16 with right chest tube placed during procedure.     On 7/25, patient noted to have     ID was consulted for concern for pneumonia.     Repeat CT CAP on 7/25 for increased WBC, tachycardia, and change in drain quality without signs of leak. Shows fluid collections in R lung. Started on zosyn. Will discuss with thoracic about repeating esophagram/possibility of washing out chest.       REVIEW OF SYSTEMS  pending full examination    prior hospital charts reviewed [V]  primary team notes reviewed [V]  other consultant notes reviewed [V]    PAST MEDICAL & SURGICAL HISTORY:  HLD (hyperlipidemia)      Insulin dependent type 2 diabetes mellitus      BPH (benign prostatic hyperplasia)      HTN (hypertension)      Gastroesophageal cancer      Gastroesophageal cancer      Port-A-Cath in place          SOCIAL HISTORY:  Denied smoking/vaping/alcohol/recreational drug use    FAMILY HISTORY:      Allergies  No Known Allergies        ANTIMICROBIALS:  piperacillin/tazobactam IVPB.. 3.375 every 8 hours      ANTIMICROBIALS (past 90 days):  MEDICATIONS  (STANDING):  piperacillin/tazobactam IVPB.   200 mL/Hr IV Intermittent (07-25-24 @ 08:28)    piperacillin/tazobactam IVPB.-   25 mL/Hr IV Intermittent (07-25-24 @ 13:30)    piperacillin/tazobactam IVPB..   25 mL/Hr IV Intermittent (07-26-24 @ 06:34)   25 mL/Hr IV Intermittent (07-25-24 @ 22:34)        OTHER MEDS:   MEDICATIONS  (STANDING):  dextrose 50% Injectable 12.5 once  dextrose 50% Injectable 25 once  dextrose 50% Injectable 25 once  dextrose Oral Gel 15 once  dornase malik Solution 2.5 daily  enoxaparin Injectable 40 every 24 hours  hydrALAZINE Injectable 10 every 6 hours  HYDROmorphone  Injectable 0.2 every 4 hours PRN  HYDROmorphone  Injectable 0.5 every 4 hours PRN  insulin glargine Injectable (LANTUS) 5 at bedtime  insulin lispro (ADMELOG) corrective regimen sliding scale  three times a day before meals  insulin lispro (ADMELOG) corrective regimen sliding scale  at bedtime  insulin lispro Injectable (ADMELOG) 2 three times a day before meals  levalbuterol Inhalation 0.63 every 6 hours  metoprolol tartrate Injectable 5 every 6 hours  pantoprazole  Injectable 40 every 12 hours  sodium chloride 3%  Inhalation 4 every 6 hours      VITALS:  Vital Signs Last 24 Hrs  T(F): 98.2 (07-26-24 @ 09:30), Max: 99.3 (07-24-24 @ 20:45)    Vital Signs Last 24 Hrs  HR: 106 (07-26-24 @ 09:31) (91 - 106)  BP: 146/78 (07-26-24 @ 09:30) (125/79 - 157/61)  RR: 16 (07-26-24 @ 09:30)  SpO2: 96% (07-26-24 @ 09:31) (93% - 97%)  Wt(kg): --    EXAM:  pending full examination      Labs:                        10.4   21.36 )-----------( 203      ( 26 Jul 2024 08:00 )             30.5     07-26    131<L>  |  100  |  10  ----------------------------<  205<H>  3.6   |  20<L>  |  0.91    Ca    7.3<L>      26 Jul 2024 08:00  Phos  2.7     07-26  Mg     1.80     07-26        WBC Trend:  WBC Count: 21.36 (07-26-24 @ 08:00)  WBC Count: 18.49 (07-25-24 @ 05:45)  WBC Count: 13.81 (07-24-24 @ 02:58)  WBC Count: 11.81 (07-23-24 @ 02:48)      Auto Neutrophil #: 3.92 K/uL (07-08-24 @ 09:48)  Auto Neutrophil #: 5.42 K/uL (06-10-24 @ 09:03)  Auto Neutrophil #: 4.87 K/uL (06-03-24 @ 13:08)  Auto Neutrophil #: 5.00 K/uL (05-28-24 @ 13:27)  Auto Neutrophil #: 3.29 K/uL (05-20-24 @ 13:44)      Creatine Trend:  Creatinine: 0.91 (07-26)  Creatinine: 0.79 (07-25)  Creatinine: 0.97 (07-24)  Creatinine: 0.88 (07-23)      Liver Biochemical Testing Trend:  Alanine Aminotransferase (ALT/SGPT): 98 *H* (07-16)  Alanine Aminotransferase (ALT/SGPT): 38 (07-15)  Alanine Aminotransferase (ALT/SGPT): 46 *H* (07-12)  Alanine Aminotransferase (ALT/SGPT): 44 *H* (07-10)  Alanine Aminotransferase (ALT/SGPT): 16 (06-10)  Aspartate Aminotransferase (AST/SGOT): 136 (07-16-24 @ 19:12)  Aspartate Aminotransferase (AST/SGOT): 58 (07-15-24 @ 06:05)  Aspartate Aminotransferase (AST/SGOT): 55 (07-12-24 @ 11:15)  Aspartate Aminotransferase (AST/SGOT): 46 (07-10-24 @ 08:40)  Aspartate Aminotransferase (AST/SGOT): 24 (06-10-24 @ 09:53)  Bilirubin Total: 0.4 (07-16)  Bilirubin Direct: <0.2 (07-15)  Bilirubin Total: 0.3 (07-15)  Bilirubin Direct: <0.2 (07-12)  Bilirubin Total: 0.3 (07-12)      Trend LDH          MICROBIOLOGY:        Culture - Urine (collected 01 Jan 2024 03:20)  Source: Clean Catch Clean Catch (Midstream)  Final Report:    <10,000 CFU/mL Normal Urogenital Lin                                                A1C with Estimated Average Glucose Result: 10.9 % (07-13-24 @ 02:44)      RADIOLOGY:  imaging below personally reviewed   Patient is a 59 year old male with PMH of HTN, HLD, type 2 DM, B[H, stage 2a GEJ adenocarcinoma diagnosed in 1/2024 s/p neoadjuvant chemo and radiation therapy on 6/25/24 sent in for planned robotic assisted esophagectomy.  Patient s/p esophagectomy on 7/16 with right chest tube placed during procedure.     On 7/25, patient noted to have increasing white count to 18, tachycardia, and change in drain output fo was started on Zosyn. CT chest abdomen pelvis performed n 7/25 showing Small loculated right pleural effusion with foci of air and pleural   catheter. Small left pleural effusion. Splenic infarcts. Small volume ascites. Last esophagram performed on 7/22 without evidence fo leak. Primary team to talk to thoracic surgery regarding repeating esophagram/possibility of washing out chest.     ID was consulted for concern for pneumonia.     Of note, patient was documented to have difficulty tolerating PO post procedure, frequently bringing back up liquids?     Abx:   Zosyn (7/25-)     REVIEW OF SYSTEMS  pending full examination    prior hospital charts reviewed [V]  primary team notes reviewed [V]  other consultant notes reviewed [V]    PAST MEDICAL & SURGICAL HISTORY:  HLD (hyperlipidemia)      Insulin dependent type 2 diabetes mellitus      BPH (benign prostatic hyperplasia)      HTN (hypertension)      Gastroesophageal cancer      Gastroesophageal cancer      Port-A-Cath in place          SOCIAL HISTORY:  Denied smoking/vaping/alcohol/recreational drug use    FAMILY HISTORY:      Allergies  No Known Allergies        ANTIMICROBIALS:  piperacillin/tazobactam IVPB.. 3.375 every 8 hours      ANTIMICROBIALS (past 90 days):  MEDICATIONS  (STANDING):  piperacillin/tazobactam IVPB.   200 mL/Hr IV Intermittent (07-25-24 @ 08:28)    piperacillin/tazobactam IVPB.-   25 mL/Hr IV Intermittent (07-25-24 @ 13:30)    piperacillin/tazobactam IVPB..   25 mL/Hr IV Intermittent (07-26-24 @ 06:34)   25 mL/Hr IV Intermittent (07-25-24 @ 22:34)        OTHER MEDS:   MEDICATIONS  (STANDING):  dextrose 50% Injectable 12.5 once  dextrose 50% Injectable 25 once  dextrose 50% Injectable 25 once  dextrose Oral Gel 15 once  dornase malik Solution 2.5 daily  enoxaparin Injectable 40 every 24 hours  hydrALAZINE Injectable 10 every 6 hours  HYDROmorphone  Injectable 0.2 every 4 hours PRN  HYDROmorphone  Injectable 0.5 every 4 hours PRN  insulin glargine Injectable (LANTUS) 5 at bedtime  insulin lispro (ADMELOG) corrective regimen sliding scale  three times a day before meals  insulin lispro (ADMELOG) corrective regimen sliding scale  at bedtime  insulin lispro Injectable (ADMELOG) 2 three times a day before meals  levalbuterol Inhalation 0.63 every 6 hours  metoprolol tartrate Injectable 5 every 6 hours  pantoprazole  Injectable 40 every 12 hours  sodium chloride 3%  Inhalation 4 every 6 hours      VITALS:  Vital Signs Last 24 Hrs  T(F): 98.2 (07-26-24 @ 09:30), Max: 99.3 (07-24-24 @ 20:45)    Vital Signs Last 24 Hrs  HR: 106 (07-26-24 @ 09:31) (91 - 106)  BP: 146/78 (07-26-24 @ 09:30) (125/79 - 157/61)  RR: 16 (07-26-24 @ 09:30)  SpO2: 96% (07-26-24 @ 09:31) (93% - 97%)  Wt(kg): --    EXAM:  pending full examination      Labs:                        10.4   21.36 )-----------( 203      ( 26 Jul 2024 08:00 )             30.5     07-26    131<L>  |  100  |  10  ----------------------------<  205<H>  3.6   |  20<L>  |  0.91    Ca    7.3<L>      26 Jul 2024 08:00  Phos  2.7     07-26  Mg     1.80     07-26        WBC Trend:  WBC Count: 21.36 (07-26-24 @ 08:00)  WBC Count: 18.49 (07-25-24 @ 05:45)  WBC Count: 13.81 (07-24-24 @ 02:58)  WBC Count: 11.81 (07-23-24 @ 02:48)      Auto Neutrophil #: 3.92 K/uL (07-08-24 @ 09:48)  Auto Neutrophil #: 5.42 K/uL (06-10-24 @ 09:03)  Auto Neutrophil #: 4.87 K/uL (06-03-24 @ 13:08)  Auto Neutrophil #: 5.00 K/uL (05-28-24 @ 13:27)  Auto Neutrophil #: 3.29 K/uL (05-20-24 @ 13:44)      Creatine Trend:  Creatinine: 0.91 (07-26)  Creatinine: 0.79 (07-25)  Creatinine: 0.97 (07-24)  Creatinine: 0.88 (07-23)      Liver Biochemical Testing Trend:  Alanine Aminotransferase (ALT/SGPT): 98 *H* (07-16)  Alanine Aminotransferase (ALT/SGPT): 38 (07-15)  Alanine Aminotransferase (ALT/SGPT): 46 *H* (07-12)  Alanine Aminotransferase (ALT/SGPT): 44 *H* (07-10)  Alanine Aminotransferase (ALT/SGPT): 16 (06-10)  Aspartate Aminotransferase (AST/SGOT): 136 (07-16-24 @ 19:12)  Aspartate Aminotransferase (AST/SGOT): 58 (07-15-24 @ 06:05)  Aspartate Aminotransferase (AST/SGOT): 55 (07-12-24 @ 11:15)  Aspartate Aminotransferase (AST/SGOT): 46 (07-10-24 @ 08:40)  Aspartate Aminotransferase (AST/SGOT): 24 (06-10-24 @ 09:53)  Bilirubin Total: 0.4 (07-16)  Bilirubin Direct: <0.2 (07-15)  Bilirubin Total: 0.3 (07-15)  Bilirubin Direct: <0.2 (07-12)  Bilirubin Total: 0.3 (07-12)      Trend LDH          MICROBIOLOGY:        Culture - Urine (collected 01 Jan 2024 03:20)  Source: Clean Catch Clean Catch (Midstream)  Final Report:    <10,000 CFU/mL Normal Urogenital Lin                                                A1C with Estimated Average Glucose Result: 10.9 % (07-13-24 @ 02:44)      RADIOLOGY:  imaging below personally reviewed   Patient is a 59 year old male with PMH of HTN, HLD, type 2 DM, B[H, stage 2a GEJ adenocarcinoma diagnosed in 1/2024 s/p neoadjuvant chemo and radiation therapy on 6/25/24 sent in for planned robotic assisted esophagectomy.  Patient s/p esophagectomy on 7/16 with right chest tube placed during procedure and now converted to VENKATA tube.     On 7/25, patient noted to have increasing white count to 18, tachycardia, and change in drain output fo was started on Zosyn. CT chest abdomen pelvis performed on 7/25 showing Small loculated right pleural effusion with foci of air and pleural catheter. Small left pleural effusion. Splenic infarcts. Small volume ascites. Last esophagram performed on 7/22 without evidence fo leak. Primary team to talk to thoracic surgery regarding repeating esophagram/possibility of washing out chest.     ID was consulted for concern for pneumonia.     Patient denies fevers, chills, chest pain, cough, sore throat, trouble breathing, abdominal pain, nausea, vomiting, diarrhea, constipation, urinary sx, rashes or new joint pains. He is currently sating well on room air. States he is NPO today. Unsure why but is hungry. He denies difficulty swallowing foods over the past few days or choking episodes. Nursing states patient has had 60 ccs of brown drainage noted in VENKATA tube over past 24 hours. Unclear what drain output has looked like before.     Abx:   Zosyn (7/25-)     REVIEW OF SYSTEMS  Constitutional: No fevers, No chills, No fatigue   Skin: No rash, no phlebitis	  ENMT: No sore throat, No ulcers  Respiratory: No cough, no SOB  Cardiovascular:  No chest pain, No palpitations   Gastrointestinal: No pain, No nausea, No vomiting, No diarrhea, No constipation	  Genitourinary: No dysuria, No frequency, No hesitancy, No flank pain  MSK: No Joint pain, No back pain  Neurological: No HA, no weakness, no seizures, no AMS     prior hospital charts reviewed [V]  primary team notes reviewed [V]  other consultant notes reviewed [V]    PAST MEDICAL & SURGICAL HISTORY:  HLD (hyperlipidemia)      Insulin dependent type 2 diabetes mellitus      BPH (benign prostatic hyperplasia)      HTN (hypertension)      Gastroesophageal cancer      Gastroesophageal cancer      Port-A-Cath in place          SOCIAL HISTORY:  -born in the [facility] and moved to [place], [place] 12 years ago  -no recent travel or sick contacts  -no etoh use  -no drug use  -no smoking     FAMILY HISTORY:      Allergies  No Known Allergies        ANTIMICROBIALS:  piperacillin/tazobactam IVPB.. 3.375 every 8 hours      ANTIMICROBIALS (past 90 days):  MEDICATIONS  (STANDING):  piperacillin/tazobactam IVPB.   200 mL/Hr IV Intermittent (07-25-24 @ 08:28)    piperacillin/tazobactam IVPB.-   25 mL/Hr IV Intermittent (07-25-24 @ 13:30)    piperacillin/tazobactam IVPB..   25 mL/Hr IV Intermittent (07-26-24 @ 06:34)   25 mL/Hr IV Intermittent (07-25-24 @ 22:34)        OTHER MEDS:   MEDICATIONS  (STANDING):  dextrose 50% Injectable 12.5 once  dextrose 50% Injectable 25 once  dextrose 50% Injectable 25 once  dextrose Oral Gel 15 once  dornase malik Solution 2.5 daily  enoxaparin Injectable 40 every 24 hours  hydrALAZINE Injectable 10 every 6 hours  HYDROmorphone  Injectable 0.2 every 4 hours PRN  HYDROmorphone  Injectable 0.5 every 4 hours PRN  insulin glargine Injectable (LANTUS) 5 at bedtime  insulin lispro (ADMELOG) corrective regimen sliding scale  three times a day before meals  insulin lispro (ADMELOG) corrective regimen sliding scale  at bedtime  insulin lispro Injectable (ADMELOG) 2 three times a day before meals  levalbuterol Inhalation 0.63 every 6 hours  metoprolol tartrate Injectable 5 every 6 hours  pantoprazole  Injectable 40 every 12 hours  sodium chloride 3%  Inhalation 4 every 6 hours      VITALS:  Vital Signs Last 24 Hrs  T(F): 98.2 (07-26-24 @ 09:30), Max: 99.3 (07-24-24 @ 20:45)    Vital Signs Last 24 Hrs  HR: 106 (07-26-24 @ 09:31) (91 - 106)  BP: 146/78 (07-26-24 @ 09:30) (125/79 - 157/61)  RR: 16 (07-26-24 @ 09:30)  SpO2: 96% (07-26-24 @ 09:31) (93% - 97%)  Wt(kg): --    EXAM:  General: Patient appears comfortable, no acute distress  HEENT: NCAT, PERRL, anicteric sclera, mucous membranes moist and intact  CV: +S1/S2, RRR, no M/R/G  Lungs: No respiratory distress, CTA b/l, no wheezing, rales or rhonchi  Abd:  +scattered ecchmosis througout abdomen post op. BS4+, Soft, NTND, no guarding  : No suprapubic tenderness  Neuro: AAOx3. No focal deficits noted.   Ext: No cyanosis, no edema  Msk: freely moving upper and lower extremities  Skin: +right sided chemo port in place without tenderness to palpation, surrounding swelling/erythema/warmth.  +right sided VENKATA drain in place with insertion site without surrounding tenderness to palpation, warmth, erythema. Other suture sites look c/d/i.  +VENKATA drain with brown fluid. No rash, no phlebitis    Labs:                        10.4   21.36 )-----------( 203      ( 26 Jul 2024 08:00 )             30.5     07-26    131<L>  |  100  |  10  ----------------------------<  205<H>  3.6   |  20<L>  |  0.91    Ca    7.3<L>      26 Jul 2024 08:00  Phos  2.7     07-26  Mg     1.80     07-26        WBC Trend:  WBC Count: 21.36 (07-26-24 @ 08:00)  WBC Count: 18.49 (07-25-24 @ 05:45)  WBC Count: 13.81 (07-24-24 @ 02:58)  WBC Count: 11.81 (07-23-24 @ 02:48)      Auto Neutrophil #: 3.92 K/uL (07-08-24 @ 09:48)  Auto Neutrophil #: 5.42 K/uL (06-10-24 @ 09:03)  Auto Neutrophil #: 4.87 K/uL (06-03-24 @ 13:08)  Auto Neutrophil #: 5.00 K/uL (05-28-24 @ 13:27)  Auto Neutrophil #: 3.29 K/uL (05-20-24 @ 13:44)      Creatine Trend:  Creatinine: 0.91 (07-26)  Creatinine: 0.79 (07-25)  Creatinine: 0.97 (07-24)  Creatinine: 0.88 (07-23)      Liver Biochemical Testing Trend:  Alanine Aminotransferase (ALT/SGPT): 98 *H* (07-16)  Alanine Aminotransferase (ALT/SGPT): 38 (07-15)  Alanine Aminotransferase (ALT/SGPT): 46 *H* (07-12)  Alanine Aminotransferase (ALT/SGPT): 44 *H* (07-10)  Alanine Aminotransferase (ALT/SGPT): 16 (06-10)  Aspartate Aminotransferase (AST/SGOT): 136 (07-16-24 @ 19:12)  Aspartate Aminotransferase (AST/SGOT): 58 (07-15-24 @ 06:05)  Aspartate Aminotransferase (AST/SGOT): 55 (07-12-24 @ 11:15)  Aspartate Aminotransferase (AST/SGOT): 46 (07-10-24 @ 08:40)  Aspartate Aminotransferase (AST/SGOT): 24 (06-10-24 @ 09:53)  Bilirubin Total: 0.4 (07-16)  Bilirubin Direct: <0.2 (07-15)  Bilirubin Total: 0.3 (07-15)  Bilirubin Direct: <0.2 (07-12)  Bilirubin Total: 0.3 (07-12)      Trend LDH          MICROBIOLOGY:        Culture - Urine (collected 01 Jan 2024 03:20)  Source: Clean Catch Clean Catch (Midstream)  Final Report:    <10,000 CFU/mL Normal Urogenital Lin                                                A1C with Estimated Average Glucose Result: 10.9 % (07-13-24 @ 02:44)      RADIOLOGY:  imaging below personally reviewed

## 2024-07-26 NOTE — PROGRESS NOTE ADULT - SUBJECTIVE AND OBJECTIVE BOX
Patient is a 59y old  Male who presents with a chief complaint of Esophagectomy (26 Jul 2024 06:50)      SUBJECTIVE / OVERNIGHT EVENTS:    Events noted.  CONSTITUTIONAL: No fever,  or fatigue  RESPIRATORY: No cough, wheezing,  No shortness of breath  CARDIOVASCULAR: No chest pain, palpitations, dizziness, or leg swelling  GASTROINTESTINAL: No abdominal or epigastric pain. No nausea, vomiting.  NEUROLOGICAL: No headache    MEDICATIONS  (STANDING):  dextrose 5% + sodium chloride 0.9% with potassium chloride 20 mEq/L 1000 milliLiter(s) (75 mL/Hr) IV Continuous <Continuous>  dextrose 5%. 1000 milliLiter(s) (100 mL/Hr) IV Continuous <Continuous>  dextrose 5%. 1000 milliLiter(s) (50 mL/Hr) IV Continuous <Continuous>  dextrose 50% Injectable 12.5 Gram(s) IV Push once  dextrose 50% Injectable 25 Gram(s) IV Push once  dextrose 50% Injectable 25 Gram(s) IV Push once  dextrose Oral Gel 15 Gram(s) Oral once  dornase malik Solution 2.5 milliGRAM(s) Inhalation daily  enoxaparin Injectable 40 milliGRAM(s) SubCutaneous every 24 hours  hydrALAZINE Injectable 10 milliGRAM(s) IV Push every 6 hours  insulin glargine Injectable (LANTUS) 5 Unit(s) SubCutaneous at bedtime  insulin lispro (ADMELOG) corrective regimen sliding scale   SubCutaneous every 6 hours  levalbuterol Inhalation 0.63 milliGRAM(s) Inhalation every 6 hours  lidocaine   4% Patch 1 Patch Transdermal at bedtime  metoprolol tartrate Injectable 5 milliGRAM(s) IV Push every 6 hours  pantoprazole  Injectable 40 milliGRAM(s) IV Push every 12 hours  piperacillin/tazobactam IVPB.. 3.375 Gram(s) IV Intermittent every 8 hours  sodium chloride 3%  Inhalation 4 milliLiter(s) Inhalation every 6 hours    MEDICATIONS  (PRN):  HYDROmorphone  Injectable 0.2 milliGRAM(s) IV Push every 4 hours PRN Moderate Pain (4 - 6)  HYDROmorphone  Injectable 0.5 milliGRAM(s) IV Push every 4 hours PRN Severe Pain (7 - 10)  naloxone Injectable 0.1 milliGRAM(s) IV Push every 3 minutes PRN For ANY of the following changes in patient status:  A. RR LESS THAN 10 breaths per minute, B. Oxygen saturation LESS THAN 90%, C. Sedation score of 6        CAPILLARY BLOOD GLUCOSE      POCT Blood Glucose.: 199 mg/dL (26 Jul 2024 06:32)  POCT Blood Glucose.: 246 mg/dL (26 Jul 2024 00:40)  POCT Blood Glucose.: 192 mg/dL (25 Jul 2024 16:56)  POCT Blood Glucose.: 115 mg/dL (25 Jul 2024 12:14)    I&O's Summary    25 Jul 2024 07:01  -  26 Jul 2024 07:00  --------------------------------------------------------  IN: 1000 mL / OUT: 805 mL / NET: 195 mL        T(C): 36.8 (07-26-24 @ 05:07), Max: 37.1 (07-25-24 @ 16:00)  HR: 97 (07-26-24 @ 05:07) (91 - 106)  BP: 125/79 (07-26-24 @ 05:07) (125/79 - 157/61)  RR: 18 (07-26-24 @ 05:07) (18 - 18)  SpO2: 94% (07-26-24 @ 05:07) (93% - 97%)    PHYSICAL EXAM:  GENERAL: NAD  NECK: Supple, No JVD  CHEST/LUNG: Clear to auscultation bilaterally; No wheezing.  HEART: Regular rate and rhythm; No murmurs, rubs, or gallops  ABDOMEN: Soft, Nontender, Nondistended; Bowel sounds present  EXTREMITIES:   No edema  NEUROLOGY: AAO X 3      LABS:                        10.4   21.36 )-----------( 203      ( 26 Jul 2024 08:00 )             30.5     07-25    130<L>  |  99  |  11  ----------------------------<  183<H>  4.0   |  19<L>  |  0.79    Ca    7.4<L>      25 Jul 2024 05:45  Phos  1.8     07-25  Mg     1.80     07-25            Urinalysis Basic - ( 25 Jul 2024 05:45 )    Color: x / Appearance: x / SG: x / pH: x  Gluc: 183 mg/dL / Ketone: x  / Bili: x / Urobili: x   Blood: x / Protein: x / Nitrite: x   Leuk Esterase: x / RBC: x / WBC x   Sq Epi: x / Non Sq Epi: x / Bacteria: x      CAPILLARY BLOOD GLUCOSE      POCT Blood Glucose.: 199 mg/dL (26 Jul 2024 06:32)  POCT Blood Glucose.: 246 mg/dL (26 Jul 2024 00:40)  POCT Blood Glucose.: 192 mg/dL (25 Jul 2024 16:56)  POCT Blood Glucose.: 115 mg/dL (25 Jul 2024 12:14)        RADIOLOGY & ADDITIONAL TESTS:    Imaging Personally Reviewed:    Consultant(s) Notes Reviewed:      Care Discussed with Consultants/Other Providers:    Anish Goodrich MD, CMD, FACP    [address] [place] [address]  Office Tel: [phone]  Cell: [phone]

## 2024-07-26 NOTE — PROGRESS NOTE ADULT - SUBJECTIVE AND OBJECTIVE BOX
Surgery Progress Note     Interval/Subjective:  Patient seen and examined. No significant changes. OOB.   - CT yesterday without sign of leak  - VENKATA amylase and triglyceride within normal range  __________________________________________________________________  Vitals:  T(C): 36.8 (05:07), Max: 37.1 (16:00)  HR: 97 (05:07) (91 - 107)  BP: 125/79 (05:07) (125/79 - 157/61)  RR: 18 (05:07) (16 - 18)  SpO2: 94% (05:07) (93% - 97%)    I & O's:  IN:    Albumin 5%  - 250 mL: 250 mL    dextrose 5% + sodium chloride 0.45% w/ Additives: 675 mL    IV PiggyBack: 100 mL    Lactated Ringers Bolus: 1000 mL    Oral Fluid: 355 mL  Total IN: 2380 mL    OUT:    Bulb (mL): 175 mL    Indwelling Catheter - Urethral (mL): 45 mL    Voided (mL): 450 mL  Total OUT: 670 mL    24 @ 07:01  -  24 @ 06:50  --------------------------------------------------------  OUT:    Voided (mL): 0.53 mL/kg/hr  Total OUT: 0.53 mL/kg/hr    Physical Exam:  General: NAD, resting comfortably in bed  HEENT: Normocephalic atraumatic  Respiratory: Nonlabored respirations  Cardio: regular rate, normotensive  Abdomen: soft, nontender, nondistended, VENKATA dark serous    Labs:  CBC: 24 @ 05:45          10.0   18.49 >----< 198          29.3    Chemistry: 24 @ 05:45  130|99|11    ------------< 183  4.0|19|0.79    Ca: 7.4 24 @ 05:45  M.80 24 @ 05:45  Phos: 1.8 24 @ 05:45    LFT's: 24 @ 05:45  AST: --  ALT: --  ALP: --  TNoe: --  Efren: --    __________________________________________________________________

## 2024-07-26 NOTE — CONSULT NOTE ADULT - ASSESSMENT
Patient is a 59 year old male with PMH of HTN, HLD, type 2 DM, BPH, stage 2a GEJ adenocarcinoma diagnosed in 1/2024 s/p neoadjuvant chemo and radiation therapy on 6/25/24 sent in for planned robotic assisted esophagectomy.  Patient s/p esophagectomy on 7/16 with right chest tube placed during procedure and now converted to VENKATA drain. On 7/25, patient noted to have increasing white count to 18, tachycardia, and change in drain output fo was started on Zosyn. CT chest abdomen pelvis performed on 7/25 showing Small loculated right pleural effusion with foci of air and pleural catheter. Small left pleural effusion. Splenic infarcts. Small volume ascites. Last esophagram performed on 7/22 without evidence of leak. ID was consulted for concern for pneumonia. Patient clinically does not appear to have a pna- sating well on RA and lungs CTA other than decreased breath sound in right lower lung fields were loculated effusion is present. No fevers documented. VENKATA drain output appears brown in color.     Abx:   Zosyn (7/25-)     #loculated pleural effusion   -less suspicious for infectious etiology or pneumonia at this time however can continue with Zosyn for now for possible secondary infection   -continue to monitor WBC and fever curve. Would ideally obtain repeat imaging next week to evaluate pleural effusion. May need to consider investigating other etiologies for pleural effusion including esophageal leak or empyema if patient not improving     Case seen and discussed with Dr. Liu who agrees with assessment and plan. Note not final until attending addendum.    Normal

## 2024-07-26 NOTE — PROGRESS NOTE ADULT - ASSESSMENT
The patient is a 59y Male with PMH of T2DM, HTN, HLD, GE junction cancer here for esophagectomy now postop.  Endocrinology consulted for T2DM    #Uncontrolled Type 2 Diabetes Mellitus   - Follows with: Dr. Nolasco  - A1C with Estimated Average Glucose Result: 10.9 % (07-13-24)  - home regimen: Semglee 18 units, Novolog 6 units with meals      INPATIENT PLAN:  -Patient started clear liquid diet, tolerating small amount so far. Then made NPO again yesterday and was NPO this AM. Now placed back to clear liquid diet for today.  -Continue Lantus 5 units qhs  -Resume Admelog 2 units TID premeal, hold if NPO  -Recommend scale to Low q6h while NPO --> with diet use low scale premeal and low bedtime scale  -Currently on dextrose fluids  once diet removed and tolerating would remove dextrose at that point  -FS premeal and bedtime  - Inpatient glucose goals: 100-180 mg/sl  -add hypoglycemia protocol      DISCHARGE PLANNING:  - Discharge recs pending clinical course and nutrition plan. Plan to resume Semglee and Admelog will determine final doses based on hospital requirements/ po intake at time of discharge.   - will need Endocrinology follow up. Patient has an appointment with Dr. Nolasco scheduled for August 8th at 8:20 AM at [address] [facility]   [address].     #Hypertension  - Goal BP <130/80  - Management as per primary team  - check urine microalbumin level as outpatient    #Hyperlipidemia  - LDL goal <70  - check lipid panel as outpatient on a yearly basis    Leo Rader MD  Division of Endocrinology  Pager: [phone]    If after 6PM or before 9AM, or on weekends/holidays, please call endocrine answering service for assistance ([phone]).  For nonurgent matters email [email or web address] for assistance.

## 2024-07-26 NOTE — PROGRESS NOTE ADULT - ASSESSMENT
· Assessment	  59 y.o. man with history of GEJ adenocarcinoma (T3N0, stage 2A) s/p neoadjuvant chemotherapy, now s/p Michael-Broderick Esophagectomy on 7/16.     Sx f/up appreciated  PT f/up  Pain control Oxycodone  NPO    DM II:    FSSS  NPH insulin  Endo f/up appreciated    HTN:    On IV Metoprolol/Hydralazine  Cardio f/up appreciated    Leukocytosis:    On IV Zosyn  Will monitor       · Assessment	  59 y.o. man with history of GEJ adenocarcinoma (T3N0, stage 2A) s/p neoadjuvant chemotherapy, now s/p Michael-Broderick Esophagectomy on 7/16.     Sx f/up appreciated  PT f/up  Pain control Oxycodone  NPO    DM II:    FSSS  NPH insulin  Endo f/up appreciated    HTN:    On IV Metoprolol/Hydralazine  Cardio f/up appreciated    Leukocytosis:    On IV Zosyn  CT C/A/P: no source of infection

## 2024-07-26 NOTE — PROGRESS NOTE ADULT - SUBJECTIVE AND OBJECTIVE BOX
Subjective & objective:  Patient was seen and examined by thoracic surgery  Patient feels well, currently lying on bed, on room air, afebrile.  NO acute distress, no chest pain, SOB, dizziness.    Vital Signs:  Vital Signs Last 24 Hrs  T(C): 36.6 (07-26-24 @ 12:10), Max: 37.1 (07-25-24 @ 16:00)  T(F): 97.9 (07-26-24 @ 12:10), Max: 98.7 (07-25-24 @ 16:00)  HR: 107 (07-26-24 @ 12:10) (91 - 107)  BP: 149/73 (07-26-24 @ 12:10) (125/79 - 157/61)  RR: 18 (07-26-24 @ 12:10) (16 - 18)  SpO2: 94% (07-26-24 @ 12:10) (93% - 97%) on (O2)    PE:  Gen: NAD  Resp: Respirations unlabored. Bilateral chest rise.   Card: RRR.   GI: Soft. Nontender. Nondistended.   Skin: Warm, well perfused, no masses or organomegaly  EXT: No clubbing, cyanosis, or edema  Adam drain output 105cc/24h  Relevant labs, radiology and Medications reviewed                        10.4   21.36 )-----------( 203      ( 26 Jul 2024 08:00 )             30.5     07-26    131<L>  |  100  |  10  ----------------------------<  205<H>  3.6   |  20<L>  |  0.91    Ca    7.3<L>      26 Jul 2024 08:00  Phos  2.7     07-26  Mg     1.80     07-26        MEDICATIONS  (STANDING):  dextrose 5% + sodium chloride 0.9% with potassium chloride 20 mEq/L 1000 milliLiter(s) (75 mL/Hr) IV Continuous <Continuous>  dextrose 5%. 1000 milliLiter(s) (100 mL/Hr) IV Continuous <Continuous>  dextrose 5%. 1000 milliLiter(s) (50 mL/Hr) IV Continuous <Continuous>  dextrose 50% Injectable 25 Gram(s) IV Push once  dextrose 50% Injectable 12.5 Gram(s) IV Push once  dextrose 50% Injectable 25 Gram(s) IV Push once  dextrose Oral Gel 15 Gram(s) Oral once  dornase malik Solution 2.5 milliGRAM(s) Inhalation daily  enoxaparin Injectable 40 milliGRAM(s) SubCutaneous every 24 hours  hydrALAZINE Injectable 10 milliGRAM(s) IV Push every 6 hours  insulin glargine Injectable (LANTUS) 5 Unit(s) SubCutaneous at bedtime  insulin lispro (ADMELOG) corrective regimen sliding scale   SubCutaneous three times a day before meals  insulin lispro (ADMELOG) corrective regimen sliding scale   SubCutaneous at bedtime  insulin lispro Injectable (ADMELOG) 2 Unit(s) SubCutaneous three times a day before meals  levalbuterol Inhalation 0.63 milliGRAM(s) Inhalation every 6 hours  lidocaine   4% Patch 1 Patch Transdermal at bedtime  magnesium sulfate  IVPB 2 Gram(s) IV Intermittent once  metoprolol tartrate Injectable 5 milliGRAM(s) IV Push every 6 hours  pantoprazole  Injectable 40 milliGRAM(s) IV Push every 12 hours  piperacillin/tazobactam IVPB.. 3.375 Gram(s) IV Intermittent every 8 hours  potassium chloride  10 mEq/100 mL IVPB 10 milliEquivalent(s) IV Intermittent every 1 hour  sodium chloride 3%  Inhalation 4 milliLiter(s) Inhalation every 6 hours    MEDICATIONS  (PRN):  HYDROmorphone  Injectable 0.2 milliGRAM(s) IV Push every 4 hours PRN Moderate Pain (4 - 6)  HYDROmorphone  Injectable 0.5 milliGRAM(s) IV Push every 4 hours PRN Severe Pain (7 - 10)  naloxone Injectable 0.1 milliGRAM(s) IV Push every 3 minutes PRN For ANY of the following changes in patient status:  A. RR LESS THAN 10 breaths per minute, B. Oxygen saturation LESS THAN 90%, C. Sedation score of 6    Pertinent Physical Exam  I&O's Summary    25 Jul 2024 07:01  -  26 Jul 2024 07:00  --------------------------------------------------------  IN: 1000 mL / OUT: 805 mL / NET: 195 mL    26 Jul 2024 07:01  -  26 Jul 2024 13:10  --------------------------------------------------------  IN: 0 mL / OUT: 60 mL / NET: -60 mL    Assessment  59 year old male with PMH HTN and DM 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 Pt s/p barium swallow, was on CLD and then placed NPO. Zosyn was started yesterday, WBC today 21 .    PLAN  - Imaging reports followed by Dr. Laureano   - Keep zosyn for now  - Patient can be switched to clear liquid diet, no need for esophagram  - monitor and trend WBC  - No surgical intervention at this time  - continue adam to suction monitoring quality  - will follow  Plan above discussed with Dr. Laureano   Subjective & objective:  Patient was seen and examined by thoracic surgery  Patient feels well, currently lying on bed, on room air, afebrile.  NO acute distress, no chest pain, SOB, dizziness.    Vital Signs:  Vital Signs Last 24 Hrs  T(C): 36.6 (07-26-24 @ 12:10), Max: 37.1 (07-25-24 @ 16:00)  T(F): 97.9 (07-26-24 @ 12:10), Max: 98.7 (07-25-24 @ 16:00)  HR: 107 (07-26-24 @ 12:10) (91 - 107)  BP: 149/73 (07-26-24 @ 12:10) (125/79 - 157/61)  RR: 18 (07-26-24 @ 12:10) (16 - 18)  SpO2: 94% (07-26-24 @ 12:10) (93% - 97%) on (O2)    PE:  Gen: NAD  Resp: Respirations unlabored. Bilateral chest rise.   Card: RRR.   GI: Soft. Nontender. Nondistended.   Skin: Warm, well perfused, no masses or organomegaly  EXT: No clubbing, cyanosis, or edema  Adam drain output 105cc/24h  Relevant labs, radiology and Medications reviewed                        10.4   21.36 )-----------( 203      ( 26 Jul 2024 08:00 )             30.5     07-26    131<L>  |  100  |  10  ----------------------------<  205<H>  3.6   |  20<L>  |  0.91    Ca    7.3<L>      26 Jul 2024 08:00  Phos  2.7     07-26  Mg     1.80     07-26        MEDICATIONS  (STANDING):  dextrose 5% + sodium chloride 0.9% with potassium chloride 20 mEq/L 1000 milliLiter(s) (75 mL/Hr) IV Continuous <Continuous>  dextrose 5%. 1000 milliLiter(s) (100 mL/Hr) IV Continuous <Continuous>  dextrose 5%. 1000 milliLiter(s) (50 mL/Hr) IV Continuous <Continuous>  dextrose 50% Injectable 25 Gram(s) IV Push once  dextrose 50% Injectable 12.5 Gram(s) IV Push once  dextrose 50% Injectable 25 Gram(s) IV Push once  dextrose Oral Gel 15 Gram(s) Oral once  dornase malik Solution 2.5 milliGRAM(s) Inhalation daily  enoxaparin Injectable 40 milliGRAM(s) SubCutaneous every 24 hours  hydrALAZINE Injectable 10 milliGRAM(s) IV Push every 6 hours  insulin glargine Injectable (LANTUS) 5 Unit(s) SubCutaneous at bedtime  insulin lispro (ADMELOG) corrective regimen sliding scale   SubCutaneous three times a day before meals  insulin lispro (ADMELOG) corrective regimen sliding scale   SubCutaneous at bedtime  insulin lispro Injectable (ADMELOG) 2 Unit(s) SubCutaneous three times a day before meals  levalbuterol Inhalation 0.63 milliGRAM(s) Inhalation every 6 hours  lidocaine   4% Patch 1 Patch Transdermal at bedtime  magnesium sulfate  IVPB 2 Gram(s) IV Intermittent once  metoprolol tartrate Injectable 5 milliGRAM(s) IV Push every 6 hours  pantoprazole  Injectable 40 milliGRAM(s) IV Push every 12 hours  piperacillin/tazobactam IVPB.. 3.375 Gram(s) IV Intermittent every 8 hours  potassium chloride  10 mEq/100 mL IVPB 10 milliEquivalent(s) IV Intermittent every 1 hour  sodium chloride 3%  Inhalation 4 milliLiter(s) Inhalation every 6 hours    MEDICATIONS  (PRN):  HYDROmorphone  Injectable 0.2 milliGRAM(s) IV Push every 4 hours PRN Moderate Pain (4 - 6)  HYDROmorphone  Injectable 0.5 milliGRAM(s) IV Push every 4 hours PRN Severe Pain (7 - 10)  naloxone Injectable 0.1 milliGRAM(s) IV Push every 3 minutes PRN For ANY of the following changes in patient status:  A. RR LESS THAN 10 breaths per minute, B. Oxygen saturation LESS THAN 90%, C. Sedation score of 6    Pertinent Physical Exam  I&O's Summary    25 Jul 2024 07:01  -  26 Jul 2024 07:00  --------------------------------------------------------  IN: 1000 mL / OUT: 805 mL / NET: 195 mL    26 Jul 2024 07:01  -  26 Jul 2024 13:10  --------------------------------------------------------  IN: 0 mL / OUT: 60 mL / NET: -60 mL    Assessment  59 year old male with PMH HTN and DM presents with GE junction adenocarcinoma s/p chemo and radiation now s/p Michale Broderick Esophagectomy on 7/16. Pt s/p barium swallow, was on CLD and then placed NPO. Zosyn was started yesterday, WBC today 21 .    PLAN  - Imaging reports followed by Dr. Laureano   - Keep zosyn for now  - Daily CXR  - Patient can be switched to clear liquid diet, no need for esophagram  - monitor and trend WBC  - No surgical intervention at this time  - continue adam to suction monitoring quality  - will follow  Plan above discussed with Dr. Laureano

## 2024-07-27 LAB
ANION GAP SERPL CALC-SCNC: 9 MMOL/L — SIGNIFICANT CHANGE UP (ref 7–14)
BUN SERPL-MCNC: 9 MG/DL — SIGNIFICANT CHANGE UP (ref 7–23)
CALCIUM SERPL-MCNC: 7.2 MG/DL — LOW (ref 8.4–10.5)
CHLORIDE SERPL-SCNC: 102 MMOL/L — SIGNIFICANT CHANGE UP (ref 98–107)
CO2 SERPL-SCNC: 19 MMOL/L — LOW (ref 22–31)
CREAT SERPL-MCNC: 0.83 MG/DL — SIGNIFICANT CHANGE UP (ref 0.5–1.3)
EGFR: 100 ML/MIN/1.73M2 — SIGNIFICANT CHANGE UP
GLUCOSE BLDC GLUCOMTR-MCNC: 150 MG/DL — HIGH (ref 70–99)
GLUCOSE BLDC GLUCOMTR-MCNC: 171 MG/DL — HIGH (ref 70–99)
GLUCOSE BLDC GLUCOMTR-MCNC: 177 MG/DL — HIGH (ref 70–99)
GLUCOSE BLDC GLUCOMTR-MCNC: 233 MG/DL — HIGH (ref 70–99)
GLUCOSE SERPL-MCNC: 216 MG/DL — HIGH (ref 70–99)
HCT VFR BLD CALC: 30.5 % — LOW (ref 39–50)
HGB BLD-MCNC: 9.9 G/DL — LOW (ref 13–17)
MAGNESIUM SERPL-MCNC: 2.1 MG/DL — SIGNIFICANT CHANGE UP (ref 1.6–2.6)
MCHC RBC-ENTMCNC: 29.5 PG — SIGNIFICANT CHANGE UP (ref 27–34)
MCHC RBC-ENTMCNC: 32.5 GM/DL — SIGNIFICANT CHANGE UP (ref 32–36)
MCV RBC AUTO: 90.8 FL — SIGNIFICANT CHANGE UP (ref 80–100)
NRBC # BLD: 0 /100 WBCS — SIGNIFICANT CHANGE UP (ref 0–0)
NRBC # FLD: 0 K/UL — SIGNIFICANT CHANGE UP (ref 0–0)
PHOSPHATE SERPL-MCNC: 2.1 MG/DL — LOW (ref 2.5–4.5)
PLATELET # BLD AUTO: 231 K/UL — SIGNIFICANT CHANGE UP (ref 150–400)
POTASSIUM SERPL-MCNC: 4 MMOL/L — SIGNIFICANT CHANGE UP (ref 3.5–5.3)
POTASSIUM SERPL-SCNC: 4 MMOL/L — SIGNIFICANT CHANGE UP (ref 3.5–5.3)
RBC # BLD: 3.36 M/UL — LOW (ref 4.2–5.8)
RBC # FLD: 13.4 % — SIGNIFICANT CHANGE UP (ref 10.3–14.5)
SODIUM SERPL-SCNC: 130 MMOL/L — LOW (ref 135–145)
WBC # BLD: 17.39 K/UL — HIGH (ref 3.8–10.5)
WBC # FLD AUTO: 17.39 K/UL — HIGH (ref 3.8–10.5)

## 2024-07-27 PROCEDURE — 71045 X-RAY EXAM CHEST 1 VIEW: CPT | Mod: 26

## 2024-07-27 RX ORDER — OXYCODONE HYDROCHLORIDE 30 MG/1
2.5 TABLET ORAL EVERY 4 HOURS
Refills: 0 | Status: ACTIVE | OUTPATIENT
Start: 2024-07-27 | End: 2024-08-03

## 2024-07-27 RX ORDER — SODIUM PHOSPHATE, MONOBASIC, MONOHYDRATE 276; 142 MG/ML; MG/ML
30 INJECTION, SOLUTION INTRAVENOUS ONCE
Refills: 0 | Status: COMPLETED | OUTPATIENT
Start: 2024-07-27 | End: 2024-07-27

## 2024-07-27 RX ORDER — ACETAMINOPHEN 500 MG
650 TABLET ORAL EVERY 6 HOURS
Refills: 0 | Status: ACTIVE | OUTPATIENT
Start: 2024-07-27 | End: 2025-06-25

## 2024-07-27 RX ORDER — OXYCODONE HYDROCHLORIDE 30 MG/1
5 TABLET ORAL EVERY 4 HOURS
Refills: 0 | Status: ACTIVE | OUTPATIENT
Start: 2024-07-27 | End: 2024-08-03

## 2024-07-27 RX ADMIN — Medication 650 MILLIGRAM(S): at 12:29

## 2024-07-27 RX ADMIN — PIPERACILLIN SODIUM, TAZOBACTAM SODIUM 25 GRAM(S): 3; .375 INJECTION, POWDER, LYOPHILIZED, FOR SOLUTION INTRAVENOUS at 14:57

## 2024-07-27 RX ADMIN — LIDOCAINE 5% 1 PATCH: 5 CREAM TOPICAL at 05:28

## 2024-07-27 RX ADMIN — PANTOPRAZOLE SODIUM 40 MILLIGRAM(S): 20 TABLET, DELAYED RELEASE ORAL at 18:24

## 2024-07-27 RX ADMIN — Medication 2 UNIT(S): at 18:19

## 2024-07-27 RX ADMIN — Medication 2 UNIT(S): at 08:24

## 2024-07-27 RX ADMIN — Medication 2: at 08:23

## 2024-07-27 RX ADMIN — PIPERACILLIN SODIUM, TAZOBACTAM SODIUM 25 GRAM(S): 3; .375 INJECTION, POWDER, LYOPHILIZED, FOR SOLUTION INTRAVENOUS at 00:49

## 2024-07-27 RX ADMIN — LEVALBUTEROL HYDROCHLORIDE 0.63 MILLIGRAM(S): 0.31 SOLUTION RESPIRATORY (INHALATION) at 04:35

## 2024-07-27 RX ADMIN — HYDRALAZINE HYDROCHLORIDE 10 MILLIGRAM(S): 100 TABLET ORAL at 08:25

## 2024-07-27 RX ADMIN — SODIUM PHOSPHATE, MONOBASIC, MONOHYDRATE 85 MILLIMOLE(S): 276; 142 INJECTION, SOLUTION INTRAVENOUS at 14:56

## 2024-07-27 RX ADMIN — PIPERACILLIN SODIUM, TAZOBACTAM SODIUM 25 GRAM(S): 3; .375 INJECTION, POWDER, LYOPHILIZED, FOR SOLUTION INTRAVENOUS at 21:46

## 2024-07-27 RX ADMIN — Medication 5 MILLIGRAM(S): at 00:49

## 2024-07-27 RX ADMIN — Medication 5 MILLIGRAM(S): at 12:28

## 2024-07-27 RX ADMIN — LEVALBUTEROL HYDROCHLORIDE 0.63 MILLIGRAM(S): 0.31 SOLUTION RESPIRATORY (INHALATION) at 22:26

## 2024-07-27 RX ADMIN — Medication 2 UNIT(S): at 12:29

## 2024-07-27 RX ADMIN — Medication 650 MILLIGRAM(S): at 18:25

## 2024-07-27 RX ADMIN — Medication 5 MILLIGRAM(S): at 18:23

## 2024-07-27 RX ADMIN — Medication 5 MILLIGRAM(S): at 05:29

## 2024-07-27 RX ADMIN — HYDRALAZINE HYDROCHLORIDE 10 MILLIGRAM(S): 100 TABLET ORAL at 02:49

## 2024-07-27 RX ADMIN — Medication 1: at 18:19

## 2024-07-27 RX ADMIN — HYDRALAZINE HYDROCHLORIDE 10 MILLIGRAM(S): 100 TABLET ORAL at 18:26

## 2024-07-27 RX ADMIN — Medication 4 MILLILITER(S): at 22:27

## 2024-07-27 RX ADMIN — ENOXAPARIN SODIUM 40 MILLIGRAM(S): 120 INJECTION SUBCUTANEOUS at 12:31

## 2024-07-27 RX ADMIN — INSULIN GLARGINE-YFGN 5 UNIT(S): 100 INJECTION, SOLUTION SUBCUTANEOUS at 21:46

## 2024-07-27 RX ADMIN — Medication 4 MILLILITER(S): at 04:36

## 2024-07-27 RX ADMIN — PANTOPRAZOLE SODIUM 40 MILLIGRAM(S): 20 TABLET, DELAYED RELEASE ORAL at 05:29

## 2024-07-27 NOTE — PROGRESS NOTE ADULT - SUBJECTIVE AND OBJECTIVE BOX
DATE OF SERVICE: 07-27-24    NO events overnight       acetaminophen     Tablet .. 650 milliGRAM(s) Oral every 6 hours  dextrose 5% + sodium chloride 0.9% with potassium chloride 20 mEq/L 1000 milliLiter(s) IV Continuous <Continuous>  dextrose 5%. 1000 milliLiter(s) IV Continuous <Continuous>  dextrose 5%. 1000 milliLiter(s) IV Continuous <Continuous>  dextrose 50% Injectable 12.5 Gram(s) IV Push once  dextrose 50% Injectable 25 Gram(s) IV Push once  dextrose 50% Injectable 25 Gram(s) IV Push once  dextrose Oral Gel 15 Gram(s) Oral once  dornase malik Solution 2.5 milliGRAM(s) Inhalation daily  enoxaparin Injectable 40 milliGRAM(s) SubCutaneous every 24 hours  hydrALAZINE Injectable 10 milliGRAM(s) IV Push every 6 hours  HYDROmorphone  Injectable 0.5 milliGRAM(s) IV Push every 4 hours PRN  HYDROmorphone  Injectable 0.2 milliGRAM(s) IV Push every 4 hours PRN  insulin glargine Injectable (LANTUS) 5 Unit(s) SubCutaneous at bedtime  insulin lispro (ADMELOG) corrective regimen sliding scale   SubCutaneous three times a day before meals  insulin lispro (ADMELOG) corrective regimen sliding scale   SubCutaneous at bedtime  insulin lispro Injectable (ADMELOG) 2 Unit(s) SubCutaneous three times a day before meals  levalbuterol Inhalation 0.63 milliGRAM(s) Inhalation every 6 hours  lidocaine   4% Patch 1 Patch Transdermal at bedtime  metoprolol tartrate Injectable 5 milliGRAM(s) IV Push every 6 hours  naloxone Injectable 0.1 milliGRAM(s) IV Push every 3 minutes PRN  pantoprazole  Injectable 40 milliGRAM(s) IV Push every 12 hours  piperacillin/tazobactam IVPB.. 3.375 Gram(s) IV Intermittent every 8 hours  sodium chloride 3%  Inhalation 4 milliLiter(s) Inhalation every 6 hours                            9.9    17.39 )-----------( 231 ( 27 Jul 2024 06:58 )             30.5       Hemoglobin: 9.9 g/dL (07-27 @ 06:58)  Hemoglobin: 10.4 g/dL (07-26 @ 08:00)  Hemoglobin: 10.0 g/dL (07-25 @ 05:45)  Hemoglobin: 10.1 g/dL (07-24 @ 02:58)  Hemoglobin: 10.5 g/dL (07-23 @ 02:48)      07-27    130<L>  |  102  |  9   ----------------------------<  216<H>  4.0   |  19<L>  |  0.83    Ca    7.2<L>      27 Jul 2024 06:58  Phos  2.1     07-27  Mg     2.10     07-27      Creatinine Trend: 0.83<--, 0.91<--, 0.79<--, 0.97<--, 0.88<--, 0.83<--    COAGS:           T(C): 36.8 (07-27-24 @ 08:00), Max: 36.8 (07-27-24 @ 08:00)  HR: 97 (07-27-24 @ 08:00) (89 - 107)  BP: 137/69 (07-27-24 @ 08:00) (131/69 - 149/73)  RR: 16 (07-27-24 @ 08:00) (16 - 18)  SpO2: 95% (07-27-24 @ 08:00) (92% - 99%)  Wt(kg): --    I&O's Summary    26 Jul 2024 07:01  -  27 Jul 2024 07:00  --------------------------------------------------------  IN: 300 mL / OUT: 470 mL / NET: -170 mL        Gen: NAD  HEENT:  (-)icterus (-)pallor  CV: N S1 S2 1/6 CATHY (+)2 Pulses B/l  Resp:  Clear to auscultation B/L, normal effort  GI: (+) BS Soft, NT, ND  Lymph:  (-)Edema, (-)obvious lymphadenopathy  Skin: Warm to touch, Normal turgor  Psych: Appropriate mood and affect      TELEMETRY: 	 SR/ST    ECG:  	NSR      TTE 07/2024  Findings:  1. Normal left ventricular size and function.  Mild diastolic dysfunction.  2. Normal left atrial size  3. Right atrial cavity is normal in size.  4. Normal right ventricular size and function.  5. Normal trileaflet aortic valve opening.  6. Normal mitral valve opening.  7. Normal appearing tricuspid valve with mild tricuspid  regurgitation.  8. Pulmonic valve is grossly normal, yet poorly visualized  with no doppler evidence for pulmonic stenosis.  9. No evidence of significant pericardial effusion.  10. The aortic root is normal.  11. Normal pulmonary artery.  12. IVC is normal with respiratory variation.  FULL STUDY DONE INCLUDING M-MODE RECORDING,  SPECTRAL DOPPLER AND    Stress 07/2024  Normal myocardial perfusion SPECT images No evidence of stress induced ischemia or infarction.  Normal left ventricular size and function.  Calculated EF is: 68%    CT  IMPRESSION:  Small loculated right pleural effusion with foci of air and pleural   catheter. Small left pleural effusion.    Splenic infarcts.    Small volume ascites.    ASSESSMENT/PLAN: 	Pt is a 59 y.o. man with history of HTN,, DLD, DM,  GEJ adenocarcinoma (T3N0, stage 2A) s/p neoadjuvant chemotherapy admitted for optimization prior to planned Dodson-Broderick Esophagectomy on tuesday. Recently had a nuclear stress test in our office for evaluation of preoperative cardiac risk assessment prior to esophageal cancer surgery scheduled on 07/16/2024. The patient denies any chest pain, shortness ofbreath, or anginal symptoms. He has no palpitations, dizziness, or syncope    Pre-OP/HTN  - tolerated procedure well from CV perspective  - diet advanced, Losartan and Metoprolol started but now changed back to IV meds, follow BP if consistently above 180 can give IV hydralazine, no signs of CHF  and no chest pain  - On Zosyn

## 2024-07-27 NOTE — PROGRESS NOTE ADULT - SUBJECTIVE AND OBJECTIVE BOX
TEAM [ E ] Surgery Daily Progress Note  =====================================================    SUBJECTIVE: Patient seen and examined at bedside on AM rounds. Patient reports that they're feeling well. Denies fever, chills, N/V, chest pain, SOB    --------------------------------------------------------------------------------------    VITAL SIGNS:  T(C): 36.8 (07-27-24 @ 08:00), Max: 36.8 (07-27-24 @ 08:00)  HR: 97 (07-27-24 @ 08:00) (89 - 107)  BP: 137/69 (07-27-24 @ 08:00) (131/69 - 149/73)  RR: 16 (07-27-24 @ 08:00) (16 - 18)  SpO2: 95% (07-27-24 @ 08:00) (92% - 99%)  --------------------------------------------------------------------------------------    INS AND OUTS:    07-26-24 @ 07:01  -  07-27-24 @ 07:00  --------------------------------------------------------  IN: 300 mL / OUT: 470 mL / NET: -170 mL      --------------------------------------------------------------------------------------      EXAM    General: NAD, resting in bed comfortably.  Cardiac: regular rate, warm and well perfused  Respiratory: Nonlabored respirations, normal cw expansion.  Abdomen: soft, nontender, nondistended, VENKATA drain serosanguinous   Extremities: normal strength, FROM, no deformities    --------------------------------------------------------------------------------------    LABS

## 2024-07-27 NOTE — CHART NOTE - NSCHARTNOTEFT_GEN_A_CORE
Endocrine Follow Up, [phone]  Chart reviewed and discussed with covering provider   Fasting   If patient is tolerating eating, would consider removing dextrose component from IVF if not otherwise clinically indicated   Please adjust diet to always include a consistent carbohydrate consideration   Would continue with low dose basal / bolus insulin as ordered for today given above interventions     CAPILLARY BLOOD GLUCOSE  POCT Blood Glucose.: 233 mg/dL (27 Jul 2024 07:57)  POCT Blood Glucose.: 183 mg/dL (26 Jul 2024 21:59)  POCT Blood Glucose.: 199 mg/dL (26 Jul 2024 18:28)  POCT Blood Glucose.: 189 mg/dL (26 Jul 2024 12:04)    A1C with Estimated Average Glucose Result: 10.9 % (07-13-24 @ 02:44)  A1C with Estimated Average Glucose Result: 12.8 % (01-01-24 @ 06:00)      07-27    130<L>  |  102  |  9   ----------------------------<  216<H>  4.0   |  19<L>  |  0.83    Ca    7.2<L>      27 Jul 2024 06:58  Phos  2.1     07-27  Mg     2.10     07-27    MEDICATIONS  (STANDING):  acetaminophen     Tablet .. 650 milliGRAM(s) Oral every 6 hours  dextrose 5% + sodium chloride 0.9% with potassium chloride 20 mEq/L 1000 milliLiter(s) (75 mL/Hr) IV Continuous <Continuous>  dextrose 5%. 1000 milliLiter(s) (50 mL/Hr) IV Continuous <Continuous>  dextrose 5%. 1000 milliLiter(s) (100 mL/Hr) IV Continuous <Continuous>  dextrose 50% Injectable 12.5 Gram(s) IV Push once  dextrose 50% Injectable 25 Gram(s) IV Push once  dextrose 50% Injectable 25 Gram(s) IV Push once  dextrose Oral Gel 15 Gram(s) Oral once  dornase malik Solution 2.5 milliGRAM(s) Inhalation daily  enoxaparin Injectable 40 milliGRAM(s) SubCutaneous every 24 hours  hydrALAZINE Injectable 10 milliGRAM(s) IV Push every 6 hours  insulin glargine Injectable (LANTUS) 5 Unit(s) SubCutaneous at bedtime  insulin lispro (ADMELOG) corrective regimen sliding scale   SubCutaneous three times a day before meals  insulin lispro (ADMELOG) corrective regimen sliding scale   SubCutaneous at bedtime  insulin lispro Injectable (ADMELOG) 2 Unit(s) SubCutaneous three times a day before meals  levalbuterol Inhalation 0.63 milliGRAM(s) Inhalation every 6 hours  lidocaine   4% Patch 1 Patch Transdermal at bedtime  metoprolol tartrate Injectable 5 milliGRAM(s) IV Push every 6 hours  pantoprazole  Injectable 40 milliGRAM(s) IV Push every 12 hours  piperacillin/tazobactam IVPB.. 3.375 Gram(s) IV Intermittent every 8 hours  sodium chloride 3%  Inhalation 4 milliLiter(s) Inhalation every 6 hours

## 2024-07-27 NOTE — PROGRESS NOTE ADULT - SUBJECTIVE AND OBJECTIVE BOX
Patient is a 59y old  Male who presents with a chief complaint of Esophagectomy (26 Jul 2024 06:50)      SUBJECTIVE / OVERNIGHT EVENTS:    Events noted.  CONSTITUTIONAL: No fever,  or fatigue  RESPIRATORY: No cough, wheezing,  No shortness of breath  CARDIOVASCULAR: No chest pain, palpitations, dizziness, or leg swelling  GASTROINTESTINAL: No abdominal or epigastric pain. No nausea, vomiting.  NEUROLOGICAL: No headache    MEDICATIONS  (STANDING):  dextrose 5% + sodium chloride 0.9% with potassium chloride 20 mEq/L 1000 milliLiter(s) (75 mL/Hr) IV Continuous <Continuous>  dextrose 5%. 1000 milliLiter(s) (100 mL/Hr) IV Continuous <Continuous>  dextrose 5%. 1000 milliLiter(s) (50 mL/Hr) IV Continuous <Continuous>  dextrose 50% Injectable 12.5 Gram(s) IV Push once  dextrose 50% Injectable 25 Gram(s) IV Push once  dextrose 50% Injectable 25 Gram(s) IV Push once  dextrose Oral Gel 15 Gram(s) Oral once  dornase malik Solution 2.5 milliGRAM(s) Inhalation daily  enoxaparin Injectable 40 milliGRAM(s) SubCutaneous every 24 hours  hydrALAZINE Injectable 10 milliGRAM(s) IV Push every 6 hours  insulin glargine Injectable (LANTUS) 5 Unit(s) SubCutaneous at bedtime  insulin lispro (ADMELOG) corrective regimen sliding scale   SubCutaneous every 6 hours  levalbuterol Inhalation 0.63 milliGRAM(s) Inhalation every 6 hours  lidocaine   4% Patch 1 Patch Transdermal at bedtime  metoprolol tartrate Injectable 5 milliGRAM(s) IV Push every 6 hours  pantoprazole  Injectable 40 milliGRAM(s) IV Push every 12 hours  piperacillin/tazobactam IVPB.. 3.375 Gram(s) IV Intermittent every 8 hours  sodium chloride 3%  Inhalation 4 milliLiter(s) Inhalation every 6 hours    MEDICATIONS  (PRN):  HYDROmorphone  Injectable 0.2 milliGRAM(s) IV Push every 4 hours PRN Moderate Pain (4 - 6)  HYDROmorphone  Injectable 0.5 milliGRAM(s) IV Push every 4 hours PRN Severe Pain (7 - 10)  naloxone Injectable 0.1 milliGRAM(s) IV Push every 3 minutes PRN For ANY of the following changes in patient status:  A. RR LESS THAN 10 breaths per minute, B. Oxygen saturation LESS THAN 90%, C. Sedation score of 6        CAPILLARY BLOOD GLUCOSE      POCT Blood Glucose.: 199 mg/dL (26 Jul 2024 06:32)  POCT Blood Glucose.: 246 mg/dL (26 Jul 2024 00:40)  POCT Blood Glucose.: 192 mg/dL (25 Jul 2024 16:56)  POCT Blood Glucose.: 115 mg/dL (25 Jul 2024 12:14)    I&O's Summary    25 Jul 2024 07:01  -  26 Jul 2024 07:00  --------------------------------------------------------  IN: 1000 mL / OUT: 805 mL / NET: 195 mL        T(C): 36.8 (07-26-24 @ 05:07), Max: 37.1 (07-25-24 @ 16:00)  HR: 97 (07-26-24 @ 05:07) (91 - 106)  BP: 125/79 (07-26-24 @ 05:07) (125/79 - 157/61)  RR: 18 (07-26-24 @ 05:07) (18 - 18)  SpO2: 94% (07-26-24 @ 05:07) (93% - 97%)    PHYSICAL EXAM:  GENERAL: NAD  NECK: Supple, No JVD  CHEST/LUNG: Clear to auscultation bilaterally; No wheezing.  HEART: Regular rate and rhythm; No murmurs, rubs, or gallops  ABDOMEN: Soft, Nontender, Nondistended; Bowel sounds present  EXTREMITIES:   No edema  NEUROLOGY: AAO X 3      LABS:                        10.4   21.36 )-----------( 203      ( 26 Jul 2024 08:00 )             30.5     07-25    130<L>  |  99  |  11  ----------------------------<  183<H>  4.0   |  19<L>  |  0.79    Ca    7.4<L>      25 Jul 2024 05:45  Phos  1.8     07-25  Mg     1.80     07-25            Urinalysis Basic - ( 25 Jul 2024 05:45 )    Color: x / Appearance: x / SG: x / pH: x  Gluc: 183 mg/dL / Ketone: x  / Bili: x / Urobili: x   Blood: x / Protein: x / Nitrite: x   Leuk Esterase: x / RBC: x / WBC x   Sq Epi: x / Non Sq Epi: x / Bacteria: x      CAPILLARY BLOOD GLUCOSE      POCT Blood Glucose.: 199 mg/dL (26 Jul 2024 06:32)  POCT Blood Glucose.: 246 mg/dL (26 Jul 2024 00:40)  POCT Blood Glucose.: 192 mg/dL (25 Jul 2024 16:56)  POCT Blood Glucose.: 115 mg/dL (25 Jul 2024 12:14)        RADIOLOGY & ADDITIONAL TESTS:    Imaging Personally Reviewed:    Consultant(s) Notes Reviewed:      Care Discussed with Consultants/Other Providers:    Anish Goodrich MD, CMD, FACP    [address] [place] [address]  Office Tel: [phone]  Cell: [phone]

## 2024-07-27 NOTE — PROGRESS NOTE ADULT - NS ATTEND AMEND GEN_ALL_CORE FT
Patient seen and examined. Agree with plan as detailed in PA/NP Note.     Mart Mason MD  Pager: [phone]

## 2024-07-27 NOTE — PROGRESS NOTE ADULT - ASSESSMENT
· Assessment	  59 y.o. man with history of GEJ adenocarcinoma (T3N0, stage 2A) s/p neoadjuvant chemotherapy, now s/p [facility]-Broderick Esophagectomy on 7/16.     Sx f/up appreciated  PT f/up  Pain control Oxycodone  NPO    DM II:    FSSS  NPH insulin  Endo f/up appreciated    HTN:    On IV Metoprolol/Hydralazine  Cardio f/up appreciated    Leukocytosis:    On IV Zosyn  CT C/A/P: no source of infection

## 2024-07-27 NOTE — PROGRESS NOTE ADULT - ASSESSMENT
59 year old male with PMH HTN and DM presents with GE junction adenocarcinoma s/p chemo and radiation now s/p [place] Broderick Esophagectomy on 7/16. POD #6 UGI without anastomotic leak. Repeat CT CAP on 7/25 for increased WBC, tachycardia, and change in drain quality without signs of leak. Shows fluid collections in R lung. Started on zosyn. Will discuss with thoracic about repeating esophagram/possibility of washing out chest. WBC down to 17 today.    Plan  - Continue Zosyn   - Diet: full clear liquids today  - Follow up CXR today  - Maintenance IVF 75 cc/hour  - Lantus 5 units, admelog 2 TID, appreciate endo recs  - IV metoprolol and hydralazine, home PO meds held  - Zofran and reglan  - Activity: OOB as tolerated  - ISS  - Pain control with oral solution  - DVT ppx: SCD's & Lovenox

## 2024-07-27 NOTE — PROGRESS NOTE ADULT - SUBJECTIVE AND OBJECTIVE BOX
Subjective:  patient seen and examined with DR Laureano this AM  pt without acute complaints  tolerating CLD  Afebrile  pain controlled  pt denies chest pain or shortness of breath  using incentive spirometer  on bowel regimen, +flatus, +BM  ambulatory with assistance  d/w attending on morning TEAMS report      Vital Signs:  Vital Signs Last 24 Hrs  T(C): 36.7 (07-27-24 @ 04:40), Max: 36.8 (07-26-24 @ 09:30)  T(F): 98.1 (07-27-24 @ 04:40), Max: 98.2 (07-26-24 @ 09:30)  HR: 102 (07-27-24 @ 04:40) (89 - 107)  BP: 140/73 (07-27-24 @ 04:40) (131/69 - 149/73)  RR: 16 (07-27-24 @ 04:40) (16 - 18)  SpO2: 92% (07-27-24 @ 04:40) (92% - 99%) on (O2)    PE  Telemetry/Alarms: SR  General: awake and alert NAD  Neurology: A&Ox3, nonfocal, ALFORD x 4  Respiratory: CTA B/L  CV: RRR, S1S2, no murmurs, rubs or gallops  Abdominal: Soft, NT, ND +BS, Last BM  Extremities: No edema, + peripheral pulses  Incisions: c,d,i  Tubes: lavern output 130cc/24h brown output  Relevant labs, radiology and Medications reviewed                        9.9    17.39 )-----------( 231      ( 27 Jul 2024 06:58 )             30.5     07-26    131<L>  |  100  |  10  ----------------------------<  205<H>  3.6   |  20<L>  |  0.91    Ca    7.3<L>      26 Jul 2024 08:00  Phos  2.7     07-26  Mg     1.80     07-26        MEDICATIONS  (STANDING):  dextrose 5% + sodium chloride 0.9% with potassium chloride 20 mEq/L 1000 milliLiter(s) (75 mL/Hr) IV Continuous <Continuous>  dextrose 5%. 1000 milliLiter(s) (50 mL/Hr) IV Continuous <Continuous>  dextrose 5%. 1000 milliLiter(s) (100 mL/Hr) IV Continuous <Continuous>  dextrose 50% Injectable 12.5 Gram(s) IV Push once  dextrose 50% Injectable 25 Gram(s) IV Push once  dextrose 50% Injectable 25 Gram(s) IV Push once  dextrose Oral Gel 15 Gram(s) Oral once  dornase malik Solution 2.5 milliGRAM(s) Inhalation daily  enoxaparin Injectable 40 milliGRAM(s) SubCutaneous every 24 hours  hydrALAZINE Injectable 10 milliGRAM(s) IV Push every 6 hours  insulin glargine Injectable (LANTUS) 5 Unit(s) SubCutaneous at bedtime  insulin lispro (ADMELOG) corrective regimen sliding scale   SubCutaneous three times a day before meals  insulin lispro (ADMELOG) corrective regimen sliding scale   SubCutaneous at bedtime  insulin lispro Injectable (ADMELOG) 2 Unit(s) SubCutaneous three times a day before meals  levalbuterol Inhalation 0.63 milliGRAM(s) Inhalation every 6 hours  lidocaine   4% Patch 1 Patch Transdermal at bedtime  metoprolol tartrate Injectable 5 milliGRAM(s) IV Push every 6 hours  pantoprazole  Injectable 40 milliGRAM(s) IV Push every 12 hours  piperacillin/tazobactam IVPB.. 3.375 Gram(s) IV Intermittent every 8 hours  sodium chloride 3%  Inhalation 4 milliLiter(s) Inhalation every 6 hours    MEDICATIONS  (PRN):  HYDROmorphone  Injectable 0.2 milliGRAM(s) IV Push every 4 hours PRN Moderate Pain (4 - 6)  HYDROmorphone  Injectable 0.5 milliGRAM(s) IV Push every 4 hours PRN Severe Pain (7 - 10)  naloxone Injectable 0.1 milliGRAM(s) IV Push every 3 minutes PRN For ANY of the following changes in patient status:  A. RR LESS THAN 10 breaths per minute, B. Oxygen saturation LESS THAN 90%, C. Sedation score of 6    Pertinent Physical Exam  I&O's Summary    26 Jul 2024 07:01  -  27 Jul 2024 07:00  --------------------------------------------------------  IN: 300 mL / OUT: 470 mL / NET: -170 mL        PAST MEDICAL & SURGICAL HISTORY:  HLD (hyperlipidemia)      Insulin dependent type 2 diabetes mellitus      BPH (benign prostatic hyperplasia)      HTN (hypertension)      Gastroesophageal cancer      Gastroesophageal cancer      Port-A-Cath in place        A/P  59 year old male with PMH HTN and DM presents with GE junction adenocarcinoma s/p chemo and radiation now s/p [place] Broderick Esophagectomy on 7/16. Pt s/p barium swallow and started on CLD. Postop CT CAP reviewed w Dr Laureano. Pt is now on Zosyn and tolerating CLD.    - ok to advance to full liquid diet as per Dr Laureano - d/w Surg D provider  - continue Zosyn, f/u ID rec  - monitor and trend WBC, monitor for fevers  - continue lavern to suction monitoring quality  - potentially advance to soft mechanical diet tomorrow and possible home Monday as per Dr Laureano  - will follow    Molly TATE [id number]

## 2024-07-28 LAB
ANION GAP SERPL CALC-SCNC: 12 MMOL/L — SIGNIFICANT CHANGE UP (ref 7–14)
BASE EXCESS BLDV CALC-SCNC: -2.9 MMOL/L — LOW (ref -2–3)
BLOOD GAS VENOUS COMPREHENSIVE RESULT: SIGNIFICANT CHANGE UP
BUN SERPL-MCNC: 7 MG/DL — SIGNIFICANT CHANGE UP (ref 7–23)
CALCIUM SERPL-MCNC: 7.1 MG/DL — LOW (ref 8.4–10.5)
CHLORIDE BLDV-SCNC: 102 MMOL/L — SIGNIFICANT CHANGE UP (ref 96–108)
CHLORIDE SERPL-SCNC: 102 MMOL/L — SIGNIFICANT CHANGE UP (ref 98–107)
CO2 BLDV-SCNC: 23 MMOL/L — SIGNIFICANT CHANGE UP (ref 22–26)
CO2 SERPL-SCNC: 20 MMOL/L — LOW (ref 22–31)
CREAT SERPL-MCNC: 0.83 MG/DL — SIGNIFICANT CHANGE UP (ref 0.5–1.3)
EGFR: 100 ML/MIN/1.73M2 — SIGNIFICANT CHANGE UP
GAS PNL BLDV: 128 MMOL/L — LOW (ref 136–145)
GLUCOSE BLDC GLUCOMTR-MCNC: 142 MG/DL — HIGH (ref 70–99)
GLUCOSE BLDC GLUCOMTR-MCNC: 152 MG/DL — HIGH (ref 70–99)
GLUCOSE BLDC GLUCOMTR-MCNC: 187 MG/DL — HIGH (ref 70–99)
GLUCOSE BLDC GLUCOMTR-MCNC: 224 MG/DL — HIGH (ref 70–99)
GLUCOSE BLDV-MCNC: 134 MG/DL — HIGH (ref 70–99)
GLUCOSE SERPL-MCNC: 138 MG/DL — HIGH (ref 70–99)
HCO3 BLDV-SCNC: 22 MMOL/L — SIGNIFICANT CHANGE UP (ref 22–29)
HCT VFR BLD CALC: 29.4 % — LOW (ref 39–50)
HCT VFR BLDA CALC: 30 % — LOW (ref 39–51)
HGB BLD CALC-MCNC: 10.1 G/DL — LOW (ref 12.6–17.4)
HGB BLD-MCNC: 9.8 G/DL — LOW (ref 13–17)
LACTATE BLDV-MCNC: 0.7 MMOL/L — SIGNIFICANT CHANGE UP (ref 0.5–2)
MAGNESIUM SERPL-MCNC: 1.9 MG/DL — SIGNIFICANT CHANGE UP (ref 1.6–2.6)
MCHC RBC-ENTMCNC: 29.9 PG — SIGNIFICANT CHANGE UP (ref 27–34)
MCHC RBC-ENTMCNC: 33.3 GM/DL — SIGNIFICANT CHANGE UP (ref 32–36)
MCV RBC AUTO: 89.6 FL — SIGNIFICANT CHANGE UP (ref 80–100)
NRBC # BLD: 0 /100 WBCS — SIGNIFICANT CHANGE UP (ref 0–0)
NRBC # FLD: 0 K/UL — SIGNIFICANT CHANGE UP (ref 0–0)
PCO2 BLDV: 37 MMHG — LOW (ref 42–55)
PH BLDV: 7.38 — SIGNIFICANT CHANGE UP (ref 7.32–7.43)
PHOSPHATE SERPL-MCNC: 2.6 MG/DL — SIGNIFICANT CHANGE UP (ref 2.5–4.5)
PLATELET # BLD AUTO: 187 K/UL — SIGNIFICANT CHANGE UP (ref 150–400)
PO2 BLDV: 68 MMHG — HIGH (ref 25–45)
POTASSIUM BLDV-SCNC: 3.2 MMOL/L — LOW (ref 3.5–5.1)
POTASSIUM SERPL-MCNC: 3.5 MMOL/L — SIGNIFICANT CHANGE UP (ref 3.5–5.3)
POTASSIUM SERPL-SCNC: 3.5 MMOL/L — SIGNIFICANT CHANGE UP (ref 3.5–5.3)
RBC # BLD: 3.28 M/UL — LOW (ref 4.2–5.8)
RBC # FLD: 14 % — SIGNIFICANT CHANGE UP (ref 10.3–14.5)
SAO2 % BLDV: 95 % — HIGH (ref 67–88)
SODIUM SERPL-SCNC: 134 MMOL/L — LOW (ref 135–145)
WBC # BLD: 11.48 K/UL — HIGH (ref 3.8–10.5)
WBC # FLD AUTO: 11.48 K/UL — HIGH (ref 3.8–10.5)

## 2024-07-28 PROCEDURE — 71045 X-RAY EXAM CHEST 1 VIEW: CPT | Mod: 26,76

## 2024-07-28 PROCEDURE — 71275 CT ANGIOGRAPHY CHEST: CPT | Mod: 26

## 2024-07-28 PROCEDURE — 93010 ELECTROCARDIOGRAM REPORT: CPT

## 2024-07-28 PROCEDURE — 71275 CT ANGIOGRAPHY CHEST: CPT | Mod: 26,77

## 2024-07-28 RX ORDER — AMLODIPINE BESYLATE 2.5 MG/1
5 TABLET ORAL DAILY
Refills: 0 | Status: ACTIVE | OUTPATIENT
Start: 2024-07-28 | End: 2025-06-26

## 2024-07-28 RX ORDER — PANTOPRAZOLE SODIUM 20 MG/1
40 TABLET, DELAYED RELEASE ORAL EVERY 12 HOURS
Refills: 0 | Status: ACTIVE | OUTPATIENT
Start: 2024-07-28 | End: 2025-06-26

## 2024-07-28 RX ORDER — POTASSIUM CHLORIDE 1500 MG/1
20 TABLET, EXTENDED RELEASE ORAL
Refills: 0 | Status: DISCONTINUED | OUTPATIENT
Start: 2024-07-28 | End: 2024-07-28

## 2024-07-28 RX ORDER — SODIUM PHOSPHATE, MONOBASIC, MONOHYDRATE 276; 142 MG/ML; MG/ML
15 INJECTION, SOLUTION INTRAVENOUS ONCE
Refills: 0 | Status: COMPLETED | OUTPATIENT
Start: 2024-07-28 | End: 2024-07-28

## 2024-07-28 RX ORDER — POTASSIUM CHLORIDE 1500 MG/1
40 TABLET, EXTENDED RELEASE ORAL ONCE
Refills: 0 | Status: COMPLETED | OUTPATIENT
Start: 2024-07-28 | End: 2024-07-28

## 2024-07-28 RX ORDER — MAGNESIUM SULFATE 500 MG/ML
1 VIAL (ML) INJECTION ONCE
Refills: 0 | Status: COMPLETED | OUTPATIENT
Start: 2024-07-28 | End: 2024-07-28

## 2024-07-28 RX ORDER — LOSARTAN POTASSIUM 50 MG/1
50 TABLET, FILM COATED ORAL DAILY
Refills: 0 | Status: ACTIVE | OUTPATIENT
Start: 2024-07-28 | End: 2025-06-26

## 2024-07-28 RX ADMIN — LEVALBUTEROL HYDROCHLORIDE 0.63 MILLIGRAM(S): 0.31 SOLUTION RESPIRATORY (INHALATION) at 21:20

## 2024-07-28 RX ADMIN — Medication 2: at 17:17

## 2024-07-28 RX ADMIN — Medication 650 MILLIGRAM(S): at 19:03

## 2024-07-28 RX ADMIN — PIPERACILLIN SODIUM, TAZOBACTAM SODIUM 25 GRAM(S): 3; .375 INJECTION, POWDER, LYOPHILIZED, FOR SOLUTION INTRAVENOUS at 22:04

## 2024-07-28 RX ADMIN — LIDOCAINE 5% 1 PATCH: 5 CREAM TOPICAL at 08:16

## 2024-07-28 RX ADMIN — LEVALBUTEROL HYDROCHLORIDE 0.63 MILLIGRAM(S): 0.31 SOLUTION RESPIRATORY (INHALATION) at 04:20

## 2024-07-28 RX ADMIN — PIPERACILLIN SODIUM, TAZOBACTAM SODIUM 25 GRAM(S): 3; .375 INJECTION, POWDER, LYOPHILIZED, FOR SOLUTION INTRAVENOUS at 12:56

## 2024-07-28 RX ADMIN — Medication 650 MILLIGRAM(S): at 17:02

## 2024-07-28 RX ADMIN — Medication 5 MILLIGRAM(S): at 06:00

## 2024-07-28 RX ADMIN — Medication 4 MILLILITER(S): at 04:20

## 2024-07-28 RX ADMIN — Medication 1: at 12:54

## 2024-07-28 RX ADMIN — INSULIN GLARGINE-YFGN 5 UNIT(S): 100 INJECTION, SOLUTION SUBCUTANEOUS at 22:00

## 2024-07-28 RX ADMIN — Medication 4 MILLILITER(S): at 15:04

## 2024-07-28 RX ADMIN — Medication 5 MILLIGRAM(S): at 00:05

## 2024-07-28 RX ADMIN — SODIUM PHOSPHATE, MONOBASIC, MONOHYDRATE 63.75 MILLIMOLE(S): 276; 142 INJECTION, SOLUTION INTRAVENOUS at 06:01

## 2024-07-28 RX ADMIN — Medication 650 MILLIGRAM(S): at 11:40

## 2024-07-28 RX ADMIN — HYDRALAZINE HYDROCHLORIDE 10 MILLIGRAM(S): 100 TABLET ORAL at 11:42

## 2024-07-28 RX ADMIN — LEVALBUTEROL HYDROCHLORIDE 0.63 MILLIGRAM(S): 0.31 SOLUTION RESPIRATORY (INHALATION) at 09:07

## 2024-07-28 RX ADMIN — DORNASE ALFA 2.5 MILLIGRAM(S): 1 SOLUTION RESPIRATORY (INHALATION) at 09:09

## 2024-07-28 RX ADMIN — Medication 100 GRAM(S): at 05:55

## 2024-07-28 RX ADMIN — LEVALBUTEROL HYDROCHLORIDE 0.63 MILLIGRAM(S): 0.31 SOLUTION RESPIRATORY (INHALATION) at 15:04

## 2024-07-28 RX ADMIN — PANTOPRAZOLE SODIUM 40 MILLIGRAM(S): 20 TABLET, DELAYED RELEASE ORAL at 05:58

## 2024-07-28 RX ADMIN — ENOXAPARIN SODIUM 40 MILLIGRAM(S): 120 INJECTION SUBCUTANEOUS at 11:40

## 2024-07-28 RX ADMIN — HYDRALAZINE HYDROCHLORIDE 10 MILLIGRAM(S): 100 TABLET ORAL at 17:01

## 2024-07-28 RX ADMIN — PIPERACILLIN SODIUM, TAZOBACTAM SODIUM 25 GRAM(S): 3; .375 INJECTION, POWDER, LYOPHILIZED, FOR SOLUTION INTRAVENOUS at 06:01

## 2024-07-28 RX ADMIN — HYDRALAZINE HYDROCHLORIDE 10 MILLIGRAM(S): 100 TABLET ORAL at 05:59

## 2024-07-28 RX ADMIN — Medication 4 MILLILITER(S): at 21:20

## 2024-07-28 RX ADMIN — Medication 650 MILLIGRAM(S): at 12:46

## 2024-07-28 RX ADMIN — Medication 4 MILLILITER(S): at 09:06

## 2024-07-28 RX ADMIN — Medication 5 MILLIGRAM(S): at 17:01

## 2024-07-28 RX ADMIN — POTASSIUM CHLORIDE 40 MILLIEQUIVALENT(S): 1500 TABLET, EXTENDED RELEASE ORAL at 05:57

## 2024-07-28 RX ADMIN — LIDOCAINE 5% 1 PATCH: 5 CREAM TOPICAL at 18:00

## 2024-07-28 RX ADMIN — LIDOCAINE 5% 1 PATCH: 5 CREAM TOPICAL at 05:59

## 2024-07-28 RX ADMIN — LIDOCAINE 5% 1 PATCH: 5 CREAM TOPICAL at 22:01

## 2024-07-28 RX ADMIN — Medication 2 UNIT(S): at 17:17

## 2024-07-28 RX ADMIN — Medication 5 MILLIGRAM(S): at 11:41

## 2024-07-28 RX ADMIN — SODIUM CHLORIDE AND POTASSIUM CHLORIDE 30 MILLILITER(S): 150; 450 INJECTION, SOLUTION INTRAVENOUS at 11:44

## 2024-07-28 RX ADMIN — PANTOPRAZOLE SODIUM 40 MILLIGRAM(S): 20 TABLET, DELAYED RELEASE ORAL at 17:02

## 2024-07-28 NOTE — PROGRESS NOTE ADULT - ASSESSMENT
59 year old male with PMH HTN and DM 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 POD #6 UGI without anastomotic leak. Repeat CT CAP on 7/25 for increased WBC, tachycardia, and change in drain quality without signs of leak. Shows fluid collections in R lung. Started on zosyn. Will discuss with thoracic about repeating esophagram/possibility of washing out chest. WBC down to 17 today.    Plan  - Continue Zosyn   - Diet: advance to small and bite sized solids  - CXR s/f R pulmonary infiltrate  - CT PE PROTOCOL to r/o PE due to increased WOB, tachypnea, tachycardia 2 weeks postop   - Maintenance IVF 75 cc/hour  - Lantus 5 units, admelog 2 TID, appreciate endo recs  - IV metoprolol and hydralazine, home PO meds held  - Zofran and reglan  - Activity: OOB as tolerated  - ISS  - Pain control with oral solution  - DVT ppx: SCD's & Lovenox    Red Miller MD (PGY1)   D/E Team Surgery  Seen with Dr. Wilson / Discussed with Dr. Agustin

## 2024-07-28 NOTE — PROGRESS NOTE ADULT - SUBJECTIVE AND OBJECTIVE BOX
DATE OF SERVICE: 07-28-24    pt resting in bed, on 2 l NC            acetaminophen     Tablet .. 650 milliGRAM(s) Oral every 6 hours  dextrose 5% + sodium chloride 0.9% with potassium chloride 20 mEq/L 1000 milliLiter(s) IV Continuous <Continuous>  dextrose 5%. 1000 milliLiter(s) IV Continuous <Continuous>  dextrose 5%. 1000 milliLiter(s) IV Continuous <Continuous>  dextrose 50% Injectable 12.5 Gram(s) IV Push once  dextrose 50% Injectable 25 Gram(s) IV Push once  dextrose 50% Injectable 25 Gram(s) IV Push once  dextrose Oral Gel 15 Gram(s) Oral once  dornase malik Solution 2.5 milliGRAM(s) Inhalation daily  enoxaparin Injectable 40 milliGRAM(s) SubCutaneous every 24 hours  hydrALAZINE Injectable 10 milliGRAM(s) IV Push every 6 hours  insulin glargine Injectable (LANTUS) 5 Unit(s) SubCutaneous at bedtime  insulin lispro (ADMELOG) corrective regimen sliding scale   SubCutaneous three times a day before meals  insulin lispro (ADMELOG) corrective regimen sliding scale   SubCutaneous at bedtime  insulin lispro Injectable (ADMELOG) 2 Unit(s) SubCutaneous three times a day before meals  levalbuterol Inhalation 0.63 milliGRAM(s) Inhalation every 6 hours  lidocaine   4% Patch 1 Patch Transdermal at bedtime  metoprolol tartrate Injectable 5 milliGRAM(s) IV Push every 6 hours  naloxone Injectable 0.1 milliGRAM(s) IV Push every 3 minutes PRN  oxyCODONE    IR 2.5 milliGRAM(s) Oral every 4 hours PRN  oxyCODONE    IR 5 milliGRAM(s) Oral every 4 hours PRN  pantoprazole  Injectable 40 milliGRAM(s) IV Push every 12 hours  piperacillin/tazobactam IVPB.. 3.375 Gram(s) IV Intermittent every 8 hours  sodium chloride 3%  Inhalation 4 milliLiter(s) Inhalation every 6 hours                            9.8    11.48 )-----------( 187 ( 28 Jul 2024 04:12 )             29.4       Hemoglobin: 9.8 g/dL (07-28 @ 04:12)  Hemoglobin: 9.9 g/dL (07-27 @ 06:58)  Hemoglobin: 10.4 g/dL (07-26 @ 08:00)  Hemoglobin: 10.0 g/dL (07-25 @ 05:45)  Hemoglobin: 10.1 g/dL (07-24 @ 02:58)      07-28    134<L>  |  102  |  7   ----------------------------<  138<H>  3.5   |  20<L>  |  0.83    Ca    7.1<L>      28 Jul 2024 04:12  Phos  2.6     07-28  Mg     1.90     07-28      Creatinine Trend: 0.83<--, 0.83<--, 0.91<--, 0.79<--, 0.97<--, 0.88<--    COAGS:           T(C): 36.9 (07-28-24 @ 04:54), Max: 37.2 (07-27-24 @ 21:06)  HR: 116 (07-28-24 @ 04:54) (92 - 125)  BP: 146/80 (07-28-24 @ 04:54) (122/69 - 160/80)  RR: 20 (07-28-24 @ 04:54) (16 - 32)  SpO2: 92% (07-28-24 @ 04:54) (88% - 95%)  Wt(kg): --    I&O's Summary    27 Jul 2024 07:01  -  28 Jul 2024 07:00  --------------------------------------------------------  IN: 300 mL / OUT: 722 mL / NET: -422 mL        Gen: NAD  HEENT:  (-)icterus (-)pallor  CV: N S1 S2 1/6 CATHY (+)2 Pulses B/l  Resp:  Clear to auscultation B/L, normal effort  GI: (+) BS Soft, NT, ND  Lymph:  (-)Edema, (-)obvious lymphadenopathy  Skin: Warm to touch, Normal turgor  Psych: Appropriate mood and affect      TELEMETRY: 	 SR/ST    ECG:  	NSR      TTE 07/2024  Findings:  1. Normal left ventricular size and function.  Mild diastolic dysfunction.  2. Normal left atrial size  3. Right atrial cavity is normal in size.  4. Normal right ventricular size and function.  5. Normal trileaflet aortic valve opening.  6. Normal mitral valve opening.  7. Normal appearing tricuspid valve with mild tricuspid  regurgitation.  8. Pulmonic valve is grossly normal, yet poorly visualized  with no doppler evidence for pulmonic stenosis.  9. No evidence of significant pericardial effusion.  10. The aortic root is normal.  11. Normal pulmonary artery.  12. IVC is normal with respiratory variation.  FULL STUDY DONE INCLUDING M-MODE RECORDING,  SPECTRAL DOPPLER AND    Stress 07/2024  Normal myocardial perfusion SPECT images No evidence of stress induced ischemia or infarction.  Normal left ventricular size and function.  Calculated EF is: 68%    CT  IMPRESSION:  Small loculated right pleural effusion with foci of air and pleural   catheter. Small left pleural effusion.    Splenic infarcts.    Small volume ascites.    ASSESSMENT/PLAN: 	Pt is a 59 y.o. man with history of HTN,, DLD, DM,  GEJ adenocarcinoma (T3N0, stage 2A) s/p neoadjuvant chemotherapy admitted for optimization prior to planned Michael-Broderick Esophagectomy on tuesday. Recently had a nuclear stress test in our office for evaluation of preoperative cardiac risk assessment prior to esophageal cancer surgery scheduled on 07/16/2024. The patient denies any chest pain, shortness ofbreath, or anginal symptoms. He has no palpitations, dizziness, or syncope  s/p [place]-Broderick Esophagectomy on 7/16.     Pre-OP/HTN  - tolerated procedure well from CV perspective  - diet advanced, Losartan and Metoprolol started but now changed back to IV meds, follow BP if consistently above 180 can give IV hydralazine, no signs of CHF  and no chest pain  - On Zosyn per sx

## 2024-07-28 NOTE — CHART NOTE - NSCHARTNOTEFT_GEN_A_CORE
CTA reviewed with Dr Laureano  plan to repeat labs and CXR in AM  possible DC home on soft diet, small frequent meals  plan to keep lavern upon discharge, will need teaching and VNS arrangements  continue to monitor pt closely  above as per Dr Laureano  above d/w surg D resident    Molly TATE [id number]

## 2024-07-28 NOTE — PROGRESS NOTE ADULT - SUBJECTIVE AND OBJECTIVE BOX
TEAM [ E ] Surgery Daily Progress Note  =====================================================    INTERVAL: POD #12 [place] senait esophagectomy for GEJ adenocarcinoma, new tachycardia overnight + increased o2 requirement  SUBJECTIVE: Patient denies fever, chills, N/V, chest pain, SOB. Denies abdominal or chest pain near the incision sites of on bilateral chest. He is tolerating full liquids    ALLERGIES:  No Known Allergies    -------------------------------------------------------------------------------------    MEDICATIONS:  acetaminophen     Tablet .. 650 milliGRAM(s) Oral every 6 hours  dextrose 5% + sodium chloride 0.9% with potassium chloride 20 mEq/L 1000 milliLiter(s) IV Continuous <Continuous>  dextrose 5%. 1000 milliLiter(s) IV Continuous <Continuous>  dextrose 5%. 1000 milliLiter(s) IV Continuous <Continuous>  dextrose 50% Injectable 12.5 Gram(s) IV Push once  dextrose 50% Injectable 25 Gram(s) IV Push once  dextrose 50% Injectable 25 Gram(s) IV Push once  dextrose Oral Gel 15 Gram(s) Oral once  dornase malik Solution 2.5 milliGRAM(s) Inhalation daily  enoxaparin Injectable 40 milliGRAM(s) SubCutaneous every 24 hours  hydrALAZINE Injectable 10 milliGRAM(s) IV Push every 6 hours  insulin glargine Injectable (LANTUS) 5 Unit(s) SubCutaneous at bedtime  insulin lispro (ADMELOG) corrective regimen sliding scale   SubCutaneous three times a day before meals  insulin lispro (ADMELOG) corrective regimen sliding scale   SubCutaneous at bedtime  insulin lispro Injectable (ADMELOG) 2 Unit(s) SubCutaneous three times a day before meals  levalbuterol Inhalation 0.63 milliGRAM(s) Inhalation every 6 hours  lidocaine   4% Patch 1 Patch Transdermal at bedtime  metoprolol tartrate Injectable 5 milliGRAM(s) IV Push every 6 hours  naloxone Injectable 0.1 milliGRAM(s) IV Push every 3 minutes PRN  oxyCODONE    IR 2.5 milliGRAM(s) Oral every 4 hours PRN  oxyCODONE    IR 5 milliGRAM(s) Oral every 4 hours PRN  pantoprazole  Injectable 40 milliGRAM(s) IV Push every 12 hours  piperacillin/tazobactam IVPB.. 3.375 Gram(s) IV Intermittent every 8 hours  sodium chloride 3%  Inhalation 4 milliLiter(s) Inhalation every 6 hours    --------------------------------------------------------------------------------------    VITAL SIGNS:  T(C): 36.8 (07-28-24 @ 09:15), Max: 37.2 (07-27-24 @ 21:06)  HR: 115 (07-28-24 @ 09:15) (92 - 125)  BP: 131/70 (07-28-24 @ 09:15) (122/69 - 160/80)  RR: 17 (07-28-24 @ 09:15) (17 - 32)  SpO2: 100% (07-28-24 @ 09:15) (88% - 100%)  --------------------------------------------------------------------------------------    INS AND OUTS:    07-27-24 @ 07:01  -  07-28-24 @ 07:00  --------------------------------------------------------  IN: 300 mL / OUT: 722 mL / NET: -422 mL      --------------------------------------------------------------------------------------      EXAM    General: NAD, resting in bed comfortably.  Cardiac: regular rate, warm and well perfused  Respiratory: Tachypnea, accessory muscle use, satting appropriately  Abdomen: soft, nontender, nondistended, port incisions c/d/i.   Extremities: normal strength, FROM, no deformities  Adam drain w/ 22mL output, SS    --------------------------------------------------------------------------------------    LABS

## 2024-07-28 NOTE — PROGRESS NOTE ADULT - SUBJECTIVE AND OBJECTIVE BOX
Subjective:  patient seen and examined with thoracic team  d/w Dr Laureano by phone  pt without acute complaints  tolerating full liquid diet, advanced to soft diet - small frequent meals  Afebrile  pain controlled  pt denies chest pain or shortness of breath  using incentive spirometer  on bowel regimen, +flatus, +BM  ambulatory with assistance        Vital Signs:  Vital Signs Last 24 Hrs  T(C): 37.4 (07-28-24 @ 13:25), Max: 37.4 (07-28-24 @ 13:25)  T(F): 99.3 (07-28-24 @ 13:25), Max: 99.3 (07-28-24 @ 13:25)  HR: 100 (07-28-24 @ 15:15) (97 - 125)  BP: 146/79 (07-28-24 @ 13:25) (122/69 - 160/80)  RR: 17 (07-28-24 @ 13:25) (17 - 32)  SpO2: 96% (07-28-24 @ 15:15) (88% - 100%) on (O2)    PE  Telemetry/Alarms: ST  General: awake and alert NAD  Neurology: A&Ox3, nonfocal, ALFORD x 4  Respiratory: CTA B/L  CV: RRR, S1S2, no murmurs, rubs or gallops  Abdominal: Soft, NT, ND +BS, Last BM  Extremities: No edema, + peripheral pulses  Incisions: c,d,i  Tubes: lavern output 22cc/24h       Relevant labs, radiology and Medications reviewed                        9.8    11.48 )-----------( 187      ( 28 Jul 2024 04:12 )             29.4     07-28    134<L>  |  102  |  7   ----------------------------<  138<H>  3.5   |  20<L>  |  0.83    Ca    7.1<L>      28 Jul 2024 04:12  Phos  2.6     07-28  Mg     1.90     07-28        MEDICATIONS  (STANDING):  acetaminophen     Tablet .. 650 milliGRAM(s) Oral every 6 hours  dextrose 5% + sodium chloride 0.9% with potassium chloride 20 mEq/L 1000 milliLiter(s) (30 mL/Hr) IV Continuous <Continuous>  dextrose 5%. 1000 milliLiter(s) (50 mL/Hr) IV Continuous <Continuous>  dextrose 5%. 1000 milliLiter(s) (100 mL/Hr) IV Continuous <Continuous>  dextrose 50% Injectable 12.5 Gram(s) IV Push once  dextrose 50% Injectable 25 Gram(s) IV Push once  dextrose 50% Injectable 25 Gram(s) IV Push once  dextrose Oral Gel 15 Gram(s) Oral once  dornase malik Solution 2.5 milliGRAM(s) Inhalation daily  enoxaparin Injectable 40 milliGRAM(s) SubCutaneous every 24 hours  hydrALAZINE Injectable 10 milliGRAM(s) IV Push every 6 hours  insulin glargine Injectable (LANTUS) 5 Unit(s) SubCutaneous at bedtime  insulin lispro (ADMELOG) corrective regimen sliding scale   SubCutaneous at bedtime  insulin lispro (ADMELOG) corrective regimen sliding scale   SubCutaneous three times a day before meals  insulin lispro Injectable (ADMELOG) 2 Unit(s) SubCutaneous three times a day before meals  levalbuterol Inhalation 0.63 milliGRAM(s) Inhalation every 6 hours  lidocaine   4% Patch 1 Patch Transdermal at bedtime  metoprolol tartrate Injectable 5 milliGRAM(s) IV Push every 6 hours  pantoprazole  Injectable 40 milliGRAM(s) IV Push every 12 hours  piperacillin/tazobactam IVPB.. 3.375 Gram(s) IV Intermittent every 8 hours  sodium chloride 3%  Inhalation 4 milliLiter(s) Inhalation every 6 hours    MEDICATIONS  (PRN):  naloxone Injectable 0.1 milliGRAM(s) IV Push every 3 minutes PRN For ANY of the following changes in patient status:  A. RR LESS THAN 10 breaths per minute, B. Oxygen saturation LESS THAN 90%, C. Sedation score of 6  oxyCODONE    IR 2.5 milliGRAM(s) Oral every 4 hours PRN Moderate Pain (4 - 6)  oxyCODONE    IR 5 milliGRAM(s) Oral every 4 hours PRN Severe Pain (7 - 10)    Pertinent Physical Exam  I&O's Summary    27 Jul 2024 07:01  -  28 Jul 2024 07:00  --------------------------------------------------------  IN: 300 mL / OUT: 722 mL / NET: -422 mL    28 Jul 2024 07:01  -  28 Jul 2024 16:28  --------------------------------------------------------  IN: 540 mL / OUT: 315 mL / NET: 225 mL        PAST MEDICAL & SURGICAL HISTORY:  HLD (hyperlipidemia)      Insulin dependent type 2 diabetes mellitus      BPH (benign prostatic hyperplasia)      HTN (hypertension)      Gastroesophageal cancer      Gastroesophageal cancer      Port-A-Cath in place      A/P  59 year old male with PMH HTN and DM 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 Pt s/p barium swallow and started on CLD. Postop CT CAP reviewed w Dr Laureano. Pt is now on Zosyn and advanced to soft diet. Sinus tachycardia since last night. Now on nasal cannula.     - CTA r/o PE performed, will f/u official read   - continue Zosyn, f/u ID   - monitor and trend WBC, monitor for fevers  - continue lavern to suction monitoring quality  - will follow    Molly TATE [id number]

## 2024-07-28 NOTE — PROGRESS NOTE ADULT - ASSESSMENT
· Assessment	  59 y.o. man with history of GEJ adenocarcinoma (T3N0, stage 2A) s/p neoadjuvant chemotherapy, now s/p [place]-Broderick Esophagectomy on 7/16.     Sx f/up appreciated  PT f/up  Pain control Oxycodone    SOB:    CXR: Rt pleural effusion  CTPA to r/o PE  On supp O2  Xopenex nebulizer    DM II:    FSSS  Lantus insulin  Endo f/up appreciated    HTN:    On IV Metoprolol/Hydralazine  Cardio f/up appreciated    Leukocytosis:    On IV Zosyn  CT C/A/P: no source of infection

## 2024-07-28 NOTE — PROGRESS NOTE ADULT - NS ATTEND AMEND GEN_ALL_CORE FT
Patient seen and examined. Agree with plan as detailed in PA/NP Note.     Mart Mason MD  Pager: [phone]

## 2024-07-29 DIAGNOSIS — E83.51 HYPOCALCEMIA: ICD-10-CM

## 2024-07-29 LAB
ADD ON TEST-SPECIMEN IN LAB: SIGNIFICANT CHANGE UP
ALBUMIN SERPL ELPH-MCNC: 2.2 G/DL — LOW (ref 3.3–5)
ANION GAP SERPL CALC-SCNC: 8 MMOL/L — SIGNIFICANT CHANGE UP (ref 7–14)
BUN SERPL-MCNC: 8 MG/DL — SIGNIFICANT CHANGE UP (ref 7–23)
CALCIUM SERPL-MCNC: 7 MG/DL — LOW (ref 8.4–10.5)
CHLORIDE SERPL-SCNC: 102 MMOL/L — SIGNIFICANT CHANGE UP (ref 98–107)
CO2 SERPL-SCNC: 23 MMOL/L — SIGNIFICANT CHANGE UP (ref 22–31)
CREAT SERPL-MCNC: 0.87 MG/DL — SIGNIFICANT CHANGE UP (ref 0.5–1.3)
EGFR: 98 ML/MIN/1.73M2 — SIGNIFICANT CHANGE UP
GLUCOSE BLDC GLUCOMTR-MCNC: 108 MG/DL — HIGH (ref 70–99)
GLUCOSE BLDC GLUCOMTR-MCNC: 149 MG/DL — HIGH (ref 70–99)
GLUCOSE BLDC GLUCOMTR-MCNC: 198 MG/DL — HIGH (ref 70–99)
GLUCOSE BLDC GLUCOMTR-MCNC: 218 MG/DL — HIGH (ref 70–99)
GLUCOSE SERPL-MCNC: 147 MG/DL — HIGH (ref 70–99)
HCT VFR BLD CALC: 26.3 % — LOW (ref 39–50)
HGB BLD-MCNC: 8.6 G/DL — LOW (ref 13–17)
MAGNESIUM SERPL-MCNC: 2.1 MG/DL — SIGNIFICANT CHANGE UP (ref 1.6–2.6)
MCHC RBC-ENTMCNC: 29.5 PG — SIGNIFICANT CHANGE UP (ref 27–34)
MCHC RBC-ENTMCNC: 32.7 GM/DL — SIGNIFICANT CHANGE UP (ref 32–36)
MCV RBC AUTO: 90.1 FL — SIGNIFICANT CHANGE UP (ref 80–100)
NRBC # BLD: 0 /100 WBCS — SIGNIFICANT CHANGE UP (ref 0–0)
NRBC # FLD: 0 K/UL — SIGNIFICANT CHANGE UP (ref 0–0)
PHOSPHATE SERPL-MCNC: 2.2 MG/DL — LOW (ref 2.5–4.5)
PLATELET # BLD AUTO: 151 K/UL — SIGNIFICANT CHANGE UP (ref 150–400)
POTASSIUM SERPL-MCNC: 3.5 MMOL/L — SIGNIFICANT CHANGE UP (ref 3.5–5.3)
POTASSIUM SERPL-SCNC: 3.5 MMOL/L — SIGNIFICANT CHANGE UP (ref 3.5–5.3)
RBC # BLD: 2.92 M/UL — LOW (ref 4.2–5.8)
RBC # FLD: 13.3 % — SIGNIFICANT CHANGE UP (ref 10.3–14.5)
SODIUM SERPL-SCNC: 133 MMOL/L — LOW (ref 135–145)
WBC # BLD: 10.59 K/UL — HIGH (ref 3.8–10.5)
WBC # FLD AUTO: 10.59 K/UL — HIGH (ref 3.8–10.5)

## 2024-07-29 PROCEDURE — 71045 X-RAY EXAM CHEST 1 VIEW: CPT | Mod: 26

## 2024-07-29 PROCEDURE — 99232 SBSQ HOSP IP/OBS MODERATE 35: CPT

## 2024-07-29 RX ORDER — POTASSIUM CHLORIDE 1500 MG/1
40 TABLET, EXTENDED RELEASE ORAL ONCE
Refills: 0 | Status: COMPLETED | OUTPATIENT
Start: 2024-07-29 | End: 2024-07-29

## 2024-07-29 RX ORDER — METOCLOPRAMIDE HCL 5 MG/ML
10 VIAL (ML) INJECTION ONCE
Refills: 0 | Status: COMPLETED | OUTPATIENT
Start: 2024-07-29 | End: 2024-07-29

## 2024-07-29 RX ORDER — INSULIN LISPRO 100/ML
VIAL (ML) SUBCUTANEOUS EVERY 6 HOURS
Refills: 0 | Status: DISCONTINUED | OUTPATIENT
Start: 2024-07-29 | End: 2024-07-31

## 2024-07-29 RX ORDER — SODIUM CHLORIDE AND POTASSIUM CHLORIDE 150; 450 MG/100ML; MG/100ML
1000 INJECTION, SOLUTION INTRAVENOUS
Refills: 0 | Status: DISCONTINUED | OUTPATIENT
Start: 2024-07-29 | End: 2024-07-31

## 2024-07-29 RX ORDER — SOD PHOS DI, MONO/K PHOS MONO 250 MG
1 TABLET ORAL EVERY 4 HOURS
Refills: 0 | Status: COMPLETED | OUTPATIENT
Start: 2024-07-29 | End: 2024-07-29

## 2024-07-29 RX ORDER — METOPROLOL TARTRATE 100 MG
25 TABLET ORAL
Refills: 0 | Status: ACTIVE | OUTPATIENT
Start: 2024-07-29 | End: 2025-06-27

## 2024-07-29 RX ADMIN — AMLODIPINE BESYLATE 5 MILLIGRAM(S): 2.5 TABLET ORAL at 05:17

## 2024-07-29 RX ADMIN — Medication 650 MILLIGRAM(S): at 01:07

## 2024-07-29 RX ADMIN — LIDOCAINE 5% 1 PATCH: 5 CREAM TOPICAL at 05:44

## 2024-07-29 RX ADMIN — Medication 25 MILLIGRAM(S): at 17:37

## 2024-07-29 RX ADMIN — Medication 5 MILLIGRAM(S): at 00:37

## 2024-07-29 RX ADMIN — LEVALBUTEROL HYDROCHLORIDE 0.63 MILLIGRAM(S): 0.31 SOLUTION RESPIRATORY (INHALATION) at 09:43

## 2024-07-29 RX ADMIN — PIPERACILLIN SODIUM, TAZOBACTAM SODIUM 25 GRAM(S): 3; .375 INJECTION, POWDER, LYOPHILIZED, FOR SOLUTION INTRAVENOUS at 21:46

## 2024-07-29 RX ADMIN — Medication 10 MILLIGRAM(S): at 13:20

## 2024-07-29 RX ADMIN — LIDOCAINE 5% 1 PATCH: 5 CREAM TOPICAL at 21:46

## 2024-07-29 RX ADMIN — Medication 4 MILLILITER(S): at 16:37

## 2024-07-29 RX ADMIN — LEVALBUTEROL HYDROCHLORIDE 0.63 MILLIGRAM(S): 0.31 SOLUTION RESPIRATORY (INHALATION) at 03:13

## 2024-07-29 RX ADMIN — Medication 650 MILLIGRAM(S): at 17:36

## 2024-07-29 RX ADMIN — Medication 650 MILLIGRAM(S): at 05:43

## 2024-07-29 RX ADMIN — Medication 650 MILLIGRAM(S): at 12:52

## 2024-07-29 RX ADMIN — Medication 2 UNIT(S): at 12:20

## 2024-07-29 RX ADMIN — Medication 650 MILLIGRAM(S): at 05:18

## 2024-07-29 RX ADMIN — PIPERACILLIN SODIUM, TAZOBACTAM SODIUM 25 GRAM(S): 3; .375 INJECTION, POWDER, LYOPHILIZED, FOR SOLUTION INTRAVENOUS at 13:34

## 2024-07-29 RX ADMIN — Medication 4 MILLILITER(S): at 03:13

## 2024-07-29 RX ADMIN — LEVALBUTEROL HYDROCHLORIDE 0.63 MILLIGRAM(S): 0.31 SOLUTION RESPIRATORY (INHALATION) at 21:46

## 2024-07-29 RX ADMIN — Medication 4 MILLILITER(S): at 21:46

## 2024-07-29 RX ADMIN — Medication 1: at 12:19

## 2024-07-29 RX ADMIN — Medication 650 MILLIGRAM(S): at 00:37

## 2024-07-29 RX ADMIN — Medication 1 TABLET(S): at 12:21

## 2024-07-29 RX ADMIN — Medication 4 MILLILITER(S): at 09:43

## 2024-07-29 RX ADMIN — Medication 650 MILLIGRAM(S): at 23:13

## 2024-07-29 RX ADMIN — Medication 5 MILLIGRAM(S): at 05:18

## 2024-07-29 RX ADMIN — INSULIN GLARGINE-YFGN 5 UNIT(S): 100 INJECTION, SOLUTION SUBCUTANEOUS at 23:12

## 2024-07-29 RX ADMIN — LEVALBUTEROL HYDROCHLORIDE 0.63 MILLIGRAM(S): 0.31 SOLUTION RESPIRATORY (INHALATION) at 16:36

## 2024-07-29 RX ADMIN — Medication 650 MILLIGRAM(S): at 23:48

## 2024-07-29 RX ADMIN — PANTOPRAZOLE SODIUM 40 MILLIGRAM(S): 20 TABLET, DELAYED RELEASE ORAL at 05:18

## 2024-07-29 RX ADMIN — SODIUM CHLORIDE AND POTASSIUM CHLORIDE 30 MILLILITER(S): 150; 450 INJECTION, SOLUTION INTRAVENOUS at 00:41

## 2024-07-29 RX ADMIN — ENOXAPARIN SODIUM 40 MILLIGRAM(S): 120 INJECTION SUBCUTANEOUS at 12:22

## 2024-07-29 RX ADMIN — PIPERACILLIN SODIUM, TAZOBACTAM SODIUM 25 GRAM(S): 3; .375 INJECTION, POWDER, LYOPHILIZED, FOR SOLUTION INTRAVENOUS at 05:18

## 2024-07-29 RX ADMIN — LOSARTAN POTASSIUM 50 MILLIGRAM(S): 50 TABLET, FILM COATED ORAL at 05:17

## 2024-07-29 RX ADMIN — DORNASE ALFA 2.5 MILLIGRAM(S): 1 SOLUTION RESPIRATORY (INHALATION) at 09:47

## 2024-07-29 RX ADMIN — Medication 650 MILLIGRAM(S): at 19:21

## 2024-07-29 RX ADMIN — Medication 650 MILLIGRAM(S): at 12:22

## 2024-07-29 RX ADMIN — PANTOPRAZOLE SODIUM 40 MILLIGRAM(S): 20 TABLET, DELAYED RELEASE ORAL at 17:36

## 2024-07-29 RX ADMIN — Medication 2: at 23:13

## 2024-07-29 NOTE — PHYSICAL THERAPY INITIAL EVALUATION ADULT - PATIENT PROFILE REVIEW, REHAB EVAL
PT initial evaluation received and chart review completed. Pt agreeable to participate in PT evaluation. ACTIVITY: AMBULATE AS TOLERATED/yes

## 2024-07-29 NOTE — PROGRESS NOTE ADULT - SUBJECTIVE AND OBJECTIVE BOX
Follow Up:      Inverval History/ROS:Patient is a 59y old  Male who presents with a chief complaint of Esophagectomy (29 Jul 2024 13:26)    No fever  C/o spitting up  Feels it is from his chest    Allergies    No Known Allergies    Intolerances        ANTIMICROBIALS:  piperacillin/tazobactam IVPB.. 3.375 every 8 hours      OTHER MEDS:  acetaminophen     Tablet .. 650 milliGRAM(s) Oral every 6 hours  amLODIPine   Tablet 5 milliGRAM(s) Oral daily  dextrose 5% + sodium chloride 0.9% with potassium chloride 20 mEq/L 1000 milliLiter(s) IV Continuous <Continuous>  dextrose 5%. 1000 milliLiter(s) IV Continuous <Continuous>  dextrose 5%. 1000 milliLiter(s) IV Continuous <Continuous>  dextrose 50% Injectable 12.5 Gram(s) IV Push once  dextrose 50% Injectable 25 Gram(s) IV Push once  dextrose 50% Injectable 25 Gram(s) IV Push once  dextrose Oral Gel 15 Gram(s) Oral once  dornase malik Solution 2.5 milliGRAM(s) Inhalation daily  enoxaparin Injectable 40 milliGRAM(s) SubCutaneous every 24 hours  hydrochlorothiazide 12.5 milliGRAM(s) Oral daily  insulin glargine Injectable (LANTUS) 5 Unit(s) SubCutaneous at bedtime  insulin lispro (ADMELOG) corrective regimen sliding scale   SubCutaneous every 6 hours  levalbuterol Inhalation 0.63 milliGRAM(s) Inhalation every 6 hours  lidocaine   4% Patch 1 Patch Transdermal at bedtime  losartan 50 milliGRAM(s) Oral daily  metoprolol tartrate 25 milliGRAM(s) Oral two times a day  naloxone Injectable 0.1 milliGRAM(s) IV Push every 3 minutes PRN  oxyCODONE    IR 2.5 milliGRAM(s) Oral every 4 hours PRN  oxyCODONE    IR 5 milliGRAM(s) Oral every 4 hours PRN  pantoprazole    Tablet 40 milliGRAM(s) Oral every 12 hours  sodium chloride 3%  Inhalation 4 milliLiter(s) Inhalation every 6 hours      Vital Signs Last 24 Hrs  T(C): 36.9 (29 Jul 2024 12:31), Max: 37.1 (29 Jul 2024 00:25)  T(F): 98.5 (29 Jul 2024 12:31), Max: 98.7 (29 Jul 2024 00:25)  HR: 78 (29 Jul 2024 12:31) (78 - 106)  BP: 144/70 (29 Jul 2024 12:31) (121/69 - 149/68)  BP(mean): 88 (29 Jul 2024 08:00) (88 - 88)  RR: 17 (29 Jul 2024 12:31) (17 - 18)  SpO2: 92% (29 Jul 2024 12:31) (92% - 97%)    Parameters below as of 29 Jul 2024 08:00  Patient On (Oxygen Delivery Method): room air        PHYSICAL EXAM:  General: [x ] non-toxic  HEAD/EYES: [ ] PERRL [x ] white sclera [ ] icterus  ENT:  [ ] normal [ ] supple [ ] thrush [ ] pharyngeal exudate  Cardiovascular:   [ ] murmur [x ] normal [ ] PPM/AICD  Respiratory:  [x ] clear to ausculation bilaterally  GI:  [x ] soft, non-tender, normal bowel sounds  :  [ ] stallworth [ ] no CVA tenderness   Musculoskeletal:  [ ] no synovitis  Neurologic:  [ ] non-focal exam   Skin:  [x ] no rash  Lymph: [x ] no lymphadenopathy  Psychiatric:  [ ] appropriate affect [ ] alert & oriented  Lines:  [ x] no phlebitis [ ] central line                                8.6    10.59 )-----------( 151      ( 29 Jul 2024 05:28 )             26.3       07-29    133<L>  |  102  |  8   ----------------------------<  147<H>  3.5   |  23  |  0.87    Ca    7.0<L>      29 Jul 2024 05:28  Phos  2.2     07-29  Mg     2.10     07-29    TPro  x   /  Alb  2.2<L>  /  TBili  x   /  DBili  x   /  AST  x   /  ALT  x   /  AlkPhos  x   07-29      Urinalysis Basic - ( 29 Jul 2024 05:28 )    Color: x / Appearance: x / SG: x / pH: x  Gluc: 147 mg/dL / Ketone: x  / Bili: x / Urobili: x   Blood: x / Protein: x / Nitrite: x   Leuk Esterase: x / RBC: x / WBC x   Sq Epi: x / Non Sq Epi: x / Bacteria: x        MICROBIOLOGY:    RADIOLOGY:

## 2024-07-29 NOTE — PHYSICAL THERAPY INITIAL EVALUATION ADULT - PERTINENT HX OF CURRENT PROBLEM, REHAB EVAL
Pt is a 59 year old male presenting for preop optimization for scheduled robotic assisted [place] sen[place] esophagectomy. Pt underwent esophagectomy 07/16 c/b PO intolerance, moderate right loculated hydropneumothorax, and sinus tachycardia.

## 2024-07-29 NOTE — PROGRESS NOTE ADULT - SUBJECTIVE AND OBJECTIVE BOX
Subjective: 59yMale    Vital Signs:  Vital Signs Last 24 Hrs  T(C): 36.9 (07-29-24 @ 08:00), Max: 37.4 (07-28-24 @ 13:25)  T(F): 98.5 (07-29-24 @ 08:00), Max: 99.3 (07-28-24 @ 13:25)  HR: 100 (07-29-24 @ 09:40) (84 - 116)  BP: 149/68 (07-29-24 @ 08:00) (121/69 - 149/68)  RR: 17 (07-29-24 @ 08:00) (17 - 18)  SpO2: 95% (07-29-24 @ 09:40) (93% - 97%) on (O2)    Pertinent Physical Exam:  Neuro: A+O x 3,  CV: regular rate, regular rhythm  Pulm/chest: no accessory muscle use noted  Abd: soft, NT  Ext: Moves all extremities x 4  Skin: warm, well perfused, no rashes  Drains: R lavern 90cc/24hrs      I&O's Summary    28 Jul 2024 07:01  -  29 Jul 2024 07:00  --------------------------------------------------------  IN: 1020 mL / OUT: 755 mL / NET: 265 mL        Relevant labs, radiology and Medications reviewed                        8.6    10.59 )-----------( 151      ( 29 Jul 2024 05:28 )             26.3     07-29    133<L>  |  102  |  8   ----------------------------<  147<H>  3.5   |  23  |  0.87    Ca    7.0<L>      29 Jul 2024 05:28  Phos  2.2     07-29  Mg     2.10     07-29    TPro  x   /  Alb  2.2<L>  /  TBili  x   /  DBili  x   /  AST  x   /  ALT  x   /  AlkPhos  x   07-29          Assessment  59 year old male with PMH HTN and DM 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 Pt s/p barium swallow and started on CLD. Postop CT CAP reviewed w Dr Laureano. Pt is now on Zosyn and advanced to soft diet. Sinus tachycardia since last night. Now on nasal cannula.     7/29: Pt with now improving leukocytosis and s/op CTA r/o PE for tachycardia over the weekend. Imaging reviewed by Dr Laureano, no acute findings and able to advance to soft diet as previously mentioned. Patient tolerating PO diet well. Patient without fevers and with drain in place, 90cc output and on IV abx.     PLAN  HH with slight drop since yesterday, appears well and BP stable, intermittent sinus tach.   Patient with improved WBC and no fevers. If stable tomorrow no c/i to dc from thoracic perspective  Plan for to discharge patient with Drain  in place and VNS  PO soft diet  PO Augmentin on DC with follow up to see Dr Laureano in office in 1 week with CXR PA/LAT  Discussed with Cardiothoracic Team at AM rounds.   Subjective: 59yMale seen at bedside. Patient with no acute events overnight.     Vital Signs:  Vital Signs Last 24 Hrs  T(C): 36.9 (07-29-24 @ 08:00), Max: 37.4 (07-28-24 @ 13:25)  T(F): 98.5 (07-29-24 @ 08:00), Max: 99.3 (07-28-24 @ 13:25)  HR: 100 (07-29-24 @ 09:40) (84 - 116)  BP: 149/68 (07-29-24 @ 08:00) (121/69 - 149/68)  RR: 17 (07-29-24 @ 08:00) (17 - 18)  SpO2: 95% (07-29-24 @ 09:40) (93% - 97%) on (O2)    Pertinent Physical Exam:  Neuro: A+O x 3,  CV: regular rate, regular rhythm  Pulm/chest: no accessory muscle use noted  Abd: soft, NT  Ext: Moves all extremities x 4  Skin: warm, well perfused, no rashes  Drains: R lavern 90cc/24hrs      I&O's Summary    28 Jul 2024 07:01  -  29 Jul 2024 07:00  --------------------------------------------------------  IN: 1020 mL / OUT: 755 mL / NET: 265 mL        Relevant labs, radiology and Medications reviewed                        8.6    10.59 )-----------( 151      ( 29 Jul 2024 05:28 )             26.3     07-29    133<L>  |  102  |  8   ----------------------------<  147<H>  3.5   |  23  |  0.87    Ca    7.0<L>      29 Jul 2024 05:28  Phos  2.2     07-29  Mg     2.10     07-29    TPro  x   /  Alb  2.2<L>  /  TBili  x   /  DBili  x   /  AST  x   /  ALT  x   /  AlkPhos  x   07-29          Assessment  59 year old male with PMH HTN and DM presents with 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 Pt s/p barium swallow and started on CLD. Postop CT CAP reviewed w Dr Laureano. Pt is now on Zosyn and advanced to soft diet. Sinus tachycardia since last night. Now on nasal cannula.     7/29: Pt with now improving leukocytosis and s/op CTA r/o PE for tachycardia over the weekend. Imaging reviewed by Dr Laureano, no acute findings and able to advance to soft diet as previously mentioned. Patient tolerating PO diet well. Patient without fevers and with drain in place, 90cc output and on IV abx.     PLAN  HH with slight drop since yesterday, appears well and BP stable, intermittent sinus tach.   Patient with improved WBC and no fevers. If stable tomorrow no c/i to dc from thoracic perspective  Plan for to discharge patient with Drain  in place and VNS  PO soft diet  PO Augmentin on DC with follow up to see Dr Laureano in office in 1 week with CXR PA/LAT  Discussed with Cardiothoracic Team at AM rounds.

## 2024-07-29 NOTE — CHART NOTE - NSCHARTNOTEFT_GEN_A_CORE
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 POD #6 UGI without anastomotic leak. CTPA 7/28 with moderate right loculated hydropneumothorax with interval increase in size and small right apical PTX.    n:       Current Weight:   % Weight Change    Pertinent Medications:   Pertinent Labs:  07-30 Na135 mmol/L Glu 194 mg/dL<H> K+ 3.5 mmol/L Cr  0.80 mg/dL BUN 6 mg/dL<L> 07-30 Phos 1.9 mg/dL<L> 07-29 Alb 2.2 g/dL<L> 07-12 PAB 21 mg/dL      Skin:     Estimated Needs:   [ ] no change since previous assessment  [ ] recalculated:       Previous Nutrition Diagnosis: [ ] Malnutrition, moderate      Nutrition Diagnosis is [x] ongoing           New Nutrition Diagnosis: [ ] not applicable        Nutrition Interventions/Recommendations: Source: Patient [x]       other [x] medical chart, nurse   Diet rx: Soft and bite sized     Pt 60 yo male with PMHx HTN, DM presented with GE junction adenocarcinoma s/p chemo and radiation; Pt s/p [place] Broderick Esophagectomy on 7/16; UGI without anastomotic leak. CTPA 7/28 with moderate right loculated hydropneumothorax with interval increase in size and small right apical PTX - per chart review.     At time of visit, Pt awake, alert appears weak. Per Pt, his appetite better now; Pt ate well for breakfast this morning. Pt with no chewing or swallowing difficulty with Soft & bite sized consistency diet rx. No report of nausea, vomiting @ this time; but Pt c/o diarrhea 1x, yesterday. No diarrhea today, per Pt.     Pt's height: 64" (7/12), verified with Pt; Pt's body weight: 120#, PTA. Of note Pt's HbA1c level 10.9% (7/13). Rec to add Consistent Carbohydrate therapeutic restriction to diet rx. Better food options discussed with Pt. At home, Pt's sister helps with meal preparation reported. RD answered Pt's concern related to diet. RD remains available, Pt made aware.       Pt's height: 64" (7/12)      IBW: 130#+/-10%      Pt's weights: 58.6 kg (7/21), 54.9 kg (7/16), 54 kg (7/12)   Pertinent Medications: Lovenox, Protonix, Insulin (Glargine), Insulin (Lispro),   Pertinent Labs: (7/29) WBC 10.59 H, H/H 8.6/26.3 L, Na 133 L, phosphorus 2.2 L, Glu 147 H;     (7/16) Albumin 3.6,  H,  H, LT 98 H;    (7/13) HbA1c 10.9% H  CAPILLARY BLOOD GLUCOSE  POCT Blood Glucose.: 108 mg/dL (29 Jul 2024 17:04)  POCT Blood Glucose.: 198 mg/dL (29 Jul 2024 11:46)  Skin: +surgical incision: abdomen     Estimated Needs: [x] no change since previous assessment  Previous Nutrition Diagnosis: [x] Malnutrition, moderate    Nutrition Diagnosis is [x] ongoing      Nutrition Interventions/Recommendations:  1. PO diet rx: Soft and bite sized; Consistent Carbohydrate (evening snack); PO supplement: Glucerna Therapeutic Shake 1 can/237 ml (220 Kcal, 10 gm Protein) - 2x daily;   2. Encourage & assist Pt with meals; Monitor PO diet tolerance;   3. Honor food preferences;  4. Add Lactobacillus Acidophilus (probiotic supplementation) to promote improved gut function;   5. Add Multivitamins 1 tab daily for micronutrient coverage;   6. Monitor labs, weekly weights, hydration status;  7. Pt to follow up with appropriate RDN upon discharge for the purposes of long-term nutrition evaluation and diet education;

## 2024-07-29 NOTE — PROGRESS NOTE ADULT - ASSESSMENT
59 year old gentleman with diagnosis of adenocarcinoma at GE junction s/p chemotherapy and radiation  Now s/p robotic resection on 7/16    Post operatively, he had worsening leukocytosis  Imaging with a right sided loculated effusion  Drain in place draining brown fluid.    ? if imaging changes are post operative vs due to infection  His leukocytosis improved quickly- lowed my concern for empyema    Continue to monitor drain outpt  Continue to monitor cough/ emesis    Continue zosyn for now

## 2024-07-29 NOTE — PHYSICAL THERAPY INITIAL EVALUATION ADULT - GENERAL OBSERVATIONS, REHAB EVAL
Upon entry, pt semi-supine in bed in NAD; + telemetry.  bpm SpO2 96% on 2L O2. Sister present at bedside. Pt left seated in recliner with all tubes/lines intact, call bell in reach and in NAD.

## 2024-07-29 NOTE — PROGRESS NOTE ADULT - SUBJECTIVE AND OBJECTIVE BOX
DATE OF SERVICE: 07-29-24    Patient denies chest pain or shortness of breath.   Review of symptoms otherwise negative.    MEDICATIONS:  acetaminophen     Tablet .. 650 milliGRAM(s) Oral every 6 hours  amLODIPine   Tablet 5 milliGRAM(s) Oral daily  dextrose 5%. 1000 milliLiter(s) IV Continuous <Continuous>  dextrose 5%. 1000 milliLiter(s) IV Continuous <Continuous>  dextrose 50% Injectable 12.5 Gram(s) IV Push once  dextrose 50% Injectable 25 Gram(s) IV Push once  dextrose 50% Injectable 25 Gram(s) IV Push once  dextrose Oral Gel 15 Gram(s) Oral once  dornase malik Solution 2.5 milliGRAM(s) Inhalation daily  enoxaparin Injectable 40 milliGRAM(s) SubCutaneous every 24 hours  hydrochlorothiazide 12.5 milliGRAM(s) Oral daily  insulin glargine Injectable (LANTUS) 5 Unit(s) SubCutaneous at bedtime  insulin lispro (ADMELOG) corrective regimen sliding scale   SubCutaneous three times a day before meals  insulin lispro (ADMELOG) corrective regimen sliding scale   SubCutaneous at bedtime  insulin lispro Injectable (ADMELOG) 2 Unit(s) SubCutaneous three times a day before meals  levalbuterol Inhalation 0.63 milliGRAM(s) Inhalation every 6 hours  lidocaine   4% Patch 1 Patch Transdermal at bedtime  losartan 50 milliGRAM(s) Oral daily  metoprolol tartrate 25 milliGRAM(s) Oral two times a day  naloxone Injectable 0.1 milliGRAM(s) IV Push every 3 minutes PRN  oxyCODONE    IR 2.5 milliGRAM(s) Oral every 4 hours PRN  oxyCODONE    IR 5 milliGRAM(s) Oral every 4 hours PRN  pantoprazole    Tablet 40 milliGRAM(s) Oral every 12 hours  piperacillin/tazobactam IVPB.. 3.375 Gram(s) IV Intermittent every 8 hours  potassium chloride   Powder 40 milliEquivalent(s) Oral once  potassium phosphate / sodium phosphate Tablet (K-PHOS No. 2) 1 Tablet(s) Oral every 4 hours  sodium chloride 3%  Inhalation 4 milliLiter(s) Inhalation every 6 hours    LABS:                        8.6    10.59 )-----------( 151      ( 29 Jul 2024 05:28 )             26.3   Hemoglobin: 8.6 g/dL (07-29 @ 05:28)  Hemoglobin: 9.8 g/dL (07-28 @ 04:12)  Hemoglobin: 9.9 g/dL (07-27 @ 06:58)  Hemoglobin: 10.4 g/dL (07-26 @ 08:00)  Hemoglobin: 10.0 g/dL (07-25 @ 05:45)      07-29    133<L>  |  102  |  8   ----------------------------<  147<H>  3.5   |  23  |  0.87    Ca    7.0<L>      29 Jul 2024 05:28  Phos  2.2     07-29  Mg     2.10     07-29    TPro  x   /  Alb  2.2<L>  /  TBili  x   /  DBili  x   /  AST  x   /  ALT  x   /  AlkPhos  x   07-29  Creatinine Trend: 0.87<--, 0.83<--, 0.83<--, 0.91<--, 0.79<--, 0.97<--    PHYSICAL EXAM:  T(C): 36.9 (07-29-24 @ 08:00), Max: 37.4 (07-28-24 @ 13:25)  HR: 100 (07-29-24 @ 09:40) (84 - 116)  BP: 149/68 (07-29-24 @ 08:00) (121/69 - 149/68)  RR: 17 (07-29-24 @ 08:00) (17 - 18)  SpO2: 95% (07-29-24 @ 09:40) (93% - 97%)  Wt(kg): --    I&O's Summary    28 Jul 2024 07:01  -  29 Jul 2024 07:00  --------------------------------------------------------  IN: 1020 mL / OUT: 755 mL / NET: 265 mL    Gen: NAD  HEENT:  (-)icterus (-)pallor  CV: N S1 S2 1/6 CATHY (+)2 Pulses B/l  Resp:  Clear to auscultation B/L, normal effort  GI: (+) BS Soft, NT, ND  Lymph:  (-)Edema, (-)obvious lymphadenopathy  Skin: Warm to touch, Normal turgor  Psych: Appropriate mood and affect      TELEMETRY: 	 SR/ST    ECG:  	NSR      TTE 07/2024  Findings:  1. Normal left ventricular size and function.  Mild diastolic dysfunction.  2. Normal left atrial size  3. Right atrial cavity is normal in size.  4. Normal right ventricular size and function.  5. Normal trileaflet aortic valve opening.  6. Normal mitral valve opening.  7. Normal appearing tricuspid valve with mild tricuspid  regurgitation.  8. Pulmonic valve is grossly normal, yet poorly visualized  with no doppler evidence for pulmonic stenosis.  9. No evidence of significant pericardial effusion.  10. The aortic root is normal.  11. Normal pulmonary artery.  12. IVC is normal with respiratory variation.  FULL STUDY DONE INCLUDING M-MODE RECORDING,  SPECTRAL DOPPLER AND    Stress 07/2024  Normal myocardial perfusion SPECT images No evidence of stress induced ischemia or infarction.  Normal left ventricular size and function.  Calculated EF is: 68%    CT  IMPRESSION:  Small loculated right pleural effusion with foci of air and pleural   catheter. Small left pleural effusion.    Splenic infarcts.    Small volume ascites.    ASSESSMENT/PLAN: 	Pt is a 59 y.o. man with history of HTN,, DLD, DM,  GEJ adenocarcinoma (T3N0, stage 2A) s/p neoadjuvant chemotherapy admitted for optimization prior to planned [place]-Broderick Esophagectomy on tuesday. Recently had a nuclear stress test in our office for evaluation of preoperative cardiac risk assessment prior to esophageal cancer surgery scheduled on 07/16/2024. The patient denies any chest pain, shortness ofbreath, or anginal symptoms. He has no palpitations, dizziness, or syncope  s/p [facility]-Broderick Esophagectomy on 7/16.     Pre-OP/HTN  - tolerated procedure well from CV perspective  - diet advanced, Losartan, HCTZ, Norvasc and Metoprolol started  - On Zosyn per power Mason MD  Pager: [phone]

## 2024-07-29 NOTE — PROGRESS NOTE ADULT - ASSESSMENT
· Assessment	  59 y.o. man with history of GEJ adenocarcinoma (T3N0, stage 2A) s/p neoadjuvant chemotherapy, now s/p [facility]-Broderick Esophagectomy on 7/16.     Sx f/up appreciated  OOB  Pain control Oxycodone    SOB:    CXR: Rt pleural effusion  CTPA: No PE/Rt hydropneumothorax  Thoracic Sx f/up  On supp O2  Xopenex nebulizer    DM II:    FSSS  Lantus insulin  Endo f/up appreciated    HTN:    On IV Metoprolol/Hydralazine  Cardio f/up appreciated    Hypophosphatemia:    Po4 supp

## 2024-07-29 NOTE — PROGRESS NOTE ADULT - SUBJECTIVE AND OBJECTIVE BOX
Chief Complaint: DM2    History: now tolerating po  missed some doses of rapid acting insulin past 24 hours  no hypoglycemia events    MEDICATIONS  (STANDING):  acetaminophen     Tablet .. 650 milliGRAM(s) Oral every 6 hours  amLODIPine   Tablet 5 milliGRAM(s) Oral daily  dextrose 5%. 1000 milliLiter(s) (100 mL/Hr) IV Continuous <Continuous>  dextrose 5%. 1000 milliLiter(s) (50 mL/Hr) IV Continuous <Continuous>  dextrose 50% Injectable 12.5 Gram(s) IV Push once  dextrose 50% Injectable 25 Gram(s) IV Push once  dextrose 50% Injectable 25 Gram(s) IV Push once  dextrose Oral Gel 15 Gram(s) Oral once  dornase malik Solution 2.5 milliGRAM(s) Inhalation daily  enoxaparin Injectable 40 milliGRAM(s) SubCutaneous every 24 hours  hydrochlorothiazide 12.5 milliGRAM(s) Oral daily  insulin glargine Injectable (LANTUS) 5 Unit(s) SubCutaneous at bedtime  insulin lispro (ADMELOG) corrective regimen sliding scale   SubCutaneous every 6 hours  levalbuterol Inhalation 0.63 milliGRAM(s) Inhalation every 6 hours  lidocaine   4% Patch 1 Patch Transdermal at bedtime  losartan 50 milliGRAM(s) Oral daily  metoclopramide Injectable 10 milliGRAM(s) IV Push once  metoprolol tartrate 25 milliGRAM(s) Oral two times a day  pantoprazole    Tablet 40 milliGRAM(s) Oral every 12 hours  piperacillin/tazobactam IVPB.. 3.375 Gram(s) IV Intermittent every 8 hours  sodium chloride 3%  Inhalation 4 milliLiter(s) Inhalation every 6 hours    MEDICATIONS  (PRN):  naloxone Injectable 0.1 milliGRAM(s) IV Push every 3 minutes PRN For ANY of the following changes in patient status:  A. RR LESS THAN 10 breaths per minute, B. Oxygen saturation LESS THAN 90%, C. Sedation score of 6  oxyCODONE    IR 2.5 milliGRAM(s) Oral every 4 hours PRN Moderate Pain (4 - 6)  oxyCODONE    IR 5 milliGRAM(s) Oral every 4 hours PRN Severe Pain (7 - 10)      Allergies    No Known Allergies    Intolerances      Review of Systems:    ALL OTHER SYSTEMS REVIEWED AND NEGATIVE      PHYSICAL EXAM:  VITALS: T(C): 36.9 (07-29-24 @ 12:31)  T(F): 98.5 (07-29-24 @ 12:31), Max: 98.7 (07-29-24 @ 00:25)  HR: 78 (07-29-24 @ 12:31) (78 - 106)  BP: 144/70 (07-29-24 @ 12:31) (121/69 - 149/68)  RR:  (17 - 18)  SpO2:  (92% - 97%)  Wt(kg): --  GENERAL: NAD, well-groomed, well-developed  EYES: No proptosis, no lid lag, anicteric  HEENT:  Atraumatic, Normocephalic, moist mucous membranes  RESPIRATORY: nonlabored respirations, no wheezing  PSYCH: Alert and oriented x 3, normal affect, normal mood    CAPILLARY BLOOD GLUCOSE      POCT Blood Glucose.: 198 mg/dL (29 Jul 2024 11:46)  POCT Blood Glucose.: 149 mg/dL (29 Jul 2024 07:50)  POCT Blood Glucose.: 152 mg/dL (28 Jul 2024 21:35)  POCT Blood Glucose.: 224 mg/dL (28 Jul 2024 17:14)      07-29    133<L>  |  102  |  8   ----------------------------<  147<H>  3.5   |  23  |  0.87    eGFR: 98    Ca    7.0<L>      07-29  Mg     2.10     07-29  Phos  2.2     07-29    TPro  x   /  Alb  2.2<L>  /  TBili  x   /  DBili  x   /  AST  x   /  ALT  x   /  AlkPhos  x   07-29      A1C with Estimated Average Glucose Result: 10.9 % (07-13-24 @ 02:44)  A1C with Estimated Average Glucose Result: 12.8 % (01-01-24 @ 06:00)      Thyroid Function Tests:

## 2024-07-29 NOTE — PROGRESS NOTE ADULT - ASSESSMENT
The patient is a 59y Male with PMH of T2DM, HTN, HLD, GE junction cancer here for esophagectomy now postop.  Endocrinology consulted for T2DM    #Uncontrolled Type 2 Diabetes Mellitus   - Follows with: Dr. Nolasco  - A1C with Estimated Average Glucose Result: 10.9 % (07-13-24)  - home regimen: Semglee 18 units, Novolog 6 units with meals      INPATIENT PLAN:  -Patient reports tolerating solid soft diet (not consistent carb)  -Has not been receiving all doses would proceed with regimen as ordered.  -Continue Lantus 5 units qhs  -Continue Admelog 2 units TID premeal, hold if NPO  - low Admelog scale premeal and low bedtime scale  -FS premeal and bedtime  - Inpatient glucose goals: 100-180 mg/sl  -hypoglycemia protocol  -Please change diet to include consistent carb      DISCHARGE PLANNING:  - Discharge recs pending clinical course and nutrition plan. Plan to resume Semglee and Admelog will determine final doses based on hospital requirements/ po intake at time of discharge.   - will need Endocrinology follow up. Patient has an appointment with Dr. Nolasco scheduled for August 8th at 8:20 AM at [address] [facility]   [address].     #Hypertension  - Goal BP <130/80  - Management as per primary team  - check urine microalbumin level as outpatient    #Hyperlipidemia  - LDL goal <70  - check lipid panel as outpatient on a yearly basis    #Hypercalcemia  calcium 7.0  albumin 2.2  corrected calcium 8.4 mildly low, likely in setting of reduced intake recently  check 25OHD    Leo Rader MD  Division of Endocrinology  Pager: [phone]    If after 6PM or before 9AM, or on weekends/holidays, please call endocrine answering service for assistance ([phone]).  For nonurgent matters email [email or web address] for assistance.   The patient is a 59y Male with PMH of T2DM, HTN, HLD, GE junction cancer here for esophagectomy now postop.  Endocrinology consulted for T2DM    #Uncontrolled Type 2 Diabetes Mellitus   - Follows with: Dr. Nolasco  - A1C with Estimated Average Glucose Result: 10.9 % (07-13-24)  - home regimen: Semglee 18 units, Novolog 6 units with meals      INPATIENT PLAN:  -Patient reports tolerating solid soft diet (not consistent carb)  -Has not been receiving all doses would proceed with regimen as ordered.  -Continue Lantus 5 units qhs (ok to give if NPO)  -Continue Admelog 2 units TID premeal, hold if NPO  - low Admelog scale premeal and low bedtime scale  -FS premeal and bedtime  -If NPO change correction to low scale q6h  - Inpatient glucose goals: 100-180 mg/sl  -hypoglycemia protocol  -Please change diet to include consistent carb when eating    Addendum - patient now made NPO      DISCHARGE PLANNING:  - Discharge recs pending clinical course and nutrition plan. Plan to resume Semglee and Admelog will determine final doses based on hospital requirements/ po intake at time of discharge.   - will need Endocrinology follow up. Patient has an appointment with Dr. Nolasco scheduled for August 8th at 8:20 AM at [address] [facility]   [address].     #Hypertension  - Goal BP <130/80  - Management as per primary team  - check urine microalbumin level as outpatient    #Hyperlipidemia  - LDL goal <70  - check lipid panel as outpatient on a yearly basis    #Hypercalcemia  calcium 7.0  albumin 2.2  corrected calcium 8.4 mildly low, likely in setting of reduced intake recently  check 25OHD    Leo Rader MD  Division of Endocrinology  Pager: [phone]    If after 6PM or before 9AM, or on weekends/holidays, please call endocrine answering service for assistance ([phone]).  For nonurgent matters email [email or web address] for assistance.

## 2024-07-29 NOTE — PROGRESS NOTE ADULT - ASSESSMENT
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 POD #6 UGI without anastomotic leak. CTPA 7/28 with moderate right loculated hydropneumothorax with interval increase in size and small right apical PTX.    PLAN:  - Discussed CTPA findings with thoracic surgery, no thoracic surgical intervention indicated  - Continue Zosyn   - Soft Diet, IVL  - Daily CXR/ OOB/ ambulate/ IS while in bed  - Lantus 5 units, admelog 2 TID, appreciate endo recs  - Pain control PRN with oral solution  - DVT ppx: SCD's & Lovenox  - Dispo planning: Home with VENKATA drain, VNS    D Team Surgery  [id number]

## 2024-07-29 NOTE — PHYSICAL THERAPY INITIAL EVALUATION ADULT - ADDITIONAL COMMENTS
Pt lives in a private house with his sister; there are 3-4 step to enter. Bedroom located in basement ~8-10 steps to negotiate; however, per sister, patient able to reside on first floor.

## 2024-07-29 NOTE — PHYSICAL THERAPY INITIAL EVALUATION ADULT - GAIT DEVIATIONS NOTED, PT EVAL
forward flexed posture with downward gaze/decreased wolf/decreased velocity of limb motion/decreased step length/decreased stride length/decreased weight-shifting ability

## 2024-07-29 NOTE — PROGRESS NOTE ADULT - SUBJECTIVE AND OBJECTIVE BOX
Patient is a 59y old  Male who presents with a chief complaint of Esophagectomy (29 Jul 2024 14:12)      SUBJECTIVE / OVERNIGHT EVENTS:    Events noted.  CONSTITUTIONAL: No fever,  or fatigue  RESPIRATORY: No cough, wheezing,  No shortness of breath  CARDIOVASCULAR: No chest pain, palpitations, dizziness, or leg swelling  GASTROINTESTINAL: No abdominal or epigastric pain.       MEDICATIONS  (STANDING):  acetaminophen     Tablet .. 650 milliGRAM(s) Oral every 6 hours  amLODIPine   Tablet 5 milliGRAM(s) Oral daily  dextrose 5% + sodium chloride 0.9% with potassium chloride 20 mEq/L 1000 milliLiter(s) (84 mL/Hr) IV Continuous <Continuous>  dextrose 5%. 1000 milliLiter(s) (100 mL/Hr) IV Continuous <Continuous>  dextrose 5%. 1000 milliLiter(s) (50 mL/Hr) IV Continuous <Continuous>  dextrose 50% Injectable 12.5 Gram(s) IV Push once  dextrose 50% Injectable 25 Gram(s) IV Push once  dextrose 50% Injectable 25 Gram(s) IV Push once  dextrose Oral Gel 15 Gram(s) Oral once  dornase malik Solution 2.5 milliGRAM(s) Inhalation daily  enoxaparin Injectable 40 milliGRAM(s) SubCutaneous every 24 hours  hydrochlorothiazide 12.5 milliGRAM(s) Oral daily  insulin glargine Injectable (LANTUS) 5 Unit(s) SubCutaneous at bedtime  insulin lispro (ADMELOG) corrective regimen sliding scale   SubCutaneous every 6 hours  levalbuterol Inhalation 0.63 milliGRAM(s) Inhalation every 6 hours  lidocaine   4% Patch 1 Patch Transdermal at bedtime  losartan 50 milliGRAM(s) Oral daily  metoprolol tartrate 25 milliGRAM(s) Oral two times a day  pantoprazole    Tablet 40 milliGRAM(s) Oral every 12 hours  piperacillin/tazobactam IVPB.. 3.375 Gram(s) IV Intermittent every 8 hours  sodium chloride 3%  Inhalation 4 milliLiter(s) Inhalation every 6 hours    MEDICATIONS  (PRN):  naloxone Injectable 0.1 milliGRAM(s) IV Push every 3 minutes PRN For ANY of the following changes in patient status:  A. RR LESS THAN 10 breaths per minute, B. Oxygen saturation LESS THAN 90%, C. Sedation score of 6  oxyCODONE    IR 2.5 milliGRAM(s) Oral every 4 hours PRN Moderate Pain (4 - 6)  oxyCODONE    IR 5 milliGRAM(s) Oral every 4 hours PRN Severe Pain (7 - 10)        CAPILLARY BLOOD GLUCOSE      POCT Blood Glucose.: 108 mg/dL (29 Jul 2024 17:04)  POCT Blood Glucose.: 198 mg/dL (29 Jul 2024 11:46)  POCT Blood Glucose.: 149 mg/dL (29 Jul 2024 07:50)    I&O's Summary    28 Jul 2024 07:01  -  29 Jul 2024 07:00  --------------------------------------------------------  IN: 1020 mL / OUT: 755 mL / NET: 265 mL    29 Jul 2024 07:01  -  29 Jul 2024 23:04  --------------------------------------------------------  IN: 552 mL / OUT: 1020 mL / NET: -468 mL        T(C): 37.1 (07-29-24 @ 20:02), Max: 37.1 (07-29-24 @ 00:25)  HR: 118 (07-29-24 @ 20:02) (78 - 118)  BP: 168/76 (07-29-24 @ 20:02) (123/65 - 168/76)  RR: 18 (07-29-24 @ 20:02) (17 - 18)  SpO2: 98% (07-29-24 @ 20:02) (92% - 98%)    PHYSICAL EXAM:  GENERAL: NAD  NECK: Supple, No JVD  CHEST/LUNG: Clear to auscultation bilaterally; No wheezing.  HEART: Regular rate and rhythm; No murmurs, rubs, or gallops  ABDOMEN: Soft, Nontender, Nondistended; Bowel sounds present  EXTREMITIES:   No edema  NEUROLOGY: AAO X 3      LABS:                        8.6    10.59 )-----------( 151      ( 29 Jul 2024 05:28 )             26.3     07-29    133<L>  |  102  |  8   ----------------------------<  147<H>  3.5   |  23  |  0.87    Ca    7.0<L>      29 Jul 2024 05:28  Phos  2.2     07-29  Mg     2.10     07-29    TPro  x   /  Alb  2.2<L>  /  TBili  x   /  DBili  x   /  AST  x   /  ALT  x   /  AlkPhos  x   07-29          Urinalysis Basic - ( 29 Jul 2024 05:28 )    Color: x / Appearance: x / SG: x / pH: x  Gluc: 147 mg/dL / Ketone: x  / Bili: x / Urobili: x   Blood: x / Protein: x / Nitrite: x   Leuk Esterase: x / RBC: x / WBC x   Sq Epi: x / Non Sq Epi: x / Bacteria: x      CAPILLARY BLOOD GLUCOSE      POCT Blood Glucose.: 108 mg/dL (29 Jul 2024 17:04)  POCT Blood Glucose.: 198 mg/dL (29 Jul 2024 11:46)  POCT Blood Glucose.: 149 mg/dL (29 Jul 2024 07:50)        RADIOLOGY & ADDITIONAL TESTS:    Imaging Personally Reviewed:    Consultant(s) Notes Reviewed:      Care Discussed with Consultants/Other Providers:    Anish Goodrich MD, CMD, FACP    [id number] [id number]  Office Tel: [phone]  Cell: [phone]

## 2024-07-29 NOTE — PROGRESS NOTE ADULT - SUBJECTIVE AND OBJECTIVE BOX
D TEAM Surgery Progress Note  Patient is a 59y old  Male who presents with a chief complaint of Esophagectomy (28 Jul 2024 16:28)    INTERVAL EVENTS: Patient is POD#13 s/p Michael Broderick esophagectomy. CTPA yesterday negative for PE, shows Moderate right loculated hydropneumothorax with interval increase in size. Small focus of pneumothorax in the right apex. Stable appearance of a small left pleural effusions. Atelectasis in bilateral lungs. No acute events overnight.    SUBJECTIVE: Patient seen and examined at bedside with surgical team, patient without complaints. Denies cough or SOB. Denies chest pain. Denies fevers, chills. Abdominal pain is well controlled. Tolerating soft diet without nausea, vomiting. Passing flatus and having bowel movements.     OBJECTIVE:    Vital Signs Last 24 Hrs  T(C): 36.9 (29 Jul 2024 08:00), Max: 37.4 (28 Jul 2024 13:25)  T(F): 98.5 (29 Jul 2024 08:00), Max: 99.3 (28 Jul 2024 13:25)  HR: 101 (29 Jul 2024 08:00) (84 - 116)  BP: 149/68 (29 Jul 2024 08:00) (121/69 - 149/68)  BP(mean): 88 (29 Jul 2024 08:00) (87 - 88)  RR: 17 (29 Jul 2024 08:00) (17 - 18)  SpO2: 93% (29 Jul 2024 08:00) (93% - 100%)    Parameters below as of 29 Jul 2024 08:00  Patient On (Oxygen Delivery Method): room air    I&O's Detail    28 Jul 2024 07:01  -  29 Jul 2024 07:00  --------------------------------------------------------  IN:    dextrose 5% + sodium chloride 0.9% w/ Additives: 240 mL    Oral Fluid: 780 mL  Total IN: 1020 mL    OUT:    Bulb (mL): 55 mL    IV PiggyBack: 0 mL    Voided (mL): 700 mL  Total OUT: 755 mL    Total NET: 265 mL      MEDICATIONS  (STANDING):  acetaminophen     Tablet .. 650 milliGRAM(s) Oral every 6 hours  amLODIPine   Tablet 5 milliGRAM(s) Oral daily  dextrose 5%. 1000 milliLiter(s) (100 mL/Hr) IV Continuous <Continuous>  dextrose 5%. 1000 milliLiter(s) (50 mL/Hr) IV Continuous <Continuous>  dextrose 50% Injectable 12.5 Gram(s) IV Push once  dextrose 50% Injectable 25 Gram(s) IV Push once  dextrose 50% Injectable 25 Gram(s) IV Push once  dextrose Oral Gel 15 Gram(s) Oral once  dornase malik Solution 2.5 milliGRAM(s) Inhalation daily  enoxaparin Injectable 40 milliGRAM(s) SubCutaneous every 24 hours  hydrochlorothiazide 12.5 milliGRAM(s) Oral daily  insulin glargine Injectable (LANTUS) 5 Unit(s) SubCutaneous at bedtime  insulin lispro (ADMELOG) corrective regimen sliding scale   SubCutaneous at bedtime  insulin lispro (ADMELOG) corrective regimen sliding scale   SubCutaneous three times a day before meals  insulin lispro Injectable (ADMELOG) 2 Unit(s) SubCutaneous three times a day before meals  levalbuterol Inhalation 0.63 milliGRAM(s) Inhalation every 6 hours  lidocaine   4% Patch 1 Patch Transdermal at bedtime  losartan 50 milliGRAM(s) Oral daily  metoprolol tartrate Injectable 5 milliGRAM(s) IV Push every 6 hours  pantoprazole    Tablet 40 milliGRAM(s) Oral every 12 hours  piperacillin/tazobactam IVPB.. 3.375 Gram(s) IV Intermittent every 8 hours  potassium chloride   Powder 40 milliEquivalent(s) Oral once  potassium phosphate / sodium phosphate Tablet (K-PHOS No. 2) 1 Tablet(s) Oral every 4 hours  sodium chloride 3%  Inhalation 4 milliLiter(s) Inhalation every 6 hours    MEDICATIONS  (PRN):  naloxone Injectable 0.1 milliGRAM(s) IV Push every 3 minutes PRN For ANY of the following changes in patient status:  A. RR LESS THAN 10 breaths per minute, B. Oxygen saturation LESS THAN 90%, C. Sedation score of 6  oxyCODONE    IR 2.5 milliGRAM(s) Oral every 4 hours PRN Moderate Pain (4 - 6)  oxyCODONE    IR 5 milliGRAM(s) Oral every 4 hours PRN Severe Pain (7 - 10)      PHYSICAL EXAM:  Constitutional: A&Ox3, NAD  Respiratory: Unlabored breathing  Abdomen: Soft, nondistended, NTTP. No rebound or guarding. Incisions with soft, stable ecchymosis. VENKATA drain with scant serous output.  Extremities: WWP, ALFORD spontaneously    LABS:                        8.6    10.59 )-----------( 151      ( 29 Jul 2024 05:28 )             26.3     07-29    133<L>  |  102  |  8   ----------------------------<  147<H>  3.5   |  23  |  0.87    Ca    7.0<L>      29 Jul 2024 05:28  Phos  2.2     07-29  Mg     2.10     07-29          Urinalysis Basic - ( 29 Jul 2024 05:28 )    Color: x / Appearance: x / SG: x / pH: x  Gluc: 147 mg/dL / Ketone: x  / Bili: x / Urobili: x   Blood: x / Protein: x / Nitrite: x   Leuk Esterase: x / RBC: x / WBC x   Sq Epi: x / Non Sq Epi: x / Bacteria: x

## 2024-07-30 LAB
24R-OH-CALCIDIOL SERPL-MCNC: 6.5 NG/ML — LOW (ref 30–80)
ANION GAP SERPL CALC-SCNC: 9 MMOL/L — SIGNIFICANT CHANGE UP (ref 7–14)
BLD GP AB SCN SERPL QL: NEGATIVE — SIGNIFICANT CHANGE UP
BUN SERPL-MCNC: 6 MG/DL — LOW (ref 7–23)
CALCIUM SERPL-MCNC: 7.1 MG/DL — LOW (ref 8.4–10.5)
CHLORIDE SERPL-SCNC: 104 MMOL/L — SIGNIFICANT CHANGE UP (ref 98–107)
CO2 SERPL-SCNC: 22 MMOL/L — SIGNIFICANT CHANGE UP (ref 22–31)
CREAT SERPL-MCNC: 0.8 MG/DL — SIGNIFICANT CHANGE UP (ref 0.5–1.3)
EGFR: 101 ML/MIN/1.73M2 — SIGNIFICANT CHANGE UP
GLUCOSE BLDC GLUCOMTR-MCNC: 196 MG/DL — HIGH (ref 70–99)
GLUCOSE BLDC GLUCOMTR-MCNC: 213 MG/DL — HIGH (ref 70–99)
GLUCOSE BLDC GLUCOMTR-MCNC: 228 MG/DL — HIGH (ref 70–99)
GLUCOSE BLDC GLUCOMTR-MCNC: 259 MG/DL — HIGH (ref 70–99)
GLUCOSE SERPL-MCNC: 194 MG/DL — HIGH (ref 70–99)
HCT VFR BLD CALC: 26.2 % — LOW (ref 39–50)
HGB BLD-MCNC: 8.4 G/DL — LOW (ref 13–17)
MAGNESIUM SERPL-MCNC: 2 MG/DL — SIGNIFICANT CHANGE UP (ref 1.6–2.6)
MCHC RBC-ENTMCNC: 29 PG — SIGNIFICANT CHANGE UP (ref 27–34)
MCHC RBC-ENTMCNC: 32.1 GM/DL — SIGNIFICANT CHANGE UP (ref 32–36)
MCV RBC AUTO: 90.3 FL — SIGNIFICANT CHANGE UP (ref 80–100)
NRBC # BLD: 0 /100 WBCS — SIGNIFICANT CHANGE UP (ref 0–0)
NRBC # FLD: 0 K/UL — SIGNIFICANT CHANGE UP (ref 0–0)
PHOSPHATE SERPL-MCNC: 1.9 MG/DL — LOW (ref 2.5–4.5)
PLATELET # BLD AUTO: 161 K/UL — SIGNIFICANT CHANGE UP (ref 150–400)
POTASSIUM SERPL-MCNC: 3.5 MMOL/L — SIGNIFICANT CHANGE UP (ref 3.5–5.3)
POTASSIUM SERPL-SCNC: 3.5 MMOL/L — SIGNIFICANT CHANGE UP (ref 3.5–5.3)
RBC # BLD: 2.9 M/UL — LOW (ref 4.2–5.8)
RBC # FLD: 13.6 % — SIGNIFICANT CHANGE UP (ref 10.3–14.5)
RH IG SCN BLD-IMP: POSITIVE — SIGNIFICANT CHANGE UP
SODIUM SERPL-SCNC: 135 MMOL/L — SIGNIFICANT CHANGE UP (ref 135–145)
WBC # BLD: 10 K/UL — SIGNIFICANT CHANGE UP (ref 3.8–10.5)
WBC # FLD AUTO: 10 K/UL — SIGNIFICANT CHANGE UP (ref 3.8–10.5)

## 2024-07-30 PROCEDURE — 71045 X-RAY EXAM CHEST 1 VIEW: CPT | Mod: 26

## 2024-07-30 PROCEDURE — 99232 SBSQ HOSP IP/OBS MODERATE 35: CPT

## 2024-07-30 PROCEDURE — 71250 CT THORAX DX C-: CPT | Mod: 26

## 2024-07-30 RX ORDER — DEXTROSE MONOHYDRATE, SODIUM CHLORIDE, SODIUM LACTATE, CALCIUM CHLORIDE, MAGNESIUM CHLORIDE 1.5; 538; 448; 18.4; 5.08 G/100ML; MG/100ML; MG/100ML; MG/100ML; MG/100ML
500 SOLUTION INTRAPERITONEAL ONCE
Refills: 0 | Status: DISCONTINUED | OUTPATIENT
Start: 2024-07-30 | End: 2024-07-30

## 2024-07-30 RX ORDER — METOCLOPRAMIDE HCL 5 MG/ML
10 VIAL (ML) INJECTION EVERY 8 HOURS
Refills: 0 | Status: DISCONTINUED | OUTPATIENT
Start: 2024-07-30 | End: 2024-08-09

## 2024-07-30 RX ORDER — ASPIRIN 325 MG
10 TABLET ORAL ONCE
Refills: 0 | Status: COMPLETED | OUTPATIENT
Start: 2024-07-30 | End: 2024-07-30

## 2024-07-30 RX ADMIN — PIPERACILLIN SODIUM, TAZOBACTAM SODIUM 25 GRAM(S): 3; .375 INJECTION, POWDER, LYOPHILIZED, FOR SOLUTION INTRAVENOUS at 05:33

## 2024-07-30 RX ADMIN — PANTOPRAZOLE SODIUM 40 MILLIGRAM(S): 20 TABLET, DELAYED RELEASE ORAL at 17:30

## 2024-07-30 RX ADMIN — Medication 650 MILLIGRAM(S): at 23:29

## 2024-07-30 RX ADMIN — LIDOCAINE 5% 1 PATCH: 5 CREAM TOPICAL at 06:26

## 2024-07-30 RX ADMIN — SODIUM CHLORIDE AND POTASSIUM CHLORIDE 84 MILLILITER(S): 150; 450 INJECTION, SOLUTION INTRAVENOUS at 05:14

## 2024-07-30 RX ADMIN — Medication 10 MILLIGRAM(S): at 15:31

## 2024-07-30 RX ADMIN — AMLODIPINE BESYLATE 5 MILLIGRAM(S): 2.5 TABLET ORAL at 05:16

## 2024-07-30 RX ADMIN — Medication 3: at 17:28

## 2024-07-30 RX ADMIN — LEVALBUTEROL HYDROCHLORIDE 0.63 MILLIGRAM(S): 0.31 SOLUTION RESPIRATORY (INHALATION) at 04:03

## 2024-07-30 RX ADMIN — INSULIN GLARGINE-YFGN 5 UNIT(S): 100 INJECTION, SOLUTION SUBCUTANEOUS at 23:27

## 2024-07-30 RX ADMIN — Medication 4 MILLILITER(S): at 16:13

## 2024-07-30 RX ADMIN — DORNASE ALFA 2.5 MILLIGRAM(S): 1 SOLUTION RESPIRATORY (INHALATION) at 10:00

## 2024-07-30 RX ADMIN — Medication 2: at 23:41

## 2024-07-30 RX ADMIN — LEVALBUTEROL HYDROCHLORIDE 0.63 MILLIGRAM(S): 0.31 SOLUTION RESPIRATORY (INHALATION) at 16:13

## 2024-07-30 RX ADMIN — Medication 1: at 12:04

## 2024-07-30 RX ADMIN — Medication 4 MILLILITER(S): at 04:03

## 2024-07-30 RX ADMIN — Medication 650 MILLIGRAM(S): at 05:46

## 2024-07-30 RX ADMIN — SODIUM CHLORIDE AND POTASSIUM CHLORIDE 84 MILLILITER(S): 150; 450 INJECTION, SOLUTION INTRAVENOUS at 17:34

## 2024-07-30 RX ADMIN — PIPERACILLIN SODIUM, TAZOBACTAM SODIUM 25 GRAM(S): 3; .375 INJECTION, POWDER, LYOPHILIZED, FOR SOLUTION INTRAVENOUS at 23:26

## 2024-07-30 RX ADMIN — PANTOPRAZOLE SODIUM 40 MILLIGRAM(S): 20 TABLET, DELAYED RELEASE ORAL at 05:16

## 2024-07-30 RX ADMIN — Medication 650 MILLIGRAM(S): at 17:30

## 2024-07-30 RX ADMIN — ENOXAPARIN SODIUM 40 MILLIGRAM(S): 120 INJECTION SUBCUTANEOUS at 12:02

## 2024-07-30 RX ADMIN — Medication 650 MILLIGRAM(S): at 05:16

## 2024-07-30 RX ADMIN — Medication 650 MILLIGRAM(S): at 18:00

## 2024-07-30 RX ADMIN — Medication 4 MILLILITER(S): at 21:20

## 2024-07-30 RX ADMIN — Medication 25 MILLIGRAM(S): at 05:17

## 2024-07-30 RX ADMIN — LOSARTAN POTASSIUM 50 MILLIGRAM(S): 50 TABLET, FILM COATED ORAL at 05:16

## 2024-07-30 RX ADMIN — Medication 2: at 06:35

## 2024-07-30 RX ADMIN — Medication 25 MILLIGRAM(S): at 17:32

## 2024-07-30 RX ADMIN — LEVALBUTEROL HYDROCHLORIDE 0.63 MILLIGRAM(S): 0.31 SOLUTION RESPIRATORY (INHALATION) at 09:57

## 2024-07-30 RX ADMIN — LEVALBUTEROL HYDROCHLORIDE 0.63 MILLIGRAM(S): 0.31 SOLUTION RESPIRATORY (INHALATION) at 21:20

## 2024-07-30 RX ADMIN — SODIUM CHLORIDE AND POTASSIUM CHLORIDE 84 MILLILITER(S): 150; 450 INJECTION, SOLUTION INTRAVENOUS at 15:30

## 2024-07-30 RX ADMIN — Medication 650 MILLIGRAM(S): at 23:58

## 2024-07-30 RX ADMIN — PIPERACILLIN SODIUM, TAZOBACTAM SODIUM 25 GRAM(S): 3; .375 INJECTION, POWDER, LYOPHILIZED, FOR SOLUTION INTRAVENOUS at 15:29

## 2024-07-30 RX ADMIN — Medication 10 MILLIGRAM(S): at 23:27

## 2024-07-30 RX ADMIN — Medication 4 MILLILITER(S): at 09:57

## 2024-07-30 NOTE — PROGRESS NOTE ADULT - ASSESSMENT
59 year old gentleman with diagnosis of adenocarcinoma at GE junction s/p chemotherapy and radiation  Now s/p robotic resection on 7/16    Post operatively, he had worsening leukocytosis  Imaging with a right sided loculated effusion  Drain in place draining brown fluid.    ? if imaging changes are post operative vs due to infection  His leukocytosis improved quickly- lowed my concern for empyema    Continue to monitor drain outpt- it has increased. Does not appear purulent.   Repeat imaging to evaluate for a leak    Continue zosyn for now    Discussed with CT surgery.

## 2024-07-30 NOTE — PROGRESS NOTE ADULT - SUBJECTIVE AND OBJECTIVE BOX
Chief Complaint: DM2    History: Made NPO yesterday  desat overnight, on nasal cannula  denies emesis  receiving IV fluids with dextrose    MEDICATIONS  (STANDING):  acetaminophen     Tablet .. 650 milliGRAM(s) Oral every 6 hours  amLODIPine   Tablet 5 milliGRAM(s) Oral daily  dextrose 5% + sodium chloride 0.9% with potassium chloride 20 mEq/L 1000 milliLiter(s) (84 mL/Hr) IV Continuous <Continuous>  dextrose 5%. 1000 milliLiter(s) (50 mL/Hr) IV Continuous <Continuous>  dextrose 5%. 1000 milliLiter(s) (100 mL/Hr) IV Continuous <Continuous>  dextrose 50% Injectable 25 Gram(s) IV Push once  dextrose 50% Injectable 12.5 Gram(s) IV Push once  dextrose 50% Injectable 25 Gram(s) IV Push once  dextrose Oral Gel 15 Gram(s) Oral once  dornase malik Solution 2.5 milliGRAM(s) Inhalation daily  enoxaparin Injectable 40 milliGRAM(s) SubCutaneous every 24 hours  hydrochlorothiazide 12.5 milliGRAM(s) Oral daily  insulin glargine Injectable (LANTUS) 5 Unit(s) SubCutaneous at bedtime  insulin lispro (ADMELOG) corrective regimen sliding scale   SubCutaneous every 6 hours  levalbuterol Inhalation 0.63 milliGRAM(s) Inhalation every 6 hours  lidocaine   4% Patch 1 Patch Transdermal at bedtime  losartan 50 milliGRAM(s) Oral daily  metoprolol tartrate 25 milliGRAM(s) Oral two times a day  pantoprazole    Tablet 40 milliGRAM(s) Oral every 12 hours  piperacillin/tazobactam IVPB.. 3.375 Gram(s) IV Intermittent every 8 hours  sodium chloride 3%  Inhalation 4 milliLiter(s) Inhalation every 6 hours    MEDICATIONS  (PRN):  naloxone Injectable 0.1 milliGRAM(s) IV Push every 3 minutes PRN For ANY of the following changes in patient status:  A. RR LESS THAN 10 breaths per minute, B. Oxygen saturation LESS THAN 90%, C. Sedation score of 6  oxyCODONE    IR 2.5 milliGRAM(s) Oral every 4 hours PRN Moderate Pain (4 - 6)  oxyCODONE    IR 5 milliGRAM(s) Oral every 4 hours PRN Severe Pain (7 - 10)      Allergies    No Known Allergies    Intolerances      Review of Systems:  ALL OTHER SYSTEMS REVIEWED AND NEGATIVE      PHYSICAL EXAM:  VITALS: T(C): 36.7 (07-30-24 @ 12:10)  T(F): 98 (07-30-24 @ 12:10), Max: 98.8 (07-29-24 @ 20:02)  HR: 91 (07-30-24 @ 12:10) (83 - 118)  BP: 132/66 (07-30-24 @ 12:10) (132/66 - 168/76)  RR:  (18 - 18)  SpO2:  (92% - 100%)  Wt(kg): --  GENERAL: NAD, well-groomed, well-developed  EYES: No proptosis, no lid lag, anicteric  HEENT:  Atraumatic, Normocephalic, moist mucous membranes  RESPIRATORY: nonlabored respirations, no wheezing  PSYCH: Alert and oriented x 3, normal affect, normal mood    CAPILLARY BLOOD GLUCOSE      POCT Blood Glucose.: 196 mg/dL (30 Jul 2024 12:03)  POCT Blood Glucose.: 228 mg/dL (30 Jul 2024 06:21)  POCT Blood Glucose.: 218 mg/dL (29 Jul 2024 23:11)  POCT Blood Glucose.: 108 mg/dL (29 Jul 2024 17:04)      07-30    135  |  104  |  6<L>  ----------------------------<  194<H>  3.5   |  22  |  0.80    eGFR: 101    Ca    7.1<L>      07-30  Mg     2.00     07-30  Phos  1.9     07-30    TPro  x   /  Alb  2.2<L>  /  TBili  x   /  DBili  x   /  AST  x   /  ALT  x   /  AlkPhos  x   07-29      A1C with Estimated Average Glucose Result: 10.9 % (07-13-24 @ 02:44)  A1C with Estimated Average Glucose Result: 12.8 % (01-01-24 @ 06:00)      Thyroid Function Tests:

## 2024-07-30 NOTE — PROGRESS NOTE ADULT - SUBJECTIVE AND OBJECTIVE BOX
D TEAM Surgery Progress Note  Patient is a 59y old  Male who presents with a chief complaint of Esophagectomy (29 Jul 2024 14:12)      INTERVAL EVENTS: Patient is POD#14 s/p Michael Broderick esophagectomy. Developed vomiting yesterday and VENKATA produced 915cc tan fluid in 24h. Desaturation to 80s overnight, recovered with supplemental oxygen. Now saturating well on RA.    SUBJECTIVE: Patient seen and examined at bedside with surgical team, patient without complaints. Denies abdominal pain. Denies dyspnea, CP, SOB, cough. Denies nausea, vomiting. Denies passing flatus. Denies bowel movement. Denies fever, chills.    OBJECTIVE:    Vital Signs Last 24 Hrs  T(C): 36.8 (30 Jul 2024 08:13), Max: 37.1 (29 Jul 2024 20:02)  T(F): 98.3 (30 Jul 2024 08:13), Max: 98.8 (29 Jul 2024 20:02)  HR: 83 (30 Jul 2024 08:13) (78 - 118)  BP: 133/66 (30 Jul 2024 08:13) (133/66 - 168/76)  BP(mean): --  RR: 18 (30 Jul 2024 08:13) (17 - 18)  SpO2: 97% (30 Jul 2024 08:13) (92% - 100%)    Parameters below as of 30 Jul 2024 08:13  Patient On (Oxygen Delivery Method): room air    I&O's Detail    29 Jul 2024 07:01  -  30 Jul 2024 07:00  --------------------------------------------------------  IN:    dextrose 5% + sodium chloride 0.9% w/ Additives: 420 mL    Oral Fluid: 300 mL  Total IN: 720 mL    OUT:    Bulb (mL): 910 mL    Voided (mL): 940 mL  Total OUT: 1850 mL    Total NET: -1130 mL      30 Jul 2024 07:01  -  30 Jul 2024 08:29  --------------------------------------------------------  IN:    dextrose 5% + sodium chloride 0.9% w/ Additives: 84 mL  Total IN: 84 mL    OUT:    Bulb (mL): 90 mL    Oral Fluid: 0 mL  Total OUT: 90 mL    Total NET: -6 mL      MEDICATIONS  (STANDING):  acetaminophen     Tablet .. 650 milliGRAM(s) Oral every 6 hours  amLODIPine   Tablet 5 milliGRAM(s) Oral daily  bisacodyl Suppository 10 milliGRAM(s) Rectal once  dextrose 5% + sodium chloride 0.9% with potassium chloride 20 mEq/L 1000 milliLiter(s) (84 mL/Hr) IV Continuous <Continuous>  dextrose 5%. 1000 milliLiter(s) (50 mL/Hr) IV Continuous <Continuous>  dextrose 5%. 1000 milliLiter(s) (100 mL/Hr) IV Continuous <Continuous>  dextrose 50% Injectable 12.5 Gram(s) IV Push once  dextrose 50% Injectable 25 Gram(s) IV Push once  dextrose 50% Injectable 25 Gram(s) IV Push once  dextrose Oral Gel 15 Gram(s) Oral once  dornase malik Solution 2.5 milliGRAM(s) Inhalation daily  enoxaparin Injectable 40 milliGRAM(s) SubCutaneous every 24 hours  hydrochlorothiazide 12.5 milliGRAM(s) Oral daily  insulin glargine Injectable (LANTUS) 5 Unit(s) SubCutaneous at bedtime  insulin lispro (ADMELOG) corrective regimen sliding scale   SubCutaneous every 6 hours  levalbuterol Inhalation 0.63 milliGRAM(s) Inhalation every 6 hours  lidocaine   4% Patch 1 Patch Transdermal at bedtime  losartan 50 milliGRAM(s) Oral daily  metoprolol tartrate 25 milliGRAM(s) Oral two times a day  pantoprazole    Tablet 40 milliGRAM(s) Oral every 12 hours  piperacillin/tazobactam IVPB.. 3.375 Gram(s) IV Intermittent every 8 hours  sodium chloride 3%  Inhalation 4 milliLiter(s) Inhalation every 6 hours    MEDICATIONS  (PRN):  naloxone Injectable 0.1 milliGRAM(s) IV Push every 3 minutes PRN For ANY of the following changes in patient status:  A. RR LESS THAN 10 breaths per minute, B. Oxygen saturation LESS THAN 90%, C. Sedation score of 6  oxyCODONE    IR 2.5 milliGRAM(s) Oral every 4 hours PRN Moderate Pain (4 - 6)  oxyCODONE    IR 5 milliGRAM(s) Oral every 4 hours PRN Severe Pain (7 - 10)      PHYSICAL EXAM:  Constitutional: A&Ox3, NAD  Respiratory: Unlabored breathing  Abdomen: Soft, nondistended, NTTP. No rebound or guarding. Incisions with soft, stable ecchymosis. VENKATA drain with opaque, tan output.  Extremities: WWP, ALFORD spontaneously    LABS:                        8.4    10.00 )-----------( 161      ( 30 Jul 2024 08:00 )             26.2     07-29    133<L>  |  102  |  8   ----------------------------<  147<H>  3.5   |  23  |  0.87    Ca    7.0<L>      29 Jul 2024 05:28  Phos  2.2     07-29  Mg     2.10     07-29    TPro  x   /  Alb  2.2<L>  /  TBili  x   /  DBili  x   /  AST  x   /  ALT  x   /  AlkPhos  x   07-29      LIVER FUNCTIONS - ( 29 Jul 2024 05:28 )  Alb: 2.2 g/dL / Pro: x     / ALK PHOS: x     / ALT: x     / AST: x     / GGT: x           Urinalysis Basic - ( 29 Jul 2024 05:28 )    Color: x / Appearance: x / SG: x / pH: x  Gluc: 147 mg/dL / Ketone: x  / Bili: x / Urobili: x   Blood: x / Protein: x / Nitrite: x   Leuk Esterase: x / RBC: x / WBC x   Sq Epi: x / Non Sq Epi: x / Bacteria: x

## 2024-07-30 NOTE — PROGRESS NOTE ADULT - ASSESSMENT
· Assessment	  59 y.o. man with history of GEJ adenocarcinoma (T3N0, stage 2A) s/p neoadjuvant chemotherapy, now s/p Michael-Broderick Esophagectomy on 7/16.     Sx f/up appreciated  OOB  Pain control Oxycodone  CLD    SOB:    CXR: Rt pleural effusion  CTPA: No PE/Rt hydropneumothorax  Thoracic Sx f/up appreciated  On supp O2  Xopenex nebulizer    DM II:    FSSS  Lantus insulin  Endo f/up appreciated    HTN:    On IV Metoprolol/Hydralazine  Cardio f/up appreciated

## 2024-07-30 NOTE — PROGRESS NOTE ADULT - SUBJECTIVE AND OBJECTIVE BOX
Patient is a 59y old  Male who presents with a chief complaint of Esophagectomy (30 Jul 2024 18:15)      SUBJECTIVE / OVERNIGHT EVENTS:    Events noted.  CONSTITUTIONAL: No fever,  or fatigue  RESPIRATORY: No cough, wheezing,  No shortness of breath  CARDIOVASCULAR: No chest pain, palpitations, dizziness, or leg swelling  GASTROINTESTINAL: No abdominal or epigastric pain.       MEDICATIONS  (STANDING):  acetaminophen     Tablet .. 650 milliGRAM(s) Oral every 6 hours  amLODIPine   Tablet 5 milliGRAM(s) Oral daily  dextrose 5% + sodium chloride 0.9% with potassium chloride 20 mEq/L 1000 milliLiter(s) (84 mL/Hr) IV Continuous <Continuous>  dextrose 5%. 1000 milliLiter(s) (50 mL/Hr) IV Continuous <Continuous>  dextrose 5%. 1000 milliLiter(s) (100 mL/Hr) IV Continuous <Continuous>  dextrose 50% Injectable 25 Gram(s) IV Push once  dextrose 50% Injectable 25 Gram(s) IV Push once  dextrose 50% Injectable 12.5 Gram(s) IV Push once  dextrose Oral Gel 15 Gram(s) Oral once  dornase malik Solution 2.5 milliGRAM(s) Inhalation daily  enoxaparin Injectable 40 milliGRAM(s) SubCutaneous every 24 hours  hydrochlorothiazide 12.5 milliGRAM(s) Oral daily  insulin glargine Injectable (LANTUS) 5 Unit(s) SubCutaneous at bedtime  insulin lispro (ADMELOG) corrective regimen sliding scale   SubCutaneous every 6 hours  levalbuterol Inhalation 0.63 milliGRAM(s) Inhalation every 6 hours  lidocaine   4% Patch 1 Patch Transdermal at bedtime  losartan 50 milliGRAM(s) Oral daily  metoclopramide Injectable 10 milliGRAM(s) IV Push every 8 hours  metoprolol tartrate 25 milliGRAM(s) Oral two times a day  pantoprazole    Tablet 40 milliGRAM(s) Oral every 12 hours  piperacillin/tazobactam IVPB.. 3.375 Gram(s) IV Intermittent every 8 hours  sodium chloride 3%  Inhalation 4 milliLiter(s) Inhalation every 6 hours    MEDICATIONS  (PRN):  naloxone Injectable 0.1 milliGRAM(s) IV Push every 3 minutes PRN For ANY of the following changes in patient status:  A. RR LESS THAN 10 breaths per minute, B. Oxygen saturation LESS THAN 90%, C. Sedation score of 6  oxyCODONE    IR 2.5 milliGRAM(s) Oral every 4 hours PRN Moderate Pain (4 - 6)  oxyCODONE    IR 5 milliGRAM(s) Oral every 4 hours PRN Severe Pain (7 - 10)        CAPILLARY BLOOD GLUCOSE      POCT Blood Glucose.: 213 mg/dL (30 Jul 2024 23:16)  POCT Blood Glucose.: 259 mg/dL (30 Jul 2024 16:59)  POCT Blood Glucose.: 196 mg/dL (30 Jul 2024 12:03)  POCT Blood Glucose.: 228 mg/dL (30 Jul 2024 06:21)    I&O's Summary    29 Jul 2024 07:01  -  30 Jul 2024 07:00  --------------------------------------------------------  IN: 720 mL / OUT: 1850 mL / NET: -1130 mL    30 Jul 2024 07:01  -  30 Jul 2024 23:39  --------------------------------------------------------  IN: 772 mL / OUT: 285 mL / NET: 487 mL        T(C): 36.4 (07-30-24 @ 20:16), Max: 37.1 (07-30-24 @ 04:32)  HR: 70 (07-30-24 @ 21:57) (70 - 96)  BP: 124/69 (07-30-24 @ 20:16) (115/66 - 143/70)  RR: 18 (07-30-24 @ 20:16) (18 - 18)  SpO2: 98% (07-30-24 @ 21:57) (92% - 99%)    PHYSICAL EXAM:    NECK: Supple, No JVD  CHEST/LUNG: Clear to auscultation bilaterally; No wheezing.  HEART: Regular rate and rhythm; No murmurs, rubs, or gallops  ABDOMEN: Soft, Nontender, Nondistended; Bowel sounds present  EXTREMITIES:   No edema  NEUROLOGY: AAO X 3      LABS:                        8.4    10.00 )-----------( 161      ( 30 Jul 2024 08:00 )             26.2     07-30    135  |  104  |  6<L>  ----------------------------<  194<H>  3.5   |  22  |  0.80    Ca    7.1<L>      30 Jul 2024 08:00  Phos  1.9     07-30  Mg     2.00     07-30    TPro  x   /  Alb  2.2<L>  /  TBili  x   /  DBili  x   /  AST  x   /  ALT  x   /  AlkPhos  x   07-29          Urinalysis Basic - ( 30 Jul 2024 08:00 )    Color: x / Appearance: x / SG: x / pH: x  Gluc: 194 mg/dL / Ketone: x  / Bili: x / Urobili: x   Blood: x / Protein: x / Nitrite: x   Leuk Esterase: x / RBC: x / WBC x   Sq Epi: x / Non Sq Epi: x / Bacteria: x      CAPILLARY BLOOD GLUCOSE      POCT Blood Glucose.: 213 mg/dL (30 Jul 2024 23:16)  POCT Blood Glucose.: 259 mg/dL (30 Jul 2024 16:59)  POCT Blood Glucose.: 196 mg/dL (30 Jul 2024 12:03)  POCT Blood Glucose.: 228 mg/dL (30 Jul 2024 06:21)        RADIOLOGY & ADDITIONAL TESTS:    Imaging Personally Reviewed:    Consultant(s) Notes Reviewed:      Care Discussed with Consultants/Other Providers:    Anish Goodrich MD, CMD, FACP    [id number] [id number]  Office Tel: [phone]  Cell: [phone]

## 2024-07-30 NOTE — PROGRESS NOTE ADULT - SUBJECTIVE AND OBJECTIVE BOX
Follow Up:      Inverval History/ROS: Patient is a 59y old  Male who presents with a chief complaint of Esophagectomy (30 Jul 2024 12:34)    No fever  Increased drainage from VENKATA        Allergies    No Known Allergies    Intolerances        ANTIMICROBIALS:  piperacillin/tazobactam IVPB.. 3.375 every 8 hours      OTHER MEDS:  acetaminophen     Tablet .. 650 milliGRAM(s) Oral every 6 hours  amLODIPine   Tablet 5 milliGRAM(s) Oral daily  dextrose 5% + sodium chloride 0.9% with potassium chloride 20 mEq/L 1000 milliLiter(s) IV Continuous <Continuous>  dextrose 5%. 1000 milliLiter(s) IV Continuous <Continuous>  dextrose 5%. 1000 milliLiter(s) IV Continuous <Continuous>  dextrose 50% Injectable 25 Gram(s) IV Push once  dextrose 50% Injectable 12.5 Gram(s) IV Push once  dextrose 50% Injectable 25 Gram(s) IV Push once  dextrose Oral Gel 15 Gram(s) Oral once  dornase malik Solution 2.5 milliGRAM(s) Inhalation daily  enoxaparin Injectable 40 milliGRAM(s) SubCutaneous every 24 hours  hydrochlorothiazide 12.5 milliGRAM(s) Oral daily  insulin glargine Injectable (LANTUS) 5 Unit(s) SubCutaneous at bedtime  insulin lispro (ADMELOG) corrective regimen sliding scale   SubCutaneous every 6 hours  levalbuterol Inhalation 0.63 milliGRAM(s) Inhalation every 6 hours  lidocaine   4% Patch 1 Patch Transdermal at bedtime  losartan 50 milliGRAM(s) Oral daily  metoclopramide Injectable 10 milliGRAM(s) IV Push every 8 hours  metoprolol tartrate 25 milliGRAM(s) Oral two times a day  naloxone Injectable 0.1 milliGRAM(s) IV Push every 3 minutes PRN  oxyCODONE    IR 2.5 milliGRAM(s) Oral every 4 hours PRN  oxyCODONE    IR 5 milliGRAM(s) Oral every 4 hours PRN  pantoprazole    Tablet 40 milliGRAM(s) Oral every 12 hours  sodium chloride 3%  Inhalation 4 milliLiter(s) Inhalation every 6 hours      Vital Signs Last 24 Hrs  T(C): 36.7 (30 Jul 2024 12:10), Max: 37.1 (29 Jul 2024 20:02)  T(F): 98 (30 Jul 2024 12:10), Max: 98.8 (29 Jul 2024 20:02)  HR: 91 (30 Jul 2024 12:10) (83 - 118)  BP: 132/66 (30 Jul 2024 12:10) (132/66 - 168/76)  BP(mean): --  RR: 18 (30 Jul 2024 12:10) (18 - 18)  SpO2: 99% (30 Jul 2024 12:10) (92% - 100%)    Parameters below as of 30 Jul 2024 12:10  Patient On (Oxygen Delivery Method): room air        PHYSICAL EXAM:  General: [x ] non-toxic  HEAD/EYES: [ ] PERRL [x ] white sclera [ ] icterus  ENT:  [ ] normal [x ] supple [ ] thrush [ ] pharyngeal exudate  Cardiovascular:   [ ] murmur [ x] normal [ ] PPM/AICD  Respiratory:  [ ] clear to ausculation bilaterally  GI:  [x ] soft, non-tender, normal bowel sounds  :  [ ] stallworth [ ] no CVA tenderness   Musculoskeletal:  [ ] no synovitis  Neurologic:  [ ] non-focal exam   Skin:  [ ] no rash  Lymph: [x ] no lymphadenopathy  Psychiatric:  [ ] appropriate affect [ ] alert & oriented  Lines:  [ ] no phlebitis [ ] central line                                8.4    10.00 )-----------( 161      ( 30 Jul 2024 08:00 )             26.2       07-30    135  |  104  |  6<L>  ----------------------------<  194<H>  3.5   |  22  |  0.80    Ca    7.1<L>      30 Jul 2024 08:00  Phos  1.9     07-30  Mg     2.00     07-30    TPro  x   /  Alb  2.2<L>  /  TBili  x   /  DBili  x   /  AST  x   /  ALT  x   /  AlkPhos  x   07-29      Urinalysis Basic - ( 30 Jul 2024 08:00 )    Color: x / Appearance: x / SG: x / pH: x  Gluc: 194 mg/dL / Ketone: x  / Bili: x / Urobili: x   Blood: x / Protein: x / Nitrite: x   Leuk Esterase: x / RBC: x / WBC x   Sq Epi: x / Non Sq Epi: x / Bacteria: x        MICROBIOLOGY:    RADIOLOGY:

## 2024-07-30 NOTE — PROGRESS NOTE ADULT - ASSESSMENT
The patient is a 59y Male with PMH of T2DM, HTN, HLD, GE junction cancer here for esophagectomy now postop, prolonged course.  Endocrinology consulted for T2DM    #Uncontrolled Type 2 Diabetes Mellitus   - Follows with: Dr. Nolasco  - A1C with Estimated Average Glucose Result: 10.9 % (07-13-24)  - home regimen: Semglee 18 units, Novolog 6 units with meals      INPATIENT PLAN:  -Patient back to NPO at this time, on IV fluids with dextrose  -Increase Lantus to 7 units qhs (ok to give if NPO)  - low Admelog scale q6h while NPO  -FS q6h  If diet resumed then:  -FS premeal and bedtime, Keep Lantus, add back Admelog 2/2/2 units prmeal and adjust low scale premeal and separate low bedtime scale.  - Inpatient glucose goals: 100-180 mg/sl  -hypoglycemia protocol        DISCHARGE PLANNING:  - Discharge recs pending clinical course and nutrition plan. Plan to resume Semglee and Admelog will determine final doses based on hospital requirements/ po intake at time of discharge.   - will need Endocrinology follow up. Patient has an appointment with Dr. Nolasco scheduled for August 8th at 8:20 AM at [address] [facility] [place], [place]   [address].     #Hypertension  - Goal BP <130/80  - Management as per primary team  - check urine microalbumin level as outpatient    #Hyperlipidemia  - LDL goal <70  - check lipid panel as outpatient on a yearly basis    #Hypercalcemia  calcium 7.0  albumin 2.2  corrected calcium 8.4 mildly low, likely in setting of reduced intake recently  checked 25OHD - 6.5 severely low.   Start ergocalciferol 50,000 units PO once weekly once able to take PO    Leo Rader MD  Division of Endocrinology  Pager: [phone]    If after 6PM or before 9AM, or on weekends/holidays, please call endocrine answering service for assistance ([phone]).  For nonurgent matters email [email or web address] for assistance.

## 2024-07-30 NOTE — PROGRESS NOTE ADULT - ASSESSMENT
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 POD #6 UGI without anastomotic leak. CTPA 7/28 with moderate right loculated hydropneumothorax with interval increase in size and small right apical PTX.    PLAN:  - CT chest today per CTSx to evaluate for leak  - Continue Zosyn   - NPO  - Daily CXR/ OOB/ ambulate/ IS while in bed  - Lantus 5 units, FSq6h, appreciate endo recs  - Pain control PRN with oral solution  - DVT ppx: SCD's & Lovenox  - Dispo planning: Home with VENKATA drain, VNS, home PT  - Patient being transferred to thoracic surgery service, will follow    D Team Surgery  [id number]

## 2024-07-30 NOTE — PROGRESS NOTE ADULT - SUBJECTIVE AND OBJECTIVE BOX
DATE OF SERVICE: 07-30-24    Patient denies chest pain or shortness of breath.   Review of symptoms otherwise negative.    MEDICATIONS:  acetaminophen     Tablet .. 650 milliGRAM(s) Oral every 6 hours  amLODIPine   Tablet 5 milliGRAM(s) Oral daily  bisacodyl Suppository 10 milliGRAM(s) Rectal once  dextrose 5% + sodium chloride 0.9% with potassium chloride 20 mEq/L 1000 milliLiter(s) IV Continuous <Continuous>  dextrose 5%. 1000 milliLiter(s) IV Continuous <Continuous>  dextrose 5%. 1000 milliLiter(s) IV Continuous <Continuous>  dextrose 50% Injectable 12.5 Gram(s) IV Push once  dextrose 50% Injectable 25 Gram(s) IV Push once  dextrose 50% Injectable 25 Gram(s) IV Push once  dextrose Oral Gel 15 Gram(s) Oral once  dornase malik Solution 2.5 milliGRAM(s) Inhalation daily  enoxaparin Injectable 40 milliGRAM(s) SubCutaneous every 24 hours  hydrochlorothiazide 12.5 milliGRAM(s) Oral daily  insulin glargine Injectable (LANTUS) 5 Unit(s) SubCutaneous at bedtime  insulin lispro (ADMELOG) corrective regimen sliding scale   SubCutaneous every 6 hours  levalbuterol Inhalation 0.63 milliGRAM(s) Inhalation every 6 hours  lidocaine   4% Patch 1 Patch Transdermal at bedtime  losartan 50 milliGRAM(s) Oral daily  metoprolol tartrate 25 milliGRAM(s) Oral two times a day  naloxone Injectable 0.1 milliGRAM(s) IV Push every 3 minutes PRN  oxyCODONE    IR 2.5 milliGRAM(s) Oral every 4 hours PRN  oxyCODONE    IR 5 milliGRAM(s) Oral every 4 hours PRN  pantoprazole    Tablet 40 milliGRAM(s) Oral every 12 hours  piperacillin/tazobactam IVPB.. 3.375 Gram(s) IV Intermittent every 8 hours  sodium chloride 3%  Inhalation 4 milliLiter(s) Inhalation every 6 hours      LABS:                        8.4    10.00 )-----------( 161      ( 30 Jul 2024 08:00 )             26.2       Hemoglobin: 8.4 g/dL (07-30 @ 08:00)  Hemoglobin: 8.6 g/dL (07-29 @ 05:28)  Hemoglobin: 9.8 g/dL (07-28 @ 04:12)  Hemoglobin: 9.9 g/dL (07-27 @ 06:58)  Hemoglobin: 10.4 g/dL (07-26 @ 08:00)      07-30    135  |  104  |  6<L>  ----------------------------<  194<H>  3.5   |  22  |  0.80    Ca    7.1<L>      30 Jul 2024 08:00  Phos  1.9     07-30  Mg     2.00     07-30    TPro  x   /  Alb  2.2<L>  /  TBili  x   /  DBili  x   /  AST  x   /  ALT  x   /  AlkPhos  x   07-29  Creatinine Trend: 0.80<--, 0.87<--, 0.83<--, 0.83<--, 0.91<--, 0.79<--    PHYSICAL EXAM:  T(C): 36.8 (07-30-24 @ 08:13), Max: 37.1 (07-29-24 @ 20:02)  HR: 95 (07-30-24 @ 09:57) (78 - 118)  BP: 133/66 (07-30-24 @ 08:13) (133/66 - 168/76)  RR: 18 (07-30-24 @ 08:13) (17 - 18)  SpO2: 95% (07-30-24 @ 09:57) (92% - 100%)  Wt(kg): --    I&O's Summary    29 Jul 2024 07:01  -  30 Jul 2024 07:00  --------------------------------------------------------  IN: 720 mL / OUT: 1850 mL / NET: -1130 mL    30 Jul 2024 07:01  -  30 Jul 2024 10:49  --------------------------------------------------------  IN: 84 mL / OUT: 90 mL / NET: -6 mL      Gen: NAD  HEENT:  (-)icterus (-)pallor  CV: N S1 S2 1/6 CATHY (+)2 Pulses B/l  Resp:  Clear to auscultation B/L, normal effort  GI: (+) BS Soft, NT, ND  Lymph:  (-)Edema, (-)obvious lymphadenopathy  Skin: Warm to touch, Normal turgor  Psych: Appropriate mood and affect      TELEMETRY: 	 SR/ST    ECG:  	NSR      TTE 07/2024  Findings:  1. Normal left ventricular size and function.  Mild diastolic dysfunction.  2. Normal left atrial size  3. Right atrial cavity is normal in size.  4. Normal right ventricular size and function.  5. Normal trileaflet aortic valve opening.  6. Normal mitral valve opening.  7. Normal appearing tricuspid valve with mild tricuspid  regurgitation.  8. Pulmonic valve is grossly normal, yet poorly visualized  with no doppler evidence for pulmonic stenosis.  9. No evidence of significant pericardial effusion.  10. The aortic root is normal.  11. Normal pulmonary artery.  12. IVC is normal with respiratory variation.  FULL STUDY DONE INCLUDING M-MODE RECORDING,  SPECTRAL DOPPLER AND    Stress 07/2024  Normal myocardial perfusion SPECT images No evidence of stress induced ischemia or infarction.  Normal left ventricular size and function.  Calculated EF is: 68%    CT  IMPRESSION:  Small loculated right pleural effusion with foci of air and pleural   catheter. Small left pleural effusion.    Splenic infarcts.    Small volume ascites.    ASSESSMENT/PLAN: 	Pt is a 59 y.o. man with history of HTN,, DLD, DM,  GEJ adenocarcinoma (T3N0, stage 2A) s/p neoadjuvant chemotherapy admitted for optimization prior to planned Michael-Broderick Esophagectomy on tuesday. Recently had a nuclear stress test in our office for evaluation of preoperative cardiac risk assessment prior to esophageal cancer surgery scheduled on 07/16/2024. The patient denies any chest pain, shortness ofbreath, or anginal symptoms. He has no palpitations, dizziness, or syncope  s/p [place]-Broderick Esophagectomy on 7/16.     Pre-OP/HTN  - tolerated procedure well from CV perspective  - diet advanced, Losartan, HCTZ, Norvasc and Metoprolol started  - On Zosyn per sx  - pending CT scan    Mart Mason MD  Pager: [phone]

## 2024-07-30 NOTE — PROGRESS NOTE ADULT - SUBJECTIVE AND OBJECTIVE BOX
Subjective:  patient seen and examined with Dr Laureano  pt without acute complaints  pain controlled  pt denies chest pain or shortness of breath  using incentive spirometer  on bowel regimen, +flatus, +BM  ambulatory with assistance; walked w PT in hallway  CT chest completed and reviewed  resumed CLD per Dr Laureano  d/w attending on morning TEAMS report    Vital Signs:  Vital Signs Last 24 Hrs  T(C): 36.3 (07-30-24 @ 16:06), Max: 37.1 (07-29-24 @ 20:02)  T(F): 97.4 (07-30-24 @ 16:06), Max: 98.8 (07-29-24 @ 20:02)  HR: 85 (07-30-24 @ 17:20) (72 - 118)  BP: 115/66 (07-30-24 @ 17:20) (115/66 - 168/76)  RR: 18 (07-30-24 @ 16:06) (18 - 18)  SpO2: 95% (07-30-24 @ 16:13) (92% - 100%) on (O2)    Pertinent Physical Exam:  Neuro: A+O x 3,  CV: regular rate, regular rhythm  Pulm/chest: no accessory muscle use noted  Abd: soft, NT  Ext: Moves all extremities x 4  Skin: warm, well perfused, no rashes  Drains: R lavern 800cc/24hrs      Relevant labs, radiology and Medications reviewed                        8.4    10.00 )-----------( 161      ( 30 Jul 2024 08:00 )             26.2     07-30    135  |  104  |  6<L>  ----------------------------<  194<H>  3.5   |  22  |  0.80    Ca    7.1<L>      30 Jul 2024 08:00  Phos  1.9     07-30  Mg     2.00     07-30    TPro  x   /  Alb  2.2<L>  /  TBili  x   /  DBili  x   /  AST  x   /  ALT  x   /  AlkPhos  x   07-29      MEDICATIONS  (STANDING):  acetaminophen     Tablet .. 650 milliGRAM(s) Oral every 6 hours  amLODIPine   Tablet 5 milliGRAM(s) Oral daily  dextrose 5% + sodium chloride 0.9% with potassium chloride 20 mEq/L 1000 milliLiter(s) (84 mL/Hr) IV Continuous <Continuous>  dextrose 5%. 1000 milliLiter(s) (50 mL/Hr) IV Continuous <Continuous>  dextrose 5%. 1000 milliLiter(s) (100 mL/Hr) IV Continuous <Continuous>  dextrose 50% Injectable 25 Gram(s) IV Push once  dextrose 50% Injectable 12.5 Gram(s) IV Push once  dextrose 50% Injectable 25 Gram(s) IV Push once  dextrose Oral Gel 15 Gram(s) Oral once  dornase malik Solution 2.5 milliGRAM(s) Inhalation daily  enoxaparin Injectable 40 milliGRAM(s) SubCutaneous every 24 hours  hydrochlorothiazide 12.5 milliGRAM(s) Oral daily  insulin glargine Injectable (LANTUS) 5 Unit(s) SubCutaneous at bedtime  insulin lispro (ADMELOG) corrective regimen sliding scale   SubCutaneous every 6 hours  levalbuterol Inhalation 0.63 milliGRAM(s) Inhalation every 6 hours  lidocaine   4% Patch 1 Patch Transdermal at bedtime  losartan 50 milliGRAM(s) Oral daily  metoclopramide Injectable 10 milliGRAM(s) IV Push every 8 hours  metoprolol tartrate 25 milliGRAM(s) Oral two times a day  pantoprazole    Tablet 40 milliGRAM(s) Oral every 12 hours  piperacillin/tazobactam IVPB.. 3.375 Gram(s) IV Intermittent every 8 hours  sodium chloride 3%  Inhalation 4 milliLiter(s) Inhalation every 6 hours    MEDICATIONS  (PRN):  naloxone Injectable 0.1 milliGRAM(s) IV Push every 3 minutes PRN For ANY of the following changes in patient status:  A. RR LESS THAN 10 breaths per minute, B. Oxygen saturation LESS THAN 90%, C. 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 Pt s/p barium swallow and started on CLD. Postop CT CAP reviewed w Dr Laureano. Pt is now on Zosyn and advanced to soft diet. Sinus tachycardia since last night. Now on nasal cannula.   7/29: Pt with now improving leukocytosis and s/op CTA r/o PE for tachycardia over the weekend. Imaging reviewed by Dr Laureano, no acute findings and able to advance to soft diet as previously mentioned. Patient tolerating PO diet well. Patient without fevers and with drain in place on IV abx.   CT chest completed today, no contrast extravasation. Pt resumed CLD      PLAN  Neuro: Pain management  Pulm: Encourage coughing, deep breathing and use of incentive spirometry. Wean off supplemental oxygen as able. Daily CXR.   Cardio: Monitor telemetry/alarms  GI: Tolerating CLD. Continue stool softeners.  Renal: monitor urine output, supplement electrolytes as needed  Vasc: lovenox for DVT prophylaxis  Heme: Stable H/H. .   ID: Zosyn  Therapy: OOB/ambulate  Tubes: Monitor Chest tube output  Disposition: resume CLD  Discussed with Cardiothoracic Team at AM rounds.

## 2024-07-31 ENCOUNTER — TRANSCRIPTION ENCOUNTER (OUTPATIENT)
Age: 59
End: 2024-07-31

## 2024-07-31 LAB
ANION GAP SERPL CALC-SCNC: 6 MMOL/L — LOW (ref 7–14)
BUN SERPL-MCNC: 5 MG/DL — LOW (ref 7–23)
CALCIUM SERPL-MCNC: 7.2 MG/DL — LOW (ref 8.4–10.5)
CHLORIDE SERPL-SCNC: 102 MMOL/L — SIGNIFICANT CHANGE UP (ref 98–107)
CO2 SERPL-SCNC: 24 MMOL/L — SIGNIFICANT CHANGE UP (ref 22–31)
CREAT SERPL-MCNC: 0.75 MG/DL — SIGNIFICANT CHANGE UP (ref 0.5–1.3)
EGFR: 103 ML/MIN/1.73M2 — SIGNIFICANT CHANGE UP
GLUCOSE BLDC GLUCOMTR-MCNC: 128 MG/DL — HIGH (ref 70–99)
GLUCOSE BLDC GLUCOMTR-MCNC: 178 MG/DL — HIGH (ref 70–99)
GLUCOSE BLDC GLUCOMTR-MCNC: 183 MG/DL — HIGH (ref 70–99)
GLUCOSE BLDC GLUCOMTR-MCNC: 199 MG/DL — HIGH (ref 70–99)
GLUCOSE SERPL-MCNC: 173 MG/DL — HIGH (ref 70–99)
HCT VFR BLD CALC: 26.7 % — LOW (ref 39–50)
HGB BLD-MCNC: 8.8 G/DL — LOW (ref 13–17)
MAGNESIUM SERPL-MCNC: 1.8 MG/DL — SIGNIFICANT CHANGE UP (ref 1.6–2.6)
MCHC RBC-ENTMCNC: 29.4 PG — SIGNIFICANT CHANGE UP (ref 27–34)
MCHC RBC-ENTMCNC: 33 GM/DL — SIGNIFICANT CHANGE UP (ref 32–36)
MCV RBC AUTO: 89.3 FL — SIGNIFICANT CHANGE UP (ref 80–100)
NRBC # BLD: 0 /100 WBCS — SIGNIFICANT CHANGE UP (ref 0–0)
NRBC # FLD: 0 K/UL — SIGNIFICANT CHANGE UP (ref 0–0)
PHOSPHATE SERPL-MCNC: 1.8 MG/DL — LOW (ref 2.5–4.5)
PLATELET # BLD AUTO: 176 K/UL — SIGNIFICANT CHANGE UP (ref 150–400)
POTASSIUM SERPL-MCNC: 3.3 MMOL/L — LOW (ref 3.5–5.3)
POTASSIUM SERPL-SCNC: 3.3 MMOL/L — LOW (ref 3.5–5.3)
RBC # BLD: 2.99 M/UL — LOW (ref 4.2–5.8)
RBC # FLD: 13.9 % — SIGNIFICANT CHANGE UP (ref 10.3–14.5)
SODIUM SERPL-SCNC: 132 MMOL/L — LOW (ref 135–145)
WBC # BLD: 8.31 K/UL — SIGNIFICANT CHANGE UP (ref 3.8–10.5)
WBC # FLD AUTO: 8.31 K/UL — SIGNIFICANT CHANGE UP (ref 3.8–10.5)

## 2024-07-31 PROCEDURE — 99232 SBSQ HOSP IP/OBS MODERATE 35: CPT

## 2024-07-31 PROCEDURE — 71045 X-RAY EXAM CHEST 1 VIEW: CPT | Mod: 26

## 2024-07-31 RX ORDER — POTASSIUM CHLORIDE 1500 MG/1
40 TABLET, EXTENDED RELEASE ORAL ONCE
Refills: 0 | Status: COMPLETED | OUTPATIENT
Start: 2024-07-31 | End: 2024-07-31

## 2024-07-31 RX ORDER — INSULIN LISPRO 100/ML
VIAL (ML) SUBCUTANEOUS AT BEDTIME
Refills: 0 | Status: DISCONTINUED | OUTPATIENT
Start: 2024-07-31 | End: 2024-08-01

## 2024-07-31 RX ORDER — INSULIN LISPRO 100/ML
VIAL (ML) SUBCUTANEOUS
Refills: 0 | Status: DISCONTINUED | OUTPATIENT
Start: 2024-07-31 | End: 2024-08-01

## 2024-07-31 RX ADMIN — ENOXAPARIN SODIUM 40 MILLIGRAM(S): 120 INJECTION SUBCUTANEOUS at 12:30

## 2024-07-31 RX ADMIN — Medication 10 MILLIGRAM(S): at 14:42

## 2024-07-31 RX ADMIN — SODIUM CHLORIDE AND POTASSIUM CHLORIDE 84 MILLILITER(S): 150; 450 INJECTION, SOLUTION INTRAVENOUS at 03:12

## 2024-07-31 RX ADMIN — Medication 10 MILLIGRAM(S): at 21:21

## 2024-07-31 RX ADMIN — PIPERACILLIN SODIUM, TAZOBACTAM SODIUM 25 GRAM(S): 3; .375 INJECTION, POWDER, LYOPHILIZED, FOR SOLUTION INTRAVENOUS at 21:20

## 2024-07-31 RX ADMIN — PIPERACILLIN SODIUM, TAZOBACTAM SODIUM 25 GRAM(S): 3; .375 INJECTION, POWDER, LYOPHILIZED, FOR SOLUTION INTRAVENOUS at 14:41

## 2024-07-31 RX ADMIN — Medication 650 MILLIGRAM(S): at 17:40

## 2024-07-31 RX ADMIN — PANTOPRAZOLE SODIUM 40 MILLIGRAM(S): 20 TABLET, DELAYED RELEASE ORAL at 17:41

## 2024-07-31 RX ADMIN — LOSARTAN POTASSIUM 50 MILLIGRAM(S): 50 TABLET, FILM COATED ORAL at 06:00

## 2024-07-31 RX ADMIN — Medication 4 MILLILITER(S): at 03:36

## 2024-07-31 RX ADMIN — LEVALBUTEROL HYDROCHLORIDE 0.63 MILLIGRAM(S): 0.31 SOLUTION RESPIRATORY (INHALATION) at 09:51

## 2024-07-31 RX ADMIN — Medication 25 MILLIGRAM(S): at 06:00

## 2024-07-31 RX ADMIN — Medication 650 MILLIGRAM(S): at 05:59

## 2024-07-31 RX ADMIN — Medication 1: at 17:40

## 2024-07-31 RX ADMIN — Medication 10 MILLIGRAM(S): at 06:03

## 2024-07-31 RX ADMIN — Medication 4 MILLILITER(S): at 09:50

## 2024-07-31 RX ADMIN — POTASSIUM CHLORIDE 40 MILLIEQUIVALENT(S): 1500 TABLET, EXTENDED RELEASE ORAL at 17:40

## 2024-07-31 RX ADMIN — INSULIN GLARGINE-YFGN 5 UNIT(S): 100 INJECTION, SOLUTION SUBCUTANEOUS at 21:21

## 2024-07-31 RX ADMIN — AMLODIPINE BESYLATE 5 MILLIGRAM(S): 2.5 TABLET ORAL at 06:00

## 2024-07-31 RX ADMIN — LEVALBUTEROL HYDROCHLORIDE 0.63 MILLIGRAM(S): 0.31 SOLUTION RESPIRATORY (INHALATION) at 21:44

## 2024-07-31 RX ADMIN — PIPERACILLIN SODIUM, TAZOBACTAM SODIUM 25 GRAM(S): 3; .375 INJECTION, POWDER, LYOPHILIZED, FOR SOLUTION INTRAVENOUS at 05:54

## 2024-07-31 RX ADMIN — LEVALBUTEROL HYDROCHLORIDE 0.63 MILLIGRAM(S): 0.31 SOLUTION RESPIRATORY (INHALATION) at 03:36

## 2024-07-31 RX ADMIN — Medication 4 MILLILITER(S): at 21:44

## 2024-07-31 RX ADMIN — Medication 650 MILLIGRAM(S): at 18:10

## 2024-07-31 RX ADMIN — Medication 650 MILLIGRAM(S): at 13:04

## 2024-07-31 RX ADMIN — Medication 1: at 06:03

## 2024-07-31 RX ADMIN — Medication 1: at 12:31

## 2024-07-31 RX ADMIN — Medication 650 MILLIGRAM(S): at 12:31

## 2024-07-31 RX ADMIN — DORNASE ALFA 2.5 MILLIGRAM(S): 1 SOLUTION RESPIRATORY (INHALATION) at 09:50

## 2024-07-31 RX ADMIN — Medication 25 MILLIGRAM(S): at 17:40

## 2024-07-31 RX ADMIN — PANTOPRAZOLE SODIUM 40 MILLIGRAM(S): 20 TABLET, DELAYED RELEASE ORAL at 06:00

## 2024-07-31 RX ADMIN — Medication 650 MILLIGRAM(S): at 06:52

## 2024-07-31 NOTE — DISCHARGE NOTE NURSING/CASE MANAGEMENT/SOCIAL WORK - PATIENT PORTAL LINK FT
MEDICARE WELLNESS VISIT NOTE      History of Present Illness:   Frank Díaz presents for his Subsequent Annual Medicare Wellness Visit.   He has no current complaints or concerns. His cataract surgery went very wellness pleased with his improved vision     Patient Care Team:  Ilana Smith MD as PCP - General (Internal Medicine)        Patient Active Problem List    Diagnosis Date Noted   • Hx of CABG 10/25/2017     Priority: Low   • Complete tear of rotator cuff 2014     Priority: Low   • LBP (low back pain) 2013     Priority: Low   • Sleep disorder 10/02/2012     Priority: Low   • Insomnia 2012     Priority: Low   • Essential hypertension, benign 2012     Priority: Low   • Other and unspecified hyperlipidemia 2012     Priority: Low         Past Medical History:   Diagnosis Date   • Arthritis     knee   • Chronic pain     back pain   • Colon Polyp     benign   • GERD     at times   • Hx of CABG 10/25/2017   • Hyperlipidemia    • Hypertension    • Myocardial infarction (CMS/HCC)     silent Heart attack   • Seasonal allergies    • Unspecified sinusitis (chronic)          Past Surgical History:   Procedure Laterality Date   • Cabg, arterial, three      [facility]   • Cardiac surgery      CABG x 3   • Colonoscopy w/ polypectomy  2007   • Esophagogastroduodenoscopy transoral flex w/bx single or mult  2007    EGD with Bx   • Incision and drainage of wound      right leg   • Knee scope,diagnostic      Knee Arthroscopy   • Occult blood test tube  2011   • Rotator cuff repair  1/14/15    Left   • Service to gastroenterology           Social History     Tobacco Use   • Smoking status: Former Smoker     Last attempt to quit: 1983     Years since quittin.8   • Smokeless tobacco: Never Used   Substance Use Topics   • Alcohol use: Yes     Alcohol/week: 1.8 oz     Types: 3 Cans of beer per week   • Drug use: No     Drug use:    Drug Use:    No              Family  History   Problem Relation Age of Onset   • COPD Mother    • Heart disease Mother    • High blood pressure Mother    • High cholesterol Mother    • Diabetes Mother    • Hypertension Mother    • High blood pressure Son    • High cholesterol Son    • Hypertension Son    • Multiple Sclerosis Daughter    • Stroke Father    • Hypertension Father    • High blood pressure Father    • Diabetes Paternal Grandmother    • Diabetes Paternal Grandfather        Current Outpatient Medications   Medication Sig Dispense Refill   • zolpidem (AMBIEN) 10 MG tablet Take 1 tablet po qhs prn 30 tablet 5   • finasteride (PROSCAR) 5 MG tablet Take 1 tablet by mouth daily. 90 tablet 3   • cyclobenzaprine (FLEXERIL) 10 MG tablet TAKE 1 TABLET BY MOUTH TWICE DAILY AS NEEDED FOR MUSCLE SPASMS 60 tablet 0   • gabapentin (NEURONTIN) 300 MG capsule Take 1 capsule by mouth 4 times daily. 120 capsule 2   • HYDROcodone-acetaminophen (NORCO) 5-325 MG per tablet Take 1 tablet by mouth every 6 hours as needed for Pain. 30 tablet 0   • Melatonin 5 MG Cap      • Sennosides-Docusate Sodium (SENNA-DOCUSATE SODIUM PO) Take by mouth daily.     • DISPENSE TENS unit-   Joints in Motion 1 Device 0   • atorvastatin (LIPITOR) 40 MG tablet Take 40 mg by mouth daily.     • lisinopril (ZESTRIL) 5 MG tablet Take 5 mg by mouth daily.     • metoPROLOL (TOPROL-XL) 25 MG 24 hr tablet Take 25 mg by mouth daily.     • clopidogrel (PLAVIX) 300 MG Tab Take 300 mg by mouth daily.     • ondansetron (ZOFRAN ODT) 4 MG disintegrating tablet Take 4 mg by mouth every 8 hours as needed for Nausea.     • Calcium Carbonate-Vitamin D (CALCIUM + D PO) Take  by mouth.     • aspirin 81 MG tablet Take 81 mg by mouth daily.     • cetirizine (ZYRTEC ALLERGY) 10 MG tablet Take 10 mg by mouth daily as needed.       No current facility-administered medications for this visit.         The following items on the Medicare Health Risk Assessment were found to be positive  6 a.) How many servings of  Fruits and Vegetables do you have each day ( 1 serving = 1 piece of fruit, 1/2 cup fruits or vegetables):  1 per day     6 b.) How many servings of High Fiber / Whole Grain Foods to you have each day ( 1 serving = 1 cup cold cereal, 1/2 cup cooked cereal, 1 slice bread):  1 per day         Vision and hearing screens: Hearing Screening Comments: Whisper screening test results:  Right - normal  Left - normal  Vision Screening Comments: Had cataract surgery in 2019  Advance Directive document: Yes  The patient has the following Advance Directive documents:  Power of  for Health Care  Cognitive Assessment: no evidence of cognitive dysfunction by direct observation    Recent PHQ 2/9 Score    PHQ 2:  Date Adult PHQ 2 Score   2/4/2019 0       PHQ 9:       DEPRESSION ASSESSMENT/PLAN:  Depression screening is negative no further plan needed.     Body mass index is 33.67 kg/m².    BMI ASSESSMENT/PLAN:  Journal food intake daily the patient is a stocky man but in no way appears obese, he is well muscled and exercises regularly      Needed follow up:  None    ------------------------------------------------------------------    REVIEW OF SYSTEMS: The patient DENIES symptoms of:   General: Fever, chills, night sweats, fatigue, weight loss, weight gain, decreased appetite.  Skin: Rash, itching, lumps or bumps or moles that are changing, hair changes, nail changes.  Eyes: Visual blurring, double vision, spots before the eyes, eye pain.  Ears: Decreased auditory acuity, ringing or tinnitus in the ears, pain in the ears, discharge from the ears.  Nose: Nosebleed, discharge from the nose, decrease in ability to smell.  Mouth: Soreness of the mouth or tongue or lips, abnormality of the teeth or gums.  Throat: Sore throat, hoarseness or voice change.  Neck: Stiffness or pain in the neck.  Breasts: Changes of the breasts, lumps or bumps in the breasts, discharge from the nipples, pain in the breasts, nipple  changes.  Cardiorespiratory: Chest pain, edema, cough, hemoptysis, wheeze, shortness of breath.  Gastrointestinal: Abdominal pain, nausea, vomiting, constipation, diarrhea, black tarry stools, blood in the stools, change in the bowel habits, sour burps or heartburn, indigestion.  Genitourinary: Urgency, frequency, dysuria, hematuria, nocturia.   Neurologic: Headache, weakness, loss of sensation, visual disturbance, trouble with balance or coordination, loss of memory.  Musculoskeletal: Joint pain, muscle pain.  Psychiatric: Symptoms of depression, feeling sad or blue.    PHYSICAL EXAM:    Visit Vitals  /70   Pulse 67   Ht 5' 9\" (1.753 m)   Wt 103.4 kg   BMI 33.67 kg/m²       GENERAL APPEARANCE: Appears stated age, is in no apparent distress, is well developed and well nourished.  SKIN: Normal color for ethnicity, normal texture, good turgor, no skin rashes, no atypical-appearing skin lesions, no bruises.  LYMPH NODES: No cervical, supraclavicular, axillary or inguinal adenopathy.  HEAD: Normocephalic.  EYES: Pupils are equal and reactive to light and accommodation, extraocular movements are full, sclerae and conjunctivae are normal, lids and lashes are normal.  Visual acuity above.  EARS: Pinnae and external ears are normal bilaterally, external auditory canals are normal, tympanic membranes are normal, there is no tragal tenderness, auditory acuity is grossly intact.  See above.   NOSE: External nose is normal to inspection, no septal deviation.  MOUTH/THROAT: Tongue is midline and appears normal, oropharynx appears normal, soft palate and uvula are normal, oral mucosa is normal.  Dentition good.  NECK: Neck is supple, no thyromegaly, trachea is midline.  CHEST: Configuration normal, nontender to palpation, respiratory effort is not labored.  BREASTS: Breasts symmetric, no nipple changes, no palpable nodules.  LUNGS: Lungs are clear to auscultation with normal inspiratory/expiratory sounds, no rales, no  rhonchi, no wheezes.  CARDIOVASCULAR: Normal point of maximal impulse, normal rate and rhythm, no palpable heaves or thrills, S1 and S2 normal, no S3 or S4, no murmurs, no extra heart sounds.   No carotid or abdominal bruits.  ABDOMEN: Abdomen is soft, normal active bowel sounds, nontender, without masses, without hepatomegaly or splenomegaly, no pulsatile masses.  BACK: Inspection shows normal curvature, no discomfort to palpation of the midline, no costovertebral angle discomfort to palpation.  EXTREMITIES: No clubbing, no cyanosis, no edema, normal muscle tone and development bilaterally.  NEUROLOGIC: Alert, appropriate, oriented x 3.  Speech fluent. Cranial nerves 2-12 intact, motor strength intact and symmetric, no apraxia or ataxia observed, deep tendon reflexes normal and symmetric, no tremor noted.  PSYCHIATRIC:  Affect appropriate, insight fair, mood good.    See orders.   See Patient Instructions section.   Return in about 1 year (around 2/4/2020) for Medicare Wellness Visit.   You can access the FollowMyHealth Patient Portal offered by [facility] by registering at the following website: [email or web address]. By joining [employer]’s FollowMyHealth portal, you will also be able to view your health information using other applications (apps) compatible with our system.

## 2024-07-31 NOTE — PROGRESS NOTE ADULT - ASSESSMENT
The patient is a 59y Male with PMH of T2DM, HTN, HLD, GE junction cancer here for esophagectomy now postop, prolonged course.  Endocrinology consulted for T2DM.     Uncontrolled Type 2 Diabetes Mellitus   - Follows with: Dr. Nolasco  - A1C with Estimated Average Glucose Result: 10.9 % (07-13-24)  - home regimen: Semglee 18 units, Novolog 6 units with meals      Inpatient plan   - FS goal 100-180 mg/dl   - on full liquid diet   - fasting glucose at goal   - continue Lantus 5 units at bedtime   - if prandial glucose >180 x2, can start Admelog 2 units TID AC. Hold if not eating/NPO.   - change to low Admelog correctional scale TID AC ---> q6 if NPO  - change to separate low Admelog correctional scale at HS  - FS TID AC & HS ---> q6 if NPO   - hypoglycemia protocol PRN   - consistent carbohydrate diet    Discharge plan   - Discharge recs pending clinical course and nutrition plan. Plan to resume Semglee and Admelog, will determine final doses based on hospital requirements/PO intake at time of discharge.   - Patient has an appointment with Dr. Nolasco scheduled for August 8th at 8:20 AM at [address] [facility]         Hypertension  - Goal BP <130/80  - continue metoprolol, losartan, amlodipine, HCTZ   - Management as per primary team  - check urine microalbumin level as outpatient      Hyperlipidemia  - LDL goal <70  - check lipid panel as outpatient on a yearly basis  - management per primary team       Hypercalcemia  calcium 7.2  albumin 2.2  corrected calcium 8.6  vitamin 25OHD - 6.5 severely low.   Start ergocalciferol 50,000 units PO once weekly once able to take PO        TETE Day-BC  Nurse Practitioner  Division of Endocrinology & Diabetes  pager [phone]

## 2024-07-31 NOTE — PROGRESS NOTE ADULT - SUBJECTIVE AND OBJECTIVE BOX
DATE OF SERVICE: 07-31-24    Patient denies chest pain or shortness of breath.   Review of symptoms otherwise negative.    MEDICATIONS:  acetaminophen     Tablet .. 650 milliGRAM(s) Oral every 6 hours  amLODIPine   Tablet 5 milliGRAM(s) Oral daily  dextrose 5% + sodium chloride 0.9% with potassium chloride 20 mEq/L 1000 milliLiter(s) IV Continuous <Continuous>  dextrose 5%. 1000 milliLiter(s) IV Continuous <Continuous>  dextrose 5%. 1000 milliLiter(s) IV Continuous <Continuous>  dextrose 50% Injectable 12.5 Gram(s) IV Push once  dextrose 50% Injectable 25 Gram(s) IV Push once  dextrose 50% Injectable 25 Gram(s) IV Push once  dextrose Oral Gel 15 Gram(s) Oral once  dornase malik Solution 2.5 milliGRAM(s) Inhalation daily  enoxaparin Injectable 40 milliGRAM(s) SubCutaneous every 24 hours  hydrochlorothiazide 12.5 milliGRAM(s) Oral daily  insulin glargine Injectable (LANTUS) 5 Unit(s) SubCutaneous at bedtime  insulin lispro (ADMELOG) corrective regimen sliding scale   SubCutaneous three times a day before meals  insulin lispro (ADMELOG) corrective regimen sliding scale   SubCutaneous at bedtime  levalbuterol Inhalation 0.63 milliGRAM(s) Inhalation every 6 hours  lidocaine   4% Patch 1 Patch Transdermal at bedtime  losartan 50 milliGRAM(s) Oral daily  metoclopramide Injectable 10 milliGRAM(s) IV Push every 8 hours  metoprolol tartrate 25 milliGRAM(s) Oral two times a day  naloxone Injectable 0.1 milliGRAM(s) IV Push every 3 minutes PRN  oxyCODONE    IR 2.5 milliGRAM(s) Oral every 4 hours PRN  oxyCODONE    IR 5 milliGRAM(s) Oral every 4 hours PRN  pantoprazole    Tablet 40 milliGRAM(s) Oral every 12 hours  piperacillin/tazobactam IVPB.. 3.375 Gram(s) IV Intermittent every 8 hours  sodium chloride 3%  Inhalation 4 milliLiter(s) Inhalation every 6 hours      LABS:                        8.8    8.31  )-----------( 176      ( 31 Jul 2024 05:47 )             26.7       Hemoglobin: 8.8 g/dL (07-31 @ 05:47)  Hemoglobin: 8.4 g/dL (07-30 @ 08:00)  Hemoglobin: 8.6 g/dL (07-29 @ 05:28)  Hemoglobin: 9.8 g/dL (07-28 @ 04:12)  Hemoglobin: 9.9 g/dL (07-27 @ 06:58)      07-31    132<L>  |  102  |  5<L>  ----------------------------<  173<H>  3.3<L>   |  24  |  0.75    Ca    7.2<L>      31 Jul 2024 05:47  Phos  1.8     07-31  Mg     1.80     07-31      Creatinine Trend: 0.75<--, 0.80<--, 0.87<--, 0.83<--, 0.83<--, 0.91<--    COAGS:           PHYSICAL EXAM:  T(C): 36.7 (07-31-24 @ 08:00), Max: 36.9 (07-31-24 @ 04:00)  HR: 81 (07-31-24 @ 09:50) (70 - 92)  BP: 131/62 (07-31-24 @ 08:00) (115/66 - 142/70)  RR: 19 (07-31-24 @ 08:00) (16 - 19)  SpO2: 99% (07-31-24 @ 09:50) (94% - 100%)  Wt(kg): --    I&O's Summary    30 Jul 2024 07:01  -  31 Jul 2024 07:00  --------------------------------------------------------  IN: 1524 mL / OUT: 672 mL / NET: 852 mL    31 Jul 2024 07:01  -  31 Jul 2024 11:24  --------------------------------------------------------  IN: 836 mL / OUT: 415 mL / NET: 421 mL      Gen: NAD  HEENT:  (-)icterus (-)pallor  CV: N S1 S2 1/6 CATHY (+)2 Pulses B/l  Resp:  Clear to auscultation B/L, normal effort  GI: (+) BS Soft, NT, ND  Lymph:  (-)Edema, (-)obvious lymphadenopathy  Skin: Warm to touch, Normal turgor  Psych: Appropriate mood and affect      TELEMETRY: 	 SR/ST    ECG:  	NSR      TTE 07/2024  Findings:  1. Normal left ventricular size and function.  Mild diastolic dysfunction.  2. Normal left atrial size  3. Right atrial cavity is normal in size.  4. Normal right ventricular size and function.  5. Normal trileaflet aortic valve opening.  6. Normal mitral valve opening.  7. Normal appearing tricuspid valve with mild tricuspid  regurgitation.  8. Pulmonic valve is grossly normal, yet poorly visualized  with no doppler evidence for pulmonic stenosis.  9. No evidence of significant pericardial effusion.  10. The aortic root is normal.  11. Normal pulmonary artery.  12. IVC is normal with respiratory variation.  FULL STUDY DONE INCLUDING M-MODE RECORDING,  SPECTRAL DOPPLER AND    Stress 07/2024  Normal myocardial perfusion SPECT images No evidence of stress induced ischemia or infarction.  Normal left ventricular size and function.  Calculated EF is: 68%    CT  IMPRESSION:  Small loculated right pleural effusion with foci of air and pleural   catheter. Small left pleural effusion.    Splenic infarcts.    Small volume ascites.    ASSESSMENT/PLAN: 	Pt is a 59 y.o. man with history of HTN,, DLD, DM,  GEJ adenocarcinoma (T3N0, stage 2A) s/p neoadjuvant chemotherapy admitted for optimization prior to planned [place] Esophagectomy on tuesday. Recently had a nuclear stress test in our office for evaluation of preoperative cardiac risk assessment prior to esophageal cancer surgery scheduled on 07/16/2024. The patient denies any chest pain, shortness ofbreath, or anginal symptoms. He has no palpitations, dizziness, or syncope  s/p [place] Esophagectomy on 7/16.     Pre-OP/HTN  - tolerated procedure well from CV perspective  - diet advanced, Losartan, HCTZ, Norvasc and Metoprolol started  - On Zosyn per sx  - Ct scan noted    Mart Mason MD  Pager: [phone]

## 2024-07-31 NOTE — PROGRESS NOTE ADULT - SUBJECTIVE AND OBJECTIVE BOX
Chief Complaint: DM type 2     Interval Events: Fasting glucose at goal. On full liquid diet. No N/V or abdominal pain.     MEDICATIONS  (STANDING):  acetaminophen     Tablet .. 650 milliGRAM(s) Oral every 6 hours  amLODIPine   Tablet 5 milliGRAM(s) Oral daily  dextrose 5% + sodium chloride 0.9% with potassium chloride 20 mEq/L 1000 milliLiter(s) (84 mL/Hr) IV Continuous <Continuous>  dextrose 5%. 1000 milliLiter(s) (50 mL/Hr) IV Continuous <Continuous>  dextrose 5%. 1000 milliLiter(s) (100 mL/Hr) IV Continuous <Continuous>  dextrose 50% Injectable 12.5 Gram(s) IV Push once  dextrose 50% Injectable 25 Gram(s) IV Push once  dextrose 50% Injectable 25 Gram(s) IV Push once  dextrose Oral Gel 15 Gram(s) Oral once  dornase malik Solution 2.5 milliGRAM(s) Inhalation daily  enoxaparin Injectable 40 milliGRAM(s) SubCutaneous every 24 hours  hydrochlorothiazide 12.5 milliGRAM(s) Oral daily  insulin glargine Injectable (LANTUS) 5 Unit(s) SubCutaneous at bedtime  insulin lispro (ADMELOG) corrective regimen sliding scale   SubCutaneous three times a day before meals  insulin lispro (ADMELOG) corrective regimen sliding scale   SubCutaneous at bedtime  levalbuterol Inhalation 0.63 milliGRAM(s) Inhalation every 6 hours  lidocaine   4% Patch 1 Patch Transdermal at bedtime  losartan 50 milliGRAM(s) Oral daily  metoclopramide Injectable 10 milliGRAM(s) IV Push every 8 hours  metoprolol tartrate 25 milliGRAM(s) Oral two times a day  pantoprazole    Tablet 40 milliGRAM(s) Oral every 12 hours  piperacillin/tazobactam IVPB.. 3.375 Gram(s) IV Intermittent every 8 hours  sodium chloride 3%  Inhalation 4 milliLiter(s) Inhalation every 6 hours    MEDICATIONS  (PRN):  naloxone Injectable 0.1 milliGRAM(s) IV Push every 3 minutes PRN For ANY of the following changes in patient status:  A. RR LESS THAN 10 breaths per minute, B. Oxygen saturation LESS THAN 90%, C. Sedation score of 6  oxyCODONE    IR 2.5 milliGRAM(s) Oral every 4 hours PRN Moderate Pain (4 - 6)  oxyCODONE    IR 5 milliGRAM(s) Oral every 4 hours PRN Severe Pain (7 - 10)      Allergies  No Known Allergies    Review of Systems:  Constitutional: No fever/chills   Eyes: No blurry vision  Neuro: No tremors  HEENT: No pain  Cardiovascular: No chest pain, no palpitations  Respiratory: No SOB, no cough  GI: No nausea, vomiting or abdominal pain  : No dysuria  Endocrine: no polyuria, polydipsia      VITALS: T(C): 36.4 (07-31-24 @ 11:37)  T(F): 97.5 (07-31-24 @ 11:37), Max: 98.5 (07-31-24 @ 04:00)  HR: 87 (07-31-24 @ 11:37) (70 - 92)  BP: 127/63 (07-31-24 @ 11:37) (115/66 - 142/70)  RR:  (16 - 19)  SpO2:  (92% - 100%)  Wt(kg): --      Physical Exam:   GENERAL: NAD, well-groomed, well-developed  EYES: No proptosis, no lid lag, anicteric  HEENT:  Atraumatic, Normocephalic, moist mucous membranes  RESPIRATORY: non labored breathing   GI: Soft, nontender, non distended  SKIN: Dry, intact, No rashes or lesions  MUSCULOSKELETAL: Full range of motion, normal strength  NEURO: extraocular movements intact, no tremor  PSYCH: Alert and oriented x 3, normal affect, normal mood    CAPILLARY BLOOD GLUCOSE      POCT Blood Glucose.: 183 mg/dL (31 Jul 2024 11:57)  POCT Blood Glucose.: 178 mg/dL (31 Jul 2024 05:45)  POCT Blood Glucose.: 213 mg/dL (30 Jul 2024 23:16)  POCT Blood Glucose.: 259 mg/dL (30 Jul 2024 16:59)      07-31    132<L>  |  102  |  5<L>  ----------------------------<  173<H>  3.3<L>   |  24  |  0.75    eGFR: 103    Ca    7.2<L>      07-31  Mg     1.80     07-31  Phos  1.8     07-31    TPro  x   /  Alb  2.2<L>  /  TBili  x   /  DBili  x   /  AST  x   /  ALT  x   /  AlkPhos  x   07-29      A1C with Estimated Average Glucose Result: 10.9 % (07-13-24 @ 02:44)  A1C with Estimated Average Glucose Result: 12.8 % (01-01-24 @ 06:00)

## 2024-07-31 NOTE — PROGRESS NOTE ADULT - SUBJECTIVE AND OBJECTIVE BOX
TEAM SURGERY PROGRESS NOTE    POST OPERATIVE DAY #: 15 s/p norman senait esophagectomy    Interval events: CT chest showed no evidence of leak but now w/ pneumonia. Transferred to CT service.    SUBJECTIVE: Patient seen and examined at bedside on AM rounds, patient without complaints.     Vital Signs Last 24 Hrs  T(C): 36.7 (31 Jul 2024 08:00), Max: 36.9 (31 Jul 2024 04:00)  T(F): 98 (31 Jul 2024 08:00), Max: 98.5 (31 Jul 2024 04:00)  HR: 92 (31 Jul 2024 08:00) (70 - 95)  BP: 131/62 (31 Jul 2024 08:00) (115/66 - 142/70)  BP(mean): --  RR: 19 (31 Jul 2024 08:00) (16 - 19)  SpO2: 94% (31 Jul 2024 08:00) (94% - 100%)    Parameters below as of 31 Jul 2024 08:00  Patient On (Oxygen Delivery Method): room air      I&O's Detail    30 Jul 2024 07:01  -  31 Jul 2024 07:00  --------------------------------------------------------  IN:    dextrose 5% + sodium chloride 0.9% w/ Additives: 1344 mL    Oral Fluid: 180 mL  Total IN: 1524 mL    OUT:    Bulb (mL): 322 mL    Voided (mL): 350 mL  Total OUT: 672 mL    Total NET: 852 mL      31 Jul 2024 07:01  -  31 Jul 2024 09:06  --------------------------------------------------------  IN:    dextrose 5% + sodium chloride 0.9% w/ Additives: 336 mL    IV PiggyBack: 100 mL    Oral Fluid: 400 mL  Total IN: 836 mL    OUT:    Albumin 5%  - 250 mL: 0 mL    Bulb (mL): 15 mL    Voided (mL): 400 mL  Total OUT: 415 mL    Total NET: 421 mL        MEDICATIONS  (STANDING):  acetaminophen     Tablet .. 650 milliGRAM(s) Oral every 6 hours  amLODIPine   Tablet 5 milliGRAM(s) Oral daily  dextrose 5% + sodium chloride 0.9% with potassium chloride 20 mEq/L 1000 milliLiter(s) (84 mL/Hr) IV Continuous <Continuous>  dextrose 5%. 1000 milliLiter(s) (50 mL/Hr) IV Continuous <Continuous>  dextrose 5%. 1000 milliLiter(s) (100 mL/Hr) IV Continuous <Continuous>  dextrose 50% Injectable 25 Gram(s) IV Push once  dextrose 50% Injectable 12.5 Gram(s) IV Push once  dextrose 50% Injectable 25 Gram(s) IV Push once  dextrose Oral Gel 15 Gram(s) Oral once  dornase malik Solution 2.5 milliGRAM(s) Inhalation daily  enoxaparin Injectable 40 milliGRAM(s) SubCutaneous every 24 hours  hydrochlorothiazide 12.5 milliGRAM(s) Oral daily  insulin glargine Injectable (LANTUS) 5 Unit(s) SubCutaneous at bedtime  insulin lispro (ADMELOG) corrective regimen sliding scale   SubCutaneous every 6 hours  levalbuterol Inhalation 0.63 milliGRAM(s) Inhalation every 6 hours  lidocaine   4% Patch 1 Patch Transdermal at bedtime  losartan 50 milliGRAM(s) Oral daily  metoclopramide Injectable 10 milliGRAM(s) IV Push every 8 hours  metoprolol tartrate 25 milliGRAM(s) Oral two times a day  pantoprazole    Tablet 40 milliGRAM(s) Oral every 12 hours  piperacillin/tazobactam IVPB.. 3.375 Gram(s) IV Intermittent every 8 hours  sodium chloride 3%  Inhalation 4 milliLiter(s) Inhalation every 6 hours    MEDICATIONS  (PRN):  naloxone Injectable 0.1 milliGRAM(s) IV Push every 3 minutes PRN For ANY of the following changes in patient status:  A. RR LESS THAN 10 breaths per minute, B. Oxygen saturation LESS THAN 90%, C. Sedation score of 6  oxyCODONE    IR 2.5 milliGRAM(s) Oral every 4 hours PRN Moderate Pain (4 - 6)  oxyCODONE    IR 5 milliGRAM(s) Oral every 4 hours PRN Severe Pain (7 - 10)      Physical Exam  General: A&Ox3, NAD  Respiratory: Unlabored breathing  Abdomen: Soft, nondistended, NTTP. No rebound or guarding. Incisions with soft, stable ecchymosis. VENKATA drain with whitish fibrinous output.  Extremities: WWP, ALFORD spontaneously      LABS:                        8.8    8.31  )-----------( 176      ( 31 Jul 2024 05:47 )             26.7     07-31    132<L>  |  102  |  5<L>  ----------------------------<  173<H>  3.3<L>   |  24  |  0.75    Ca    7.2<L>      31 Jul 2024 05:47  Phos  1.8     07-31  Mg     1.80     07-31        Urinalysis Basic - ( 31 Jul 2024 05:47 )    Color: x / Appearance: x / SG: x / pH: x  Gluc: 173 mg/dL / Ketone: x  / Bili: x / Urobili: x   Blood: x / Protein: x / Nitrite: x   Leuk Esterase: x / RBC: x / WBC x   Sq Epi: x / Non Sq Epi: x / Bacteria: x          Patient is a 59y Male

## 2024-07-31 NOTE — PROGRESS NOTE ADULT - ASSESSMENT
· Assessment	  59 y.o. man with history of GEJ adenocarcinoma (T3N0, stage 2A) s/p neoadjuvant chemotherapy, now s/p Michael-Broderick Esophagectomy on 7/16.     Sx f/up appreciated  OOB  Pain control Oxycodone  Full liquid diet    SOB:    CXR: Rt pleural effusion  Thoracic Sx f/up appreciated  Xopenex nebulizer    DM II:    FSSS  Lantus insulin  Endo f/up appreciated    HTN:    On IV Metoprolol/Hydralazine  Cardio f/up appreciated    Hypokalemia:    K supp

## 2024-07-31 NOTE — DISCHARGE NOTE NURSING/CASE MANAGEMENT/SOCIAL WORK - NSDCPETBCESMAN_GEN_ALL_CORE
Pt w/ dec step length and shelia. Dec weight shifting abilities at this time 2/2 reported discomfort along drain insertion site; also reports/displays difficulty erecting standing posture at this time. No LOB/falls or acute distress observed. Good aerobic endurance, no SOB noted./decreased shelia/decreased weight-shifting ability
If you are a smoker, it is important for your health to stop smoking. Please be aware that second hand smoke is also harmful.

## 2024-07-31 NOTE — DISCHARGE NOTE NURSING/CASE MANAGEMENT/SOCIAL WORK - NSSCNAMETXT_GEN_ALL_CORE
[facility] [place] [phone] initial visit will be day after discharge home. A nurse will call prior to the home visit.  [facility] [phone] SAME day nurse visit for 8/9/24

## 2024-07-31 NOTE — DISCHARGE NOTE NURSING/CASE MANAGEMENT/SOCIAL WORK - NSDCPEFALRISK_GEN_ALL_CORE
For information on Fall & Injury Prevention, visit: [email or web address] OR  [email or web address] OR  https://www.cdc.gov/steadi/patient.html

## 2024-07-31 NOTE — PROGRESS NOTE ADULT - ASSESSMENT
59 year old male with PMH HTN and DM who presented with GE junction adenocarcinoma s/p chemo and radiation now s/p [place] Broderick Esophagectomy on 7/16. POD #6 UGI without anastomotic leak. CTPA 7/28 with moderate right loculated hydropneumothorax with interval increase in size and small right apical PTX.    PLAN:  - Continue Zosyn   - CLD  - Daily CXR/ OOB/ ambulate/ IS while in bed  - FSq6h, appreciate endo recs  - Pain control PRN   - DVT ppx: SCD's & Lovenox  - Monitor VENKATA drain output  - Care per primary team    D Team Surgery  [id number]

## 2024-07-31 NOTE — PROGRESS NOTE ADULT - ASSESSMENT
59 year old gentleman with diagnosis of adenocarcinoma at GE junction s/p chemotherapy and radiation  Now s/p robotic resection on 7/16    Post operatively, he had worsening leukocytosis  Imaging with a right sided loculated effusion  Drain in place draining brown fluid with debris    Repeat imaging did not show a leak but still concerning    Continue zosyn for now    Discussed with CT surgery.

## 2024-07-31 NOTE — DISCHARGE NOTE NURSING/CASE MANAGEMENT/SOCIAL WORK - NSDCFUADDAPPT_GEN_ALL_CORE_FT
Please follow up with Dr. Laureano in 2 weeks, please call for an appointment [phone]. Obtain a new Chest XRay within 1-2 days of your appointment and bring it with you.    Please follow up with Dr. Agustin in 2-3 weeks or as needed, call for an appointment.    Please follow up with your PCP in 2-3 weeks or as needed, call for an appointment.

## 2024-07-31 NOTE — PROGRESS NOTE ADULT - SUBJECTIVE AND OBJECTIVE BOX
Follow Up:      Inverval History/ROS:Patient is a 59y old  Male who presents with a chief complaint of Esophagectomy (31 Jul 2024 16:11)    No fever    Allergies    No Known Allergies    Intolerances        ANTIMICROBIALS:  piperacillin/tazobactam IVPB.. 3.375 every 8 hours      OTHER MEDS:  acetaminophen     Tablet .. 650 milliGRAM(s) Oral every 6 hours  amLODIPine   Tablet 5 milliGRAM(s) Oral daily  dextrose 5%. 1000 milliLiter(s) IV Continuous <Continuous>  dextrose 5%. 1000 milliLiter(s) IV Continuous <Continuous>  dextrose 50% Injectable 25 Gram(s) IV Push once  dextrose 50% Injectable 12.5 Gram(s) IV Push once  dextrose 50% Injectable 25 Gram(s) IV Push once  dextrose Oral Gel 15 Gram(s) Oral once  dornase malik Solution 2.5 milliGRAM(s) Inhalation daily  enoxaparin Injectable 40 milliGRAM(s) SubCutaneous every 24 hours  hydrochlorothiazide 12.5 milliGRAM(s) Oral daily  insulin glargine Injectable (LANTUS) 5 Unit(s) SubCutaneous at bedtime  insulin lispro (ADMELOG) corrective regimen sliding scale   SubCutaneous three times a day before meals  insulin lispro (ADMELOG) corrective regimen sliding scale   SubCutaneous at bedtime  levalbuterol Inhalation 0.63 milliGRAM(s) Inhalation every 6 hours  lidocaine   4% Patch 1 Patch Transdermal at bedtime  losartan 50 milliGRAM(s) Oral daily  metoclopramide Injectable 10 milliGRAM(s) IV Push every 8 hours  metoprolol tartrate 25 milliGRAM(s) Oral two times a day  naloxone Injectable 0.1 milliGRAM(s) IV Push every 3 minutes PRN  oxyCODONE    IR 2.5 milliGRAM(s) Oral every 4 hours PRN  oxyCODONE    IR 5 milliGRAM(s) Oral every 4 hours PRN  pantoprazole    Tablet 40 milliGRAM(s) Oral every 12 hours  sodium chloride 3%  Inhalation 4 milliLiter(s) Inhalation every 6 hours      Vital Signs Last 24 Hrs  T(C): 36.4 (31 Jul 2024 16:18), Max: 36.9 (31 Jul 2024 04:00)  T(F): 97.6 (31 Jul 2024 16:18), Max: 98.5 (31 Jul 2024 04:00)  HR: 93 (31 Jul 2024 16:18) (70 - 93)  BP: 138/70 (31 Jul 2024 16:18) (124/69 - 142/70)  BP(mean): --  RR: 18 (31 Jul 2024 16:18) (16 - 19)  SpO2: 92% (31 Jul 2024 16:18) (92% - 100%)    Parameters below as of 31 Jul 2024 16:18  Patient On (Oxygen Delivery Method): room air        PHYSICAL EXAM:  General: x[ ] non-toxic  HEAD/EYES: [ ] PERRL [ x] white sclera [ ] icterus  ENT:  [ ] normal [x ] supple [ ] thrush [ ] pharyngeal exudate  Cardiovascular:   [ ] murmur [ x] normal [ ] PPM/AICD  Respiratory:  [x ] clear to ausculation bilaterally, right pleural drain in place  GI:  [x ] soft, non-tender, normal bowel sounds  :  [ ] stallworth [ ] no CVA tenderness   Musculoskeletal:  [ ] no synovitis  Neurologic:  [ ] non-focal exam   Skin:  [x ] no rash  Lymph: [x ] no lymphadenopathy  Psychiatric:  [ ] appropriate affect [ ] alert & oriented  Lines:  [x ] no phlebitis [ ] central line                                8.8    8.31  )-----------( 176      ( 31 Jul 2024 05:47 )             26.7       07-31    132<L>  |  102  |  5<L>  ----------------------------<  173<H>  3.3<L>   |  24  |  0.75    Ca    7.2<L>      31 Jul 2024 05:47  Phos  1.8     07-31  Mg     1.80     07-31        Urinalysis Basic - ( 31 Jul 2024 05:47 )    Color: x / Appearance: x / SG: x / pH: x  Gluc: 173 mg/dL / Ketone: x  / Bili: x / Urobili: x   Blood: x / Protein: x / Nitrite: x   Leuk Esterase: x / RBC: x / WBC x   Sq Epi: x / Non Sq Epi: x / Bacteria: x        MICROBIOLOGY:    RADIOLOGY:    < from: CT Chest w/ Oral Cont (07.30.24 @ 12:23) >  IMPRESSION:    Status post distal esophagectomy with gastric pull-through. No evidence   of extraluminal contrast extravasation.    New groundglass opacities in the left upper and lower lobes are likely   infectious/inflammatory.    Mild decrease in size of a small right hydropneumothorax.  Stable small   left pleural effusion.    < end of copied text >

## 2024-07-31 NOTE — DISCHARGE NOTE NURSING/CASE MANAGEMENT/SOCIAL WORK - NSSCCONTNUM_GEN_ALL_CORE
& [facility] (Former[facility] [place])  : For J-tube feed formula and supplies

## 2024-07-31 NOTE — PROGRESS NOTE ADULT - SUBJECTIVE AND OBJECTIVE BOX
Patient is a 59y old  Male who presents with a chief complaint of Esophagectomy (31 Jul 2024 16:11)      SUBJECTIVE / OVERNIGHT EVENTS:    Events noted.  CONSTITUTIONAL: No fever,  or fatigue  RESPIRATORY: No cough, wheezing,  No shortness of breath  CARDIOVASCULAR: No chest pain, palpitations, dizziness, or leg swelling  GASTROINTESTINAL: No abdominal or epigastric pain. No nausea, vomiting.  NEUROLOGICAL: No headache    MEDICATIONS  (STANDING):  acetaminophen     Tablet .. 650 milliGRAM(s) Oral every 6 hours  amLODIPine   Tablet 5 milliGRAM(s) Oral daily  dextrose 5%. 1000 milliLiter(s) (50 mL/Hr) IV Continuous <Continuous>  dextrose 5%. 1000 milliLiter(s) (100 mL/Hr) IV Continuous <Continuous>  dextrose 50% Injectable 12.5 Gram(s) IV Push once  dextrose 50% Injectable 25 Gram(s) IV Push once  dextrose 50% Injectable 25 Gram(s) IV Push once  dextrose Oral Gel 15 Gram(s) Oral once  dornase malik Solution 2.5 milliGRAM(s) Inhalation daily  enoxaparin Injectable 40 milliGRAM(s) SubCutaneous every 24 hours  hydrochlorothiazide 12.5 milliGRAM(s) Oral daily  insulin glargine Injectable (LANTUS) 5 Unit(s) SubCutaneous at bedtime  insulin lispro (ADMELOG) corrective regimen sliding scale   SubCutaneous three times a day before meals  insulin lispro (ADMELOG) corrective regimen sliding scale   SubCutaneous at bedtime  levalbuterol Inhalation 0.63 milliGRAM(s) Inhalation every 6 hours  lidocaine   4% Patch 1 Patch Transdermal at bedtime  losartan 50 milliGRAM(s) Oral daily  metoclopramide Injectable 10 milliGRAM(s) IV Push every 8 hours  metoprolol tartrate 25 milliGRAM(s) Oral two times a day  pantoprazole    Tablet 40 milliGRAM(s) Oral every 12 hours  piperacillin/tazobactam IVPB.. 3.375 Gram(s) IV Intermittent every 8 hours  potassium chloride   Powder 40 milliEquivalent(s) Oral once  sodium chloride 3%  Inhalation 4 milliLiter(s) Inhalation every 6 hours    MEDICATIONS  (PRN):  naloxone Injectable 0.1 milliGRAM(s) IV Push every 3 minutes PRN For ANY of the following changes in patient status:  A. RR LESS THAN 10 breaths per minute, B. Oxygen saturation LESS THAN 90%, C. Sedation score of 6  oxyCODONE    IR 2.5 milliGRAM(s) Oral every 4 hours PRN Moderate Pain (4 - 6)  oxyCODONE    IR 5 milliGRAM(s) Oral every 4 hours PRN Severe Pain (7 - 10)        CAPILLARY BLOOD GLUCOSE      POCT Blood Glucose.: 199 mg/dL (31 Jul 2024 17:10)  POCT Blood Glucose.: 183 mg/dL (31 Jul 2024 11:57)  POCT Blood Glucose.: 178 mg/dL (31 Jul 2024 05:45)  POCT Blood Glucose.: 213 mg/dL (30 Jul 2024 23:16)    I&O's Summary    30 Jul 2024 07:01  -  31 Jul 2024 07:00  --------------------------------------------------------  IN: 1524 mL / OUT: 672 mL / NET: 852 mL    31 Jul 2024 07:01  -  31 Jul 2024 17:39  --------------------------------------------------------  IN: 1472 mL / OUT: 665 mL / NET: 807 mL        T(C): 36.4 (07-31-24 @ 16:18), Max: 36.9 (07-31-24 @ 04:00)  HR: 93 (07-31-24 @ 16:18) (70 - 93)  BP: 138/70 (07-31-24 @ 16:18) (124/69 - 142/70)  RR: 18 (07-31-24 @ 16:18) (16 - 19)  SpO2: 92% (07-31-24 @ 16:18) (92% - 100%)    PHYSICAL EXAM:  GENERAL: NAD  NECK: Supple, No JVD  CHEST/LUNG: Clear to auscultation bilaterally; No wheezing.  HEART: Regular rate and rhythm; No murmurs, rubs, or gallops  ABDOMEN: Soft, Nontender, Nondistended; Bowel sounds present  EXTREMITIES:   No edema  NEUROLOGY: AAO X 3      LABS:                        8.8    8.31  )-----------( 176      ( 31 Jul 2024 05:47 )             26.7     07-31    132<L>  |  102  |  5<L>  ----------------------------<  173<H>  3.3<L>   |  24  |  0.75    Ca    7.2<L>      31 Jul 2024 05:47  Phos  1.8     07-31  Mg     1.80     07-31            Urinalysis Basic - ( 31 Jul 2024 05:47 )    Color: x / Appearance: x / SG: x / pH: x  Gluc: 173 mg/dL / Ketone: x  / Bili: x / Urobili: x   Blood: x / Protein: x / Nitrite: x   Leuk Esterase: x / RBC: x / WBC x   Sq Epi: x / Non Sq Epi: x / Bacteria: x      CAPILLARY BLOOD GLUCOSE      POCT Blood Glucose.: 199 mg/dL (31 Jul 2024 17:10)  POCT Blood Glucose.: 183 mg/dL (31 Jul 2024 11:57)  POCT Blood Glucose.: 178 mg/dL (31 Jul 2024 05:45)  POCT Blood Glucose.: 213 mg/dL (30 Jul 2024 23:16)        RADIOLOGY & ADDITIONAL TESTS:    Imaging Personally Reviewed:    Consultant(s) Notes Reviewed:      Care Discussed with Consultants/Other Providers:    Anish Goodrich MD, CMD, FACP    [id number] [id number]  Office Tel: [phone]  Cell: [phone]

## 2024-07-31 NOTE — DISCHARGE NOTE NURSING/CASE MANAGEMENT/SOCIAL WORK - NSDCVIVACCINE_GEN_ALL_CORE_FT
influenza, injectable, quadrivalent, preservative free; 20-Oct-2020 12:16; Rafa Thacker (RN); [employer]; [id number] (Exp. Date: 30-Jun-2021); IntraMuscular; Deltoid Right.; 0.5 milliLiter(s); VIS (VIS Published: 15-Aug-2019, VIS Presented: 20-Oct-2020);

## 2024-08-01 LAB
AMYLASE FLD-CCNC: >7500 U/L — SIGNIFICANT CHANGE UP
ANION GAP SERPL CALC-SCNC: 10 MMOL/L — SIGNIFICANT CHANGE UP (ref 7–14)
BUN SERPL-MCNC: 5 MG/DL — LOW (ref 7–23)
CALCIUM SERPL-MCNC: 7.3 MG/DL — LOW (ref 8.4–10.5)
CHLORIDE SERPL-SCNC: 99 MMOL/L — SIGNIFICANT CHANGE UP (ref 98–107)
CO2 SERPL-SCNC: 22 MMOL/L — SIGNIFICANT CHANGE UP (ref 22–31)
CREAT SERPL-MCNC: 0.77 MG/DL — SIGNIFICANT CHANGE UP (ref 0.5–1.3)
EGFR: 102 ML/MIN/1.73M2 — SIGNIFICANT CHANGE UP
GLUCOSE BLDC GLUCOMTR-MCNC: 166 MG/DL — HIGH (ref 70–99)
GLUCOSE BLDC GLUCOMTR-MCNC: 170 MG/DL — HIGH (ref 70–99)
GLUCOSE BLDC GLUCOMTR-MCNC: 181 MG/DL — HIGH (ref 70–99)
GLUCOSE BLDC GLUCOMTR-MCNC: 78 MG/DL — SIGNIFICANT CHANGE UP (ref 70–99)
GLUCOSE BLDC GLUCOMTR-MCNC: 84 MG/DL — SIGNIFICANT CHANGE UP (ref 70–99)
GLUCOSE SERPL-MCNC: 67 MG/DL — LOW (ref 70–99)
GRAM STN FLD: ABNORMAL
HCT VFR BLD CALC: 27.4 % — LOW (ref 39–50)
HGB BLD-MCNC: 8.9 G/DL — LOW (ref 13–17)
MCHC RBC-ENTMCNC: 29.2 PG — SIGNIFICANT CHANGE UP (ref 27–34)
MCHC RBC-ENTMCNC: 32.5 GM/DL — SIGNIFICANT CHANGE UP (ref 32–36)
MCV RBC AUTO: 89.8 FL — SIGNIFICANT CHANGE UP (ref 80–100)
NRBC # BLD: 0 /100 WBCS — SIGNIFICANT CHANGE UP (ref 0–0)
NRBC # FLD: 0 K/UL — SIGNIFICANT CHANGE UP (ref 0–0)
PLATELET # BLD AUTO: 177 K/UL — SIGNIFICANT CHANGE UP (ref 150–400)
POTASSIUM SERPL-MCNC: 3.5 MMOL/L — SIGNIFICANT CHANGE UP (ref 3.5–5.3)
POTASSIUM SERPL-SCNC: 3.5 MMOL/L — SIGNIFICANT CHANGE UP (ref 3.5–5.3)
RBC # BLD: 3.05 M/UL — LOW (ref 4.2–5.8)
RBC # FLD: 13.9 % — SIGNIFICANT CHANGE UP (ref 10.3–14.5)
SODIUM SERPL-SCNC: 131 MMOL/L — LOW (ref 135–145)
SPECIMEN SOURCE: SIGNIFICANT CHANGE UP
WBC # BLD: 12.91 K/UL — HIGH (ref 3.8–10.5)
WBC # FLD AUTO: 12.91 K/UL — HIGH (ref 3.8–10.5)

## 2024-08-01 PROCEDURE — 99232 SBSQ HOSP IP/OBS MODERATE 35: CPT

## 2024-08-01 PROCEDURE — 71045 X-RAY EXAM CHEST 1 VIEW: CPT | Mod: 26

## 2024-08-01 RX ORDER — METOPROLOL TARTRATE 100 MG
5 TABLET ORAL EVERY 6 HOURS
Refills: 0 | Status: DISCONTINUED | OUTPATIENT
Start: 2024-08-01 | End: 2024-08-08

## 2024-08-01 RX ORDER — DEXTROSE 4 G
25 TABLET,CHEWABLE ORAL ONCE
Refills: 0 | Status: COMPLETED | OUTPATIENT
Start: 2024-08-01 | End: 2024-08-01

## 2024-08-01 RX ORDER — INSULIN GLARGINE-YFGN 100 [IU]/ML
3 INJECTION, SOLUTION SUBCUTANEOUS AT BEDTIME
Refills: 0 | Status: DISCONTINUED | OUTPATIENT
Start: 2024-08-01 | End: 2024-08-06

## 2024-08-01 RX ORDER — DEXTROSE MONOHYDRATE, SODIUM CHLORIDE, SODIUM LACTATE, CALCIUM CHLORIDE, MAGNESIUM CHLORIDE 1.5; 538; 448; 18.4; 5.08 G/100ML; MG/100ML; MG/100ML; MG/100ML; MG/100ML
1000 SOLUTION INTRAPERITONEAL
Refills: 0 | Status: DISCONTINUED | OUTPATIENT
Start: 2024-08-01 | End: 2024-08-03

## 2024-08-01 RX ORDER — HYDROMORPHONE HCL IN 0.9% NACL 0.2 MG/ML
0.5 PLASTIC BAG, INJECTION (ML) INTRAVENOUS EVERY 4 HOURS
Refills: 0 | Status: DISCONTINUED | OUTPATIENT
Start: 2024-08-01 | End: 2024-08-07

## 2024-08-01 RX ORDER — INSULIN LISPRO 100/ML
VIAL (ML) SUBCUTANEOUS EVERY 6 HOURS
Refills: 0 | Status: DISCONTINUED | OUTPATIENT
Start: 2024-08-01 | End: 2024-08-08

## 2024-08-01 RX ORDER — FUROSEMIDE 10 MG/ML
20 INJECTION, SOLUTION INTRAVENOUS ONCE
Refills: 0 | Status: COMPLETED | OUTPATIENT
Start: 2024-08-01 | End: 2024-08-01

## 2024-08-01 RX ADMIN — LOSARTAN POTASSIUM 50 MILLIGRAM(S): 50 TABLET, FILM COATED ORAL at 05:38

## 2024-08-01 RX ADMIN — DEXTROSE MONOHYDRATE, SODIUM CHLORIDE, SODIUM LACTATE, CALCIUM CHLORIDE, MAGNESIUM CHLORIDE 75 MILLILITER(S): 1.5; 538; 448; 18.4; 5.08 SOLUTION INTRAPERITONEAL at 11:20

## 2024-08-01 RX ADMIN — LEVALBUTEROL HYDROCHLORIDE 0.63 MILLIGRAM(S): 0.31 SOLUTION RESPIRATORY (INHALATION) at 03:39

## 2024-08-01 RX ADMIN — PIPERACILLIN SODIUM, TAZOBACTAM SODIUM 25 GRAM(S): 3; .375 INJECTION, POWDER, LYOPHILIZED, FOR SOLUTION INTRAVENOUS at 05:42

## 2024-08-01 RX ADMIN — Medication 1: at 17:33

## 2024-08-01 RX ADMIN — Medication 10 MILLIGRAM(S): at 05:39

## 2024-08-01 RX ADMIN — Medication 10 MILLIGRAM(S): at 22:51

## 2024-08-01 RX ADMIN — ENOXAPARIN SODIUM 40 MILLIGRAM(S): 120 INJECTION SUBCUTANEOUS at 12:03

## 2024-08-01 RX ADMIN — Medication 10 MILLIGRAM(S): at 13:53

## 2024-08-01 RX ADMIN — Medication 5 MILLIGRAM(S): at 17:33

## 2024-08-01 RX ADMIN — LEVALBUTEROL HYDROCHLORIDE 0.63 MILLIGRAM(S): 0.31 SOLUTION RESPIRATORY (INHALATION) at 21:30

## 2024-08-01 RX ADMIN — Medication 4 MILLILITER(S): at 03:38

## 2024-08-01 RX ADMIN — PANTOPRAZOLE SODIUM 40 MILLIGRAM(S): 20 TABLET, DELAYED RELEASE ORAL at 05:41

## 2024-08-01 RX ADMIN — LEVALBUTEROL HYDROCHLORIDE 0.63 MILLIGRAM(S): 0.31 SOLUTION RESPIRATORY (INHALATION) at 15:56

## 2024-08-01 RX ADMIN — Medication 4 MILLILITER(S): at 15:56

## 2024-08-01 RX ADMIN — AMLODIPINE BESYLATE 5 MILLIGRAM(S): 2.5 TABLET ORAL at 05:39

## 2024-08-01 RX ADMIN — PIPERACILLIN SODIUM, TAZOBACTAM SODIUM 25 GRAM(S): 3; .375 INJECTION, POWDER, LYOPHILIZED, FOR SOLUTION INTRAVENOUS at 13:52

## 2024-08-01 RX ADMIN — Medication 25 MILLIGRAM(S): at 05:38

## 2024-08-01 RX ADMIN — Medication 4 MILLILITER(S): at 21:29

## 2024-08-01 RX ADMIN — Medication 25 GRAM(S): at 11:06

## 2024-08-01 RX ADMIN — FUROSEMIDE 20 MILLIGRAM(S): 10 INJECTION, SOLUTION INTRAVENOUS at 14:54

## 2024-08-01 NOTE — PROVIDER CONTACT NOTE (OTHER) - SITUATION
900 cc of white, thick drainage noted in VENKATA drain. 90 cc of white, thick drainage noted in VENKATA drain.

## 2024-08-01 NOTE — PROGRESS NOTE ADULT - ASSESSMENT
80y/o F with A. fib (not on anticoagulation), HTN, HLD, and DM  s/p robotic cholecystectomy and liver resection (10/11) presented as a transfer s/p standing from fall with right 4 to 6 lateral rib fracture with bilateral pleural effusion. The patient is a 59y Male with PMH of T2DM, HTN, HLD, GE junction cancer here for esophagectomy now postop, prolonged course.  Endocrinology consulted for T2DM.     Uncontrolled Type 2 Diabetes Mellitus   - Follows with: Dr. Nolasco  - A1C with Estimated Average Glucose Result: 10.9 % (07-13-24)  - home regimen: Semglee 18 units, Novolog 6 units with meals      Inpatient plan   - FS goal 100-180 mg/dl   - was on full liquid diet, now NPO  - hypoglycemia noted on serum glucose, asymptomatic   - decrease Lantus to 3 units at bedtime   - change to LOW Admelog correction scale q6 while NPO   - FS q6 while NPO   - hypoglycemia protocol PRN   - consistent carbohydrate diet if eating     Discharge plan   - Discharge recs pending clinical course and nutrition plan. Plan to resume Semglee and Admelog, will determine final doses based on hospital requirements/PO intake at time of discharge.   - Patient has an appointment with Dr. Nolasco scheduled for August 8th at 8:20 AM at [address] [facility] [address].         Hypertension  - Goal BP <130/80  - continue metoprolol, losartan, amlodipine, HCTZ   - Management as per primary team  - check urine microalbumin level as outpatient      Hyperlipidemia  - LDL goal <70  - check lipid panel as outpatient on a yearly basis  - management per primary team       Hypercalcemia  calcium 7.2  albumin 2.2  corrected calcium 8.7  vitamin 25OHD - 6.5 severely low.   Start ergocalciferol 50,000 units PO once weekly once able to take PO        TETE Day-BC  Nurse Practitioner  Division of Endocrinology & Diabetes  pager [phone]

## 2024-08-01 NOTE — CHART NOTE - NSCHARTNOTEFT_GEN_A_CORE
PRE-INTERVENTIONAL RADIOLOGY PROCEDURE NOTE      Patient Age: 59    Patient Gender: M    Procedure: Placement of feeding J tube    Diagnosis/Indication: esoph leak    Interventional Radiology Attending Physician: Dr. Lo    Ordering Attending Physician: Dr. Laureano    Pertinent Medical History:  s/p esophagectomy    Pertinent labs:                      8.9    12.91 )-----------( 177      ( 01 Aug 2024 05:15 )             27.4       08-01    131<L>  |  99  |  5<L>  ----------------------------<  67<L>  3.5   |  22  |  0.77    Ca    7.3<L>      01 Aug 2024 05:15  Phos  1.8     07-31  Mg     1.80     07-31                Patient and Family Aware ? Yes

## 2024-08-01 NOTE — PROGRESS NOTE ADULT - SUBJECTIVE AND OBJECTIVE BOX
Surgery Progress Note:    OVERNIGHT EVENTS: NAEO    SUBJECTIVE: Pt seen and examined at bedside. Patient comfortable and in no-apparent distress. Pain is controlled. Reported some leaking from left prior port site. Pt no complaints of nausea or vomiting.     MEDICATIONS  (STANDING):  dextrose 5% + sodium chloride 0.45%. 1000 milliLiter(s) (75 mL/Hr) IV Continuous <Continuous>  dextrose 5%. 1000 milliLiter(s) (100 mL/Hr) IV Continuous <Continuous>  dextrose 5%. 1000 milliLiter(s) (50 mL/Hr) IV Continuous <Continuous>  dextrose 50% Injectable 12.5 Gram(s) IV Push once  dextrose 50% Injectable 25 Gram(s) IV Push once  dextrose 50% Injectable 25 Gram(s) IV Push once  dextrose Oral Gel 15 Gram(s) Oral once  dornase malik Solution 2.5 milliGRAM(s) Inhalation daily  enoxaparin Injectable 40 milliGRAM(s) SubCutaneous every 24 hours  insulin glargine Injectable (LANTUS) 3 Unit(s) SubCutaneous at bedtime  insulin lispro (ADMELOG) corrective regimen sliding scale   SubCutaneous every 6 hours  levalbuterol Inhalation 0.63 milliGRAM(s) Inhalation every 6 hours  lidocaine   4% Patch 1 Patch Transdermal at bedtime  metoclopramide Injectable 10 milliGRAM(s) IV Push every 8 hours  metoprolol tartrate Injectable 5 milliGRAM(s) IV Push every 6 hours  sodium chloride 3%  Inhalation 4 milliLiter(s) Inhalation every 6 hours    MEDICATIONS  (PRN):  HYDROmorphone  Injectable 0.5 milliGRAM(s) IV Push every 4 hours PRN moderate to severe pain  naloxone Injectable 0.1 milliGRAM(s) IV Push every 3 minutes PRN For ANY of the following changes in patient status:  A. RR LESS THAN 10 breaths per minute, B. Oxygen saturation LESS THAN 90%, C. Sedation score of 6    T(C): 36.4 (08-01-24 @ 16:00), Max: 37 (08-01-24 @ 04:00)  HR: 94 (08-01-24 @ 16:00) (75 - 99)  BP: 128/63 (08-01-24 @ 16:00) (126/66 - 138/70)  RR: 18 (08-01-24 @ 16:00) (17 - 19)  SpO2: 97% (08-01-24 @ 16:00) (93% - 98%)    07-31-24 @ 07:01  -  08-01-24 @ 07:00  --------------------------------------------------------  IN: 2300 mL / OUT: 1385 mL / NET: 915 mL    08-01-24 @ 07:01  -  08-01-24 @ 16:38  --------------------------------------------------------  IN: 475 mL / OUT: 1670 mL / NET: -1195 mL      LABS:                        8.9    12.91 )-----------( 177      ( 01 Aug 2024 05:15 )             27.4     08-01    131<L>  |  99  |  5<L>  ----------------------------<  67<L>  3.5   |  22  |  0.77    Ca    7.3<L>      01 Aug 2024 05:15  Phos  1.8     07-31  Mg     1.80     07-31        Urinalysis Basic - ( 01 Aug 2024 05:15 )    Color: x / Appearance: x / SG: x / pH: x  Gluc: 67 mg/dL / Ketone: x  / Bili: x / Urobili: x   Blood: x / Protein: x / Nitrite: x   Leuk Esterase: x / RBC: x / WBC x   Sq Epi: x / Non Sq Epi: x / Bacteria: x      PE:  Gen: NAD  CV: Pulse regular present  Resp: Nonlabored  Abdomen: Soft, nondistended, NTTP. No rebound or guarding. Incisions with soft, stable ecchymosis. VENKATA drain with whitish fibrinous output.

## 2024-08-01 NOTE — PROGRESS NOTE ADULT - ASSESSMENT
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 POD #6 UGI without anastomotic leak. CTPA 7/28 with moderate right loculated hydropneumothorax with interval increase in size and small right apical PTX.     PLAN:  - NPO Except chips  - Daily CXR/ OOB/ ambulate/ IS while in bed  - FSq6h, appreciate endo recs  - Pain control PRN   - DVT ppx: SCD's & Lovenox  - Monitor VENKATA drain output  - Care per primary team    D Team Surgery  [id number]

## 2024-08-01 NOTE — PROGRESS NOTE ADULT - SUBJECTIVE AND OBJECTIVE BOX
Patient is a 59y old  Male who presents with a chief complaint of Esophagectomy (01 Aug 2024 16:40)      SUBJECTIVE / OVERNIGHT EVENTS:    Events noted.  CONSTITUTIONAL: No fever,  or fatigue  RESPIRATORY: No cough, wheezing,  No shortness of breath  CARDIOVASCULAR: No chest pain, palpitations, dizziness, or leg swelling  GASTROINTESTINAL: No abdominal or epigastric pain.       MEDICATIONS  (STANDING):  dextrose 5% + sodium chloride 0.45%. 1000 milliLiter(s) (75 mL/Hr) IV Continuous <Continuous>  dextrose 5%. 1000 milliLiter(s) (100 mL/Hr) IV Continuous <Continuous>  dextrose 5%. 1000 milliLiter(s) (50 mL/Hr) IV Continuous <Continuous>  dextrose 50% Injectable 12.5 Gram(s) IV Push once  dextrose 50% Injectable 25 Gram(s) IV Push once  dextrose 50% Injectable 25 Gram(s) IV Push once  dextrose Oral Gel 15 Gram(s) Oral once  dornase malik Solution 2.5 milliGRAM(s) Inhalation daily  enoxaparin Injectable 40 milliGRAM(s) SubCutaneous every 24 hours  insulin glargine Injectable (LANTUS) 3 Unit(s) SubCutaneous at bedtime  insulin lispro (ADMELOG) corrective regimen sliding scale   SubCutaneous every 6 hours  levalbuterol Inhalation 0.63 milliGRAM(s) Inhalation every 6 hours  lidocaine   4% Patch 1 Patch Transdermal at bedtime  metoclopramide Injectable 10 milliGRAM(s) IV Push every 8 hours  metoprolol tartrate Injectable 5 milliGRAM(s) IV Push every 6 hours  sodium chloride 3%  Inhalation 4 milliLiter(s) Inhalation every 6 hours    MEDICATIONS  (PRN):  HYDROmorphone  Injectable 0.5 milliGRAM(s) IV Push every 4 hours PRN moderate to severe pain  naloxone Injectable 0.1 milliGRAM(s) IV Push every 3 minutes PRN For ANY of the following changes in patient status:  A. RR LESS THAN 10 breaths per minute, B. Oxygen saturation LESS THAN 90%, C. Sedation score of 6        CAPILLARY BLOOD GLUCOSE      POCT Blood Glucose.: 181 mg/dL (01 Aug 2024 17:19)  POCT Blood Glucose.: 170 mg/dL (01 Aug 2024 11:28)  POCT Blood Glucose.: 78 mg/dL (01 Aug 2024 10:51)  POCT Blood Glucose.: 84 mg/dL (01 Aug 2024 07:56)    I&O's Summary    31 Jul 2024 07:01  -  01 Aug 2024 07:00  --------------------------------------------------------  IN: 2300 mL / OUT: 1385 mL / NET: 915 mL    01 Aug 2024 07:01  -  01 Aug 2024 22:12  --------------------------------------------------------  IN: 850 mL / OUT: 2380 mL / NET: -1530 mL        T(C): 37.1 (08-01-24 @ 20:00), Max: 37.1 (08-01-24 @ 20:00)  HR: 89 (08-01-24 @ 20:00) (83 - 99)  BP: 122/61 (08-01-24 @ 20:00) (122/61 - 138/70)  RR: 18 (08-01-24 @ 20:00) (17 - 19)  SpO2: 95% (08-01-24 @ 20:00) (92% - 97%)    PHYSICAL EXAM:  GENERAL: NAD  NECK: Supple, No JVD  CHEST/LUNG: Clear to auscultation bilaterally; No wheezing.  HEART: Regular rate and rhythm; No murmurs, rubs, or gallops  ABDOMEN: Soft, Nontender, Nondistended; Bowel sounds present  EXTREMITIES:   No edema  NEUROLOGY: AAO X 3      LABS:                        8.9    12.91 )-----------( 177      ( 01 Aug 2024 05:15 )             27.4     08-01    131<L>  |  99  |  5<L>  ----------------------------<  67<L>  3.5   |  22  |  0.77    Ca    7.3<L>      01 Aug 2024 05:15  Phos  1.8     07-31  Mg     1.80     07-31            Urinalysis Basic - ( 01 Aug 2024 05:15 )    Color: x / Appearance: x / SG: x / pH: x  Gluc: 67 mg/dL / Ketone: x  / Bili: x / Urobili: x   Blood: x / Protein: x / Nitrite: x   Leuk Esterase: x / RBC: x / WBC x   Sq Epi: x / Non Sq Epi: x / Bacteria: x      CAPILLARY BLOOD GLUCOSE      POCT Blood Glucose.: 181 mg/dL (01 Aug 2024 17:19)  POCT Blood Glucose.: 170 mg/dL (01 Aug 2024 11:28)  POCT Blood Glucose.: 78 mg/dL (01 Aug 2024 10:51)  POCT Blood Glucose.: 84 mg/dL (01 Aug 2024 07:56)        RADIOLOGY & ADDITIONAL TESTS:    Imaging Personally Reviewed:    Consultant(s) Notes Reviewed:      Care Discussed with Consultants/Other Providers:    Anish Goodrich MD, CMD, FACP    [id number] [place] [address]  Office Tel: [phone]  Cell: [phone]

## 2024-08-01 NOTE — PROGRESS NOTE ADULT - SUBJECTIVE AND OBJECTIVE BOX
DATE OF SERVICE: 08-01-24    Patient denies chest pain or shortness of breath.   Review of symptoms otherwise negative.    Review of Systems:   Constitutional: [ ] fevers, [ ] chills.   Skin: [ ] dry skin. [ ] rashes.  Psychiatric: [ ] depression, [ ] anxiety.   Gastrointestinal: [ ] BRBPR, [ ] melena.   Neurological: [ ] confusion. [ ] seizures. [ ] shuffling gait.   Ears,Nose,Mouth and Throat: [ ] ear pain [ ] sore throat.   Eyes: [ ] diplopia.   Respiratory: [ ] hemoptysis. [ ] shortness of breath  Cardiovascular: See HPI above  Hematologic/Lymphatic: [ ] anemia. [ ] painful nodes. [ ] prolonged bleeding.   Genitourinary: [ ] hematuria. [ ] flank pain.   Endocrine: [ ] significant change in weight. [ ] intolerance to heat and cold.     Review of systems [x ] otherwise negative, [ ] otherwise unable to obtain    FH: no family history of sudden cardiac death in first degree relatives    SH: [ ] tobacco, [ ] alcohol, [ ] drugs    acetaminophen     Tablet .. 650 milliGRAM(s) Oral every 6 hours  amLODIPine   Tablet 5 milliGRAM(s) Oral daily  dextrose 5% + sodium chloride 0.45%. 1000 milliLiter(s) IV Continuous <Continuous>  dextrose 5%. 1000 milliLiter(s) IV Continuous <Continuous>  dextrose 5%. 1000 milliLiter(s) IV Continuous <Continuous>  dextrose 50% Injectable 25 Gram(s) IV Push once  dextrose 50% Injectable 25 Gram(s) IV Push once  dextrose 50% Injectable 12.5 Gram(s) IV Push once  dextrose 50% Injectable 25 Gram(s) IV Push once  dextrose Oral Gel 15 Gram(s) Oral once  dornase malik Solution 2.5 milliGRAM(s) Inhalation daily  enoxaparin Injectable 40 milliGRAM(s) SubCutaneous every 24 hours  hydrochlorothiazide 12.5 milliGRAM(s) Oral daily  insulin glargine Injectable (LANTUS) 3 Unit(s) SubCutaneous at bedtime  insulin lispro (ADMELOG) corrective regimen sliding scale   SubCutaneous three times a day before meals  insulin lispro (ADMELOG) corrective regimen sliding scale   SubCutaneous at bedtime  levalbuterol Inhalation 0.63 milliGRAM(s) Inhalation every 6 hours  lidocaine   4% Patch 1 Patch Transdermal at bedtime  losartan 50 milliGRAM(s) Oral daily  metoclopramide Injectable 10 milliGRAM(s) IV Push every 8 hours  metoprolol tartrate 25 milliGRAM(s) Oral two times a day  naloxone Injectable 0.1 milliGRAM(s) IV Push every 3 minutes PRN  oxyCODONE    IR 2.5 milliGRAM(s) Oral every 4 hours PRN  oxyCODONE    IR 5 milliGRAM(s) Oral every 4 hours PRN  pantoprazole    Tablet 40 milliGRAM(s) Oral every 12 hours  piperacillin/tazobactam IVPB.. 3.375 Gram(s) IV Intermittent every 8 hours  sodium chloride 3%  Inhalation 4 milliLiter(s) Inhalation every 6 hours                            8.9    12.91 )-----------( 177      ( 01 Aug 2024 05:15 )             27.4       131<L>  |  99  |  5<L>  ----------------------------<  67<L>  3.5   |  22  |  0.77    Ca    7.3<L>      01 Aug 2024 05:15  Phos  1.8     07-31  Mg     1.80     07-31        T(C): 36.7 (08-01-24 @ 08:30), Max: 37 (08-01-24 @ 04:00)  HR: 99 (08-01-24 @ 08:30) (75 - 99)  BP: 131/68 (08-01-24 @ 08:30) (126/66 - 138/70)  RR: 19 (08-01-24 @ 08:30) (17 - 19)  SpO2: 97% (08-01-24 @ 08:30) (92% - 98%)  Wt(kg): --    I&O's Summary    31 Jul 2024 07:01  -  01 Aug 2024 07:00  --------------------------------------------------------  IN: 2300 mL / OUT: 1385 mL / NET: 915 mL    01 Aug 2024 07:01  -  01 Aug 2024 11:38  --------------------------------------------------------  IN: 375 mL / OUT: 820 mL / NET: -445 mL      Gen: NAD  HEENT:  (-)icterus (-)pallor  CV: N S1 S2 1/6 CATHY (+)2 Pulses B/l  Resp:  Clear to auscultation B/L, normal effort  GI: (+) BS Soft, NT, ND  Lymph:  (-)Edema, (-)obvious lymphadenopathy  Skin: Warm to touch, Normal turgor  Psych: Appropriate mood and affect      TELEMETRY: 	 SR/ST    ECG:  	NSR      TTE 07/2024  Findings:  1. Normal left ventricular size and function.  Mild diastolic dysfunction.  2. Normal left atrial size  3. Right atrial cavity is normal in size.  4. Normal right ventricular size and function.  5. Normal trileaflet aortic valve opening.  6. Normal mitral valve opening.  7. Normal appearing tricuspid valve with mild tricuspid  regurgitation.  8. Pulmonic valve is grossly normal, yet poorly visualized  with no doppler evidence for pulmonic stenosis.  9. No evidence of significant pericardial effusion.  10. The aortic root is normal.  11. Normal pulmonary artery.  12. IVC is normal with respiratory variation.  FULL STUDY DONE INCLUDING M-MODE RECORDING,  SPECTRAL DOPPLER AND    Stress 07/2024  Normal myocardial perfusion SPECT images No evidence of stress induced ischemia or infarction.  Normal left ventricular size and function.  Calculated EF is: 68%    CT  IMPRESSION:  Small loculated right pleural effusion with foci of air and pleural   catheter. Small left pleural effusion.    Splenic infarcts.    Small volume ascites.    ASSESSMENT/PLAN: 	Pt is a 59 y.o. man with history of HTN,, DLD, DM,  GEJ adenocarcinoma (T3N0, stage 2A) s/p neoadjuvant chemotherapy admitted for optimization prior to planned Michael-Broderick Esophagectomy on tuesday. Recently had a nuclear stress test in our office for evaluation of preoperative cardiac risk assessment prior to esophageal cancer surgery scheduled on 07/16/2024. The patient denies any chest pain, shortness ofbreath, or anginal symptoms. He has no palpitations, dizziness, or syncope  s/p Michael-Broderick Esophagectomy on 7/16.     Pre-OP/HTN  - tolerated procedure well from CV perspective  - diet advanced, Losartan, HCTZ, Norvasc and Metoprolol started (Metoprolol only "new" med)  - On Zosyn per sx/ ID  - Ct scan noted     F/U with Dr Shah (cardiologist) after DC as scheduled, [phone]    N[place] TATE  [phone]

## 2024-08-01 NOTE — PROGRESS NOTE ADULT - SUBJECTIVE AND OBJECTIVE BOX
Subjective: "I'm trying to eat, I had pudding yesterday and I did ok" Pt denies n/v, just poor appetite. Denies dysphagia. OOB w assist. Noted to have continued high output from Rt chest adam, saliva looking appearance. Pt also w/ full body, generalized edema. Significant drainage of pure serous fluid noted from left LQ abd incision site. Increased leukocytosis today. AFebrile. Discussed w Dr. Laureano, will make pt NPO and test adam fluid for culture and Amylase    Vital Signs:  Vital Signs Last 24 Hrs  T(C): 36.5 (08-01-24 @ 12:26), Max: 37 (08-01-24 @ 04:00)  T(F): 97.7 (08-01-24 @ 12:26), Max: 98.6 (08-01-24 @ 04:00)  HR: 83 (08-01-24 @ 12:26) (75 - 99)  BP: 129/65 (08-01-24 @ 12:26) (126/66 - 138/70)  RR: 18 (08-01-24 @ 12:26) (17 - 19)  SpO2: 95% (08-01-24 @ 12:26) (92% - 98%) on (O2)    Telemetry/Alarms: tele SR  Relevant labs, radiology and Medications reviewed                        8.9    12.91 )-----------( 177      ( 01 Aug 2024 05:15 )             27.4     08-01    131<L>  |  99  |  5<L>  ----------------------------<  67<L>  3.5   |  22  |  0.77    Ca    7.3<L>      01 Aug 2024 05:15  Phos  1.8     07-31  Mg     1.80     07-31        MEDICATIONS  (STANDING):  acetaminophen     Tablet .. 650 milliGRAM(s) Oral every 6 hours  amLODIPine   Tablet 5 milliGRAM(s) Oral daily  dextrose 5% + sodium chloride 0.45%. 1000 milliLiter(s) (75 mL/Hr) IV Continuous <Continuous>  dextrose 5%. 1000 milliLiter(s) (50 mL/Hr) IV Continuous <Continuous>  dextrose 5%. 1000 milliLiter(s) (100 mL/Hr) IV Continuous <Continuous>  dextrose 50% Injectable 12.5 Gram(s) IV Push once  dextrose 50% Injectable 25 Gram(s) IV Push once  dextrose 50% Injectable 25 Gram(s) IV Push once  dextrose Oral Gel 15 Gram(s) Oral once  dornase malik Solution 2.5 milliGRAM(s) Inhalation daily  enoxaparin Injectable 40 milliGRAM(s) SubCutaneous every 24 hours  hydrochlorothiazide 12.5 milliGRAM(s) Oral daily  insulin glargine Injectable (LANTUS) 3 Unit(s) SubCutaneous at bedtime  insulin lispro (ADMELOG) corrective regimen sliding scale   SubCutaneous every 6 hours  levalbuterol Inhalation 0.63 milliGRAM(s) Inhalation every 6 hours  lidocaine   4% Patch 1 Patch Transdermal at bedtime  losartan 50 milliGRAM(s) Oral daily  metoclopramide Injectable 10 milliGRAM(s) IV Push every 8 hours  metoprolol tartrate 25 milliGRAM(s) Oral two times a day  pantoprazole    Tablet 40 milliGRAM(s) Oral every 12 hours  piperacillin/tazobactam IVPB.. 3.375 Gram(s) IV Intermittent every 8 hours  sodium chloride 3%  Inhalation 4 milliLiter(s) Inhalation every 6 hours    MEDICATIONS  (PRN):  naloxone Injectable 0.1 milliGRAM(s) IV Push every 3 minutes PRN For ANY of the following changes in patient status:  A. RR LESS THAN 10 breaths per minute, B. Oxygen saturation LESS THAN 90%, C. Sedation score of 6  oxyCODONE    IR 2.5 milliGRAM(s) Oral every 4 hours PRN Moderate Pain (4 - 6)  oxyCODONE    IR 5 milliGRAM(s) Oral every 4 hours PRN Severe Pain (7 - 10)    General: WD NAD  Neurology: A&Ox3, nonfocal, AFLORD x 4  Eyes: PERRLA/ EOMI, Gross vision intact  ENT/Neck: Neck supple, trachea midline, No JVD, Gross hearing intact  Respiratory: CTA B/L, No wheezing, rales, rhonchi. Dec BS bilat, Rt> Left.   CV: RRR, S1S2, no murmurs, rubs or gallops  Abdominal: Soft, NT, ND +BS, Pt states + BM yesterday. Was on Fulls po, now made NPO pending fluid testing.   Extremities: + general  edema throughout bilat UE, Bilat LE and abdomen +anasarca, + peripheral pulses  Skin: No Rashes, Hematoma, Ecchymosis  Incisions: Rt CW incisions c/d/i. Abd incisions intact, left LQ incision w/ large drainage, no signs of infection    I&O's Summary    31 Jul 2024 07:01  -  01 Aug 2024 07:00  --------------------------------------------------------  IN: 2300 mL / OUT: 1385 mL / NET: 915 mL    01 Aug 2024 07:01  -  01 Aug 2024 12:52  --------------------------------------------------------  IN: 375 mL / OUT: 820 mL / NET: -445 mL      Assessment  59y Male  w/ PAST MEDICAL & SURGICAL HISTORY:  HLD (hyperlipidemia)      Insulin dependent type 2 diabetes mellitus      BPH (benign prostatic hyperplasia)      HTN (hypertension)      Gastroesophageal cancer      Gastroesophageal cancer      Port-A-Cath in place    59 year old male with PMH HTN and DM 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 Pt s/p barium swallow and started on CLD. Postop CT CAP reviewed w Dr Laureano. Pt is now on Zosyn and advanced to soft diet. Sinus tachycardia since last night. Now on nasal cannula.  7/29: Pt with now improving leukocytosis and s/op CTA r/o PE for tachycardia over the weekend. Imaging reviewed by Dr Laureano, no acute findings and able to advance to soft diet as previously mentioned. Patient tolerating PO diet well. Patient without fevers and with drain in place on IV abx.   7/30-7/31-CT chest completed, no contrast extravasation. Pt resumed CLD and advanced to FLD today.  8/1-Today noted to have continued high drain output. Inc in Leukocytosis. Pt with generalized 3rd spacing. Fluid specimens sent, cont Zosyn.       PLAN  Neuro: Pain management  Pulm: Encourage coughing, deep breathing and use of incentive spirometry. Wean off supplemental oxygen as able. Daily CXR.   Cardio: Monitor telemetry/alarms  Endo: Monitor bld sugars, IVF w glucose for now while NPO.   GI: NPO w sips and chips for now. Will resume IVF. Nutritional numbers in am. Will send fluid for amylase and culture.   Renal: monitor urine output, supplement electrolytes as needed. Monitor electrolytes.   Vasc: Heparin SC/SCDs for DVT prophylaxis  Heme: Stable H/H. .   ID: Continue Zosyn. Trend Leukocytosis.   Therapy: OOB/ambulate  Tubes: Monitor Chest tube output. Watch Adam output. Send fluid for Amylase and Culture. Surg Onc made aware of draining wound.   Disposition: Aim to D/C to home once surgically stable.   Discussed with Cardiothoracic Team at AM rounds.

## 2024-08-01 NOTE — PROGRESS NOTE ADULT - SUBJECTIVE AND OBJECTIVE BOX
Chief Complaint: DM type 2     Interval Events: Hypoglycemia noted on serum glucose, patient asymptomatic. He was able to eat oatmeal for breakfast, now NPO. No N/V or abdominal pain.     MEDICATIONS  (STANDING):  acetaminophen     Tablet .. 650 milliGRAM(s) Oral every 6 hours  amLODIPine   Tablet 5 milliGRAM(s) Oral daily  dextrose 5% + sodium chloride 0.45%. 1000 milliLiter(s) (75 mL/Hr) IV Continuous <Continuous>  dextrose 5%. 1000 milliLiter(s) (50 mL/Hr) IV Continuous <Continuous>  dextrose 5%. 1000 milliLiter(s) (100 mL/Hr) IV Continuous <Continuous>  dextrose 50% Injectable 25 Gram(s) IV Push once  dextrose 50% Injectable 12.5 Gram(s) IV Push once  dextrose 50% Injectable 25 Gram(s) IV Push once  dextrose Oral Gel 15 Gram(s) Oral once  dornase malik Solution 2.5 milliGRAM(s) Inhalation daily  enoxaparin Injectable 40 milliGRAM(s) SubCutaneous every 24 hours  hydrochlorothiazide 12.5 milliGRAM(s) Oral daily  insulin glargine Injectable (LANTUS) 3 Unit(s) SubCutaneous at bedtime  insulin lispro (ADMELOG) corrective regimen sliding scale   SubCutaneous three times a day before meals  insulin lispro (ADMELOG) corrective regimen sliding scale   SubCutaneous at bedtime  levalbuterol Inhalation 0.63 milliGRAM(s) Inhalation every 6 hours  lidocaine   4% Patch 1 Patch Transdermal at bedtime  losartan 50 milliGRAM(s) Oral daily  metoclopramide Injectable 10 milliGRAM(s) IV Push every 8 hours  metoprolol tartrate 25 milliGRAM(s) Oral two times a day  pantoprazole    Tablet 40 milliGRAM(s) Oral every 12 hours  piperacillin/tazobactam IVPB.. 3.375 Gram(s) IV Intermittent every 8 hours  sodium chloride 3%  Inhalation 4 milliLiter(s) Inhalation every 6 hours    MEDICATIONS  (PRN):  naloxone Injectable 0.1 milliGRAM(s) IV Push every 3 minutes PRN For ANY of the following changes in patient status:  A. RR LESS THAN 10 breaths per minute, B. Oxygen saturation LESS THAN 90%, C. Sedation score of 6  oxyCODONE    IR 2.5 milliGRAM(s) Oral every 4 hours PRN Moderate Pain (4 - 6)  oxyCODONE    IR 5 milliGRAM(s) Oral every 4 hours PRN Severe Pain (7 - 10)      Allergies  No Known Allergies    Review of Systems:  Constitutional: No fever/chills   Eyes: No blurry vision  Neuro: No tremors  HEENT: No pain  Cardiovascular: No chest pain, no palpitations  Respiratory: No SOB, no cough  GI: No nausea, vomiting or abdominal pain  : No dysuria  Endocrine: no polyuria, polydipsia      VITALS: T(C): 36.5 (08-01-24 @ 12:26)  T(F): 97.7 (08-01-24 @ 12:26), Max: 98.6 (08-01-24 @ 04:00)  HR: 83 (08-01-24 @ 12:26) (75 - 99)  BP: 129/65 (08-01-24 @ 12:26) (126/66 - 138/70)  RR:  (17 - 19)  SpO2:  (92% - 98%)        Physical Exam:   GENERAL: NAD, well-groomed, well-developed  EYES: No proptosis, no lid lag, anicteric  HEENT:  Atraumatic, Normocephalic, moist mucous membranes  RESPIRATORY: non labored breathing   GI: Soft, nontender, non distended  SKIN: Dry, intact, No rashes or lesions  MUSCULOSKELETAL: Full range of motion, normal strength  NEURO: extraocular movements intact, no tremor  PSYCH: Alert and oriented x 3, normal affect, normal mood      CAPILLARY BLOOD GLUCOSE  POCT Blood Glucose.: 170 mg/dL (01 Aug 2024 11:28)  POCT Blood Glucose.: 78 mg/dL (01 Aug 2024 10:51)  POCT Blood Glucose.: 84 mg/dL (01 Aug 2024 07:56)  POCT Blood Glucose.: 128 mg/dL (31 Jul 2024 20:54)  POCT Blood Glucose.: 199 mg/dL (31 Jul 2024 17:10)      08-01    131<L>  |  99  |  5<L>  ----------------------------<  67<L>  3.5   |  22  |  0.77    eGFR: 102    Ca    7.3<L>      08-01  Mg     1.80     07-31  Phos  1.8     07-31        A1C with Estimated Average Glucose Result: 10.9 % (07-13-24 @ 02:44)  A1C with Estimated Average Glucose Result: 12.8 % (01-01-24 @ 06:00)

## 2024-08-01 NOTE — PROGRESS NOTE ADULT - SUBJECTIVE AND OBJECTIVE BOX
Follow Up:      Inverval History/ROS:Patient is a 59y old  Male who presents with a chief complaint of Esophagectomy (01 Aug 2024 12:52)    No fever     Allergies    No Known Allergies    Intolerances        ANTIMICROBIALS:  piperacillin/tazobactam IVPB.. 3.375 every 8 hours      OTHER MEDS:  acetaminophen     Tablet .. 650 milliGRAM(s) Oral every 6 hours  amLODIPine   Tablet 5 milliGRAM(s) Oral daily  dextrose 5% + sodium chloride 0.45%. 1000 milliLiter(s) IV Continuous <Continuous>  dextrose 5%. 1000 milliLiter(s) IV Continuous <Continuous>  dextrose 5%. 1000 milliLiter(s) IV Continuous <Continuous>  dextrose 50% Injectable 25 Gram(s) IV Push once  dextrose 50% Injectable 12.5 Gram(s) IV Push once  dextrose 50% Injectable 25 Gram(s) IV Push once  dextrose Oral Gel 15 Gram(s) Oral once  dornase malik Solution 2.5 milliGRAM(s) Inhalation daily  enoxaparin Injectable 40 milliGRAM(s) SubCutaneous every 24 hours  hydrochlorothiazide 12.5 milliGRAM(s) Oral daily  insulin glargine Injectable (LANTUS) 3 Unit(s) SubCutaneous at bedtime  insulin lispro (ADMELOG) corrective regimen sliding scale   SubCutaneous every 6 hours  levalbuterol Inhalation 0.63 milliGRAM(s) Inhalation every 6 hours  lidocaine   4% Patch 1 Patch Transdermal at bedtime  losartan 50 milliGRAM(s) Oral daily  metoclopramide Injectable 10 milliGRAM(s) IV Push every 8 hours  metoprolol tartrate 25 milliGRAM(s) Oral two times a day  naloxone Injectable 0.1 milliGRAM(s) IV Push every 3 minutes PRN  oxyCODONE    IR 2.5 milliGRAM(s) Oral every 4 hours PRN  oxyCODONE    IR 5 milliGRAM(s) Oral every 4 hours PRN  pantoprazole    Tablet 40 milliGRAM(s) Oral every 12 hours  sodium chloride 3%  Inhalation 4 milliLiter(s) Inhalation every 6 hours      Vital Signs Last 24 Hrs  T(C): 36.5 (01 Aug 2024 12:26), Max: 37 (01 Aug 2024 04:00)  T(F): 97.7 (01 Aug 2024 12:26), Max: 98.6 (01 Aug 2024 04:00)  HR: 83 (01 Aug 2024 12:26) (75 - 99)  BP: 129/65 (01 Aug 2024 12:26) (126/66 - 138/70)  BP(mean): --  RR: 18 (01 Aug 2024 12:26) (17 - 19)  SpO2: 95% (01 Aug 2024 12:26) (92% - 98%)    Parameters below as of 01 Aug 2024 12:26  Patient On (Oxygen Delivery Method): room air        PHYSICAL EXAM:  General: [ ] non-toxic  HEAD/EYES: [ ] PERRL [x ] white sclera [ ] icterus  ENT:  [ ] normal [x ] supple [ ] thrush [ ] pharyngeal exudate  Cardiovascular:   [ ] murmur [x ] normal [ ] PPM/AICD  Respiratory:  [ ] clear to ausculation bilaterally  GI:  [x ] soft, non-tender, normal bowel sounds  :  [ ] stallworth [ ] no CVA tenderness   Musculoskeletal:  [ ] no synovitis  Neurologic:  [ ] non-focal exam   Skin:  [x ] no rash  Lymph: [x ] no lymphadenopathy  Psychiatric:  [ ] appropriate affect [ ] alert & oriented  Lines:  [x ] no phlebitis [ ] central line                                8.9    12.91 )-----------( 177      ( 01 Aug 2024 05:15 )             27.4       08-01    131<L>  |  99  |  5<L>  ----------------------------<  67<L>  3.5   |  22  |  0.77    Ca    7.3<L>      01 Aug 2024 05:15  Phos  1.8     07-31  Mg     1.80     07-31        Urinalysis Basic - ( 01 Aug 2024 05:15 )    Color: x / Appearance: x / SG: x / pH: x  Gluc: 67 mg/dL / Ketone: x  / Bili: x / Urobili: x   Blood: x / Protein: x / Nitrite: x   Leuk Esterase: x / RBC: x / WBC x   Sq Epi: x / Non Sq Epi: x / Bacteria: x        MICROBIOLOGY:    RADIOLOGY:

## 2024-08-01 NOTE — CONSULT NOTE ADULT - SUBJECTIVE AND OBJECTIVE BOX
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  Slightly elevated white count. Patient on subq heparin.  Prior CT reviewed with attending Dr. Lo.    Vital Signs: Vital Signs Last 24 Hrs  T(C): 36.5 (01 Aug 2024 12:26), Max: 37 (01 Aug 2024 04:00)  T(F): 97.7 (01 Aug 2024 12:26), Max: 98.6 (01 Aug 2024 04:00)  HR: 83 (01 Aug 2024 12:26) (75 - 99)  BP: 129/65 (01 Aug 2024 12:26) (126/66 - 138/70)  BP(mean): --  RR: 18 (01 Aug 2024 12:26) (17 - 19)  SpO2: 95% (01 Aug 2024 12:26) (92% - 98%)    Parameters below as of 01 Aug 2024 12:26  Patient On (Oxygen Delivery Method): room air        Past Medical/ Surgical History: PAST MEDICAL & SURGICAL HISTORY:  HLD (hyperlipidemia)      Insulin dependent type 2 diabetes mellitus      BPH (benign prostatic hyperplasia)      HTN (hypertension)      Gastroesophageal cancer      Gastroesophageal cancer      Port-A-Cath in place          Allergies: Allergies    No Known Allergies    Intolerances        Medications: MEDICATIONS  (STANDING):  dextrose 5% + sodium chloride 0.45%. 1000 milliLiter(s) (75 mL/Hr) IV Continuous <Continuous>  dextrose 5%. 1000 milliLiter(s) (100 mL/Hr) IV Continuous <Continuous>  dextrose 5%. 1000 milliLiter(s) (50 mL/Hr) IV Continuous <Continuous>  dextrose 50% Injectable 12.5 Gram(s) IV Push once  dextrose 50% Injectable 25 Gram(s) IV Push once  dextrose 50% Injectable 25 Gram(s) IV Push once  dextrose Oral Gel 15 Gram(s) Oral once  dornase malik Solution 2.5 milliGRAM(s) Inhalation daily  enoxaparin Injectable 40 milliGRAM(s) SubCutaneous every 24 hours  insulin glargine Injectable (LANTUS) 3 Unit(s) SubCutaneous at bedtime  insulin lispro (ADMELOG) corrective regimen sliding scale   SubCutaneous every 6 hours  levalbuterol Inhalation 0.63 milliGRAM(s) Inhalation every 6 hours  lidocaine   4% Patch 1 Patch Transdermal at bedtime  metoclopramide Injectable 10 milliGRAM(s) IV Push every 8 hours  metoprolol tartrate Injectable 5 milliGRAM(s) IV Push every 6 hours  sodium chloride 3%  Inhalation 4 milliLiter(s) Inhalation every 6 hours    MEDICATIONS  (PRN):  HYDROmorphone  Injectable 0.5 milliGRAM(s) IV Push every 4 hours PRN moderate to severe pain  naloxone Injectable 0.1 milliGRAM(s) IV Push every 3 minutes PRN For ANY of the following changes in patient status:  A. RR LESS THAN 10 breaths per minute, B. Oxygen saturation LESS THAN 90%, C. Sedation score of 6      SOCIAL HISTORY:    FAMILY HISTORY:        LABS:                        8.9    12.91 )-----------( 177      ( 01 Aug 2024 05:15 )             27.4     08-01    131<L>  |  99  |  5<L>  ----------------------------<  67<L>  3.5   |  22  |  0.77    Ca    7.3<L>      01 Aug 2024 05:15  Phos  1.8     07-31  Mg     1.80     07-31        Urinalysis Basic - ( 01 Aug 2024 05:15 )    Color: x / Appearance: x / SG: x / pH: x  Gluc: 67 mg/dL / Ketone: x  / Bili: x / Urobili: x   Blood: x / Protein: x / Nitrite: x   Leuk Esterase: x / RBC: x / WBC x   Sq Epi: x / Non Sq Epi: x / Bacteria: x        RADIOLOGY & ADDITIONAL STUDIES:      ASSESSMENT/DISCUSSION:  Patient is a 59y old  Male who presents with a chief complaint of Esophagectomy (01 Aug 2024 13:45)  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  Slightly elevated white count. Patient on subq heparin.  Prior CT reviewed with attending Dr. Lo.    IR consulted for J tube for feeding.    Procedure has been TENTATIVELY APPROVED for TOMORROW 8/2/24.    RECOMMENDATIONS:    - obtain repeat non contrast CT abdomen/pelvis given prior with significant infiltration.  - pending results of above, can potentially do tomorrow.    - Please place order for IR Procedure "J tube placement", approving attending Dr. Lo  - Please complete "IR PRE-PROCEDURE NOTE" ([facility] only)  - NPO past midnight EXCEPT MEDICATIONS evening prior to procedure (@1159PM TONIGHT)  - hold lovenox minimum 12 h  - continue to hold therapeutic and prophylactic anticoagulants; contact IR in AM if pt required new AC overnight  - maintain active type and screen x 2  - Please draw AM labs @4AM morning of the procedure - CBC/INR/aPTT/BMP  - Plan for procedure TOMORROW  - d/w provider covering pt at time of approval  - Ensure the pt can and will provide consent; if alternative/family consent is required; please ensure the number is in the chart/handoff     Mao Newman MD  Radiology Resident    -Available on [employer] TEAMS for all non-urgent questions  -Emergent issues: [facility]-[phone]; [facility]-p.[phone] ([phone])  -Non-emergent consults: Please place a [place] order "Consult-Interventional Radiology" with an appropriate callback number  -Scheduling questions: [facility]: [phone]; [facility]: [phone]  -Clinic/Outpatient booking: [facility]: [phone]; [facility]: [phone]

## 2024-08-01 NOTE — PROGRESS NOTE ADULT - NS ATTEND AMEND GEN_ALL_CORE FT
Patient seen and examined. Agree with plan as detailed in PA/NP Note.     c/w  Losartan, HCTZ, Norvasc consider stopping Metoprolol     Mart Mason MD  Pager: [phone]

## 2024-08-01 NOTE — PROGRESS NOTE ADULT - ASSESSMENT
· Assessment	  59 y.o. man with history of GEJ adenocarcinoma (T3N0, stage 2A) s/p neoadjuvant chemotherapy, now s/p Michael-Broderick Esophagectomy on 7/16.     Sx f/up appreciated  OOB  Pain control Oxycodone  NPO  Eso leak: J tube    SOB:    CXR: Rt pleural effusion  Thoracic Sx f/up appreciated  Xopenex nebulizer    DM II:    FSSS  Lantus insulin  Endo f/up appreciated    HTN:    On IV Metoprolol/Hydralazine  Cardio f/up appreciated    Hypokalemia:    K supp

## 2024-08-01 NOTE — PROGRESS NOTE ADULT - SUBJECTIVE AND OBJECTIVE BOX
SURGICAL ONCOLOGY  Patient seen and examined.     MEDICATIONS  (STANDING):  dextrose 5% + sodium chloride 0.45%. 1000 milliLiter(s) (75 mL/Hr) IV Continuous <Continuous>  dextrose 5%. 1000 milliLiter(s) (100 mL/Hr) IV Continuous <Continuous>  dextrose 5%. 1000 milliLiter(s) (50 mL/Hr) IV Continuous <Continuous>  dextrose 50% Injectable 25 Gram(s) IV Push once  dextrose 50% Injectable 12.5 Gram(s) IV Push once  dextrose 50% Injectable 25 Gram(s) IV Push once  dextrose Oral Gel 15 Gram(s) Oral once  dornase malik Solution 2.5 milliGRAM(s) Inhalation daily  enoxaparin Injectable 40 milliGRAM(s) SubCutaneous every 24 hours  insulin glargine Injectable (LANTUS) 3 Unit(s) SubCutaneous at bedtime  insulin lispro (ADMELOG) corrective regimen sliding scale   SubCutaneous every 6 hours  levalbuterol Inhalation 0.63 milliGRAM(s) Inhalation every 6 hours  lidocaine   4% Patch 1 Patch Transdermal at bedtime  metoclopramide Injectable 10 milliGRAM(s) IV Push every 8 hours  metoprolol tartrate Injectable 5 milliGRAM(s) IV Push every 6 hours  sodium chloride 3%  Inhalation 4 milliLiter(s) Inhalation every 6 hours    MEDICATIONS  (PRN):  HYDROmorphone  Injectable 0.5 milliGRAM(s) IV Push every 4 hours PRN moderate to severe pain  naloxone Injectable 0.1 milliGRAM(s) IV Push every 3 minutes PRN For ANY of the following changes in patient status:  A. RR LESS THAN 10 breaths per minute, B. Oxygen saturation LESS THAN 90%, C. Sedation score of 6      Vital Signs Last 24 Hrs  T(C): 36.4 (01 Aug 2024 16:00), Max: 37 (01 Aug 2024 04:00)  T(F): 97.6 (01 Aug 2024 16:00), Max: 98.6 (01 Aug 2024 04:00)  HR: 94 (01 Aug 2024 16:00) (75 - 99)  BP: 128/63 (01 Aug 2024 16:00) (126/66 - 138/70)  BP(mean): --  RR: 18 (01 Aug 2024 16:00) (17 - 19)  SpO2: 97% (01 Aug 2024 16:00) (93% - 98%)    Parameters below as of 01 Aug 2024 16:00  Patient On (Oxygen Delivery Method): room air        I&O's Detail    31 Jul 2024 07:01  -  01 Aug 2024 07:00  --------------------------------------------------------  IN:    dextrose 5% + sodium chloride 0.9% w/ Additives: 840 mL    IV PiggyBack: 200 mL    Oral Fluid: 1260 mL  Total IN: 2300 mL    OUT:    Albumin 5%  - 250 mL: 0 mL    Bulb (mL): 235 mL    Voided (mL): 1150 mL  Total OUT: 1385 mL    Total NET: 915 mL      01 Aug 2024 07:01  -  01 Aug 2024 16:41  --------------------------------------------------------  IN:    dextrose 5% + sodium chloride 0.45%: 75 mL    IV PiggyBack: 100 mL    Oral Fluid: 300 mL  Total IN: 475 mL    OUT:    Bulb (mL): 270 mL    Voided (mL): 1400 mL  Total OUT: 1670 mL    Total NET: -1195 mL          Daily     Daily     LABS:                        8.9    12.91 )-----------( 177      ( 01 Aug 2024 05:15 )             27.4     08-01    131<L>  |  99  |  5<L>  ----------------------------<  67<L>  3.5   |  22  |  0.77    Ca    7.3<L>      01 Aug 2024 05:15  Phos  1.8     07-31  Mg     1.80     07-31        Urinalysis Basic - ( 01 Aug 2024 05:15 )    Color: x / Appearance: x / SG: x / pH: x  Gluc: 67 mg/dL / Ketone: x  / Bili: x / Urobili: x   Blood: x / Protein: x / Nitrite: x   Leuk Esterase: x / RBC: x / WBC x   Sq Epi: x / Non Sq Epi: x / Bacteria: x        Chief complaint:   PHYSICAL EXAM:  Gen: AOX3  Abd: soft NTND  R chest drain- clear fluid - appears like saliva    59yMale s/p   Plan:   -Pt clinically non toxic  Last CT chest- no leak  Drain amylase pending- if high would consider NPO and IR J tube  Management per thoracic  - I have discussed the diagnosis and therapeutic plan with the patient in detail. Patient expressed verbal understanding and willingness to proceed with proposed plan. All questions answered

## 2024-08-01 NOTE — PROVIDER CONTACT NOTE (OTHER) - ASSESSMENT
900 cc of white, thick drainage noted in VENKATA drain. VSS. 90 cc of white, thick drainage noted in VENKATA drain. VSS.

## 2024-08-02 LAB
ALBUMIN SERPL ELPH-MCNC: 2 G/DL — LOW (ref 3.3–5)
ALP SERPL-CCNC: 128 U/L — HIGH (ref 40–120)
ALT FLD-CCNC: 6 U/L — SIGNIFICANT CHANGE UP (ref 4–41)
ANION GAP SERPL CALC-SCNC: 9 MMOL/L — SIGNIFICANT CHANGE UP (ref 7–14)
APTT BLD: 33.9 SEC — SIGNIFICANT CHANGE UP (ref 24.5–35.6)
AST SERPL-CCNC: 14 U/L — SIGNIFICANT CHANGE UP (ref 4–40)
BILIRUB SERPL-MCNC: 0.6 MG/DL — SIGNIFICANT CHANGE UP (ref 0.2–1.2)
BLD GP AB SCN SERPL QL: NEGATIVE — SIGNIFICANT CHANGE UP
BUN SERPL-MCNC: 4 MG/DL — LOW (ref 7–23)
CALCIUM SERPL-MCNC: 7.3 MG/DL — LOW (ref 8.4–10.5)
CHLORIDE SERPL-SCNC: 98 MMOL/L — SIGNIFICANT CHANGE UP (ref 98–107)
CO2 SERPL-SCNC: 25 MMOL/L — SIGNIFICANT CHANGE UP (ref 22–31)
CREAT SERPL-MCNC: 0.8 MG/DL — SIGNIFICANT CHANGE UP (ref 0.5–1.3)
EGFR: 101 ML/MIN/1.73M2 — SIGNIFICANT CHANGE UP
GLUCOSE BLDC GLUCOMTR-MCNC: 149 MG/DL — HIGH (ref 70–99)
GLUCOSE BLDC GLUCOMTR-MCNC: 158 MG/DL — HIGH (ref 70–99)
GLUCOSE BLDC GLUCOMTR-MCNC: 178 MG/DL — HIGH (ref 70–99)
GLUCOSE BLDC GLUCOMTR-MCNC: 183 MG/DL — HIGH (ref 70–99)
GLUCOSE SERPL-MCNC: 152 MG/DL — HIGH (ref 70–99)
HCT VFR BLD CALC: 26.4 % — LOW (ref 39–50)
HGB BLD-MCNC: 8.8 G/DL — LOW (ref 13–17)
INR BLD: 1.08 RATIO — SIGNIFICANT CHANGE UP (ref 0.85–1.18)
MCHC RBC-ENTMCNC: 30 PG — SIGNIFICANT CHANGE UP (ref 27–34)
MCHC RBC-ENTMCNC: 33.3 GM/DL — SIGNIFICANT CHANGE UP (ref 32–36)
MCV RBC AUTO: 90.1 FL — SIGNIFICANT CHANGE UP (ref 80–100)
NRBC # BLD: 0 /100 WBCS — SIGNIFICANT CHANGE UP (ref 0–0)
NRBC # FLD: 0 K/UL — SIGNIFICANT CHANGE UP (ref 0–0)
PLATELET # BLD AUTO: 182 K/UL — SIGNIFICANT CHANGE UP (ref 150–400)
POTASSIUM SERPL-MCNC: 3.3 MMOL/L — LOW (ref 3.5–5.3)
POTASSIUM SERPL-SCNC: 3.3 MMOL/L — LOW (ref 3.5–5.3)
PREALB SERPL-MCNC: 5 MG/DL — LOW (ref 20–40)
PROT SERPL-MCNC: 5.1 G/DL — LOW (ref 6–8.3)
PROTHROM AB SERPL-ACNC: 12.2 SEC — SIGNIFICANT CHANGE UP (ref 9.5–13)
RBC # BLD: 2.93 M/UL — LOW (ref 4.2–5.8)
RBC # FLD: 13.3 % — SIGNIFICANT CHANGE UP (ref 10.3–14.5)
RH IG SCN BLD-IMP: POSITIVE — SIGNIFICANT CHANGE UP
SODIUM SERPL-SCNC: 132 MMOL/L — LOW (ref 135–145)
SURGICAL PATHOLOGY STUDY: SIGNIFICANT CHANGE UP
WBC # BLD: 13.24 K/UL — HIGH (ref 3.8–10.5)
WBC # FLD AUTO: 13.24 K/UL — HIGH (ref 3.8–10.5)

## 2024-08-02 PROCEDURE — 74150 CT ABDOMEN W/O CONTRAST: CPT | Mod: 26

## 2024-08-02 PROCEDURE — 49441 PLACE DUOD/JEJ TUBE PERC: CPT

## 2024-08-02 PROCEDURE — 71045 X-RAY EXAM CHEST 1 VIEW: CPT | Mod: 26

## 2024-08-02 PROCEDURE — 99232 SBSQ HOSP IP/OBS MODERATE 35: CPT

## 2024-08-02 PROCEDURE — 99223 1ST HOSP IP/OBS HIGH 75: CPT | Mod: GC

## 2024-08-02 RX ORDER — PIPERACILLIN SODIUM, TAZOBACTAM SODIUM 3; .375 G/15ML; G/15ML
3.38 INJECTION, POWDER, LYOPHILIZED, FOR SOLUTION INTRAVENOUS EVERY 8 HOURS
Refills: 0 | Status: DISCONTINUED | OUTPATIENT
Start: 2024-08-02 | End: 2024-08-09

## 2024-08-02 RX ADMIN — INSULIN GLARGINE-YFGN 3 UNIT(S): 100 INJECTION, SOLUTION SUBCUTANEOUS at 00:26

## 2024-08-02 RX ADMIN — Medication 1: at 00:27

## 2024-08-02 RX ADMIN — LEVALBUTEROL HYDROCHLORIDE 0.63 MILLIGRAM(S): 0.31 SOLUTION RESPIRATORY (INHALATION) at 03:45

## 2024-08-02 RX ADMIN — Medication 5 MILLIGRAM(S): at 12:42

## 2024-08-02 RX ADMIN — Medication 5 MILLIGRAM(S): at 00:29

## 2024-08-02 RX ADMIN — Medication 10 MILLIGRAM(S): at 05:14

## 2024-08-02 RX ADMIN — Medication 4 MILLILITER(S): at 22:38

## 2024-08-02 RX ADMIN — Medication 4 MILLILITER(S): at 03:45

## 2024-08-02 RX ADMIN — DEXTROSE MONOHYDRATE, SODIUM CHLORIDE, SODIUM LACTATE, CALCIUM CHLORIDE, MAGNESIUM CHLORIDE 75 MILLILITER(S): 1.5; 538; 448; 18.4; 5.08 SOLUTION INTRAPERITONEAL at 22:02

## 2024-08-02 RX ADMIN — Medication 5 MILLIGRAM(S): at 05:14

## 2024-08-02 RX ADMIN — Medication 10 MILLIGRAM(S): at 22:02

## 2024-08-02 RX ADMIN — Medication 1: at 06:42

## 2024-08-02 RX ADMIN — Medication 10 MILLIGRAM(S): at 14:49

## 2024-08-02 RX ADMIN — PIPERACILLIN SODIUM, TAZOBACTAM SODIUM 25 GRAM(S): 3; .375 INJECTION, POWDER, LYOPHILIZED, FOR SOLUTION INTRAVENOUS at 22:02

## 2024-08-02 RX ADMIN — LEVALBUTEROL HYDROCHLORIDE 0.63 MILLIGRAM(S): 0.31 SOLUTION RESPIRATORY (INHALATION) at 22:38

## 2024-08-02 NOTE — CONSULT NOTE ADULT - SUBJECTIVE AND OBJECTIVE BOX
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 POD #10 with post procedure course c/b high VENKATA drain output with studies showing elevated amylase although imaging with UGI without anastomotic leak. CTPA 7/28 with moderate right loculated hydropneumothorax with interval increase in size and small right apical PTX. Advanced GI consulted to further workup any underlying leak near anastomaosis.     Notably, pt with GE junction adenocarcinoma s/p esophagectomy with post procedure course c/  high VENKATA drain (?500 cc/24 hours) output with studies showing elevated amylase although imaging with UGI without anastomotic leak. CTPA 7/28 with moderate right loculated hydropneumothorax with interval increase in size and small right apical PTX. Advanced GI consulted to further workup any underlying leak near anastomaosis. Team planning to keep pt NPO with plan for J tube. Pt overall feeling well with no ongoing nausea, vomiting or abd pain.       Allergies:  No Known Allergies      Home Medications:  amLODIPine 5 mg oral tablet: 1 tab(s) orally once a day Noon (10 Jul 2024 09:06)  atorvastatin 20 mg oral tablet: 1 tab(s) orally once a day noon (10 Jul 2024 09:06)  Jardiance 25 mg oral tablet: 1 tab(s) orally once a day Noon (10 Jul 2024 09:06)  losartan-hydrochlorothiazide 50 mg-12.5 mg oral tablet: 1 tab(s) orally once a day (10 Jul 2024 09:06)  metFORMIN 500 mg oral tablet: 1 tab(s) orally 2 times a day (10 Jul 2024 09:16)  NovoLOG 100 units/mL subcutaneous solution: subcutaneous 3 times a day (before meals) 15 units (10 Jul 2024 09:44)  pantoprazole 40 mg oral delayed release tablet: 1 tab(s) orally once a day AM (10 Jul 2024 09:16)  Sodium Chloride, 1 G, PO, 2 times Daily:  (10 Jul 2024 09:16)  tamsulosin 0.4 mg oral capsule: 1 cap(s) orally once a day (at bedtime) (10 Jul 2024 09:16)      Hospital Medications:  dextrose 5% + sodium chloride 0.45%. 1000 milliLiter(s) IV Continuous <Continuous>  dextrose 5%. 1000 milliLiter(s) IV Continuous <Continuous>  dextrose 5%. 1000 milliLiter(s) IV Continuous <Continuous>  dextrose 50% Injectable 12.5 Gram(s) IV Push once  dextrose 50% Injectable 25 Gram(s) IV Push once  dextrose 50% Injectable 25 Gram(s) IV Push once  dextrose Oral Gel 15 Gram(s) Oral once  dornase malik Solution 2.5 milliGRAM(s) Inhalation daily  enoxaparin Injectable 40 milliGRAM(s) SubCutaneous every 24 hours  HYDROmorphone  Injectable 0.5 milliGRAM(s) IV Push every 4 hours PRN  insulin glargine Injectable (LANTUS) 3 Unit(s) SubCutaneous at bedtime  insulin lispro (ADMELOG) corrective regimen sliding scale   SubCutaneous every 6 hours  levalbuterol Inhalation 0.63 milliGRAM(s) Inhalation every 6 hours  lidocaine   4% Patch 1 Patch Transdermal at bedtime  metoclopramide Injectable 10 milliGRAM(s) IV Push every 8 hours  metoprolol tartrate Injectable 5 milliGRAM(s) IV Push every 6 hours  naloxone Injectable 0.1 milliGRAM(s) IV Push every 3 minutes PRN  sodium chloride 3%  Inhalation 4 milliLiter(s) Inhalation every 6 hours      PMHX/PSHX:    DM (diabetes mellitus)  HLD (hyperlipidemia)  Insulin dependent type 2 diabetes mellitus  BPH (benign prostatic hyperplasia)  HTN (hypertension)  Gastroesophageal cancer  History of removal of Port-a-Cath  [place] Broderick Esophagectomy     Family history:  No pertinent family history in first degree relatives    Social History:   Tob: Denies  EtOH: Denies  Illicit Drugs: Denies    ROS: Complete and normal except as mentioned above      PHYSICAL EXAM:   GENERAL:  No acute distress  HEENT:  NCAT, no scleral icterus   CHEST:  no respiratory distress  HEART:  Regular rate and rhythm  ABDOMEN:  Soft, non-tender, non-distended, normoactive bowel sounds  NEURO:  Alert and oriented x 3    Vital Signs:  Vital Signs Last 24 Hrs  T(C): 37.3 (02 Aug 2024 08:00), Max: 37.3 (02 Aug 2024 08:00)  T(F): 99.1 (02 Aug 2024 08:00), Max: 99.1 (02 Aug 2024 08:00)  HR: 82 (02 Aug 2024 08:00) (71 - 98)  BP: 124/59 (02 Aug 2024 08:00) (122/61 - 138/65)  BP(mean): --  RR: 16 (02 Aug 2024 08:00) (16 - 18)  SpO2: 93% (02 Aug 2024 08:00) (92% - 98%)    Parameters below as of 02 Aug 2024 08:00  Patient On (Oxygen Delivery Method): room air    LABS:                        8.8    13.24 )-----------( 182      ( 02 Aug 2024 05:45 )             26.4     Mean Cell Volume: 90.1 fL (08-02-24 @ 05:45)    08-02    132<L>  |  98  |  4<L>  ----------------------------<  152<H>  3.3<L>   |  25  |  0.80    Ca    7.3<L>      02 Aug 2024 05:45    TPro  5.1<L>  /  Alb  2.0<L>  /  TBili  0.6  /  DBili  x   /  AST  14  /  ALT  6   /  AlkPhos  128<H>  08-02    LIVER FUNCTIONS - ( 02 Aug 2024 05:45 )  Alb: 2.0 g/dL / Pro: 5.1 g/dL / ALK PHOS: 128 U/L / ALT: 6 U/L / AST: 14 U/L / GGT: x           PT/INR - ( 02 Aug 2024 05:45 )   PT: 12.2 sec;   INR: 1.08 ratio         PTT - ( 02 Aug 2024 05:45 )  PTT:33.9 sec  Urinalysis Basic - ( 02 Aug 2024 05:45 )    Color: x / Appearance: x / SG: x / pH: x  Gluc: 152 mg/dL / Ketone: x  / Bili: x / Urobili: x   Blood: x / Protein: x / Nitrite: x   Leuk Esterase: x / RBC: x / WBC x   Sq Epi: x / Non Sq Epi: x / Bacteria: x                              8.8    13.24 )-----------( 182      ( 02 Aug 2024 05:45 )             26.4                         8.9    12.91 )-----------( 177      ( 01 Aug 2024 05:15 )             27.4                         8.8    8.31  )-----------( 176      ( 31 Jul 2024 05:47 )             26.7       Imaging:  < from: CT Chest w/ Oral Cont (07.30.24 @ 12:23) >  BONES: No acute abnormality.    IMPRESSION:    Status post distal esophagectomy with gastric pull-through. No evidence   of extraluminal contrast extravasation.    New groundglass opacities in the left upper and lower lobes are likely   infectious/inflammatory.    Mild decrease in size of a small right hydropneumothorax.  Stable small   left pleural effusion.    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 POD #10 with post procedure course c/b high VENKATA drain output with studies showing elevated amylase although imaging with UGI without anastomotic leak. CTPA 7/28 with moderate right loculated hydropneumothorax with interval increase in size and small right apical PTX. Advanced GI consulted to further workup any underlying leak near anastomaosis.     Notably, pt with GE junction adenocarcinoma s/p esophagectomy with post procedure course c/ b high VENKATA drain (?500 cc/24 hours) output with studies showing elevated amylase although imaging with UGI without anastomotic leak. CTPA 7/28 with moderate right loculated hydropneumothorax with interval increase in size and small right apical PTX. Advanced GI consulted to further workup any underlying leak near anastomaosis. Team planning to keep pt NPO with plan for J tube. Pt overall feeling well with no ongoing nausea, vomiting or abd pain outside of some pain near VENKATA drain.    Allergies:  No Known Allergies      Home Medications:  amLODIPine 5 mg oral tablet: 1 tab(s) orally once a day Noon (10 Jul 2024 09:06)  atorvastatin 20 mg oral tablet: 1 tab(s) orally once a day noon (10 Jul 2024 09:06)  Jardiance 25 mg oral tablet: 1 tab(s) orally once a day Noon (10 Jul 2024 09:06)  losartan-hydrochlorothiazide 50 mg-12.5 mg oral tablet: 1 tab(s) orally once a day (10 Jul 2024 09:06)  metFORMIN 500 mg oral tablet: 1 tab(s) orally 2 times a day (10 Jul 2024 09:16)  NovoLOG 100 units/mL subcutaneous solution: subcutaneous 3 times a day (before meals) 15 units (10 Jul 2024 09:44)  pantoprazole 40 mg oral delayed release tablet: 1 tab(s) orally once a day AM (10 Jul 2024 09:16)  Sodium Chloride, 1 G, PO, 2 times Daily:  (10 Jul 2024 09:16)  tamsulosin 0.4 mg oral capsule: 1 cap(s) orally once a day (at bedtime) (10 Jul 2024 09:16)      Hospital Medications:  dextrose 5% + sodium chloride 0.45%. 1000 milliLiter(s) IV Continuous <Continuous>  dextrose 5%. 1000 milliLiter(s) IV Continuous <Continuous>  dextrose 5%. 1000 milliLiter(s) IV Continuous <Continuous>  dextrose 50% Injectable 12.5 Gram(s) IV Push once  dextrose 50% Injectable 25 Gram(s) IV Push once  dextrose 50% Injectable 25 Gram(s) IV Push once  dextrose Oral Gel 15 Gram(s) Oral once  dornase malik Solution 2.5 milliGRAM(s) Inhalation daily  enoxaparin Injectable 40 milliGRAM(s) SubCutaneous every 24 hours  HYDROmorphone  Injectable 0.5 milliGRAM(s) IV Push every 4 hours PRN  insulin glargine Injectable (LANTUS) 3 Unit(s) SubCutaneous at bedtime  insulin lispro (ADMELOG) corrective regimen sliding scale   SubCutaneous every 6 hours  levalbuterol Inhalation 0.63 milliGRAM(s) Inhalation every 6 hours  lidocaine   4% Patch 1 Patch Transdermal at bedtime  metoclopramide Injectable 10 milliGRAM(s) IV Push every 8 hours  metoprolol tartrate Injectable 5 milliGRAM(s) IV Push every 6 hours  naloxone Injectable 0.1 milliGRAM(s) IV Push every 3 minutes PRN  sodium chloride 3%  Inhalation 4 milliLiter(s) Inhalation every 6 hours      PMHX/PSHX:    DM (diabetes mellitus)  HLD (hyperlipidemia)  Insulin dependent type 2 diabetes mellitus  BPH (benign prostatic hyperplasia)  HTN (hypertension)  Gastroesophageal cancer  History of removal of Port-a-Cath  [place] Broderick Esophagectomy     Family history:  No pertinent family history in first degree relatives    Social History:   Tob: Denies  EtOH: Denies  Illicit Drugs: Denies    ROS: Complete and normal except as mentioned above      PHYSICAL EXAM:   GENERAL:  No acute distress  HEENT:  NCAT, no scleral icterus   CHEST:  no respiratory distress  HEART:  Regular rate and rhythm  ABDOMEN:  Soft, non-tender, non-distended, normoactive bowel sounds. VENKATA drain with white appearing milky fluid.   NEURO:  Alert and oriented x 3    Vital Signs:  Vital Signs Last 24 Hrs  T(C): 37.3 (02 Aug 2024 08:00), Max: 37.3 (02 Aug 2024 08:00)  T(F): 99.1 (02 Aug 2024 08:00), Max: 99.1 (02 Aug 2024 08:00)  HR: 82 (02 Aug 2024 08:00) (71 - 98)  BP: 124/59 (02 Aug 2024 08:00) (122/61 - 138/65)  BP(mean): --  RR: 16 (02 Aug 2024 08:00) (16 - 18)  SpO2: 93% (02 Aug 2024 08:00) (92% - 98%)    Parameters below as of 02 Aug 2024 08:00  Patient On (Oxygen Delivery Method): room air    LABS:                        8.8    13.24 )-----------( 182      ( 02 Aug 2024 05:45 )             26.4     Mean Cell Volume: 90.1 fL (08-02-24 @ 05:45)    08-02    132<L>  |  98  |  4<L>  ----------------------------<  152<H>  3.3<L>   |  25  |  0.80    Ca    7.3<L>      02 Aug 2024 05:45    TPro  5.1<L>  /  Alb  2.0<L>  /  TBili  0.6  /  DBili  x   /  AST  14  /  ALT  6   /  AlkPhos  128<H>  08-02    LIVER FUNCTIONS - ( 02 Aug 2024 05:45 )  Alb: 2.0 g/dL / Pro: 5.1 g/dL / ALK PHOS: 128 U/L / ALT: 6 U/L / AST: 14 U/L / GGT: x           PT/INR - ( 02 Aug 2024 05:45 )   PT: 12.2 sec;   INR: 1.08 ratio         PTT - ( 02 Aug 2024 05:45 )  PTT:33.9 sec  Urinalysis Basic - ( 02 Aug 2024 05:45 )    Color: x / Appearance: x / SG: x / pH: x  Gluc: 152 mg/dL / Ketone: x  / Bili: x / Urobili: x   Blood: x / Protein: x / Nitrite: x   Leuk Esterase: x / RBC: x / WBC x   Sq Epi: x / Non Sq Epi: x / Bacteria: x                              8.8    13.24 )-----------( 182      ( 02 Aug 2024 05:45 )             26.4                         8.9    12.91 )-----------( 177      ( 01 Aug 2024 05:15 )             27.4                         8.8    8.31  )-----------( 176      ( 31 Jul 2024 05:47 )             26.7       Imaging:  < from: CT Chest w/ Oral Cont (07.30.24 @ 12:23) >  BONES: No acute abnormality.    IMPRESSION:    Status post distal esophagectomy with gastric pull-through. No evidence   of extraluminal contrast extravasation.    New groundglass opacities in the left upper and lower lobes are likely   infectious/inflammatory.    Mild decrease in size of a small right hydropneumothorax.  Stable small   left pleural effusion.    < end of copied text >

## 2024-08-02 NOTE — PROGRESS NOTE ADULT - SUBJECTIVE AND OBJECTIVE BOX
Subjective: "I feel alright" Pt concerned about high output from chest lavern, now more light tan in appearance. OOB w assist. WBC rising. AFebrile. Discussed w Dr. Laureano, proceed with CT abd today and J tube placement, GI consulted for consideration of esophageal stent if needed.       Vital Signs Last 24 Hrs  T(C): 37.3 (08-02-24 @ 08:00), Max: 37.3 (08-02-24 @ 08:00)  T(F): 99.1 (08-02-24 @ 08:00), Max: 99.1 (08-02-24 @ 08:00)  HR: 82 (08-02-24 @ 08:00) (71 - 98)  BP: 124/59 (08-02-24 @ 08:00) (122/61 - 138/65)  RR: 16 (08-02-24 @ 08:00) (16 - 18)  SpO2: 93% (08-02-24 @ 08:00) (92% - 98%) on (O2)    General: WD NAD  Neurology: A&Ox3, nonfocal, ALFORD x 4  Eyes: PERRLA/ EOMI, Gross vision intact  ENT/Neck: Neck supple, trachea midline, No JVD, Gross hearing intact  Respiratory: CTA B/L, No wheezing, rales, rhonchi.   CV: RRR, S1S2, no murmurs, rubs or gallops  Abdominal: Soft, NT, ND +BS. NPO pending fluid testing.   Extremities: + general edema throughout bl UE, bl LE and abdomen +anasarca, + peripheral pulses  Skin: No Rashes, Hematoma, Ecchymosis  Incisions: Rt CW incisions c/d/i. Abd incisions intact, left LQ incision w/ drainage, no signs of infection  Relevant labs, radiology and Medications reviewed                        8.8    13.24 )-----------( 182      ( 02 Aug 2024 05:45 )             26.4     08-02    132<L>  |  98  |  4<L>  ----------------------------<  152<H>  3.3<L>   |  25  |  0.80    Ca    7.3<L>      02 Aug 2024 05:45    TPro  5.1<L>  /  Alb  2.0<L>  /  TBili  0.6  /  DBili  x   /  AST  14  /  ALT  6   /  AlkPhos  128<H>  08-02    PT/INR - ( 02 Aug 2024 05:45 )   PT: 12.2 sec;   INR: 1.08 ratio         PTT - ( 02 Aug 2024 05:45 )  PTT:33.9 sec  MEDICATIONS  (STANDING):  dextrose 5% + sodium chloride 0.45%. 1000 milliLiter(s) (75 mL/Hr) IV Continuous <Continuous>  dextrose 5%. 1000 milliLiter(s) (50 mL/Hr) IV Continuous <Continuous>  dextrose 5%. 1000 milliLiter(s) (100 mL/Hr) IV Continuous <Continuous>  dextrose 50% Injectable 12.5 Gram(s) IV Push once  dextrose 50% Injectable 25 Gram(s) IV Push once  dextrose 50% Injectable 25 Gram(s) IV Push once  dextrose Oral Gel 15 Gram(s) Oral once  dornase malik Solution 2.5 milliGRAM(s) Inhalation daily  enoxaparin Injectable 40 milliGRAM(s) SubCutaneous every 24 hours  insulin glargine Injectable (LANTUS) 3 Unit(s) SubCutaneous at bedtime  insulin lispro (ADMELOG) corrective regimen sliding scale   SubCutaneous every 6 hours  levalbuterol Inhalation 0.63 milliGRAM(s) Inhalation every 6 hours  lidocaine   4% Patch 1 Patch Transdermal at bedtime  metoclopramide Injectable 10 milliGRAM(s) IV Push every 8 hours  metoprolol tartrate Injectable 5 milliGRAM(s) IV Push every 6 hours  sodium chloride 3%  Inhalation 4 milliLiter(s) Inhalation every 6 hours    MEDICATIONS  (PRN):  HYDROmorphone  Injectable 0.5 milliGRAM(s) IV Push every 4 hours PRN moderate to severe pain  naloxone Injectable 0.1 milliGRAM(s) IV Push every 3 minutes PRN For ANY of the following changes in patient status:  A. RR LESS THAN 10 breaths per minute, B. Oxygen saturation LESS THAN 90%, C. Sedation score of 6    I&O's Summary    01 Aug 2024 07:01  -  02 Aug 2024 07:00  --------------------------------------------------------  IN: 1450 mL / OUT: 3000 mL / NET: -1550 mL    02 Aug 2024 07:01  -  02 Aug 2024 11:20  --------------------------------------------------------  IN: 0 mL / OUT: 220 mL / NET: -220 mL    Assessment  59y Male  w/ PAST MEDICAL & SURGICAL HISTORY:  HLD (hyperlipidemia)      Insulin dependent type 2 diabetes mellitus      BPH (benign prostatic hyperplasia)      HTN (hypertension)      Gastroesophageal cancer      Gastroesophageal cancer      Port-A-Cath in place      admitted with complaints of Patient is a 59y old  Male who presents with a chief complaint of Esophagectomy (02 Aug 2024 10:46)    59 year old male with PMH HTN and DM 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 Pt s/p barium swallow and started on CLD. Postop CT CAP reviewed w Dr Laureano. Pt is now on Zosyn and advanced to soft diet. Sinus tachycardia since last night. Now on nasal cannula.  7/29: Pt with now improving leukocytosis and s/op CTA r/o PE for tachycardia over the weekend. Imaging reviewed by Dr Laureano, no acute findings and able to advance to soft diet as previously mentioned. Patient tolerating PO diet well. Patient without fevers and with drain in place on IV abx.   7/30-7/31-CT chest completed, no contrast extravasation. Pt resumed CLD and advanced to FLD today.  8/1-Today noted to have continued high drain output. Inc in Leukocytosis. Pt with generalized 3rd spacing. Fluid specimens sent, cont Zosyn.   8/2 Drain still w/ high output. Went for abd CT, plan for J tube placement. GI onboard to help eval for leak / intervene in future if necessary      PLAN  Neuro: Pain management  Pulm: Encourage coughing, deep breathing and use of incentive spirometry. Wean off supplemental oxygen as able. Daily CXR.   Cardio: Monitor telemetry/alarms  Endo: Monitor bld sugars, IVF w glucose for now while NPO.   GI: NPO w sips and chips for now. Will resume IVF. Nutritional numbers in am. Amylase > 7500, culture sent. J tube by IR planned for td  Renal: monitor urine output, supplement electrolytes as needed. Monitor electrolytes.   Vasc: Heparin SC/SCDs for DVT prophylaxis  Heme: Stable H/H. .   ID: Continue Zosyn. Trend Leukocytosis.   Therapy: OOB/ambulate  Tubes: Monitor Chest tube output. Watch Lavern output. Surg Onc aware of draining wound.   Disposition: Per VS, cont monitor output. IF output > 200 per 24 hours over weekend rescan CT Chest abd with PO contrast to help GI eval for anastomosis leak   Discussed with Cardiothoracic Team at AM rounds.

## 2024-08-02 NOTE — PROGRESS NOTE ADULT - ASSESSMENT
59 year old gentleman with diagnosis of adenocarcinoma at GE junction s/p chemotherapy and radiation  Now s/p robotic resection on 7/16    Post operatively, he had worsening leukocytosis  Imaging with a right sided loculated effusion  Drain in placeinitally draining brown fluid with debris  Drainage now looks like saliva    Fluid had high amylase  Concenring for leak    Now s/p jejunosotmy    Pleural fluid culture from 8/1 was sent from a drain that has been in place.  Already growing VRE.  I suspect growth will be polymicrobial.   I suspect this culture growth represents colonization.  I would not direct antibiotics to cover these organisms.     Continue zosyn for now    Discussed with CT surgery.    I will be away 8/3-8/11. ID service will be available as needed and can be reached at [phone] or the fellow on call can be contacted via TEAMS      Chicho Liu MD  Please contact via TEAM between 8 am and 6 pm    In the evening or on weekends, can contact call service at [phone]

## 2024-08-02 NOTE — PROGRESS NOTE ADULT - ASSESSMENT
The patient is a 59y Male with PMH of T2DM, HTN, HLD, GE junction cancer here for esophagectomy now postop, prolonged course.  Endocrinology consulted for T2DM.     Uncontrolled Type 2 Diabetes Mellitus   - Follows with: Dr. Nolasco  - A1C with Estimated Average Glucose Result: 10.9 % (07-13-24)  - home regimen: Semglee 18 units, Novolog 6 units with meals      Inpatient plan   - FS goal 100-180 mg/dl   - NPO  - continue Lantus 3 units at bedtime   - continue LOW Admelog correction scale q6 while NPO --> TID AC if started on diet   - FS q6 while NPO --- TID AC if diet started   - hypoglycemia protocol PRN   - consistent carbohydrate diet if eating     Discharge plan   - Discharge recs pending clinical course and nutrition plan. Plan to resume Semglee and Admelog, will determine final doses based on hospital requirements/PO intake at time of discharge.   - Patient has an appointment with Dr. Nolasco scheduled for August 8th at 8:20 AM at [address] [facility] [address].   - Please ensure patient has a working glucometer and supplies - test strips, lancets, alcohol pads and insulin pen needles.       Hypertension  - Goal BP <130/80  - continue metoprolol  - Management as per primary team  - check urine microalbumin level as outpatient      Hyperlipidemia  - LDL goal <70  - check lipid panel as outpatient on a yearly basis  - management per primary team       Hypercalcemia  calcium 7.3  albumin 2  corrected calcium 8.9  vitamin 25OHD - 6.5 severely low.   Start ergocalciferol 50,000 units PO once weekly once able to take PO        TETE Day-BC  Nurse Practitioner  Division of Endocrinology & Diabetes  pager [phone]

## 2024-08-02 NOTE — CONSULT NOTE ADULT - CONSULT REQUESTED DATE/TIME
02-Aug-2024 10:47
13-Jul-2024 11:55
17-Jul-2024 15:34
01-Aug-2024 15:58
15-Jul-2024 16:02
26-Jul-2024 12:07

## 2024-08-02 NOTE — CONSULT NOTE ADULT - ASSESSMENT
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 POD #10 with post procedure course c/b high VENKATA drain output with studies showing elevated amylase although imaging with UGI without anastomotic leak. CTPA 7/28 with moderate right loculated hydropneumothorax with interval increase in size and small right apical PTX. Advanced GI consulted to further workup any underlying leak near anastomaosis.     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 CTPA 7/28 with moderate right loculated hydropneumothorax with interval increase in size and small right apical PTX. Advanced GI consulted to further workup any underlying leak near anastomaosis. Please obtain CTAP with PO contrast to asses for leak. Pending results of the above, may consider EGD on Monday for luminal assesment of any underlying leak.     Recommendations:  -Please keep NPO  -Please obtain CTAP with PO contrast. Pending results of imaging may consider EGD on Monday for luminal assesment of any underlying leak.   -Plesae check TG level from VENKATA drain and monitor VENKATA output     All recommendations are tentative until note is attested by attending.

## 2024-08-02 NOTE — PROGRESS NOTE ADULT - SUBJECTIVE AND OBJECTIVE BOX
Chief Complaint: DM type 2     Interval Events: Sitting in the chair. FS at goal. NPO.     MEDICATIONS  (STANDING):  dextrose 5% + sodium chloride 0.45%. 1000 milliLiter(s) (75 mL/Hr) IV Continuous <Continuous>  dextrose 5%. 1000 milliLiter(s) (50 mL/Hr) IV Continuous <Continuous>  dextrose 5%. 1000 milliLiter(s) (100 mL/Hr) IV Continuous <Continuous>  dextrose 50% Injectable 12.5 Gram(s) IV Push once  dextrose 50% Injectable 25 Gram(s) IV Push once  dextrose 50% Injectable 25 Gram(s) IV Push once  dextrose Oral Gel 15 Gram(s) Oral once  dornase malik Solution 2.5 milliGRAM(s) Inhalation daily  enoxaparin Injectable 40 milliGRAM(s) SubCutaneous every 24 hours  insulin glargine Injectable (LANTUS) 3 Unit(s) SubCutaneous at bedtime  insulin lispro (ADMELOG) corrective regimen sliding scale   SubCutaneous every 6 hours  levalbuterol Inhalation 0.63 milliGRAM(s) Inhalation every 6 hours  lidocaine   4% Patch 1 Patch Transdermal at bedtime  metoclopramide Injectable 10 milliGRAM(s) IV Push every 8 hours  metoprolol tartrate Injectable 5 milliGRAM(s) IV Push every 6 hours  sodium chloride 3%  Inhalation 4 milliLiter(s) Inhalation every 6 hours    MEDICATIONS  (PRN):  HYDROmorphone  Injectable 0.5 milliGRAM(s) IV Push every 4 hours PRN moderate to severe pain  naloxone Injectable 0.1 milliGRAM(s) IV Push every 3 minutes PRN For ANY of the following changes in patient status:  A. RR LESS THAN 10 breaths per minute, B. Oxygen saturation LESS THAN 90%, C. Sedation score of 6      Allergies  No Known Allergies    Review of Systems:  Constitutional: No fever/chills   Eyes: No blurry vision  Neuro: No tremors  HEENT: No pain  Cardiovascular: No chest pain, no palpitations  Respiratory: No SOB, no cough  GI: No nausea, vomiting or abdominal pain  : No dysuria  Endocrine: no polyuria, polydipsia    VITALS: T(C): 37.3 (08-02-24 @ 08:00)  T(F): 99.1 (08-02-24 @ 08:00), Max: 99.1 (08-02-24 @ 08:00)  HR: 82 (08-02-24 @ 08:00) (71 - 98)  BP: 124/59 (08-02-24 @ 08:00) (122/61 - 138/65)  RR:  (16 - 18)  SpO2:  (92% - 98%)      Physical Exam:   GENERAL: NAD, well-groomed, well-developed  EYES: No proptosis, no lid lag, anicteric  HEENT:  Atraumatic, Normocephalic, moist mucous membranes  RESPIRATORY: non labored breathing   GI: Soft, nontender, non distended, +VENKATA drain   SKIN: Dry, intact, No rashes or lesions  MUSCULOSKELETAL: Full range of motion, normal strength  NEURO: extraocular movements intact, no tremor  PSYCH: Alert and oriented x 3, pleasant, normal affect, normal mood      CAPILLARY BLOOD GLUCOSE  POCT Blood Glucose.: 158 mg/dL (02 Aug 2024 06:33)  POCT Blood Glucose.: 166 mg/dL (01 Aug 2024 23:36)  POCT Blood Glucose.: 181 mg/dL (01 Aug 2024 17:19)      08-02    132<L>  |  98  |  4<L>  ----------------------------<  152<H>  3.3<L>   |  25  |  0.80    eGFR: 101    Ca    7.3<L>      08-02  Mg     1.80     07-31  Phos  1.8     07-31    TPro  5.1<L>  /  Alb  2.0<L>  /  TBili  0.6  /  DBili  x   /  AST  14  /  ALT  6   /  AlkPhos  128<H>  08-02      A1C with Estimated Average Glucose Result: 10.9 % (07-13-24 @ 02:44)  A1C with Estimated Average Glucose Result: 12.8 % (01-01-24 @ 06:00)

## 2024-08-02 NOTE — PROCEDURE NOTE - PROCEDURE FINDINGS AND DETAILS
Successful placement of 12F jejunostomy tube in the LLQ connected to gravity drainage bag. Keep connected to bag for 24 hours, then trial of saline for 4 hours at tube feed rate, then can transition to tube feeds. Full report to follow.

## 2024-08-02 NOTE — PROGRESS NOTE ADULT - SUBJECTIVE AND OBJECTIVE BOX
Follow Up:      Inverval History/ROS:Patient is a 59y old  Male who presents with a chief complaint of Esophagectomy (02 Aug 2024 16:54)    Fluid from drain is suggestive of leak  S/p jejunostomy tube placement    Allergies    No Known Allergies    Intolerances        ANTIMICROBIALS:      OTHER MEDS:  dextrose 5% + sodium chloride 0.45%. 1000 milliLiter(s) IV Continuous <Continuous>  dextrose 5%. 1000 milliLiter(s) IV Continuous <Continuous>  dextrose 5%. 1000 milliLiter(s) IV Continuous <Continuous>  dextrose 50% Injectable 12.5 Gram(s) IV Push once  dextrose 50% Injectable 25 Gram(s) IV Push once  dextrose 50% Injectable 25 Gram(s) IV Push once  dextrose Oral Gel 15 Gram(s) Oral once  dornase malik Solution 2.5 milliGRAM(s) Inhalation daily  enoxaparin Injectable 40 milliGRAM(s) SubCutaneous every 24 hours  HYDROmorphone  Injectable 0.5 milliGRAM(s) IV Push every 4 hours PRN  insulin glargine Injectable (LANTUS) 3 Unit(s) SubCutaneous at bedtime  insulin lispro (ADMELOG) corrective regimen sliding scale   SubCutaneous every 6 hours  levalbuterol Inhalation 0.63 milliGRAM(s) Inhalation every 6 hours  lidocaine   4% Patch 1 Patch Transdermal at bedtime  metoclopramide Injectable 10 milliGRAM(s) IV Push every 8 hours  metoprolol tartrate Injectable 5 milliGRAM(s) IV Push every 6 hours  naloxone Injectable 0.1 milliGRAM(s) IV Push every 3 minutes PRN  sodium chloride 3%  Inhalation 4 milliLiter(s) Inhalation every 6 hours      Vital Signs Last 24 Hrs  T(C): 37.1 (02 Aug 2024 19:00), Max: 37.3 (02 Aug 2024 08:00)  T(F): 98.8 (02 Aug 2024 19:00), Max: 99.1 (02 Aug 2024 08:00)  HR: 90 (02 Aug 2024 19:30) (71 - 98)  BP: 146/69 (02 Aug 2024 19:30) (122/61 - 146/69)  BP(mean): --  RR: 15 (02 Aug 2024 19:30) (15 - 18)  SpO2: 95% (02 Aug 2024 19:30) (93% - 98%)    Parameters below as of 02 Aug 2024 19:00  Patient On (Oxygen Delivery Method): room air        PHYSICAL EXAM:  General: [ ] non-toxic  HEAD/EYES: [ ] PERRL [x ] white sclera [ ] icterus  ENT:  [ ] normal [x ] supple [ ] thrush [ ] pharyngeal exudate  Cardiovascular:   [ ] murmur [x ] normal [ ] PPM/AICD  Respiratory:  [ x] clear to ausculation bilaterally  GI:  [x ] soft, non-tender, normal bowel sounds  :  [ ] stallworth [ ] no CVA tenderness   Musculoskeletal:  [x ] no synovitis  Neurologic:  [ ] non-focal exam   Skin:  x[ ] no rash  Lymph: [x ] no lymphadenopathy  Psychiatric:  [ ] appropriate affect [ ] alert & oriented  Lines:  [x ] no phlebitis [ ] central line                                8.8    13.24 )-----------( 182      ( 02 Aug 2024 05:45 )             26.4       08-02    132<L>  |  98  |  4<L>  ----------------------------<  152<H>  3.3<L>   |  25  |  0.80    Ca    7.3<L>      02 Aug 2024 05:45    TPro  5.1<L>  /  Alb  2.0<L>  /  TBili  0.6  /  DBili  x   /  AST  14  /  ALT  6   /  AlkPhos  128<H>  08-02      Urinalysis Basic - ( 02 Aug 2024 05:45 )    Color: x / Appearance: x / SG: x / pH: x  Gluc: 152 mg/dL / Ketone: x  / Bili: x / Urobili: x   Blood: x / Protein: x / Nitrite: x   Leuk Esterase: x / RBC: x / WBC x   Sq Epi: x / Non Sq Epi: x / Bacteria: x        MICROBIOLOGY:Culture Results:   Numerous Enterococcus faecium (08-01-24 @ 10:00)      RADIOLOGY:

## 2024-08-02 NOTE — CONSULT NOTE ADULT - CONSULT REASON
pna and abx recommendations
r/o esophogeal leak
Office Patient
Medical Co-Management
J tube placement
T2DM

## 2024-08-02 NOTE — PROGRESS NOTE ADULT - ASSESSMENT
· Assessment	  59 y.o. man with history of GEJ adenocarcinoma (T3N0, stage 2A) s/p neoadjuvant chemotherapy, now s/p [place]-Broderick Esophagectomy on 7/16.     Sx f/up appreciated  OOB  Pain control Oxycodone  NPO  Eso leak: J tube    SOB:    CXR: Rt pleural effusion  Thoracic Sx f/up appreciated  Xopenex nebulizer    DM II:    FSSS  Lantus insulin  Endo f/up appreciated    HTN:    On IV Metoprolol/Hydralazine  Cardio f/up appreciated    Hypokalemia:    K supp

## 2024-08-02 NOTE — PROGRESS NOTE ADULT - SUBJECTIVE AND OBJECTIVE BOX
Patient is a 59y old  Male who presents with a chief complaint of Esophagectomy (02 Aug 2024 12:10)      SUBJECTIVE / OVERNIGHT EVENTS:    MEDICATIONS  (STANDING):  dextrose 5% + sodium chloride 0.45%. 1000 milliLiter(s) (75 mL/Hr) IV Continuous <Continuous>  dextrose 5%. 1000 milliLiter(s) (100 mL/Hr) IV Continuous <Continuous>  dextrose 5%. 1000 milliLiter(s) (50 mL/Hr) IV Continuous <Continuous>  dextrose 50% Injectable 12.5 Gram(s) IV Push once  dextrose 50% Injectable 25 Gram(s) IV Push once  dextrose 50% Injectable 25 Gram(s) IV Push once  dextrose Oral Gel 15 Gram(s) Oral once  dornase malik Solution 2.5 milliGRAM(s) Inhalation daily  enoxaparin Injectable 40 milliGRAM(s) SubCutaneous every 24 hours  insulin glargine Injectable (LANTUS) 3 Unit(s) SubCutaneous at bedtime  insulin lispro (ADMELOG) corrective regimen sliding scale   SubCutaneous every 6 hours  levalbuterol Inhalation 0.63 milliGRAM(s) Inhalation every 6 hours  lidocaine   4% Patch 1 Patch Transdermal at bedtime  metoclopramide Injectable 10 milliGRAM(s) IV Push every 8 hours  metoprolol tartrate Injectable 5 milliGRAM(s) IV Push every 6 hours  sodium chloride 3%  Inhalation 4 milliLiter(s) Inhalation every 6 hours    MEDICATIONS  (PRN):  HYDROmorphone  Injectable 0.5 milliGRAM(s) IV Push every 4 hours PRN moderate to severe pain  naloxone Injectable 0.1 milliGRAM(s) IV Push every 3 minutes PRN For ANY of the following changes in patient status:  A. RR LESS THAN 10 breaths per minute, B. Oxygen saturation LESS THAN 90%, C. Sedation score of 6      Vital Signs Last 24 Hrs  T(F): 98 (08-02-24 @ 12:29), Max: 99.1 (08-02-24 @ 08:00)  HR: 92 (08-02-24 @ 12:29) (71 - 98)  BP: 130/65 (08-02-24 @ 12:29) (122/61 - 138/65)  RR: 17 (08-02-24 @ 12:29) (16 - 18)  SpO2: 93% (08-02-24 @ 12:29) (93% - 98%)  Telemetry:   CAPILLARY BLOOD GLUCOSE      POCT Blood Glucose.: 183 mg/dL (02 Aug 2024 15:13)  POCT Blood Glucose.: 149 mg/dL (02 Aug 2024 12:14)  POCT Blood Glucose.: 158 mg/dL (02 Aug 2024 06:33)  POCT Blood Glucose.: 166 mg/dL (01 Aug 2024 23:36)  POCT Blood Glucose.: 181 mg/dL (01 Aug 2024 17:19)    I&O's Summary    01 Aug 2024 07:01  -  02 Aug 2024 07:00  --------------------------------------------------------  IN: 1450 mL / OUT: 3000 mL / NET: -1550 mL    02 Aug 2024 07:01  -  02 Aug 2024 16:54  --------------------------------------------------------  IN: 0 mL / OUT: 610 mL / NET: -610 mL        PHYSICAL EXAM:  GENERAL: NAD, well-developed  HEAD:  Atraumatic, Normocephalic  EYES: EOMI, PERRLA, conjunctiva and sclera clear  NECK: Supple, No JVD  CHEST/LUNG: Clear to auscultation bilaterally; No wheeze  HEART: Regular rate and rhythm; No murmurs, rubs, or gallops  ABDOMEN: Soft, Nontender, Nondistended; Bowel sounds present  EXTREMITIES:  2+ Peripheral Pulses, No clubbing, cyanosis, or edema  PSYCH: AAOx3  NEUROLOGY: non-focal  SKIN: No rashes or lesions    LABS:                        8.8    13.24 )-----------( 182      ( 02 Aug 2024 05:45 )             26.4     08-02    132<L>  |  98  |  4<L>  ----------------------------<  152<H>  3.3<L>   |  25  |  0.80    Ca    7.3<L>      02 Aug 2024 05:45    TPro  5.1<L>  /  Alb  2.0<L>  /  TBili  0.6  /  DBili  x   /  AST  14  /  ALT  6   /  AlkPhos  128<H>  08-02    PT/INR - ( 02 Aug 2024 05:45 )   PT: 12.2 sec;   INR: 1.08 ratio         PTT - ( 02 Aug 2024 05:45 )  PTT:33.9 sec      Urinalysis Basic - ( 02 Aug 2024 05:45 )    Color: x / Appearance: x / SG: x / pH: x  Gluc: 152 mg/dL / Ketone: x  / Bili: x / Urobili: x   Blood: x / Protein: x / Nitrite: x   Leuk Esterase: x / RBC: x / WBC x   Sq Epi: x / Non Sq Epi: x / Bacteria: x        RADIOLOGY & ADDITIONAL TESTS:    Imaging Personally Reviewed:    Consultant(s) Notes Reviewed:      Care Discussed with Consultants/Other Providers:

## 2024-08-02 NOTE — PROGRESS NOTE ADULT - SUBJECTIVE AND OBJECTIVE BOX
DATE OF SERVICE: 08-02-24    Patient denies chest pain or shortness of breath. Possible J tube by IR   Review of symptoms otherwise negative.    MEDICATIONS:  dextrose 5% + sodium chloride 0.45%. 1000 milliLiter(s) IV Continuous <Continuous>  dextrose 5%. 1000 milliLiter(s) IV Continuous <Continuous>  dextrose 5%. 1000 milliLiter(s) IV Continuous <Continuous>  dextrose 50% Injectable 25 Gram(s) IV Push once  dextrose 50% Injectable 12.5 Gram(s) IV Push once  dextrose 50% Injectable 25 Gram(s) IV Push once  dextrose Oral Gel 15 Gram(s) Oral once  dornase malik Solution 2.5 milliGRAM(s) Inhalation daily  enoxaparin Injectable 40 milliGRAM(s) SubCutaneous every 24 hours  HYDROmorphone  Injectable 0.5 milliGRAM(s) IV Push every 4 hours PRN  insulin glargine Injectable (LANTUS) 3 Unit(s) SubCutaneous at bedtime  insulin lispro (ADMELOG) corrective regimen sliding scale   SubCutaneous every 6 hours  levalbuterol Inhalation 0.63 milliGRAM(s) Inhalation every 6 hours  lidocaine   4% Patch 1 Patch Transdermal at bedtime  metoclopramide Injectable 10 milliGRAM(s) IV Push every 8 hours  metoprolol tartrate Injectable 5 milliGRAM(s) IV Push every 6 hours  naloxone Injectable 0.1 milliGRAM(s) IV Push every 3 minutes PRN  sodium chloride 3%  Inhalation 4 milliLiter(s) Inhalation every 6 hours      LABS:                        8.8    13.24 )-----------( 182      ( 02 Aug 2024 05:45 )             26.4       Hemoglobin: 8.8 g/dL (08-02 @ 05:45)  Hemoglobin: 8.9 g/dL (08-01 @ 05:15)  Hemoglobin: 8.8 g/dL (07-31 @ 05:47)  Hemoglobin: 8.4 g/dL (07-30 @ 08:00)  Hemoglobin: 8.6 g/dL (07-29 @ 05:28)      08-02    132<L>  |  98  |  4<L>  ----------------------------<  152<H>  3.3<L>   |  25  |  0.80    Ca    7.3<L>      02 Aug 2024 05:45    TPro  5.1<L>  /  Alb  2.0<L>  /  TBili  0.6  /  DBili  x   /  AST  14  /  ALT  6   /  AlkPhos  128<H>  08-02  Creatinine Trend: 0.80<--, 0.77<--, 0.75<--, 0.80<--, 0.87<--, 0.83<--  COAGS: PT/INR - ( 02 Aug 2024 05:45 )   PT: 12.2 sec;   INR: 1.08 ratio    PTT - ( 02 Aug 2024 05:45 )  PTT:33.9 sec    PHYSICAL EXAM:  T(C): 37.3 (08-02-24 @ 08:00), Max: 37.3 (08-02-24 @ 08:00)  HR: 82 (08-02-24 @ 08:00) (71 - 98)  BP: 124/59 (08-02-24 @ 08:00) (122/61 - 138/65)  RR: 16 (08-02-24 @ 08:00) (16 - 18)  SpO2: 93% (08-02-24 @ 08:00) (92% - 98%)  Wt(kg): --    I&O's Summary    01 Aug 2024 07:01  -  02 Aug 2024 07:00  --------------------------------------------------------  IN: 1450 mL / OUT: 3000 mL / NET: -1550 mL      Gen: NAD  HEENT:  (-)icterus (-)pallor  CV: N S1 S2 1/6 CATHY (+)2 Pulses B/l  Resp:  Clear to auscultation B/L, normal effort  GI: (+) BS Soft, NT, ND  Lymph:  (-)Edema, (-)obvious lymphadenopathy  Skin: Warm to touch, Normal turgor  Psych: Appropriate mood and affect      TELEMETRY: 	 SR/ST    ECG:  	NSR      TTE 07/2024  Findings:  1. Normal left ventricular size and function.  Mild diastolic dysfunction.  2. Normal left atrial size  3. Right atrial cavity is normal in size.  4. Normal right ventricular size and function.  5. Normal trileaflet aortic valve opening.  6. Normal mitral valve opening.  7. Normal appearing tricuspid valve with mild tricuspid  regurgitation.  8. Pulmonic valve is grossly normal, yet poorly visualized  with no doppler evidence for pulmonic stenosis.  9. No evidence of significant pericardial effusion.  10. The aortic root is normal.  11. Normal pulmonary artery.  12. IVC is normal with respiratory variation.  FULL STUDY DONE INCLUDING M-MODE RECORDING,  SPECTRAL DOPPLER AND    Stress 07/2024  Normal myocardial perfusion SPECT images No evidence of stress induced ischemia or infarction.  Normal left ventricular size and function.  Calculated EF is: 68%    CT  IMPRESSION:  Small loculated right pleural effusion with foci of air and pleural   catheter. Small left pleural effusion.    Splenic infarcts.    Small volume ascites.    ASSESSMENT/PLAN: 	Pt is a 59 y.o. man with history of HTN,, DLD, DM,  GEJ adenocarcinoma (T3N0, stage 2A) s/p neoadjuvant chemotherapy admitted for optimization prior to planned Michael-Broderick Esophagectomy on tuesday. Recently had a nuclear stress test in our office for evaluation of preoperative cardiac risk assessment prior to esophageal cancer surgery scheduled on 07/16/2024. The patient denies any chest pain, shortness ofbreath, or anginal symptoms. He has no palpitations, dizziness, or syncope    s/p [place]-Broderick Esophagectomy on 7/16.     Pre-OP/HTN  - tolerated procedure well from CV perspective  -  oral BP meds on holdm diet now NPO, Losartan, HCTZ, Norvasc and Metoprolol on hold-  - no ion clinical CHF, if BP consistently above 180 systolic can give  Hydralazine IV PRN     F/U with Dr Shah (cardiologist) after DC as scheduled, [phone]    Mart Mason MD  Pager: [phone]

## 2024-08-03 LAB
ANION GAP SERPL CALC-SCNC: 10 MMOL/L — SIGNIFICANT CHANGE UP (ref 7–14)
BUN SERPL-MCNC: 6 MG/DL — LOW (ref 7–23)
CALCIUM SERPL-MCNC: 7.3 MG/DL — LOW (ref 8.4–10.5)
CHLORIDE SERPL-SCNC: 99 MMOL/L — SIGNIFICANT CHANGE UP (ref 98–107)
CO2 SERPL-SCNC: 26 MMOL/L — SIGNIFICANT CHANGE UP (ref 22–31)
CREAT SERPL-MCNC: 0.75 MG/DL — SIGNIFICANT CHANGE UP (ref 0.5–1.3)
EGFR: 103 ML/MIN/1.73M2 — SIGNIFICANT CHANGE UP
GLUCOSE BLDC GLUCOMTR-MCNC: 137 MG/DL — HIGH (ref 70–99)
GLUCOSE BLDC GLUCOMTR-MCNC: 143 MG/DL — HIGH (ref 70–99)
GLUCOSE BLDC GLUCOMTR-MCNC: 156 MG/DL — HIGH (ref 70–99)
GLUCOSE BLDC GLUCOMTR-MCNC: 165 MG/DL — HIGH (ref 70–99)
GLUCOSE BLDC GLUCOMTR-MCNC: 201 MG/DL — HIGH (ref 70–99)
GLUCOSE SERPL-MCNC: 153 MG/DL — HIGH (ref 70–99)
HCT VFR BLD CALC: 25.8 % — LOW (ref 39–50)
HGB BLD-MCNC: 8.6 G/DL — LOW (ref 13–17)
MAGNESIUM SERPL-MCNC: 1.5 MG/DL — LOW (ref 1.6–2.6)
MCHC RBC-ENTMCNC: 29.4 PG — SIGNIFICANT CHANGE UP (ref 27–34)
MCHC RBC-ENTMCNC: 33.3 GM/DL — SIGNIFICANT CHANGE UP (ref 32–36)
MCV RBC AUTO: 88.1 FL — SIGNIFICANT CHANGE UP (ref 80–100)
NRBC # BLD: 0 /100 WBCS — SIGNIFICANT CHANGE UP (ref 0–0)
NRBC # FLD: 0 K/UL — SIGNIFICANT CHANGE UP (ref 0–0)
PHOSPHATE SERPL-MCNC: 2.6 MG/DL — SIGNIFICANT CHANGE UP (ref 2.5–4.5)
PLATELET # BLD AUTO: 219 K/UL — SIGNIFICANT CHANGE UP (ref 150–400)
POTASSIUM SERPL-MCNC: 2.9 MMOL/L — CRITICAL LOW (ref 3.5–5.3)
POTASSIUM SERPL-SCNC: 2.9 MMOL/L — CRITICAL LOW (ref 3.5–5.3)
RBC # BLD: 2.93 M/UL — LOW (ref 4.2–5.8)
RBC # FLD: 13.5 % — SIGNIFICANT CHANGE UP (ref 10.3–14.5)
SODIUM SERPL-SCNC: 135 MMOL/L — SIGNIFICANT CHANGE UP (ref 135–145)
TRIGL FLD-MCNC: 20 MG/DL — SIGNIFICANT CHANGE UP
WBC # BLD: 7.48 K/UL — SIGNIFICANT CHANGE UP (ref 3.8–10.5)
WBC # FLD AUTO: 7.48 K/UL — SIGNIFICANT CHANGE UP (ref 3.8–10.5)

## 2024-08-03 PROCEDURE — 71045 X-RAY EXAM CHEST 1 VIEW: CPT | Mod: 26

## 2024-08-03 RX ORDER — MAGNESIUM SULFATE 500 MG/ML
2 VIAL (ML) INJECTION ONCE
Refills: 0 | Status: COMPLETED | OUTPATIENT
Start: 2024-08-03 | End: 2024-08-03

## 2024-08-03 RX ORDER — BACTERIOSTATIC SODIUM CHLORIDE 0.9 %
1000 VIAL (ML) INJECTION
Refills: 0 | Status: DISCONTINUED | OUTPATIENT
Start: 2024-08-03 | End: 2024-08-04

## 2024-08-03 RX ORDER — SODIUM CHLORIDE AND POTASSIUM CHLORIDE 150; 450 MG/100ML; MG/100ML
1000 INJECTION, SOLUTION INTRAVENOUS
Refills: 0 | Status: DISCONTINUED | OUTPATIENT
Start: 2024-08-03 | End: 2024-08-09

## 2024-08-03 RX ORDER — POTASSIUM CHLORIDE 1500 MG/1
10 TABLET, EXTENDED RELEASE ORAL
Refills: 0 | Status: COMPLETED | OUTPATIENT
Start: 2024-08-03 | End: 2024-08-03

## 2024-08-03 RX ADMIN — Medication 5 MILLIGRAM(S): at 05:43

## 2024-08-03 RX ADMIN — Medication 1: at 18:25

## 2024-08-03 RX ADMIN — Medication 20 MILLILITER(S): at 19:22

## 2024-08-03 RX ADMIN — LEVALBUTEROL HYDROCHLORIDE 0.63 MILLIGRAM(S): 0.31 SOLUTION RESPIRATORY (INHALATION) at 20:14

## 2024-08-03 RX ADMIN — SODIUM CHLORIDE AND POTASSIUM CHLORIDE 75 MILLILITER(S): 150; 450 INJECTION, SOLUTION INTRAVENOUS at 09:30

## 2024-08-03 RX ADMIN — Medication 10 MILLIGRAM(S): at 15:25

## 2024-08-03 RX ADMIN — Medication 2: at 00:57

## 2024-08-03 RX ADMIN — POTASSIUM CHLORIDE 100 MILLIEQUIVALENT(S): 1500 TABLET, EXTENDED RELEASE ORAL at 09:30

## 2024-08-03 RX ADMIN — LEVALBUTEROL HYDROCHLORIDE 0.63 MILLIGRAM(S): 0.31 SOLUTION RESPIRATORY (INHALATION) at 15:28

## 2024-08-03 RX ADMIN — POTASSIUM CHLORIDE 100 MILLIEQUIVALENT(S): 1500 TABLET, EXTENDED RELEASE ORAL at 10:50

## 2024-08-03 RX ADMIN — LEVALBUTEROL HYDROCHLORIDE 0.63 MILLIGRAM(S): 0.31 SOLUTION RESPIRATORY (INHALATION) at 04:39

## 2024-08-03 RX ADMIN — Medication 1: at 05:24

## 2024-08-03 RX ADMIN — PIPERACILLIN SODIUM, TAZOBACTAM SODIUM 25 GRAM(S): 3; .375 INJECTION, POWDER, LYOPHILIZED, FOR SOLUTION INTRAVENOUS at 22:25

## 2024-08-03 RX ADMIN — Medication 5 MILLIGRAM(S): at 12:30

## 2024-08-03 RX ADMIN — Medication 10 MILLIGRAM(S): at 05:43

## 2024-08-03 RX ADMIN — Medication 5 MILLIGRAM(S): at 00:57

## 2024-08-03 RX ADMIN — PIPERACILLIN SODIUM, TAZOBACTAM SODIUM 25 GRAM(S): 3; .375 INJECTION, POWDER, LYOPHILIZED, FOR SOLUTION INTRAVENOUS at 15:25

## 2024-08-03 RX ADMIN — Medication 4 MILLILITER(S): at 15:29

## 2024-08-03 RX ADMIN — Medication 5 MILLIGRAM(S): at 18:24

## 2024-08-03 RX ADMIN — PIPERACILLIN SODIUM, TAZOBACTAM SODIUM 25 GRAM(S): 3; .375 INJECTION, POWDER, LYOPHILIZED, FOR SOLUTION INTRAVENOUS at 05:43

## 2024-08-03 RX ADMIN — ENOXAPARIN SODIUM 40 MILLIGRAM(S): 120 INJECTION SUBCUTANEOUS at 12:30

## 2024-08-03 RX ADMIN — Medication 4 MILLILITER(S): at 04:39

## 2024-08-03 RX ADMIN — INSULIN GLARGINE-YFGN 3 UNIT(S): 100 INJECTION, SOLUTION SUBCUTANEOUS at 22:24

## 2024-08-03 RX ADMIN — Medication 25 GRAM(S): at 12:31

## 2024-08-03 RX ADMIN — Medication 10 MILLIGRAM(S): at 22:25

## 2024-08-03 RX ADMIN — Medication 4 MILLILITER(S): at 20:14

## 2024-08-03 RX ADMIN — INSULIN GLARGINE-YFGN 3 UNIT(S): 100 INJECTION, SOLUTION SUBCUTANEOUS at 00:57

## 2024-08-03 RX ADMIN — POTASSIUM CHLORIDE 100 MILLIEQUIVALENT(S): 1500 TABLET, EXTENDED RELEASE ORAL at 12:30

## 2024-08-03 NOTE — PROGRESS NOTE ADULT - SUBJECTIVE AND OBJECTIVE BOX
ANESTHESIA POSTOP NOTE  59y Male POSTOP DAY 1    Vital Signs Last 24 Hrs  T(C): 36.6 (03 Aug 2024 16:00), Max: 37.1 (02 Aug 2024 19:00)  T(F): 97.9 (03 Aug 2024 16:00), Max: 98.8 (02 Aug 2024 19:00)  HR: 90 (03 Aug 2024 16:00) (75 - 98)  BP: 114/58 (03 Aug 2024 16:00) (114/58 - 146/69)  BP(mean): --  RR: 17 (03 Aug 2024 16:00) (15 - 17)  SpO2: 96% (03 Aug 2024 16:00) (94% - 97%)    Parameters below as of 03 Aug 2024 16:00  Patient On (Oxygen Delivery Method): room air      I&O's Summary    02 Aug 2024 07:01  -  03 Aug 2024 07:00  --------------------------------------------------------  IN: 675 mL / OUT: 1580 mL / NET: -905 mL    03 Aug 2024 07:01  -  03 Aug 2024 17:34  --------------------------------------------------------  IN: 0 mL / OUT: 520 mL / NET: -520 mL        [ X ] NO APPARENT ANESTHESIA COMPLICATIONS

## 2024-08-03 NOTE — PROGRESS NOTE ADULT - NS ATTEND AMEND GEN_ALL_CORE FT
recovering from esophagectomy.  no change in cardio plan of care, will follow w/ you.  IR J-tube for nutritional support, esoph stent pending.  no change in CV clearance for these add'l procedures.

## 2024-08-03 NOTE — PROGRESS NOTE ADULT - SUBJECTIVE AND OBJECTIVE BOX
Subjective:  "I feel tired. The tube in my stomach hurts a little" Pt denies CP or SOB. OOB w assist. No fevers. Normalized leukocytosis today. s/p IR placement of J tube last night. No n/v. Strict NPO.     Vital Signs:  Vital Signs Last 24 Hrs  T(C): 36.4 (08-03-24 @ 13:15), Max: 37.1 (08-02-24 @ 19:00)  T(F): 97.6 (08-03-24 @ 13:15), Max: 98.8 (08-02-24 @ 19:00)  HR: 92 (08-03-24 @ 13:15) (75 - 98)  BP: 128/63 (08-03-24 @ 13:15) (116/57 - 146/69)  RR: 17 (08-03-24 @ 13:15) (15 - 17)  SpO2: 97% (08-03-24 @ 13:15) (94% - 97%) on (O2)    Telemetry/Alarms: SR  Relevant labs, radiology and Medications reviewed           CXR  stable.              8.6    7.48  )-----------( 219      ( 03 Aug 2024 04:52 )             25.8     08-03    135  |  99  |  6<L>  ----------------------------<  153<H>  2.9<LL>   |  26  |  0.75    Ca    7.3<L>      03 Aug 2024 04:52  Phos  2.6     08-03  Mg     1.50     08-03    TPro  5.1<L>  /  Alb  2.0<L>  /  TBili  0.6  /  DBili  x   /  AST  14  /  ALT  6   /  AlkPhos  128<H>  08-02    PT/INR - ( 02 Aug 2024 05:45 )   PT: 12.2 sec;   INR: 1.08 ratio         PTT - ( 02 Aug 2024 05:45 )  PTT:33.9 sec  MEDICATIONS  (STANDING):  dextrose 5% + sodium chloride 0.45% with potassium chloride 20 mEq/L 1000 milliLiter(s) (75 mL/Hr) IV Continuous <Continuous>  dextrose 5%. 1000 milliLiter(s) (100 mL/Hr) IV Continuous <Continuous>  dextrose 5%. 1000 milliLiter(s) (50 mL/Hr) IV Continuous <Continuous>  dextrose 50% Injectable 12.5 Gram(s) IV Push once  dextrose 50% Injectable 25 Gram(s) IV Push once  dextrose 50% Injectable 25 Gram(s) IV Push once  dextrose Oral Gel 15 Gram(s) Oral once  dornase malik Solution 2.5 milliGRAM(s) Inhalation daily  enoxaparin Injectable 40 milliGRAM(s) SubCutaneous every 24 hours  insulin glargine Injectable (LANTUS) 3 Unit(s) SubCutaneous at bedtime  insulin lispro (ADMELOG) corrective regimen sliding scale   SubCutaneous every 6 hours  levalbuterol Inhalation 0.63 milliGRAM(s) Inhalation every 6 hours  lidocaine   4% Patch 1 Patch Transdermal at bedtime  metoclopramide Injectable 10 milliGRAM(s) IV Push every 8 hours  metoprolol tartrate Injectable 5 milliGRAM(s) IV Push every 6 hours  piperacillin/tazobactam IVPB.. 3.375 Gram(s) IV Intermittent every 8 hours  sodium chloride 0.9%. 1000 milliLiter(s) (20 mL/Hr) IV Continuous <Continuous>  sodium chloride 3%  Inhalation 4 milliLiter(s) Inhalation every 6 hours    MEDICATIONS  (PRN):  HYDROmorphone  Injectable 0.5 milliGRAM(s) IV Push every 4 hours PRN moderate to severe pain  naloxone Injectable 0.1 milliGRAM(s) IV Push every 3 minutes PRN For ANY of the following changes in patient status:  A. RR LESS THAN 10 breaths per minute, B. Oxygen saturation LESS THAN 90%, C. Sedation score of 6    General: WD NAD  Neurology: A&Ox3, nonfocal, ALFORD x 4  Eyes: PERRLA/ EOMI, Gross vision intact  ENT/Neck: Neck supple, trachea midline, No JVD, Gross hearing intact  Respiratory: CTA B/L, No wheezing, rales, rhonchi. dec BS Bilat Rt> left.   CV: RRR, S1S2, no murmurs, rubs or gallops  Abdominal: Soft, NT, ND +BS, Last BM on Wednesday. + flatus. STrict NPO. J tube to gravity  Extremities: Slight improved generalized edema/anasarca, + peripheral pulses  Skin: No Rashes, Hematoma, Ecchymosis  Incisions: rt CW and abd incisions c/d/i. Left Port site to LLQ of abd now dry.   Tubes: J tube c/d/i w primary dressing, to stallworth gravity. Left Lavern- 600cc murky fluid  I&O's Summary    02 Aug 2024 07:01  -  03 Aug 2024 07:00  --------------------------------------------------------  IN: 675 mL / OUT: 1580 mL / NET: -905 mL    03 Aug 2024 07:01  -  03 Aug 2024 13:44  --------------------------------------------------------  IN: 0 mL / OUT: 70 mL / NET: -70 mL        Assessment  59y Male  w/ PAST MEDICAL & SURGICAL HISTORY:  HLD (hyperlipidemia)      Insulin dependent type 2 diabetes mellitus      BPH (benign prostatic hyperplasia)      HTN (hypertension)      Gastroesophageal cancer      Gastroesophageal cancer      Port-A-Cath in place      admitted with complaints of Patient is a 59y old  Male who presents with a chief complaint of Esophagectomy (03 Aug 2024 09:03)  .  59 year old male with PMH HTN and DM presents with GE junction adenocarcinoma s/p chemo and radiation now s/p [place] Broderick Esophagectomy on 7/16. Pt s/p barium swallow and started on CLD. Postop CT CAP reviewed w Dr Laureano. Pt is now on Zosyn and advanced to soft diet. Sinus tachycardia since last night. Now on nasal cannula.  7/29: Pt with now improving leukocytosis and s/op CTA r/o PE for tachycardia over the weekend. Imaging reviewed by Dr Laureano, no acute findings and able to advance to soft diet as previously mentioned. Patient tolerating PO diet well. Patient without fevers and with drain in place on IV abx.   7/30-7/31-CT chest completed, no contrast extravasation. Pt resumed CLD and advanced to FLD today.  8/1-Today noted to have continued high drain output. Inc in Leukocytosis. Pt with generalized 3rd spacing. Fluid specimens sent, cont Zosyn.   8/2 Drain still w/ high output. Went for abd CT, plan for J tube placement. GI onboard to help eval for leak / intervene in future if necessary  8/3- J tube placed last night. Will start tickle infusion of NS later. Nutritional consult ordered. Lavern still w high output, probable CT scan of c/a/p tomorrow. Possible stent w/ GI next week.       PLAN  Neuro: Pain management  Pulm: Encourage coughing, deep breathing and use of incentive spirometry. Daily CXR.   Cardio: Monitor telemetry/alarms. IV lopressor  Endo: Pt npo, bld glucose controlled  GI: Cont NPO. Nut c/s for TF reccs. Will start TF if tolerates saline infusion via J tube. PreAlb 5. POssible Esoph stent w GI next week  Renal: monitor urine output, supplement electrolytes as needed. Will supplement hypomagnesia and hypokalemia.   Vasc: Heparin SC/SCDs for DVT prophylaxis  Heme: Stable H/H. .   ID: Continue Zosyn. monitor for fever, WBC  Therapy: OOB/ambulate  Tubes: Monitor lavern output. CT scan c/a/p tomorrow  Disposition: Aim to D/C to home once surgically stable.   Discussed with Cardiothoracic Team at AM rounds.

## 2024-08-03 NOTE — PROGRESS NOTE ADULT - ASSESSMENT
· Assessment	  59 y.o. man with history of GEJ adenocarcinoma (T3N0, stage 2A) s/p neoadjuvant chemotherapy, now s/p [facility] Esophagectomy on 7/16.     Sx f/up appreciated  OOB  Pain control Oxycodone  NPO  Eso leak: J tube placement in IR 8/2    SOB:    CXR: Rt pleural effusion  Thoracic Sx f/up appreciated  Xopenex nebulizer    DM II:    FSSS  Lantus insulin  Endo f/up appreciated    HTN:    On IV Metoprolol/Hydralazine  Cardio f/up appreciated    Hypokalemia:    K supp

## 2024-08-03 NOTE — PROGRESS NOTE ADULT - SUBJECTIVE AND OBJECTIVE BOX
Patient is a 59y old  Male who presents with a chief complaint of Esophagectomy (03 Aug 2024 17:34)      SUBJECTIVE / OVERNIGHT EVENTS: s/p J tube placement on 8/2, started on trickle feeds    MEDICATIONS  (STANDING):  dextrose 5% + sodium chloride 0.45% with potassium chloride 20 mEq/L 1000 milliLiter(s) (75 mL/Hr) IV Continuous <Continuous>  dextrose 5%. 1000 milliLiter(s) (100 mL/Hr) IV Continuous <Continuous>  dextrose 5%. 1000 milliLiter(s) (50 mL/Hr) IV Continuous <Continuous>  dextrose 50% Injectable 12.5 Gram(s) IV Push once  dextrose 50% Injectable 25 Gram(s) IV Push once  dextrose 50% Injectable 25 Gram(s) IV Push once  dextrose Oral Gel 15 Gram(s) Oral once  dornase malik Solution 2.5 milliGRAM(s) Inhalation daily  enoxaparin Injectable 40 milliGRAM(s) SubCutaneous every 24 hours  insulin glargine Injectable (LANTUS) 3 Unit(s) SubCutaneous at bedtime  insulin lispro (ADMELOG) corrective regimen sliding scale   SubCutaneous every 6 hours  levalbuterol Inhalation 0.63 milliGRAM(s) Inhalation every 6 hours  lidocaine   4% Patch 1 Patch Transdermal at bedtime  metoclopramide Injectable 10 milliGRAM(s) IV Push every 8 hours  metoprolol tartrate Injectable 5 milliGRAM(s) IV Push every 6 hours  piperacillin/tazobactam IVPB.. 3.375 Gram(s) IV Intermittent every 8 hours  sodium chloride 0.9%. 1000 milliLiter(s) (20 mL/Hr) IV Continuous <Continuous>  sodium chloride 3%  Inhalation 4 milliLiter(s) Inhalation every 6 hours    MEDICATIONS  (PRN):  HYDROmorphone  Injectable 0.5 milliGRAM(s) IV Push every 4 hours PRN moderate to severe pain  naloxone Injectable 0.1 milliGRAM(s) IV Push every 3 minutes PRN For ANY of the following changes in patient status:  A. RR LESS THAN 10 breaths per minute, B. Oxygen saturation LESS THAN 90%, C. Sedation score of 6      Vital Signs Last 24 Hrs  T(F): 97.9 (08-03-24 @ 16:00), Max: 98.8 (08-02-24 @ 19:00)  HR: 90 (08-03-24 @ 16:00) (75 - 98)  BP: 114/58 (08-03-24 @ 16:00) (114/58 - 146/69)  RR: 17 (08-03-24 @ 16:00) (15 - 17)  SpO2: 96% (08-03-24 @ 16:00) (94% - 100%)  Telemetry:   CAPILLARY BLOOD GLUCOSE      POCT Blood Glucose.: 156 mg/dL (03 Aug 2024 18:04)  POCT Blood Glucose.: 137 mg/dL (03 Aug 2024 12:12)  POCT Blood Glucose.: 165 mg/dL (03 Aug 2024 04:39)  POCT Blood Glucose.: 201 mg/dL (03 Aug 2024 00:30)  POCT Blood Glucose.: 178 mg/dL (02 Aug 2024 20:03)    I&O's Summary    02 Aug 2024 07:01  -  03 Aug 2024 07:00  --------------------------------------------------------  IN: 675 mL / OUT: 1580 mL / NET: -905 mL    03 Aug 2024 07:01  -  03 Aug 2024 18:51  --------------------------------------------------------  IN: 0 mL / OUT: 520 mL / NET: -520 mL        PHYSICAL EXAM:  GENERAL: NAD, well-developed  HEAD:  Atraumatic, Normocephalic  EYES: EOMI, PERRLA, conjunctiva and sclera clear  NECK: Supple, No JVD  CHEST/LUNG: Clear to auscultation bilaterally; No wheeze  HEART: Regular rate and rhythm; No murmurs, rubs, or gallops  ABDOMEN: Soft, Nontender, Nondistended; Bowel sounds present  EXTREMITIES:  2+ Peripheral Pulses, No clubbing, cyanosis, or edema  PSYCH: AAOx3  NEUROLOGY: non-focal  SKIN: No rashes or lesions    LABS:                        8.6    7.48  )-----------( 219      ( 03 Aug 2024 04:52 )             25.8     08-03    135  |  99  |  6<L>  ----------------------------<  153<H>  2.9<LL>   |  26  |  0.75    Ca    7.3<L>      03 Aug 2024 04:52  Phos  2.6     08-03  Mg     1.50     08-03    TPro  5.1<L>  /  Alb  2.0<L>  /  TBili  0.6  /  DBili  x   /  AST  14  /  ALT  6   /  AlkPhos  128<H>  08-02    PT/INR - ( 02 Aug 2024 05:45 )   PT: 12.2 sec;   INR: 1.08 ratio         PTT - ( 02 Aug 2024 05:45 )  PTT:33.9 sec      Urinalysis Basic - ( 03 Aug 2024 04:52 )    Color: x / Appearance: x / SG: x / pH: x  Gluc: 153 mg/dL / Ketone: x  / Bili: x / Urobili: x   Blood: x / Protein: x / Nitrite: x   Leuk Esterase: x / RBC: x / WBC x   Sq Epi: x / Non Sq Epi: x / Bacteria: x        RADIOLOGY & ADDITIONAL TESTS:    Imaging Personally Reviewed:    Consultant(s) Notes Reviewed:      Care Discussed with Consultants/Other Providers:

## 2024-08-03 NOTE — PROGRESS NOTE ADULT - SUBJECTIVE AND OBJECTIVE BOX
DATE OF SERVICE: 08-03-24    pt resting in bed, no distress        dextrose 5% + sodium chloride 0.45% with potassium chloride 20 mEq/L 1000 milliLiter(s) IV Continuous <Continuous>  dextrose 5%. 1000 milliLiter(s) IV Continuous <Continuous>  dextrose 5%. 1000 milliLiter(s) IV Continuous <Continuous>  dextrose 50% Injectable 12.5 Gram(s) IV Push once  dextrose 50% Injectable 25 Gram(s) IV Push once  dextrose 50% Injectable 25 Gram(s) IV Push once  dextrose Oral Gel 15 Gram(s) Oral once  dornase malik Solution 2.5 milliGRAM(s) Inhalation daily  enoxaparin Injectable 40 milliGRAM(s) SubCutaneous every 24 hours  HYDROmorphone  Injectable 0.5 milliGRAM(s) IV Push every 4 hours PRN  insulin glargine Injectable (LANTUS) 3 Unit(s) SubCutaneous at bedtime  insulin lispro (ADMELOG) corrective regimen sliding scale   SubCutaneous every 6 hours  levalbuterol Inhalation 0.63 milliGRAM(s) Inhalation every 6 hours  lidocaine   4% Patch 1 Patch Transdermal at bedtime  magnesium sulfate  IVPB 2 Gram(s) IV Intermittent once  metoclopramide Injectable 10 milliGRAM(s) IV Push every 8 hours  metoprolol tartrate Injectable 5 milliGRAM(s) IV Push every 6 hours  naloxone Injectable 0.1 milliGRAM(s) IV Push every 3 minutes PRN  piperacillin/tazobactam IVPB.. 3.375 Gram(s) IV Intermittent every 8 hours  potassium chloride  10 mEq/100 mL IVPB 10 milliEquivalent(s) IV Intermittent every 1 hour  sodium chloride 3%  Inhalation 4 milliLiter(s) Inhalation every 6 hours                            8.6    7.48  )-----------( 219      ( 03 Aug 2024 04:52 )             25.8       Hemoglobin: 8.6 g/dL (08-03 @ 04:52)  Hemoglobin: 8.8 g/dL (08-02 @ 05:45)  Hemoglobin: 8.9 g/dL (08-01 @ 05:15)  Hemoglobin: 8.8 g/dL (07-31 @ 05:47)  Hemoglobin: 8.4 g/dL (07-30 @ 08:00)      08-03    135  |  99  |  6<L>  ----------------------------<  153<H>  2.9<LL>   |  26  |  0.75    Ca    7.3<L>      03 Aug 2024 04:52  Phos  2.6     08-03  Mg     1.50     08-03    TPro  5.1<L>  /  Alb  2.0<L>  /  TBili  0.6  /  DBili  x   /  AST  14  /  ALT  6   /  AlkPhos  128<H>  08-02    Creatinine Trend: 0.75<--, 0.80<--, 0.77<--, 0.75<--, 0.80<--, 0.87<--    COAGS:           T(C): 36.6 (08-03-24 @ 04:40), Max: 37.1 (08-02-24 @ 19:00)  HR: 87 (08-03-24 @ 04:40) (75 - 98)  BP: 129/63 (08-03-24 @ 04:40) (116/57 - 146/69)  RR: 17 (08-03-24 @ 04:40) (15 - 17)  SpO2: 94% (08-03-24 @ 04:40) (93% - 96%)  Wt(kg): --    I&O's Summary    02 Aug 2024 07:01  -  03 Aug 2024 07:00  --------------------------------------------------------  IN: 675 mL / OUT: 1580 mL / NET: -905 mL    03 Aug 2024 07:01  -  03 Aug 2024 09:04  --------------------------------------------------------  IN: 0 mL / OUT: 70 mL / NET: -70 mL        Gen: NAD  HEENT:  (-)icterus (-)pallor  CV: N S1 S2 1/6 CATHY (+)2 Pulses B/l  Resp:  Clear to auscultation B/L, normal effort  GI: (+) BS Soft, NT, ND  Lymph:  (-)Edema, (-)obvious lymphadenopathy  Skin: Warm to touch, Normal turgor  Psych: Appropriate mood and affect      TELEMETRY: 	 SR/ST    ECG:  	NSR      TTE 07/2024  Findings:  1. Normal left ventricular size and function.  Mild diastolic dysfunction.  2. Normal left atrial size  3. Right atrial cavity is normal in size.  4. Normal right ventricular size and function.  5. Normal trileaflet aortic valve opening.  6. Normal mitral valve opening.  7. Normal appearing tricuspid valve with mild tricuspid  regurgitation.  8. Pulmonic valve is grossly normal, yet poorly visualized  with no doppler evidence for pulmonic stenosis.  9. No evidence of significant pericardial effusion.  10. The aortic root is normal.  11. Normal pulmonary artery.  12. IVC is normal with respiratory variation.  FULL STUDY DONE INCLUDING M-MODE RECORDING,  SPECTRAL DOPPLER AND    Stress 07/2024  Normal myocardial perfusion SPECT images No evidence of stress induced ischemia or infarction.  Normal left ventricular size and function.  Calculated EF is: 68%    CT  IMPRESSION:  Small loculated right pleural effusion with foci of air and pleural   catheter. Small left pleural effusion.    Splenic infarcts.    Small volume ascites.    ASSESSMENT/PLAN: 	Pt is a 59 y.o. man with history of HTN,, DLD, DM,  GEJ adenocarcinoma (T3N0, stage 2A) s/p neoadjuvant chemotherapy admitted for optimization prior to planned [facility]-Broderick Esophagectomy on tuesday. Recently had a nuclear stress test in our office for evaluation of preoperative cardiac risk assessment prior to esophageal cancer surgery scheduled on 07/16/2024. The patient denies any chest pain, shortness ofbreath, or anginal symptoms. He has no palpitations, dizziness, or syncope    s/p [facility]-Broderick Esophagectomy on 7/16.   s/p J tube palcement     Pre-OP/HTN  - tolerated procedure well from CV perspective  -  oral BP meds on holdm diet now NPO, Losartan, HCTZ, Norvasc and Metoprolol on hold-  - no ion clinical CHF, if BP consistently above 180 systolic can give  Hydralazine IV PRN  - cont IV Bb at present      F/U with Dr Shah (cardiologist) after DC as scheduled, [phone]

## 2024-08-04 LAB
ALBUMIN SERPL ELPH-MCNC: 2.1 G/DL — LOW (ref 3.3–5)
ALP SERPL-CCNC: 87 U/L — SIGNIFICANT CHANGE UP (ref 40–120)
ALT FLD-CCNC: 5 U/L — SIGNIFICANT CHANGE UP (ref 4–41)
ANION GAP SERPL CALC-SCNC: 11 MMOL/L — SIGNIFICANT CHANGE UP (ref 7–14)
ANION GAP SERPL CALC-SCNC: 9 MMOL/L — SIGNIFICANT CHANGE UP (ref 7–14)
AST SERPL-CCNC: 13 U/L — SIGNIFICANT CHANGE UP (ref 4–40)
BILIRUB SERPL-MCNC: 0.4 MG/DL — SIGNIFICANT CHANGE UP (ref 0.2–1.2)
BUN SERPL-MCNC: 4 MG/DL — LOW (ref 7–23)
BUN SERPL-MCNC: 5 MG/DL — LOW (ref 7–23)
CALCIUM SERPL-MCNC: 7 MG/DL — LOW (ref 8.4–10.5)
CALCIUM SERPL-MCNC: 7.2 MG/DL — LOW (ref 8.4–10.5)
CHLORIDE SERPL-SCNC: 98 MMOL/L — SIGNIFICANT CHANGE UP (ref 98–107)
CHLORIDE SERPL-SCNC: 98 MMOL/L — SIGNIFICANT CHANGE UP (ref 98–107)
CO2 SERPL-SCNC: 25 MMOL/L — SIGNIFICANT CHANGE UP (ref 22–31)
CO2 SERPL-SCNC: 25 MMOL/L — SIGNIFICANT CHANGE UP (ref 22–31)
CREAT SERPL-MCNC: 0.71 MG/DL — SIGNIFICANT CHANGE UP (ref 0.5–1.3)
CREAT SERPL-MCNC: 0.77 MG/DL — SIGNIFICANT CHANGE UP (ref 0.5–1.3)
EGFR: 102 ML/MIN/1.73M2 — SIGNIFICANT CHANGE UP
EGFR: 104 ML/MIN/1.73M2 — SIGNIFICANT CHANGE UP
GLUCOSE BLDC GLUCOMTR-MCNC: 110 MG/DL — HIGH (ref 70–99)
GLUCOSE BLDC GLUCOMTR-MCNC: 190 MG/DL — HIGH (ref 70–99)
GLUCOSE BLDC GLUCOMTR-MCNC: 200 MG/DL — HIGH (ref 70–99)
GLUCOSE BLDC GLUCOMTR-MCNC: 205 MG/DL — HIGH (ref 70–99)
GLUCOSE SERPL-MCNC: 115 MG/DL — HIGH (ref 70–99)
GLUCOSE SERPL-MCNC: 195 MG/DL — HIGH (ref 70–99)
HCT VFR BLD CALC: 25 % — LOW (ref 39–50)
HGB BLD-MCNC: 8.4 G/DL — LOW (ref 13–17)
MAGNESIUM SERPL-MCNC: 1.8 MG/DL — SIGNIFICANT CHANGE UP (ref 1.6–2.6)
MAGNESIUM SERPL-MCNC: 1.8 MG/DL — SIGNIFICANT CHANGE UP (ref 1.6–2.6)
MCHC RBC-ENTMCNC: 29.8 PG — SIGNIFICANT CHANGE UP (ref 27–34)
MCHC RBC-ENTMCNC: 33.6 GM/DL — SIGNIFICANT CHANGE UP (ref 32–36)
MCV RBC AUTO: 88.7 FL — SIGNIFICANT CHANGE UP (ref 80–100)
NRBC # BLD: 0 /100 WBCS — SIGNIFICANT CHANGE UP (ref 0–0)
NRBC # FLD: 0 K/UL — SIGNIFICANT CHANGE UP (ref 0–0)
PHOSPHATE SERPL-MCNC: 1.8 MG/DL — LOW (ref 2.5–4.5)
PHOSPHATE SERPL-MCNC: 2 MG/DL — LOW (ref 2.5–4.5)
PLATELET # BLD AUTO: 220 K/UL — SIGNIFICANT CHANGE UP (ref 150–400)
POTASSIUM SERPL-MCNC: 3.4 MMOL/L — LOW (ref 3.5–5.3)
POTASSIUM SERPL-MCNC: 4 MMOL/L — SIGNIFICANT CHANGE UP (ref 3.5–5.3)
POTASSIUM SERPL-SCNC: 3.4 MMOL/L — LOW (ref 3.5–5.3)
POTASSIUM SERPL-SCNC: 4 MMOL/L — SIGNIFICANT CHANGE UP (ref 3.5–5.3)
PROT SERPL-MCNC: 4.9 G/DL — LOW (ref 6–8.3)
RBC # BLD: 2.82 M/UL — LOW (ref 4.2–5.8)
RBC # FLD: 13.6 % — SIGNIFICANT CHANGE UP (ref 10.3–14.5)
SODIUM SERPL-SCNC: 132 MMOL/L — LOW (ref 135–145)
SODIUM SERPL-SCNC: 134 MMOL/L — LOW (ref 135–145)
WBC # BLD: 7.04 K/UL — SIGNIFICANT CHANGE UP (ref 3.8–10.5)
WBC # FLD AUTO: 7.04 K/UL — SIGNIFICANT CHANGE UP (ref 3.8–10.5)

## 2024-08-04 PROCEDURE — 74176 CT ABD & PELVIS W/O CONTRAST: CPT | Mod: 26

## 2024-08-04 PROCEDURE — 71250 CT THORAX DX C-: CPT | Mod: 26

## 2024-08-04 PROCEDURE — 71045 X-RAY EXAM CHEST 1 VIEW: CPT | Mod: 26

## 2024-08-04 RX ORDER — POTASSIUM CHLORIDE 1500 MG/1
40 TABLET, EXTENDED RELEASE ORAL ONCE
Refills: 0 | Status: COMPLETED | OUTPATIENT
Start: 2024-08-04 | End: 2024-08-04

## 2024-08-04 RX ORDER — BACITRACIN 500 UNIT/G
1 OINTMENT (GRAM) TOPICAL
Refills: 0 | Status: DISCONTINUED | OUTPATIENT
Start: 2024-08-04 | End: 2024-08-09

## 2024-08-04 RX ORDER — POTASSIUM CHLORIDE 1500 MG/1
10 TABLET, EXTENDED RELEASE ORAL
Refills: 0 | Status: COMPLETED | OUTPATIENT
Start: 2024-08-04 | End: 2024-08-04

## 2024-08-04 RX ADMIN — PIPERACILLIN SODIUM, TAZOBACTAM SODIUM 25 GRAM(S): 3; .375 INJECTION, POWDER, LYOPHILIZED, FOR SOLUTION INTRAVENOUS at 21:59

## 2024-08-04 RX ADMIN — SODIUM CHLORIDE AND POTASSIUM CHLORIDE 75 MILLILITER(S): 150; 450 INJECTION, SOLUTION INTRAVENOUS at 19:50

## 2024-08-04 RX ADMIN — LEVALBUTEROL HYDROCHLORIDE 0.63 MILLIGRAM(S): 0.31 SOLUTION RESPIRATORY (INHALATION) at 16:02

## 2024-08-04 RX ADMIN — PIPERACILLIN SODIUM, TAZOBACTAM SODIUM 25 GRAM(S): 3; .375 INJECTION, POWDER, LYOPHILIZED, FOR SOLUTION INTRAVENOUS at 14:42

## 2024-08-04 RX ADMIN — Medication 1: at 06:17

## 2024-08-04 RX ADMIN — Medication 5 MILLIGRAM(S): at 18:00

## 2024-08-04 RX ADMIN — INSULIN GLARGINE-YFGN 3 UNIT(S): 100 INJECTION, SOLUTION SUBCUTANEOUS at 23:16

## 2024-08-04 RX ADMIN — Medication 4 MILLILITER(S): at 10:40

## 2024-08-04 RX ADMIN — Medication 4 MILLILITER(S): at 16:02

## 2024-08-04 RX ADMIN — Medication 5 MILLIGRAM(S): at 00:02

## 2024-08-04 RX ADMIN — Medication 1 APPLICATION(S): at 22:01

## 2024-08-04 RX ADMIN — Medication 10 MILLIGRAM(S): at 05:56

## 2024-08-04 RX ADMIN — Medication 5 MILLIGRAM(S): at 23:17

## 2024-08-04 RX ADMIN — POTASSIUM CHLORIDE 100 MILLIEQUIVALENT(S): 1500 TABLET, EXTENDED RELEASE ORAL at 10:51

## 2024-08-04 RX ADMIN — Medication 5 MILLIGRAM(S): at 12:49

## 2024-08-04 RX ADMIN — POTASSIUM CHLORIDE 40 MILLIEQUIVALENT(S): 1500 TABLET, EXTENDED RELEASE ORAL at 16:36

## 2024-08-04 RX ADMIN — Medication 4 MILLILITER(S): at 20:51

## 2024-08-04 RX ADMIN — Medication 1: at 12:50

## 2024-08-04 RX ADMIN — PIPERACILLIN SODIUM, TAZOBACTAM SODIUM 25 GRAM(S): 3; .375 INJECTION, POWDER, LYOPHILIZED, FOR SOLUTION INTRAVENOUS at 05:57

## 2024-08-04 RX ADMIN — LEVALBUTEROL HYDROCHLORIDE 0.63 MILLIGRAM(S): 0.31 SOLUTION RESPIRATORY (INHALATION) at 20:51

## 2024-08-04 RX ADMIN — Medication 5 MILLIGRAM(S): at 05:56

## 2024-08-04 RX ADMIN — Medication 2: at 23:16

## 2024-08-04 RX ADMIN — LEVALBUTEROL HYDROCHLORIDE 0.63 MILLIGRAM(S): 0.31 SOLUTION RESPIRATORY (INHALATION) at 10:40

## 2024-08-04 RX ADMIN — DORNASE ALFA 2.5 MILLIGRAM(S): 1 SOLUTION RESPIRATORY (INHALATION) at 10:40

## 2024-08-04 RX ADMIN — ENOXAPARIN SODIUM 40 MILLIGRAM(S): 120 INJECTION SUBCUTANEOUS at 12:49

## 2024-08-04 RX ADMIN — POTASSIUM CHLORIDE 40 MILLIEQUIVALENT(S): 1500 TABLET, EXTENDED RELEASE ORAL at 19:51

## 2024-08-04 RX ADMIN — Medication 10 MILLIGRAM(S): at 22:00

## 2024-08-04 RX ADMIN — Medication 10 MILLIGRAM(S): at 14:42

## 2024-08-04 NOTE — PROGRESS NOTE ADULT - ASSESSMENT
· Assessment	  59 y.o. man with history of GEJ adenocarcinoma (T3N0, stage 2A) s/p neoadjuvant chemotherapy, now s/p [facility]-Broderick Esophagectomy on 7/16.     Sx f/up appreciated  OOB  Pain control Oxycodone  NPO  Eso leak: J tube placement in IR 8/2,  tolerating J tube feeds w Glucerna    SOB:    CXR: Rt pleural effusion  Thoracic Sx f/up appreciated  Xopenex nebulizer    DM II:    FSSS  Lantus insulin  Endo f/up appreciated    HTN:    On IV Metoprolol/Hydralazine  Cardio f/up appreciated    Hypokalemia:    K supp

## 2024-08-04 NOTE — PROGRESS NOTE ADULT - ASSESSMENT
Subjective: 59yMale seen at bedside with thoracic surgery team. Pt with no issues overnight. During day tolerating trickle tube feeds via J tube. Obtained a CT chest, abd, pelvis today with PO contrast. After exam patient with diarrhea, paused TFs for night and past MN for procedure tomorrow pending review of CT. Electrolytes repleted and will repeat BMP. All other vitals stable, no WBC and afebrile to date.     Vital Signs:  Vital Signs Last 24 Hrs  T(C): 36.7 (08-04-24 @ 16:00), Max: 36.9 (08-04-24 @ 04:58)  T(F): 98.1 (08-04-24 @ 16:00), Max: 98.4 (08-04-24 @ 04:58)  HR: 92 (08-04-24 @ 16:00) (86 - 94)  BP: 123/58 (08-04-24 @ 16:00) (122/62 - 138/75)  RR: 18 (08-04-24 @ 16:00) (18 - 18)  SpO2: 96% (08-04-24 @ 16:00) (94% - 100%) on (O2)    Pertinent Physical Exam:  Telemetry/Alarms: NSR  General: thin male  Neurology: Awake, nonfocal  Neck: Neck supple, trachea midline, No JVD,   Respiratory: Some rhales and cough  CV: RRR  Abdominal: Soft, NT, ND +BS,     CT Chest:   ct< from: CT Abdomen and Pelvis w/ Oral Cont (08.04.24 @ 14:44) >  IMPRESSION:  1.  Extensive contrast and air extravasation into the right pleural space   consistent with anastomotic leak.  2.  Fracture of the posterior right eighth rib that has been present   since 7/30/2024 but is new since 7/28/2024.    Findings were discussed with LEA Tee on 8/4/2024 at 2:54 PM by   Dr. Marrero with read back confirmation.    --- End of Report ---    < end of copied text >      I&O's Summary    03 Aug 2024 07:01  -  04 Aug 2024 07:00  --------------------------------------------------------  IN: 1105 mL / OUT: 2340 mL / NET: -1235 mL    04 Aug 2024 07:01  -  04 Aug 2024 17:44  --------------------------------------------------------  IN: 1320 mL / OUT: 100 mL / NET: 1220 mL        Relevant labs, radiology and Medications reviewed                        8.4    7.04  )-----------( 220      ( 04 Aug 2024 06:20 )             25.0     08-04    134<L>  |  98  |  5<L>  ----------------------------<  195<H>  3.4<L>   |  25  |  0.77    Ca    7.0<L>      04 Aug 2024 06:20  Phos  2.0     08-04  Mg     1.80     08-04    TPro  4.9<L>  /  Alb  2.1<L>  /  TBili  0.4  /  DBili  x   /  AST  13  /  ALT  5   /  AlkPhos  87  08-04          MEDICATIONS  (STANDING):  dextrose 5% + sodium chloride 0.45% with potassium chloride 20 mEq/L 1000 milliLiter(s) (75 mL/Hr) IV Continuous <Continuous>  dextrose 5%. 1000 milliLiter(s) (50 mL/Hr) IV Continuous <Continuous>  dextrose 5%. 1000 milliLiter(s) (100 mL/Hr) IV Continuous <Continuous>  dextrose 50% Injectable 12.5 Gram(s) IV Push once  dextrose 50% Injectable 25 Gram(s) IV Push once  dextrose 50% Injectable 25 Gram(s) IV Push once  dextrose Oral Gel 15 Gram(s) Oral once  dornase malik Solution 2.5 milliGRAM(s) Inhalation daily  enoxaparin Injectable 40 milliGRAM(s) SubCutaneous every 24 hours  insulin glargine Injectable (LANTUS) 3 Unit(s) SubCutaneous at bedtime  insulin lispro (ADMELOG) corrective regimen sliding scale   SubCutaneous every 6 hours  levalbuterol Inhalation 0.63 milliGRAM(s) Inhalation every 6 hours  lidocaine   4% Patch 1 Patch Transdermal at bedtime  metoclopramide Injectable 10 milliGRAM(s) IV Push every 8 hours  metoprolol tartrate Injectable 5 milliGRAM(s) IV Push every 6 hours  piperacillin/tazobactam IVPB.. 3.375 Gram(s) IV Intermittent every 8 hours  sodium chloride 3%  Inhalation 4 milliLiter(s) Inhalation every 6 hours    MEDICATIONS  (PRN):  HYDROmorphone  Injectable 0.5 milliGRAM(s) IV Push every 4 hours PRN moderate to severe pain  naloxone Injectable 0.1 milliGRAM(s) IV Push every 3 minutes PRN For ANY of the following changes in patient status:  A. RR LESS THAN 10 breaths per minute, B. Oxygen saturation LESS THAN 90%, C. Sedation score of 6        Assessment  59 year old male with PMH HTN and DM presents with GE junction adenocarcinoma s/p chemo and radiation now s/p [place] Bro[facility] Esophagectomy on 7/16. Pt s/p barium swallow and started on CLD. Postop CT CAP reviewed w Dr Laureano. Pt is now on Zosyn and advanced to soft diet. Sinus tachycardia since last night. Now on nasal cannula.  7/29: Pt with now improving leukocytosis and s/op CTA r/o PE for tachycardia over the weekend. Imaging reviewed by Dr Laureano, no acute findings and able to advance to soft diet as previously mentioned. Patient tolerating PO diet well. Patient without fevers and with drain in place on IV abx.   7/30-7/31-CT chest completed, no contrast extravasation. Pt resumed CLD and advanced to FLD today.  8/1-Today noted to have continued high drain output. Inc in Leukocytosis. Pt with generalized 3rd spacing. Fluid specimens sent, cont Zosyn.   8/2 Drain still w/ high output. Went for abd CT, plan for J tube placement. GI onboard to help eval for leak / intervene in future if necessary  8/3- J tube placed last night. Will start tickle infusion of NS later. Nutritional consult ordered. Adam still w high output, probable CT scan of c/a/p tomorrow. Possible stent w/ GI next week.     8/4: CT chest obtained - plan for GI to do EGD.     PLAN  NPO past MN - Hold TF  CT chest - obtained   Electrolyte repletion   Pain control   Meds via J tube   Possible EGD with tomorrow iwth GI  ID recs appreciated     Discussed at AM rounds Subjective: 59yMale seen at bedside with thoracic surgery team. Pt with no issues overnight. During day tolerating trickle tube feeds via J tube. Obtained a CT chest, abd, pelvis today with PO contrast. After exam patient with diarrhea, paused TFs for night and past MN for procedure tomorrow pending review of CT. Electrolytes repleted and will repeat BMP. All other vitals stable, no WBC and afebrile to date.     Vital Signs:  Vital Signs Last 24 Hrs  T(C): 36.7 (08-04-24 @ 16:00), Max: 36.9 (08-04-24 @ 04:58)  T(F): 98.1 (08-04-24 @ 16:00), Max: 98.4 (08-04-24 @ 04:58)  HR: 92 (08-04-24 @ 16:00) (86 - 94)  BP: 123/58 (08-04-24 @ 16:00) (122/62 - 138/75)  RR: 18 (08-04-24 @ 16:00) (18 - 18)  SpO2: 96% (08-04-24 @ 16:00) (94% - 100%) on (O2)    Pertinent Physical Exam:  Telemetry/Alarms: NSR  General: thin male  Neurology: Awake, nonfocal  Neck: Neck supple, trachea midline, No JVD,   Respiratory: Some rhales and cough  CV: RRR  Abdominal: Soft, NT, ND +BS,     CT Chest:   ct< from: CT Abdomen and Pelvis w/ Oral Cont (08.04.24 @ 14:44) >  IMPRESSION:  1.  Extensive contrast and air extravasation into the right pleural space   consistent with anastomotic leak.  2.  Fracture of the posterior right eighth rib that has been present   since 7/30/2024 but is new since 7/28/2024.    Findings were discussed with LEA Tee on 8/4/2024 at 2:54 PM by   Dr. Marrero with read back confirmation.    --- End of Report ---    < end of copied text >      I&O's Summary    03 Aug 2024 07:01  -  04 Aug 2024 07:00  --------------------------------------------------------  IN: 1105 mL / OUT: 2340 mL / NET: -1235 mL    04 Aug 2024 07:01  -  04 Aug 2024 17:44  --------------------------------------------------------  IN: 1320 mL / OUT: 100 mL / NET: 1220 mL        Relevant labs, radiology and Medications reviewed                        8.4    7.04  )-----------( 220      ( 04 Aug 2024 06:20 )             25.0     08-04    134<L>  |  98  |  5<L>  ----------------------------<  195<H>  3.4<L>   |  25  |  0.77    Ca    7.0<L>      04 Aug 2024 06:20  Phos  2.0     08-04  Mg     1.80     08-04    TPro  4.9<L>  /  Alb  2.1<L>  /  TBili  0.4  /  DBili  x   /  AST  13  /  ALT  5   /  AlkPhos  87  08-04          MEDICATIONS  (STANDING):  dextrose 5% + sodium chloride 0.45% with potassium chloride 20 mEq/L 1000 milliLiter(s) (75 mL/Hr) IV Continuous <Continuous>  dextrose 5%. 1000 milliLiter(s) (50 mL/Hr) IV Continuous <Continuous>  dextrose 5%. 1000 milliLiter(s) (100 mL/Hr) IV Continuous <Continuous>  dextrose 50% Injectable 12.5 Gram(s) IV Push once  dextrose 50% Injectable 25 Gram(s) IV Push once  dextrose 50% Injectable 25 Gram(s) IV Push once  dextrose Oral Gel 15 Gram(s) Oral once  dornase malik Solution 2.5 milliGRAM(s) Inhalation daily  enoxaparin Injectable 40 milliGRAM(s) SubCutaneous every 24 hours  insulin glargine Injectable (LANTUS) 3 Unit(s) SubCutaneous at bedtime  insulin lispro (ADMELOG) corrective regimen sliding scale   SubCutaneous every 6 hours  levalbuterol Inhalation 0.63 milliGRAM(s) Inhalation every 6 hours  lidocaine   4% Patch 1 Patch Transdermal at bedtime  metoclopramide Injectable 10 milliGRAM(s) IV Push every 8 hours  metoprolol tartrate Injectable 5 milliGRAM(s) IV Push every 6 hours  piperacillin/tazobactam IVPB.. 3.375 Gram(s) IV Intermittent every 8 hours  sodium chloride 3%  Inhalation 4 milliLiter(s) Inhalation every 6 hours    MEDICATIONS  (PRN):  HYDROmorphone  Injectable 0.5 milliGRAM(s) IV Push every 4 hours PRN moderate to severe pain  naloxone Injectable 0.1 milliGRAM(s) IV Push every 3 minutes PRN For ANY of the following changes in patient status:  A. RR LESS THAN 10 breaths per minute, B. Oxygen saturation LESS THAN 90%, C. Sedation score of 6        Assessment  59 year old male with PMH HTN and DM presents with GE junction adenocarcinoma s/p chemo and radiation now s/p [place] Bro[facility]ick Esophagectomy on 7/16. Pt s/p barium swallow and started on CLD. Postop CT CAP reviewed w Dr Laureano. Pt is now on Zosyn and advanced to soft diet. Sinus tachycardia since last night. Now on nasal cannula.  7/29: Pt with now improving leukocytosis and s/op CTA r/o PE for tachycardia over the weekend. Imaging reviewed by Dr Laureano, no acute findings and able to advance to soft diet as previously mentioned. Patient tolerating PO diet well. Patient without fevers and with drain in place on IV abx.   7/30-7/31-CT chest completed, no contrast extravasation. Pt resumed CLD and advanced to FLD today.  8/1-Today noted to have continued high drain output. Inc in Leukocytosis. Pt with generalized 3rd spacing. Fluid specimens sent, cont Zosyn.   8/2 Drain still w/ high output. Went for abd CT, plan for J tube placement. GI onboard to help eval for leak / intervene in future if necessary  8/3- J tube placed last night. Will start tickle infusion of NS later. Nutritional consult ordered. Adam still w high output, probable CT scan of c/a/p tomorrow. Possible stent w/ GI next week.     8/4: CT chest obtained - Extravasation of contrast into right chest. Plan for GI to do EGD.     PLAN  NPO past MN - Hold TF  CT chest - obtained - Extravasation of contrast into right chest  Electrolyte repletion repeat BMP  Pain control   Possible EGD with tomorrow with GI after review of imaging.  ID recs appreciated   DM - Insulin, glucose check, ISS  Chest tube in place, monitor output - cx sent     Discussed at AM rounds

## 2024-08-04 NOTE — PROGRESS NOTE ADULT - SUBJECTIVE AND OBJECTIVE BOX
DATE OF SERVICE: 08-04-24     no events overnight        dextrose 5% + sodium chloride 0.45% with potassium chloride 20 mEq/L 1000 milliLiter(s) IV Continuous <Continuous>  dextrose 5%. 1000 milliLiter(s) IV Continuous <Continuous>  dextrose 5%. 1000 milliLiter(s) IV Continuous <Continuous>  dextrose 50% Injectable 12.5 Gram(s) IV Push once  dextrose 50% Injectable 25 Gram(s) IV Push once  dextrose 50% Injectable 25 Gram(s) IV Push once  dextrose Oral Gel 15 Gram(s) Oral once  dornase malik Solution 2.5 milliGRAM(s) Inhalation daily  enoxaparin Injectable 40 milliGRAM(s) SubCutaneous every 24 hours  HYDROmorphone  Injectable 0.5 milliGRAM(s) IV Push every 4 hours PRN  insulin glargine Injectable (LANTUS) 3 Unit(s) SubCutaneous at bedtime  insulin lispro (ADMELOG) corrective regimen sliding scale   SubCutaneous every 6 hours  levalbuterol Inhalation 0.63 milliGRAM(s) Inhalation every 6 hours  lidocaine   4% Patch 1 Patch Transdermal at bedtime  metoclopramide Injectable 10 milliGRAM(s) IV Push every 8 hours  metoprolol tartrate Injectable 5 milliGRAM(s) IV Push every 6 hours  naloxone Injectable 0.1 milliGRAM(s) IV Push every 3 minutes PRN  piperacillin/tazobactam IVPB.. 3.375 Gram(s) IV Intermittent every 8 hours  sodium chloride 0.9%. 1000 milliLiter(s) IV Continuous <Continuous>  sodium chloride 3%  Inhalation 4 milliLiter(s) Inhalation every 6 hours                            8.4    7.04  )-----------( 220      ( 04 Aug 2024 06:20 )             25.0       Hemoglobin: 8.4 g/dL (08-04 @ 06:20)  Hemoglobin: 8.6 g/dL (08-03 @ 04:52)  Hemoglobin: 8.8 g/dL (08-02 @ 05:45)  Hemoglobin: 8.9 g/dL (08-01 @ 05:15)  Hemoglobin: 8.8 g/dL (07-31 @ 05:47)      08-04    134<L>  |  98  |  5<L>  ----------------------------<  195<H>  3.4<L>   |  25  |  0.77    Ca    7.0<L>      04 Aug 2024 06:20  Phos  2.0     08-04  Mg     1.80     08-04    TPro  4.9<L>  /  Alb  2.1<L>  /  TBili  0.4  /  DBili  x   /  AST  13  /  ALT  5   /  AlkPhos  87  08-04    Creatinine Trend: 0.77<--, 0.75<--, 0.80<--, 0.77<--, 0.75<--, 0.80<--    COAGS:           T(C): 36.7 (08-04-24 @ 08:30), Max: 36.9 (08-04-24 @ 04:58)  HR: 87 (08-04-24 @ 08:30) (82 - 93)  BP: 125/67 (08-04-24 @ 08:30) (114/58 - 138/75)  RR: 18 (08-04-24 @ 08:30) (17 - 18)  SpO2: 98% (08-04-24 @ 08:30) (96% - 100%)  Wt(kg): --    I&O's Summary    03 Aug 2024 07:01  -  04 Aug 2024 07:00  --------------------------------------------------------  IN: 1105 mL / OUT: 2340 mL / NET: -1235 mL        Gen: NAD  HEENT:  (-)icterus (-)pallor  CV: N S1 S2 1/6 CATHY (+)2 Pulses B/l  Resp:  Clear to auscultation B/L, normal effort  GI: (+) BS Soft, NT, ND  Lymph:  (-)Edema, (-)obvious lymphadenopathy  Skin: Warm to touch, Normal turgor  Psych: Appropriate mood and affect      TELEMETRY: 	 SR/ST    ECG:  	NSR      TTE 07/2024  Findings:  1. Normal left ventricular size and function.  Mild diastolic dysfunction.  2. Normal left atrial size  3. Right atrial cavity is normal in size.  4. Normal right ventricular size and function.  5. Normal trileaflet aortic valve opening.  6. Normal mitral valve opening.  7. Normal appearing tricuspid valve with mild tricuspid  regurgitation.  8. Pulmonic valve is grossly normal, yet poorly visualized  with no doppler evidence for pulmonic stenosis.  9. No evidence of significant pericardial effusion.  10. The aortic root is normal.  11. Normal pulmonary artery.  12. IVC is normal with respiratory variation.  FULL STUDY DONE INCLUDING M-MODE RECORDING,  SPECTRAL DOPPLER AND    Stress 07/2024  Normal myocardial perfusion SPECT images No evidence of stress induced ischemia or infarction.  Normal left ventricular size and function.  Calculated EF is: 68%    CT  IMPRESSION:  Small loculated right pleural effusion with foci of air and pleural   catheter. Small left pleural effusion.    Splenic infarcts.    Small volume ascites.    ASSESSMENT/PLAN: 	Pt is a 59 y.o. man with history of HTN,, DLD, DM,  GEJ adenocarcinoma (T3N0, stage 2A) s/p neoadjuvant chemotherapy admitted for optimization prior to planned [place] Esophagectomy on tuesday. Recently had a nuclear stress test in our office for evaluation of preoperative cardiac risk assessment prior to esophageal cancer surgery scheduled on 07/16/2024. The patient denies any chest pain, shortness ofbreath, or anginal symptoms. He has no palpitations, dizziness, or syncope    s/p [place] Esophagectomy on 7/16.   s/p J tube palcement     Pre-OP/HTN  - tolerated procedure well from CV perspective  -  oral BP meds on holdm diet now NPO, Losartan, HCTZ, Norvasc and Metoprolol on hold-  - no ion clinical CHF, if BP consistently above 180 systolic can give  Hydralazine IV PRN  - cont IV Bb at present      F/U with Dr Shah (cardiologist) after DC as scheduled, [phone]

## 2024-08-04 NOTE — CHART NOTE - NSCHARTNOTEFT_GEN_A_CORE
Tentative plan for EGD tomorrow pending review of imaging with weekday attending   - Please keep NPO at MN   - AM labs with electrolyte repletion   - Trend hbg and keep >7 or >8 if with CAD  - Adv GI team to follow up with further recs in AM     Bessie Ramírez MD  Gastroenterology/Hepatology Fellow, PGY-5  Please contact via TEAMS    NON-URGENT CONSULTS:  Please email [email or web address]

## 2024-08-04 NOTE — PROGRESS NOTE ADULT - SUBJECTIVE AND OBJECTIVE BOX
Patient is a 59y old  Male who presents with a chief complaint of Esophagectomy (04 Aug 2024 09:01)      SUBJECTIVE / OVERNIGHT EVENTS: tolerating J tube feeds w Glucerna    MEDICATIONS  (STANDING):  dextrose 5% + sodium chloride 0.45% with potassium chloride 20 mEq/L 1000 milliLiter(s) (75 mL/Hr) IV Continuous <Continuous>  dextrose 5%. 1000 milliLiter(s) (50 mL/Hr) IV Continuous <Continuous>  dextrose 5%. 1000 milliLiter(s) (100 mL/Hr) IV Continuous <Continuous>  dextrose 50% Injectable 25 Gram(s) IV Push once  dextrose 50% Injectable 12.5 Gram(s) IV Push once  dextrose 50% Injectable 25 Gram(s) IV Push once  dextrose Oral Gel 15 Gram(s) Oral once  dornase malik Solution 2.5 milliGRAM(s) Inhalation daily  enoxaparin Injectable 40 milliGRAM(s) SubCutaneous every 24 hours  insulin glargine Injectable (LANTUS) 3 Unit(s) SubCutaneous at bedtime  insulin lispro (ADMELOG) corrective regimen sliding scale   SubCutaneous every 6 hours  levalbuterol Inhalation 0.63 milliGRAM(s) Inhalation every 6 hours  lidocaine   4% Patch 1 Patch Transdermal at bedtime  metoclopramide Injectable 10 milliGRAM(s) IV Push every 8 hours  metoprolol tartrate Injectable 5 milliGRAM(s) IV Push every 6 hours  piperacillin/tazobactam IVPB.. 3.375 Gram(s) IV Intermittent every 8 hours  sodium chloride 3%  Inhalation 4 milliLiter(s) Inhalation every 6 hours    MEDICATIONS  (PRN):  HYDROmorphone  Injectable 0.5 milliGRAM(s) IV Push every 4 hours PRN moderate to severe pain  naloxone Injectable 0.1 milliGRAM(s) IV Push every 3 minutes PRN For ANY of the following changes in patient status:  A. RR LESS THAN 10 breaths per minute, B. Oxygen saturation LESS THAN 90%, C. Sedation score of 6      Vital Signs Last 24 Hrs  T(F): 98.1 (08-04-24 @ 16:00), Max: 98.4 (08-04-24 @ 04:58)  HR: 92 (08-04-24 @ 16:00) (86 - 94)  BP: 123/58 (08-04-24 @ 16:00) (122/62 - 138/75)  RR: 18 (08-04-24 @ 16:00) (18 - 18)  SpO2: 96% (08-04-24 @ 16:00) (94% - 100%)  Telemetry:   CAPILLARY BLOOD GLUCOSE      POCT Blood Glucose.: 110 mg/dL (04 Aug 2024 17:49)  POCT Blood Glucose.: 200 mg/dL (04 Aug 2024 12:13)  POCT Blood Glucose.: 190 mg/dL (04 Aug 2024 06:09)  POCT Blood Glucose.: 143 mg/dL (03 Aug 2024 23:55)  POCT Blood Glucose.: 156 mg/dL (03 Aug 2024 18:04)    I&O's Summary    03 Aug 2024 07:01  -  04 Aug 2024 07:00  --------------------------------------------------------  IN: 1105 mL / OUT: 2340 mL / NET: -1235 mL    04 Aug 2024 07:01  -  04 Aug 2024 17:51  --------------------------------------------------------  IN: 1320 mL / OUT: 100 mL / NET: 1220 mL        PHYSICAL EXAM:  GENERAL: NAD, well-developed  HEAD:  Atraumatic, Normocephalic  EYES: EOMI, PERRLA, conjunctiva and sclera clear  NECK: Supple, No JVD  CHEST/LUNG: Clear to auscultation bilaterally; No wheeze  HEART: Regular rate and rhythm; No murmurs, rubs, or gallops  ABDOMEN: Soft, Nontender, Nondistended; Bowel sounds present  EXTREMITIES:  2+ Peripheral Pulses, No clubbing, cyanosis, or edema  PSYCH: AAOx3  NEUROLOGY: non-focal  SKIN: No rashes or lesions    LABS:                        8.4    7.04  )-----------( 220      ( 04 Aug 2024 06:20 )             25.0     08-04    134<L>  |  98  |  5<L>  ----------------------------<  195<H>  3.4<L>   |  25  |  0.77    Ca    7.0<L>      04 Aug 2024 06:20  Phos  2.0     08-04  Mg     1.80     08-04    TPro  4.9<L>  /  Alb  2.1<L>  /  TBili  0.4  /  DBili  x   /  AST  13  /  ALT  5   /  AlkPhos  87  08-04          Urinalysis Basic - ( 04 Aug 2024 06:20 )    Color: x / Appearance: x / SG: x / pH: x  Gluc: 195 mg/dL / Ketone: x  / Bili: x / Urobili: x   Blood: x / Protein: x / Nitrite: x   Leuk Esterase: x / RBC: x / WBC x   Sq Epi: x / Non Sq Epi: x / Bacteria: x        RADIOLOGY & ADDITIONAL TESTS:    Imaging Personally Reviewed:    Consultant(s) Notes Reviewed:      Care Discussed with Consultants/Other Providers:

## 2024-08-05 LAB
-  AMPICILLIN: SIGNIFICANT CHANGE UP
-  VANCOMYCIN: SIGNIFICANT CHANGE UP
ANION GAP SERPL CALC-SCNC: 9 MMOL/L — SIGNIFICANT CHANGE UP (ref 7–14)
BUN SERPL-MCNC: 3 MG/DL — LOW (ref 7–23)
CALCIUM SERPL-MCNC: 7.4 MG/DL — LOW (ref 8.4–10.5)
CHLORIDE SERPL-SCNC: 102 MMOL/L — SIGNIFICANT CHANGE UP (ref 98–107)
CO2 SERPL-SCNC: 23 MMOL/L — SIGNIFICANT CHANGE UP (ref 22–31)
CREAT SERPL-MCNC: 0.73 MG/DL — SIGNIFICANT CHANGE UP (ref 0.5–1.3)
EGFR: 104 ML/MIN/1.73M2 — SIGNIFICANT CHANGE UP
GLUCOSE BLDC GLUCOMTR-MCNC: 104 MG/DL — HIGH (ref 70–99)
GLUCOSE BLDC GLUCOMTR-MCNC: 120 MG/DL — HIGH (ref 70–99)
GLUCOSE BLDC GLUCOMTR-MCNC: 127 MG/DL — HIGH (ref 70–99)
GLUCOSE BLDC GLUCOMTR-MCNC: 169 MG/DL — HIGH (ref 70–99)
GLUCOSE BLDC GLUCOMTR-MCNC: 183 MG/DL — HIGH (ref 70–99)
GLUCOSE BLDC GLUCOMTR-MCNC: 194 MG/DL — HIGH (ref 70–99)
GLUCOSE BLDC GLUCOMTR-MCNC: 213 MG/DL — HIGH (ref 70–99)
GLUCOSE SERPL-MCNC: 100 MG/DL — HIGH (ref 70–99)
HCT VFR BLD CALC: 25 % — LOW (ref 39–50)
HGB BLD-MCNC: 8.3 G/DL — LOW (ref 13–17)
INR BLD: 1.07 RATIO — SIGNIFICANT CHANGE UP (ref 0.85–1.18)
MAGNESIUM SERPL-MCNC: 1.9 MG/DL — SIGNIFICANT CHANGE UP (ref 1.6–2.6)
MCHC RBC-ENTMCNC: 29.3 PG — SIGNIFICANT CHANGE UP (ref 27–34)
MCHC RBC-ENTMCNC: 33.2 GM/DL — SIGNIFICANT CHANGE UP (ref 32–36)
MCV RBC AUTO: 88.3 FL — SIGNIFICANT CHANGE UP (ref 80–100)
METHOD TYPE: SIGNIFICANT CHANGE UP
NRBC # BLD: 0 /100 WBCS — SIGNIFICANT CHANGE UP (ref 0–0)
NRBC # FLD: 0 K/UL — SIGNIFICANT CHANGE UP (ref 0–0)
PHOSPHATE SERPL-MCNC: 2 MG/DL — LOW (ref 2.5–4.5)
PLATELET # BLD AUTO: 238 K/UL — SIGNIFICANT CHANGE UP (ref 150–400)
POTASSIUM SERPL-MCNC: 4.3 MMOL/L — SIGNIFICANT CHANGE UP (ref 3.5–5.3)
POTASSIUM SERPL-SCNC: 4.3 MMOL/L — SIGNIFICANT CHANGE UP (ref 3.5–5.3)
PROTHROM AB SERPL-ACNC: 12 SEC — SIGNIFICANT CHANGE UP (ref 9.5–13)
RBC # BLD: 2.83 M/UL — LOW (ref 4.2–5.8)
RBC # FLD: 13.4 % — SIGNIFICANT CHANGE UP (ref 10.3–14.5)
SODIUM SERPL-SCNC: 134 MMOL/L — LOW (ref 135–145)
WBC # BLD: 6.19 K/UL — SIGNIFICANT CHANGE UP (ref 3.8–10.5)
WBC # FLD AUTO: 6.19 K/UL — SIGNIFICANT CHANGE UP (ref 3.8–10.5)

## 2024-08-05 PROCEDURE — 99232 SBSQ HOSP IP/OBS MODERATE 35: CPT

## 2024-08-05 PROCEDURE — 43266 EGD ENDOSCOPIC STENT PLACE: CPT

## 2024-08-05 PROCEDURE — 71045 X-RAY EXAM CHEST 1 VIEW: CPT | Mod: 26

## 2024-08-05 DEVICE — IMPLANTABLE DEVICE: Type: IMPLANTABLE DEVICE | Status: FUNCTIONAL

## 2024-08-05 DEVICE — HYDRA WIRE: Type: IMPLANTABLE DEVICE | Status: FUNCTIONAL

## 2024-08-05 RX ADMIN — PIPERACILLIN SODIUM, TAZOBACTAM SODIUM 25 GRAM(S): 3; .375 INJECTION, POWDER, LYOPHILIZED, FOR SOLUTION INTRAVENOUS at 13:19

## 2024-08-05 RX ADMIN — PIPERACILLIN SODIUM, TAZOBACTAM SODIUM 25 GRAM(S): 3; .375 INJECTION, POWDER, LYOPHILIZED, FOR SOLUTION INTRAVENOUS at 05:00

## 2024-08-05 RX ADMIN — Medication 10 MILLIGRAM(S): at 05:01

## 2024-08-05 RX ADMIN — Medication 1: at 20:44

## 2024-08-05 RX ADMIN — Medication 5 MILLIGRAM(S): at 05:00

## 2024-08-05 RX ADMIN — Medication 10 MILLIGRAM(S): at 13:19

## 2024-08-05 RX ADMIN — Medication 5 MILLIGRAM(S): at 12:09

## 2024-08-05 RX ADMIN — INSULIN GLARGINE-YFGN 3 UNIT(S): 100 INJECTION, SOLUTION SUBCUTANEOUS at 23:14

## 2024-08-05 RX ADMIN — Medication 5 MILLIGRAM(S): at 20:34

## 2024-08-05 RX ADMIN — SODIUM CHLORIDE AND POTASSIUM CHLORIDE 75 MILLILITER(S): 150; 450 INJECTION, SOLUTION INTRAVENOUS at 20:42

## 2024-08-05 RX ADMIN — SODIUM CHLORIDE AND POTASSIUM CHLORIDE 75 MILLILITER(S): 150; 450 INJECTION, SOLUTION INTRAVENOUS at 06:38

## 2024-08-05 RX ADMIN — Medication 4 MILLILITER(S): at 21:55

## 2024-08-05 RX ADMIN — LEVALBUTEROL HYDROCHLORIDE 0.63 MILLIGRAM(S): 0.31 SOLUTION RESPIRATORY (INHALATION) at 21:54

## 2024-08-05 RX ADMIN — PIPERACILLIN SODIUM, TAZOBACTAM SODIUM 25 GRAM(S): 3; .375 INJECTION, POWDER, LYOPHILIZED, FOR SOLUTION INTRAVENOUS at 21:48

## 2024-08-05 RX ADMIN — Medication 1: at 23:15

## 2024-08-05 RX ADMIN — ENOXAPARIN SODIUM 40 MILLIGRAM(S): 120 INJECTION SUBCUTANEOUS at 12:09

## 2024-08-05 RX ADMIN — Medication 1 APPLICATION(S): at 20:45

## 2024-08-05 RX ADMIN — Medication 10 MILLIGRAM(S): at 21:49

## 2024-08-05 RX ADMIN — Medication 1 APPLICATION(S): at 05:02

## 2024-08-05 NOTE — PROGRESS NOTE ADULT - ASSESSMENT
59y old Male w/ PMHx GE junction adenocarcinoma s/p esophagectomy now with potential leak. IR consulted for J tube for feeding. Patient is s/p jejunostomy tube placement on 8/2 in Interventional Radiology. CT A/P w/ PO contrast demonstrating leak near anastomosis site, now planned for EGD w/ possible stent placement today.     Plan:  - Jejunostomy tube for feeding as per primary team. No meds via J-tube.     [id number]

## 2024-08-05 NOTE — PROGRESS NOTE ADULT - SUBJECTIVE AND OBJECTIVE BOX
Infectious Diseases Follow Up:    Patient is a 59y old  Male who presents with a chief complaint of Esophagectomy (04 Aug 2024 17:51)      Interval History/ROS:  No acute events, pt w/o acute complaints,   Denies F/C/SOB/abdominal pain     Allergies  No Known Allergies        ANTIMICROBIALS:  piperacillin/tazobactam IVPB.. 3.375 every 8 hours      Current Abx:     Previous Abx     OTHER MEDS:  MEDICATIONS  (STANDING):  dextrose 50% Injectable 25 once  dextrose 50% Injectable 12.5 once  dextrose 50% Injectable 25 once  dextrose Oral Gel 15 once  dornase malik Solution 2.5 daily  enoxaparin Injectable 40 every 24 hours  HYDROmorphone  Injectable 0.5 every 4 hours PRN  insulin glargine Injectable (LANTUS) 3 at bedtime  insulin lispro (ADMELOG) corrective regimen sliding scale  every 6 hours  levalbuterol Inhalation 0.63 every 6 hours  metoclopramide Injectable 10 every 8 hours  metoprolol tartrate Injectable 5 every 6 hours  sodium chloride 3%  Inhalation 4 every 6 hours      Vital Signs Last 24 Hrs  T(C): 36.5 (05 Aug 2024 07:34), Max: 36.8 (04 Aug 2024 12:41)  T(F): 97.7 (05 Aug 2024 07:34), Max: 98.2 (04 Aug 2024 12:41)  HR: 84 (05 Aug 2024 07:34) (84 - 98)  BP: 125/75 (05 Aug 2024 07:34) (123/58 - 141/62)  BP(mean): --  RR: 18 (05 Aug 2024 07:34) (18 - 18)  SpO2: 95% (05 Aug 2024 07:34) (93% - 97%)    Parameters below as of 05 Aug 2024 07:34  Patient On (Oxygen Delivery Method): room air        PHYSICAL EXAM:  GENERAL: NAD, well-developed  HEAD:  Atraumatic, Normocephalic  EYES: EOMI, conjunctiva and sclera clear  CHEST/LUNG: Clear to auscultation bilaterally; No wheeze  HEART: Regular rate and rhythm; No murmurs, rubs, or gallops  ABDOMEN: Soft, Nontender, Nondistended; Bowel sounds present  PSYCH: AAOx3                          8.3    6.19  )-----------( 238      ( 05 Aug 2024 06:39 )             25.0       08-05    134<L>  |  102  |  3<L>  ----------------------------<  100<H>  4.3   |  23  |  0.73    Ca    7.4<L>      05 Aug 2024 06:39  Phos  2.0     08-05  Mg     1.90     08-05    TPro  4.9<L>  /  Alb  2.1<L>  /  TBili  0.4  /  DBili  x   /  AST  13  /  ALT  5   /  AlkPhos  87  08-04      Urinalysis Basic - ( 05 Aug 2024 06:39 )    Color: x / Appearance: x / SG: x / pH: x  Gluc: 100 mg/dL / Ketone: x  / Bili: x / Urobili: x   Blood: x / Protein: x / Nitrite: x   Leuk Esterase: x / RBC: x / WBC x   Sq Epi: x / Non Sq Epi: x / Bacteria: x        MICROBIOLOGY:  v  Pleural Fl Pleural Fluid  08-01-24   Numerous Enterococcus faecium  Few Eva albicans Referred to Mycology Susceptibility to follow.  Moderate Prevotella melaninogenica "Susceptibilities not performed"  --  Enterococcus faecium                RADIOLOGY:    < from: CT Abdomen and Pelvis w/ Oral Cont (08.04.24 @ 14:44) >  IMPRESSION:  1.  Extensive contrast and air extravasation into the right pleural space   consistent with anastomotic leak.  2.  Fracture of the posterior right eighth rib that has been present   since 7/30/2024 but is new since 7/28/2024.    < end of copied text >

## 2024-08-05 NOTE — PROGRESS NOTE ADULT - ASSESSMENT
59 year old gentleman with diagnosis of adenocarcinoma at GE junction s/p chemotherapy and radiation  Now s/p robotic resection on 7/16    Post operatively, he had worsening leukocytosis  Imaging with a right sided loculated effusion  Drain in place initially draining brown fluid with debris  Drainage now looks like saliva    Fluid had high amylase  Concerning for leak    Now s/p jejunostomy    Pleural fluid culture from 8/1 was sent from a drain that has been in place.  Already growing VRE.  I suspect growth will be polymicrobial.   I suspect this culture growth represents colonization.  I would not direct antibiotics to cover these organisms.     Plan:   1. C/w Zosyn 3.375 g q8 for now. Pt doing well despite no VRE, fungal coverage. Will hold off additional coverage at this time given Cx sent from existing drain  2. Pending EGD given c/f leak     Thank you for this consult. Inpatient ID team will follow.    Kd Ramírez M.D.  Attending Physician  Division of Infectious Diseases  Department of Medicine    Please contact through MS Teams message.  Office: [phone] (after 5 PM or weekend)

## 2024-08-05 NOTE — PROGRESS NOTE ADULT - ASSESSMENT
The patient is a 59y Male with PMH of T2DM, HTN, HLD, GE junction cancer here for esophagectomy now postop, prolonged course.  Endocrinology consulted for T2DM.     Uncontrolled Type 2 Diabetes Mellitus   - Follows with: Dr. Nolasco  - A1C with Estimated Average Glucose Result: 10.9 % (07-13-24)  - home regimen: Semglee 18 units, Novolog 6 units with meals      Inpatient plan   - FS goal 100-180 mg/dl   - s/p jejunostomy tube by IR on 08/02  - NPO  - FS at goal   - continue Lantus 3 units at bedtime   - continue LOW Admelog correction scale q6 while NPO/tube feeds  - FS q6 while NPO --- TID AC if diet started   - hypoglycemia protocol PRN     Discharge plan   - Discharge recs pending clinical course and nutrition plan - oral vs tube feeds  - Patient has an appointment with Dr. Nolasco scheduled for August 8th at 8:20 [place] [address] [facility] [place] [address]   - Please ensure patient has a working glucometer and supplies - test strips, lancets, alcohol pads and insulin pen needles.       Hypertension  - Goal BP <130/80  - continue metoprolol  - Management as per primary team  - check urine microalbumin level as outpatient      Hyperlipidemia  - LDL goal <70  - check lipid panel as outpatient on a yearly basis  - management per primary team       Hypercalcemia  calcium 7.4  albumin 2.1  corrected calcium 8.9  vitamin 25OHD - 6.5 severely low.   Start ergocalciferol 50,000 units PO once weekly once able to take PO        TETE Day-BC  Nurse Practitioner  Division of Endocrinology & Diabetes  pager [phone]

## 2024-08-05 NOTE — PROGRESS NOTE ADULT - SUBJECTIVE AND OBJECTIVE BOX
60yo Male s/p jejunostomy tube placement on 8/2 in Interventional Radiology.     Patient seen and examined at bedside, resting comfortably.  J-tube currently clamped in anticipation for EGD w/ possible stent today w/ GI.   Was tolerating feeds via J-tube.     T(F): 97.7 (08-05-24 @ 07:34), Max: 98.2 (08-04-24 @ 12:41)  HR: 84 (08-05-24 @ 07:34) (84 - 98)  BP: 125/75 (08-05-24 @ 07:34) (123/58 - 141/62)  RR: 18 (08-05-24 @ 07:34) (18 - 18)  SpO2: 95% (08-05-24 @ 07:34) (93% - 97%)    LABS:                        8.3    6.19  )-----------( 238      ( 05 Aug 2024 06:39 )             25.0     08-05    134<L>  |  102  |  3<L>  ----------------------------<  100<H>  4.3   |  23  |  0.73    Ca    7.4<L>      05 Aug 2024 06:39  Phos  2.0     08-05  Mg     1.90     08-05    TPro  4.9<L>  /  Alb  2.1<L>  /  TBili  0.4  /  DBili  x   /  AST  13  /  ALT  5   /  AlkPhos  87  08-04    PT/INR - ( 05 Aug 2024 06:39 )   PT: 12.0 sec;   INR: 1.07 ratio        I&O's Detail    04 Aug 2024 07:01  -  05 Aug 2024 07:00  --------------------------------------------------------  IN:    dextrose 5% + sodium chloride 0.45% w/ Additives: 1425 mL    Enteral Tube Flush: 130 mL    Free Water: 850 mL    Glucerna 1.5: 140 mL    IV PiggyBack: 200 mL  Total IN: 2745 mL    OUT:    Bulb (mL): 470 mL    Jejunostomy Tube (mL): 0 mL    Oral Fluid: 0 mL    Voided (mL): 350 mL  Total OUT: 820 mL    Total NET: 1925 mL    PHYSICAL EXAM:  General: Nontoxic, in NAD  Jejunostomy Tube: dressing c/d/i, clamped.

## 2024-08-05 NOTE — PRE-OP CHECKLIST - ORDERS/MEDICATION ADMINISTRATION RECORD ON CHART
Cholesterol is elevated, at increased risk of diabetes,advise to eat low-fat, low carbohydrate diet and exercise 5 times a week for 30 minutes daily. Kidney function slightly reduced, please drink 8 to 10 glasses of water daily. no anemia, normal liver,thyroid function.neg for hep c, follow up as scheduled with PCP.
done
done

## 2024-08-05 NOTE — CHART NOTE - NSCHARTNOTEFT_GEN_A_CORE
Pt is 58yo M s/p [place] [facility] Esophagectomy on 716/24. Post op pt with large esophageal leak. J tube placed by IR on 8/2/24. Pt will need all nutrition via j tube tube feeds. Any po intake is absolutely contraindicated due to large esophageal leak. There are no other alternatives. The need is absolute. Failure of pt to have tube feeds will lead to prolonged hospitalization and possibly death. Length of need is at least 3 months but may be life long. Tube feeds must be via pump because J tube cannot be bolused.

## 2024-08-05 NOTE — PROGRESS NOTE ADULT - SUBJECTIVE AND OBJECTIVE BOX
Your opinion matters! Thank you for choosing Dr. Juan Pablo Monet at [facility] [place]. You may receive a survey in the mail about today's visit.  We always appreciate feedback.  It was a pleasure to care for you today!       Soak your feet daily in Epsom Salt. Please add 1 tablespoon of salt to 1 quart of warm water. Soak for 15 minutes.   - For any callus formation, use a pumice stone afterwards or a pedegg to remove the calluses.   - Moisturize 2-3 times a day to keep hydration to your feet.   - To increase moisture, use Aquaphor or Vaseline to the feet at night and cover with a sock.       Dr. Monet   Patient is a 59y old  Male who presents with a chief complaint of Esophagectomy (05 Aug 2024 13:09)      SUBJECTIVE / OVERNIGHT EVENTS:    Events noted.  CONSTITUTIONAL: No fever,  or fatigue  RESPIRATORY: No cough, wheezing,  No shortness of breath  CARDIOVASCULAR: No chest pain, palpitations  GASTROINTESTINAL: No abdominal or epigastric pain.       MEDICATIONS  (STANDING):  bacitracin   Ointment 1 Application(s) Topical two times a day  dextrose 5% + sodium chloride 0.45% with potassium chloride 20 mEq/L 1000 milliLiter(s) (75 mL/Hr) IV Continuous <Continuous>  dextrose 5%. 1000 milliLiter(s) (100 mL/Hr) IV Continuous <Continuous>  dextrose 5%. 1000 milliLiter(s) (50 mL/Hr) IV Continuous <Continuous>  dextrose 50% Injectable 12.5 Gram(s) IV Push once  dextrose 50% Injectable 25 Gram(s) IV Push once  dextrose 50% Injectable 25 Gram(s) IV Push once  dextrose Oral Gel 15 Gram(s) Oral once  dornase malik Solution 2.5 milliGRAM(s) Inhalation daily  enoxaparin Injectable 40 milliGRAM(s) SubCutaneous every 24 hours  insulin glargine Injectable (LANTUS) 3 Unit(s) SubCutaneous at bedtime  insulin lispro (ADMELOG) corrective regimen sliding scale   SubCutaneous every 6 hours  levalbuterol Inhalation 0.63 milliGRAM(s) Inhalation every 6 hours  lidocaine   4% Patch 1 Patch Transdermal at bedtime  metoclopramide Injectable 10 milliGRAM(s) IV Push every 8 hours  metoprolol tartrate Injectable 5 milliGRAM(s) IV Push every 6 hours  piperacillin/tazobactam IVPB.. 3.375 Gram(s) IV Intermittent every 8 hours  sodium chloride 3%  Inhalation 4 milliLiter(s) Inhalation every 6 hours    MEDICATIONS  (PRN):  HYDROmorphone  Injectable 0.5 milliGRAM(s) IV Push every 4 hours PRN moderate to severe pain  naloxone Injectable 0.1 milliGRAM(s) IV Push every 3 minutes PRN For ANY of the following changes in patient status:  A. RR LESS THAN 10 breaths per minute, B. Oxygen saturation LESS THAN 90%, C. Sedation score of 6        CAPILLARY BLOOD GLUCOSE      POCT Blood Glucose.: 213 mg/dL (05 Aug 2024 19:34)  POCT Blood Glucose.: 169 mg/dL (05 Aug 2024 16:10)  POCT Blood Glucose.: 127 mg/dL (05 Aug 2024 14:12)  POCT Blood Glucose.: 120 mg/dL (05 Aug 2024 12:03)  POCT Blood Glucose.: 104 mg/dL (05 Aug 2024 05:54)  POCT Blood Glucose.: 205 mg/dL (04 Aug 2024 23:12)    I&O's Summary    04 Aug 2024 07:01  -  05 Aug 2024 07:00  --------------------------------------------------------  IN: 2745 mL / OUT: 820 mL / NET: 1925 mL    05 Aug 2024 07:01  -  05 Aug 2024 20:54  --------------------------------------------------------  IN: 685 mL / OUT: 810 mL / NET: -125 mL        T(C): 36.4 (08-05-24 @ 20:38), Max: 36.7 (08-05-24 @ 14:13)  HR: 84 (08-05-24 @ 20:38) (81 - 101)  BP: 116/61 (08-05-24 @ 20:38) (116/61 - 139/73)  RR: 18 (08-05-24 @ 20:38) (16 - 21)  SpO2: 95% (08-05-24 @ 20:38) (93% - 100%)    PHYSICAL EXAM:    NECK: Supple, No JVD  CHEST/LUNG: Clear to auscultation bilaterally; No wheezing.  HEART: Regular rate and rhythm; No murmurs, rubs, or gallops  ABDOMEN: Soft, Nontender, Nondistended; Bowel sounds present  EXTREMITIES:   No edema  NEUROLOGY: AAO X 3      LABS:                        8.3    6.19  )-----------( 238      ( 05 Aug 2024 06:39 )             25.0     08-05    134<L>  |  102  |  3<L>  ----------------------------<  100<H>  4.3   |  23  |  0.73    Ca    7.4<L>      05 Aug 2024 06:39  Phos  2.0     08-05  Mg     1.90     08-05    TPro  4.9<L>  /  Alb  2.1<L>  /  TBili  0.4  /  DBili  x   /  AST  13  /  ALT  5   /  AlkPhos  87  08-04    PT/INR - ( 05 Aug 2024 06:39 )   PT: 12.0 sec;   INR: 1.07 ratio               Urinalysis Basic - ( 05 Aug 2024 06:39 )    Color: x / Appearance: x / SG: x / pH: x  Gluc: 100 mg/dL / Ketone: x  / Bili: x / Urobili: x   Blood: x / Protein: x / Nitrite: x   Leuk Esterase: x / RBC: x / WBC x   Sq Epi: x / Non Sq Epi: x / Bacteria: x      CAPILLARY BLOOD GLUCOSE      POCT Blood Glucose.: 213 mg/dL (05 Aug 2024 19:34)  POCT Blood Glucose.: 169 mg/dL (05 Aug 2024 16:10)  POCT Blood Glucose.: 127 mg/dL (05 Aug 2024 14:12)  POCT Blood Glucose.: 120 mg/dL (05 Aug 2024 12:03)  POCT Blood Glucose.: 104 mg/dL (05 Aug 2024 05:54)  POCT Blood Glucose.: 205 mg/dL (04 Aug 2024 23:12)        RADIOLOGY & ADDITIONAL TESTS:    Imaging Personally Reviewed:    Consultant(s) Notes Reviewed:      Care Discussed with Consultants/Other Providers:    Anish Goodrich MD, CMD, FACP    [address] [place] [address]  Office Tel: [phone]  Cell: [phone]

## 2024-08-05 NOTE — CHART NOTE - NSCHARTNOTEFT_GEN_A_CORE
CT with evidence of leak near anastomosis site. Please keep NPO, plan for EGD for luminal assessment and possible stent placement today       Carlos Tillman, PGY-6  Gastroenterology/Hepatology Fellow  Available on Microsoft Teams   [phone] (Long Range Pager)  [phone] (Short Range Pager [facility])    After 5pm, please contact the on-call GI fellow for any urgent issues via the Hospital Call .

## 2024-08-05 NOTE — PROGRESS NOTE ADULT - SUBJECTIVE AND OBJECTIVE BOX
Chief Complaint: DM type 2     Interval Events: FS at goal. NPO, s/p jejunostomy tube placement by IR on 8/2. No N/V or abdominal pain.     MEDICATIONS  (STANDING):  bacitracin   Ointment 1 Application(s) Topical two times a day  dextrose 5% + sodium chloride 0.45% with potassium chloride 20 mEq/L 1000 milliLiter(s) (75 mL/Hr) IV Continuous <Continuous>  dextrose 5%. 1000 milliLiter(s) (50 mL/Hr) IV Continuous <Continuous>  dextrose 5%. 1000 milliLiter(s) (100 mL/Hr) IV Continuous <Continuous>  dextrose 50% Injectable 12.5 Gram(s) IV Push once  dextrose 50% Injectable 25 Gram(s) IV Push once  dextrose 50% Injectable 25 Gram(s) IV Push once  dextrose Oral Gel 15 Gram(s) Oral once  dornase malik Solution 2.5 milliGRAM(s) Inhalation daily  enoxaparin Injectable 40 milliGRAM(s) SubCutaneous every 24 hours  insulin glargine Injectable (LANTUS) 3 Unit(s) SubCutaneous at bedtime  insulin lispro (ADMELOG) corrective regimen sliding scale   SubCutaneous every 6 hours  levalbuterol Inhalation 0.63 milliGRAM(s) Inhalation every 6 hours  lidocaine   4% Patch 1 Patch Transdermal at bedtime  metoclopramide Injectable 10 milliGRAM(s) IV Push every 8 hours  metoprolol tartrate Injectable 5 milliGRAM(s) IV Push every 6 hours  piperacillin/tazobactam IVPB.. 3.375 Gram(s) IV Intermittent every 8 hours  sodium chloride 3%  Inhalation 4 milliLiter(s) Inhalation every 6 hours    MEDICATIONS  (PRN):  HYDROmorphone  Injectable 0.5 milliGRAM(s) IV Push every 4 hours PRN moderate to severe pain  naloxone Injectable 0.1 milliGRAM(s) IV Push every 3 minutes PRN For ANY of the following changes in patient status:  A. RR LESS THAN 10 breaths per minute, B. Oxygen saturation LESS THAN 90%, C. Sedation score of 6      Allergies  No Known Allergies    Review of Systems:  Constitutional: No fever/chills   Eyes: No blurry vision  Neuro: No tremors  HEENT: No pain  Cardiovascular: No chest pain, no palpitations  Respiratory: No SOB, no cough  GI: No nausea, vomiting or abdominal pain  : No dysuria  Endocrine: no polyuria, polydipsia      VITALS: T(C): 36.6 (08-05-24 @ 12:00)  T(F): 97.8 (08-05-24 @ 12:00), Max: 98.1 (08-04-24 @ 16:00)  HR: 89 (08-05-24 @ 12:00) (84 - 98)  BP: 128/62 (08-05-24 @ 12:00) (123/58 - 141/62)  RR:  (18 - 18)  SpO2:  (93% - 97%)  Wt(kg): --      Physical Exam:   GENERAL: NAD, well-groomed, well-developed  EYES: No proptosis, no lid lag, anicteric  HEENT:  Atraumatic, Normocephalic, moist mucous membranes  RESPIRATORY: non labored breathing   GI: soft, +VENKATA drain, + jejunostomy tube  SKIN: Dry, intact, No rashes or lesions  MUSCULOSKELETAL: Full range of motion, normal strength  NEURO: extraocular movements intact, no tremor  PSYCH: Alert and oriented x 3, pleasant, normal affect, normal mood      CAPILLARY BLOOD GLUCOSE  POCT Blood Glucose.: 120 mg/dL (05 Aug 2024 12:03)  POCT Blood Glucose.: 104 mg/dL (05 Aug 2024 05:54)  POCT Blood Glucose.: 205 mg/dL (04 Aug 2024 23:12)  POCT Blood Glucose.: 110 mg/dL (04 Aug 2024 17:49)      08-05    134<L>  |  102  |  3<L>  ----------------------------<  100<H>  4.3   |  23  |  0.73    eGFR: 104    Ca    7.4<L>      08-05  Mg     1.90     08-05  Phos  2.0     08-05    TPro  4.9<L>  /  Alb  2.1<L>  /  TBili  0.4  /  DBili  x   /  AST  13  /  ALT  5   /  AlkPhos  87  08-04      A1C with Estimated Average Glucose Result: 10.9 % (07-13-24 @ 02:44)  A1C with Estimated Average Glucose Result: 12.8 % (01-01-24 @ 06:00)

## 2024-08-05 NOTE — PROGRESS NOTE ADULT - SUBJECTIVE AND OBJECTIVE BOX
Subjective: "I feel ok, just tired" Pt aware of planned procedure w GI today. NO n/v over night. No fevers. Current TF on hold/NPO for GI procedure.   NO CP or SOB. OOB w assist. c/o fullness yesterday on TF, awaiting official nutritional recommendations.     Vital Signs:  Vital Signs Last 24 Hrs  T(C): 36.6 (08-05-24 @ 12:00), Max: 36.7 (08-04-24 @ 16:00)  T(F): 97.8 (08-05-24 @ 12:00), Max: 98.1 (08-04-24 @ 16:00)  HR: 89 (08-05-24 @ 12:00) (84 - 98)  BP: 128/62 (08-05-24 @ 12:00) (123/58 - 141/62)  RR: 18 (08-05-24 @ 12:00) (18 - 18)  SpO2: 93% (08-05-24 @ 12:00) (93% - 97%) on (O2)    Telemetry/Alarms: SR  Relevant labs, radiology and Medications reviewed           CXR stable  CT scan of c/a/p yesterday confirms Esoph leak.              8.3    6.19  )-----------( 238      ( 05 Aug 2024 06:39 )             25.0     08-05    134<L>  |  102  |  3<L>  ----------------------------<  100<H>  4.3   |  23  |  0.73    Ca    7.4<L>      05 Aug 2024 06:39  Phos  2.0     08-05  Mg     1.90     08-05    TPro  4.9<L>  /  Alb  2.1<L>  /  TBili  0.4  /  DBili  x   /  AST  13  /  ALT  5   /  AlkPhos  87  08-04    PT/INR - ( 05 Aug 2024 06:39 )   PT: 12.0 sec;   INR: 1.07 ratio           MEDICATIONS  (STANDING):  bacitracin   Ointment 1 Application(s) Topical two times a day  dextrose 5% + sodium chloride 0.45% with potassium chloride 20 mEq/L 1000 milliLiter(s) (75 mL/Hr) IV Continuous <Continuous>  dextrose 5%. 1000 milliLiter(s) (100 mL/Hr) IV Continuous <Continuous>  dextrose 5%. 1000 milliLiter(s) (50 mL/Hr) IV Continuous <Continuous>  dextrose 50% Injectable 12.5 Gram(s) IV Push once  dextrose 50% Injectable 25 Gram(s) IV Push once  dextrose 50% Injectable 25 Gram(s) IV Push once  dextrose Oral Gel 15 Gram(s) Oral once  dornase malik Solution 2.5 milliGRAM(s) Inhalation daily  enoxaparin Injectable 40 milliGRAM(s) SubCutaneous every 24 hours  insulin glargine Injectable (LANTUS) 3 Unit(s) SubCutaneous at bedtime  insulin lispro (ADMELOG) corrective regimen sliding scale   SubCutaneous every 6 hours  levalbuterol Inhalation 0.63 milliGRAM(s) Inhalation every 6 hours  lidocaine   4% Patch 1 Patch Transdermal at bedtime  metoclopramide Injectable 10 milliGRAM(s) IV Push every 8 hours  metoprolol tartrate Injectable 5 milliGRAM(s) IV Push every 6 hours  piperacillin/tazobactam IVPB.. 3.375 Gram(s) IV Intermittent every 8 hours  sodium chloride 3%  Inhalation 4 milliLiter(s) Inhalation every 6 hours    MEDICATIONS  (PRN):  HYDROmorphone  Injectable 0.5 milliGRAM(s) IV Push every 4 hours PRN moderate to severe pain  naloxone Injectable 0.1 milliGRAM(s) IV Push every 3 minutes PRN For ANY of the following changes in patient status:  A. RR LESS THAN 10 breaths per minute, B. Oxygen saturation LESS THAN 90%, C. Sedation score of 6    General: WD NAD  Neurology: A&Ox3, nonfocal, ALFORD x 4  Eyes: PERRLA/ EOMI, Gross vision intact  ENT/Neck: Neck supple, trachea midline, No JVD, Gross hearing intact  Respiratory:  Dec BS w/ Rhonchi to Rt side.   CV: RRR, S1S2, no murmurs, rubs or gallops  Abdominal: Soft, NT, ND +BS, + diarrhea yesterday. NPO. TF on hold  Extremities: + edema to arms/legs, + peripheral pulses  Skin: No Rashes, Hematoma, Ecchymosis  Incisions: Rt chest and abd incisions c/d/i.   Tubes: J tube site c/di. Rt. Lavern 460cc to bulb, gray opaque fluid  I&O's Summary    04 Aug 2024 07:01  -  05 Aug 2024 07:00  --------------------------------------------------------  IN: 2745 mL / OUT: 820 mL / NET: 1925 mL    05 Aug 2024 07:01  -  05 Aug 2024 13:03  --------------------------------------------------------  IN: 135 mL / OUT: 50 mL / NET: 85 mL          Assessment  59y Male  w/ PAST MEDICAL & SURGICAL HISTORY:  HLD (hyperlipidemia)      Insulin dependent type 2 diabetes mellitus      BPH (benign prostatic hyperplasia)      HTN (hypertension)      Gastroesophageal cancer      Gastroesophageal cancer      Port-A-Cath in place      admitted with complaints of Patient is a 59y old  Male who presents with a chief complaint of Esophagectomy (05 Aug 2024 12:52)  :Assessment  59 year old male with PMH HTN and DM presents with 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 Pt s/p barium swallow and started on CLD. Postop CT CAP reviewed w Dr Laureano. Pt is now on Zosyn and advanced to soft diet. Sinus tachycardia since last night. Now on nasal cannula.  7/29: Pt with now improving leukocytosis and s/op CTA r/o PE for tachycardia over the weekend. Imaging reviewed by Dr Laureano, no acute findings and able to advance to soft diet as previously mentioned. Patient tolerating PO diet well. Patient without fevers and with drain in place on IV abx.   7/30-7/31-CT chest completed, no contrast extravasation. Pt resumed CLD and advanced to FLD today.  8/1-Today noted to have continued high drain output. Inc in Leukocytosis. Pt with generalized 3rd spacing. Fluid specimens sent, cont Zosyn.   8/2 Drain still w/ high output. Went for abd CT, plan for J tube placement. GI onboard to help eval for leak / intervene in future if necessary  8/3- J tube placed last night. Will start tickle infusion of NS later. Nutritional consult ordered. Lavern still w high output, probable CT scan of c/a/p tomorrow. Possible stent w/ GI next week.     8/4: CT chest obtained - Extravasation of contrast into right chest. Plan for GI to do EGD.       PLAN  Neuro: Pain management  Pulm: Encourage coughing, deep breathing and use of incentive spirometry.  Daily CXR.   Cardio: Monitor telemetry/alarms. Cont IVP BB  GI: NPO. Cont IVF. Pending Stent w GI today. + esoph leak. TF on hold for GI procedure, awaiting official nutritional consult.   Renal: monitor urine output, supplement electrolytes as needed  Vasc: Heparin SC/SCDs for DVT prophylaxis  Heme: Stable H/H. .   ID: Cont zosyn  Therapy: OOB/ambulate  Tubes: Monitor Rt lavern output, keep to bulb  Disposition: Aim to D/C to home once surgically stable.   Discussed with Cardiothoracic Team at AM rounds.

## 2024-08-05 NOTE — PROGRESS NOTE ADULT - SUBJECTIVE AND OBJECTIVE BOX
DATE OF SERVICE: 08-05-24    Patient denies chest pain or shortness of breath.   For EGD today Review of symptoms otherwise negative.    MEDICATIONS:  bacitracin   Ointment 1 Application(s) Topical two times a day  dextrose 5% + sodium chloride 0.45% with potassium chloride 20 mEq/L 1000 milliLiter(s) IV Continuous <Continuous>  dextrose 5%. 1000 milliLiter(s) IV Continuous <Continuous>  dextrose 5%. 1000 milliLiter(s) IV Continuous <Continuous>  dextrose 50% Injectable 25 Gram(s) IV Push once  dextrose 50% Injectable 12.5 Gram(s) IV Push once  dextrose 50% Injectable 25 Gram(s) IV Push once  dextrose Oral Gel 15 Gram(s) Oral once  dornase malik Solution 2.5 milliGRAM(s) Inhalation daily  enoxaparin Injectable 40 milliGRAM(s) SubCutaneous every 24 hours  HYDROmorphone  Injectable 0.5 milliGRAM(s) IV Push every 4 hours PRN  insulin glargine Injectable (LANTUS) 3 Unit(s) SubCutaneous at bedtime  insulin lispro (ADMELOG) corrective regimen sliding scale   SubCutaneous every 6 hours  levalbuterol Inhalation 0.63 milliGRAM(s) Inhalation every 6 hours  lidocaine   4% Patch 1 Patch Transdermal at bedtime  metoclopramide Injectable 10 milliGRAM(s) IV Push every 8 hours  metoprolol tartrate Injectable 5 milliGRAM(s) IV Push every 6 hours  naloxone Injectable 0.1 milliGRAM(s) IV Push every 3 minutes PRN  piperacillin/tazobactam IVPB.. 3.375 Gram(s) IV Intermittent every 8 hours  sodium chloride 3%  Inhalation 4 milliLiter(s) Inhalation every 6 hours      LABS:                        8.3    6.19  )-----------( 238      ( 05 Aug 2024 06:39 )             25.0       Hemoglobin: 8.3 g/dL (08-05 @ 06:39)  Hemoglobin: 8.4 g/dL (08-04 @ 06:20)  Hemoglobin: 8.6 g/dL (08-03 @ 04:52)  Hemoglobin: 8.8 g/dL (08-02 @ 05:45)  Hemoglobin: 8.9 g/dL (08-01 @ 05:15)      08-05    134<L>  |  102  |  3<L>  ----------------------------<  100<H>  4.3   |  23  |  0.73    Ca    7.4<L>      05 Aug 2024 06:39  Phos  2.0     08-05  Mg     1.90     08-05    TPro  4.9<L>  /  Alb  2.1<L>  /  TBili  0.4  /  DBili  x   /  AST  13  /  ALT  5   /  AlkPhos  87  08-04    Creatinine Trend: 0.73<--, 0.71<--, 0.77<--, 0.75<--, 0.80<--, 0.77<--    COAGS: PT/INR - ( 05 Aug 2024 06:39 )   PT: 12.0 sec;   INR: 1.07 ratio                   PHYSICAL EXAM:  T(C): 36.6 (08-05-24 @ 12:00), Max: 36.7 (08-04-24 @ 16:00)  HR: 89 (08-05-24 @ 12:00) (84 - 98)  BP: 128/62 (08-05-24 @ 12:00) (123/58 - 141/62)  RR: 18 (08-05-24 @ 12:00) (18 - 18)  SpO2: 93% (08-05-24 @ 12:00) (93% - 97%)  Wt(kg): --    I&O's Summary    04 Aug 2024 07:01  -  05 Aug 2024 07:00  --------------------------------------------------------  IN: 2745 mL / OUT: 820 mL / NET: 1925 mL    05 Aug 2024 07:01  -  05 Aug 2024 12:53  --------------------------------------------------------  IN: 135 mL / OUT: 50 mL / NET: 85 mL          Gen: NAD  HEENT:  (-)icterus (-)pallor  CV: N S1 S2 1/6 CATHY (+)2 Pulses B/l  Resp:  Clear to auscultation B/L, normal effort  GI: (+) BS Soft, NT, ND  Lymph:  (-)Edema, (-)obvious lymphadenopathy  Skin: Warm to touch, Normal turgor  Psych: Appropriate mood and affect      TELEMETRY: 	 SR/ST    ECG:  	NSR      TTE 07/2024  Findings:  1. Normal left ventricular size and function.  Mild diastolic dysfunction.  2. Normal left atrial size  3. Right atrial cavity is normal in size.  4. Normal right ventricular size and function.  5. Normal trileaflet aortic valve opening.  6. Normal mitral valve opening.  7. Normal appearing tricuspid valve with mild tricuspid  regurgitation.  8. Pulmonic valve is grossly normal, yet poorly visualized  with no doppler evidence for pulmonic stenosis.  9. No evidence of significant pericardial effusion.  10. The aortic root is normal.  11. Normal pulmonary artery.  12. IVC is normal with respiratory variation.  FULL STUDY DONE INCLUDING M-MODE RECORDING,  SPECTRAL DOPPLER AND    Stress 07/2024  Normal myocardial perfusion SPECT images No evidence of stress induced ischemia or infarction.  Normal left ventricular size and function.  Calculated EF is: 68%    CT  IMPRESSION:  Small loculated right pleural effusion with foci of air and pleural catheter. Small left pleural effusion.      ASSESSMENT/PLAN: 	Pt is a 59 y.o. man with history of HTN,, DLD, DM,  GEJ adenocarcinoma (T3N0, stage 2A) s/p neoadjuvant chemotherapy admitted for optimization prior to planned [place] Esophagectomy on tuesday. Recently had a nuclear stress test in our office for evaluation of preoperative cardiac risk assessment prior to esophageal cancer surgery scheduled on 07/16/2024. The patient denies any chest pain, shortness ofbreath, or anginal symptoms. He has no palpitations, dizziness, or syncope    s/p [place]Bro[facility] Esophagectomy on 7/16.   s/p J tube placement     Pre-OP/HTN  - oral BP meds on hold diet now NPO, Losartan, HCTZ, Norvasc and Metoprolol on hold  - not in clinical CHF, if BP consistently above 180 systolic can give  Hydralazine IV PRN  - EKG from office with no q waves, no chest pain,  euvolemic on exam and no severely stenotic valvular disease on recent TTE  - Patient low risk for EGDfor MACEE can proceed with no further  CV w/u     F/U with Dr Shah (cardiologist) after DC as scheduled, [phone]     Mart Mason MD  Pager: [phone]

## 2024-08-05 NOTE — PROGRESS NOTE ADULT - ASSESSMENT
· Assessment	  59 y.o. man with history of GEJ adenocarcinoma (T3N0, stage 2A) s/p neoadjuvant chemotherapy, now s/p [place]Bro[facility] Esophagectomy on 7/16.     Sx f/up appreciated  OOB  Pain control Oxycodone  NPO  Eso leak: J tube placement in IR 8/2,  tolerating J tube feeds w Glucerna/S/p EGD and stent      DM II:    FSSS  Lantus insulin  Endo f/up appreciated    HTN:    On IV Metoprolol/Hydralazine  Cardio f/up appreciated

## 2024-08-05 NOTE — CHART NOTE - NSCHARTNOTEFT_GEN_A_CORE
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      At time of visit, Pt awake, alert appears weak. Pt NPO past midnight for scheduled procedure today. No report of nausea, vomiting or abdominal pain @ present, but Pt c/o diarrhea, "few times daily for past few days". If clinically feasible, rec to add Cholestyramine, per MD discretion. Rec to resume Tube Feeding once/when clinically indicates. No other food/Tube Feeding related concern voiced at present. Case discussed with nurse; RD remains available.          Pt's height: 64" (7/12)      IBW: 130#+/-10%      Pt's weights: 58.5 kg (8/5/24), 63.9 kg (7/31), 54 kg (7/12)   Pertinent Medications: Lovenox, Reglan, Insulin (Glargine), Insulin (Lispro),   Pertinent Labs: (8/5) H/H 8.3/25.0 L, Na 134 L, phosphorus 2.0 L, BUN 3 L Glu 100 H Glu 147 H;     (8/4) Albumin 2.1 L;   (7/13) HbA1c 10.9% H  CAPILLARY BLOOD GLUCOSE  POCT Blood Glucose.: 120 mg/dL (05 Aug 2024 12:03)  POCT Blood Glucose.: 104 mg/dL (05 Aug 2024 05:54)  POCT Blood Glucose.: 205 mg/dL (04 Aug 2024 23:12)  POCT Blood Glucose.: 110 mg/dL (04 Aug 2024 17:49)  Skin: no report of pressure injury at present     Estimated Needs: [x] recalculated: BW: 58.5 kg (8/5)  estimated energy needs: 9664-4127 kcal/day (@ 25-30 kcal/kg BW)   estimated protein needs: 70-88 gm protein/day (@ 1.2-1.5 gm protein/kg BW)   Previous Nutrition Diagnosis: [x] Malnutrition, moderate    Nutrition Diagnosis is [x] ongoing      Nutrition Interventions/Recommendations:  1. Once/when clinically indicates, resume Tube Feeding via PEJ: Glucerna 1.5cal at 10 ml/hr, if Pt tolerates well increase 4 ml every 12 hrs to goal rate of Glucerna 1.5cal @ 40 ml/hr x 24 hrs (goal rate to provide 960 ml formula, 1440 kcal, 80 gm protein in 24 hrs);   2. Once Tube Feeding resumes, monitor Tube Feeding tolerance; Add free water flushes per MD discretion;   3. If clinically feasible, rec to add Cholestyramine, per MD discretion;   4. Replace/replete e-lytes per MD discretion; Monitor signs for refeeding syndrome;   5. Add Multivitamin/minerals/iron Solution (CENTRUM) - 15 milliLiter(s) daily, for micronutrient coverage;   6. Monitor labs, weekly weights, hydration status;  Reconsult nutrition if warranted; RD remains available

## 2024-08-06 LAB
-  FLUCONAZOLE: SIGNIFICANT CHANGE UP
ANION GAP SERPL CALC-SCNC: 11 MMOL/L — SIGNIFICANT CHANGE UP (ref 7–14)
BUN SERPL-MCNC: 5 MG/DL — LOW (ref 7–23)
CALCIUM SERPL-MCNC: 7.4 MG/DL — LOW (ref 8.4–10.5)
CHLORIDE SERPL-SCNC: 98 MMOL/L — SIGNIFICANT CHANGE UP (ref 98–107)
CO2 SERPL-SCNC: 22 MMOL/L — SIGNIFICANT CHANGE UP (ref 22–31)
CREAT SERPL-MCNC: 0.66 MG/DL — SIGNIFICANT CHANGE UP (ref 0.5–1.3)
CULTURE RESULTS: ABNORMAL
EGFR: 107 ML/MIN/1.73M2 — SIGNIFICANT CHANGE UP
GLUCOSE BLDC GLUCOMTR-MCNC: 173 MG/DL — HIGH (ref 70–99)
GLUCOSE BLDC GLUCOMTR-MCNC: 197 MG/DL — HIGH (ref 70–99)
GLUCOSE BLDC GLUCOMTR-MCNC: 244 MG/DL — HIGH (ref 70–99)
GLUCOSE BLDC GLUCOMTR-MCNC: 257 MG/DL — HIGH (ref 70–99)
GLUCOSE SERPL-MCNC: 252 MG/DL — HIGH (ref 70–99)
HCT VFR BLD CALC: 26.4 % — LOW (ref 39–50)
HGB BLD-MCNC: 8.7 G/DL — LOW (ref 13–17)
MCHC RBC-ENTMCNC: 29.3 PG — SIGNIFICANT CHANGE UP (ref 27–34)
MCHC RBC-ENTMCNC: 33 GM/DL — SIGNIFICANT CHANGE UP (ref 32–36)
MCV RBC AUTO: 88.9 FL — SIGNIFICANT CHANGE UP (ref 80–100)
METHOD TYPE: SIGNIFICANT CHANGE UP
NRBC # BLD: 0 /100 WBCS — SIGNIFICANT CHANGE UP (ref 0–0)
NRBC # FLD: 0 K/UL — SIGNIFICANT CHANGE UP (ref 0–0)
ORGANISM # SPEC MICROSCOPIC CNT: ABNORMAL
PLATELET # BLD AUTO: 235 K/UL — SIGNIFICANT CHANGE UP (ref 150–400)
POTASSIUM SERPL-MCNC: 4.2 MMOL/L — SIGNIFICANT CHANGE UP (ref 3.5–5.3)
POTASSIUM SERPL-SCNC: 4.2 MMOL/L — SIGNIFICANT CHANGE UP (ref 3.5–5.3)
RBC # BLD: 2.97 M/UL — LOW (ref 4.2–5.8)
RBC # FLD: 13.5 % — SIGNIFICANT CHANGE UP (ref 10.3–14.5)
SODIUM SERPL-SCNC: 131 MMOL/L — LOW (ref 135–145)
SPECIMEN SOURCE: SIGNIFICANT CHANGE UP
WBC # BLD: 5.08 K/UL — SIGNIFICANT CHANGE UP (ref 3.8–10.5)
WBC # FLD AUTO: 5.08 K/UL — SIGNIFICANT CHANGE UP (ref 3.8–10.5)

## 2024-08-06 PROCEDURE — 99232 SBSQ HOSP IP/OBS MODERATE 35: CPT

## 2024-08-06 PROCEDURE — 99232 SBSQ HOSP IP/OBS MODERATE 35: CPT | Mod: GC

## 2024-08-06 PROCEDURE — 71045 X-RAY EXAM CHEST 1 VIEW: CPT | Mod: 26

## 2024-08-06 RX ORDER — ACETAMINOPHEN 500 MG
1000 TABLET ORAL ONCE
Refills: 0 | Status: COMPLETED | OUTPATIENT
Start: 2024-08-06 | End: 2024-08-06

## 2024-08-06 RX ADMIN — Medication 5 MILLIGRAM(S): at 11:58

## 2024-08-06 RX ADMIN — Medication 10 MILLIGRAM(S): at 05:47

## 2024-08-06 RX ADMIN — Medication 10 MILLIGRAM(S): at 13:42

## 2024-08-06 RX ADMIN — Medication 1: at 17:32

## 2024-08-06 RX ADMIN — Medication 400 MILLIGRAM(S): at 15:36

## 2024-08-06 RX ADMIN — LEVALBUTEROL HYDROCHLORIDE 0.63 MILLIGRAM(S): 0.31 SOLUTION RESPIRATORY (INHALATION) at 21:14

## 2024-08-06 RX ADMIN — Medication 5 MILLIGRAM(S): at 02:04

## 2024-08-06 RX ADMIN — ENOXAPARIN SODIUM 40 MILLIGRAM(S): 120 INJECTION SUBCUTANEOUS at 11:58

## 2024-08-06 RX ADMIN — Medication 5 MILLIGRAM(S): at 17:39

## 2024-08-06 RX ADMIN — PIPERACILLIN SODIUM, TAZOBACTAM SODIUM 25 GRAM(S): 3; .375 INJECTION, POWDER, LYOPHILIZED, FOR SOLUTION INTRAVENOUS at 13:39

## 2024-08-06 RX ADMIN — Medication 4 MILLILITER(S): at 21:14

## 2024-08-06 RX ADMIN — Medication 1000 MILLIGRAM(S): at 22:06

## 2024-08-06 RX ADMIN — Medication 3: at 05:50

## 2024-08-06 RX ADMIN — Medication 4 UNIT(S): at 17:31

## 2024-08-06 RX ADMIN — Medication 1 APPLICATION(S): at 05:48

## 2024-08-06 RX ADMIN — Medication 2: at 11:57

## 2024-08-06 RX ADMIN — Medication 400 MILLIGRAM(S): at 21:32

## 2024-08-06 RX ADMIN — Medication 1 APPLICATION(S): at 17:39

## 2024-08-06 RX ADMIN — LEVALBUTEROL HYDROCHLORIDE 0.63 MILLIGRAM(S): 0.31 SOLUTION RESPIRATORY (INHALATION) at 17:04

## 2024-08-06 RX ADMIN — Medication 4 UNIT(S): at 11:56

## 2024-08-06 RX ADMIN — DORNASE ALFA 2.5 MILLIGRAM(S): 1 SOLUTION RESPIRATORY (INHALATION) at 17:04

## 2024-08-06 RX ADMIN — PIPERACILLIN SODIUM, TAZOBACTAM SODIUM 25 GRAM(S): 3; .375 INJECTION, POWDER, LYOPHILIZED, FOR SOLUTION INTRAVENOUS at 05:47

## 2024-08-06 RX ADMIN — Medication 4 MILLILITER(S): at 17:04

## 2024-08-06 NOTE — PROGRESS NOTE ADULT - SUBJECTIVE AND OBJECTIVE BOX
ANESTHESIA POSTOP CHECK    59y Male POSTOP DAY 1 S/P EGD with Stent    Vital Signs Last 24 Hrs  T(C): 36.7 (06 Aug 2024 08:24), Max: 36.7 (05 Aug 2024 14:13)  T(F): 98.1 (06 Aug 2024 08:24), Max: 98.1 (05 Aug 2024 14:13)  HR: 81 (06 Aug 2024 08:24) (81 - 101)  BP: 118/60 (06 Aug 2024 08:24) (116/61 - 139/73)  BP(mean): 85 (05 Aug 2024 19:22) (85 - 85)  RR: 18 (06 Aug 2024 08:24) (16 - 21)  SpO2: 99% (06 Aug 2024 08:24) (95% - 100%)    Parameters below as of 06 Aug 2024 08:24  Patient On (Oxygen Delivery Method): room air      I&O's Summary    05 Aug 2024 07:01  -  06 Aug 2024 07:00  --------------------------------------------------------  IN: 1560 mL / OUT: 940 mL / NET: 620 mL    06 Aug 2024 07:01  -  06 Aug 2024 12:07  --------------------------------------------------------  IN: 105 mL / OUT: 0 mL / NET: 105 mL        [X] NO APPARENT ANESTHESIA COMPLICATIONS      Comments:

## 2024-08-06 NOTE — PROGRESS NOTE ADULT - SUBJECTIVE AND OBJECTIVE BOX
Patient is a 59y old  Male who presents with a chief complaint of Esophagectomy (06 Aug 2024 14:40)      SUBJECTIVE / OVERNIGHT EVENTS:    Events noted.  CONSTITUTIONAL: No fever,  or fatigue  RESPIRATORY: No cough, wheezing,  No shortness of breath  CARDIOVASCULAR: No chest pain, palpitations, dizziness, or leg swelling  GASTROINTESTINAL: No abdominal or epigastric pain. No nausea, vomiting.  NEUROLOGICAL: No headache    MEDICATIONS  (STANDING):  bacitracin   Ointment 1 Application(s) Topical two times a day  dextrose 5% + sodium chloride 0.45% with potassium chloride 20 mEq/L 1000 milliLiter(s) (75 mL/Hr) IV Continuous <Continuous>  dextrose 5%. 1000 milliLiter(s) (100 mL/Hr) IV Continuous <Continuous>  dextrose 5%. 1000 milliLiter(s) (50 mL/Hr) IV Continuous <Continuous>  dextrose 50% Injectable 12.5 Gram(s) IV Push once  dextrose 50% Injectable 25 Gram(s) IV Push once  dextrose 50% Injectable 25 Gram(s) IV Push once  dextrose Oral Gel 15 Gram(s) Oral once  dornase malik Solution 2.5 milliGRAM(s) Inhalation daily  enoxaparin Injectable 40 milliGRAM(s) SubCutaneous every 24 hours  insulin lispro (ADMELOG) corrective regimen sliding scale   SubCutaneous every 6 hours  insulin NPH human recombinant 4 Unit(s) SubCutaneous <User Schedule>  levalbuterol Inhalation 0.63 milliGRAM(s) Inhalation every 6 hours  lidocaine   4% Patch 1 Patch Transdermal at bedtime  metoclopramide Injectable 10 milliGRAM(s) IV Push every 8 hours  metoprolol tartrate Injectable 5 milliGRAM(s) IV Push every 6 hours  piperacillin/tazobactam IVPB.. 3.375 Gram(s) IV Intermittent every 8 hours  sodium chloride 3%  Inhalation 4 milliLiter(s) Inhalation every 6 hours    MEDICATIONS  (PRN):  HYDROmorphone  Injectable 0.5 milliGRAM(s) IV Push every 4 hours PRN moderate to severe pain  naloxone Injectable 0.1 milliGRAM(s) IV Push every 3 minutes PRN For ANY of the following changes in patient status:  A. RR LESS THAN 10 breaths per minute, B. Oxygen saturation LESS THAN 90%, C. Sedation score of 6        CAPILLARY BLOOD GLUCOSE      POCT Blood Glucose.: 197 mg/dL (06 Aug 2024 17:10)  POCT Blood Glucose.: 244 mg/dL (06 Aug 2024 11:46)  POCT Blood Glucose.: 257 mg/dL (06 Aug 2024 05:49)    I&O's Summary    05 Aug 2024 07:01  -  06 Aug 2024 07:00  --------------------------------------------------------  IN: 1560 mL / OUT: 940 mL / NET: 620 mL    06 Aug 2024 07:01  -  06 Aug 2024 23:16  --------------------------------------------------------  IN: 1158 mL / OUT: 290 mL / NET: 868 mL        T(C): 36.7 (08-06-24 @ 20:36), Max: 36.8 (08-06-24 @ 16:54)  HR: 84 (08-06-24 @ 20:36) (81 - 92)  BP: 127/70 (08-06-24 @ 20:36) (118/60 - 130/68)  RR: 18 (08-06-24 @ 20:36) (18 - 18)  SpO2: 97% (08-06-24 @ 20:36) (95% - 99%)    PHYSICAL EXAM:  GENERAL: NAD  NECK: Supple, No JVD  CHEST/LUNG: Clear to auscultation bilaterally; No wheezing.  HEART: Regular rate and rhythm; No murmurs, rubs, or gallops  ABDOMEN: Soft, Nontender, Nondistended; Bowel sounds present  EXTREMITIES:   No edema  NEUROLOGY: AAO X 3      LABS:                        8.7    5.08  )-----------( 235      ( 06 Aug 2024 06:51 )             26.4     08-06    131<L>  |  98  |  5<L>  ----------------------------<  252<H>  4.2   |  22  |  0.66    Ca    7.4<L>      06 Aug 2024 06:51  Phos  2.0     08-05  Mg     1.90     08-05      PT/INR - ( 05 Aug 2024 06:39 )   PT: 12.0 sec;   INR: 1.07 ratio               Urinalysis Basic - ( 06 Aug 2024 06:51 )    Color: x / Appearance: x / SG: x / pH: x  Gluc: 252 mg/dL / Ketone: x  / Bili: x / Urobili: x   Blood: x / Protein: x / Nitrite: x   Leuk Esterase: x / RBC: x / WBC x   Sq Epi: x / Non Sq Epi: x / Bacteria: x      CAPILLARY BLOOD GLUCOSE      POCT Blood Glucose.: 197 mg/dL (06 Aug 2024 17:10)  POCT Blood Glucose.: 244 mg/dL (06 Aug 2024 11:46)  POCT Blood Glucose.: 257 mg/dL (06 Aug 2024 05:49)        RADIOLOGY & ADDITIONAL TESTS:    Imaging Personally Reviewed:    Consultant(s) Notes Reviewed:      Care Discussed with Consultants/Other Providers:    Anish Goodrich MD, CMD, FACP    [id number] [id number]  Office Tel: [phone]  Cell: [phone]

## 2024-08-06 NOTE — PROGRESS NOTE ADULT - ASSESSMENT
chemo and radiation now s/p [place] Broderick Esophagectomy on 7/16. POD #10 with post procedure course c/b high VENKATA drain output with studies showing elevated amylase although imaging with UGI without anastomotic leak. CTPA 7/28 with moderate right loculated hydropneumothorax with interval increase in size and small right apical PTX. [facility] consulted to further workup any underlying leak near anastomaosis.     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 CTPA 7/28 with moderate right loculated hydropneumothorax with interval increase in size and small right apical PTX. CTAP with PO contrast on 7/4 with note of extensive contrast and air extravasation into the right pleural space consistent with anastomotic leak. s/p EGD on 8/5 with esophago-gastric anastomotic leak with placement of one 20 mm by 10 cm  fully covered esophageal stent (Hanaro) was deployed across the anastomosis. Pt overall feeling well post procedure although will likely take a couple days for defect to fully heal. Please monitor VENKATA drain output over the next couple of days.       Recommendations:  -Please keep NPO, ok to resume J tube feeds  -Monitor VENKATA drain output.  -Abx/anti-fungals per primary team   -Recommend repeat EGD for stent removal/reassessment in 4-6 weeks pending clinical progress.                                                                                   All recommendations are tentative until note is attested by attending.

## 2024-08-06 NOTE — PROGRESS NOTE ADULT - SUBJECTIVE AND OBJECTIVE BOX
Chief Complaint: DM type 2     Interval Events: FS above goal. Tube feeding ongoing at 14 cc/hr. No N/V or abdominal pain. No diarrhea.     MEDICATIONS  (STANDING):  bacitracin   Ointment 1 Application(s) Topical two times a day  dextrose 5% + sodium chloride 0.45% with potassium chloride 20 mEq/L 1000 milliLiter(s) (75 mL/Hr) IV Continuous <Continuous>  dextrose 5%. 1000 milliLiter(s) (100 mL/Hr) IV Continuous <Continuous>  dextrose 5%. 1000 milliLiter(s) (50 mL/Hr) IV Continuous <Continuous>  dextrose 50% Injectable 12.5 Gram(s) IV Push once  dextrose 50% Injectable 25 Gram(s) IV Push once  dextrose 50% Injectable 25 Gram(s) IV Push once  dextrose Oral Gel 15 Gram(s) Oral once  dornase malik Solution 2.5 milliGRAM(s) Inhalation daily  enoxaparin Injectable 40 milliGRAM(s) SubCutaneous every 24 hours  insulin lispro (ADMELOG) corrective regimen sliding scale   SubCutaneous every 6 hours  insulin NPH human recombinant 4 Unit(s) SubCutaneous <User Schedule>  levalbuterol Inhalation 0.63 milliGRAM(s) Inhalation every 6 hours  lidocaine   4% Patch 1 Patch Transdermal at bedtime  metoclopramide Injectable 10 milliGRAM(s) IV Push every 8 hours  metoprolol tartrate Injectable 5 milliGRAM(s) IV Push every 6 hours  piperacillin/tazobactam IVPB.. 3.375 Gram(s) IV Intermittent every 8 hours  sodium chloride 3%  Inhalation 4 milliLiter(s) Inhalation every 6 hours    MEDICATIONS  (PRN):  HYDROmorphone  Injectable 0.5 milliGRAM(s) IV Push every 4 hours PRN moderate to severe pain  naloxone Injectable 0.1 milliGRAM(s) IV Push every 3 minutes PRN For ANY of the following changes in patient status:  A. RR LESS THAN 10 breaths per minute, B. Oxygen saturation LESS THAN 90%, C. Sedation score of 6      Allergies  No Known Allergies    Review of Systems:  Constitutional: No fever/chills   Eyes: No blurry vision  Neuro: No tremors  HEENT: No pain  Cardiovascular: No chest pain, no palpitations  Respiratory: No SOB, no cough  GI: No nausea, vomiting or abdominal pain  : No dysuria  Endocrine: no polyuria, polydipsia    VITALS: T(C): 36.3 (08-06-24 @ 12:37)  T(F): 97.4 (08-06-24 @ 12:37), Max: 98.1 (08-06-24 @ 08:24)  HR: 92 (08-06-24 @ 12:37) (81 - 101)  BP: 127/63 (08-06-24 @ 12:37) (116/61 - 139/73)  RR:  (16 - 21)  SpO2:  (95% - 100%)  Wt(kg): --      Physical Exam:   GENERAL: NAD, well-groomed, well-developed  EYES: No proptosis, no lid lag, anicteric  HEENT:  Atraumatic, Normocephalic, moist mucous membranes  RESPIRATORY: non labored breathing   GI: soft, +VENKATA drain, + jejunostomy tube  SKIN: Dry, intact, No rashes or lesions  MUSCULOSKELETAL: Full range of motion, normal strength  NEURO: extraocular movements intact, no tremor  PSYCH: Alert and oriented x 3, pleasant, normal affect, normal mood      CAPILLARY BLOOD GLUCOSE  POCT Blood Glucose.: 244 mg/dL (06 Aug 2024 11:46)  POCT Blood Glucose.: 257 mg/dL (06 Aug 2024 05:49)  POCT Blood Glucose.: 194 mg/dL (05 Aug 2024 23:11)  POCT Blood Glucose.: 183 mg/dL (05 Aug 2024 20:40)  POCT Blood Glucose.: 213 mg/dL (05 Aug 2024 19:34)  POCT Blood Glucose.: 169 mg/dL (05 Aug 2024 16:10)      08-06    131<L>  |  98  |  5<L>  ----------------------------<  252<H>  4.2   |  22  |  0.66    eGFR: 107    Ca    7.4<L>      08-06  Mg     1.90     08-05  Phos  2.0     08-05    TPro  4.9<L>  /  Alb  2.1<L>  /  TBili  0.4  /  DBili  x   /  AST  13  /  ALT  5   /  AlkPhos  87  08-04      A1C with Estimated Average Glucose Result: 10.9 % (07-13-24 @ 02:44)  A1C with Estimated Average Glucose Result: 12.8 % (01-01-24 @ 06:00)

## 2024-08-06 NOTE — PROGRESS NOTE ADULT - SUBJECTIVE AND OBJECTIVE BOX
Gastroenterology Progress Note    Interval Events:   -EGD with stent placement yesterday for leak at the anastomosis   -Feeling well with no crepitus, chest pain or ongoing abdominal pain      Allergies:  No Known Allergies      Hospital Medications:  bacitracin   Ointment 1 Application(s) Topical two times a day  dextrose 5% + sodium chloride 0.45% with potassium chloride 20 mEq/L 1000 milliLiter(s) IV Continuous <Continuous>  dextrose 5%. 1000 milliLiter(s) IV Continuous <Continuous>  dextrose 5%. 1000 milliLiter(s) IV Continuous <Continuous>  dextrose 50% Injectable 12.5 Gram(s) IV Push once  dextrose 50% Injectable 25 Gram(s) IV Push once  dextrose 50% Injectable 25 Gram(s) IV Push once  dextrose Oral Gel 15 Gram(s) Oral once  dornase malik Solution 2.5 milliGRAM(s) Inhalation daily  enoxaparin Injectable 40 milliGRAM(s) SubCutaneous every 24 hours  HYDROmorphone  Injectable 0.5 milliGRAM(s) IV Push every 4 hours PRN  insulin glargine Injectable (LANTUS) 3 Unit(s) SubCutaneous at bedtime  insulin lispro (ADMELOG) corrective regimen sliding scale   SubCutaneous every 6 hours  levalbuterol Inhalation 0.63 milliGRAM(s) Inhalation every 6 hours  lidocaine   4% Patch 1 Patch Transdermal at bedtime  metoclopramide Injectable 10 milliGRAM(s) IV Push every 8 hours  metoprolol tartrate Injectable 5 milliGRAM(s) IV Push every 6 hours  naloxone Injectable 0.1 milliGRAM(s) IV Push every 3 minutes PRN  piperacillin/tazobactam IVPB.. 3.375 Gram(s) IV Intermittent every 8 hours  sodium chloride 3%  Inhalation 4 milliLiter(s) Inhalation every 6 hours      ROS: 14 point ROS negative unless otherwise state in subjective    PHYSICAL EXAM:   Vital Signs:  Vital Signs Last 24 Hrs  T(C): 36.7 (06 Aug 2024 08:24), Max: 36.7 (05 Aug 2024 14:13)  T(F): 98.1 (06 Aug 2024 08:24), Max: 98.1 (05 Aug 2024 14:13)  HR: 81 (06 Aug 2024 08:24) (81 - 101)  BP: 118/60 (06 Aug 2024 08:24) (116/61 - 139/73)  BP(mean): 85 (05 Aug 2024 19:22) (85 - 85)  RR: 18 (06 Aug 2024 08:24) (16 - 21)  SpO2: 99% (06 Aug 2024 08:24) (93% - 100%)    Parameters below as of 06 Aug 2024 08:24  Patient On (Oxygen Delivery Method): room air      Daily     Daily Weight in k.5 (05 Aug 2024 12:46)    GENERAL:  No acute distress  HEENT:  no crepitus   CHEST: no resp distress  HEART:  RRR  ABDOMEN:  Soft, non-tender, non-distended, normoactive bowel sounds  NEURO:  Alert and oriented x 3    LABS:                        8.7    5.08  )-----------( 235      ( 06 Aug 2024 06:51 )             26.4     Mean Cell Volume: 88.9 fL (24 @ 06:51)    08-    131<L>  |  98  |  x   ----------------------------<  x   4.2   |  x   |  x     Ca    7.4<L>      05 Aug 2024 06:39  Phos  2.0     08-05  Mg     1.90     08-05        PT/INR - ( 05 Aug 2024 06:39 )   PT: 12.0 sec;   INR: 1.07 ratio           Urinalysis Basic - ( 05 Aug 2024 06:39 )    Color: x / Appearance: x / SG: x / pH: x  Gluc: 100 mg/dL / Ketone: x  / Bili: x / Urobili: x   Blood: x / Protein: x / Nitrite: x   Leuk Esterase: x / RBC: x / WBC x   Sq Epi: x / Non Sq Epi: x / Bacteria: x      Imaging:  < from: Upper Endoscopy (24 @ 17:35) >       An esophago-gastric anastomosis was found at 26 cm from the incisors.        There were areas clean based ulceration with one visible defect        (measured approximately 6 mm) at the anastomosis consistent with known        leak. To cover the defect and facilitate healing, one 20 mm by 10 cm        fully covered esophageal stent (Hanaro) was deployed across the        anastomosis. The stent was in good position traversing the leak with the        proximal end at 20 cm from the incisors. The proximal end of the stent        was then secured in place using one endoscopic suture.       The entire examined stomach was normal.       The examined portions of duodenum were normal.                                                                                   Impression:          - Esophago-gastric anastomotic leak as above. Esophageal                        stent placed.  Recommendation:      - [facility] patient [facility] for [facility] care.                       - Monitor VENKATA drain output.                       - Recommend repeat EGD for stent removal/reassessment in                      4-6 weeks pending clinical progress.                                                                                     < end of copied text >

## 2024-08-06 NOTE — PROGRESS NOTE ADULT - ASSESSMENT
The patient is a 59y Male with PMH of T2DM, HTN, HLD, GE junction cancer here for esophagectomy now postop, prolonged course.  Endocrinology consulted for T2DM.     Uncontrolled Type 2 Diabetes Mellitus   - Follows with: Dr. Nolasco  - A1C with Estimated Average Glucose Result: 10.9 % (07-13-24)  - home regimen: Semglee 18 units, Novolog 6 units with meals      Inpatient plan   - FS goal 100-180 mg/dl   - s/p jejunostomy tube by IR on 08/02  - tube feeding ongoing at 14 cc/hr   - FS above goal   - d/c Lantus  - start NPH 4 units q6 while on tube feeds (hold NPH if tube feed held)  - continue LOW Admelog correction scale q6 while NPO/tube feeds  - FS q6 while NPO/tube feeds ---> TID AC if PO diet started   - hypoglycemia protocol PRN       Discharge plan   - Discharge recs pending clinical course and nutrition plan - oral vs tube feeds  - Patient has an appointment with Dr. Nolasco scheduled for August 8th at 8:20 AM at [address] [facility] [place] [address].   - Please ensure patient has a working glucometer and supplies - test strips, lancets, alcohol pads and insulin pen needles.       Hypertension  - Goal BP <130/80  - continue metoprolol  - Management as per primary team  - check urine microalbumin level as outpatient      Hyperlipidemia  - LDL goal <70  - check lipid panel as outpatient on a yearly basis  - management per primary team       Hypercalcemia  calcium 7.4  albumin 2.1  corrected calcium 8.9  vitamin 25OHD - 6.5 severely low.   Start ergocalciferol 50,000 units PO once weekly once able to take PO        TETE Day-BC  Nurse Practitioner  Division of Endocrinology & Diabetes  pager [phone]

## 2024-08-06 NOTE — PROGRESS NOTE ADULT - SUBJECTIVE AND OBJECTIVE BOX
Infectious Diseases Follow Up:    Patient is a 59y old  Male who presents with a chief complaint of Esophagectomy (06 Aug 2024 08:34)      Interval History/ROS:  S/p EGD and stent placement   No acute complaints     Allergies  No Known Allergies        ANTIMICROBIALS:  piperacillin/tazobactam IVPB.. 3.375 every 8 hours      Current Abx:     Previous Abx     OTHER MEDS:  MEDICATIONS  (STANDING):  dextrose 50% Injectable 25 once  dextrose 50% Injectable 12.5 once  dextrose 50% Injectable 25 once  dextrose Oral Gel 15 once  dornase malik Solution 2.5 daily  enoxaparin Injectable 40 every 24 hours  HYDROmorphone  Injectable 0.5 every 4 hours PRN  insulin glargine Injectable (LANTUS) 3 at bedtime  insulin lispro (ADMELOG) corrective regimen sliding scale  every 6 hours  insulin NPH human recombinant 5 <User Schedule>  levalbuterol Inhalation 0.63 every 6 hours  metoclopramide Injectable 10 every 8 hours  metoprolol tartrate Injectable 5 every 6 hours  sodium chloride 3%  Inhalation 4 every 6 hours      Vital Signs Last 24 Hrs  T(C): 36.7 (06 Aug 2024 08:24), Max: 36.7 (05 Aug 2024 14:13)  T(F): 98.1 (06 Aug 2024 08:24), Max: 98.1 (05 Aug 2024 14:13)  HR: 81 (06 Aug 2024 08:24) (81 - 101)  BP: 118/60 (06 Aug 2024 08:24) (116/61 - 139/73)  BP(mean): 85 (05 Aug 2024 19:22) (85 - 85)  RR: 18 (06 Aug 2024 08:24) (16 - 21)  SpO2: 99% (06 Aug 2024 08:24) (93% - 100%)    Parameters below as of 06 Aug 2024 08:24  Patient On (Oxygen Delivery Method): room air        PHYSICAL EXAM:  GENERAL: NAD, well-developed  HEAD:  Atraumatic, Normocephalic  EYES: EOMI, conjunctiva and sclera clear  CHEST/LUNG: Clear to auscultation bilaterally; No wheeze. R chest with VENKATA, doll drainage   HEART: Regular rate and rhythm; No murmurs, rubs, or gallops  ABDOMEN: Soft, Nontender, Nondistended; Bowel sounds present  PSYCH: AAOx3                          8.7    5.08  )-----------( 235      ( 06 Aug 2024 06:51 )             26.4       08-06    131<L>  |  98  |  5<L>  ----------------------------<  252<H>  4.2   |  22  |  0.66    Ca    7.4<L>      06 Aug 2024 06:51  Phos  2.0     08-05  Mg     1.90     08-05        Urinalysis Basic - ( 06 Aug 2024 06:51 )    Color: x / Appearance: x / SG: x / pH: x  Gluc: 252 mg/dL / Ketone: x  / Bili: x / Urobili: x   Blood: x / Protein: x / Nitrite: x   Leuk Esterase: x / RBC: x / WBC x   Sq Epi: x / Non Sq Epi: x / Bacteria: x        MICROBIOLOGY:  v  Pleural Fl Pleural Fluid  08-01-24   Numerous Enterococcus faecium  Few Eva albicans Referred to Mycology Susceptibility to follow.  Moderate Prevotella melaninogenica "Susceptibilities not performed"  --  Enterococcus faecium                RADIOLOGY:

## 2024-08-06 NOTE — PROGRESS NOTE ADULT - ASSESSMENT
· Assessment	  59 y.o. man with history of GEJ adenocarcinoma (T3N0, stage 2A) s/p neoadjuvant chemotherapy, now s/p Michael-Broderick Esophagectomy on 7/16.     Sx f/up appreciated  OOB  Pain control Oxycodone  NPO  Eso leak: J tube placement in IR 8/2,  tolerating J tube feeds w Glucerna/S/p EGD and stent  J tube feeding    DM II:    FSSS  Lantus insulin  Endo f/up appreciated    HTN:    On IV Metoprolol/Hydralazine  Cardio f/up appreciated

## 2024-08-06 NOTE — PROGRESS NOTE ADULT - ASSESSMENT
59 year old gentleman with diagnosis of adenocarcinoma at GE junction s/p chemotherapy and radiation  Now s/p robotic resection on 7/16    Post operatively, he had worsening leukocytosis  Imaging with a right sided loculated effusion  Drain in place initially draining brown fluid with debris  Drainage now looks like saliva    Fluid had high amylase  Concerning for leak    Now s/p jejunostomy    Pleural fluid culture from 8/1 was sent from a drain that has been in place.  Already growing VRE.  I suspect growth will be polymicrobial.   I suspect this culture growth represents colonization.  I would not direct antibiotics to cover these organisms.   Now s/p EGD on 8/5 with esophago-gastric anastomotic leak with placement stent    Plan:   1. C/w Zosyn 3.375 g q8 for now. Pt doing well despite no VRE, fungal coverage. Will hold off additional coverage at this time given Cx sent from existing drain  2. Now s/p stent placement     Thank you for this consult. Inpatient ID team will follow.    Kd Ramírez M.D.  Attending Physician  Division of Infectious Diseases  Department of Medicine    Please contact through MS Teams message.  Office: [phone] (after 5 PM or weekend)

## 2024-08-06 NOTE — PROGRESS NOTE ADULT - SUBJECTIVE AND OBJECTIVE BOX
DATE OF SERVICE: 08-06-24    Patient denies chest pain or shortness of breath.   For EGD today Review of symptoms otherwise negative.    Review of Systems:   Constitutional: [ ] fevers, [ ] chills.   Skin: [ ] dry skin. [ ] rashes.  Psychiatric: [ ] depression, [ ] anxiety.   Gastrointestinal: [ ] BRBPR, [ ] melena.   Neurological: [ ] confusion. [ ] seizures. [ ] shuffling gait.   Ears,Nose,Mouth and Throat: [ ] ear pain [ ] sore throat.   Eyes: [ ] diplopia.   Respiratory: [ ] hemoptysis. [ ] shortness of breath  Cardiovascular: See HPI above  Hematologic/Lymphatic: [ ] anemia. [ ] painful nodes. [ ] prolonged bleeding.   Genitourinary: [ ] hematuria. [ ] flank pain.   Endocrine: [ ] significant change in weight. [ ] intolerance to heat and cold.     Review of systems [x ] otherwise negative, [ ] otherwise unable to obtain    FH: no family history of sudden cardiac death in first degree relatives    SH: [ ] tobacco, [ ] alcohol, [ ] drugs    bacitracin   Ointment 1 Application(s) Topical two times a day  dextrose 5% + sodium chloride 0.45% with potassium chloride 20 mEq/L 1000 milliLiter(s) IV Continuous <Continuous>  dextrose 5%. 1000 milliLiter(s) IV Continuous <Continuous>  dextrose 5%. 1000 milliLiter(s) IV Continuous <Continuous>  dextrose 50% Injectable 25 Gram(s) IV Push once  dextrose 50% Injectable 12.5 Gram(s) IV Push once  dextrose 50% Injectable 25 Gram(s) IV Push once  dextrose Oral Gel 15 Gram(s) Oral once  dornase malik Solution 2.5 milliGRAM(s) Inhalation daily  enoxaparin Injectable 40 milliGRAM(s) SubCutaneous every 24 hours  HYDROmorphone  Injectable 0.5 milliGRAM(s) IV Push every 4 hours PRN  insulin lispro (ADMELOG) corrective regimen sliding scale   SubCutaneous every 6 hours  insulin NPH human recombinant 4 Unit(s) SubCutaneous <User Schedule>  levalbuterol Inhalation 0.63 milliGRAM(s) Inhalation every 6 hours  lidocaine   4% Patch 1 Patch Transdermal at bedtime  metoclopramide Injectable 10 milliGRAM(s) IV Push every 8 hours  metoprolol tartrate Injectable 5 milliGRAM(s) IV Push every 6 hours  naloxone Injectable 0.1 milliGRAM(s) IV Push every 3 minutes PRN  piperacillin/tazobactam IVPB.. 3.375 Gram(s) IV Intermittent every 8 hours  sodium chloride 3%  Inhalation 4 milliLiter(s) Inhalation every 6 hours                            8.7    5.08  )-----------( 235      ( 06 Aug 2024 06:51 )             26.4       08-06    131<L>  |  98  |  5<L>  ----------------------------<  252<H>  4.2   |  22  |  0.66    Ca    7.4<L>      06 Aug 2024 06:51  Phos  2.0     08-05  Mg     1.90     08-05      T(C): 36.7 (08-06-24 @ 08:24), Max: 36.7 (08-05-24 @ 14:13)  HR: 81 (08-06-24 @ 08:24) (81 - 101)  BP: 118/60 (08-06-24 @ 08:24) (116/61 - 139/73)  RR: 18 (08-06-24 @ 08:24) (16 - 21)  SpO2: 99% (08-06-24 @ 08:24) (95% - 100%)  Wt(kg): --    I&O's Summary    05 Aug 2024 07:01  -  06 Aug 2024 07:00  --------------------------------------------------------  IN: 1560 mL / OUT: 940 mL / NET: 620 mL    06 Aug 2024 07:01  -  06 Aug 2024 12:23  --------------------------------------------------------  IN: 105 mL / OUT: 0 mL / NET: 105 mL      Gen: NAD  HEENT:  (-)icterus (-)pallor  CV: N S1 S2 1/6 CATHY (+)2 Pulses B/l  Resp:  Clear to auscultation B/L, normal effort  GI: (+) BS Soft, NT, ND  Lymph:  (-)Edema, (-)obvious lymphadenopathy  Skin: Warm to touch, Normal turgor  Psych: Appropriate mood and affect      TELEMETRY: 	 SR/ST    ECG:  	NSR      TTE 07/2024  Findings:  1. Normal left ventricular size and function.  Mild diastolic dysfunction.  2. Normal left atrial size  3. Right atrial cavity is normal in size.  4. Normal right ventricular size and function.  5. Normal trileaflet aortic valve opening.  6. Normal mitral valve opening.  7. Normal appearing tricuspid valve with mild tricuspid  regurgitation.  8. Pulmonic valve is grossly normal, yet poorly visualized  with no doppler evidence for pulmonic stenosis.  9. No evidence of significant pericardial effusion.  10. The aortic root is normal.  11. Normal pulmonary artery.  12. IVC is normal with respiratory variation.  FULL STUDY DONE INCLUDING M-MODE RECORDING,  SPECTRAL DOPPLER AND    Stress 07/2024  Normal myocardial perfusion SPECT images No evidence of stress induced ischemia or infarction.  Normal left ventricular size and function.  Calculated EF is: 68%    CT  IMPRESSION:  Small loculated right pleural effusion with foci of air and pleural catheter. Small left pleural effusion.      ASSESSMENT/PLAN: 	Pt is a 59 y.o. man with history of HTN,, DLD, DM,  GEJ adenocarcinoma (T3N0, stage 2A) s/p neoadjuvant chemotherapy admitted for optimization prior to planned Michael-Broderick Esophagectomy on tuesday. Recently had a nuclear stress test in our office for evaluation of preoperative cardiac risk assessment prior to esophageal cancer surgery scheduled on 07/16/2024. The patient denies any chest pain, shortness ofbreath, or anginal symptoms. He has no palpitations, dizziness, or syncope    s/p [place]-Broderick Esophagectomy on 7/16.   s/p J tube placement     Pre-OP/HTN  - oral BP meds on hold diet now NPO, Losartan, HCTZ, Norvasc and Metoprolol on hold  - not in clinical CHF, if BP consistently above 180 systolic can give  Hydralazine IV PRN  - EKG from office with no q waves, no chest pain,  euvolemic on exam and no severely stenotic valvular disease on recent TTE  - s/p EGD 8/5 with esophagogastric anastomotic leak s/p stent placement      F/U with Dr Shah (cardiologist) after DC as scheduled, [phone]

## 2024-08-06 NOTE — PROGRESS NOTE ADULT - SUBJECTIVE AND OBJECTIVE BOX
Subjective: "I'm feeling OK. My stomach feels OK." No diarrhea overnight. No CP or SOB. Amb w minimal assist. Yared TF at current rate. Seen by DR. Laureano this am. Adam output improving.     Vital Signs:  Vital Signs Last 24 Hrs  T(C): 36.7 (08-06-24 @ 08:24), Max: 36.7 (08-05-24 @ 14:13)  T(F): 98.1 (08-06-24 @ 08:24), Max: 98.1 (08-05-24 @ 14:13)  HR: 81 (08-06-24 @ 08:24) (81 - 101)  BP: 118/60 (08-06-24 @ 08:24) (116/61 - 139/73)  RR: 18 (08-06-24 @ 08:24) (16 - 21)  SpO2: 99% (08-06-24 @ 08:24) (93% - 100%) on (O2)    Telemetry/Alarms: Tele SR  Relevant labs, radiology and Medications reviewed           CXR- Rt effusion stable. STent in place.              8.7    5.08  )-----------( 235      ( 06 Aug 2024 06:51 )             26.4     08-06    131<L>  |  98  |  5<L>  ----------------------------<  252<H>  4.2   |  22  |  0.66    Ca    7.4<L>      06 Aug 2024 06:51  Phos  2.0     08-05  Mg     1.90     08-05      PT/INR - ( 05 Aug 2024 06:39 )   PT: 12.0 sec;   INR: 1.07 ratio           MEDICATIONS  (STANDING):  bacitracin   Ointment 1 Application(s) Topical two times a day  dextrose 5% + sodium chloride 0.45% with potassium chloride 20 mEq/L 1000 milliLiter(s) (75 mL/Hr) IV Continuous <Continuous>  dextrose 5%. 1000 milliLiter(s) (50 mL/Hr) IV Continuous <Continuous>  dextrose 5%. 1000 milliLiter(s) (100 mL/Hr) IV Continuous <Continuous>  dextrose 50% Injectable 25 Gram(s) IV Push once  dextrose 50% Injectable 12.5 Gram(s) IV Push once  dextrose 50% Injectable 25 Gram(s) IV Push once  dextrose Oral Gel 15 Gram(s) Oral once  dornase malik Solution 2.5 milliGRAM(s) Inhalation daily  enoxaparin Injectable 40 milliGRAM(s) SubCutaneous every 24 hours  insulin lispro (ADMELOG) corrective regimen sliding scale   SubCutaneous every 6 hours  insulin NPH human recombinant 4 Unit(s) SubCutaneous <User Schedule>  levalbuterol Inhalation 0.63 milliGRAM(s) Inhalation every 6 hours  lidocaine   4% Patch 1 Patch Transdermal at bedtime  metoclopramide Injectable 10 milliGRAM(s) IV Push every 8 hours  metoprolol tartrate Injectable 5 milliGRAM(s) IV Push every 6 hours  piperacillin/tazobactam IVPB.. 3.375 Gram(s) IV Intermittent every 8 hours  sodium chloride 3%  Inhalation 4 milliLiter(s) Inhalation every 6 hours    MEDICATIONS  (PRN):  HYDROmorphone  Injectable 0.5 milliGRAM(s) IV Push every 4 hours PRN moderate to severe pain  naloxone Injectable 0.1 milliGRAM(s) IV Push every 3 minutes PRN For ANY of the following changes in patient status:  A. RR LESS THAN 10 breaths per minute, B. Oxygen saturation LESS THAN 90%, C. Sedation score of 6    General: WD NAD  Neurology: A&Ox3, nonfocal, ALFORD x 4  Eyes: PERRLA/ EOMI, Gross vision intact  ENT/Neck: Neck supple, trachea midline, No JVD, Gross hearing intact  Respiratory: Dec'd BS to Rt base  CV: RRR, S1S2, no murmurs, rubs or gallops  Abdominal: Soft, NT, ND +BS, no BM overnight. NPO. ON TF  Extremities: No edema, + peripheral pulses  Skin: No Rashes, Hematoma, Ecchymosis  Incisions: Rt Chest and abd incisions c/d/i.   Tubes: rt Adam - 240cc/24hrs to bulb.   I&O's Summary    05 Aug 2024 07:01  -  06 Aug 2024 07:00  --------------------------------------------------------  IN: 1560 mL / OUT: 940 mL / NET: 620 mL    06 Aug 2024 07:01  -  06 Aug 2024 11:35  --------------------------------------------------------  IN: 105 mL / OUT: 0 mL / NET: 105 mL        Assessment  59y Male  w/ PAST MEDICAL & SURGICAL HISTORY:  HLD (hyperlipidemia)      Insulin dependent type 2 diabetes mellitus      BPH (benign prostatic hyperplasia)      HTN (hypertension)      Gastroesophageal cancer      Gastroesophageal cancer      Port-A-Cath in place    59 year old male with PMH HTN and DM 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 Pt s/p barium swallow and started on CLD. Postop CT CAP reviewed w Dr Laureano. Pt is now on Zosyn and advanced to soft diet. Sinus tachycardia since last night. Now on nasal cannula.  7/29: Pt with now improving leukocytosis and s/op CTA r/o PE for tachycardia over the weekend. Imaging reviewed by Dr Laureano, no acute findings and able to advance to soft diet as previously mentioned. Patient tolerating PO diet well. Patient without fevers and with drain in place on IV abx.   7/30-7/31-CT chest completed, no contrast extravasation. Pt resumed CLD and advanced to FLD today.  8/1-Today noted to have continued high drain output. Inc in Leukocytosis. Pt with generalized 3rd spacing. Fluid specimens sent, cont Zosyn.   8/2 Drain still w/ high output. Went for abd CT, plan for J tube placement. GI onboard to help eval for leak / intervene in future if necessary  8/3- J tube placed last night. Will start tickle infusion of NS later. Nutritional consult ordered. Adam still w high output, probable CT scan of c/a/p tomorrow. Possible stent w/ GI next week.     8/4: CT chest obtained - Extravasation of contrast into right chest. Plan for GI to do EGD. 8/5- EGD and Esoph stent placement done by GI. Nutritional consult done. TF restarted. Scripts given to CM. Teaching ordered      PLAN  Neuro: Pain management  Pulm: Encourage coughing, deep breathing and use of incentive spirometry.  Daily CXR.   Cardio: Monitor telemetry/alarms. Cont IVP BB  GI: NPO. Cont IVF. . + esoph leak. S/p Stent w GI. TF titrate to goal  Renal: monitor urine output, supplement electrolytes as needed  Vasc: Heparin SC/SCDs for DVT prophylaxis  Heme: Stable H/H. .   ID: Cont zosyn  Therapy: OOB/ambulate  Tubes: Monitor Rt adam output, keep to bulb  Disposition: Aim to D/C to home once surgically stable.   Discussed with Cardiothoracic Team at AM rounds.

## 2024-08-06 NOTE — PROGRESS NOTE ADULT - NS ATTEND AMEND GEN_ALL_CORE FT
Patient seen and examined. Agree with plan as detailed in PA/NP Note.     S/p Stent placement in Esophagus, nnow nutrition only through J Tube.    Mart Mason MD  Pager: [phone] Patient seen and examined. Agree with plan as detailed in PA/NP Note.     S/p Stent placement in Esophagus, now nutrition only through J Tube.    Mart Mason MD  Pager: [phone] Patient seen and examined. Agree with plan as detailed in PA/NP Note.     S/p Stent placement in Esophagus, now nutrition only through J Tube. suggest starting clonidine patch at  lowest dose and if bp > 180 systolic can give IV hydralazine    Mart Mason MD  Pager: [phone]

## 2024-08-07 LAB
ANION GAP SERPL CALC-SCNC: 8 MMOL/L — SIGNIFICANT CHANGE UP (ref 7–14)
BUN SERPL-MCNC: 5 MG/DL — LOW (ref 7–23)
CALCIUM SERPL-MCNC: 7.2 MG/DL — LOW (ref 8.4–10.5)
CHLORIDE SERPL-SCNC: 100 MMOL/L — SIGNIFICANT CHANGE UP (ref 98–107)
CO2 SERPL-SCNC: 25 MMOL/L — SIGNIFICANT CHANGE UP (ref 22–31)
CREAT SERPL-MCNC: 0.71 MG/DL — SIGNIFICANT CHANGE UP (ref 0.5–1.3)
EGFR: 104 ML/MIN/1.73M2 — SIGNIFICANT CHANGE UP
GLUCOSE BLDC GLUCOMTR-MCNC: 167 MG/DL — HIGH (ref 70–99)
GLUCOSE BLDC GLUCOMTR-MCNC: 215 MG/DL — HIGH (ref 70–99)
GLUCOSE BLDC GLUCOMTR-MCNC: 74 MG/DL — SIGNIFICANT CHANGE UP (ref 70–99)
GLUCOSE SERPL-MCNC: 178 MG/DL — HIGH (ref 70–99)
HCT VFR BLD CALC: 26 % — LOW (ref 39–50)
HGB BLD-MCNC: 8.5 G/DL — LOW (ref 13–17)
MCHC RBC-ENTMCNC: 29.4 PG — SIGNIFICANT CHANGE UP (ref 27–34)
MCHC RBC-ENTMCNC: 32.7 GM/DL — SIGNIFICANT CHANGE UP (ref 32–36)
MCV RBC AUTO: 90 FL — SIGNIFICANT CHANGE UP (ref 80–100)
NRBC # BLD: 0 /100 WBCS — SIGNIFICANT CHANGE UP (ref 0–0)
NRBC # FLD: 0 K/UL — SIGNIFICANT CHANGE UP (ref 0–0)
PLATELET # BLD AUTO: 259 K/UL — SIGNIFICANT CHANGE UP (ref 150–400)
POTASSIUM SERPL-MCNC: 3.9 MMOL/L — SIGNIFICANT CHANGE UP (ref 3.5–5.3)
POTASSIUM SERPL-SCNC: 3.9 MMOL/L — SIGNIFICANT CHANGE UP (ref 3.5–5.3)
RBC # BLD: 2.89 M/UL — LOW (ref 4.2–5.8)
RBC # FLD: 13.8 % — SIGNIFICANT CHANGE UP (ref 10.3–14.5)
SODIUM SERPL-SCNC: 133 MMOL/L — LOW (ref 135–145)
WBC # BLD: 4.36 K/UL — SIGNIFICANT CHANGE UP (ref 3.8–10.5)
WBC # FLD AUTO: 4.36 K/UL — SIGNIFICANT CHANGE UP (ref 3.8–10.5)

## 2024-08-07 PROCEDURE — 99232 SBSQ HOSP IP/OBS MODERATE 35: CPT

## 2024-08-07 PROCEDURE — 71045 X-RAY EXAM CHEST 1 VIEW: CPT | Mod: 26

## 2024-08-07 RX ADMIN — Medication 4 MILLILITER(S): at 15:05

## 2024-08-07 RX ADMIN — PIPERACILLIN SODIUM, TAZOBACTAM SODIUM 25 GRAM(S): 3; .375 INJECTION, POWDER, LYOPHILIZED, FOR SOLUTION INTRAVENOUS at 06:05

## 2024-08-07 RX ADMIN — Medication 4 UNIT(S): at 00:12

## 2024-08-07 RX ADMIN — Medication 1 APPLICATION(S): at 06:07

## 2024-08-07 RX ADMIN — Medication 10 MILLIGRAM(S): at 06:09

## 2024-08-07 RX ADMIN — PIPERACILLIN SODIUM, TAZOBACTAM SODIUM 25 GRAM(S): 3; .375 INJECTION, POWDER, LYOPHILIZED, FOR SOLUTION INTRAVENOUS at 15:21

## 2024-08-07 RX ADMIN — LEVALBUTEROL HYDROCHLORIDE 0.63 MILLIGRAM(S): 0.31 SOLUTION RESPIRATORY (INHALATION) at 10:10

## 2024-08-07 RX ADMIN — Medication 6 UNIT(S): at 13:04

## 2024-08-07 RX ADMIN — ENOXAPARIN SODIUM 40 MILLIGRAM(S): 120 INJECTION SUBCUTANEOUS at 13:05

## 2024-08-07 RX ADMIN — Medication 10 MILLIGRAM(S): at 15:21

## 2024-08-07 RX ADMIN — Medication 10 MILLIGRAM(S): at 00:15

## 2024-08-07 RX ADMIN — LEVALBUTEROL HYDROCHLORIDE 0.63 MILLIGRAM(S): 0.31 SOLUTION RESPIRATORY (INHALATION) at 15:05

## 2024-08-07 RX ADMIN — Medication 1: at 00:14

## 2024-08-07 RX ADMIN — PIPERACILLIN SODIUM, TAZOBACTAM SODIUM 25 GRAM(S): 3; .375 INJECTION, POWDER, LYOPHILIZED, FOR SOLUTION INTRAVENOUS at 00:20

## 2024-08-07 RX ADMIN — Medication 5 MILLIGRAM(S): at 13:02

## 2024-08-07 RX ADMIN — Medication 5 MILLIGRAM(S): at 23:59

## 2024-08-07 RX ADMIN — Medication 5 MILLIGRAM(S): at 00:14

## 2024-08-07 RX ADMIN — DORNASE ALFA 2.5 MILLIGRAM(S): 1 SOLUTION RESPIRATORY (INHALATION) at 10:18

## 2024-08-07 RX ADMIN — Medication 4 MILLILITER(S): at 21:02

## 2024-08-07 RX ADMIN — Medication 1: at 13:03

## 2024-08-07 RX ADMIN — Medication 4 MILLILITER(S): at 10:10

## 2024-08-07 RX ADMIN — Medication 5 MILLIGRAM(S): at 06:07

## 2024-08-07 RX ADMIN — Medication 0.5 MILLIGRAM(S): at 10:55

## 2024-08-07 RX ADMIN — Medication 4 UNIT(S): at 06:06

## 2024-08-07 RX ADMIN — LEVALBUTEROL HYDROCHLORIDE 0.63 MILLIGRAM(S): 0.31 SOLUTION RESPIRATORY (INHALATION) at 21:01

## 2024-08-07 RX ADMIN — Medication 2: at 06:06

## 2024-08-07 RX ADMIN — PIPERACILLIN SODIUM, TAZOBACTAM SODIUM 25 GRAM(S): 3; .375 INJECTION, POWDER, LYOPHILIZED, FOR SOLUTION INTRAVENOUS at 23:59

## 2024-08-07 RX ADMIN — SODIUM CHLORIDE AND POTASSIUM CHLORIDE 75 MILLILITER(S): 150; 450 INJECTION, SOLUTION INTRAVENOUS at 13:02

## 2024-08-07 NOTE — PROGRESS NOTE ADULT - NS ATTEND AMEND GEN_ALL_CORE FT
Patient seen and examined. Agree with plan as detailed in PA/NP Note.     unable to crush meds so options are limited to IV meds at this time, suggest starting clonidine patch at lowest dose and if bp > 180 systolic can give IV hydralazine      Mart Mason MD  Pager: [phone]

## 2024-08-07 NOTE — PROGRESS NOTE ADULT - ASSESSMENT
The patient is a 59y Male with PMH of T2DM, HTN, HLD, GE junction cancer here for esophagectomy now postop, prolonged course.  Endocrinology consulted for T2DM.     Uncontrolled Type 2 Diabetes Mellitus   - Follows with: Dr. Nolasco  - A1C with Estimated Average Glucose Result: 10.9 % (07-13-24)  - home regimen: Semglee 18 units, Novolog 6 units with meals      Inpatient plan   - FS goal 100-180 mg/dl   - s/p jejunostomy tube by IR on 08/02  - tube feeding ongoing at  goal rate 40 cc/hr   - increase NPH to 6 units q6 while on tube feeds (hold NPH if tube feed held)  - continue LOW Admelog correction scale q6 while NPO/tube feeds  - FS q6 while NPO/tube feeds ---> TID AC if PO diet started   - hypoglycemia protocol PRN       Discharge plan   - Discharge recs pending clinical course and nutrition plan - oral vs tube feeds  - Patient has an appointment with Dr. Nolasco scheduled for August 8th at 8:20 AM at [address] [facility] [address]. Email sent to Dr Nolasco to reschedule appointment on 08/07.   - Please ensure patient has a working glucometer and supplies - test strips, lancets, alcohol pads and insulin pen needles.       Hypertension  - Goal BP <130/80  - continue metoprolol  - Management as per primary team  - check urine microalbumin level as outpatient      Hyperlipidemia  - LDL goal <70  - check lipid panel as outpatient on a yearly basis  - management per primary team       Hypercalcemia  calcium 7.4  albumin 2.1  corrected calcium 8.7  vitamin 25OHD - 6.5 severely low.   Start ergocalciferol 50,000 units PO once weekly once able to take PO        TETE Day-BC  Nurse Practitioner  Division of Endocrinology & Diabetes  pager [phone]

## 2024-08-07 NOTE — PROGRESS NOTE ADULT - ASSESSMENT
59 year old gentleman with diagnosis of adenocarcinoma at GE junction s/p chemotherapy and radiation  Now s/p robotic resection on 7/16    Post operatively, he had worsening leukocytosis  Imaging with a right sided loculated effusion  Drain in place initially draining brown fluid with debris  Drainage now looks like saliva    Fluid had high amylase  Concerning for leak    Now s/p jejunostomy    Pleural fluid culture from 8/1 was sent from a drain that has been in place.  Already growing VRE.  I suspect growth will be polymicrobial. I suspect this culture growth represents colonization.  I would not direct antibiotics to cover these organisms.   Now s/p EGD on 8/5 with esophago-gastric anastomotic leak with placement stent    Plan:   1. C/w Zosyn 3.375 g q8 while inpatient, when ready for discharge, would change to Augmenting 875/125 mg BID for total 4 week course until 8/21/24. Suspect pleural fluid Cx likely colonization, unclear if there is an infection in the setting of leak.     D/w primary team    Thank you for this consult. Inpatient ID consult team will sign off.    Further changes in lab values, imaging studies, or clinical status will not be known to ID inpatient consultants unless specifically communicated by primary team.    Kd Ramírez MD  Attending Physician  Division of Infectious Diseases  Department of Medicine    Please contact through MS Teams message.  Office: [phone] (after 5 PM or weekend)

## 2024-08-07 NOTE — PROGRESS NOTE ADULT - SUBJECTIVE AND OBJECTIVE BOX
DATE OF SERVICE: 08-07-24    Patient denies chest pain or shortness of breath.       Review of Systems:   Constitutional: [ ] fevers, [ ] chills.   Skin: [ ] dry skin. [ ] rashes.  Psychiatric: [ ] depression, [ ] anxiety.   Gastrointestinal: [ ] BRBPR, [ ] melena.   Neurological: [ ] confusion. [ ] seizures. [ ] shuffling gait.   Ears,Nose,Mouth and Throat: [ ] ear pain [ ] sore throat.   Eyes: [ ] diplopia.   Respiratory: [ ] hemoptysis. [ ] shortness of breath  Cardiovascular: See HPI above  Hematologic/Lymphatic: [ ] anemia. [ ] painful nodes. [ ] prolonged bleeding.   Genitourinary: [ ] hematuria. [ ] flank pain.   Endocrine: [ ] significant change in weight. [ ] intolerance to heat and cold.     Review of systems [ x] otherwise negative, [ ] otherwise unable to obtain    FH: no family history of sudden cardiac death in first degree relatives    SH: [ ] tobacco, [ ] alcohol, [ ] drugs    bacitracin   Ointment 1 Application(s) Topical two times a day  dextrose 5% + sodium chloride 0.45% with potassium chloride 20 mEq/L 1000 milliLiter(s) IV Continuous <Continuous>  dextrose 5%. 1000 milliLiter(s) IV Continuous <Continuous>  dextrose 5%. 1000 milliLiter(s) IV Continuous <Continuous>  dextrose 50% Injectable 12.5 Gram(s) IV Push once  dextrose 50% Injectable 25 Gram(s) IV Push once  dextrose 50% Injectable 25 Gram(s) IV Push once  dextrose Oral Gel 15 Gram(s) Oral once  dornase malik Solution 2.5 milliGRAM(s) Inhalation daily  enoxaparin Injectable 40 milliGRAM(s) SubCutaneous every 24 hours  HYDROmorphone  Injectable 0.5 milliGRAM(s) IV Push every 4 hours PRN  insulin lispro (ADMELOG) corrective regimen sliding scale   SubCutaneous every 6 hours  insulin NPH human recombinant 6 Unit(s) SubCutaneous <User Schedule>  levalbuterol Inhalation 0.63 milliGRAM(s) Inhalation every 6 hours  lidocaine   4% Patch 1 Patch Transdermal at bedtime  metoclopramide Injectable 10 milliGRAM(s) IV Push every 8 hours  metoprolol tartrate Injectable 5 milliGRAM(s) IV Push every 6 hours  naloxone Injectable 0.1 milliGRAM(s) IV Push every 3 minutes PRN  piperacillin/tazobactam IVPB.. 3.375 Gram(s) IV Intermittent every 8 hours  sodium chloride 3%  Inhalation 4 milliLiter(s) Inhalation every 6 hours                            8.5    4.36  )-----------( 259      ( 07 Aug 2024 06:52 )             26.0       08-07    133<L>  |  100  |  5<L>  ----------------------------<  178<H>  3.9   |  25  |  0.71    Ca    7.2<L>      07 Aug 2024 06:52      T(C): 36.5 (08-07-24 @ 11:40), Max: 36.9 (08-07-24 @ 00:04)  HR: 94 (08-07-24 @ 11:40) (84 - 94)  BP: 123/62 (08-07-24 @ 11:40) (123/62 - 132/66)  RR: 18 (08-07-24 @ 11:40) (18 - 18)  SpO2: 96% (08-07-24 @ 11:40) (96% - 99%)  Wt(kg): --    I&O's Summary    06 Aug 2024 07:01  -  07 Aug 2024 07:00  --------------------------------------------------------  IN: 2028 mL / OUT: 1190 mL / NET: 838 mL    Gen: NAD  HEENT:  (-)icterus (-)pallor  CV: N S1 S2 1/6 CATHY (+)2 Pulses B/l  Resp:  Clear to auscultation B/L, normal effort  GI: (+) BS Soft, NT, ND  Lymph:  (-)Edema, (-)obvious lymphadenopathy  Skin: Warm to touch, Normal turgor  Psych: Appropriate mood and affect      TELEMETRY: 	 SR/ST    ECG:  	NSR      TTE 07/2024  Findings:  1. Normal left ventricular size and function.  Mild diastolic dysfunction.  2. Normal left atrial size  3. Right atrial cavity is normal in size.  4. Normal right ventricular size and function.  5. Normal trileaflet aortic valve opening.  6. Normal mitral valve opening.  7. Normal appearing tricuspid valve with mild tricuspid  regurgitation.  8. Pulmonic valve is grossly normal, yet poorly visualized  with no doppler evidence for pulmonic stenosis.  9. No evidence of significant pericardial effusion.  10. The aortic root is normal.  11. Normal pulmonary artery.  12. IVC is normal with respiratory variation.  FULL STUDY DONE INCLUDING M-MODE RECORDING,  SPECTRAL DOPPLER AND    Stress 07/2024  Normal myocardial perfusion SPECT images No evidence of stress induced ischemia or infarction.  Normal left ventricular size and function.  Calculated EF is: 68%    CT  IMPRESSION:  Small loculated right pleural effusion with foci of air and pleural catheter. Small left pleural effusion.      ASSESSMENT/PLAN: 	Pt is a 59 y.o. man with history of HTN,, DLD, DM,  GEJ adenocarcinoma (T3N0, stage 2A) s/p neoadjuvant chemotherapy admitted for optimization prior to planned [place]-Broderick Esophagectomy on tuesday. Recently had a nuclear stress test in our office for evaluation of preoperative cardiac risk assessment prior to esophageal cancer surgery scheduled on 07/16/2024. The patient denies any chest pain, shortness ofbreath, or anginal symptoms. He has no palpitations, dizziness, or syncope    s/p [place]-Broderick Esophagectomy on 7/16.   s/p J tube placement     Pre-OP/HTN  - oral BP meds on hold diet now NPO ( Losartan, HCTZ, Norvasc and Metoprolol Held)  - not in clinical CHF, if BP consistently above 180 systolic can give  Hydralazine IV PRN  - EKG from office with no q waves, no chest pain,  euvolemic on exam and no severely stenotic valvular disease on recent TTE  - s/p EGD 8/5 with esophagogastric anastomotic leak s/p stent placement   - unable to crush meds so options are limited to IV meds at this time, suggest starting clonidine patch at lowest dose and if bp > 180 systolic can give IV hydralazine       F/U with Dr Shah (cardiologist) after DC as scheduled, [phone]     [place] TATE  [phone]

## 2024-08-07 NOTE — PROGRESS NOTE ADULT - SUBJECTIVE AND OBJECTIVE BOX
Chief Complaint: DM type 2     Interval Events: No hypoglycemic event. Tube feeding going at goal rate. No N/V or abdominal pain.     MEDICATIONS  (STANDING):  bacitracin   Ointment 1 Application(s) Topical two times a day  dextrose 5% + sodium chloride 0.45% with potassium chloride 20 mEq/L 1000 milliLiter(s) (75 mL/Hr) IV Continuous <Continuous>  dextrose 5%. 1000 milliLiter(s) (50 mL/Hr) IV Continuous <Continuous>  dextrose 5%. 1000 milliLiter(s) (100 mL/Hr) IV Continuous <Continuous>  dextrose 50% Injectable 25 Gram(s) IV Push once  dextrose 50% Injectable 12.5 Gram(s) IV Push once  dextrose 50% Injectable 25 Gram(s) IV Push once  dextrose Oral Gel 15 Gram(s) Oral once  dornase malik Solution 2.5 milliGRAM(s) Inhalation daily  enoxaparin Injectable 40 milliGRAM(s) SubCutaneous every 24 hours  insulin lispro (ADMELOG) corrective regimen sliding scale   SubCutaneous every 6 hours  insulin NPH human recombinant 6 Unit(s) SubCutaneous <User Schedule>  levalbuterol Inhalation 0.63 milliGRAM(s) Inhalation every 6 hours  lidocaine   4% Patch 1 Patch Transdermal at bedtime  metoclopramide Injectable 10 milliGRAM(s) IV Push every 8 hours  metoprolol tartrate Injectable 5 milliGRAM(s) IV Push every 6 hours  piperacillin/tazobactam IVPB.. 3.375 Gram(s) IV Intermittent every 8 hours  sodium chloride 3%  Inhalation 4 milliLiter(s) Inhalation every 6 hours    MEDICATIONS  (PRN):  HYDROmorphone  Injectable 0.5 milliGRAM(s) IV Push every 4 hours PRN moderate to severe pain  naloxone Injectable 0.1 milliGRAM(s) IV Push every 3 minutes PRN For ANY of the following changes in patient status:  A. RR LESS THAN 10 breaths per minute, B. Oxygen saturation LESS THAN 90%, C. Sedation score of 6      Allergies  No Known Allergies    Review of Systems:  Constitutional: No fever/chills   Eyes: No blurry vision  Neuro: No tremors  HEENT: No pain  Cardiovascular: No chest pain, no palpitations  Respiratory: No SOB, no cough  GI: No nausea, vomiting or abdominal pain  : No dysuria  Endocrine: no polyuria, polydipsia      VITALS: T(C): 36.5 (08-07-24 @ 11:40)  T(F): 97.7 (08-07-24 @ 11:40), Max: 98.5 (08-07-24 @ 05:47)  HR: 94 (08-07-24 @ 11:40) (84 - 94)  BP: 123/62 (08-07-24 @ 11:40) (123/62 - 132/66)  RR:  (18 - 18)  SpO2:  (96% - 99%)  Wt(kg): --      Physical Exam:   GENERAL: NAD, well-groomed, well-developed  EYES: No proptosis, no lid lag, anicteric  HEENT:  Atraumatic, Normocephalic, moist mucous membranes  RESPIRATORY: non labored breathing   GI: soft, +VENKATA drain, + jejunostomy tube  SKIN: Dry, intact, No rashes or lesions  MUSCULOSKELETAL: Full range of motion, normal strength  NEURO: extraocular movements intact, no tremor  PSYCH: Alert and oriented x 3, pleasant, normal affect, normal mood        CAPILLARY BLOOD GLUCOSE  POCT Blood Glucose.: 167 mg/dL (07 Aug 2024 12:20)  POCT Blood Glucose.: 215 mg/dL (07 Aug 2024 05:50)  POCT Blood Glucose.: 173 mg/dL (06 Aug 2024 23:52)  POCT Blood Glucose.: 197 mg/dL (06 Aug 2024 17:10)      08-07    133<L>  |  100  |  5<L>  ----------------------------<  178<H>  3.9   |  25  |  0.71    eGFR: 104    Ca    7.2<L>      08-07  Mg     1.90     08-05  Phos  2.0     08-05        A1C with Estimated Average Glucose Result: 10.9 % (07-13-24 @ 02:44)  A1C with Estimated Average Glucose Result: 12.8 % (01-01-24 @ 06:00)

## 2024-08-07 NOTE — PROGRESS NOTE ADULT - SUBJECTIVE AND OBJECTIVE BOX
Infectious Diseases Follow Up:    Patient is a 59y old  Male who presents with a chief complaint of Esophagectomy (07 Aug 2024 11:55)      Interval History/ROS:  No acute events, afebrile, pt is doing well.     Allergies  No Known Allergies        ANTIMICROBIALS:  piperacillin/tazobactam IVPB.. 3.375 every 8 hours      Current Abx:     Previous Abx     OTHER MEDS:  MEDICATIONS  (STANDING):  dextrose 50% Injectable 25 once  dextrose 50% Injectable 12.5 once  dextrose 50% Injectable 25 once  dextrose Oral Gel 15 once  dornase malik Solution 2.5 daily  enoxaparin Injectable 40 every 24 hours  HYDROmorphone  Injectable 0.5 every 4 hours PRN  insulin lispro (ADMELOG) corrective regimen sliding scale  every 6 hours  insulin NPH human recombinant 6 <User Schedule>  levalbuterol Inhalation 0.63 every 6 hours  metoclopramide Injectable 10 every 8 hours  metoprolol tartrate Injectable 5 every 6 hours  sodium chloride 3%  Inhalation 4 every 6 hours      Vital Signs Last 24 Hrs  T(C): 36.5 (07 Aug 2024 11:40), Max: 36.9 (07 Aug 2024 00:04)  T(F): 97.7 (07 Aug 2024 11:40), Max: 98.5 (07 Aug 2024 05:47)  HR: 94 (07 Aug 2024 11:40) (84 - 94)  BP: 123/62 (07 Aug 2024 11:40) (123/62 - 132/66)  BP(mean): --  RR: 18 (07 Aug 2024 11:40) (18 - 18)  SpO2: 96% (07 Aug 2024 11:40) (96% - 99%)    Parameters below as of 07 Aug 2024 11:40  Patient On (Oxygen Delivery Method): room air        PHYSICAL EXAM:  GENERAL: NAD, well-developed  HEAD:  Atraumatic, Normocephalic  EYES: EOMI, conjunctiva and sclera clear  CHEST/LUNG: Clear to auscultation bilaterally; No wheeze. R chest with VENKATA, doll drainage   HEART: Regular rate and rhythm; No murmurs, rubs, or gallops  ABDOMEN: Soft, Nontender, Nondistended; Bowel sounds present  PSYCH: AAOx3                          8.5    4.36  )-----------( 259      ( 07 Aug 2024 06:52 )             26.0       08-07    133<L>  |  100  |  5<L>  ----------------------------<  178<H>  3.9   |  25  |  0.71    Ca    7.2<L>      07 Aug 2024 06:52        Urinalysis Basic - ( 07 Aug 2024 06:52 )    Color: x / Appearance: x / SG: x / pH: x  Gluc: 178 mg/dL / Ketone: x  / Bili: x / Urobili: x   Blood: x / Protein: x / Nitrite: x   Leuk Esterase: x / RBC: x / WBC x   Sq Epi: x / Non Sq Epi: x / Bacteria: x        MICROBIOLOGY:  v  Pleural Fl Pleural Fluid  08-01-24   Numerous Enterococcus faecium  Few Candida albicans  Moderate Prevotella melaninogenica "Susceptibilities not performed"  --  Enterococcus faecium  Candida albicans                RADIOLOGY:

## 2024-08-07 NOTE — PROGRESS NOTE ADULT - SUBJECTIVE AND OBJECTIVE BOX
Subjective: "I feel fine"  Patient seen and evaluated by thoracic team. OOB to chair. Tolerating TF, continuing to advance rate slowly.   Patient offers no acute complaints. Adam output improved.  Denies shortness of breath, chest pain, palpitations, dizziness, nausea, vomiting, diarrhea.     Vital Signs:  Vital Signs Last 24 Hrs  T(C): 36.9 (08-07-24 @ 05:47), Max: 36.9 (08-07-24 @ 00:04)  T(F): 98.5 (08-07-24 @ 05:47), Max: 98.5 (08-07-24 @ 05:47)  HR: 93 (08-07-24 @ 05:47) (84 - 93)  BP: 128/67 (08-07-24 @ 05:47) (124/67 - 132/66)  RR: 18 (08-07-24 @ 05:47) (18 - 18)  SpO2: 96% (08-07-24 @ 05:47) (96% - 99%) on (O2)    General: WD NAD  Neurology: A&Ox3, nonfocal, ALFORD x 4  Eyes: PERRLA/ EOMI, Gross vision intact  ENT/Neck: Neck supple, trachea midline, No JVD, Gross hearing intact  Respiratory: Dec'd BS to Rt base  CV: RRR, S1S2, no murmurs, rubs or gallops  Abdominal: Soft, NT, ND +BS, no BM overnight. NPO. ON TF  Extremities: No edema, + peripheral pulses  Skin: No Rashes, Hematoma, Ecchymosis  Incisions: Rt Chest and abd incisions c/d/i.   Tubes: rt Adam - 160cc/24hrs to bulb.   Relevant labs, radiology and Medications reviewed                        8.5    4.36  )-----------( 259      ( 07 Aug 2024 06:52 )             26.0     08-07    133<L>  |  100  |  5<L>  ----------------------------<  178<H>  3.9   |  25  |  0.71    Ca    7.2<L>      07 Aug 2024 06:52        MEDICATIONS  (STANDING):  bacitracin   Ointment 1 Application(s) Topical two times a day  dextrose 5% + sodium chloride 0.45% with potassium chloride 20 mEq/L 1000 milliLiter(s) (75 mL/Hr) IV Continuous <Continuous>  dextrose 5%. 1000 milliLiter(s) (50 mL/Hr) IV Continuous <Continuous>  dextrose 5%. 1000 milliLiter(s) (100 mL/Hr) IV Continuous <Continuous>  dextrose 50% Injectable 25 Gram(s) IV Push once  dextrose 50% Injectable 12.5 Gram(s) IV Push once  dextrose 50% Injectable 25 Gram(s) IV Push once  dextrose Oral Gel 15 Gram(s) Oral once  dornase malik Solution 2.5 milliGRAM(s) Inhalation daily  enoxaparin Injectable 40 milliGRAM(s) SubCutaneous every 24 hours  insulin lispro (ADMELOG) corrective regimen sliding scale   SubCutaneous every 6 hours  insulin NPH human recombinant 4 Unit(s) SubCutaneous <User Schedule>  levalbuterol Inhalation 0.63 milliGRAM(s) Inhalation every 6 hours  lidocaine   4% Patch 1 Patch Transdermal at bedtime  metoclopramide Injectable 10 milliGRAM(s) IV Push every 8 hours  metoprolol tartrate Injectable 5 milliGRAM(s) IV Push every 6 hours  piperacillin/tazobactam IVPB.. 3.375 Gram(s) IV Intermittent every 8 hours  sodium chloride 3%  Inhalation 4 milliLiter(s) Inhalation every 6 hours    MEDICATIONS  (PRN):  HYDROmorphone  Injectable 0.5 milliGRAM(s) IV Push every 4 hours PRN moderate to severe pain  naloxone Injectable 0.1 milliGRAM(s) IV Push every 3 minutes PRN For ANY of the following changes in patient status:  A. RR LESS THAN 10 breaths per minute, B. Oxygen saturation LESS THAN 90%, C. Sedation score of 6    I&O's Summary    06 Aug 2024 07:01  -  07 Aug 2024 07:00  --------------------------------------------------------  IN: 2028 mL / OUT: 1190 mL / NET: 838 mL      Marital Status:  (   )    (   ) Single    (   )    (  )   Lives with: (  ) alone  (  ) children   (  ) spouse   (  ) parents  (  ) other  Recent Travel: No recent travel  Occupation:    Substance Use (street drugs): ( x ) never used  (  ) other:  Tobacco Usage:  ( x  ) never smoked   (   ) former smoker   (   ) current smoker  (     ) pack year  Alcohol Usage: None     Assessment  59y Male  w/ PAST MEDICAL & SURGICAL HISTORY:  HLD (hyperlipidemia)      Insulin dependent type 2 diabetes mellitus      BPH (benign prostatic hyperplasia)      HTN (hypertension)      Gastroesophageal cancer      Gastroesophageal cancer      Port-A-Cath in place      59 year old male with PMH HTN and DM 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 Pt s/p barium swallow and started on CLD. Postop CT CAP reviewed w Dr Laureano. Pt is now on Zosyn and advanced to soft diet. Sinus tachycardia since last night. Now on nasal cannula.  7/29: Pt with now improving leukocytosis and s/op CTA r/o PE for tachycardia over the weekend. Imaging reviewed by Dr Laureano, no acute findings and able to advance to soft diet as previously mentioned. Patient tolerating PO diet well. Patient without fevers and with drain in place on IV abx.   7/30-7/31-CT chest completed, no contrast extravasation. Pt resumed CLD and advanced to FLD today.  8/1-Today noted to have continued high drain output. Inc in Leukocytosis. Pt with generalized 3rd spacing. Fluid specimens sent, cont Zosyn.   8/2 Drain still w/ high output. Went for abd CT, plan for J tube placement. GI onboard to help eval for leak / intervene in future if necessary  8/3- J tube placed last night. Will start tickle infusion of NS later. Nutritional consult ordered. Adam still w high output, probable CT scan of c/a/p tomorrow. Possible stent w/ GI next week.   8/4: CT chest obtained - Extravasation of contrast into right chest. Plan for GI to do EGD.   8/5- EGD and Esoph stent placement done by GI. Nutritional consult done. TF restarted. Scripts given to CM. Teaching ordered  8/7 Working on TF via medicaid. Bulb to stay after discharge      PLAN  Neuro: Pain management  Pulm: Encourage coughing, deep breathing and use of incentive spirometry.  Daily CXR.   Cardio: Monitor telemetry/alarms. Cont IVP BB  GI: NPO. Cont IVF. . + esoph leak. S/p Stent w GI. TF titrate to goal  Renal: monitor urine output, supplement electrolytes as needed  Vasc: Heparin SC/SCDs for DVT prophylaxis  Heme: Stable H/H.   ID: Cont zosyn pending dc reccs from ID   Therapy: OOB/ambulate  Tubes: Monitor Rt adam output, keep to bulb, will discharge with bulb   Disposition: Aim to D/C to home once surgically stable after TF sent  Discussed with Cardiothoracic Team at AM rounds.

## 2024-08-07 NOTE — PROGRESS NOTE ADULT - SUBJECTIVE AND OBJECTIVE BOX
Patient is a 59y old  Male who presents with a chief complaint of Esophagectomy (07 Aug 2024 14:30)      SUBJECTIVE / OVERNIGHT EVENTS:    Events noted.  CONSTITUTIONAL: No fever,  or fatigue  RESPIRATORY: No cough, wheezing,  No shortness of breath  CARDIOVASCULAR: No chest pain, palpitations, dizziness, or leg swelling  GASTROINTESTINAL: No abdominal or epigastric pain. No nausea, vomiting.      MEDICATIONS  (STANDING):  bacitracin   Ointment 1 Application(s) Topical two times a day  dextrose 5% + sodium chloride 0.45% with potassium chloride 20 mEq/L 1000 milliLiter(s) (75 mL/Hr) IV Continuous <Continuous>  dextrose 5%. 1000 milliLiter(s) (100 mL/Hr) IV Continuous <Continuous>  dextrose 5%. 1000 milliLiter(s) (50 mL/Hr) IV Continuous <Continuous>  dextrose 50% Injectable 12.5 Gram(s) IV Push once  dextrose 50% Injectable 25 Gram(s) IV Push once  dextrose 50% Injectable 25 Gram(s) IV Push once  dextrose Oral Gel 15 Gram(s) Oral once  dornase malik Solution 2.5 milliGRAM(s) Inhalation daily  enoxaparin Injectable 40 milliGRAM(s) SubCutaneous every 24 hours  insulin lispro (ADMELOG) corrective regimen sliding scale   SubCutaneous every 6 hours  insulin NPH human recombinant 6 Unit(s) SubCutaneous <User Schedule>  levalbuterol Inhalation 0.63 milliGRAM(s) Inhalation every 6 hours  lidocaine   4% Patch 1 Patch Transdermal at bedtime  metoclopramide Injectable 10 milliGRAM(s) IV Push every 8 hours  metoprolol tartrate Injectable 5 milliGRAM(s) IV Push every 6 hours  piperacillin/tazobactam IVPB.. 3.375 Gram(s) IV Intermittent every 8 hours  sodium chloride 3%  Inhalation 4 milliLiter(s) Inhalation every 6 hours    MEDICATIONS  (PRN):  HYDROmorphone  Injectable 0.5 milliGRAM(s) IV Push every 4 hours PRN moderate to severe pain  naloxone Injectable 0.1 milliGRAM(s) IV Push every 3 minutes PRN For ANY of the following changes in patient status:  A. RR LESS THAN 10 breaths per minute, B. Oxygen saturation LESS THAN 90%, C. Sedation score of 6        CAPILLARY BLOOD GLUCOSE      POCT Blood Glucose.: 74 mg/dL (07 Aug 2024 18:21)  POCT Blood Glucose.: 167 mg/dL (07 Aug 2024 12:20)  POCT Blood Glucose.: 215 mg/dL (07 Aug 2024 05:50)  POCT Blood Glucose.: 173 mg/dL (06 Aug 2024 23:52)    I&O's Summary    06 Aug 2024 07:01  -  07 Aug 2024 07:00  --------------------------------------------------------  IN: 2028 mL / OUT: 1190 mL / NET: 838 mL    07 Aug 2024 07:01  -  07 Aug 2024 21:58  --------------------------------------------------------  IN: 0 mL / OUT: 400 mL / NET: -400 mL        T(C): 36.7 (08-07-24 @ 20:00), Max: 36.9 (08-07-24 @ 00:04)  HR: 92 (08-07-24 @ 21:16) (75 - 96)  BP: 131/69 (08-07-24 @ 20:00) (116/69 - 132/66)  RR: 18 (08-07-24 @ 20:00) (18 - 18)  SpO2: 97% (08-07-24 @ 21:16) (96% - 100%)    PHYSICAL EXAM:  GENERAL: NAD  NECK: Supple, No JVD  CHEST/LUNG: Clear to auscultation bilaterally; No wheezing.  HEART: Regular rate and rhythm; No murmurs, rubs, or gallops  ABDOMEN: Soft, Nontender, Nondistended; Bowel sounds present  EXTREMITIES:   No edema  NEUROLOGY: AAO X 3      LABS:                        8.5    4.36  )-----------( 259      ( 07 Aug 2024 06:52 )             26.0     08-07    133<L>  |  100  |  5<L>  ----------------------------<  178<H>  3.9   |  25  |  0.71    Ca    7.2<L>      07 Aug 2024 06:52            Urinalysis Basic - ( 07 Aug 2024 06:52 )    Color: x / Appearance: x / SG: x / pH: x  Gluc: 178 mg/dL / Ketone: x  / Bili: x / Urobili: x   Blood: x / Protein: x / Nitrite: x   Leuk Esterase: x / RBC: x / WBC x   Sq Epi: x / Non Sq Epi: x / Bacteria: x      CAPILLARY BLOOD GLUCOSE      POCT Blood Glucose.: 74 mg/dL (07 Aug 2024 18:21)  POCT Blood Glucose.: 167 mg/dL (07 Aug 2024 12:20)  POCT Blood Glucose.: 215 mg/dL (07 Aug 2024 05:50)  POCT Blood Glucose.: 173 mg/dL (06 Aug 2024 23:52)        RADIOLOGY & ADDITIONAL TESTS:    Imaging Personally Reviewed:    Consultant(s) Notes Reviewed:      Care Discussed with Consultants/Other Providers:    Anish Goodrich MD, CMD, FACP    [address] [place] [address]  Office Tel: [phone]  Cell: [phone]

## 2024-08-07 NOTE — PROGRESS NOTE ADULT - NS ATTEND OPT1 GEN_ALL_CORE

## 2024-08-08 ENCOUNTER — APPOINTMENT (OUTPATIENT)
Dept: ENDOCRINOLOGY | Facility: CLINIC | Age: 59
End: 2024-08-08

## 2024-08-08 LAB
ANION GAP SERPL CALC-SCNC: 8 MMOL/L — SIGNIFICANT CHANGE UP (ref 7–14)
BUN SERPL-MCNC: 7 MG/DL — SIGNIFICANT CHANGE UP (ref 7–23)
CALCIUM SERPL-MCNC: 7.3 MG/DL — LOW (ref 8.4–10.5)
CHLORIDE SERPL-SCNC: 100 MMOL/L — SIGNIFICANT CHANGE UP (ref 98–107)
CO2 SERPL-SCNC: 25 MMOL/L — SIGNIFICANT CHANGE UP (ref 22–31)
CREAT SERPL-MCNC: 0.7 MG/DL — SIGNIFICANT CHANGE UP (ref 0.5–1.3)
EGFR: 105 ML/MIN/1.73M2 — SIGNIFICANT CHANGE UP
GLUCOSE BLDC GLUCOMTR-MCNC: 131 MG/DL — HIGH (ref 70–99)
GLUCOSE BLDC GLUCOMTR-MCNC: 171 MG/DL — HIGH (ref 70–99)
GLUCOSE BLDC GLUCOMTR-MCNC: 174 MG/DL — HIGH (ref 70–99)
GLUCOSE BLDC GLUCOMTR-MCNC: 183 MG/DL — HIGH (ref 70–99)
GLUCOSE BLDC GLUCOMTR-MCNC: 69 MG/DL — LOW (ref 70–99)
GLUCOSE BLDC GLUCOMTR-MCNC: 78 MG/DL — SIGNIFICANT CHANGE UP (ref 70–99)
GLUCOSE SERPL-MCNC: 185 MG/DL — HIGH (ref 70–99)
HCT VFR BLD CALC: 25.4 % — LOW (ref 39–50)
HGB BLD-MCNC: 8.3 G/DL — LOW (ref 13–17)
MAGNESIUM SERPL-MCNC: 1.8 MG/DL — SIGNIFICANT CHANGE UP (ref 1.6–2.6)
MCHC RBC-ENTMCNC: 29.1 PG — SIGNIFICANT CHANGE UP (ref 27–34)
MCHC RBC-ENTMCNC: 32.7 GM/DL — SIGNIFICANT CHANGE UP (ref 32–36)
MCV RBC AUTO: 89.1 FL — SIGNIFICANT CHANGE UP (ref 80–100)
NRBC # BLD: 0 /100 WBCS — SIGNIFICANT CHANGE UP (ref 0–0)
NRBC # FLD: 0 K/UL — SIGNIFICANT CHANGE UP (ref 0–0)
PHOSPHATE SERPL-MCNC: 2.2 MG/DL — LOW (ref 2.5–4.5)
PLATELET # BLD AUTO: 240 K/UL — SIGNIFICANT CHANGE UP (ref 150–400)
POTASSIUM SERPL-MCNC: 4.2 MMOL/L — SIGNIFICANT CHANGE UP (ref 3.5–5.3)
POTASSIUM SERPL-SCNC: 4.2 MMOL/L — SIGNIFICANT CHANGE UP (ref 3.5–5.3)
RBC # BLD: 2.85 M/UL — LOW (ref 4.2–5.8)
RBC # FLD: 14 % — SIGNIFICANT CHANGE UP (ref 10.3–14.5)
SODIUM SERPL-SCNC: 133 MMOL/L — LOW (ref 135–145)
WBC # BLD: 4.97 K/UL — SIGNIFICANT CHANGE UP (ref 3.8–10.5)
WBC # FLD AUTO: 4.97 K/UL — SIGNIFICANT CHANGE UP (ref 3.8–10.5)

## 2024-08-08 PROCEDURE — 99232 SBSQ HOSP IP/OBS MODERATE 35: CPT | Mod: GC

## 2024-08-08 PROCEDURE — 71045 X-RAY EXAM CHEST 1 VIEW: CPT | Mod: 26

## 2024-08-08 PROCEDURE — 99232 SBSQ HOSP IP/OBS MODERATE 35: CPT

## 2024-08-08 PROCEDURE — 74220 X-RAY XM ESOPHAGUS 1CNTRST: CPT | Mod: 26

## 2024-08-08 RX ORDER — INSULIN GLARGINE-YFGN 100 [IU]/ML
10 INJECTION, SOLUTION SUBCUTANEOUS AT BEDTIME
Refills: 0 | Status: DISCONTINUED | OUTPATIENT
Start: 2024-08-08 | End: 2024-08-09

## 2024-08-08 RX ORDER — INSULIN LISPRO 100/ML
VIAL (ML) SUBCUTANEOUS
Refills: 0 | Status: DISCONTINUED | OUTPATIENT
Start: 2024-08-08 | End: 2024-08-09

## 2024-08-08 RX ORDER — CLONIDINE 500 UG/ML
1 INJECTION, SOLUTION EPIDURAL
Refills: 0 | Status: DISCONTINUED | OUTPATIENT
Start: 2024-08-08 | End: 2024-08-09

## 2024-08-08 RX ADMIN — Medication 6 UNIT(S): at 09:50

## 2024-08-08 RX ADMIN — CLONIDINE 1 PATCH: 500 INJECTION, SOLUTION EPIDURAL at 12:55

## 2024-08-08 RX ADMIN — PIPERACILLIN SODIUM, TAZOBACTAM SODIUM 25 GRAM(S): 3; .375 INJECTION, POWDER, LYOPHILIZED, FOR SOLUTION INTRAVENOUS at 06:10

## 2024-08-08 RX ADMIN — Medication 5 MILLIGRAM(S): at 06:13

## 2024-08-08 RX ADMIN — Medication 4 MILLILITER(S): at 09:38

## 2024-08-08 RX ADMIN — DORNASE ALFA 2.5 MILLIGRAM(S): 1 SOLUTION RESPIRATORY (INHALATION) at 09:41

## 2024-08-08 RX ADMIN — Medication 5 UNIT(S): at 12:54

## 2024-08-08 RX ADMIN — Medication 10 MILLIGRAM(S): at 00:00

## 2024-08-08 RX ADMIN — PIPERACILLIN SODIUM, TAZOBACTAM SODIUM 25 GRAM(S): 3; .375 INJECTION, POWDER, LYOPHILIZED, FOR SOLUTION INTRAVENOUS at 22:44

## 2024-08-08 RX ADMIN — INSULIN GLARGINE-YFGN 10 UNIT(S): 100 INJECTION, SOLUTION SUBCUTANEOUS at 22:44

## 2024-08-08 RX ADMIN — PIPERACILLIN SODIUM, TAZOBACTAM SODIUM 25 GRAM(S): 3; .375 INJECTION, POWDER, LYOPHILIZED, FOR SOLUTION INTRAVENOUS at 16:40

## 2024-08-08 RX ADMIN — CLONIDINE 1 PATCH: 500 INJECTION, SOLUTION EPIDURAL at 22:40

## 2024-08-08 RX ADMIN — Medication 1 APPLICATION(S): at 06:12

## 2024-08-08 RX ADMIN — LEVALBUTEROL HYDROCHLORIDE 0.63 MILLIGRAM(S): 0.31 SOLUTION RESPIRATORY (INHALATION) at 09:38

## 2024-08-08 RX ADMIN — Medication 1 APPLICATION(S): at 17:50

## 2024-08-08 RX ADMIN — Medication 1: at 01:04

## 2024-08-08 RX ADMIN — Medication 10 MILLIGRAM(S): at 16:39

## 2024-08-08 RX ADMIN — Medication 10 MILLIGRAM(S): at 06:10

## 2024-08-08 RX ADMIN — Medication 1: at 09:49

## 2024-08-08 RX ADMIN — Medication 6 UNIT(S): at 01:04

## 2024-08-08 RX ADMIN — Medication 10 MILLIGRAM(S): at 22:44

## 2024-08-08 NOTE — CHART NOTE - NSCHARTNOTEFT_GEN_A_CORE
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     At time of visit, Pt awake, alert appears weak. Pt NPO with Tube Feeding via PEJ: Glucerna 1.5cal @ 40 ml/hr infusing at time of visit. Pt appears tolerating Tube Feeding well; no vomiting diarrhea or abdominal pain @ present. +Last BM (8/6) per flow sheet. Case discussed with nurse. RD remains available.    Pt's height: 64" - per Pt      IBW: 130#+/-10%      Pt's weights: 58.5 kg (8/5/24), 54 kg (7/12/24) --> weight loss/wt change: 4.5 kg/7.69% x <1 month     Pertinent Medications: Lovenox, Reglan, Insulin (Lispro), NPH (Humulin)    Pertinent Labs: (8/8) H/H 8.3/25.4 L, Na 133 L, phosphorus 2.2 L, Glu 185 H     (8/2) Pre-albumin 2.2 L;   (7/13) HbA1c 10.9% H  CAPILLARY BLOOD GLUCOSE  POCT Blood Glucose.: 131 mg/dL (08 Aug 2024 11:53)  POCT Blood Glucose.: 174 mg/dL (08 Aug 2024 09:04)  POCT Blood Glucose.: 183 mg/dL (08 Aug 2024 00:48)  POCT Blood Glucose.: 74 mg/dL (07 Aug 2024 18:21)  Nutrition focused physical exam conducted, Pt found with signs of subcutaneous fat loss: [moderate] Orbital fat pads region, [moderate] Buccal fat region & muscle wasting: [severe] Temples region, [moderate] Clavicle region [mild] Shoulder region      Estimated Needs: [x] no change from previous assessment (using BW: 58.5 kg -8/5)  estimated energy needs: 4718-6472 kcal/day (@ 25-30 kcal/kg BW)   estimated protein needs: 70-88 gm protein/day (@ 1.2-1.5 gm protein/kg BW)   Previous Nutrition Diagnosis: [x] Malnutrition, moderate    Nutrition Diagnosis is [x] ongoing      New Nutrition Diagnosis [x] Malnutrition, Severe   Pt meets criteria for severe malnutrition in the context of acute in chronic illness   Pt with physical findings described above; weight change: 7.69% x <1 month     Nutrition Interventions/Recommendations:  1. Continue Tube Feeding via PEJ: Glucerna 1.5cal @ 40 ml/hr x 24 hrs, as ordered;    2. Monitor Tube Feeding tolerance; Add free water flushes per MD discretion;   3. Replace/replete e-lytes per MD discretion; Monitor signs for refeeding syndrome;   4. Add Multivitamin/minerals/iron Solution (CENTRUM) - 15 milliLiter(s) daily, for micronutrient coverage;   5. Monitor labs, weekly weights, hydration status;  Reconsult nutrition if warranted; RD remains available

## 2024-08-08 NOTE — PROGRESS NOTE ADULT - ASSESSMENT
The patient is a 59y Male with PMH of T2DM, HTN, HLD, GE junction cancer here for esophagectomy now postop, prolonged course.  Endocrinology consulted for T2DM.     Uncontrolled Type 2 Diabetes Mellitus   - Follows with: Dr. Nolasco  - A1C with Estimated Average Glucose Result: 10.9 % (07-13-24)  - home regimen: Semglee 18 units, Novolog 6 units with meals      Inpatient plan   - FS goal 100-180 mg/dl   - s/p jejunostomy tube by IR on 08/02  - tube feeding ongoing at goal rate 40 cc/hr   - per primary team, at home, patient will be on tube feeds from 8 pm to 12 noon the next day (16 hours)   - will trial home regimen while patient is in hospital   - STOP NPH  - start Lantus 10 units at bedtime   - change LOW Admelog correction scale to 8am, 2pm, 8pm (waking hours)   - if patient responds well this regimen, will continue this regimen at discharge  - hypoglycemia protocol PRN       Discharge plan   - plan to discharge on tube feeds from 8 pm to 12 noon the next day (16 hours)   - trial of Lantus + Admelog correction scale ongoing (see above) ---> may need adjustment depending on FS  - can discharge on Semglee at bedtime + Novolog correction scale 8am, 2pm, 8pm, dose TBD  - Patient has an appointment with Dr. Nolasco scheduled for August 8th at 8:20 AM at [address] [facility] [address]. Email sent to Dr Nolasco to reschedule appointment on 08/07.   - Please ensure patient has a working glucometer and supplies - test strips, lancets, alcohol pads and insulin pen needles.         Hypertension  - Goal BP <130/80  - continue clonidine patch  - Management as per primary team  - check urine microalbumin level as outpatient      Hyperlipidemia  - LDL goal <70  - check lipid panel as outpatient on a yearly basis  - management per primary team       Hypercalcemia  calcium 7.4  albumin 2.1  corrected calcium 8.8  vitamin 25OHD - 6.5 severely low.   Start ergocalciferol 50,000 units PO once weekly once able to take PO        Ethelyn So, AGACNP-BC  Nurse Practitioner  Division of Endocrinology & Diabetes  pager [phone]

## 2024-08-08 NOTE — PROVIDER CONTACT NOTE (OTHER) - RECOMMENDATIONS
Come to bedside and increase NC liters
Per provider
Place pt on 2L NC, chest PT performed, incentive spirometer used  come to bedside and assess pt
Team made aware.

## 2024-08-08 NOTE — PROGRESS NOTE ADULT - SUBJECTIVE AND OBJECTIVE BOX
Chief Complaint: DM type 2     Interval Events: FS at goal. Tube feeds at goal. No N/V or abdominal pain.     MEDICATIONS  (STANDING):  bacitracin   Ointment 1 Application(s) Topical two times a day  cloNIDine Patch 0.1 mG/24Hr(s) 1 patch Transdermal every 7 days  dextrose 5% + sodium chloride 0.45% with potassium chloride 20 mEq/L 1000 milliLiter(s) (75 mL/Hr) IV Continuous <Continuous>  dextrose 5%. 1000 milliLiter(s) (50 mL/Hr) IV Continuous <Continuous>  dextrose 5%. 1000 milliLiter(s) (100 mL/Hr) IV Continuous <Continuous>  dextrose 50% Injectable 12.5 Gram(s) IV Push once  dextrose 50% Injectable 25 Gram(s) IV Push once  dextrose 50% Injectable 25 Gram(s) IV Push once  dextrose Oral Gel 15 Gram(s) Oral once  dornase malik Solution 2.5 milliGRAM(s) Inhalation daily  enoxaparin Injectable 40 milliGRAM(s) SubCutaneous every 24 hours  insulin glargine Injectable (LANTUS) 10 Unit(s) SubCutaneous at bedtime  insulin lispro (ADMELOG) corrective regimen sliding scale   SubCutaneous <User Schedule>  levalbuterol Inhalation 0.63 milliGRAM(s) Inhalation every 6 hours  lidocaine   4% Patch 1 Patch Transdermal at bedtime  metoclopramide Injectable 10 milliGRAM(s) IV Push every 8 hours  piperacillin/tazobactam IVPB.. 3.375 Gram(s) IV Intermittent every 8 hours  sodium chloride 3%  Inhalation 4 milliLiter(s) Inhalation every 6 hours    MEDICATIONS  (PRN):  HYDROmorphone  Injectable 0.5 milliGRAM(s) IV Push every 4 hours PRN moderate to severe pain  naloxone Injectable 0.1 milliGRAM(s) IV Push every 3 minutes PRN For ANY of the following changes in patient status:  A. RR LESS THAN 10 breaths per minute, B. Oxygen saturation LESS THAN 90%, C. Sedation score of 6      Allergies  No Known Allergies    Review of Systems:  Constitutional: No fever/chills   Eyes: No blurry vision  Neuro: No tremors  HEENT: No pain  Cardiovascular: No chest pain, no palpitations  Respiratory: No SOB, no cough  GI: No nausea, vomiting or abdominal pain  : No dysuria  Endocrine: no polyuria, polydipsia    VITALS: T(C): 36.6 (08-08-24 @ 12:21)  T(F): 97.8 (08-08-24 @ 12:21), Max: 98.8 (08-08-24 @ 00:00)  HR: 96 (08-08-24 @ 12:21) (88 - 99)  BP: 136/66 (08-08-24 @ 12:21) (125/75 - 142/73)  RR:  (16 - 18)  SpO2:  (94% - 100%)        Physical Exam:   GENERAL: NAD, well-groomed, well-developed  EYES: No proptosis, no lid lag, anicteric  HEENT:  Atraumatic, Normocephalic, moist mucous membranes  RESPIRATORY: non labored breathing   GI: soft, +VENKATA drain, + jejunostomy tube  SKIN: Dry, intact, No rashes or lesions  MUSCULOSKELETAL: Full range of motion, normal strength  NEURO: extraocular movements intact, no tremor  PSYCH: Alert and oriented x 3, pleasant, normal affect, normal mood      CAPILLARY BLOOD GLUCOSE  POCT Blood Glucose.: 131 mg/dL (08 Aug 2024 11:53)  POCT Blood Glucose.: 174 mg/dL (08 Aug 2024 09:04)  POCT Blood Glucose.: 183 mg/dL (08 Aug 2024 00:48)  POCT Blood Glucose.: 74 mg/dL (07 Aug 2024 18:21)      08-08    133<L>  |  100  |  7   ----------------------------<  185<H>  4.2   |  25  |  0.70    eGFR: 105    Ca    7.3<L>      08-08  Mg     1.80     08-08  Phos  2.2     08-08        A1C with Estimated Average Glucose Result: 10.9 % (07-13-24 @ 02:44)  A1C with Estimated Average Glucose Result: 12.8 % (01-01-24 @ 06:00)

## 2024-08-08 NOTE — DIETITIAN NUTRITION RISK NOTIFICATION - TREATMENT: THE FOLLOWING DIET HAS BEEN RECOMMENDED
Diet, NPO with Tube Feed:   Tube Feeding Modality: Jejunostomy  Glucerna 1.5 Hood (GLUCERNA1.5RTH)  Total Volume for 24 Hours (mL): 960  Continuous  Starting Tube Feed Rate {mL per Hour}: 20  Increase Tube Feed Rate by (mL): 5     Every 4 hours  Until Goal Tube Feed Rate (mL per Hour): 40  Tube Feed Duration (in Hours): 24  Tube Feed Start Time: 18:00 (08-06-24 @ 16:11) [Active]

## 2024-08-08 NOTE — PROVIDER CONTACT NOTE (OTHER) - ACTION/TREATMENT ORDERED:
No further orders at this time.
Continue to monitor urine output and okay to give heparin injection
Chest xray and to notify if O2 goes below 90 %
Will tell team and assess pt.
Stat labs, EKG, xray

## 2024-08-08 NOTE — PROVIDER CONTACT NOTE (OTHER) - BACKGROUND
s/p [place] senait esophagectomy
Pt. has J-tube and tube feeds
Pt had a [place] Broderick Esophagectomy on 7/16. 7/29 found to have excessive drainage from LLQ lap site and purlulent drainage from R lavern drain to VENKATA bulb.
s/p [place] Broderick Esophagectomy.
Pt. S/P esophagectomy

## 2024-08-08 NOTE — PROGRESS NOTE ADULT - ASSESSMENT
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 POD #10 with post procedure course c/b high VENKATA drain output with studies showing elevated amylase although imaging with UGI without anastomotic leak. CTPA 7/28 with moderate right loculated hydropneumothorax with interval increase in size and small right apical PTX. [facility] consulted to further workup any underlying leak near anastomaosis.     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 CTPA 7/28 with moderate right loculated hydropneumothorax with interval increase in size and small right apical PTX. CTAP with PO contrast on 7/4 with note of extensive contrast and air extravasation into the right pleural space consistent with anastomotic leak. s/p EGD on 8/5 with esophago-gastric anastomotic leak with placement of one 20 mm by 10 cm  fully covered esophageal stent (Hanaro) was deployed across the anastomosis. Pt with initialyl dec VENKATA drain output which is now increased. Please obtain repeat barium esophogram to asses stent position and change in leak.     Recommendations:  -Please obtain repeat barium esophogram to asses stent position and change in leak.   -Please keep NPO, ok to resume J tube feeds  -Monitor VENKATA drain output.  -Abx/anti-fungals per primary team   -Recommend repeat EGD for stent removal/reassessment in 4-6 weeks pending clinical progress.                                                                                   All recommendations are tentative until note is attested by attending.    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 POD #10 with post procedure course c/b high VENKATA drain output with studies showing elevated amylase although imaging with UGI without anastomotic leak. CTPA 7/28 with moderate right loculated hydropneumothorax with interval increase in size and small right apical PTX. [facility] consulted to further workup any underlying leak near anastomaosis.     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 CTPA 7/28 with moderate right loculated hydropneumothorax with interval increase in size and small right apical PTX. CTAP with PO contrast on 7/4 with note of extensive contrast and air extravasation into the right pleural space consistent with anastomotic leak. s/p EGD on 8/5 with esophago-gastric anastomotic leak with placement of one 20 mm by 10 cm  fully covered esophageal stent (Hanaro) was deployed across the anastomosis. Pt with initialyl dec VENKATA drain output which is now increased. Please obtain repeat barium esophogram to asses stent position and change in leak.     Recommendations:  -Please obtain repeat esophogram to asses stent position and change in leak.   -Please keep NPO, ok to resume J tube feeds  -Monitor VENKATA drain output.  -Abx/anti-fungals per primary team   -Recommend repeat EGD for stent removal/reassessment in 4-6 weeks pending clinical progress.                                                                                   All recommendations are tentative until note is attested by attending.

## 2024-08-08 NOTE — DIETITIAN NUTRITION RISK NOTIFICATION - MALNUTRITION EVALUATION AS DEMONSTRATED BY (ADULTS > 20 YEARS OF AGE)
Inadequate energy intake.../Loss of subcutaneous fat.../Loss of muscle...
Weight loss.../Loss of subcutaneous fat.../Loss of muscle...

## 2024-08-08 NOTE — PROGRESS NOTE ADULT - ASSESSMENT
· Assessment	  59 y.o. man with history of GEJ adenocarcinoma (T3N0, stage 2A) s/p neoadjuvant chemotherapy, now s/p Michael-Broderick Esophagectomy on 7/16.     Sx f/up appreciated  OOB  Pain control Oxycodone  NPO  Eso leak: J tube placement in IR 8/2,  tolerating J tube feeds w Glucerna/S/p EGD and stent  J tube feeding  GI f/up appreciated. Repeat barium esophogram to asses stent position and change in leak.       DM II:    FSSS  Lantus insulin  Endo f/up appreciated    HTN:    On IV Metoprolol/Hydralazine  Cardio f/up appreciated

## 2024-08-08 NOTE — PROGRESS NOTE ADULT - SUBJECTIVE AND OBJECTIVE BOX
Gastroenterology Progress Note    Interval Events:   VENKATA drain output intitially dec to 300cc/24 hours although now incresad to600 cc  Pt with mild pain near drain site with no ongoing nuasea, vomiting or abd pain     Allergies:  No Known Allergies      Hospital Medications:  bacitracin   Ointment 1 Application(s) Topical two times a day  cloNIDine Patch 0.1 mG/24Hr(s) 1 patch Transdermal every 7 days  dextrose 5% + sodium chloride 0.45% with potassium chloride 20 mEq/L 1000 milliLiter(s) IV Continuous <Continuous>  dextrose 5%. 1000 milliLiter(s) IV Continuous <Continuous>  dextrose 5%. 1000 milliLiter(s) IV Continuous <Continuous>  dextrose 50% Injectable 25 Gram(s) IV Push once  dextrose 50% Injectable 12.5 Gram(s) IV Push once  dextrose 50% Injectable 25 Gram(s) IV Push once  dextrose Oral Gel 15 Gram(s) Oral once  dornase malik Solution 2.5 milliGRAM(s) Inhalation daily  enoxaparin Injectable 40 milliGRAM(s) SubCutaneous every 24 hours  HYDROmorphone  Injectable 0.5 milliGRAM(s) IV Push every 4 hours PRN  insulin lispro (ADMELOG) corrective regimen sliding scale   SubCutaneous every 6 hours  insulin NPH human recombinant 5 Unit(s) SubCutaneous <User Schedule>  levalbuterol Inhalation 0.63 milliGRAM(s) Inhalation every 6 hours  lidocaine   4% Patch 1 Patch Transdermal at bedtime  metoclopramide Injectable 10 milliGRAM(s) IV Push every 8 hours  naloxone Injectable 0.1 milliGRAM(s) IV Push every 3 minutes PRN  piperacillin/tazobactam IVPB.. 3.375 Gram(s) IV Intermittent every 8 hours  sodium chloride 3%  Inhalation 4 milliLiter(s) Inhalation every 6 hours      ROS: 14 point ROS negative unless otherwise state in subjective    PHYSICAL EXAM:   Vital Signs:  Vital Signs Last 24 Hrs  T(C): 36.6 (08 Aug 2024 12:21), Max: 37.1 (08 Aug 2024 00:00)  T(F): 97.8 (08 Aug 2024 12:21), Max: 98.8 (08 Aug 2024 00:00)  HR: 96 (08 Aug 2024 12:21) (88 - 99)  BP: 136/66 (08 Aug 2024 12:21) (125/75 - 142/73)  BP(mean): --  RR: 18 (08 Aug 2024 12:21) (16 - 18)  SpO2: 99% (08 Aug 2024 12:21) (94% - 100%)    Parameters below as of 08 Aug 2024 12:21  Patient On (Oxygen Delivery Method): room air      Daily     Daily     GENERAL:  No acute distress  HEENT:  NCAT, no scleral icterus  CHEST: no resp distress  HEART:  RRR  ABDOMEN:  Soft, non-tender, non-distended, normoactive bowel sounds. J tube in place. VENKATA drain in place  NEURO:  Alert and oriented x 3    LABS:                        8.3    4.97  )-----------( 240      ( 08 Aug 2024 07:00 )             25.4     Mean Cell Volume: 89.1 fL (08-08-24 @ 07:00)    08-08    133<L>  |  100  |  7   ----------------------------<  185<H>  4.2   |  25  |  0.70    Ca    7.3<L>      08 Aug 2024 07:00  Phos  2.2     08-08  Mg     1.80     08-08          Urinalysis Basic - ( 08 Aug 2024 07:00 )    Color: x / Appearance: x / SG: x / pH: x  Gluc: 185 mg/dL / Ketone: x  / Bili: x / Urobili: x   Blood: x / Protein: x / Nitrite: x   Leuk Esterase: x / RBC: x / WBC x   Sq Epi: x / Non Sq Epi: x / Bacteria: x      Imaging:  < from: Upper Endoscopy (08.05.24 @ 17:35) >       An esophago-gastric anastomosis was found at 26 cm from the incisors.        There were areas clean based ulceration with one visible defect        (measured approximately 6 mm) at the anastomosis consistent with known        leak. To cover the defect and facilitate healing, one 20 mm by 10 cm        fully covered esophageal stent (Mauri) was deployed across the        anastomosis. The stent was in good position traversing the leak with the        proximal end at 20 cm from the incisors. The proximal end of the stent        was then secured in place using one endoscopic suture.       The entire examined stomach was normal.       The examined portions of duodenum were normal.                                                                                   Impression:          - Esophago-gastric anastomotic leak as above. Esophageal                        stent placed.  Recommendation:      - [facility] patient to [facility] for [facility] care.                       - Monitor VENKATA drain output.                       - Recommend repeat EGD for stent removal/reassessment in                      4-6 weeks pending clinical progress.

## 2024-08-08 NOTE — PROGRESS NOTE ADULT - SUBJECTIVE AND OBJECTIVE BOX
DATE OF SERVICE: 08-08-24    Patient denies chest pain or shortness of breath.   Review of symptoms otherwise negative.    MEDICATIONS:  bacitracin   Ointment 1 Application(s) Topical two times a day  cloNIDine Patch 0.1 mG/24Hr(s) 1 patch Transdermal every 7 days  dextrose 5% + sodium chloride 0.45% with potassium chloride 20 mEq/L 1000 milliLiter(s) IV Continuous <Continuous>  dextrose 5%. 1000 milliLiter(s) IV Continuous <Continuous>  dextrose 5%. 1000 milliLiter(s) IV Continuous <Continuous>  dextrose 50% Injectable 12.5 Gram(s) IV Push once  dextrose 50% Injectable 25 Gram(s) IV Push once  dextrose 50% Injectable 25 Gram(s) IV Push once  dextrose Oral Gel 15 Gram(s) Oral once  dornase malik Solution 2.5 milliGRAM(s) Inhalation daily  enoxaparin Injectable 40 milliGRAM(s) SubCutaneous every 24 hours  HYDROmorphone  Injectable 0.5 milliGRAM(s) IV Push every 4 hours PRN  insulin lispro (ADMELOG) corrective regimen sliding scale   SubCutaneous every 6 hours  insulin NPH human recombinant 5 Unit(s) SubCutaneous <User Schedule>  levalbuterol Inhalation 0.63 milliGRAM(s) Inhalation every 6 hours  lidocaine   4% Patch 1 Patch Transdermal at bedtime  metoclopramide Injectable 10 milliGRAM(s) IV Push every 8 hours  naloxone Injectable 0.1 milliGRAM(s) IV Push every 3 minutes PRN  piperacillin/tazobactam IVPB.. 3.375 Gram(s) IV Intermittent every 8 hours  sodium chloride 3%  Inhalation 4 milliLiter(s) Inhalation every 6 hours      LABS:                        8.3    4.97  )-----------( 240      ( 08 Aug 2024 07:00 )             25.4       Hemoglobin: 8.3 g/dL (08-08 @ 07:00)  Hemoglobin: 8.5 g/dL (08-07 @ 06:52)  Hemoglobin: 8.7 g/dL (08-06 @ 06:51)  Hemoglobin: 8.3 g/dL (08-05 @ 06:39)  Hemoglobin: 8.4 g/dL (08-04 @ 06:20)      08-08    133<L>  |  100  |  7   ----------------------------<  185<H>  4.2   |  25  |  0.70    Ca    7.3<L>      08 Aug 2024 07:00  Phos  2.2     08-08  Mg     1.80     08-08      Creatinine Trend: 0.70<--, 0.71<--, 0.66<--, 0.73<--, 0.71<--, 0.77<--    PHYSICAL EXAM:  T(C): 36.7 (08-08-24 @ 04:00), Max: 37.1 (08-08-24 @ 00:00)  HR: 99 (08-08-24 @ 04:00) (92 - 99)  BP: 142/73 (08-08-24 @ 04:00) (123/62 - 142/73)  RR: 18 (08-08-24 @ 04:00) (17 - 18)  SpO2: 99% (08-08-24 @ 04:00) (96% - 100%)  Wt(kg): --    I&O's Summary    07 Aug 2024 07:01  -  08 Aug 2024 07:00  --------------------------------------------------------  IN: 1230 mL / OUT: 1210 mL / NET: 20 mL      Gen: NAD  HEENT:  (-)icterus (-)pallor  CV: N S1 S2 1/6 CATHY (+)2 Pulses B/l  Resp:  Clear to auscultation B/L, normal effort  GI: (+) BS Soft, NT, ND  Lymph:  (-)Edema, (-)obvious lymphadenopathy  Skin: Warm to touch, Normal turgor  Psych: Appropriate mood and affect      TELEMETRY: 	 SR/ST    ECG:  	NSR      TTE 07/2024  Findings:  1. Normal left ventricular size and function.  Mild diastolic dysfunction.  2. Normal left atrial size  3. Right atrial cavity is normal in size.  4. Normal right ventricular size and function.  5. Normal trileaflet aortic valve opening.  6. Normal mitral valve opening.  7. Normal appearing tricuspid valve with mild tricuspid  regurgitation.  8. Pulmonic valve is grossly normal, yet poorly visualized  with no doppler evidence for pulmonic stenosis.  9. No evidence of significant pericardial effusion.  10. The aortic root is normal.  11. Normal pulmonary artery.  12. IVC is normal with respiratory variation.  FULL STUDY DONE INCLUDING M-MODE RECORDING,  SPECTRAL DOPPLER AND    Stress 07/2024  Normal myocardial perfusion SPECT images No evidence of stress induced ischemia or infarction.  Normal left ventricular size and function.  Calculated EF is: 68%    CT  IMPRESSION:  Small loculated right pleural effusion with foci of air and pleural catheter. Small left pleural effusion.      ASSESSMENT/PLAN: 	Pt is a 59 y.o. man with history of HTN,, DLD, DM,  GEJ adenocarcinoma (T3N0, stage 2A) s/p neoadjuvant chemotherapy admitted for optimization prior to planned Michael-Broderick Esophagectomy on tuesday. Recently had a nuclear stress test in our office for evaluation of preoperative cardiac risk assessment prior to esophageal cancer surgery scheduled on 07/16/2024. The patient denies any chest pain, shortness ofbreath, or anginal symptoms. He has no palpitations, dizziness, or syncope    s/p [place]-Broderick Esophagectomy on 7/16.   s/p J tube placement     Pre-OP/HTN  - oral BP meds on hold diet now NPO ( Losartan, HCTZ, Norvasc and Metoprolol Held)  - not in clinical CHF, if BP consistently above 180 systolic can give  Hydralazine IV PRN  - EKG from office with no q waves, no chest pain,  euvolemic on exam and no severely stenotic valvular disease on recent TTE  - s/p EGD 8/5 with esophagogastric anastomotic leak s/p stent placement   - unable to crush meds so options are limited to IV meds at this time, suggest starting clonidine patch at lowest dose      F/U with Dr Shah (cardiologist) after DC as scheduled, [phone]     Mart Mason MD  Pager: [phone]

## 2024-08-08 NOTE — PROGRESS NOTE ADULT - SUBJECTIVE AND OBJECTIVE BOX
Pt resting in bed, no acute complaints. Upset that he is not going home. Tolerating tube feeds.      Vital Signs:  Vital Signs Last 24 Hrs  T(C): 36.6 (08-08-24 @ 12:21), Max: 37.1 (08-08-24 @ 00:00)  T(F): 97.8 (08-08-24 @ 12:21), Max: 98.8 (08-08-24 @ 00:00)  HR: 96 (08-08-24 @ 12:21) (88 - 99)  BP: 136/66 (08-08-24 @ 12:21) (125/75 - 142/73)  RR: 18 (08-08-24 @ 12:21) (16 - 18)  SpO2: 99% (08-08-24 @ 12:21) (94% - 100%) on (O2)    Telemetry/Alarms:    Relevant labs, radiology and Medications reviewed                        8.3    4.97  )-----------( 240      ( 08 Aug 2024 07:00 )             25.4     08-08    133<L>  |  100  |  7   ----------------------------<  185<H>  4.2   |  25  |  0.70    Ca    7.3<L>      08 Aug 2024 07:00  Phos  2.2     08-08  Mg     1.80     08-08        MEDICATIONS  (STANDING):  bacitracin   Ointment 1 Application(s) Topical two times a day  cloNIDine Patch 0.1 mG/24Hr(s) 1 patch Transdermal every 7 days  dextrose 5% + sodium chloride 0.45% with potassium chloride 20 mEq/L 1000 milliLiter(s) (75 mL/Hr) IV Continuous <Continuous>  dextrose 5%. 1000 milliLiter(s) (50 mL/Hr) IV Continuous <Continuous>  dextrose 5%. 1000 milliLiter(s) (100 mL/Hr) IV Continuous <Continuous>  dextrose 50% Injectable 12.5 Gram(s) IV Push once  dextrose 50% Injectable 25 Gram(s) IV Push once  dextrose 50% Injectable 25 Gram(s) IV Push once  dextrose Oral Gel 15 Gram(s) Oral once  dornase malik Solution 2.5 milliGRAM(s) Inhalation daily  enoxaparin Injectable 40 milliGRAM(s) SubCutaneous every 24 hours  insulin lispro (ADMELOG) corrective regimen sliding scale   SubCutaneous every 6 hours  insulin NPH human recombinant 5 Unit(s) SubCutaneous <User Schedule>  levalbuterol Inhalation 0.63 milliGRAM(s) Inhalation every 6 hours  lidocaine   4% Patch 1 Patch Transdermal at bedtime  metoclopramide Injectable 10 milliGRAM(s) IV Push every 8 hours  piperacillin/tazobactam IVPB.. 3.375 Gram(s) IV Intermittent every 8 hours  sodium chloride 3%  Inhalation 4 milliLiter(s) Inhalation every 6 hours    MEDICATIONS  (PRN):  HYDROmorphone  Injectable 0.5 milliGRAM(s) IV Push every 4 hours PRN moderate to severe pain  naloxone Injectable 0.1 milliGRAM(s) IV Push every 3 minutes PRN For ANY of the following changes in patient status:  A. RR LESS THAN 10 breaths per minute, B. Oxygen saturation LESS THAN 90%, C. Sedation score of 6      Social:  Denies Smoking, Drinking illict drug use    Physical exam  General: WD NAD  Neurology: A&Ox3, nonfocal, ALFORD x 4  Eyes: PERRLA/ EOMI, Gross vision intact  ENT/Neck: Neck supple, trachea midline, No JVD, Gross hearing intact  Respiratory: Dec'd BS to Rt base  CV: RRR, S1S2, no murmurs, rubs or gallops  Abdominal: Soft, NT, ND +BS, no BM overnight. NPO. ON TF  Extremities: No edema, + peripheral pulses  Skin: No Rashes, Hematoma, Ecchymosis  Incisions: Rt Chest and abd incisions c/d/i.   Tubes: rt Lavern - 600cc/24hrs to bulb.   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 Pt s/p barium swallow and started on CLD. Postop CT CAP reviewed w Dr Laureano. Pt is now on Zosyn and advanced to soft diet. Sinus tachycardia since last night. Now on nasal cannula.  7/29: Pt with now improving leukocytosis and s/op CTA r/o PE for tachycardia over the weekend. Imaging reviewed by Dr Laureano, no acute findings and able to advance to soft diet as previously mentioned. Patient tolerating PO diet well. Patient without fevers and with drain in place on IV abx.   7/30-7/31-CT chest completed, no contrast extravasation. Pt resumed CLD and advanced to FLD today.  8/1-Today noted to have continued high drain output. Inc in Leukocytosis. Pt with generalized 3rd spacing. Fluid specimens sent, cont Zosyn.   8/2 Drain still w/ high output. Went for abd CT, plan for J tube placement. GI onboard to help eval for leak / intervene in future if necessary  8/3- J tube placed last night. Will start tickle infusion of NS later. Nutritional consult ordered. Lavern still w high output, probable CT scan of c/a/p tomorrow. Possible stent w/ GI next week.   8/4: CT chest obtained - Extravasation of contrast into right chest. Plan for GI to do EGD.   8/5- EGD and Esoph stent placement done by GI. Nutritional consult done. TF restarted. Scripts given to CM. Teaching ordered  8/7 Working on TF via medicaid. Bulb to stay after discharge  8/8: Very high Lavern output. DC on hold, GI consulted. BS ordered    PLAN  Neuro: Pain management  Pulm: Encourage coughing, deep breathing and use of incentive spirometry.  Daily CXR.   Cardio: Monitor telemetry/alarms. Cont IVP BB  GI: NPO. Cont IVF. . + esoph leak. S/p Stent w GI. TF titrate to goal  Renal: monitor urine output, supplement electrolytes as needed  Vasc: Heparin SC/SCDs for DVT prophylaxis  Heme: Stable H/H.   ID: Cont zosyn pending dc reccs from ID   Therapy: OOB/ambulate  Tubes: Monitor Rt lavern output, keep to bulb, will discharge with bulb   Disposition: Aim to D/C to home once surgically stable after TF sent  Discussed with Cardiothoracic Team at AM rounds.

## 2024-08-08 NOTE — PROGRESS NOTE ADULT - SUBJECTIVE AND OBJECTIVE BOX
Patient is a 59y old  Male who presents with a chief complaint of Esophagectomy (08 Aug 2024 13:30)      SUBJECTIVE / OVERNIGHT EVENTS:    Events noted.  CONSTITUTIONAL: No fever,  or fatigue  RESPIRATORY: No cough, wheezing,  No shortness of breath  CARDIOVASCULAR: No chest pain, palpitations, dizziness, or leg swelling  GASTROINTESTINAL: No abdominal or epigastric pain.       MEDICATIONS  (STANDING):  bacitracin   Ointment 1 Application(s) Topical two times a day  cloNIDine Patch 0.1 mG/24Hr(s) 1 patch Transdermal every 7 days  dextrose 5% + sodium chloride 0.45% with potassium chloride 20 mEq/L 1000 milliLiter(s) (75 mL/Hr) IV Continuous <Continuous>  dextrose 5%. 1000 milliLiter(s) (50 mL/Hr) IV Continuous <Continuous>  dextrose 5%. 1000 milliLiter(s) (100 mL/Hr) IV Continuous <Continuous>  dextrose 50% Injectable 12.5 Gram(s) IV Push once  dextrose 50% Injectable 25 Gram(s) IV Push once  dextrose 50% Injectable 25 Gram(s) IV Push once  dextrose Oral Gel 15 Gram(s) Oral once  dornase malik Solution 2.5 milliGRAM(s) Inhalation daily  enoxaparin Injectable 40 milliGRAM(s) SubCutaneous every 24 hours  insulin lispro (ADMELOG) corrective regimen sliding scale   SubCutaneous every 6 hours  insulin NPH human recombinant 5 Unit(s) SubCutaneous <User Schedule>  levalbuterol Inhalation 0.63 milliGRAM(s) Inhalation every 6 hours  lidocaine   4% Patch 1 Patch Transdermal at bedtime  metoclopramide Injectable 10 milliGRAM(s) IV Push every 8 hours  piperacillin/tazobactam IVPB.. 3.375 Gram(s) IV Intermittent every 8 hours  sodium chloride 3%  Inhalation 4 milliLiter(s) Inhalation every 6 hours    MEDICATIONS  (PRN):  HYDROmorphone  Injectable 0.5 milliGRAM(s) IV Push every 4 hours PRN moderate to severe pain  naloxone Injectable 0.1 milliGRAM(s) IV Push every 3 minutes PRN For ANY of the following changes in patient status:  A. RR LESS THAN 10 breaths per minute, B. Oxygen saturation LESS THAN 90%, C. Sedation score of 6        CAPILLARY BLOOD GLUCOSE      POCT Blood Glucose.: 131 mg/dL (08 Aug 2024 11:53)  POCT Blood Glucose.: 174 mg/dL (08 Aug 2024 09:04)  POCT Blood Glucose.: 183 mg/dL (08 Aug 2024 00:48)  POCT Blood Glucose.: 74 mg/dL (07 Aug 2024 18:21)    I&O's Summary    07 Aug 2024 07:01  -  08 Aug 2024 07:00  --------------------------------------------------------  IN: 1230 mL / OUT: 1210 mL / NET: 20 mL    08 Aug 2024 07:01  -  08 Aug 2024 13:41  --------------------------------------------------------  IN: 0 mL / OUT: 460 mL / NET: -460 mL        T(C): 36.6 (08-08-24 @ 12:21), Max: 37.1 (08-08-24 @ 00:00)  HR: 96 (08-08-24 @ 12:21) (88 - 99)  BP: 136/66 (08-08-24 @ 12:21) (125/75 - 142/73)  RR: 18 (08-08-24 @ 12:21) (16 - 18)  SpO2: 99% (08-08-24 @ 12:21) (94% - 100%)    PHYSICAL EXAM:    NECK: Supple, No JVD  CHEST/LUNG: Clear to auscultation bilaterally; No wheezing.  HEART: Regular rate and rhythm; No murmurs, rubs, or gallops  ABDOMEN: Soft, Nontender, Nondistended; Bowel sounds present  EXTREMITIES:   No edema  NEUROLOGY: AAO X 3      LABS:                        8.3    4.97  )-----------( 240      ( 08 Aug 2024 07:00 )             25.4     08-08    133<L>  |  100  |  7   ----------------------------<  185<H>  4.2   |  25  |  0.70    Ca    7.3<L>      08 Aug 2024 07:00  Phos  2.2     08-08  Mg     1.80     08-08            Urinalysis Basic - ( 08 Aug 2024 07:00 )    Color: x / Appearance: x / SG: x / pH: x  Gluc: 185 mg/dL / Ketone: x  / Bili: x / Urobili: x   Blood: x / Protein: x / Nitrite: x   Leuk Esterase: x / RBC: x / WBC x   Sq Epi: x / Non Sq Epi: x / Bacteria: x      CAPILLARY BLOOD GLUCOSE      POCT Blood Glucose.: 131 mg/dL (08 Aug 2024 11:53)  POCT Blood Glucose.: 174 mg/dL (08 Aug 2024 09:04)  POCT Blood Glucose.: 183 mg/dL (08 Aug 2024 00:48)  POCT Blood Glucose.: 74 mg/dL (07 Aug 2024 18:21)        RADIOLOGY & ADDITIONAL TESTS:    Imaging Personally Reviewed:    Consultant(s) Notes Reviewed:      Care Discussed with Consultants/Other Providers:    Anish Goodrich MD, CMD, FACP    [address] [place] [address]  Office Tel: [phone]  Cell: [phone]

## 2024-08-09 VITALS — OXYGEN SATURATION: 100 %

## 2024-08-09 LAB
ANION GAP SERPL CALC-SCNC: 9 MMOL/L — SIGNIFICANT CHANGE UP (ref 7–14)
BUN SERPL-MCNC: 6 MG/DL — LOW (ref 7–23)
CALCIUM SERPL-MCNC: 7.6 MG/DL — LOW (ref 8.4–10.5)
CHLORIDE SERPL-SCNC: 99 MMOL/L — SIGNIFICANT CHANGE UP (ref 98–107)
CO2 SERPL-SCNC: 24 MMOL/L — SIGNIFICANT CHANGE UP (ref 22–31)
CREAT SERPL-MCNC: 0.66 MG/DL — SIGNIFICANT CHANGE UP (ref 0.5–1.3)
EGFR: 107 ML/MIN/1.73M2 — SIGNIFICANT CHANGE UP
GLUCOSE BLDC GLUCOMTR-MCNC: 126 MG/DL — HIGH (ref 70–99)
GLUCOSE BLDC GLUCOMTR-MCNC: 178 MG/DL — HIGH (ref 70–99)
GLUCOSE SERPL-MCNC: 160 MG/DL — HIGH (ref 70–99)
HCT VFR BLD CALC: 25 % — LOW (ref 39–50)
HGB BLD-MCNC: 8.2 G/DL — LOW (ref 13–17)
MCHC RBC-ENTMCNC: 29.2 PG — SIGNIFICANT CHANGE UP (ref 27–34)
MCHC RBC-ENTMCNC: 32.8 GM/DL — SIGNIFICANT CHANGE UP (ref 32–36)
MCV RBC AUTO: 89 FL — SIGNIFICANT CHANGE UP (ref 80–100)
NRBC # BLD: 0 /100 WBCS — SIGNIFICANT CHANGE UP (ref 0–0)
NRBC # FLD: 0 K/UL — SIGNIFICANT CHANGE UP (ref 0–0)
PLATELET # BLD AUTO: 227 K/UL — SIGNIFICANT CHANGE UP (ref 150–400)
POTASSIUM SERPL-MCNC: 4 MMOL/L — SIGNIFICANT CHANGE UP (ref 3.5–5.3)
POTASSIUM SERPL-SCNC: 4 MMOL/L — SIGNIFICANT CHANGE UP (ref 3.5–5.3)
RBC # BLD: 2.81 M/UL — LOW (ref 4.2–5.8)
RBC # FLD: 14.1 % — SIGNIFICANT CHANGE UP (ref 10.3–14.5)
SODIUM SERPL-SCNC: 132 MMOL/L — LOW (ref 135–145)
WBC # BLD: 3.37 K/UL — LOW (ref 3.8–10.5)
WBC # FLD AUTO: 3.37 K/UL — LOW (ref 3.8–10.5)

## 2024-08-09 PROCEDURE — 99232 SBSQ HOSP IP/OBS MODERATE 35: CPT | Mod: GC

## 2024-08-09 PROCEDURE — 99232 SBSQ HOSP IP/OBS MODERATE 35: CPT

## 2024-08-09 PROCEDURE — 71045 X-RAY EXAM CHEST 1 VIEW: CPT | Mod: 26

## 2024-08-09 RX ADMIN — SODIUM CHLORIDE AND POTASSIUM CHLORIDE 75 MILLILITER(S): 150; 450 INJECTION, SOLUTION INTRAVENOUS at 07:37

## 2024-08-09 RX ADMIN — LEVALBUTEROL HYDROCHLORIDE 0.63 MILLIGRAM(S): 0.31 SOLUTION RESPIRATORY (INHALATION) at 09:23

## 2024-08-09 RX ADMIN — Medication 1 APPLICATION(S): at 05:44

## 2024-08-09 RX ADMIN — ENOXAPARIN SODIUM 40 MILLIGRAM(S): 120 INJECTION SUBCUTANEOUS at 12:29

## 2024-08-09 RX ADMIN — DORNASE ALFA 2.5 MILLIGRAM(S): 1 SOLUTION RESPIRATORY (INHALATION) at 09:23

## 2024-08-09 RX ADMIN — CLONIDINE 1 PATCH: 500 INJECTION, SOLUTION EPIDURAL at 06:48

## 2024-08-09 RX ADMIN — PIPERACILLIN SODIUM, TAZOBACTAM SODIUM 25 GRAM(S): 3; .375 INJECTION, POWDER, LYOPHILIZED, FOR SOLUTION INTRAVENOUS at 05:44

## 2024-08-09 RX ADMIN — Medication 10 MILLIGRAM(S): at 05:45

## 2024-08-09 RX ADMIN — Medication 1: at 08:23

## 2024-08-09 RX ADMIN — Medication 4 MILLILITER(S): at 09:23

## 2024-08-09 NOTE — PROGRESS NOTE ADULT - PROBLEM SELECTOR PROBLEM 4
Hypocalcemia

## 2024-08-09 NOTE — PROGRESS NOTE ADULT - ASSESSMENT
The patient is a 59y Male with PMH of T2DM, HTN, HLD, GE junction cancer here for esophagectomy now postop, prolonged course.  Endocrinology consulted for T2DM.     Uncontrolled Type 2 Diabetes Mellitus   - Follows with: Dr. Nolasco  - A1C with Estimated Average Glucose Result: 10.9 % (07-13-24)  - home regimen: Semglee 18 units, Novolog 6 units with meals    Inpatient plan   - FS goal 100-180 mg/dl: stable today  - s/p jejunostomy tube by IR on 08/02  - Glucerna 1.5 noreen 60ml/hr at goal rate   - per primary team, at home, patient will be on tube feeds from 8 pm to 12 noon the next day (16 hours)   - continue Lantus 10 units at bedtime   - continue LOW Admelog correction scale to 8am, 2pm, 8pm (waking hours)   - hypoglycemia protocol PRN   - Dc dextrose IVF       Discharge plan   - plan to discharge on tube feeds from 8 pm to 12 noon the next day (16 hours)   - can discharge on Semglee 10 units sq q 8pm + Novolog Low Admelog correction scale 8am, 2pm, 8pm  1 Unit(s) if Glucose 151 - 200  2 Unit(s) if Glucose 201 - 250  3 Unit(s) if Glucose 251 - 300  4 Unit(s) if Glucose 301 - 350  5 Unit(s) if Glucose 351 - 400  6 Unit(s) if Glucose Greater Than 400  - Please ensure patient has a working glucometer and supplies - test strips, lancets, alcohol pads and insulin pen needles.   - Please call your doctor if you fs is 70 or below and or 250 and above   - Reviewed importance of medication adherence,  glucose monitoring, and following a consistent carb diet   - Reviewed Hypoglycemia and intervention   - Please follow up with your pcp, podiatry, endocrinology, and opthalmology as an outpt   - Patient has an appointment with Dr. Nolasco scheduled for August 8th at 8:20 AM [place] [address] [facility] [place] [address]. Email sent to Dr Nolasco to reschedule appointment on 08/07.       Hypertension  - Goal BP <130/80  - continue clonidine patch  - Management as per primary team  - check urine microalbumin level as outpatient    Hyperlipidemia  - LDL goal <70  - check lipid panel as outpatient on a yearly basis  - management per primary team     Hypercalcemia  calcium 7.4  albumin 2.1  corrected calcium 8.8  vitamin 25OHD - 6.5 severely low.   Please start Vit D3 125mcg/ml (5,000 intl units/ml) oral liquid 1ml via Jtube daily   please repeat vit D, 25 OH in 6-8 weeks as an outpt     d/w Dr. Neumann   [facility] [id number]    Jojo Watson  Nurse Practitioner  Division of Endocrinology & Diabetes  In house pager #[phone]    If before 9AM or after 6PM, or on weekends/holidays, please call endocrine answering service for assistance ([phone]).For nonurgent matters email [email or web address] for assistance.

## 2024-08-09 NOTE — PROGRESS NOTE ADULT - PROVIDER SPECIALTY LIST ADULT
Anesthesia
CT Surgery
Cardiology
Critical Care
Endocrinology
Gastroenterology
Internal Medicine
Surgery
Thoracic Surgery
Anesthesia
CT Surgery
Cardiology
Critical Care
Critical Care
Endocrinology
Endocrinology
Gastroenterology
Infectious Disease
Infectious Disease
Internal Medicine
Intervent Radiology
Pain Medicine
Pain Medicine
Surgery
Surgery
Thoracic Surgery
Cardiology
Critical Care
Gastroenterology
Infectious Disease
Internal Medicine
Pain Medicine
Surgery
Thoracic Surgery
Critical Care
Critical Care
Endocrinology
Infectious Disease
Internal Medicine
Thoracic Surgery
Thoracic Surgery
Endocrinology
Endocrinology
Internal Medicine
Internal Medicine
Surgery
Endocrinology

## 2024-08-09 NOTE — PROGRESS NOTE ADULT - SUBJECTIVE AND OBJECTIVE BOX
Surgery Progress Note:    OVERNIGHT EVENTS: NAEO    SUBJECTIVE: Pt seen and examined at bedside. Patient comfortable and in no-apparent distress. Pain is controlled. Tolerating j tube feeds. Denies nausea/vomiting.    MEDICATIONS  (STANDING):  bacitracin   Ointment 1 Application(s) Topical two times a day  cloNIDine Patch 0.1 mG/24Hr(s) 1 patch Transdermal every 7 days  dextrose 5% + sodium chloride 0.45% with potassium chloride 20 mEq/L 1000 milliLiter(s) (75 mL/Hr) IV Continuous <Continuous>  dextrose 5%. 1000 milliLiter(s) (50 mL/Hr) IV Continuous <Continuous>  dextrose 5%. 1000 milliLiter(s) (100 mL/Hr) IV Continuous <Continuous>  dextrose 50% Injectable 25 Gram(s) IV Push once  dextrose 50% Injectable 12.5 Gram(s) IV Push once  dextrose 50% Injectable 25 Gram(s) IV Push once  dextrose Oral Gel 15 Gram(s) Oral once  dornase malik Solution 2.5 milliGRAM(s) Inhalation daily  enoxaparin Injectable 40 milliGRAM(s) SubCutaneous every 24 hours  insulin glargine Injectable (LANTUS) 10 Unit(s) SubCutaneous at bedtime  insulin lispro (ADMELOG) corrective regimen sliding scale   SubCutaneous <User Schedule>  levalbuterol Inhalation 0.63 milliGRAM(s) Inhalation every 6 hours  lidocaine   4% Patch 1 Patch Transdermal at bedtime  metoclopramide Injectable 10 milliGRAM(s) IV Push every 8 hours  piperacillin/tazobactam IVPB.. 3.375 Gram(s) IV Intermittent every 8 hours  sodium chloride 3%  Inhalation 4 milliLiter(s) Inhalation every 6 hours    MEDICATIONS  (PRN):  naloxone Injectable 0.1 milliGRAM(s) IV Push every 3 minutes PRN For ANY of the following changes in patient status:  A. RR LESS THAN 10 breaths per minute, B. Oxygen saturation LESS THAN 90%, C. Sedation score of 6    T(C): 36.8 (08-09-24 @ 04:00), Max: 36.8 (08-08-24 @ 20:00)  HR: 98 (08-09-24 @ 04:00) (82 - 104)  BP: 136/74 (08-09-24 @ 04:00) (132/63 - 147/84)  RR: 17 (08-09-24 @ 04:00) (16 - 18)  SpO2: 98% (08-09-24 @ 04:00) (94% - 100%)    08-08-24 @ 07:01  -  08-09-24 @ 07:00  --------------------------------------------------------  IN: 1140 mL / OUT: 1730 mL / NET: -590 mL      LABS:                        8.3    4.97  )-----------( 240      ( 08 Aug 2024 07:00 )             25.4     08-08    133<L>  |  100  |  7   ----------------------------<  185<H>  4.2   |  25  |  0.70    Ca    7.3<L>      08 Aug 2024 07:00  Phos  2.2     08-08  Mg     1.80     08-08        Urinalysis Basic - ( 08 Aug 2024 07:00 )    Color: x / Appearance: x / SG: x / pH: x  Gluc: 185 mg/dL / Ketone: x  / Bili: x / Urobili: x   Blood: x / Protein: x / Nitrite: x   Leuk Esterase: x / RBC: x / WBC x   Sq Epi: x / Non Sq Epi: x / Bacteria: x      PE:  Gen: NAD  CV: Pulse regular present  Resp: Nonlabored  Chest: R VENKATA drain outputing milky fluid  Abdomen: Soft, nontender, nondistended, J tube in place

## 2024-08-09 NOTE — PROGRESS NOTE ADULT - ATTENDING COMMENTS
Agree with above  s/p stent placement
Agree with above
Agree with above  Repeat esophagram to assess for leak given increased output.
The patient is a 59y Male with PMH of T2DM, HTN, HLD, GE junction cancer here for esophagectomy.  Endocrinology consulted for T2DM  icu goal glucose 140-180  would be cautious with insulin dosing as pt is NPO, suspect pt may even need dextrose IVF in near future with prolonged NPO  would only place pt on low dose q6hr scale for now and low dose lantus 7 units in am (this may even need to decrease tomorrow in npo status)  do not recommend NPH 3 units q6hrs as this will risk hypoglycemia  plan as outlined in fellow note

## 2024-08-09 NOTE — PROGRESS NOTE ADULT - SUBJECTIVE AND OBJECTIVE BOX
DATE OF SERVICE: 08-09-24    Patient denies chest pain or shortness of breath.   Review of symptoms otherwise negative.    MEDICATIONS:  bacitracin   Ointment 1 Application(s) Topical two times a day  cloNIDine Patch 0.1 mG/24Hr(s) 1 patch Transdermal every 7 days  dextrose 5% + sodium chloride 0.45% with potassium chloride 20 mEq/L 1000 milliLiter(s) IV Continuous <Continuous>  dextrose 5%. 1000 milliLiter(s) IV Continuous <Continuous>  dextrose 5%. 1000 milliLiter(s) IV Continuous <Continuous>  dextrose 50% Injectable 12.5 Gram(s) IV Push once  dextrose 50% Injectable 25 Gram(s) IV Push once  dextrose 50% Injectable 25 Gram(s) IV Push once  dextrose Oral Gel 15 Gram(s) Oral once  dornase malik Solution 2.5 milliGRAM(s) Inhalation daily  enoxaparin Injectable 40 milliGRAM(s) SubCutaneous every 24 hours  insulin glargine Injectable (LANTUS) 10 Unit(s) SubCutaneous at bedtime  insulin lispro (ADMELOG) corrective regimen sliding scale   SubCutaneous <User Schedule>  levalbuterol Inhalation 0.63 milliGRAM(s) Inhalation every 6 hours  lidocaine   4% Patch 1 Patch Transdermal at bedtime  metoclopramide Injectable 10 milliGRAM(s) IV Push every 8 hours  naloxone Injectable 0.1 milliGRAM(s) IV Push every 3 minutes PRN  piperacillin/tazobactam IVPB.. 3.375 Gram(s) IV Intermittent every 8 hours  sodium chloride 3%  Inhalation 4 milliLiter(s) Inhalation every 6 hours      LABS:                        8.2    3.37  )-----------( 227      ( 09 Aug 2024 05:50 )             25.0       Hemoglobin: 8.2 g/dL (08-09 @ 05:50)  Hemoglobin: 8.3 g/dL (08-08 @ 07:00)  Hemoglobin: 8.5 g/dL (08-07 @ 06:52)  Hemoglobin: 8.7 g/dL (08-06 @ 06:51)  Hemoglobin: 8.3 g/dL (08-05 @ 06:39)      08-09    132<L>  |  99  |  6<L>  ----------------------------<  160<H>  4.0   |  24  |  0.66    Ca    7.6<L>      09 Aug 2024 05:50  Phos  2.2     08-08  Mg     1.80     08-08      Creatinine Trend: 0.66<--, 0.70<--, 0.71<--, 0.66<--, 0.73<--, 0.71<--    COAGS:           PHYSICAL EXAM:  T(C): 36.7 (08-09-24 @ 08:00), Max: 36.8 (08-08-24 @ 20:00)  HR: 97 (08-09-24 @ 09:23) (82 - 104)  BP: 143/80 (08-09-24 @ 08:00) (136/74 - 147/84)  RR: 18 (08-09-24 @ 08:00) (17 - 18)  SpO2: 100% (08-09-24 @ 09:23) (95% - 100%)  Wt(kg): --    I&O's Summary    08 Aug 2024 07:01  -  09 Aug 2024 07:00  --------------------------------------------------------  IN: 1140 mL / OUT: 1730 mL / NET: -590 mL        Gen: NAD  HEENT:  (-)icterus (-)pallor  CV: N S1 S2 1/6 CATHY (+)2 Pulses B/l  Resp:  Clear to auscultation B/L, normal effort  GI: (+) BS Soft, NT, ND  Lymph:  (-)Edema, (-)obvious lymphadenopathy  Skin: Warm to touch, Normal turgor  Psych: Appropriate mood and affect      TELEMETRY: 	 SR/ST    ECG:  	NSR      TTE 07/2024  Findings:  1. Normal left ventricular size and function.  Mild diastolic dysfunction.  2. Normal left atrial size  3. Right atrial cavity is normal in size.  4. Normal right ventricular size and function.  5. Normal trileaflet aortic valve opening.  6. Normal mitral valve opening.  7. Normal appearing tricuspid valve with mild tricuspid  regurgitation.  8. Pulmonic valve is grossly normal, yet poorly visualized  with no doppler evidence for pulmonic stenosis.  9. No evidence of significant pericardial effusion.  10. The aortic root is normal.  11. Normal pulmonary artery.  12. IVC is normal with respiratory variation.  FULL STUDY DONE INCLUDING M-MODE RECORDING,  SPECTRAL DOPPLER AND    Stress 07/2024  Normal myocardial perfusion SPECT images No evidence of stress induced ischemia or infarction.  Normal left ventricular size and function.  Calculated EF is: 68%    CT  IMPRESSION:  Small loculated right pleural effusion with foci of air and pleural catheter. Small left pleural effusion.      ASSESSMENT/PLAN: 	Pt is a 59 y.o. man with history of HTN,, DLD, DM,  GEJ adenocarcinoma (T3N0, stage 2A) s/p neoadjuvant chemotherapy admitted for optimization prior to planned [place] Esophagectomy on tuesday. Recently had a nuclear stress test in our office for evaluation of preoperative cardiac risk assessment prior to esophageal cancer surgery scheduled on 07/16/2024. The patient denies any chest pain, shortness ofbreath, or anginal symptoms. He has no palpitations, dizziness, or syncope    s/p [place] Esophagectomy on 7/16.   s/p J tube placement     Pre-OP/HTN  - oral BP meds on hold diet now NPO ( Losartan, HCTZ, Norvasc and Metoprolol Held)  - not in clinical CHF, if BP consistently above 180 systolic can give  Hydralazine IV PRN  - EKG from office with no q waves, no chest pain,  euvolemic on exam and no severely stenotic valvular disease on recent TTE  - s/p EGD 8/5 with esophagogastric anastomotic leak s/p stent placement   - unable to crush meds so options are limited to IV meds at this time, c/w Clonidine patch     F/U with Dr Shah (cardiologist) after DC as scheduled, [phone]     Mart Mason MD  Pager: [phone]

## 2024-08-09 NOTE — PROGRESS NOTE ADULT - NUTRITIONAL ASSESSMENT
This patient has been assessed with a concern for Malnutrition and has been determined to have a diagnosis/diagnoses of Moderate protein-calorie malnutrition.    This patient is being managed with:   Diet NPO after Midnight-     NPO Start Date: 04-Aug-2024   NPO Start Time: 23:59  Entered: Aug  4 2024  4:34PM    Diet NPO with Tube Feed-  Tube Feeding Modality: Jejunostomy  Glucerna 1.5 Hood (GLUCERNA1.5RTH)  Total Volume for 24 Hours (mL): 480  Continuous  Starting Tube Feed Rate {mL per Hour}: 20  Until Goal Tube Feed Rate (mL per Hour): 20  Tube Feed Duration (in Hours): 24  Tube Feed Start Time: 23:59  Free Water Flush  Bolus   Total Volume per Flush (mL): 250   Frequency: Every 6 Hours  Entered: Aug  3 2024 11:49PM  
This patient has been assessed with a concern for Malnutrition and has been determined to have a diagnosis/diagnoses of Moderate protein-calorie malnutrition.    This patient is being managed with:   Diet NPO with Tube Feed-  Tube Feeding Modality: Jejunostomy  Glucerna 1.5 Hood (GLUCERNA1.5RTH)  Total Volume for 24 Hours (mL): 480  Continuous  Starting Tube Feed Rate {mL per Hour}: 20  Until Goal Tube Feed Rate (mL per Hour): 20  Tube Feed Duration (in Hours): 24  Tube Feed Start Time: 23:59  Free Water Flush  Bolus   Total Volume per Flush (mL): 250   Frequency: Every 6 Hours  Entered: Aug  3 2024 11:49PM  
This patient has been assessed with a concern for Malnutrition and has been determined to have a diagnosis/diagnoses of Moderate protein-calorie malnutrition.    This patient is being managed with:   Diet NPO-  Entered: Jul 16 2024  5:27PM  
This patient has been assessed with a concern for Malnutrition and has been determined to have a diagnosis/diagnoses of Moderate protein-calorie malnutrition.    This patient is being managed with:   Diet NPO-  With Ice Chips/Sips of Water  Entered: Aug  1 2024  9:22AM  
This patient has been assessed with a concern for Malnutrition and has been determined to have a diagnosis/diagnoses of Moderate protein-calorie malnutrition.    This patient is being managed with:   Diet Clear Liquid-  Consistent Carbohydrate {No Snacks} (CSTCHO)  DASH/TLC {Sodium & Cholesterol Restricted} (DASH)  No Carbonated Beverages  Entered: Jul 23 2024 10:26AM  
This patient has been assessed with a concern for Malnutrition and has been determined to have a diagnosis/diagnoses of Moderate protein-calorie malnutrition.    This patient is being managed with:   Diet Clear Liquid-  Entered: Jul 27 2024  9:08AM  
This patient has been assessed with a concern for Malnutrition and has been determined to have a diagnosis/diagnoses of Moderate protein-calorie malnutrition.    This patient is being managed with:   Diet Consistent Carbohydrate Renal/No Snacks-  Supplement Feeding Modality:  Oral  Glucerna Shake Cans or Servings Per Day:  3       Frequency:  Three Times a day  Entered: Jul 13 2024  9:37AM  
This patient has been assessed with a concern for Malnutrition and has been determined to have a diagnosis/diagnoses of Moderate protein-calorie malnutrition.    This patient is being managed with:   Diet Full Liquid-  Consistent Carbohydrate {Evening Snack} (CSTCHOSN)  Entered: Jul 27 2024  5:02PM  
This patient has been assessed with a concern for Malnutrition and has been determined to have a diagnosis/diagnoses of Moderate protein-calorie malnutrition.    This patient is being managed with:   Diet NPO with Tube Feed-  Tube Feeding Modality: Jejunostomy  Glucerna 1.5 Hood (GLUCERNA1.5RTH)  Total Volume for 24 Hours (mL): 960  Continuous  Starting Tube Feed Rate {mL per Hour}: 20  Increase Tube Feed Rate by (mL): 5     Every 4 hours  Until Goal Tube Feed Rate (mL per Hour): 40  Tube Feed Duration (in Hours): 24  Tube Feed Start Time: 18:00  Entered: Aug  6 2024  4:11PM    This patient has been assessed with a concern for Malnutrition and has been determined to have a diagnosis/diagnoses of Severe protein-calorie malnutrition.    This patient is being managed with:   Diet NPO with Tube Feed-  Tube Feeding Modality: Jejunostomy  Glucerna 1.5 Hood (GLUCERNA1.5RTH)  Total Volume for 24 Hours (mL): 1080  Continuous  Starting Tube Feed Rate {mL per Hour}: 40  Increase Tube Feed Rate by (mL): 10     Every 4 hours  Until Goal Tube Feed Rate (mL per Hour): 60  Tube Feed Duration (in Hours): 18  Tube Feed Start Time: 20:00  Entered: Aug  8 2024  2:40PM  
This patient has been assessed with a concern for Malnutrition and has been determined to have a diagnosis/diagnoses of Moderate protein-calorie malnutrition.    This patient is being managed with:   Diet NPO-  Entered: Jul 16 2024  5:27PM  
This patient has been assessed with a concern for Malnutrition and has been determined to have a diagnosis/diagnoses of Moderate protein-calorie malnutrition.    This patient is being managed with:   Diet NPO-  Entered: Jul 25 2024  7:42AM  
This patient has been assessed with a concern for Malnutrition and has been determined to have a diagnosis/diagnoses of Severe protein-calorie malnutrition.    This patient is being managed with:   Diet NPO with Tube Feed-  Tube Feeding Modality: Jejunostomy  Glucerna 1.5 Hood (GLUCERNA1.5RTH)  Total Volume for 24 Hours (mL): 1080  Continuous  Starting Tube Feed Rate {mL per Hour}: 40  Increase Tube Feed Rate by (mL): 10     Every 4 hours  Until Goal Tube Feed Rate (mL per Hour): 60  Tube Feed Duration (in Hours): 18  Tube Feed Start Time: 20:00  Entered: Aug  8 2024  2:40PM  
This patient has been assessed with a concern for Malnutrition and has been determined to have a diagnosis/diagnoses of Moderate protein-calorie malnutrition.    This patient is being managed with:   Diet Clear Liquid-  Consistent Carbohydrate {No Snacks} (CSTCHO)  DASH/TLC {Sodium & Cholesterol Restricted} (DASH)  Entered: Jul 22 2024 10:14AM  
This patient has been assessed with a concern for Malnutrition and has been determined to have a diagnosis/diagnoses of Moderate protein-calorie malnutrition.    This patient is being managed with:   Diet Clear Liquid-  Consistent Carbohydrate {No Snacks} (CSTCHO)  DASH/TLC {Sodium & Cholesterol Restricted} (DASH)  No Carbonated Beverages  Entered: Jul 23 2024 10:26AM  
This patient has been assessed with a concern for Malnutrition and has been determined to have a diagnosis/diagnoses of Moderate protein-calorie malnutrition.    This patient is being managed with:   Diet Clear Liquid-  Consistent Carbohydrate {No Snacks} (CSTCHO)  DASH/TLC {Sodium & Cholesterol Restricted} (DASH)  No Carbonated Beverages  Entered: Jul 23 2024 10:26AM  
This patient has been assessed with a concern for Malnutrition and has been determined to have a diagnosis/diagnoses of Moderate protein-calorie malnutrition.    This patient is being managed with:   Diet Clear Liquid-  Consistent Carbohydrate {No Snacks} (CSTCHO)  DASH/TLC {Sodium & Cholesterol Restricted} (DASH)  No Carbonated Beverages  Entered: Jul 26 2024 12:04PM  
This patient has been assessed with a concern for Malnutrition and has been determined to have a diagnosis/diagnoses of Moderate protein-calorie malnutrition.    This patient is being managed with:   Diet Clear Liquid-  Entered: Jul 27 2024  9:08AM  
This patient has been assessed with a concern for Malnutrition and has been determined to have a diagnosis/diagnoses of Moderate protein-calorie malnutrition.    This patient is being managed with:   Diet NPO-  Entered: Aug  2 2024  9:39PM  
This patient has been assessed with a concern for Malnutrition and has been determined to have a diagnosis/diagnoses of Moderate protein-calorie malnutrition.    This patient is being managed with:   Diet NPO-  Entered: Aug  2 2024  9:39PM  
This patient has been assessed with a concern for Malnutrition and has been determined to have a diagnosis/diagnoses of Moderate protein-calorie malnutrition.    This patient is being managed with:   Diet NPO-  Entered: Jul 16 2024  5:27PM  
This patient has been assessed with a concern for Malnutrition and has been determined to have a diagnosis/diagnoses of Moderate protein-calorie malnutrition.    This patient is being managed with:   Diet Clear Liquid-  Consistent Carbohydrate {No Snacks} (CSTCHO)  DASH/TLC {Sodium & Cholesterol Restricted} (DASH)  No Carbonated Beverages  Entered: Jul 23 2024 10:26AM  
This patient has been assessed with a concern for Malnutrition and has been determined to have a diagnosis/diagnoses of Moderate protein-calorie malnutrition.    This patient is being managed with:   Diet Clear Liquid-  Consistent Carbohydrate {No Snacks} (CSTCHO)  DASH/TLC {Sodium & Cholesterol Restricted} (DASH)  No Carbonated Beverages  Entered: Jul 26 2024 12:04PM  
This patient has been assessed with a concern for Malnutrition and has been determined to have a diagnosis/diagnoses of Moderate protein-calorie malnutrition.    This patient is being managed with:   Diet NPO after Midnight-     NPO Start Date: 04-Aug-2024   NPO Start Time: 23:59  Entered: Aug  4 2024  4:34PM    Diet NPO with Tube Feed-  Tube Feeding Modality: Jejunostomy  Glucerna 1.5 Hood (GLUCERNA1.5RTH)  Total Volume for 24 Hours (mL): 480  Continuous  Starting Tube Feed Rate {mL per Hour}: 20  Until Goal Tube Feed Rate (mL per Hour): 20  Tube Feed Duration (in Hours): 24  Tube Feed Start Time: 23:59  Free Water Flush  Bolus   Total Volume per Flush (mL): 250   Frequency: Every 6 Hours  Entered: Aug  3 2024 11:49PM  
This patient has been assessed with a concern for Malnutrition and has been determined to have a diagnosis/diagnoses of Moderate protein-calorie malnutrition.    This patient is being managed with:   Diet NPO after Midnight-     NPO Start Date: 04-Aug-2024   NPO Start Time: 23:59  Entered: Aug  4 2024  4:34PM    Diet NPO with Tube Feed-  Tube Feeding Modality: Jejunostomy  Glucerna 1.5 Hood (GLUCERNA1.5RTH)  Total Volume for 24 Hours (mL): 480  Continuous  Starting Tube Feed Rate {mL per Hour}: 20  Until Goal Tube Feed Rate (mL per Hour): 20  Tube Feed Duration (in Hours): 24  Tube Feed Start Time: 23:59  Free Water Flush  Bolus   Total Volume per Flush (mL): 250   Frequency: Every 6 Hours  Entered: Aug  3 2024 11:49PM  
This patient has been assessed with a concern for Malnutrition and has been determined to have a diagnosis/diagnoses of Moderate protein-calorie malnutrition.    This patient is being managed with:   Diet NPO after Midnight-     NPO Start Date: 04-Aug-2024   NPO Start Time: 23:59  Entered: Aug  4 2024  4:34PM    Diet NPO with Tube Feed-  Tube Feeding Modality: Jejunostomy  Glucerna 1.5 [place] (GLUCERNA1.5RTH)  Total Volume for 24 Hours (mL): 480  Continuous  Starting Tube Feed Rate {mL per Hour}: 20  Until Goal Tube Feed Rate (mL per Hour): 20  Tube Feed Duration (in Hours): 24  Tube Feed Start Time: 23:59  Free Water Flush  Bolus   Total Volume per Flush (mL): 250   Frequency: Every 6 Hours  Entered: Aug  3 2024 11:49PM  
This patient has been assessed with a concern for Malnutrition and has been determined to have a diagnosis/diagnoses of Moderate protein-calorie malnutrition.    This patient is being managed with:   Diet NPO with Tube Feed-  Tube Feeding Modality: Jejunostomy  Glucerna 1.5 Hood (GLUCERNA1.5RTH)  Total Volume for 24 Hours (mL): 480  Continuous  Starting Tube Feed Rate {mL per Hour}: 20  Until Goal Tube Feed Rate (mL per Hour): 20  Tube Feed Duration (in Hours): 24  Tube Feed Start Time: 23:59  Free Water Flush  Bolus   Total Volume per Flush (mL): 250   Frequency: Every 6 Hours  Entered: Aug  3 2024 11:49PM  
This patient has been assessed with a concern for Malnutrition and has been determined to have a diagnosis/diagnoses of Moderate protein-calorie malnutrition.    This patient is being managed with:   Diet NPO with Tube Feed-  Tube Feeding Modality: Jejunostomy  Glucerna 1.5 Hood (GLUCERNA1.5RTH)  Total Volume for 24 Hours (mL): 960  Continuous  Starting Tube Feed Rate {mL per Hour}: 20  Increase Tube Feed Rate by (mL): 5     Every 4 hours  Until Goal Tube Feed Rate (mL per Hour): 40  Tube Feed Duration (in Hours): 24  Tube Feed Start Time: 18:00  Entered: Aug  6 2024  4:11PM  
This patient has been assessed with a concern for Malnutrition and has been determined to have a diagnosis/diagnoses of Moderate protein-calorie malnutrition.    This patient is being managed with:   Diet NPO-  Entered: Jul 16 2024  5:27PM  
This patient has been assessed with a concern for Malnutrition and has been determined to have a diagnosis/diagnoses of Moderate protein-calorie malnutrition.    This patient is being managed with:   Diet NPO-  Entered: Jul 25 2024  7:42AM  
This patient has been assessed with a concern for Malnutrition and has been determined to have a diagnosis/diagnoses of Moderate protein-calorie malnutrition.    This patient is being managed with:   Diet NPO-  With Ice Chips/Sips of Water  Entered: Aug  1 2024  9:22AM  
This patient has been assessed with a concern for Malnutrition and has been determined to have a diagnosis/diagnoses of Moderate protein-calorie malnutrition.    This patient is being managed with:   Diet Soft and Bite Sized-  Entered: Jul 28 2024 11:11AM  
This patient has been assessed with a concern for Malnutrition and has been determined to have a diagnosis/diagnoses of Moderate protein-calorie malnutrition.    This patient is being managed with:   Diet Soft and Bite Sized-  Entered: Jul 28 2024 11:11AM  
This patient has been assessed with a concern for Malnutrition and has been determined to have a diagnosis/diagnoses of Moderate protein-calorie malnutrition.    This patient is being managed with:   Diet Clear Liquid-  No Carbonated Beverages  Entered: Jul 30 2024  1:42PM  
This patient has been assessed with a concern for Malnutrition and has been determined to have a diagnosis/diagnoses of Moderate protein-calorie malnutrition.    This patient is being managed with:   Diet NPO-  Entered: Jul 16 2024  5:27PM  
This patient has been assessed with a concern for Malnutrition and has been determined to have a diagnosis/diagnoses of Moderate protein-calorie malnutrition.    This patient is being managed with:   Diet NPO-  With Ice Chips/Sips of Water  Entered: Aug  1 2024  9:22AM  
This patient has been assessed with a concern for Malnutrition and has been determined to have a diagnosis/diagnoses of Severe protein-calorie malnutrition.    This patient is being managed with:   Diet NPO with Tube Feed-  Tube Feeding Modality: Jejunostomy  Glucerna 1.5 Hood (GLUCERNA1.5RTH)  Total Volume for 24 Hours (mL): 1080  Continuous  Starting Tube Feed Rate {mL per Hour}: 40  Increase Tube Feed Rate by (mL): 10     Every 4 hours  Until Goal Tube Feed Rate (mL per Hour): 60  Tube Feed Duration (in Hours): 18  Tube Feed Start Time: 20:00  Entered: Aug  8 2024  2:40PM

## 2024-08-09 NOTE — PROGRESS NOTE ADULT - SUBJECTIVE AND OBJECTIVE BOX
Gastroenterology Progress Note    Interval Events:   -Barium esophogram with continued leak although with stent in good position   -VENKATA with ~350 cc/output x 24 hours   -Denies any ongoing fevers, chills, nausea, vomiting or abd pain     Allergies:  No Known Allergies      Hospital Medications:  bacitracin   Ointment 1 Application(s) Topical two times a day  cloNIDine Patch 0.1 mG/24Hr(s) 1 patch Transdermal every 7 days  dextrose 5% + sodium chloride 0.45% with potassium chloride 20 mEq/L 1000 milliLiter(s) IV Continuous <Continuous>  dextrose 5%. 1000 milliLiter(s) IV Continuous <Continuous>  dextrose 5%. 1000 milliLiter(s) IV Continuous <Continuous>  dextrose 50% Injectable 25 Gram(s) IV Push once  dextrose 50% Injectable 12.5 Gram(s) IV Push once  dextrose 50% Injectable 25 Gram(s) IV Push once  dextrose Oral Gel 15 Gram(s) Oral once  dornase malik Solution 2.5 milliGRAM(s) Inhalation daily  enoxaparin Injectable 40 milliGRAM(s) SubCutaneous every 24 hours  insulin glargine Injectable (LANTUS) 10 Unit(s) SubCutaneous at bedtime  insulin lispro (ADMELOG) corrective regimen sliding scale   SubCutaneous <User Schedule>  levalbuterol Inhalation 0.63 milliGRAM(s) Inhalation every 6 hours  lidocaine   4% Patch 1 Patch Transdermal at bedtime  metoclopramide Injectable 10 milliGRAM(s) IV Push every 8 hours  naloxone Injectable 0.1 milliGRAM(s) IV Push every 3 minutes PRN  piperacillin/tazobactam IVPB.. 3.375 Gram(s) IV Intermittent every 8 hours  sodium chloride 3%  Inhalation 4 milliLiter(s) Inhalation every 6 hours      ROS: 14 point ROS negative unless otherwise state in subjective    PHYSICAL EXAM:   Vital Signs:  Vital Signs Last 24 Hrs  T(C): 36.8 (09 Aug 2024 04:00), Max: 36.8 (08 Aug 2024 20:00)  T(F): 98.2 (09 Aug 2024 04:00), Max: 98.2 (08 Aug 2024 20:00)  HR: 98 (09 Aug 2024 04:00) (82 - 104)  BP: 136/74 (09 Aug 2024 04:00) (136/66 - 147/84)  BP(mean): --  RR: 17 (09 Aug 2024 04:00) (17 - 18)  SpO2: 98% (09 Aug 2024 04:00) (94% - 100%)    Parameters below as of 09 Aug 2024 04:00  Patient On (Oxygen Delivery Method): room air      Daily     Daily     GENERAL:  No acute distress  HEENT:  NCAT  CHEST: no resp distress  HEART:  RRR  ABDOMEN:  Soft, non-tender, non-distended, normoactive bowel sounds. VENKATA drain in place. J tube in place.   EXTREMITIES:  No edema  NEURO:  Alert and oriented x 3    LABS:                        8.2    3.37  )-----------( 227      ( 09 Aug 2024 05:50 )             25.0     Mean Cell Volume: 89.0 fL (08-09-24 @ 05:50)    08-09    132<L>  |  99  |  6<L>  ----------------------------<  160<H>  4.0   |  24  |  0.66    Ca    7.6<L>      09 Aug 2024 05:50  Phos  2.2     08-08  Mg     1.80     08-08          Urinalysis Basic - ( 09 Aug 2024 05:50 )    Color: x / Appearance: x / SG: x / pH: x  Gluc: 160 mg/dL / Ketone: x  / Bili: x / Urobili: x   Blood: x / Protein: x / Nitrite: x   Leuk Esterase: x / RBC: x / WBC x   Sq Epi: x / Non Sq Epi: x / Bacteria: x    Imaging:    < from: Xray Esophagram Single Contrast (08.08.24 @ 15:29) >  IMPRESSION:  Status post esophagectomy with interval stent placement in the   intrathoracic gastric conduit.    There is of leak of contrast into the right pleural space, which is also   seen to collect in the right chest tube. There is suggestion of leak   originating around the upper to midportion of the stent.    < end of copied text >  < from: Upper Endoscopy (08.05.24 @ 17:35) >  Findings:       An esophago-gastric anastomosis was found at 26 cm from the incisors.        There were areas clean based ulceration with one visible defect        (measured approximately 6 mm) at the anastomosis consistent with known        leak. To cover the defect and facilitate healing, one 20 mm by 10 cm        fully covered esophageal stent (Opencare) was deployed across the        anastomosis. The stent was in good position traversing the leak with the        proximal end at 20 cm from the incisors. The proximal end of the stent        was then secured in place using one endoscopic suture.       The entire examined stomach was normal.       The examined portions of duodenum were normal.                                                                                   Impression:          - Esophago-gastric anastomotic leak as above. Esophageal                        stent placed.  Recommendation:      - [facility] patient to [facility] care.                       - Monitor VENKATA drain output.                       - Recommend repeat EGD for stent removal/reassessment in                      4-6 weeks pending clinical progress.    < end of copied text >

## 2024-08-09 NOTE — PROGRESS NOTE ADULT - ASSESSMENT
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 Pt was noted to have high R chest drain output with elevated amylase and had J-tube place on 8/3  underwent EDG w/ esophageal stenting for 6 mm defect on 8/5. Pt continues to have high output from drain with milky appearance.     Plan  - Care by CT surgery team  - GI recs appreciated  - Recommend repeat drain fluid triglycerides  - Will continue to follow    D Team Surgery  [id number]

## 2024-08-09 NOTE — PROGRESS NOTE ADULT - PROBLEM SELECTOR PROBLEM 1
T2DM (type 2 diabetes mellitus)

## 2024-08-09 NOTE — PROGRESS NOTE ADULT - SUBJECTIVE AND OBJECTIVE BOX
Chief Complaint: Type 2 DM     History: Pt seen at bedside. Pt tolerating oral diet. Pt denies nausea and vomiting/any signs of hypoglycemia. Pt reports an adequate appetite.     MEDICATIONS  (STANDING):  bacitracin   Ointment 1 Application(s) Topical two times a day  cloNIDine Patch 0.1 mG/24Hr(s) 1 patch Transdermal every 7 days  dextrose 5% + sodium chloride 0.45% with potassium chloride 20 mEq/L 1000 milliLiter(s) (75 mL/Hr) IV Continuous <Continuous>  dextrose 5%. 1000 milliLiter(s) (100 mL/Hr) IV Continuous <Continuous>  dextrose 5%. 1000 milliLiter(s) (50 mL/Hr) IV Continuous <Continuous>  dextrose 50% Injectable 12.5 Gram(s) IV Push once  dextrose 50% Injectable 25 Gram(s) IV Push once  dextrose 50% Injectable 25 Gram(s) IV Push once  dextrose Oral Gel 15 Gram(s) Oral once  dornase malik Solution 2.5 milliGRAM(s) Inhalation daily  enoxaparin Injectable 40 milliGRAM(s) SubCutaneous every 24 hours  insulin glargine Injectable (LANTUS) 10 Unit(s) SubCutaneous at bedtime  insulin lispro (ADMELOG) corrective regimen sliding scale   SubCutaneous <User Schedule>  levalbuterol Inhalation 0.63 milliGRAM(s) Inhalation every 6 hours  lidocaine   4% Patch 1 Patch Transdermal at bedtime  metoclopramide Injectable 10 milliGRAM(s) IV Push every 8 hours  piperacillin/tazobactam IVPB.. 3.375 Gram(s) IV Intermittent every 8 hours  sodium chloride 3%  Inhalation 4 milliLiter(s) Inhalation every 6 hours    MEDICATIONS  (PRN):  naloxone Injectable 0.1 milliGRAM(s) IV Push every 3 minutes PRN For ANY of the following changes in patient status:  A. RR LESS THAN 10 breaths per minute, B. Oxygen saturation LESS THAN 90%, C. Sedation score of 6      Allergies: No Known Allergies      Review of Systems:  Respiratory: No SOB, no cough  GI: No nausea, vomiting, abdominal pain  Endocrine: no polyuria, polydipsia      PHYSICAL EXAM:  VITALS: T(C): 36.7 (08-09-24 @ 08:00)  T(F): 98.1 (08-09-24 @ 08:00), Max: 98.2 (08-08-24 @ 20:00)  HR: 97 (08-09-24 @ 09:23) (82 - 104)  BP: 143/80 (08-09-24 @ 08:00) (136/74 - 147/84)  RR:  (17 - 18)  SpO2:  (95% - 100%)  Wt(kg): --  GENERAL: NAD, well-groomed, well-developed  RESPIRATORY: No labored breathing   GI: Soft, nontender, non distended  PSYCH: Alert and oriented x 3, normal affect, normal mood      CAPILLARY BLOOD GLUCOSE  POCT Blood Glucose.: 126 mg/dL (09 Aug 2024 11:58)  POCT Blood Glucose.: 178 mg/dL (09 Aug 2024 08:06)  POCT Blood Glucose.: 171 mg/dL (08 Aug 2024 22:36)  POCT Blood Glucose.: 78 mg/dL (08 Aug 2024 17:04)  POCT Blood Glucose.: 69 mg/dL (08 Aug 2024 17:02)    A1C with Estimated Average Glucose (07.13.24 @ 02:44)    A1C with Estimated Average Glucose Result: 10.9   Estimated Average Glucose: 266      08-09    132<L>  |  99  |  6<L>  ----------------------------<  160<H>  4.0   |  24  |  0.66    eGFR: 107    Ca    7.6<L>      08-09  Mg     1.80     08-08  Phos  2.2     08-08    Diet, NPO with Tube Feed:   Tube Feeding Modality: Jejunostomy  Glucerna 1.5 [place] (GLUCERNA1.5RTH)  Total Volume for 24 Hours (mL): 1080  Continuous  Starting Tube Feed Rate mL per Hour: 40  Increase Tube Feed Rate by (mL): 10     Every 4 hours  Until Goal Tube Feed Rate (mL per Hour): 60  Tube Feed Duration (in Hours): 18  Tube Feed Start Time: 20:00 (08-08-24 @ 14:40) [Active]

## 2024-08-09 NOTE — PROGRESS NOTE ADULT - REASON FOR ADMISSION
Esophageal cancer
Esophagectomy

## 2024-08-09 NOTE — PROGRESS NOTE ADULT - ASSESSMENT
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 POD #10 with post procedure course c/b high VENKATA drain output with studies showing elevated amylase although imaging with UGI without anastomotic leak. CTPA 7/28 with moderate right loculated hydropneumothorax with interval increase in size and small right apical PTX. [facility] consulted to further workup any underlying leak near anastomaosis.     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 CTPA 7/28 with moderate right loculated hydropneumothorax with interval increase in size and small right apical PTX. CTAP with PO contrast on 7/4 with note of extensive contrast and air extravasation into the right pleural space consistent with anastomotic leak. s/p EGD on 8/5 with esophago-gastric anastomotic leak with placement of one 20 mm by 10 cm  fully covered esophageal stent (Hanaro) was deployed across the anastomosis. Pt with initially dec VENKATA drain output which is now increased with repeat barium esophogram on 8/8 showing continued leak   although stent in good position. No plan for any repeat endoscopic intervention at this time. Defect may take some time to fully close with stent in good position and diverting appropriately     Recommendations:  -Please keep NPO, ok to resume J tube feeds  -Monitor VENKATA drain output.  -Abx/anti-fungals per primary team   -Recommend repeat EGD for stent removal/reassessment in 4-6 weeks pending clinical progress.                                                                                   All recommendations are tentative until note is attested by attending.

## 2024-08-10 ENCOUNTER — EMERGENCY (EMERGENCY)
Facility: HOSPITAL | Age: 59
LOS: 1 days | Discharge: HOME | End: 2024-08-10
Attending: EMERGENCY MEDICINE | Admitting: EMERGENCY MEDICINE
Payer: MEDICAID

## 2024-08-10 VITALS
TEMPERATURE: 98 F | RESPIRATION RATE: 20 BRPM | HEART RATE: 94 BPM | DIASTOLIC BLOOD PRESSURE: 70 MMHG | SYSTOLIC BLOOD PRESSURE: 132 MMHG | OXYGEN SATURATION: 96 %

## 2024-08-10 VITALS
SYSTOLIC BLOOD PRESSURE: 131 MMHG | DIASTOLIC BLOOD PRESSURE: 74 MMHG | HEART RATE: 98 BPM | RESPIRATION RATE: 17 BRPM | OXYGEN SATURATION: 98 % | HEIGHT: 64 IN | TEMPERATURE: 98 F

## 2024-08-10 DIAGNOSIS — Z95.828 PRESENCE OF OTHER VASCULAR IMPLANTS AND GRAFTS: Chronic | ICD-10-CM

## 2024-08-10 LAB
ALBUMIN SERPL ELPH-MCNC: 2.3 G/DL — LOW (ref 3.3–5)
ALP SERPL-CCNC: 93 U/L — SIGNIFICANT CHANGE UP (ref 40–120)
ALT FLD-CCNC: 8 U/L — SIGNIFICANT CHANGE UP (ref 4–41)
ANION GAP SERPL CALC-SCNC: 10 MMOL/L — SIGNIFICANT CHANGE UP (ref 7–14)
AST SERPL-CCNC: 13 U/L — SIGNIFICANT CHANGE UP (ref 4–40)
BASOPHILS # BLD AUTO: 0.06 K/UL — SIGNIFICANT CHANGE UP (ref 0–0.2)
BASOPHILS NFR BLD AUTO: 0.9 % — SIGNIFICANT CHANGE UP (ref 0–2)
BILIRUB SERPL-MCNC: 0.3 MG/DL — SIGNIFICANT CHANGE UP (ref 0.2–1.2)
BUN SERPL-MCNC: 10 MG/DL — SIGNIFICANT CHANGE UP (ref 7–23)
CALCIUM SERPL-MCNC: 7.8 MG/DL — LOW (ref 8.4–10.5)
CHLORIDE SERPL-SCNC: 99 MMOL/L — SIGNIFICANT CHANGE UP (ref 98–107)
CO2 SERPL-SCNC: 23 MMOL/L — SIGNIFICANT CHANGE UP (ref 22–31)
CREAT SERPL-MCNC: 0.69 MG/DL — SIGNIFICANT CHANGE UP (ref 0.5–1.3)
DACRYOCYTES BLD QL SMEAR: SLIGHT — SIGNIFICANT CHANGE UP
EGFR: 105 ML/MIN/1.73M2 — SIGNIFICANT CHANGE UP
EOSINOPHIL # BLD AUTO: 0 K/UL — SIGNIFICANT CHANGE UP (ref 0–0.5)
EOSINOPHIL NFR BLD AUTO: 0 % — SIGNIFICANT CHANGE UP (ref 0–6)
GIANT PLATELETS BLD QL SMEAR: PRESENT — SIGNIFICANT CHANGE UP
GLUCOSE SERPL-MCNC: 158 MG/DL — HIGH (ref 70–99)
HCT VFR BLD CALC: 25.4 % — LOW (ref 39–50)
HGB BLD-MCNC: 8.4 G/DL — LOW (ref 13–17)
HYPOCHROMIA BLD QL: SLIGHT — SIGNIFICANT CHANGE UP
IANC: 5.11 K/UL — SIGNIFICANT CHANGE UP (ref 1.8–7.4)
LYMPHOCYTES # BLD AUTO: 0.22 K/UL — LOW (ref 1–3.3)
LYMPHOCYTES # BLD AUTO: 3.5 % — LOW (ref 13–44)
MCHC RBC-ENTMCNC: 29.3 PG — SIGNIFICANT CHANGE UP (ref 27–34)
MCHC RBC-ENTMCNC: 33.1 GM/DL — SIGNIFICANT CHANGE UP (ref 32–36)
MCV RBC AUTO: 88.5 FL — SIGNIFICANT CHANGE UP (ref 80–100)
MONOCYTES # BLD AUTO: 0.66 K/UL — SIGNIFICANT CHANGE UP (ref 0–0.9)
MONOCYTES NFR BLD AUTO: 10.4 % — SIGNIFICANT CHANGE UP (ref 2–14)
NEUTROPHILS # BLD AUTO: 5.38 K/UL — SIGNIFICANT CHANGE UP (ref 1.8–7.4)
NEUTROPHILS NFR BLD AUTO: 64.3 % — SIGNIFICANT CHANGE UP (ref 43–77)
NEUTS BAND # BLD: 20.9 % — CRITICAL HIGH (ref 0–6)
PLAT MORPH BLD: NORMAL — SIGNIFICANT CHANGE UP
PLATELET # BLD AUTO: 290 K/UL — SIGNIFICANT CHANGE UP (ref 150–400)
PLATELET COUNT - ESTIMATE: NORMAL — SIGNIFICANT CHANGE UP
POIKILOCYTOSIS BLD QL AUTO: SLIGHT — SIGNIFICANT CHANGE UP
POLYCHROMASIA BLD QL SMEAR: SLIGHT — SIGNIFICANT CHANGE UP
POTASSIUM SERPL-MCNC: 3.9 MMOL/L — SIGNIFICANT CHANGE UP (ref 3.5–5.3)
POTASSIUM SERPL-SCNC: 3.9 MMOL/L — SIGNIFICANT CHANGE UP (ref 3.5–5.3)
PROT SERPL-MCNC: 5.6 G/DL — LOW (ref 6–8.3)
RBC # BLD: 2.87 M/UL — LOW (ref 4.2–5.8)
RBC # FLD: 14.6 % — HIGH (ref 10.3–14.5)
RBC BLD AUTO: ABNORMAL
SCHISTOCYTES BLD QL AUTO: SLIGHT — SIGNIFICANT CHANGE UP
SODIUM SERPL-SCNC: 132 MMOL/L — LOW (ref 135–145)
TARGETS BLD QL SMEAR: SLIGHT — SIGNIFICANT CHANGE UP
WBC # BLD: 6.31 K/UL — SIGNIFICANT CHANGE UP (ref 3.8–10.5)
WBC # FLD AUTO: 6.31 K/UL — SIGNIFICANT CHANGE UP (ref 3.8–10.5)

## 2024-08-10 PROCEDURE — 70450 CT HEAD/BRAIN W/O DYE: CPT | Mod: 26,MC

## 2024-08-10 PROCEDURE — 72131 CT LUMBAR SPINE W/O DYE: CPT | Mod: 26,MC

## 2024-08-10 PROCEDURE — 72125 CT NECK SPINE W/O DYE: CPT | Mod: 26,MC

## 2024-08-10 PROCEDURE — 99253 IP/OBS CNSLTJ NEW/EST LOW 45: CPT

## 2024-08-10 PROCEDURE — 71260 CT THORAX DX C+: CPT | Mod: 26,MC

## 2024-08-10 PROCEDURE — 99285 EMERGENCY DEPT VISIT HI MDM: CPT

## 2024-08-10 PROCEDURE — 72128 CT CHEST SPINE W/O DYE: CPT | Mod: 26,MC

## 2024-08-10 PROCEDURE — 74177 CT ABD & PELVIS W/CONTRAST: CPT | Mod: 26,MC

## 2024-08-10 RX ORDER — DEXTROSE 4 G
50 TABLET,CHEWABLE ORAL ONCE
Refills: 0 | Status: COMPLETED | OUTPATIENT
Start: 2024-08-10 | End: 2024-08-10

## 2024-08-10 RX ADMIN — Medication 50 MILLILITER(S): at 16:15

## 2024-08-10 NOTE — ED ADULT NURSE NOTE - CHIEF COMPLAINT QUOTE
pt d/c'd from [facility] yesterday, had unwitnessed fall today trying to go for a walk. walked down 2 steps then fell, hitting head. takes lovenox injections daily. has a vile drain and VENKATA drain. daughter states since the fall the pts drainage has doubled. no obvious injuries noted. peg tube noted  pt noted to be hypoglycemia, unable to take PO, charge RN called.

## 2024-08-10 NOTE — ED PROVIDER NOTE - PATIENT PORTAL LINK FT
You can access the FollowMyHealth Patient Portal offered by [facility] by registering at the following website: [email or web address]. By joining [employer]’s FollowMyHealth portal, you will also be able to view your health information using other applications (apps) compatible with our system.

## 2024-08-10 NOTE — ED ADULT NURSE NOTE - OBJECTIVE STATEMENT
Pt A+Ox4. Pt with hx of esophogeal ca.  Pt sent after fall at home.  Pt was discharged from [facility] yesterday.  Pt had a tube feed at 12pm and was given 15 units of Novilog.  Pt noted with hypoglycemia upon arrival.  IV placed left AC #20 and an amp of D50 given.  Pt placed on cardiac monitor.  Pt reports he lost his balance and fell on a wood deck and hit his head. Pt denies LOC, no blood thinners.  Pt noted with an abrasion to right knee.  Pt also noted with PEG tube to left abdomen, VENKATA drain to right flank with milky drainage, Pt also noted with multiple blisters to b/l LE.  Pt has a chemo port to right chest.

## 2024-08-10 NOTE — ED ADULT NURSE NOTE - NSFALLHARMRISKINTERV_ED_ALL_ED

## 2024-08-10 NOTE — ED PROVIDER NOTE - PROGRESS NOTE DETAILS
CT surgery regarding VENKATA drain output.  Will evaluate pending reads of CT scan.  After discussion it appears VENKATA drain was placed due to esophageal rupture now status post stent and defer chest drainage.    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  Evaluated by CT surgery.  No acute interventions indicated at this time.  Repeat fingerstick stable.  Discussed with family to monitor and follow-up outpatient with your cardiothoracic surgeon.  Dispo to home with outpatient CT surgery follow-up. Patient's family acknowledged understanding about hypoglycemia and have a good action plan. CT surgery regarding VENKATA drain output.  Will evaluate pending reads of CT scan.  After discussion it appears VENKATA drain was placed due to esophageal rupture now status post stent and for chest drainage.    Maurizio El PGY-3

## 2024-08-10 NOTE — ED ADULT NURSE REASSESSMENT NOTE - NS ED NURSE REASSESS COMMENT FT1
Pt received from previous RN Alfredo. Respirations even and unlabored. No signs of distress noted. Pt stable for di

## 2024-08-10 NOTE — CONSULT NOTE ADULT - SUBJECTIVE AND OBJECTIVE BOX
HPI:  This 59 year old man was discharged yesterday POD 24 after an [place]-Broderick esophagectomy for gastroesophageal adenocarcinoma.  He was found to have an anastomotic leak and GI placed a stent.  Despite that, he still had a leak, and he was discharged home with his Adam drain.  He is NPO on tube feeds via a J-tube.  Today he fell down one step.  He hit his head but had no LOC.  He denies any pain or injury.      PAST MEDICAL & SURGICAL HISTORY:  HLD (hyperlipidemia)  Insulin dependent type 2 diabetes mellitus  BPH (benign prostatic hyperplasia)  HTN (hypertension)  Gastroesophageal cancer  Port-A-Cath     REVIEW OF SYSTEMS  General: No Fatigue, HA, or Dizziness	  Skin/Breast: No Abrasions or Lumps	  Ophthalmologic: No Blurry vision	  ENMT: No Hearing loss	  Respiratory and Thorax: No Cough, Wheezing, SOB, or Hemoptysis	  Cardiovascular: No Chest pain or Palpitations	  Gastrointestinal: No Nausea, Vomiting, Constipation; No issues with tube feeds; No issues with drain that is emptied and recorded regualrly  Genitourinary: No Hematuria or  Dysuria  Musculoskeletal: No Pain or Weakness	  Neurological: No Seizures  Psychiatric: No Dementia	  Hematology/Lymphatics: No hx of bleeding/ Edema	  Endocrine:+DM  Allergic/Immunologic: No Anaphylaxis or intolerance    MEDICATIONS (ER):  NPH insulin    Allergies  No Known Allergies    SOCIAL HISTORY:  Supportive family    FAMILY HISTORY:  Not contributory    Vital Signs Last 24 Hrs  T(C): 36.7 (10 Aug 2024 20:17), Max: 36.8 (10 Aug 2024 15:48)  T(F): 98.1 (10 Aug 2024 20:17), Max: 98.2 (10 Aug 2024 15:48)  HR: 94 (10 Aug 2024 20:17) (91 - 98)  BP: 132/70 (10 Aug 2024 20:17) (131/74 - 134/74)  RR: 20 (10 Aug 2024 20:17) (17 - 20)  SpO2: 96% (10 Aug 2024 20:17) (96% - 98%) RA    General: Awake and alert in NAD  Neurology: No focal deficit; ALFORD x 4  Eyes: Gross vision intact  ENT: Gross hearing intact, grossly patent pharynx, no stridor  Neck: Neck supple, trachea midline, No JVD,   Respiratory: Unlabored breathing  Chest: R Port-a-cath noted  CV: S1, S2  Abdominal: Soft, NT, ND +BS, Adam drain to bulb with murky gray drainage; J-tube capped  Extremities: No edema, + peripheral pulses  Skin: No Rashes, Hematoma, Ecchymosis  Psych: normal affect    LABS:                        8.4    6.31  )-----------( 290      ( 10 Aug 2024 16:37 )             25.4     08-10    132<L>  |  99  |  10  ----------------------------<  158<H>  3.9   |  23  |  0.69    Ca    7.8<L>      10 Aug 2024 16:37    TPro  5.6<L>  /  Alb  2.3<L>  /  TBili  0.3  /  DBili  x   /  AST  13  /  ALT  8   /  AlkPhos  93  08-10      Urinalysis Basic - ( 10 Aug 2024 16:37 )    Color: x / Appearance: x / SG: x / pH: x  Gluc: 158 mg/dL / Ketone: x  / Bili: x / Urobili: x   Blood: x / Protein: x / Nitrite: x   Leuk Esterase: x / RBC: x / WBC x   Sq Epi: x / Non Sq Epi: x / Bacteria: x    RADIOLOGY & ADDITIONAL STUDIES:    CT Chest w/ IV Cont (08.10.24 @ 17:35)   CT ABDOMEN AND PELVIS IC     COMPARISON: CT abdomen pelvis 8/4/2024  FINDINGS:  CHEST:  LUNGS AND LARGE AIRWAYS: Patent central airways. No pulmonary nodules. Focal atelectasis posterior aspect of right upper lobe  PLEURA: Small bilateral pleural effusions, left greater than right with adjacent passive atelectasis.  VESSELS: Aortic calcifications. Coronary artery calcifications.  HEART: Mild cardiomegaly No pericardial effusion.  MEDIASTINUM AND DENIS: No lymphadenopathy. s/p [place] Broderick Esophagectomy, jejunostomy tube 8/3, s/p EGD with esophageal stenting 8/5.  CHEST WALL AND LOWER NECK: Right VENKATA drain in place  ABDOMEN AND PELVIS:  LIVER: Within normal limits.  BILE DUCTS: Normal caliber.  GALLBLADDER: Similar trace pericholecystic fluid.  SPLEEN: Within normal limits.  PANCREAS: Mildly fatty replaced  ADRENALS: Within normal limits.  KIDNEYS/URETERS: Within normal limits.  BLADDER: Distended bladder. Dependent air noted within bladder, possibly post instrumentation  REPRODUCTIVE ORGANS: Prostate within normal limits.  BOWEL: No bowel obstruction. Appendix is not visualized. No evidence of inflammation in the pericecal region.. As noted previously post esophagectomy and gastric pull-through.  PERITONEUM/RETROPERITONEUM: Trace ascites.  VESSELS: Atherosclerotic changes.  LYMPH NODES: No lymphadenopathy.  ABDOMINAL WALL: Mild subcutaneous edema.  BONES: Fracture of the right eighth posterior rib with increased displacement since prior scan. No new fractures.  IMPRESSION:  Fracture of the right eighth posterior rib with increased displacement since prior scan.  No other acute findings in the abdomen or pelvis.    CT Lumbar Spine, CT Thoracic Spine No Cont (08.10.24 @ 17:34)   IMPRESSION:  No acute fracture. Multilevel spondylosis.    CT Cervical Spine , CT Brain No Cont (08.10.24 @ 17:34)  IMPRESSION:  CT HEAD:Lytic destructive soft tissue clival mass likely reflects metastatic disease given history of gastric adenocarcinoma. Lytic erosion of the floor the sella turcica/dorsum sella and posterior margin of the clivus. No acute intracranial hemorrhage.  CT CERVICAL SPINE: No acute fracture. Postoperative changes from gastric pull-through. Right-sided CVC.    ASSESSMENT:   s/p fall after recent Michael-Broderick Esophagectomy; no acute injuries found;   No intervention for the previously noted but now more displaced rib fx  The brain lesion finding was discussed with the pt's daughter and she stated that the lesion had been already known.        PLAN:  No Thoracic surgery objection to discharge home.    Disposition as per ER  Follow up with Dr Laureano as planned    Case discussed with Dr Poe

## 2024-08-10 NOTE — ED PROVIDER NOTE - NSFOLLOWUPINSTRUCTIONS_ED_ALL_ED_FT
Today in the emergency department you were evaluated after a fall.  You are found to have low blood sugar.  Please check your blood sugar before you administer your insulin.  Your blood sugar is low you are able to put apple juice through the PEG tube.  Then also provide additional Glucerna through the PEG tube to ensure that the blood sugar does not go any lower.  Repeat the blood sugar after about half an hour and if that point the blood sugar is improving you can give insulin.  Be careful about any blood sugar under 80.  This can cause weakness and can lead to falls.    For the increased drain output, please follow-up with your cardiothoracic surgeon in their office within 1 week of discharge from the emergency department.  Please bring a copy of your results with you.  Please return to the emergency department for worsening of your symptoms.

## 2024-08-10 NOTE — ED ADULT TRIAGE NOTE - CHIEF COMPLAINT QUOTE
pt d/c'd from [facility] yesterday, had unwitnessed fall today trying to go for a walk. walked down 2 steps then fell, hitting head. takes lovenox injections daily. has a vile drain and VENKATA drain. daughter states since the fall the pts drainage has doubled. no obvious injuries noted. pt d/c'd from [facility] yesterday, had unwitnessed fall today trying to go for a walk. walked down 2 steps then fell, hitting head. takes lovenox injections daily. has a vile drain and VENKATA drain. daughter states since the fall the pts drainage has doubled. no obvious injuries noted. peg tube noted  pt noted to be hypoglycemia, unable to take PO, charge RN called.

## 2024-08-10 NOTE — ED PROVIDER NOTE - ATTENDING CONTRIBUTION TO CARE
This is a 58 y/o M PMHx HTN, HLD, DM, GE junction cancer (s/p esophagectomy, jejunostomy tube and J-P drain) p/w fall at home around 3pm. Last feed around 12pm. Reports home nurse examined patient and advised to go to ER for evaluation. Patient reports increased drainage from J-P drain since the fall. /74. FS <50. AAOx3. Plan- D50, EKG, Labs, CTH/C-spine/C/A/P with T and L recons, Reassess This is a 60 y/o M PMHx HTN, HLD, DM, GE junction adenocarcinoma (s/p chemo/radiation s/p [place] Broderick Esophagectomy 7/16, jejunostomy tube 8/3, s/p EGD with esophageal stenting 8/5 and R chest J-P drain) p/w fall at home around 3pm. Last feed around 12pm. Reports home nurse examined patient and advised to go to ER for evaluation. Patient reports increased drainage from J-P drain since the fall. /74. FS <50. AAOx3. Plan- D50, EKG, Labs, CTH/C-spine/C/A/P with T and L recons, Reassess This is a 60 y/o M PMHx HTN, HLD, DM, GE junction adenocarcinoma (s/p chemo/radiation s/p [place] Broderick Esophagectomy 7/16, jejunostomy tube 8/3, s/p EGD with esophageal stenting 8/5 and R chest J-P drain) p/w fall at home around 3pm. Last feed around 12pm and reports took usual Insulin dosage. Reports home nurse examined patient and advised to go to ER for evaluation. Patient reports increased drainage from J-P drain since the fall. /74. FS <50. AAOx3. Plan- D50, EKG, Labs, CTH/C-spine/C/A/P with T and L recons, Reassess This is a 58 y/o M PMHx HTN, HLD, DM, GE junction adenocarcinoma (s/p chemo/radiation s/p [place] Broderick Esophagectomy 7/16, jejunostomy tube 8/3, s/p EGD with esophageal stenting 8/5 and R chest J-P drain) p/w fall at home around 3pm. Last feed around 12pm and reports took extra Insulin dosage. Reports home nurse examined patient and advised to go to ER for evaluation. Patient reports increased drainage from J-P drain since the fall. /74. FS <50. AAOx3. Plan- D50, EKG, Labs, CTH/C-spine/C/A/P with T and L recons, Reassess This is a 60 y/o M PMHx HTN, HLD, DM, GE junction adenocarcinoma (s/p chemo/radiation s/p [place] Broderick Esophagectomy 7/16, jejunostomy tube 8/3, s/p EGD with esophageal stenting 8/5 and R chest J-P drain) p/w fall at home around 3pm. Last feed around 12pm and reports took extra Insulin dosage of Novolog (because of problem getting Novolin at home). Reports home nurse examined patient and advised to go to ER for evaluation. Patient reports increased drainage from J-P drain since the fall. /74. FS <50. AAOx3. Plan- D50, EKG, Labs, CTH/C-spine/C/A/P with T and L recons, Reassess

## 2024-08-10 NOTE — ED PROVIDER NOTE - PHYSICAL EXAMINATION
GEN: NAD. A&Ox3. Non-toxic appearing.  HEENT: normocephalic, atraumatic, EOMI, PERRL, no scleral icterus, no conjuntival pallor, moist MM  CARDIAC: RRR, S1, S2, no murmur. Radial pulses and DP pulses present and symmetric b/l  PULM: CTA B/L no wheeze, rhonchi, rales.   ABD: soft NT, ND, no rebound no guarding  MSK: Moving all extremities, no edema  NEURO: gait normal, no focal neurological deficits, CN 2-12 grossly intact  SKIN: covered b/l blisters at ankles, GEN: NAD. A&Ox3. Non-toxic appearing.  HEENT: normocephalic, atraumatic, EOMI, PERRL, no scleral icterus, no conjuntival pallor, moist MM  CARDIAC: RRR, S1, S2, no murmur. Radial pulses present and symmetric b/l  PULM: CTA B/L no wheeze, rhonchi, rales.   ABD: soft NT, ND, no rebound no guarding  MSK: No midline ttp, Moving all extremities, no edema  NEURO:  no focal neurological deficits, CN 2-12 grossly intact  SKIN: covered b/l blisters at ankles, abrasion noted below rt knee, rt chest J/P drain with gray millky fuids

## 2024-08-10 NOTE — ED PROVIDER NOTE - CLINICAL SUMMARY MEDICAL DECISION MAKING FREE TEXT BOX
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  Patient was discharged yesterday and had an unwitnessed fall down 1 step outside earlier today.  States hit his head, denies LOC or nausea or vomiting.  Poor ambulation at baseline after recent admission.  Daughter noticed since fall has had increased output from VENKATA drain now with approximately 130 cc.  Also noting darker color since.  Patient denies headaches, chest pain, shortness of breath, abdominal pain, nausea, vomiting, back pain, decree sensation.  Also denies any prodromal symptoms.  Per daughter at home nurse also gave patient 15 units of NovoLog instead of Novolin as medication was not ready yet.    Afebrile, not tachycardic, not hypotensive, lungs clear, abdomen nontender, no midline tenderness, no focal neuro deficits, abrasion noted to right knee.  Chest VENKATA drain with gray milky output.  J-tube site clean dry and intact.  Patient initially hypoglycemic, improved after 1 amp of D50.  Likely hypoglycemic in setting of given NovoLog instead of Novolin.  Given unwitnessed fall, will also check for arrhythmia, electrolyte abnormality, anemia, pan scan given recent surgeries, and discuss with cardiothoracic surgery given high VENKATA drain output.

## 2024-08-11 ENCOUNTER — OUTPATIENT (OUTPATIENT)
Dept: OUTPATIENT SERVICES | Facility: HOSPITAL | Age: 59
LOS: 1 days | Discharge: HOME | End: 2024-08-11

## 2024-08-11 DIAGNOSIS — Z95.828 PRESENCE OF OTHER VASCULAR IMPLANTS AND GRAFTS: Chronic | ICD-10-CM

## 2024-08-11 DIAGNOSIS — C15.9 MALIGNANT NEOPLASM OF ESOPHAGUS, UNSPECIFIED: ICD-10-CM

## 2024-08-12 NOTE — POST DISCHARGE NOTE - NOTIFICATION:
Patient's CT scan reflected on the Radiology Incidental Findings report for today. Patient is already established @ [facility].

## 2024-08-14 ENCOUNTER — APPOINTMENT (OUTPATIENT)
Dept: HEMATOLOGY ONCOLOGY | Facility: CLINIC | Age: 59
End: 2024-08-14

## 2024-08-16 ENCOUNTER — APPOINTMENT (OUTPATIENT)
Dept: ENDOCRINOLOGY | Facility: CLINIC | Age: 59
End: 2024-08-16
Payer: MEDICAID

## 2024-08-16 VITALS
RESPIRATION RATE: 18 BRPM | HEART RATE: 104 BPM | SYSTOLIC BLOOD PRESSURE: 140 MMHG | TEMPERATURE: 97 F | DIASTOLIC BLOOD PRESSURE: 80 MMHG | HEIGHT: 63 IN | OXYGEN SATURATION: 95 %

## 2024-08-16 DIAGNOSIS — Z79.4 LONG TERM (CURRENT) USE OF INSULIN: ICD-10-CM

## 2024-08-16 DIAGNOSIS — E11.9 TYPE 2 DIABETES MELLITUS W/OUT COMPLICATIONS: ICD-10-CM

## 2024-08-16 LAB — GLUCOSE BLDC GLUCOMTR-MCNC: 211

## 2024-08-16 PROCEDURE — 82962 GLUCOSE BLOOD TEST: CPT

## 2024-08-16 PROCEDURE — 99214 OFFICE O/P EST MOD 30 MIN: CPT

## 2024-08-16 RX ORDER — METFORMIN HYDROCHLORIDE 1000 MG/1
1000 TABLET, COATED ORAL TWICE DAILY
Qty: 1 | Refills: 1 | Status: ACTIVE | COMMUNITY
Start: 2024-08-16 | End: 1900-01-01

## 2024-08-16 NOTE — HISTORY OF PRESENT ILLNESS
[FreeTextEntry1] : 59 yearM here for assessment for Type 2 diabetes mellitus   last A1c: 12.8%  Summary:    Patient with past medical history as below, remarkable for HTN, HLD, BPH, GEJ adenoca  Patient recently d/c on tube feeding 9pm-12noon   Screening  EGFR: 93     Current diabetic medication regimen (verified with patient):  metformin 500mg po bid jardiance 25mg po daily  discharged on "novolin N 5 units Q 6hrs" and "novolog 15 units tid-ac" per discharge paperwork  Patient was hyperglycemic on the above and then took 15 units of Novolog and glucose went from >300---> 43, he had a fall and was taking to ER.       SMBG ranges (glucometer): reported 200-300mg/dl (insulin was paused.

## 2024-08-16 NOTE — ASSESSMENT
[FreeTextEntry1] : Mr. 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 Chemo started on 03/04/2024 with Dr. Mima Angelo, C5 on 04/29/2024. As per the CALGB 66490 regimen, proceeded with chemo/RT, started RT started on 05/09/2024 with Dr. Donato Mota, Planned Dose: 4140 cGy. Planned to completed Chemo/RT on 06/10/2024.  Referred by Dr. Dangelo Love.   EGD on 01/02/2024: nodule on gastric side of the GEJ with some old heme in stomach. Possible healing Kavita Valero tear. Pathology reveal GEJ bx: fragments of adenocarcinoma, moderately differentiated.  EUS on 01/17/2024 showed malignant esophageal tumor was found at the GEJ. The lesion was partially circumferential. There was sonographic evidence suggesting invasion up to the level of the muscularis propria (Layer 4). No lymphadenopathy seen.   Patient is s/p [place] Broderick esophagogastrectomy with flexible bronchoscopy and upper endoscopy on 07/16/2024.   7/25/2024: Chest xray with R sided infiltrates. Diet backed down to NPO. CT chest A/P with IV and oral contrast demonstrates small loculated right pleural effusion with foci of air and pleural cath.  Small left pleural effusion, splenic infarcts, small volume ascites.  WBC increasing and adam with murky output, Zosyn started.  7/26/2024 As per Dr. Laureano, switched back to CLD, monitor right adam output. WBC trending up. 7/28 new sinus tach, req nasal cannula, worsening RLL opacification;  pt advanced to soft diet, tachycardic CTA neg PE, increasing R hydroPTX 7/29: Vomiting-> NPO. High murky Adam drainage- 350 x 12h. Productive cough. 8/2 J-tube placement by IR 8/4 CT abd/pelv w/ contrast showing leak 8/6 EGD and stent w/ GI. GI to remove in 4-6 weeks. 8/7 TFs approved and delivered to home, ID rec d/c home on 4 weeks of PO Augmentin 8/9 discharged home  Patient is currently tube feeding from 8 pm for 18 hours. (Glucerna 1.5 [place])   I have reviewed the patient's medical records and diagnostic images at time of this office consultation and have made the following recommendation: 1.   I, RAMAN Kim, personally performed the evaluation and management (E/M) services for this established patient who follow up today with an existing condition.  That E/M includes conducting the examination, assessing all new/exacerbated/existing conditions, and establishing a plan of care.  Today, my ACP, Britney Vega ANP-C, was here to observe my evaluation and management services for this existing condition to be followed going forward.

## 2024-08-16 NOTE — CONSULT LETTER
[Dear  ___] : Dear  [unfilled], [Consult Letter:] : I had the pleasure of evaluating your patient, [unfilled]. [Please see my note below.] : Please see my note below. [Consult Closing:] : Thank you very much for allowing me to participate in the care of this patient.  If you have any questions, please do not hesitate to contact me. [Sincerely,] : Sincerely, [FreeTextEntry2] : Dr. Dangelo Love (Surgical Oncology) [FreeTextEntry3] : Lewis Laureano MD Director of Thoracic, [facility] Cardiovascular & Thoracic Surgery Assitant Professor Cardiovascular & Thoracic Surgery [facility]

## 2024-08-19 ENCOUNTER — APPOINTMENT (OUTPATIENT)
Dept: ENDOCRINOLOGY | Facility: CLINIC | Age: 59
End: 2024-08-19

## 2024-08-19 ENCOUNTER — INPATIENT (INPATIENT)
Facility: HOSPITAL | Age: 59
LOS: 2 days | Discharge: HOME HEALTH | End: 2024-08-22
Attending: THORACIC SURGERY (CARDIOTHORACIC VASCULAR SURGERY) | Admitting: THORACIC SURGERY (CARDIOTHORACIC VASCULAR SURGERY)
Payer: MEDICAID

## 2024-08-19 VITALS
RESPIRATION RATE: 18 BRPM | WEIGHT: 100.09 LBS | HEART RATE: 98 BPM | SYSTOLIC BLOOD PRESSURE: 115 MMHG | OXYGEN SATURATION: 99 % | HEIGHT: 64 IN | DIASTOLIC BLOOD PRESSURE: 65 MMHG | TEMPERATURE: 98 F

## 2024-08-19 DIAGNOSIS — R11.2 NAUSEA WITH VOMITING, UNSPECIFIED: ICD-10-CM

## 2024-08-19 DIAGNOSIS — Z95.828 PRESENCE OF OTHER VASCULAR IMPLANTS AND GRAFTS: Chronic | ICD-10-CM

## 2024-08-19 LAB
ALBUMIN SERPL ELPH-MCNC: 2.7 G/DL — LOW (ref 3.3–5)
ALP SERPL-CCNC: 145 U/L — HIGH (ref 40–120)
ALT FLD-CCNC: 10 U/L — SIGNIFICANT CHANGE UP (ref 4–41)
ANION GAP SERPL CALC-SCNC: 13 MMOL/L — SIGNIFICANT CHANGE UP (ref 7–14)
APTT BLD: 34.5 SEC — SIGNIFICANT CHANGE UP (ref 24.5–35.6)
AST SERPL-CCNC: 20 U/L — SIGNIFICANT CHANGE UP (ref 4–40)
BASOPHILS # BLD AUTO: 0.05 K/UL — SIGNIFICANT CHANGE UP (ref 0–0.2)
BASOPHILS NFR BLD AUTO: 0.5 % — SIGNIFICANT CHANGE UP (ref 0–2)
BILIRUB SERPL-MCNC: 0.4 MG/DL — SIGNIFICANT CHANGE UP (ref 0.2–1.2)
BLD GP AB SCN SERPL QL: NEGATIVE — SIGNIFICANT CHANGE UP
BUN SERPL-MCNC: 25 MG/DL — HIGH (ref 7–23)
CALCIUM SERPL-MCNC: 8.5 MG/DL — SIGNIFICANT CHANGE UP (ref 8.4–10.5)
CHLORIDE SERPL-SCNC: 94 MMOL/L — LOW (ref 98–107)
CO2 SERPL-SCNC: 25 MMOL/L — SIGNIFICANT CHANGE UP (ref 22–31)
CREAT SERPL-MCNC: 0.68 MG/DL — SIGNIFICANT CHANGE UP (ref 0.5–1.3)
EGFR: 106 ML/MIN/1.73M2 — SIGNIFICANT CHANGE UP
EOSINOPHIL # BLD AUTO: 0.01 K/UL — SIGNIFICANT CHANGE UP (ref 0–0.5)
EOSINOPHIL NFR BLD AUTO: 0.1 % — SIGNIFICANT CHANGE UP (ref 0–6)
GLUCOSE BLDC GLUCOMTR-MCNC: 160 MG/DL — HIGH (ref 70–99)
GLUCOSE BLDC GLUCOMTR-MCNC: 167 MG/DL — HIGH (ref 70–99)
GLUCOSE SERPL-MCNC: 231 MG/DL — HIGH (ref 70–99)
HCT VFR BLD CALC: 27.1 % — LOW (ref 39–50)
HGB BLD-MCNC: 8.7 G/DL — LOW (ref 13–17)
IANC: 8.45 K/UL — HIGH (ref 1.8–7.4)
IMM GRANULOCYTES NFR BLD AUTO: 1.8 % — HIGH (ref 0–0.9)
INR BLD: 0.93 RATIO — SIGNIFICANT CHANGE UP (ref 0.85–1.18)
LYMPHOCYTES # BLD AUTO: 1.19 K/UL — SIGNIFICANT CHANGE UP (ref 1–3.3)
LYMPHOCYTES # BLD AUTO: 11.2 % — LOW (ref 13–44)
MAGNESIUM SERPL-MCNC: 2.3 MG/DL — SIGNIFICANT CHANGE UP (ref 1.6–2.6)
MCHC RBC-ENTMCNC: 28.3 PG — SIGNIFICANT CHANGE UP (ref 27–34)
MCHC RBC-ENTMCNC: 32.1 GM/DL — SIGNIFICANT CHANGE UP (ref 32–36)
MCV RBC AUTO: 88.3 FL — SIGNIFICANT CHANGE UP (ref 80–100)
MONOCYTES # BLD AUTO: 0.71 K/UL — SIGNIFICANT CHANGE UP (ref 0–0.9)
MONOCYTES NFR BLD AUTO: 6.7 % — SIGNIFICANT CHANGE UP (ref 2–14)
NEUTROPHILS # BLD AUTO: 8.45 K/UL — HIGH (ref 1.8–7.4)
NEUTROPHILS NFR BLD AUTO: 79.7 % — HIGH (ref 43–77)
NRBC # BLD: 0 /100 WBCS — SIGNIFICANT CHANGE UP (ref 0–0)
NRBC # FLD: 0 K/UL — SIGNIFICANT CHANGE UP (ref 0–0)
PHOSPHATE SERPL-MCNC: 3.5 MG/DL — SIGNIFICANT CHANGE UP (ref 2.5–4.5)
PLATELET # BLD AUTO: 387 K/UL — SIGNIFICANT CHANGE UP (ref 150–400)
POTASSIUM SERPL-MCNC: 4.7 MMOL/L — SIGNIFICANT CHANGE UP (ref 3.5–5.3)
POTASSIUM SERPL-SCNC: 4.7 MMOL/L — SIGNIFICANT CHANGE UP (ref 3.5–5.3)
PROT SERPL-MCNC: 6.9 G/DL — SIGNIFICANT CHANGE UP (ref 6–8.3)
PROTHROM AB SERPL-ACNC: 10.4 SEC — SIGNIFICANT CHANGE UP (ref 9.5–13)
RBC # BLD: 3.07 M/UL — LOW (ref 4.2–5.8)
RBC # FLD: 15.3 % — HIGH (ref 10.3–14.5)
RH IG SCN BLD-IMP: POSITIVE — SIGNIFICANT CHANGE UP
SODIUM SERPL-SCNC: 132 MMOL/L — LOW (ref 135–145)
WBC # BLD: 10.6 K/UL — HIGH (ref 3.8–10.5)
WBC # FLD AUTO: 10.6 K/UL — HIGH (ref 3.8–10.5)

## 2024-08-19 PROCEDURE — 71275 CT ANGIOGRAPHY CHEST: CPT | Mod: 26,MC

## 2024-08-19 PROCEDURE — 99285 EMERGENCY DEPT VISIT HI MDM: CPT

## 2024-08-19 PROCEDURE — 74174 CTA ABD&PLVS W/CONTRAST: CPT | Mod: 26,MC

## 2024-08-19 RX ORDER — HEPARIN SODIUM,BOVINE 1000/ML
2500 VIAL (ML) INJECTION EVERY 12 HOURS
Refills: 0 | Status: DISCONTINUED | OUTPATIENT
Start: 2024-08-19 | End: 2024-08-20

## 2024-08-19 RX ORDER — PANTOPRAZOLE SODIUM 40 MG
40 TABLET, DELAYED RELEASE (ENTERIC COATED) ORAL EVERY 12 HOURS
Refills: 0 | Status: DISCONTINUED | OUTPATIENT
Start: 2024-08-19 | End: 2024-08-22

## 2024-08-19 RX ORDER — IOHEXOL 350 MG/ML
30 INJECTION, SOLUTION INTRAVENOUS ONCE
Refills: 0 | Status: DISCONTINUED | OUTPATIENT
Start: 2024-08-19 | End: 2024-08-19

## 2024-08-19 RX ADMIN — Medication 1: at 19:25

## 2024-08-19 RX ADMIN — Medication 75 MILLILITER(S): at 22:54

## 2024-08-19 NOTE — H&P ADULT - RESPIRATORY COMMENTS
Breathing comfortably on room air, symmetrical chest rise. No resp accessory muscle use or signs of resp distress

## 2024-08-19 NOTE — ED ADULT TRIAGE NOTE - CHIEF COMPLAINT QUOTE
Patient brought to ER by EMS from home. Patient has esophageal cancer and on 7/16 had a procedure done and at 4a.m. he vomited coffee ground vomitus times one. type 2 DM: FS: 264 22 gauge to left arm Patient brought to ER by EMS from home. Patient has esophageal cancer and on 7/16 had a procedure done and at 4a.m. he vomited coffee ground vomitus times one. type 2 DM: FS: 264 22 gauge to left arm. Patient also has a right drainage tube from procedure and a feeding tube that might have some leakage

## 2024-08-19 NOTE — ED PROVIDER NOTE - WET READ LAUNCH FT
HIS LEGS HAVE BEEN SWELLING - FOR THE LAST MONTH - HE TAKES A WATER PILL EVERY MORNING AND A POTASSIUM PILL?           Chela 104 @  [phone] There are no Wet Read(s) to document.

## 2024-08-19 NOTE — CONSULT NOTE ADULT - ASSESSMENT
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 CTPA 7/28 with moderate right loculated hydropneumothorax with interval increase in size and small right apical PTX /p EGD on 8/5 with esophago-gastric anastomotic leak with placement of one 20 mm by 10 cm  fully covered esophageal stent (Hanaro) was deployed across the anastomosis presenting to the hospital with new bloody output from the VENKATA drain and CGE.     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 CTPA 7/28 with moderate right loculated hydropneumothorax with interval increase in size and small right apical PTX. CTAP with PO contrast on 7/4 with note of extensive contrast and air extravasation into the right pleural space consistent with anastomotic leak. s/p EGD on 8/5 with esophago-gastric anastomotic leak with placement of one 20 mm by 10 cm  fully covered esophageal stent (Hanaro) was deployed across the anastomosis. Pt currently presenting to the hospital with bloody VENKATA drain output with an episode of CGE this morning although with no passage of melena or hematochezia with pt having liquid brown BMs and workup thus fart notable for hgb of 8.4 from recent d/c of.7. VENKATA drain output c/f underlying bleeding vs. stent migration. Please obtain CT angio of the chest/abdomen withOUT PO contrast (will limit study to asses for bleeding if given PO contrast) to asses for any active bleeding. If CTA negative, can then plan for CT with PO contrast to asses status of underling leak.     Recommendations:  -Please obtain stat CT angio of the chest/abdomen withOUT PO contrast (will limit study to asses for bleeding if given PO contrast) to asses for any active bleeding->If CTA negative, can then plan for CT with PO contrast to asses status of underling leak.   -Please keep NPO and hold J tube feeds  -Monitor VENKATA drain output.  -Trend CBC BID and transfuse to keep hgb>7   -Please start IV PPI BID     All recommendations are tentative until note is attested by attending.

## 2024-08-19 NOTE — ED PROVIDER NOTE - PHYSICAL EXAMINATION
GEN - NAD; well appearing; A+O x3   HEAD - NC/AT   ENT: Airway patent, mmm  NECK: Neck supple  CHEST: Rdrain noted with bloody output  CV: S1, S2  Abdominal: Soft, NT, ND +BS, Adam drain to bulb with yellow/clear drainage  EXTREMITIES - FROM   NEUROLOGIC - alert, speech clear  PSYCH -nl mood/affect, nl insight.

## 2024-08-19 NOTE — ED ADULT NURSE NOTE - NSFALLRISKINTERV_ED_ALL_ED
Assistance OOB with selected safe patient handling equipment if applicable/Assistance with ambulation/Communicate fall risk and risk factors to all staff, patient, and family/Provide visual cue: yellow wristband, yellow gown, etc/Reinforce activity limits and safety measures with patient and family/Call bell, personal items and telephone in reach/Instruct patient to call for assistance before getting out of bed/chair/stretcher/Non-slip footwear applied when patient is off stretcher/[place] to call system/Physically safe environment - no spills, clutter or unnecessary equipment/Purposeful Proactive Rounding/Room/bathroom lighting operational, light cord in reach Initial Size Of Lesion: 1.3

## 2024-08-19 NOTE — H&P ADULT - ASSESSMENT
Assessment: 59 yr M w/ pmh HTN, DM2 s/p [place] Broderick on 7/16/24 w/ postop course c/b high VENKATA drain output. found on EGD  to have esophago-gastric anastomotic now s/p esophageal stent placement by GI on 8/5, now presenting to the hospital with emesis and new bloody output from the VENKATA drain    Plan  - Admit to Thoracic Surgery under Dr. Laureano  - Emesis control  - Trend Hgb  - Tentative endoscopy with GI service    Plan discussed with and in agreement with Dr. Laureano    Thoracic Surgery  [id number]

## 2024-08-19 NOTE — ED PROVIDER NOTE - PROGRESS NOTE DETAILS
CT surgery initially recommended CT chest with oral contrast, GI at bedside stating would prefer patient to CT angio chest abdomen pelvis for concern for stent migration, they also spoke with CT surgery on the phone to confirm new plan for imaging.

## 2024-08-19 NOTE — H&P ADULT - HISTORY OF PRESENT ILLNESS
59 yr M w/ pmh HTN, DM2 s/p [place] Broderick on 7/16/24 w/ postop course c/b high VENKATA drain output. found on EGD  to have esophago-gastric anastomotic now s/p esophageal stent placement by GI on 8/5, now presenting to the hospital with emesis and new bloody output from the VENKATA drain. Early in morning, around 4AM, pt states that he was coughing and then had dark colored emesis, pt subsequently noticed that lavern drain output color had turned red. Pt did not endorse any sick contacts or further emesis. Pt did not endorse chest pain, SOB, fevers/chills, or any subsequent N/V.

## 2024-08-19 NOTE — ED ADULT NURSE NOTE - OBJECTIVE STATEMENT
Pt received in no acute distress, with spouse at bedside. Pt reports hx of esophageal CA, with esophagectomy surgery performed in July with VENKATA drain to the left chest and reports having bloody output noted today. Pt reports having vomiting yesterday. Pt with PEG tube in placed. Denies fever, chest pain, sob, and diarrhea. Labs drawn, IV established, and pt pending CT scan.

## 2024-08-19 NOTE — ED ADULT NURSE REASSESSMENT NOTE - AS TEMP SITE
Crutches  [] Brace  [] Splint  [] Sling  [] Immobilizer   [] Whirlpool  [] Massage  [] Pneumatic  [] Rehab/Exercise  [] Other:   Guardian Contacted: Yes, Phone Call:    Comments / Instructions: will start rehab and ease back as tolerated  Follow-Up Care / Instructions:    HEP Information:    Discharged: No  Electronically Signed By: Karen Paula, SOFYA, LAT, ATC oral

## 2024-08-19 NOTE — ED ADULT NURSE NOTE - CHIEF COMPLAINT QUOTE
Patient brought to ER by EMS from home. Patient has esophageal cancer and on 7/16 had a procedure done and at 4a.m. he vomited coffee ground vomitus times one. type 2 DM: FS: 264 22 gauge to left arm. Patient also has a right drainage tube from procedure and a feeding tube that might have some leakage

## 2024-08-19 NOTE — CONSULT NOTE ADULT - NS ATTEND AMEND GEN_ALL_CORE FT
Patient seen and examined. Pt states yesterday noticed bloody output from surgical VENKATA drain, then today with coffee ground emesis x1. Currently HD stable. No melena. No recurrence in CGE. Appears well. No further episodes here. Recent esophageal stent placement for anastomotic leak post [place] Broderick. Recommend CT angio r/o fistula/active bleed. If negative, then can plan tentatively for EGD to evaluate stent and r/o ulceration at distal end as cause of bleed. Maintain NPO for now.    Total time spent to complete patient's bedside assessment, physical examination, review medical chart including labs & imaging, discuss medical plan of care with housestaff was more than 50 minutes.

## 2024-08-19 NOTE — H&P ADULT - NSHPLABSRESULTS_GEN_ALL_CORE
8.7                   Neurophils% (auto):   79.7   (08-19 @ 13:34):    10.60)-----------(387          Lymphocytes% (auto):  11.2                                          27.1                   Eosinphils% (auto):   0.1      Manual%: Neutrophils x    ; Lymphocytes x    ; Eosinophils x    ; Bands%: x    ; Blasts x          08-19    132<L>  |  94<L>  |  25<H>  ----------------------------<  231<H>  4.7   |  25  |  0.68    Ca    8.5      19 Aug 2024 13:34  Phos  3.5     08-19  Mg     2.30     08-19    TPro  6.9  /  Alb  2.7<L>  /  TBili  0.4  /  DBili  x   /  AST  20  /  ALT  10  /  AlkPhos  145<H>  08-19    ( 08-19 @ 13:34 )   PT: 10.4 sec;   INR: 0.93 ratio  aPTT: 34.5 sec        RECENT CULTURES:        PT/INR - ( 19 Aug 2024 13:34 )   PT: 10.4 sec;   INR: 0.93 ratio         PTT - ( 19 Aug 2024 13:34 )  PTT:34.5 sec

## 2024-08-19 NOTE — ED ADULT NURSE REASSESSMENT NOTE - NS ED NURSE REASSESS COMMENT FT1
Received report on pt from day RN Pt resting comfortably in stretcher, respirations are even and unlabored. Pt endorsing no complaints at this time. Awaiting transport to floor

## 2024-08-19 NOTE — H&P ADULT - GIT GEN HX ROS MEA POS PC
Appointment Information   Name: Deanne Barnes MRN: [id number]       [facility] [id number]   Date: 11/24/2020 Status: Chris   Appt Time: 9:45 AM Length: 45   Visit Type: CONSULT VIRTUAL VISIT [7761] Copay: $0.00   Provider: Brook Bernal PA-C Department:  GASTROENTEROLOGY   :         Video:       Bill Area:      Referral Number: [id number] Referral Status: No Authorization Required   Notes: Generalized abdominal pain [R10.84]   Non-intractable vomiting with nausea, unspecified vomiting type [R11.2]   Made On: 10/23/2020 11:22 AM By: ABUNDIO PALMER [[id number]] (ES)   No Authorization Required           vomiting

## 2024-08-19 NOTE — ED ADULT TRIAGE NOTE - IDEAL BODY WEIGHT(KG)
Procedure:   [x] Radio Frequency Ablation (RFA)    Post Procedure Instructions:   Activity:  DO NOT work, drive a car, stay alone, or operate machinery or electrical/power tools or appliances today.  Activity as tolerated.  Resume your pre-procedure activity or restrictions tomorrow.    Dressing/Incision Site:   Keep injection site clean and dry.  You may shower/bathe tomorrow.  You may use an ice pack at the injection site for the next 24 hours while awake.  The ice pack should only be used for 20 minutes every 2 hours.    Special Instructions:   DO NOT DRINK ALCOHOL TODAY due to the medication given during the procedure.  Resume your pre-procedure diet.  Continue your medications unless otherwise instructed.  You will most likely have continued pain after the procedure; continue your usual pain medications unless otherwise instructed.    Call [phone] if you develop any of the following:  Drainage, increase in redness, and/or swelling from the injection site.  Temperature above 101oF.  Increased numbness or weakness of your legs or arms different than before the injection.    Home Instruction Sheet  ANESTHESIA for ADULT       What are the side effects?  Side effects depend on the medication used, and may not even be present.    Most Common Sometimes   Irritability  Poor Balance  Sleeping for Several Hours  Drowsiness  Fatigue  Difficulty Concentrating Change in Behavior  Hyperactivity  Nausea (upset stomach)  Gas (flatulence)  Dizziness  Hiccups  Constipation  Blurred Vision     The effects of sedation medicine can last up to 24 hours.  You may be drowsy or irritable for 2 to 8 hours after receiving medicine.  You may need to sleep after leaving the testing area.  Sleeping after sedation will help reduce irritability.  Diet:  Do not give anything to eat or drink until you are fully awake.  Eat a light meal and advance to a normal diet unless instructed differently:   Do not drink alcohol beverages for the  next 24 hours.  Avoid greasy or spicy foods today.  Activity:  You may be dizzy and/or unsteady.  Ask for help walking, using the bathroom, or stairs to protect yourself from injury.  You should not drive a vehicle, operate machinery or power tools for 24 hours because your reflexes may be slow and your vision may be blurry.  Do not sign any legally binding documents or make important decisions for 24 hours after receiving sedation.  Medications:   Unless told otherwise, do not take any non-prescription medications (cold medicine, etc.) for 24 hours, as these medications in combination with sedation medication, can cause increased drowsiness.  If you feel that you need over the counter medication, discuss with your physician.    When Should I Call the Doctor?  Vomiting more than twice.  Extreme irritability or unusual changes in behavior.  Trouble arousing.  Inability to urinate.  Trouble breathing - call 911.    We would like to take this opportunity to thank you. Because your confidence in your caregivers is very important to us, it is our commitment to always treat you and your family with courtesy and respect. Our goal is to always answer any questions or worries or concerns you may have about the care you received.  If you have any suggestions for improvement or worries or concerns please do not hesitate to let us know.         Want to Say “Thank You” to a Nurse?  The RACH Award® was created in memory of JYOTSNA West by his family to say thank you to bedside nurses who provide an outstanding level of care.    Submit a nomination using any method below.     OR    [email or web address]  Or visit the Resource section   on your bookletmobile beatrice                                                                                                [phone]

## 2024-08-19 NOTE — PROVIDER CONTACT NOTE (OTHER) - ASSESSMENT
40 ml of sanguineous drainage output from VENKATA drain. Pt. reports no symptoms of N/V. Had 1x ep of bloody emesis in ED.

## 2024-08-19 NOTE — CONSULT NOTE ADULT - SUBJECTIVE AND OBJECTIVE BOX
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 CTPA 7/28 with moderate right loculated hydropneumothorax with interval increase in size and small right apical PTX /p EGD on 8/5 with esophago-gastric anastomotic leak with placement of one 20 mm by 10 cm  fully covered esophageal stent (Hanaro) was deployed across the anastomosis presenting to the hospital with new bloody output from the VENKATA drain and CGE.       Allergies:  No Known Allergies      Home Medications:  amLODIPine 5 mg oral tablet: 1 tab(s) orally once a day Noon (10 Jul 2024 09:06)  atorvastatin 20 mg oral tablet: 1 tab(s) orally once a day noon (10 Jul 2024 09:06)  Jardiance 25 mg oral tablet: 1 tab(s) orally once a day Noon (10 Jul 2024 09:06)  losartan-hydrochlorothiazide 50 mg-12.5 mg oral tablet: 1 tab(s) orally once a day (10 Jul 2024 09:06)  metFORMIN 500 mg oral tablet: 1 tab(s) orally 2 times a day (10 Jul 2024 09:16)  NovoLOG 100 units/mL subcutaneous solution: subcutaneous 3 times a day (before meals) 15 units (10 Jul 2024 09:44)  Sodium Chloride, 1 G, PO, 2 times Daily:  (10 Jul 2024 09:16)  tamsulosin 0.4 mg oral capsule: 1 cap(s) orally once a day (at bedtime) (10 Jul 2024 09:16)      Hospital Medications: None    PMHX/PSHX:    DM (diabetes mellitus)  HLD (hyperlipidemia)  Insulin dependent type 2 diabetes mellitus  BPH (benign prostatic hyperplasia)  HTN (hypertension)  Gastroesophageal cancer s/p [place] Broderick c/b leak s/p stenting   History of removal of Port-a-Cath  Port-A-Cath in place      Family history:  No pertinent family history in first degree relatives    Social History:   Tob: Denies  EtOH: Denies  Illicit Drugs: Denies    ROS: Complete and normal except as mentioned above    PHYSICAL EXAM:   GENERAL:  No acute distress  HEENT:  NCAT, no scleral icterus   CHEST:  no respiratory distress  HEART:  Regular rate and rhythm  ABDOMEN:  Soft, non-tender, non-distended, normoactive bowel sounds. VENKATA drain with bright red blood. J tube in place with billiuus output   EXTREMITIES: No edema  NEURO:  Alert and oriented x 3,    Vital Signs:  Vital Signs Last 24 Hrs  T(C): 36.7 (19 Aug 2024 11:58), Max: 36.7 (19 Aug 2024 11:58)  T(F): 98 (19 Aug 2024 11:58), Max: 98 (19 Aug 2024 11:58)  HR: 98 (19 Aug 2024 11:58) (98 - 98)  BP: 115/65 (19 Aug 2024 11:58) (115/65 - 115/65)  BP(mean): --  RR: 18 (19 Aug 2024 11:58) (18 - 18)  SpO2: 99% (19 Aug 2024 11:58) (99% - 99%)    Parameters below as of 19 Aug 2024 11:58  Patient On (Oxygen Delivery Method): room air      Daily Height in cm: 162.56 (19 Aug 2024 11:58)    Daily     LABS:                        8.7    10.60 )-----------( 387      ( 19 Aug 2024 13:34 )             27.1     Mean Cell Volume: 88.3 fL (08-19-24 @ 13:34)    08-19    132<L>  |  94<L>  |  25<H>  ----------------------------<  231<H>  4.7   |  25  |  0.68    Ca    8.5      19 Aug 2024 13:34  Phos  3.5     08-19  Mg     2.30     08-19    TPro  6.9  /  Alb  2.7<L>  /  TBili  0.4  /  DBili  x   /  AST  20  /  ALT  10  /  AlkPhos  145<H>  08-19    LIVER FUNCTIONS - ( 19 Aug 2024 13:34 )  Alb: 2.7 g/dL / Pro: 6.9 g/dL / ALK PHOS: 145 U/L / ALT: 10 U/L / AST: 20 U/L / GGT: x           PT/INR - ( 19 Aug 2024 13:34 )   PT: 10.4 sec;   INR: 0.93 ratio         PTT - ( 19 Aug 2024 13:34 )  PTT:34.5 sec  Urinalysis Basic - ( 19 Aug 2024 13:34 )    Color: x / Appearance: x / SG: x / pH: x  Gluc: 231 mg/dL / Ketone: x  / Bili: x / Urobili: x   Blood: x / Protein: x / Nitrite: x   Leuk Esterase: x / RBC: x / WBC x   Sq Epi: x / Non Sq Epi: x / Bacteria: x                              8.7    10.60 )-----------( 387      ( 19 Aug 2024 13:34 )             27.1       Imaging:  < from: CT Abdomen and Pelvis w/ IV Cont (08.10.24 @ 17:35) >  IMPRESSION:  Fracture of the right eighth posterior rib with increased displacement   since prior scan.  No other acute findings in the abdomen or pelvis.        < end of copied text >  < from: Upper Endoscopy (08.05.24 @ 17:35) >       An esophago-gastric anastomosis was found at 26 cm from the incisors.        There were areas clean based ulceration with one visible defect        (measured approximately 6 mm) at the anastomosis consistent with known        leak. To cover the defect and facilitate healing, one 20 mm by 10 cm        fully covered esophageal stent ([employer]) was deployed across the        anastomosis. The stent was in good position traversing the leak with the        proximal end at 20 cm from the incisors. The proximal end of the stent        was then secured in place using one endoscopic suture.       The entire examined stomach was normal.       The examined portions of duodenum were normal.                                                                                   Impression:          - Esophago-gastric anastomotic leak as above. Esophageal                        stent placed.  Recommendation:      - [facility] patient [facility] for [facility].                       - Monitor VENKATA drain output.                       - Recommend repeat EGD for stent removal/reassessment in                      4-6 weeks pending clinical progress.    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 CTPA 7/28 with moderate right loculated hydropneumothorax with interval increase in size and small right apical PTX /p EGD on 8/5 with esophago-gastric anastomotic leak with placement of one 20 mm by 10 cm  fully covered esophageal stent (Hanaro) was deployed across the anastomosis presenting to the hospital with new bloody output from the VENKATA drain and CGE.     Pt was discharged from the hospital and overall feeling well. This morning, pt noted bloody VENKATA drain output with an episode of CGE. No further episodes since this AM with pt without any ongoing fevers, chills, nausea, vomiting or abd pain. Pt has been tolerating tube feeds with no passage of melena or hemaotchezia with pt having liquid brown BMs. Vitals in the ED within normal limits with CBC with hgb of 8.7 from d/c hgb of 8.4. CT currently pending.     Allergies:  No Known Allergies      Home Medications:  amLODIPine 5 mg oral tablet: 1 tab(s) orally once a day Noon (10 Jul 2024 09:06)  atorvastatin 20 mg oral tablet: 1 tab(s) orally once a day noon (10 Jul 2024 09:06)  Jardiance 25 mg oral tablet: 1 tab(s) orally once a day Noon (10 Jul 2024 09:06)  losartan-hydrochlorothiazide 50 mg-12.5 mg oral tablet: 1 tab(s) orally once a day (10 Jul 2024 09:06)  metFORMIN 500 mg oral tablet: 1 tab(s) orally 2 times a day (10 Jul 2024 09:16)  NovoLOG 100 units/mL subcutaneous solution: subcutaneous 3 times a day (before meals) 15 units (10 Jul 2024 09:44)  Sodium Chloride, 1 G, PO, 2 times Daily:  (10 Jul 2024 09:16)  tamsulosin 0.4 mg oral capsule: 1 cap(s) orally once a day (at bedtime) (10 Jul 2024 09:16)      Hospital Medications: None    PMHX/PSHX:    DM (diabetes mellitus)  HLD (hyperlipidemia)  Insulin dependent type 2 diabetes mellitus  BPH (benign prostatic hyperplasia)  HTN (hypertension)  Gastroesophageal cancer s/p [facility] Broderick c/b leak s/p stenting   History of removal of Port-a-Cath  Port-A-Cath in place      Family history:  No pertinent family history in first degree relatives    Social History:   Tob: Denies  EtOH: Denies  Illicit Drugs: Denies    ROS: Complete and normal except as mentioned above    PHYSICAL EXAM:   GENERAL:  No acute distress  HEENT:  NCAT, no scleral icterus   CHEST:  no respiratory distress  HEART:  Regular rate and rhythm  ABDOMEN:  Soft, non-tender, non-distended, normoactive bowel sounds. VENKATA drain with bright red blood. J tube in place with billiuus output   EXTREMITIES: No edema  NEURO:  Alert and oriented x 3,    Vital Signs:  Vital Signs Last 24 Hrs  T(C): 36.7 (19 Aug 2024 11:58), Max: 36.7 (19 Aug 2024 11:58)  T(F): 98 (19 Aug 2024 11:58), Max: 98 (19 Aug 2024 11:58)  HR: 98 (19 Aug 2024 11:58) (98 - 98)  BP: 115/65 (19 Aug 2024 11:58) (115/65 - 115/65)  BP(mean): --  RR: 18 (19 Aug 2024 11:58) (18 - 18)  SpO2: 99% (19 Aug 2024 11:58) (99% - 99%)    Parameters below as of 19 Aug 2024 11:58  Patient On (Oxygen Delivery Method): room air      Daily Height in cm: 162.56 (19 Aug 2024 11:58)    Daily     LABS:                        8.7    10.60 )-----------( 387      ( 19 Aug 2024 13:34 )             27.1     Mean Cell Volume: 88.3 fL (08-19-24 @ 13:34)    08-19    132<L>  |  94<L>  |  25<H>  ----------------------------<  231<H>  4.7   |  25  |  0.68    Ca    8.5      19 Aug 2024 13:34  Phos  3.5     08-19  Mg     2.30     08-19    TPro  6.9  /  Alb  2.7<L>  /  TBili  0.4  /  DBili  x   /  AST  20  /  ALT  10  /  AlkPhos  145<H>  08-19    LIVER FUNCTIONS - ( 19 Aug 2024 13:34 )  Alb: 2.7 g/dL / Pro: 6.9 g/dL / ALK PHOS: 145 U/L / ALT: 10 U/L / AST: 20 U/L / GGT: x           PT/INR - ( 19 Aug 2024 13:34 )   PT: 10.4 sec;   INR: 0.93 ratio         PTT - ( 19 Aug 2024 13:34 )  PTT:34.5 sec  Urinalysis Basic - ( 19 Aug 2024 13:34 )    Color: x / Appearance: x / SG: x / pH: x  Gluc: 231 mg/dL / Ketone: x  / Bili: x / Urobili: x   Blood: x / Protein: x / Nitrite: x   Leuk Esterase: x / RBC: x / WBC x   Sq Epi: x / Non Sq Epi: x / Bacteria: x                              8.7    10.60 )-----------( 387      ( 19 Aug 2024 13:34 )             27.1       Imaging:  < from: CT Abdomen and Pelvis w/ IV Cont (08.10.24 @ 17:35) >  IMPRESSION:  Fracture of the right eighth posterior rib with increased displacement   since prior scan.  No other acute findings in the abdomen or pelvis.        < end of copied text >  < from: Upper Endoscopy (08.05.24 @ 17:35) >       An esophago-gastric anastomosis was found at 26 cm from the incisors.        There were areas clean based ulceration with one visible defect        (measured approximately 6 mm) at the anastomosis consistent with known        leak. To cover the defect and facilitate healing, one 20 mm by 10 cm        fully covered esophageal stent (Hanaro) was deployed across the        anastomosis. The stent was in good position traversing the leak with the        proximal end at 20 cm from the incisors. The proximal end of the stent        was then secured in place using one endoscopic suture.       The entire examined stomach was normal.       The examined portions of duodenum were normal.                                                                                   Impression:          - Esophago-gastric anastomotic leak as above. Esophageal                        stent placed.  Recommendation:      - Return patient to [facility] for [facility] care.                       - Monitor VENKATA drain output.                       - Recommend repeat EGD for stent removal/reassessment in                      4-6 weeks pending clinical progress.    < end of copied text >

## 2024-08-19 NOTE — ED PROVIDER NOTE - CLINICAL SUMMARY MEDICAL DECISION MAKING FREE TEXT BOX
Pt is s/p esophagectomy for gastroesophageal adenocarcinoma p/w bloody output x1 day from R drain and 1 episode dark brown emesis. Denies f/c/s, sob, pain. CT Surg and GI consulted, rec CT chest, pending final recs and labs, HDS

## 2024-08-20 ENCOUNTER — APPOINTMENT (OUTPATIENT)
Dept: THORACIC SURGERY | Facility: CLINIC | Age: 59
End: 2024-08-20

## 2024-08-20 LAB
ANION GAP SERPL CALC-SCNC: 18 MMOL/L — HIGH (ref 7–14)
APTT BLD: 33.4 SEC — SIGNIFICANT CHANGE UP (ref 24.5–35.6)
BLD GP AB SCN SERPL QL: NEGATIVE — SIGNIFICANT CHANGE UP
BUN SERPL-MCNC: 19 MG/DL — SIGNIFICANT CHANGE UP (ref 7–23)
CALCIUM SERPL-MCNC: 8.4 MG/DL — SIGNIFICANT CHANGE UP (ref 8.4–10.5)
CHLORIDE SERPL-SCNC: 97 MMOL/L — LOW (ref 98–107)
CO2 SERPL-SCNC: 20 MMOL/L — LOW (ref 22–31)
CREAT SERPL-MCNC: 0.73 MG/DL — SIGNIFICANT CHANGE UP (ref 0.5–1.3)
EGFR: 104 ML/MIN/1.73M2 — SIGNIFICANT CHANGE UP
GLUCOSE BLDC GLUCOMTR-MCNC: 133 MG/DL — HIGH (ref 70–99)
GLUCOSE BLDC GLUCOMTR-MCNC: 146 MG/DL — HIGH (ref 70–99)
GLUCOSE BLDC GLUCOMTR-MCNC: 147 MG/DL — HIGH (ref 70–99)
GLUCOSE BLDC GLUCOMTR-MCNC: 168 MG/DL — HIGH (ref 70–99)
GLUCOSE BLDC GLUCOMTR-MCNC: 185 MG/DL — HIGH (ref 70–99)
GLUCOSE SERPL-MCNC: 137 MG/DL — HIGH (ref 70–99)
HCT VFR BLD CALC: 27.3 % — LOW (ref 39–50)
HCT VFR BLD CALC: 27.9 % — LOW (ref 39–50)
HGB BLD-MCNC: 8.8 G/DL — LOW (ref 13–17)
HGB BLD-MCNC: 9.1 G/DL — LOW (ref 13–17)
INR BLD: 0.99 RATIO — SIGNIFICANT CHANGE UP (ref 0.85–1.18)
MAGNESIUM SERPL-MCNC: 2.2 MG/DL — SIGNIFICANT CHANGE UP (ref 1.6–2.6)
MCHC RBC-ENTMCNC: 28.5 PG — SIGNIFICANT CHANGE UP (ref 27–34)
MCHC RBC-ENTMCNC: 29.6 PG — SIGNIFICANT CHANGE UP (ref 27–34)
MCHC RBC-ENTMCNC: 32.2 GM/DL — SIGNIFICANT CHANGE UP (ref 32–36)
MCHC RBC-ENTMCNC: 32.6 GM/DL — SIGNIFICANT CHANGE UP (ref 32–36)
MCV RBC AUTO: 88.3 FL — SIGNIFICANT CHANGE UP (ref 80–100)
MCV RBC AUTO: 90.9 FL — SIGNIFICANT CHANGE UP (ref 80–100)
NRBC # BLD: 0 /100 WBCS — SIGNIFICANT CHANGE UP (ref 0–0)
NRBC # BLD: 0 /100 WBCS — SIGNIFICANT CHANGE UP (ref 0–0)
NRBC # FLD: 0 K/UL — SIGNIFICANT CHANGE UP (ref 0–0)
NRBC # FLD: 0 K/UL — SIGNIFICANT CHANGE UP (ref 0–0)
PHOSPHATE SERPL-MCNC: 3.8 MG/DL — SIGNIFICANT CHANGE UP (ref 2.5–4.5)
PLATELET # BLD AUTO: 409 K/UL — HIGH (ref 150–400)
PLATELET # BLD AUTO: 438 K/UL — HIGH (ref 150–400)
POTASSIUM SERPL-MCNC: 4.4 MMOL/L — SIGNIFICANT CHANGE UP (ref 3.5–5.3)
POTASSIUM SERPL-SCNC: 4.4 MMOL/L — SIGNIFICANT CHANGE UP (ref 3.5–5.3)
PROTHROM AB SERPL-ACNC: 11.2 SEC — SIGNIFICANT CHANGE UP (ref 9.5–13)
RBC # BLD: 3.07 M/UL — LOW (ref 4.2–5.8)
RBC # BLD: 3.09 M/UL — LOW (ref 4.2–5.8)
RBC # FLD: 15.1 % — HIGH (ref 10.3–14.5)
RBC # FLD: 15.2 % — HIGH (ref 10.3–14.5)
RH IG SCN BLD-IMP: POSITIVE — SIGNIFICANT CHANGE UP
SODIUM SERPL-SCNC: 135 MMOL/L — SIGNIFICANT CHANGE UP (ref 135–145)
WBC # BLD: 9.26 K/UL — SIGNIFICANT CHANGE UP (ref 3.8–10.5)
WBC # BLD: 9.99 K/UL — SIGNIFICANT CHANGE UP (ref 3.8–10.5)
WBC # FLD AUTO: 9.26 K/UL — SIGNIFICANT CHANGE UP (ref 3.8–10.5)
WBC # FLD AUTO: 9.99 K/UL — SIGNIFICANT CHANGE UP (ref 3.8–10.5)

## 2024-08-20 PROCEDURE — 71045 X-RAY EXAM CHEST 1 VIEW: CPT | Mod: 26

## 2024-08-20 PROCEDURE — 99232 SBSQ HOSP IP/OBS MODERATE 35: CPT | Mod: GC

## 2024-08-20 PROCEDURE — 99231 SBSQ HOSP IP/OBS SF/LOW 25: CPT

## 2024-08-20 RX ADMIN — Medication 40 MILLIGRAM(S): at 06:13

## 2024-08-20 RX ADMIN — Medication 75 MILLILITER(S): at 07:53

## 2024-08-20 RX ADMIN — Medication 40 MILLIGRAM(S): at 17:51

## 2024-08-20 RX ADMIN — Medication 2500 UNIT(S): at 06:15

## 2024-08-20 RX ADMIN — Medication 1: at 19:13

## 2024-08-20 NOTE — CHART NOTE - NSCHARTNOTEFT_GEN_A_CORE
CTA negative for any active bleeding. Hgb stable at 9.1 from 8.7. Please keep NPO and hold tube feeds, plan for EGD today for luminal assessment to asses for any luminal etiologies of bloody VENKATA output.       Carlos Tillman, PGY-6  Gastroenterology/Hepatology Fellow  Available on Microsoft Teams   [phone] (Long Range Pager)  [phone] (Short Range Pager [facility])    After 5pm, please contact the on-call GI fellow for any urgent issues via the Hospital Call .

## 2024-08-20 NOTE — ASU PREOP CHECKLIST - LATEX ALLERGY
Report to Dileep Valdovinos RN Pt aware ? Rupture on antibiotics and steroids andd clear liquid for now. Pt understands and Nicu to visit Pt fairly comfortable   no

## 2024-08-20 NOTE — CONSULT NOTE ADULT - SUBJECTIVE AND OBJECTIVE BOX
C A R D I O L O G Y  *********************    DATE OF SERVICE: 08-20-24    HISTORY OF PRESENT ILLNESS: HPI: 59 yr M w/ pmh HTN, DM2 s/p Michael Broderick on 7/16/24 w/ postop course c/b high VENKATA drain output. found on EGD  to have esophago-gastric anastomotic now s/p esophageal stent placement by GI on 8/5, now presenting to the hospital with emesis and new bloody output from the VENKATA drain. Early in morning, around 4AM, pt states that he was coughing and then had dark colored emesis, pt subsequently noticed that lavern drain output color had turned red. Pt did not endorse any sick contacts or further emesis. Pt did not endorse chest pain, SOB, fevers/chills, or any subsequent N/V. For EGD today, denies any chest pain      PAST MEDICAL & SURGICAL HISTORY:  HLD (hyperlipidemia)      Insulin dependent type 2 diabetes mellitus      BPH (benign prostatic hyperplasia)      HTN (hypertension)      Gastroesophageal cancer      Gastroesophageal cancer      Port-A-Cath in place              MEDICATIONS:  MEDICATIONS  (STANDING):  insulin lispro (ADMELOG) corrective regimen sliding scale   SubCutaneous every 6 hours  pantoprazole  Injectable 40 milliGRAM(s) IV Push every 12 hours  sodium chloride 0.45%. 1000 milliLiter(s) (75 mL/Hr) IV Continuous <Continuous>      Allergies    No Known Allergies    Intolerances        FAMILY HISTORY:    Non-contributary for premature coronary disease or sudden cardiac death    SOCIAL HISTORY:    [ ] Non-smoker  [ ] Smoker  [ ] Alcohol    FLU VACCINE THIS YEAR STARTS IN AUGUST:  [ ] Yes    [ ] No    IF OVER 65 HAVE YOU EVER HAD A PNA VACCINE:  [ ] Yes    [ ] No       [ ] N/A      REVIEW OF SYSTEMS:  [ ]chest pain  [  ]shortness of breath  [  ]palpitations  [  ]syncope  [ ]near syncope [ ]upper extremity weakness   [ ] lower extremity weakness  [  ]diplopia  [  ]altered mental status   [  ]fevers  [ ]chills [ ]nausea  [ ]vomiting  [  ]dysphagia    [ ]abdominal pain  [ ]melena  [ ]BRBPR    [  ]epistaxis  [  ]rash    [ ]lower extremity edema        [X] All others negative	  [ ] Unable to obtain      LABS:	 	    CARDIAC MARKERS:                              9.1    9.26  )-----------( 409      ( 20 Aug 2024 08:10 )             27.9     Hb Trend: 9.1<--    08-20    135  |  97<L>  |  19  ----------------------------<  137<H>  4.4   |  20<L>  |  0.73    Ca    8.4      20 Aug 2024 08:10  Phos  3.8     08-20  Mg     2.20     08-20    TPro  6.9  /  Alb  2.7<L>  /  TBili  0.4  /  DBili  x   /  AST  20  /  ALT  10  /  AlkPhos  145<H>  08-19    Creatinine Trend: 0.73<--, 0.68<--, 0.69<--, 0.66<--, 0.70<--, 0.71<--    Coags:  PT/INR - ( 20 Aug 2024 08:10 )   PT: 11.2 sec;   INR: 0.99 ratio         PTT - ( 20 Aug 2024 08:10 )  PTT:33.4 sec    proBNP:   Lipid Profile:   HgA1c:   TSH:         PHYSICAL EXAM:  T(C): 36.1 (08-20-24 @ 16:25), Max: 36.9 (08-19-24 @ 22:00)  HR: 102 (08-20-24 @ 16:25) (92 - 103)  BP: 134/71 (08-20-24 @ 16:25) (105/56 - 136/72)  RR: 18 (08-20-24 @ 16:25) (16 - 26)  SpO2: 98% (08-20-24 @ 16:25) (95% - 100%)  Wt(kg): --   BMI (kg/m2): 17 (08-20-24 @ 12:05)  I&O's Summary    19 Aug 2024 07:01  -  20 Aug 2024 07:00  --------------------------------------------------------  IN: 450 mL / OUT: 747 mL / NET: -297 mL    20 Aug 2024 07:01  -  20 Aug 2024 16:53  --------------------------------------------------------  IN: 0 mL / OUT: 220 mL / NET: -220 mL        Gen: NAD  HEENT:  (-)icterus (-)pallor  CV: N S1 S2 1/6 CATHY (+)2 Pulses B/l  Resp:  Clear to auscultation B/L, normal effort  GI: (+) BS Soft, NT, ND  Lymph:  (-)Edema, (-)obvious lymphadenopathy  Skin: Warm to touch, Normal turgor  Psych: Appropriate mood and affect      TTE 07/2024  Findings:  1. Normal left ventricular size and function.  Mild diastolic dysfunction.  2. Normal left atrial size  3. Right atrial cavity is normal in size.  4. Normal right ventricular size and function.  5. Normal trileaflet aortic valve opening.  6. Normal mitral valve opening.  7. Normal appearing tricuspid valve with mild tricuspid  regurgitation.  8. Pulmonic valve is grossly normal, yet poorly visualized  with no doppler evidence for pulmonic stenosis.  9. No evidence of significant pericardial effusion.  10. The aortic root is normal.  11. Normal pulmonary artery.  12. IVC is normal with respiratory variation.  FULL STUDY DONE INCLUDING M-MODE RECORDING,  SPECTRAL DOPPLER AND    Stress 07/2024  Normal myocardial perfusion SPECT images No evidence of stress induced ischemia or infarction.  Normal left ventricular size and function.  Calculated EF is: 68%      Pt is a 59 yr M w/ pmh HTN, DM2, GEJ adenocarcinoma (T3N0, stage 2A) s/p neoadjuvant chemotherapy s/p Michael Villafana on 7/16/24 w/ postop course c/b high VENKATA drain output found on EGD  to have esophago-gastric anastomotic leak now s/p esophageal stent placement by GI on 8/5, now presenting to the hospital with emesis and new bloody output from the VENKATA drain.     Pre-OP/HTN  - previously  on oral BP meds on hold diet( Losartan, HCTZ, Norvasc and Metoprolol Held), was discharged on Clonidine patch due to limited non oral options  - EKG from office with no q waves, no chest pain,  euvolemic on exam and no severely stenotic valvular disease on recent TTE  - can proceed to EGD with no further CV w/u    Mart Mason MD  Pager: [phone]

## 2024-08-20 NOTE — DIETITIAN INITIAL EVALUATION ADULT - PERTINENT LABORATORY DATA
(8/20) H/H 9.1/27.9 L,  H, Cl 97 L, Glu 137 H   (8/19) Albumin 2.7 L,  H   A1C with Estimated Average Glucose Result: 10.9 % (07-13-24 @ 02:44)  A1C with Estimated Average Glucose Result: 12.8 % (01-01-24 @ 06:00)  CAPILLARY BLOOD GLUCOSE  POCT Blood Glucose.: 146 mg/dL (20 Aug 2024 12:07)  POCT Blood Glucose.: 133 mg/dL (20 Aug 2024 05:08)  POCT Blood Glucose.: 147 mg/dL (20 Aug 2024 01:10)  POCT Blood Glucose.: 160 mg/dL (19 Aug 2024 20:58)  POCT Blood Glucose.: 167 mg/dL (19 Aug 2024 19:18)

## 2024-08-20 NOTE — PROGRESS NOTE ADULT - SUBJECTIVE AND OBJECTIVE BOX
Subjective: 59y Male seen at bedside at this time with thoracic team during rounds. Patient in bed, appears tired but stable in NAD. His lavern drain is still in place with minimal output, bloody appearance. Vitals stable overnight and HH stable, pending AM rounds. Patient is now pending CT chest with PO contrast for evaluation of leak.     Vital Signs:  Vital Signs Last 24 Hrs  T(C): 36.7 (08-20-24 @ 05:03), Max: 36.9 (08-19-24 @ 22:00)  T(F): 98.1 (08-20-24 @ 05:03), Max: 98.4 (08-19-24 @ 22:00)  HR: 96 (08-20-24 @ 05:03) (93 - 98)  BP: 122/71 (08-20-24 @ 05:03) (114/64 - 127/74)  RR: 16 (08-20-24 @ 05:03) (16 - 18)  SpO2: 100% (08-20-24 @ 05:03) (97% - 100%) on (O2)    Pertinent Physical Exam:  Telemetry/Alarms: NSR  General: WN/WD NAD  Neurology: Awake, nonfocal  Respiratory:  No wheezing, rales, rhonchi  CV: RRR  Abdominal: Soft, NT, ND  Extremities: No edema, + peripheral pulses  Incisions: DCI  Tubes: Drain in place    Chest Tube: Right Side  I&O's Summary    19 Aug 2024 07:01  -  20 Aug 2024 07:00  --------------------------------------------------------  IN: 450 mL / OUT: 747 mL / NET: -297 mL        Relevant labs, radiology and Medications reviewed                        8.7    10.60 )-----------( 387      ( 19 Aug 2024 13:34 )             27.1     08-19    132<L>  |  94<L>  |  25<H>  ----------------------------<  231<H>  4.7   |  25  |  0.68    Ca    8.5      19 Aug 2024 13:34  Phos  3.5     08-19  Mg     2.30     08-19    TPro  6.9  /  Alb  2.7<L>  /  TBili  0.4  /  DBili  x   /  AST  20  /  ALT  10  /  AlkPhos  145<H>  08-19    PT/INR - ( 19 Aug 2024 13:34 )   PT: 10.4 sec;   INR: 0.93 ratio         PTT - ( 19 Aug 2024 13:34 )  PTT:34.5 sec      MEDICATIONS  (STANDING):  heparin   Injectable 2500 Unit(s) SubCutaneous every 12 hours  insulin lispro (ADMELOG) corrective regimen sliding scale   SubCutaneous every 6 hours  pantoprazole  Injectable 40 milliGRAM(s) IV Push every 12 hours  sodium chloride 0.45%. 1000 milliLiter(s) (75 mL/Hr) IV Continuous <Continuous>    MEDICATIONS  (PRN):        Assessment  59 yr M w/ pmh HTN, DM2 s/p [place] Broderick on 7/16/24 w/ postop course c/b high VENKATA drain output. found on EGD  to have esophago-gastric anastomotic now s/p esophageal stent placement by GI on 8/5, now presenting to the hospital with emesis and new bloody output from the VENKATA drain. Early in morning, around 4AM, pt states that he was coughing and then had dark colored emesis, pt subsequently noticed that lavern drain output color had turned red. Pt did not endorse any sick contacts or further emesis. Pt did not endorse chest pain, SOB, fevers/chills, or any subsequent N/V.     8/20: patient s/p CTA CAP w/o PO contrast, neg for active leak. Now patient NPO on IVF with cbc trending, stable LN pending AM labs. Plan for CTA CAP with PO contrast for evaluation of leak per GI recs. Patient has Lavern, stable low output, bloody in nature. Will follow up and monitor.    PLAN  Neuro: Pain management via IV PRN  Pulm: Encourage coughing, deep breathing and use of incentive spirometry. Pending CTA CAP with PO Contrast  Cardio: Monitor telemetry/alarms.  GI: NPO, holding JT feeds. IVF while pending imaging and per GI recs  Renal: monitor urine output, supplement electrolytes as needed  Vasc: Heparin SC, consider dc if unstable hct   Heme: Stable H/H. anemic monitor BID   Endocrine: Blood glucose checks per routine. ISS   ID: Off antibiotics. Stable.   Therapy: OOB/ambulate  Tubes: Monitor Chest tube output  Disposition: Labs, f/u GI, CTA with PO contrast   Discussed with Cardiothoracic Team at AM rounds.

## 2024-08-20 NOTE — DIETITIAN INITIAL EVALUATION ADULT - ADD RECOMMEND
1. Once clinically indicates, initiate Tube Feeding via Jejunostomy -> Glucerna 1.5cal@ 20 ml/hr; if Pt tolerates well increase TF rate by 10 ml every 8 hrs to goal rate of Glucerna 1.5cal @ 40 ml/hr x 24 hrs (goal rate to provide 960 ml TF formula, 1440 kcal, 80 gm protein in 24 hrs);  2. Once Tube Feeding initiates, monitor Tube Feeding tolerance; Add free water flushes per MD discretion;  3. Add Multivitamin/minerals/iron Solution (CENTRUM) - 15 milliLiter(s) daily, for micronutrient coverage;   4. Add Lactobacillus Acidophilus (probiotic supplementation) to promote improved gut function;   5. Monitor labs, hydration status;

## 2024-08-20 NOTE — DIETITIAN INITIAL EVALUATION ADULT - OTHER INFO
Pt 58 yo male with PMHx of HTN, DM2 s/p [place] Broderick on 7/16/24 w/ postop course c/b high VENKATA drain output; s/p EGD -> esophago-gastric anastomotic; s/p esophageal stent placement by GI on 8/5, now presented with emesis and new bloody output from the VENKATA drain - per chart review.     At time of visit, Pt awake, alert but weak. Of note, Pt speaks Tagalog; Pt communicates in English as well. Pt NPO now; tube feeds held, plan for EGD today for luminal assessment to asses for any luminal etiologies of bloody VENKATA output. No report of nausea, vomiting, diarrhea or abdominal pain/discomfort @ this time.     Per Pt, his height: 64" & his body weight: 100#; Pt's UBW: 128# "in two months" --> weight loss/wt changes 28#/21.87%. Nutrition focused physical exam conducted, Pt found with signs of subcutaneous fat loss & muscle wasting. Of note, Pt's HbA1c level 10.9% (7/13/24); rec to obtain new HbA1c level now. No food related concern voiced at present. Case discussed with nurse. RD remains available, Pt made aware.

## 2024-08-21 ENCOUNTER — TRANSCRIPTION ENCOUNTER (OUTPATIENT)
Age: 59
End: 2024-08-21

## 2024-08-21 LAB
BLD GP AB SCN SERPL QL: NEGATIVE — SIGNIFICANT CHANGE UP
GLUCOSE BLDC GLUCOMTR-MCNC: 140 MG/DL — HIGH (ref 70–99)
GLUCOSE BLDC GLUCOMTR-MCNC: 152 MG/DL — HIGH (ref 70–99)
GLUCOSE BLDC GLUCOMTR-MCNC: 193 MG/DL — HIGH (ref 70–99)
GLUCOSE BLDC GLUCOMTR-MCNC: 206 MG/DL — HIGH (ref 70–99)
GLUCOSE BLDC GLUCOMTR-MCNC: 277 MG/DL — HIGH (ref 70–99)
HCT VFR BLD CALC: 25.2 % — LOW (ref 39–50)
HGB BLD-MCNC: 8.3 G/DL — LOW (ref 13–17)
INR BLD: 0.93 RATIO — SIGNIFICANT CHANGE UP (ref 0.85–1.18)
MCHC RBC-ENTMCNC: 28.9 PG — SIGNIFICANT CHANGE UP (ref 27–34)
MCHC RBC-ENTMCNC: 32.9 GM/DL — SIGNIFICANT CHANGE UP (ref 32–36)
MCV RBC AUTO: 87.8 FL — SIGNIFICANT CHANGE UP (ref 80–100)
NRBC # BLD: 0 /100 WBCS — SIGNIFICANT CHANGE UP (ref 0–0)
NRBC # FLD: 0 K/UL — SIGNIFICANT CHANGE UP (ref 0–0)
PLATELET # BLD AUTO: 403 K/UL — HIGH (ref 150–400)
PROTHROM AB SERPL-ACNC: 10.4 SEC — SIGNIFICANT CHANGE UP (ref 9.5–13)
RBC # BLD: 2.87 M/UL — LOW (ref 4.2–5.8)
RBC # FLD: 15.4 % — HIGH (ref 10.3–14.5)
RH IG SCN BLD-IMP: POSITIVE — SIGNIFICANT CHANGE UP
WBC # BLD: 8.32 K/UL — SIGNIFICANT CHANGE UP (ref 3.8–10.5)
WBC # FLD AUTO: 8.32 K/UL — SIGNIFICANT CHANGE UP (ref 3.8–10.5)

## 2024-08-21 PROCEDURE — 71045 X-RAY EXAM CHEST 1 VIEW: CPT | Mod: 26

## 2024-08-21 PROCEDURE — 99232 SBSQ HOSP IP/OBS MODERATE 35: CPT | Mod: GC

## 2024-08-21 RX ORDER — GLUCAGON INJECTION, SOLUTION 1 MG/.2ML
1 INJECTION, SOLUTION SUBCUTANEOUS ONCE
Refills: 0 | Status: DISCONTINUED | OUTPATIENT
Start: 2024-08-21 | End: 2024-08-22

## 2024-08-21 RX ORDER — INSULIN GLARGINE 100 [IU]/ML
10 INJECTION, SOLUTION SUBCUTANEOUS AT BEDTIME
Refills: 0 | Status: DISCONTINUED | OUTPATIENT
Start: 2024-08-21 | End: 2024-08-22

## 2024-08-21 RX ORDER — DEXTROSE 15 G/33 G
15 GEL IN PACKET (GRAM) ORAL ONCE
Refills: 0 | Status: DISCONTINUED | OUTPATIENT
Start: 2024-08-21 | End: 2024-08-22

## 2024-08-21 RX ORDER — DEXTROSE 15 G/33 G
12.5 GEL IN PACKET (GRAM) ORAL ONCE
Refills: 0 | Status: DISCONTINUED | OUTPATIENT
Start: 2024-08-21 | End: 2024-08-22

## 2024-08-21 RX ORDER — DEXTROSE 15 G/33 G
25 GEL IN PACKET (GRAM) ORAL ONCE
Refills: 0 | Status: DISCONTINUED | OUTPATIENT
Start: 2024-08-21 | End: 2024-08-22

## 2024-08-21 RX ADMIN — INSULIN GLARGINE 10 UNIT(S): 100 INJECTION, SOLUTION SUBCUTANEOUS at 23:08

## 2024-08-21 RX ADMIN — Medication 1: at 01:07

## 2024-08-21 RX ADMIN — Medication 1: at 06:21

## 2024-08-21 RX ADMIN — Medication 40 MILLIGRAM(S): at 18:43

## 2024-08-21 RX ADMIN — Medication 75 MILLILITER(S): at 12:10

## 2024-08-21 RX ADMIN — Medication 40 MILLIGRAM(S): at 06:23

## 2024-08-21 RX ADMIN — Medication 75 MILLILITER(S): at 06:24

## 2024-08-21 RX ADMIN — Medication 3: at 12:10

## 2024-08-21 RX ADMIN — Medication 2: at 23:08

## 2024-08-21 NOTE — PROGRESS NOTE ADULT - SUBJECTIVE AND OBJECTIVE BOX
59y Male POD1    T(C): 36.8 (08-21-24 @ 06:28), Max: 36.8 (08-21-24 @ 06:28)  HR: 104 (08-21-24 @ 06:28) (92 - 104)  BP: 135/77 (08-21-24 @ 06:28) (105/56 - 136/72)  RR: 16 (08-21-24 @ 06:28) (16 - 26)  SpO2: 97% (08-21-24 @ 06:28) (96% - 99%)  Wt(kg): --    Pt seen, doing well, no anesthesia complications or complaints noted or reported.   No Nausea  Pain well controlled

## 2024-08-21 NOTE — DISCHARGE NOTE PROVIDER - INSTRUCTIONS
Nothing to eat or drink. Continue Tube feeds as ordered of Glucerna 1.5 Hood at 60cc/hr for 18hrs a day. From 6pm to 12noon. Flush J tube with water every 6hrs 150-200cc of water each time. Flush with 30cc of water after any medication given. Do not lay flat to sleep. Keep your head of bed elevated.

## 2024-08-21 NOTE — DISCHARGE NOTE PROVIDER - NSDCMRMEDTOKEN_GEN_ALL_CORE_FT
amLODIPine 5 mg oral tablet: 1 tab(s) orally once a day Noon  amoxicillin-clavulanate 875 mg-125 mg oral tablet: 875 milligram(s) by jejunostomy tube 2 times a day As per infectious disease recommendations, take medication until 08/21/24.  atorvastatin 20 mg oral tablet: 1 tab(s) orally once a day noon  Jardiance 25 mg oral tablet: 1 tab(s) orally once a day Noon  losartan-hydrochlorothiazide 50 mg-12.5 mg oral tablet: 1 tab(s) orally once a day  Lovenox 40 mg/0.4 mL injectable solution: 40 milligram(s) subcutaneously once a day  metFORMIN 500 mg oral tablet: 1 tab(s) orally 2 times a day  NovoLIN N 100 units/mL subcutaneous suspension: 5 unit(s) subcutaneous every 6 hours  NovoLOG 100 units/mL subcutaneous solution: subcutaneous 3 times a day (before meals) 15 units  Sodium Chloride, 1 G, PO, 2 times Daily:   tamsulosin 0.4 mg oral capsule: 1 cap(s) orally once a day (at bedtime)   amLODIPine 5 mg oral tablet: 1 tab(s) orally once a day Noon  losartan-hydrochlorothiazide 50 mg-12.5 mg oral tablet: 1 tab(s) orally once a day  NovoLIN N 100 units/mL subcutaneous suspension: 5 unit(s) subcutaneous every 6 hours  NovoLOG 100 units/mL subcutaneous solution: subcutaneous 3 times a day (before meals) 15 units  tamsulosin 0.4 mg oral capsule: 1 cap(s) orally once a day (at bedtime)   amLODIPine 5 mg oral tablet: 1 tab(s) by jejunostomy tube once a day Noon. Crush, fully dissolve and put via J tube  atorvastatin 20 mg oral tablet: 1 tab(s) by jejunostomy tube once a day crush, fully dissolve and put into J tube  Jardiance 25 mg oral tablet: 1 tab(s) by jejunostomy tube once a day at 12noon. crush, fully dissolve and put through J tube  metFORMIN 1000 mg oral tablet: 1 tab(s) by jejunostomy tube 2 times a day crushed and dissolved fully and given through J tube  Semglee 100 units/mL subcutaneous solution: 8 unit(s) subcutaneous once a day Take before starting tube feeds. If sugar is high, can take 10 units instead  sodium chloride: 1 gram(s) enteral 2 times a day Crush and fully dissolve salt tab and flush well. Give via J tube.  Tylenol 80 mg/0.8 mL oral liquid: 650 milligram(s) by jejunostomy tube every 6 hours as needed for pain via your J tube

## 2024-08-21 NOTE — DISCHARGE NOTE PROVIDER - CARE PROVIDER_API CALL
Lewis Laureano  Thoracic Surgery  [address] [place] [id number]  Phone: [phone]  Fax: [phone]  Follow Up Time:     Chicho Garner  Gastroenterology  [address] [facility], [address] [place] [address]  Phone: [phone]  Fax: [phone]  Follow Up Time:

## 2024-08-21 NOTE — PROGRESS NOTE ADULT - SUBJECTIVE AND OBJECTIVE BOX
DATE OF SERVICE: 08-21-24    Patient denies chest pain or shortness of breath.   Review of symptoms otherwise negative.    MEDICATIONS:  dextrose 5%. 1000 milliLiter(s) IV Continuous <Continuous>  dextrose 5%. 1000 milliLiter(s) IV Continuous <Continuous>  dextrose 50% Injectable 25 Gram(s) IV Push once  dextrose 50% Injectable 25 Gram(s) IV Push once  dextrose 50% Injectable 12.5 Gram(s) IV Push once  dextrose Oral Gel 15 Gram(s) Oral once PRN  glucagon  Injectable 1 milliGRAM(s) IntraMuscular once  insulin glargine Injectable (LANTUS) 10 Unit(s) SubCutaneous at bedtime  insulin lispro (ADMELOG) corrective regimen sliding scale   SubCutaneous every 6 hours  pantoprazole  Injectable 40 milliGRAM(s) IV Push every 12 hours  sodium chloride 0.45%. 1000 milliLiter(s) IV Continuous <Continuous>      LABS:                        8.3    8.32  )-----------( 403      ( 21 Aug 2024 07:13 )             25.2       Hemoglobin: 8.3 g/dL (08-21 @ 07:13)  Hemoglobin: 8.8 g/dL (08-20 @ 21:53)  Hemoglobin: 9.1 g/dL (08-20 @ 08:10)  Hemoglobin: 8.7 g/dL (08-19 @ 13:34)      08-20    135  |  97<L>  |  19  ----------------------------<  137<H>  4.4   |  20<L>  |  0.73    Ca    8.4      20 Aug 2024 08:10  Phos  3.8     08-20  Mg     2.20     08-20      Creatinine Trend: 0.73<--, 0.68<--, 0.69<--, 0.66<--, 0.70<--, 0.71<--    COAGS: PT/INR - ( 21 Aug 2024 07:13 )   PT: 10.4 sec;   INR: 0.93 ratio      PHYSICAL EXAM:  T(C): 36.3 (08-21-24 @ 12:32), Max: 36.8 (08-21-24 @ 06:28)  HR: 97 (08-21-24 @ 12:32) (97 - 104)  BP: 119/65 (08-21-24 @ 12:32) (119/65 - 135/77)  RR: 18 (08-21-24 @ 12:32) (16 - 18)  SpO2: 98% (08-21-24 @ 12:32) (97% - 98%)  Wt(kg): --    I&O's Summary    20 Aug 2024 07:01  -  21 Aug 2024 07:00  --------------------------------------------------------  IN: 1215 mL / OUT: 1155 mL / NET: 60 mL    21 Aug 2024 07:01  -  21 Aug 2024 14:59  --------------------------------------------------------  IN: 0 mL / OUT: 9 mL / NET: -9 mL      Gen: NAD  HEENT:  (-)icterus (-)pallor  CV: N S1 S2 1/6 CATHY (+)2 Pulses B/l  Resp:  Clear to auscultation B/L, normal effort  GI: (+) BS Soft, NT, ND  Lymph:  (-)Edema, (-)obvious lymphadenopathy  Skin: Warm to touch, Normal turgor  Psych: Appropriate mood and affect      TTE 07/2024  Findings:  1. Normal left ventricular size and function.  Mild diastolic dysfunction.  2. Normal left atrial size  3. Right atrial cavity is normal in size.  4. Normal right ventricular size and function.  5. Normal trileaflet aortic valve opening.  6. Normal mitral valve opening.  7. Normal appearing tricuspid valve with mild tricuspid  regurgitation.  8. Pulmonic valve is grossly normal, yet poorly visualized  with no doppler evidence for pulmonic stenosis.  9. No evidence of significant pericardial effusion.  10. The aortic root is normal.  11. Normal pulmonary artery.  12. IVC is normal with respiratory variation.  FULL STUDY DONE INCLUDING M-MODE RECORDING,  SPECTRAL DOPPLER AND    Stress 07/2024  Normal myocardial perfusion SPECT images No evidence of stress induced ischemia or infarction.  Normal left ventricular size and function.  Calculated EF is: 68%      Pt is a 59 yr M w/ pmh HTN, DM2, GEJ adenocarcinoma (T3N0, stage 2A) s/p neoadjuvant chemotherapy s/p Michael Broderick on 7/16/24 w/ postop course c/b high VENKATA drain output found on EGD  to have esophago-gastric anastomotic leak now s/p esophageal stent placement by GI on 8/5, now presenting to the hospital with emesis and new bloody output from the VENKATA drain.     Pre-OP/HTN  - previously  on oral BP meds on hold diet( Losartan, HCTZ, Norvasc and Metoprolol Held), was discharged on Clonidine patch due to limited non oral options  - EKG from office with no q waves, no chest pain,  euvolemic on exam and no severely stenotic valvular disease on recent TTE  - can proceed to EGD with no further CV w/u  - EGD noted with ulcers, started on PPI  - BP well controlled, can monitor off clonidine    Mart Mason MD  Pager: [phone]

## 2024-08-21 NOTE — DISCHARGE NOTE PROVIDER - HOSPITAL COURSE
59 yr M w/ pmh HTN, DM2 s/p [place] Broderick on 7/16/24 w/ postop course c/b high VENKATA drain output. found on EGD  to have esophago-gastric anastomotic now s/p esophageal stent placement by GI on 8/5, now presenting to the hospital with emesis and new bloody output from the VENKATA drain. Early in morning, around 4AM, pt states that he was coughing and then had dark colored emesis, pt subsequently noticed that lavern drain output color had turned red. Pt did not endorse any sick contacts or further emesis. Pt did not endorse chest pain, SOB, fevers/chills, or any subsequent N/V.     8/20: patient s/p CTA CAP w/o PO contrast, neg for active leak. Now patient NPO on IVF with cbc trending. Plan for CTA CAP with PO contrast for evaluation of leak per GI recs. Patient has Lavern, stable low output, bloody in nature. Will follow up and monitor. S/P EGD with GI showing small ulcerating at base of distal stent but no active bleed.    8/21 Holding off on CTA CAP w PO contrast for now, drain output 50cc, otherwise stable HH, vitals. TF at goal. GI recommends EGD in 2-4 weeks for stent removal.       59 yr M w/ pmh HTN, DM2 s/p [place] Broderick on 7/16/24 w/ postop course c/b high VENKATA drain output. found on EGD  to have esophago-gastric anastomotic now s/p esophageal stent placement by GI on 8/5, now presenting to the hospital with emesis and new bloody output from the VENKATA drain. Early in morning, around 4AM, pt states that he was coughing and then had dark colored emesis, pt subsequently noticed that adam drain output color had turned red. Pt did not endorse any sick contacts or further emesis. Pt did not endorse chest pain, SOB, fevers/chills, or any subsequent N/V.     8/20: patient s/p CTA CAP w/o PO contrast, neg for active leak. Now patient NPO on IVF with cbc trending. Plan for CTA CAP with PO contrast for evaluation of leak per GI recs. Patient has Adam, stable low output, bloody in nature. Will follow up and monitor. S/P EGD with GI showing small ulcerating at base of distal stent but no active bleed.    8/21 Holding off on CTA CAP w PO contrast for now, drain output 50cc, otherwise stable HH, vitals. TF at goal. GI recommends EGD in 2-4 weeks for stent removal.    Today, blood work remains stable. Pt feels well. No n/v. Remains NPO on goal TF. Rt adam output improved, only 25cc/24hrs serosang output.   No CP or SOB. on RA. Amb ad cayla. Wounds healing. J tube site c/d/i. Cleared for dc to home by DR Laureano.   Vital Signs Last 24 Hrs  T(C): 36.3 (22 Aug 2024 07:20), Max: 36.7 (22 Aug 2024 05:30)  T(F): 97.4 (22 Aug 2024 07:20), Max: 98 (22 Aug 2024 05:30)  HR: 101 (22 Aug 2024 07:20) (92 - 101)  BP: 120/69 (22 Aug 2024 07:20) (119/65 - 132/74)  BP(mean): --  RR: 18 (22 Aug 2024 07:20) (17 - 18)  SpO2: 98% (22 Aug 2024 07:20) (96% - 99%)    Parameters below as of 22 Aug 2024 07:20  Patient On (Oxygen Delivery Method): room air

## 2024-08-21 NOTE — DISCHARGE NOTE PROVIDER - NSDCFUADDINST_GEN_ALL_CORE_FT
Walk 4-5x per day. You may climb stairs.   Keep all incisions clean and dry. Keep new dry gauze dressing to Right chest tube site and to J tube site and change daily or if it becomes soiled.   Empty Right bulb 2x per day and keep a journal of output. Bring to your apt with Dr. Laureano next week.

## 2024-08-21 NOTE — PROGRESS NOTE ADULT - SUBJECTIVE AND OBJECTIVE BOX
Subjective:     Vital Signs:  Vital Signs Last 24 Hrs  T(C): 36.8 (08-21-24 @ 06:28), Max: 36.9 (08-20-24 @ 08:15)  T(F): 98.3 (08-21-24 @ 06:28), Max: 98.4 (08-20-24 @ 08:15)  HR: 104 (08-21-24 @ 06:28) (92 - 104)  BP: 135/77 (08-21-24 @ 06:28) (105/56 - 136/72)  RR: 16 (08-21-24 @ 06:28) (16 - 26)  SpO2: 97% (08-21-24 @ 06:28) (95% - 99%) on (O2)    Telemetry/Alarms:  General: WN/WD NAD  Neurology: Awake, nonfocal, ALFORD x 4  Eyes: Scleras clear, PERRLA/ EOMI, Gross vision intact  ENT:Gross hearing intact, grossly patent pharynx, no stridor  Neck: Neck supple, trachea midline, No JVD,   Respiratory: CTA B/L, No wheezing, rales, rhonchi  CV: RRR, S1S2, no murmurs, rubs or gallops  Abdominal: Soft, NT, ND +BS,   Extremities: No edema, + peripheral pulses  Skin: No Rashes, Hematoma, Ecchymosis  Lymphatic: No Neck, axilla, groin LAD  Psych: Oriented x 3, normal affect  Incisions:   Tubes:  Relevant labs, radiology and Medications reviewed                        8.8    9.99  )-----------( 438      ( 20 Aug 2024 21:53 )             27.3     08-20    135  |  97<L>  |  19  ----------------------------<  137<H>  4.4   |  20<L>  |  0.73    Ca    8.4      20 Aug 2024 08:10  Phos  3.8     08-20  Mg     2.20     08-20    TPro  6.9  /  Alb  2.7<L>  /  TBili  0.4  /  DBili  x   /  AST  20  /  ALT  10  /  AlkPhos  145<H>  08-19    PT/INR - ( 20 Aug 2024 08:10 )   PT: 11.2 sec;   INR: 0.99 ratio         PTT - ( 20 Aug 2024 08:10 )  PTT:33.4 sec  MEDICATIONS  (STANDING):  insulin lispro (ADMELOG) corrective regimen sliding scale   SubCutaneous every 6 hours  pantoprazole  Injectable 40 milliGRAM(s) IV Push every 12 hours  sodium chloride 0.45%. 1000 milliLiter(s) (75 mL/Hr) IV Continuous <Continuous>    MEDICATIONS  (PRN):    I&O's Summary    20 Aug 2024 07:01  -  21 Aug 2024 07:00  --------------------------------------------------------  IN: 1215 mL / OUT: 1155 mL / NET: 60 mL      Marital Status:  (   )    (   ) Single    (   )    (  )   Lives with: (  ) alone  (  ) children   (  ) spouse   (  ) parents  (  ) other  Recent Travel: No recent travel  Occupation:    Substance Use (street drugs): ( x ) never used  (  ) other:  Tobacco Usage:  ( x  ) never smoked   (   ) former smoker   (   ) current smoker  (     ) pack year  Alcohol Usage: None     Assessment  59y Male  w/ PAST MEDICAL & SURGICAL HISTORY:  HLD (hyperlipidemia)      Insulin dependent type 2 diabetes mellitus      BPH (benign prostatic hyperplasia)      HTN (hypertension)      Gastroesophageal cancer      Gastroesophageal cancer      Port-A-Cath in place      admitted with complaints of Patient is a 59y old  Male who presents with a chief complaint of Emesis (20 Aug 2024 16:53)  .  On (Date), patient underwent . Postoperative course/issues:    PLAN  Neuro: Pain management  Pulm: Encourage coughing, deep breathing and use of incentive spirometry. Wean off supplemental oxygen as able. Daily CXR.   Cardio: Monitor telemetry/alarms  GI: Tolerating diet. Continue stool softeners.  Renal: monitor urine output, supplement electrolytes as needed  Vasc: Heparin SC/SCDs for DVT prophylaxis  Heme: Stable H/H. .   ID: Off antibiotics. Stable.  Therapy: OOB/ambulate  Tubes: Monitor Chest tube output  Disposition: Aim to D/C to home on  Discussed with Cardiothoracic Team at AM rounds.   Subjective: 59y Male seen at bedside at this time with thoracic team during rounds. Patient in bed, no complaints, NAD. Adam drain is still in place with minimal output, less bloody in appearance. He is s/p GI scope revealing no acute bleed, stent in place. Vitals stable overnight and HH stable.     Vital Signs:  Vital Signs Last 24 Hrs  T(C): 36.8 (08-21-24 @ 06:28), Max: 36.9 (08-20-24 @ 08:15)  T(F): 98.3 (08-21-24 @ 06:28), Max: 98.4 (08-20-24 @ 08:15)  HR: 104 (08-21-24 @ 06:28) (92 - 104)  BP: 135/77 (08-21-24 @ 06:28) (105/56 - 136/72)  RR: 16 (08-21-24 @ 06:28) (16 - 26)  SpO2: 97% (08-21-24 @ 06:28) (95% - 99%) on (O2)    Telemetry/Alarms:  Telemetry/Alarms: NSR  General: WN/WD NAD  Neurology: Awake, nonfocal  Respiratory:  No wheezing, rales, rhonchi  CV: RRR  Abdominal: Soft, NT, ND  Extremities: No edema, + peripheral pulses  Incisions: healing well  Tubes: Neck VENKATA Drain in place  Relevant labs, radiology and Medications reviewed                        8.8    9.99  )-----------( 438      ( 20 Aug 2024 21:53 )             27.3     08-20    135  |  97<L>  |  19  ----------------------------<  137<H>  4.4   |  20<L>  |  0.73    Ca    8.4      20 Aug 2024 08:10  Phos  3.8     08-20  Mg     2.20     08-20    TPro  6.9  /  Alb  2.7<L>  /  TBili  0.4  /  DBili  x   /  AST  20  /  ALT  10  /  AlkPhos  145<H>  08-19    PT/INR - ( 20 Aug 2024 08:10 )   PT: 11.2 sec;   INR: 0.99 ratio         PTT - ( 20 Aug 2024 08:10 )  PTT:33.4 sec  MEDICATIONS  (STANDING):  insulin lispro (ADMELOG) corrective regimen sliding scale   SubCutaneous every 6 hours  pantoprazole  Injectable 40 milliGRAM(s) IV Push every 12 hours  sodium chloride 0.45%. 1000 milliLiter(s) (75 mL/Hr) IV Continuous <Continuous>    MEDICATIONS  (PRN):    I&O's Summary    20 Aug 2024 07:01  -  21 Aug 2024 07:00  --------------------------------------------------------  IN: 1215 mL / OUT: 1155 mL / NET: 60 mL      Assessment  59 yr M w/ pmh HTN, DM2 s/p Michael Villafana on 7/16/24 w/ postop course c/b high VENKATA drain output. found on EGD  to have esophago-gastric anastomotic now s/p esophageal stent placement by GI on 8/5, now presenting to the hospital with emesis and new bloody output from the VENKATA drain. Early in morning, around 4AM, pt states that he was coughing and then had dark colored emesis, pt subsequently noticed that adam drain output color had turned red. Pt did not endorse any sick contacts or further emesis. Pt did not endorse chest pain, SOB, fevers/chills, or any subsequent N/V.     8/20: patient s/p CTA CAP w/o PO contrast, neg for active leak. Now patient NPO on IVF with cbc trending, stable LN pending AM labs. Plan for CTA CAP with PO contrast for evaluation of leak per GI recs. Patient has Adam, stable low output, bloody in nature. Will follow up and monitor.  8/21 Holding off on CTA CAP w PO contrast for now, drain output 50cc, otherwise stable HH, vitals. TF at goal     PLAN  Neuro: Pain management via IV PRN  Pulm: Encourage coughing, deep breathing and use of incentive spirometry.   Cardio: Monitor telemetry/alarms.  GI: TF at goal, tolerating  Renal: monitor urine output, supplement electrolytes as needed  Vasc: Heparin SC, consider dc if unstable hct   Heme: Stable H/H. anemic monitor BID   Endocrine: Lantus 10 at night per endo on last admission. Blood glucose checks per routine. ISS   ID: Off antibiotics. Stable.   Therapy: OOB/ambulate  Tubes: Monitor neck drain outpt  Disposition: Continue to observe, home when medically ready   Discussed with Cardiothoracic Team at AM rounds.

## 2024-08-21 NOTE — PROGRESS NOTE ADULT - ASSESSMENT
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 CTPA 7/28 with moderate right loculated hydropneumothorax with interval increase in size and small right apical PTX /p EGD on 8/5 with esophago-gastric anastomotic leak with placement of one 20 mm by 10 cm  fully covered esophageal stent (Hanaro) was deployed across the anastomosis presenting to the hospital with new bloody output from the VENKATA drain and CGE.     #s/p [place] Broderick Esophagectomy 2/2 to  GE junction adenocarcinoma s/p chemo and radiation  #Anastomotic leak s/p stent placement   #CGE with bloody drain output   Hx notable for GE junction adenocarcinoma s/p esophagectomy with post procedure course c/ b high VENKATA drain (500 cc/24 hours with white milky appearing fluid) output with studies showing elevated amylase >3500 although imaging with UGI without anastomotic leak. CTPA 7/28 with moderate right loculated hydropneumothorax with interval increase in size and small right apical PTX. CTAP with PO contrast on 7/4 with note of extensive contrast and air extravasation into the right pleural space consistent with anastomotic leak. s/p EGD on 8/5 with esophago-gastric anastomotic leak with placement of one 20 mm by 10 cm  fully covered esophageal stent (Hanaro) was deployed across the anastomosis. Pt currently presenting to the hospital with bloody VENKATA drain output with an episode of CGE this morning although with no passage of melena or hematochezia with pt having liquid brown BMs and workup thus fart notable for hgb of 8.4 from recent d/c of.7. CTA negative for any active bleeding. s/p EGD on 8/20 with no active bleeding although with note of  clean based ulcers along the inferior border of the stent phalange with no evidence of any active bleeding  seen during performed EGD although etiology of recent bleeding episode secondary to stent trauma. Hgb has remained stable around 8-9 with no further signs of overt bleeding.       Recommendations:  - Ok to resume J tube feeds  - PO PPI BID x 8 weeks  - Outpatient follow up in2-4 weeks for consideration of stent removal. No need to obtain CT with PO contrast at this time    GI will plan to sign off at this time. Please feel free to reach out to our team with any follow up questions. Please provide patient with Gastroenterology Clinic number to confirm/arrange appointment; [phone] ([facility] at [address] [place] [address]) with Dr. Chicho Garner   All recommendations are tentative until note is attested by attending.

## 2024-08-21 NOTE — DISCHARGE NOTE PROVIDER - NSDCACTIVITY_GEN_ALL_CORE
Do not drive or operate machinery/Do not make important decisions/Stairs allowed/Walking - Indoors allowed/No heavy lifting/straining/Walking - Outdoors allowed/Activity as tolerated

## 2024-08-21 NOTE — DISCHARGE NOTE PROVIDER - DETAILS OF MALNUTRITION DIAGNOSIS/DIAGNOSES
This patient has been assessed with a concern for Malnutrition and was treated during this hospitalization for the following Nutrition diagnosis/diagnoses:     -  08/20/2024: Severe protein-calorie malnutrition   -  08/20/2024: Underweight (BMI < 19)

## 2024-08-21 NOTE — DISCHARGE NOTE PROVIDER - NSDCHHATTENDCERT_GEN_ALL_CORE
24 My signature below certifies that the above stated patient is homebound and upon completion of the Face-To-Face encounter, has the need for intermittent skilled nursing, physical therapy and/or speech or occupational therapy services in their home for their current diagnosis as outlined in their initial plan of care. These services will continue to be monitored by myself or another physician.

## 2024-08-21 NOTE — PROGRESS NOTE ADULT - SUBJECTIVE AND OBJECTIVE BOX
Gastroenterology Progress Note    Interval Events:   -s/p EGD yesterday, please see note below   -Feeling well with no ongoing nausea, vomiting or abd pain with minimal drain output     Allergies:  No Known Allergies      Hospital Medications:  dextrose 5%. 1000 milliLiter(s) IV Continuous <Continuous>  dextrose 5%. 1000 milliLiter(s) IV Continuous <Continuous>  dextrose 50% Injectable 25 Gram(s) IV Push once  dextrose 50% Injectable 25 Gram(s) IV Push once  dextrose 50% Injectable 12.5 Gram(s) IV Push once  dextrose Oral Gel 15 Gram(s) Oral once PRN  glucagon  Injectable 1 milliGRAM(s) IntraMuscular once  insulin glargine Injectable (LANTUS) 10 Unit(s) SubCutaneous at bedtime  insulin lispro (ADMELOG) corrective regimen sliding scale   SubCutaneous every 6 hours  pantoprazole  Injectable 40 milliGRAM(s) IV Push every 12 hours  sodium chloride 0.45%. 1000 milliLiter(s) IV Continuous <Continuous>      ROS: 14 point ROS negative unless otherwise state in subjective    PHYSICAL EXAM:   Vital Signs:  Vital Signs Last 24 Hrs  T(C): 36.7 (21 Aug 2024 10:00), Max: 36.8 (21 Aug 2024 06:28)  T(F): 98.1 (21 Aug 2024 10:00), Max: 98.3 (21 Aug 2024 06:28)  HR: 97 (21 Aug 2024 10:00) (92 - 104)  BP: 128/65 (21 Aug 2024 10:00) (105/56 - 136/72)  BP(mean): --  RR: 18 (21 Aug 2024 10:00) (16 - 26)  SpO2: 98% (21 Aug 2024 10:00) (96% - 99%)    Parameters below as of 21 Aug 2024 10:00  Patient On (Oxygen Delivery Method): room air      Daily Height in cm: 162.6 (20 Aug 2024 12:05)    Daily     GENERAL:  No acute distress  HEENT:  NCAT  CHEST: no resp distress  HEART:  RRR  ABDOMEN:  Soft, non-tender, non-distended, normoactive bowel sounds. VENKATA drain small serosanginous fluid and J tube in place.   EXTREMITIES:  No  edema  NEURO:  Alert and oriented x 3    LABS:                        8.3    8.32  )-----------( 403      ( 21 Aug 2024 07:13 )             25.2     Mean Cell Volume: 87.8 fL (08-21-24 @ 07:13)    08-20    135  |  97<L>  |  19  ----------------------------<  137<H>  4.4   |  20<L>  |  0.73    Ca    8.4      20 Aug 2024 08:10  Phos  3.8     08-20  Mg     2.20     08-20    TPro  6.9  /  Alb  2.7<L>  /  TBili  0.4  /  DBili  x   /  AST  20  /  ALT  10  /  AlkPhos  145<H>  08-19    LIVER FUNCTIONS - ( 19 Aug 2024 13:34 )  Alb: 2.7 g/dL / Pro: 6.9 g/dL / ALK PHOS: 145 U/L / ALT: 10 U/L / AST: 20 U/L / GGT: x           PT/INR - ( 21 Aug 2024 07:13 )   PT: 10.4 sec;   INR: 0.93 ratio         PTT - ( 20 Aug 2024 08:10 )  PTT:33.4 sec  Urinalysis Basic - ( 20 Aug 2024 08:10 )    Color: x / Appearance: x / SG: x / pH: x  Gluc: 137 mg/dL / Ketone: x  / Bili: x / Urobili: x   Blood: x / Protein: x / Nitrite: x   Leuk Esterase: x / RBC: x / WBC x   Sq Epi: x / Non Sq Epi: x / Bacteria: x      Imaging:  < from: Upper Endoscopy (08.20.24 @ 11:57) >  Findings:       There was evidenceof an Michael Broderick with presence of a fully covered        metal stent that was sutured in place. The stent was easily traversed        into the stomach. Along the inferior border of the stent phalange in the        six oclock position there was evidence of small linear clean based        ulcers along the suture line. There was no evidence of any active        bleeding seen during performed EGD although etiology of recent bleeding        episode secondary to stent trauma.       The exam of the stomach was otherwise normal.       The exam of the duodenum was otherwise normal.                                                                                   Impression:          - Clean based ulcers along the inferior border of the          stent phalange with no evidence of any active bleeding                        seen during performed EGD although etiology of recent                        bleeding episode secondary to stent trauma.                       - Exam of the stomach and duodenum was otherwise normal.  Recommendation:      - Return patient to [facility] for ongoing care.                       - Ok to resume J tube feeds                       - PO PPI BID x 8 weeks                       - Outpatient follow up in2-4 weeks for consideration of                        stent removal. No need to obtain CT with PO contrast at                        this time    < end of copied text >

## 2024-08-21 NOTE — DISCHARGE NOTE PROVIDER - NSDCFUADDAPPT_GEN_ALL_CORE_FT
Gastroenterology Clinic number to arrange appointment; [phone] (Faculty Practice at [address] [place] [address]) with Dr. Chicho Garner    Gastroenterology Clinic number to arrange appointment; [phone] (Faculty Practice at [address] [place] [address]) with Dr. Chicho Woodward to make an apt to have your esophageal stent removed in 2- 4 weeks.   See Dr. Laureano at [facility] office on Tuesday August 27th at 12:30pm.    Gastroenterology Clinic number to arrange appointment; [phone] (Faculty Practice at [address] [place] [address]) with Dr. Chicho Woodward to make an apt to have your esophageal stent removed in 2- 4 weeks with GI doctor   See Dr. Laureano at [facility] office on Tuesday August 27th at 12:30pm.   See Dr. Agustin as instructed  See your Cardiologist in 2-3 weeks as well. Stop Losartan/HCTZ pill. Just take Amlodipine for blood pressure for now. You can also stop taking the Lovenox injection and stop taking the Antibiotics.

## 2024-08-22 ENCOUNTER — TRANSCRIPTION ENCOUNTER (OUTPATIENT)
Age: 59
End: 2024-08-22

## 2024-08-22 VITALS
TEMPERATURE: 98 F | HEART RATE: 96 BPM | SYSTOLIC BLOOD PRESSURE: 119 MMHG | OXYGEN SATURATION: 98 % | DIASTOLIC BLOOD PRESSURE: 67 MMHG | RESPIRATION RATE: 18 BRPM

## 2024-08-22 LAB
ANION GAP SERPL CALC-SCNC: 13 MMOL/L — SIGNIFICANT CHANGE UP (ref 7–14)
BUN SERPL-MCNC: 18 MG/DL — SIGNIFICANT CHANGE UP (ref 7–23)
CALCIUM SERPL-MCNC: 8.3 MG/DL — LOW (ref 8.4–10.5)
CHLORIDE SERPL-SCNC: 97 MMOL/L — LOW (ref 98–107)
CO2 SERPL-SCNC: 23 MMOL/L — SIGNIFICANT CHANGE UP (ref 22–31)
CREAT SERPL-MCNC: 0.59 MG/DL — SIGNIFICANT CHANGE UP (ref 0.5–1.3)
EGFR: 110 ML/MIN/1.73M2 — SIGNIFICANT CHANGE UP
GLUCOSE BLDC GLUCOMTR-MCNC: 220 MG/DL — HIGH (ref 70–99)
GLUCOSE BLDC GLUCOMTR-MCNC: 255 MG/DL — HIGH (ref 70–99)
GLUCOSE SERPL-MCNC: 219 MG/DL — HIGH (ref 70–99)
HCT VFR BLD CALC: 27.2 % — LOW (ref 39–50)
HGB BLD-MCNC: 8.9 G/DL — LOW (ref 13–17)
MAGNESIUM SERPL-MCNC: 2.2 MG/DL — SIGNIFICANT CHANGE UP (ref 1.6–2.6)
MCHC RBC-ENTMCNC: 29.6 PG — SIGNIFICANT CHANGE UP (ref 27–34)
MCHC RBC-ENTMCNC: 32.7 GM/DL — SIGNIFICANT CHANGE UP (ref 32–36)
MCV RBC AUTO: 90.4 FL — SIGNIFICANT CHANGE UP (ref 80–100)
NRBC # BLD: 0 /100 WBCS — SIGNIFICANT CHANGE UP (ref 0–0)
NRBC # FLD: 0 K/UL — SIGNIFICANT CHANGE UP (ref 0–0)
PHOSPHATE SERPL-MCNC: 2.7 MG/DL — SIGNIFICANT CHANGE UP (ref 2.5–4.5)
PLATELET # BLD AUTO: 438 K/UL — HIGH (ref 150–400)
POTASSIUM SERPL-MCNC: 4.3 MMOL/L — SIGNIFICANT CHANGE UP (ref 3.5–5.3)
POTASSIUM SERPL-SCNC: 4.3 MMOL/L — SIGNIFICANT CHANGE UP (ref 3.5–5.3)
RBC # BLD: 3.01 M/UL — LOW (ref 4.2–5.8)
RBC # FLD: 15.4 % — HIGH (ref 10.3–14.5)
SODIUM SERPL-SCNC: 133 MMOL/L — LOW (ref 135–145)
WBC # BLD: 7.64 K/UL — SIGNIFICANT CHANGE UP (ref 3.8–10.5)
WBC # FLD AUTO: 7.64 K/UL — SIGNIFICANT CHANGE UP (ref 3.8–10.5)

## 2024-08-22 RX ORDER — ACETAMINOPHEN 325 MG/1
650 TABLET ORAL
Qty: 0 | Refills: 0 | DISCHARGE

## 2024-08-22 RX ORDER — INSULIN GLARGINE 100 [IU]/ML
8 INJECTION, SOLUTION SUBCUTANEOUS
Qty: 0 | Refills: 0 | DISCHARGE

## 2024-08-22 RX ORDER — SODIUM CHLORIDE 9 MG/ML
1 INJECTION INTRAMUSCULAR; INTRAVENOUS; SUBCUTANEOUS
Qty: 0 | Refills: 0 | DISCHARGE

## 2024-08-22 RX ORDER — METFORMIN HYDROCHLORIDE 850 MG/1
1 TABLET, FILM COATED ORAL
Qty: 0 | Refills: 0 | DISCHARGE

## 2024-08-22 RX ORDER — EMPAGLIFLOZIN 10 MG/1
1 TABLET, FILM COATED ORAL
Qty: 0 | Refills: 0 | DISCHARGE

## 2024-08-22 RX ADMIN — Medication 40 MILLIGRAM(S): at 06:21

## 2024-08-22 RX ADMIN — Medication 3: at 12:39

## 2024-08-22 RX ADMIN — Medication 2: at 06:47

## 2024-08-22 NOTE — DISCHARGE NOTE NURSING/CASE MANAGEMENT/SOCIAL WORK - NSDCVIVACCINE_GEN_ALL_CORE_FT
influenza, injectable, quadrivalent, preservative free; 20-Oct-2020 12:16; Rafa Thacker (RN); [employer]; [id number] (Exp. Date: 30-Jun-2021); IntraMuscular; Deltoid Right.; 0.5 milliLiter(s); VIS (VIS Published: 15-Aug-2019, VIS Presented: 20-Oct-2020);

## 2024-08-22 NOTE — PROGRESS NOTE ADULT - SUBJECTIVE AND OBJECTIVE BOX
DATE OF SERVICE: 08-22-24    Patient denies chest pain or shortness of breath.   Review of symptoms otherwise negative.    MEDICATIONS:  dextrose 5%. 1000 milliLiter(s) IV Continuous <Continuous>  dextrose 5%. 1000 milliLiter(s) IV Continuous <Continuous>  dextrose 50% Injectable 25 Gram(s) IV Push once  dextrose 50% Injectable 25 Gram(s) IV Push once  dextrose 50% Injectable 12.5 Gram(s) IV Push once  dextrose Oral Gel 15 Gram(s) Oral once PRN  glucagon  Injectable 1 milliGRAM(s) IntraMuscular once  insulin glargine Injectable (LANTUS) 10 Unit(s) SubCutaneous at bedtime  insulin lispro (ADMELOG) corrective regimen sliding scale   SubCutaneous every 6 hours  pantoprazole  Injectable 40 milliGRAM(s) IV Push every 12 hours      LABS:                        8.9    7.64  )-----------( 438      ( 22 Aug 2024 05:48 )             27.2       Hemoglobin: 8.9 g/dL (08-22 @ 05:48)  Hemoglobin: 8.3 g/dL (08-21 @ 07:13)  Hemoglobin: 8.8 g/dL (08-20 @ 21:53)  Hemoglobin: 9.1 g/dL (08-20 @ 08:10)  Hemoglobin: 8.7 g/dL (08-19 @ 13:34)      08-22    133<L>  |  97<L>  |  18  ----------------------------<  219<H>  4.3   |  23  |  0.59    Ca    8.3<L>      22 Aug 2024 05:48  Phos  2.7     08-22  Mg     2.20     08-22      Creatinine Trend: 0.59<--, 0.73<--, 0.68<--, 0.69<--, 0.66<--, 0.70<--    COAGS:           PHYSICAL EXAM:  T(C): 36.4 (08-22-24 @ 12:05), Max: 36.7 (08-22-24 @ 05:30)  HR: 96 (08-22-24 @ 12:05) (92 - 101)  BP: 119/67 (08-22-24 @ 12:05) (119/67 - 132/74)  RR: 18 (08-22-24 @ 12:05) (17 - 18)  SpO2: 98% (08-22-24 @ 12:05) (96% - 99%)  Wt(kg): --    I&O's Summary    21 Aug 2024 07:01  -  22 Aug 2024 07:00  --------------------------------------------------------  IN: 2460 mL / OUT: 925 mL / NET: 1535 mL    22 Aug 2024 07:01  -  22 Aug 2024 13:05  --------------------------------------------------------  IN: 240 mL / OUT: 260 mL / NET: -20 mL        Gen: NAD  HEENT:  (-)icterus (-)pallor  CV: N S1 S2 1/6 CATHY (+)2 Pulses B/l  Resp:  Clear to auscultation B/L, normal effort  GI: (+) BS Soft, NT, ND  Lymph:  (-)Edema, (-)obvious lymphadenopathy  Skin: Warm to touch, Normal turgor  Psych: Appropriate mood and affect    TELEMETRY: NSR    TTE 07/2024  Findings:  1. Normal left ventricular size and function.  Mild diastolic dysfunction.  2. Normal left atrial size  3. Right atrial cavity is normal in size.  4. Normal right ventricular size and function.  5. Normal trileaflet aortic valve opening.  6. Normal mitral valve opening.  7. Normal appearing tricuspid valve with mild tricuspid  regurgitation.  8. Pulmonic valve is grossly normal, yet poorly visualized  with no doppler evidence for pulmonic stenosis.  9. No evidence of significant pericardial effusion.  10. The aortic root is normal.  11. Normal pulmonary artery.  12. IVC is normal with respiratory variation.  FULL STUDY DONE INCLUDING M-MODE RECORDING,  SPECTRAL DOPPLER AND    Stress 07/2024  Normal myocardial perfusion SPECT images No evidence of stress induced ischemia or infarction.  Normal left ventricular size and function.  Calculated EF is: 68%      Pt is a 59 yr M w/ pmh HTN, DM2, GEJ adenocarcinoma (T3N0, stage 2A) s/p neoadjuvant chemotherapy s/p [place] Broderick on 7/16/24 w/ postop course c/b high VENKATA drain output found on EGD  to have esophago-gastric anastomotic leak now s/p esophageal stent placement by GI on 8/5, now presenting to the hospital with emesis and new bloody output from the VENKATA drain.     Pre-OP/HTN  - previously  on oral BP meds on hold diet( Losartan, HCTZ, Norvasc and Metoprolol Held), was discharged on Clonidine patch due to limited non oral options  - EKG from office with no q waves, no chest pain,  euvolemic on exam and no severely stenotic valvular disease on recent TTE  - EGD noted with ulcers, started on PPI  - BP well controlled, can monitor off clonidine  - No further inpatient cardiac w/u planned  - D/C planning per primary team    Yazan Priest PA-C  Pager: [phone]

## 2024-08-22 NOTE — DISCHARGE NOTE NURSING/CASE MANAGEMENT/SOCIAL WORK - NSSCNAMETXT_GEN_ALL_CORE
[facility] [phone] initial visit will be day after discharge home. A nurse will call prior to the home visit.

## 2024-08-22 NOTE — DISCHARGE NOTE NURSING/CASE MANAGEMENT/SOCIAL WORK - PATIENT PORTAL LINK FT
You can access the FollowMyHealth Patient Portal offered by [facility] by registering at the following website: [email or web address]. By joining [employer]’s FollowMyHealth portal, you will also be able to view your health information using other applications (apps) compatible with our system.

## 2024-08-22 NOTE — DISCHARGE NOTE NURSING/CASE MANAGEMENT/SOCIAL WORK - NSDCFUADDAPPT_GEN_ALL_CORE_FT
Gastroenterology Clinic number to arrange appointment; [phone] (Faculty Practice at [address] [place] [address]) with Dr. Chicho Garner

## 2024-08-22 NOTE — DISCHARGE NOTE NURSING/CASE MANAGEMENT/SOCIAL WORK - NSDCPEFALRISK_GEN_ALL_CORE
For information on Fall & Injury Prevention, visit: [email or web address] OR  [email or web address] OR  https://www.cdc.gov/steadi/patient.html

## 2024-08-22 NOTE — PROGRESS NOTE ADULT - NS ATTEND AMEND GEN_ALL_CORE FT
Patient seen and examined. Agree with plan as detailed in PA/NP Note.     Can monitor BP of meds    Mart Mason MD  Pager: [phone]

## 2024-08-23 ENCOUNTER — APPOINTMENT (OUTPATIENT)
Dept: HEMATOLOGY ONCOLOGY | Facility: CLINIC | Age: 59
End: 2024-08-23

## 2024-08-26 NOTE — HISTORY OF PRESENT ILLNESS
[de-identified] : Mr. BHARATH GONZALES is a 59 year old male referred by Dr. Dudley for gastroesophageal junction lesion. 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 Chemo started on 03/04/2024 with Dr. Mima Angelo.   PET/CT on 02/19/2024: - Mild uptake at proximal stomach, possibly related to reported gastroesophageal junction lesion. Remaining alimentary tract shows diffuse uptake which is likely related to use of metformin. This limits evaluation for additional lesions in the bowel and the aerodigestive tract. - Previously identified peripancreatic node is difficult to identify on the current low-dose CT and there is no distinct abnormal uptake in that region. Suggest MRI of the abdomen.  CT chest on 04/24/2024: - A 0.3 cm left apical groundglass nodule (2/17), stable dating back to 2020. - Stable pancreatic uncinate process cystic lesion measuring 1.6 cm. - A 1.3 cm nodular lesion at the GE junction (2/67), likely corresponding to previously noted FDG avid lesion. - An indeterminant hyperattenuating nodular lesion measuring 1.9 cm along the course of distal gonadal vasculature (2/123), stable since 1/5/2024, however new since 10/13/2020. No correlate to findings noted in the prior PET/CT.  6/25/24 Patient reports doing well. Denies fever, chills, N/V/D, unintentional weight loss.  7/16/24 S/p [place] senait esophagectomy with Dr. Laureano   8/19 Admitted to ED for bloody emesis, d/c home after no findings of leaks.   9/4/24 Pt reports feeling

## 2024-08-26 NOTE — ASSESSMENT
[FreeTextEntry1] : IMP: 59 year old male referred by Dr. Dudley for gastroesophageal junction lesion. 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 Chemo started on 03/04/2024 with Dr. Mima Angelo. Currently s/p neoadjuvant chemoradiation with good response. S/p Michael Broderick Esophagectomy.   PLAN:  - Pt sees Dr. Laureano and myself for combined - Michael Broderick esophagectomy planned for July 2024 - Completed neoadjuvant chemo RT with good response - RTO post op I have discussed the diagnosis, therapeutic plan and options with the patient at length. Patient expressed verbal understanding to proceed with proposed plan. All questions answered.

## 2024-08-26 NOTE — CONSULT LETTER
[Dear  ___] : Dear  [unfilled], [Courtesy Letter:] : I had the pleasure of seeing your patient, [unfilled], in my office today. [Please see my note below.] : Please see my note below. [Consult Closing:] : Thank you very much for allowing me to participate in the care of this patient.  If you have any questions, please do not hesitate to contact me. [Sincerely,] : Sincerely, [FreeTextEntry3] : Dangelo Agustin MD, TATA, FACS Director of Surgical Oncology- [facility] , Department of Surgery [facility] at [place] [id number] Ph: [phone] Cell: [phone] [DrGalilea  ___] : Dr. HURTADO [DrGalilea ___] : Dr. HURTADO

## 2024-08-27 ENCOUNTER — APPOINTMENT (OUTPATIENT)
Dept: HEMATOLOGY ONCOLOGY | Facility: CLINIC | Age: 59
End: 2024-08-27
Payer: MEDICAID

## 2024-08-27 ENCOUNTER — APPOINTMENT (OUTPATIENT)
Dept: THORACIC SURGERY | Facility: CLINIC | Age: 59
End: 2024-08-27
Payer: MEDICAID

## 2024-08-27 VITALS
RESPIRATION RATE: 17 BRPM | BODY MASS INDEX: 17.19 KG/M2 | HEIGHT: 63 IN | HEART RATE: 104 BPM | OXYGEN SATURATION: 94 % | DIASTOLIC BLOOD PRESSURE: 71 MMHG | WEIGHT: 97 LBS | SYSTOLIC BLOOD PRESSURE: 115 MMHG

## 2024-08-27 DIAGNOSIS — T45.1X5A DRUG-INDUCED POLYNEUROPATHY: ICD-10-CM

## 2024-08-27 DIAGNOSIS — C16.0 MALIGNANT NEOPLASM OF CARDIA: ICD-10-CM

## 2024-08-27 DIAGNOSIS — G62.0 DRUG-INDUCED POLYNEUROPATHY: ICD-10-CM

## 2024-08-27 PROCEDURE — 99214 OFFICE O/P EST MOD 30 MIN: CPT

## 2024-08-27 PROCEDURE — 99024 POSTOP FOLLOW-UP VISIT: CPT

## 2024-08-27 PROCEDURE — G2211 COMPLEX E/M VISIT ADD ON: CPT | Mod: NC

## 2024-08-27 NOTE — CONSULT LETTER
[FreeTextEntry2] : Dr. Dangelo Love (Surgical Oncology) [FreeTextEntry3] : Lewis Laureano MD Director of Thoracic, [facility] Cardiovascular & Thoracic Surgery Assitant Professor Cardiovascular & Thoracic Surgery [facility]

## 2024-08-27 NOTE — BEGINNING OF VISIT
[0] : 2) Feeling down, depressed, or hopeless: Not at all (0) [Pain Scale: ___] : On a scale of 1-10, today the patient's pain is a(n) [unfilled]. [Future Reassessment of Pain Scale] : Future reassessment of pain scale [Never] : Never [Date Discussed (MM/DD/YY): ___] : Discussed: [unfilled] [Reviewed, no changes] : Reviewed, no changes

## 2024-08-27 NOTE — ASSESSMENT
[FreeTextEntry1] : Mr. 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 Chemo started on 03/04/2024 with Dr. Mima Angelo, C5 on 04/29/2024. As per the CALGB 68038 regimen, proceeded with chemo/RT, started RT started on 05/09/2024 with Dr. Donato Mota, Planned Dose: 4140 cGy. Planned to completed Chemo/RT on 06/10/2024.  Referred by Dr. Dangelo Love.   EGD on 01/02/2024: nodule on gastric side of the GEJ with some old heme in stomach. Possible healing Kavita Valero tear. Pathology reveal GEJ bx: fragments of adenocarcinoma, moderately differentiated.  EUS on 01/17/2024 showed malignant esophageal tumor was found at the GEJ. The lesion was partially circumferential. There was sonographic evidence suggesting invasion up to the level of the muscularis propria (Layer 4). No lymphadenopathy seen.   Patient is s/p [place] Broderick esophagogastrectomy with flexible bronchoscopy and upper endoscopy on 07/16/2024.   7/25/2024: Chest xray with R sided infiltrates. Diet backed down to NPO. CT chest A/P with IV and oral contrast demonstrates small loculated right pleural effusion with foci of air and pleural cath.  Small left pleural effusion, splenic infarcts, small volume ascites.  WBC increasing and adam with murky output, Zosyn started.  7/26/2024 As per Dr. Laureano, switched back to CLD, monitor right adam output. WBC trending up. 7/28 new sinus tach, req nasal cannula, worsening RLL opacification;  pt advanced to soft diet, tachycardic CTA neg PE, increasing R hydroPTX 7/29: Vomiting-> NPO. High murky Adam drainage- 350 x 12h. Productive cough. 8/2 J-tube placement by IR 8/4 CT abd/pelv w/ contrast showing leak 8/6 EGD and stent w/ GI. GI to remove in 4-6 weeks. 8/7 TFs approved and delivered to home, ID rec d/c home on 4 weeks of PO Augmentin 8/9 discharged home  Patient went back to ED on 08/19/2024 for Emesis and new bloody output from the VENKATA drain. 8/20: patient s/p CTA CAP w/o PO contrast, neg for active leak. s/p EGD with GI showing small ulcerating at base of distal stent but no active bleed. Patient was discharged on 08/22/2024.   Patient is currently tube feeding from 8 pm for 18 hours. (Glucerna 1.5 Hood)   I have reviewed the patient's medical records and diagnostic images at time of this office consultation and have made the following recommendation: 1. Patient is doing well, VENKATA drained ~ 50 ml per day. Pending TEB with Dr. Patsy Valentino (GI) on 09/11/2024, with possible stent removal at the end of Sep, 2024. We discussed will keep VENKATA drain. If patient has no leak after stent removal, then will remove it. We discussed to RTC in 10/2024 with CXR.   I, RAMAN Kim, personally performed the evaluation and management (E/M) services for this established patient who follow up today with an existing condition.  That E/M includes conducting the examination, assessing all new/exacerbated/existing conditions, and establishing a plan of care.  Today, my ACP, SHARON MurphyC, was here to observe my evaluation and management services for this existing condition to be followed going forward.

## 2024-08-27 NOTE — REASON FOR VISIT
[de-identified] : Michael Broderick esophagogastrectomy with flexible bronchoscopy and upper endoscopy [de-identified] : 07/16/2024

## 2024-08-27 NOTE — ASSESSMENT
[FreeTextEntry1] : Patient seen and discussed with Dr. Mima Angelo [Future Reassessment of Pain Scale] : Future reassessment of pain scale    [Curative] : Goals of care discussed with patient: Curative [Palliative Care Plan] : not applicable at this time

## 2024-08-27 NOTE — CONSULT LETTER
[FreeTextEntry2] : Dr. Dangelo Love (Surgical Oncology) [FreeTextEntry3] : Lewis Laureano MD Director of Thoracic, [facility] Cardiovascular & Thoracic Surgery Assitant Professor Cardiovascular & Thoracic Surgery [facility]

## 2024-08-27 NOTE — REASON FOR VISIT
[de-identified] : Michael Broderick esophagogastrectomy with flexible bronchoscopy and upper endoscopy [de-identified] : 07/16/2024

## 2024-08-27 NOTE — PHYSICAL EXAM
[] : no respiratory distress [Auscultation Breath Sounds / Voice Sounds] : lungs were clear to auscultation bilaterally [Heart Rate And Rhythm] : heart rate was normal and rhythm regular [Heart Sounds] : normal S1 and S2 [Heart Sounds Gallop] : no gallops [Murmurs] : no murmurs [Heart Sounds Pericardial Friction Rub] : no pericardial rub [Clean] : clean [Dry] : dry [Healing Well] : healing well [Bleeding] : no active bleeding [Foul Odor] : no foul smell [Purulent Drainage] : no purulent drainage [Serosanguinous Drainage] : no serosanguinous drainage [Erythema] : not erythematous [Warm] : not warm [Tender] : not tender [Site: ___] : Site: [unfilled] [FreeTextEntry1] : J tube and VENKATA drain site clean and dry [No Edema] : no edema

## 2024-08-27 NOTE — ASSESSMENT
[FreeTextEntry1] : Mr. 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 Chemo started on 03/04/2024 with Dr. Mima Angelo, C5 on 04/29/2024. As per the CALGB 65157 regimen, proceeded with chemo/RT, started RT started on 05/09/2024 with Dr. Donato Mota, Planned Dose: 4140 cGy. Planned to completed Chemo/RT on 06/10/2024.  Referred by Dr. Dangelo Love.   EGD on 01/02/2024: nodule on gastric side of the GEJ with some old heme in stomach. Possible healing Kavita Valero tear. Pathology reveal GEJ bx: fragments of adenocarcinoma, moderately differentiated.  EUS on 01/17/2024 showed malignant esophageal tumor was found at the GEJ. The lesion was partially circumferential. There was sonographic evidence suggesting invasion up to the level of the muscularis propria (Layer 4). No lymphadenopathy seen.   Patient is s/p [place] Broderick esophagogastrectomy with flexible bronchoscopy and upper endoscopy on 07/16/2024.   7/25/2024: Chest xray with R sided infiltrates. Diet backed down to NPO. CT chest A/P with IV and oral contrast demonstrates small loculated right pleural effusion with foci of air and pleural cath.  Small left pleural effusion, splenic infarcts, small volume ascites.  WBC increasing and adam with murky output, Zosyn started.  7/26/2024 As per Dr. Laureano, switched back to CLD, monitor right adam output. WBC trending up. 7/28 new sinus tach, req nasal cannula, worsening RLL opacification;  pt advanced to soft diet, tachycardic CTA neg PE, increasing R hydroPTX 7/29: Vomiting-> NPO. High murky Adam drainage- 350 x 12h. Productive cough. 8/2 J-tube placement by IR 8/4 CT abd/pelv w/ contrast showing leak 8/6 EGD and stent w/ GI. GI to remove in 4-6 weeks. 8/7 TFs approved and delivered to home, ID rec d/c home on 4 weeks of PO Augmentin 8/9 discharged home  Patient went back to ED on 08/19/2024 for Emesis and new bloody output from the VENKATA drain. 8/20: patient s/p CTA CAP w/o PO contrast, neg for active leak. s/p EGD with GI showing small ulcerating at base of distal stent but no active bleed. Patient was discharged on 08/22/2024.   Patient is currently tube feeding from 8 pm for 18 hours. (Glucerna 1.5 Hood)   I have reviewed the patient's medical records and diagnostic images at time of this office consultation and have made the following recommendation: 1. Patient is doing well, VENKATA drained ~ 50 ml per day. Pending TEB with Dr. Patsy Valentino (GI) on 09/11/2024, with possible stent removal at the end of Sep, 2024. We discussed will keep VENKATA drain. If patient has no leak after stent removal, then will remove it. We discussed to RTC in 10/2024 with CXR.   I, RAMAN Kim, personally performed the evaluation and management (E/M) services for this established patient who follow up today with an existing condition.  That E/M includes conducting the examination, assessing all new/exacerbated/existing conditions, and establishing a plan of care.  Today, my ACP, SHARON MurphyC, was here to observe my evaluation and management services for this existing condition to be followed going forward.

## 2024-08-28 RX ORDER — METFORMIN HYDROCHLORIDE 1000 MG/1
1000 TABLET, COATED ORAL
Refills: 0 | Status: ACTIVE | COMMUNITY
Start: 2024-08-28

## 2024-08-28 RX ORDER — INSULIN GLARGINE-YFGN 100 [IU]/ML
100 INJECTION, SOLUTION SUBCUTANEOUS
Refills: 0 | Status: ACTIVE | COMMUNITY
Start: 2024-08-28

## 2024-08-28 NOTE — PHYSICAL EXAM
[Restricted in physically strenuous activity but ambulatory and able to carry out work of a light or sedentary nature] : Status 1- Restricted in physically strenuous activity but ambulatory and able to carry out work of a light or sedentary nature, e.g., light house work, office work [Thin] : thin [Normal] : affect appropriate [de-identified] : VENKATA drain and PEG in place. CDI

## 2024-08-28 NOTE — PHYSICAL EXAM
[Restricted in physically strenuous activity but ambulatory and able to carry out work of a light or sedentary nature] : Status 1- Restricted in physically strenuous activity but ambulatory and able to carry out work of a light or sedentary nature, e.g., light house work, office work [Thin] : thin [Normal] : affect appropriate [de-identified] : VENKATA drain and PEG in place. CDI

## 2024-08-28 NOTE — HISTORY OF PRESENT ILLNESS
[Disease: _____________________] : Disease: [unfilled] [T: ___] : T[unfilled] [N: ___] : N[unfilled] [M: ___] : M[unfilled] [AJCC Stage: ____] : AJCC Stage: [unfilled] [de-identified] : 59 year old male with PMHx of HTN, HLD, IDDM2, and BPH with newly diagnosed GEJ adenocarcinoma   Patient presented [facility] 1/1/24 c/o dizziness and fatigue and admitted for symptomatic anemia (hb 4.7) and hyperglycemia. Symptoms started 2 days prior to admission. EGD on 01/02: nodule on gastric side of the GEJ with some old heme in stomach. Possible healing Kavita Valero tear. Pathology reveal GEJ bx: fragments of adenocarcinoma, moderately differentiated. Colonoscopy on 01/04: right sided diverticula, small internal hemorrhoids CT CAP 1/5/24: Indeterminate 3.6 mm left apical lung nodule, High attenuation in the gastric lumen either blood products or ingested material. Limited evaluation for underlying mass. Diffuse wall thickening consistent with gastritis. Mildly distended esophagus. Correlate with recent endoscopic findings, 6.6 x 6.7 mm peripancreatic node, enlarged prostate indenting the bladder base. Of note, no prior colonoscopy. Referred to Surgical Oncology. Saw Dr. Chong 1/24/24 who recommended PET/CT and referred to Rad Onc and Med Onc to discuss neoadjuvant chemoRT.   Patient here today for initial Medical Oncology evaluation.   2/19/24: PET: 1.  Mild uptake at proximal stomach, possibly related to reported gastroesophageal junction lesion. Remaining alimentary tract shows diffuse uptake which is likely related to use of metformin. This limits evaluation for additional lesions in the bowel and the aerodigestive tract. 2.  Previously identified peripancreatic node is difficult to identify on the current low-dose CT and there is no distinct abnormal uptake in that region. Suggest MRI of the abdomen. 3/4/24: C1 FOLFOX, dose reduced (as part of CALGB 28134 protocol) 3/18/24: C2  4/1/24: C3  4/15/24: C4 4/29/24: C5 5FU/LV alone 5/9/24: Started RT 5/13/24: C6 5FU/LV over 96 hours (oxali held 2/2 neuropathy) 5/20/24: C7 5/28/24: C8 FOLFOX 6/3/24: C9 5FU/lV.  6/10/24: C10 over 24 hours d/t RT completing 6/11/24 7/8/24: Resolution of mild hypermetabolism in GE junction, compatible with response to interval therapy. 7/12/24-8/9/24: s/p Michael Broderick on 7/16/24 w/ postop course c/b high VENKATA drain output. 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 T2N0 8/10/24: CT CAP: MEDIASTINUM AND DENIS: No lymphadenopathy. s/p [place] Broderick Esophagectomy, jejunostomy tube 8/3, s/p EGD with esophageal stenting 8/5. BOWEL: No bowel obstruction. Appendix is not visualized. No evidence of inflammation in the pericecal region.. As noted previously post esophagectomy and gastric pull-through. 8/19/24-8/22/24: Readmitted for bloody output via VENKATA drain    [de-identified] : adenocarcinoma, moderately differentiated.  [de-identified] : JEREMI Her2/gunnar negative   [FreeTextEntry1] : s/p concurrent chemoRT [de-identified] : Here for clinical f/u w/ his daughter and sister  Has had two admission since last visit due to surgery (prolonged admission) and then assessment of VENKATA drain  He is doing much better now per report. He is without pain. Has lost weight but is using PEG tube. Per sister, he has supplies at home for feeds. Patient is reporting he is hungry.  Energy level is slowly improving after surgery. He is walking more.  Of note, neuropathy has resolved. Not using Cymbalta anymore

## 2024-08-28 NOTE — HISTORY OF PRESENT ILLNESS
[Disease: _____________________] : Disease: [unfilled] [T: ___] : T[unfilled] [N: ___] : N[unfilled] [M: ___] : M[unfilled] [AJCC Stage: ____] : AJCC Stage: [unfilled] [de-identified] : 59 year old male with PMHx of HTN, HLD, IDDM2, and BPH with newly diagnosed GEJ adenocarcinoma   Patient presented [facility] 1/1/24 c/o dizziness and fatigue and admitted for symptomatic anemia (hb 4.7) and hyperglycemia. Symptoms started 2 days prior to admission. EGD on 01/02: nodule on gastric side of the GEJ with some old heme in stomach. Possible healing Kavita Valero tear. Pathology reveal GEJ bx: fragments of adenocarcinoma, moderately differentiated. Colonoscopy on 01/04: right sided diverticula, small internal hemorrhoids CT CAP 1/5/24: Indeterminate 3.6 mm left apical lung nodule, High attenuation in the gastric lumen either blood products or ingested material. Limited evaluation for underlying mass. Diffuse wall thickening consistent with gastritis. Mildly distended esophagus. Correlate with recent endoscopic findings, 6.6 x 6.7 mm peripancreatic node, enlarged prostate indenting the bladder base. Of note, no prior colonoscopy. Referred to Surgical Oncology. Saw Dr. Chong 1/24/24 who recommended PET/CT and referred to Rad Onc and Med Onc to discuss neoadjuvant chemoRT.   Patient here today for initial Medical Oncology evaluation.   2/19/24: PET: 1.  Mild uptake at proximal stomach, possibly related to reported gastroesophageal junction lesion. Remaining alimentary tract shows diffuse uptake which is likely related to use of metformin. This limits evaluation for additional lesions in the bowel and the aerodigestive tract. 2.  Previously identified peripancreatic node is difficult to identify on the current low-dose CT and there is no distinct abnormal uptake in that region. Suggest MRI of the abdomen. 3/4/24: C1 FOLFOX, dose reduced (as part of CALGB 23819 protocol) 3/18/24: C2  4/1/24: C3  4/15/24: C4 4/29/24: C5 5FU/LV alone 5/9/24: Started RT 5/13/24: C6 5FU/LV over 96 hours (oxali held 2/2 neuropathy) 5/20/24: C7 5/28/24: C8 FOLFOX 6/3/24: C9 5FU/lV.  6/10/24: C10 over 24 hours d/t RT completing 6/11/24 7/8/24: Resolution of mild hypermetabolism in GE junction, compatible with response to interval therapy. 7/12/24-8/9/24: s/p Michael Broderick on 7/16/24 w/ postop course c/b high VENKATA drain output. 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 T2N0 8/10/24: CT CAP: MEDIASTINUM AND DENIS: No lymphadenopathy. s/p [place] Broderick Esophagectomy, jejunostomy tube 8/3, s/p EGD with esophageal stenting 8/5. BOWEL: No bowel obstruction. Appendix is not visualized. No evidence of inflammation in the pericecal region.. As noted previously post esophagectomy and gastric pull-through. 8/19/24-8/22/24: Readmitted for bloody output via VENKATA drain    [de-identified] : adenocarcinoma, moderately differentiated.  [de-identified] : JEREMI Her2/gunnar negative   [FreeTextEntry1] : s/p concurrent chemoRT [de-identified] : Here for clinical f/u w/ his daughter and sister  Has had two admission since last visit due to surgery (prolonged admission) and then assessment of VENKATA drain  He is doing much better now per report. He is without pain. Has lost weight but is using PEG tube. Per sister, he has supplies at home for feeds. Patient is reporting he is hungry.  Energy level is slowly improving after surgery. He is walking more.  Of note, neuropathy has resolved. Not using Cymbalta anymore

## 2024-08-28 NOTE — PHYSICAL EXAM
[Restricted in physically strenuous activity but ambulatory and able to carry out work of a light or sedentary nature] : Status 1- Restricted in physically strenuous activity but ambulatory and able to carry out work of a light or sedentary nature, e.g., light house work, office work [Thin] : thin [Normal] : affect appropriate [de-identified] : VENKATA drain and PEG in place. CDI

## 2024-08-28 NOTE — HISTORY OF PRESENT ILLNESS
[Disease: _____________________] : Disease: [unfilled] [T: ___] : T[unfilled] [N: ___] : N[unfilled] [M: ___] : M[unfilled] [AJCC Stage: ____] : AJCC Stage: [unfilled] [de-identified] : 59 year old male with PMHx of HTN, HLD, IDDM2, and BPH with newly diagnosed GEJ adenocarcinoma   Patient presented [facility] 1/1/24 c/o dizziness and fatigue and admitted for symptomatic anemia (hb 4.7) and hyperglycemia. Symptoms started 2 days prior to admission. EGD on 01/02: nodule on gastric side of the GEJ with some old heme in stomach. Possible healing Kavita Valero tear. Pathology reveal GEJ bx: fragments of adenocarcinoma, moderately differentiated. Colonoscopy on 01/04: right sided diverticula, small internal hemorrhoids CT CAP 1/5/24: Indeterminate 3.6 mm left apical lung nodule, High attenuation in the gastric lumen either blood products or ingested material. Limited evaluation for underlying mass. Diffuse wall thickening consistent with gastritis. Mildly distended esophagus. Correlate with recent endoscopic findings, 6.6 x 6.7 mm peripancreatic node, enlarged prostate indenting the bladder base. Of note, no prior colonoscopy. Referred to Surgical Oncology. Saw Dr. Chong 1/24/24 who recommended PET/CT and referred to Rad Onc and Med Onc to discuss neoadjuvant chemoRT.   Patient here today for initial Medical Oncology evaluation.   2/19/24: PET: 1.  Mild uptake at proximal stomach, possibly related to reported gastroesophageal junction lesion. Remaining alimentary tract shows diffuse uptake which is likely related to use of metformin. This limits evaluation for additional lesions in the bowel and the aerodigestive tract. 2.  Previously identified peripancreatic node is difficult to identify on the current low-dose CT and there is no distinct abnormal uptake in that region. Suggest MRI of the abdomen. 3/4/24: C1 FOLFOX, dose reduced (as part of CALGB 41733 protocol) 3/18/24: C2  4/1/24: C3  4/15/24: C4 4/29/24: C5 5FU/LV alone 5/9/24: Started RT 5/13/24: C6 5FU/LV over 96 hours (oxali held 2/2 neuropathy) 5/20/24: C7 5/28/24: C8 FOLFOX 6/3/24: C9 5FU/lV.  6/10/24: C10 over 24 hours d/t RT completing 6/11/24 7/8/24: Resolution of mild hypermetabolism in GE junction, compatible with response to interval therapy. 7/12/24-8/9/24: s/p Michael Broderick on 7/16/24 w/ postop course c/b high VENKATA drain output. 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 T2N0 8/10/24: CT CAP: MEDIASTINUM AND DENIS: No lymphadenopathy. s/p [place] Broderick Esophagectomy, jejunostomy tube 8/3, s/p EGD with esophageal stenting 8/5. BOWEL: No bowel obstruction. Appendix is not visualized. No evidence of inflammation in the pericecal region.. As noted previously post esophagectomy and gastric pull-through. 8/19/24-8/22/24: Readmitted for bloody output via VENKATA drain    [de-identified] : adenocarcinoma, moderately differentiated.  [de-identified] : JEREMI Her2/gunnar negative   [FreeTextEntry1] : s/p concurrent chemoRT [de-identified] : Here for clinical f/u w/ his daughter and sister  Has had two admission since last visit due to surgery (prolonged admission) and then assessment of VENKATA drain  He is doing much better now per report. He is without pain. Has lost weight but is using PEG tube. Per sister, he has supplies at home for feeds. Patient is reporting he is hungry.  Energy level is slowly improving after surgery. He is walking more.  Of note, neuropathy has resolved. Not using Cymbalta anymore

## 2024-08-29 ENCOUNTER — NON-APPOINTMENT (OUTPATIENT)
Age: 59
End: 2024-08-29

## 2024-08-30 ENCOUNTER — NON-APPOINTMENT (OUTPATIENT)
Age: 59
End: 2024-08-30

## 2024-09-03 ENCOUNTER — NON-APPOINTMENT (OUTPATIENT)
Age: 59
End: 2024-09-03

## 2024-09-04 ENCOUNTER — APPOINTMENT (OUTPATIENT)
Dept: SURGICAL ONCOLOGY | Facility: CLINIC | Age: 59
End: 2024-09-04

## 2024-09-04 ENCOUNTER — NON-APPOINTMENT (OUTPATIENT)
Age: 59
End: 2024-09-04

## 2024-09-09 ENCOUNTER — NON-APPOINTMENT (OUTPATIENT)
Age: 59
End: 2024-09-09

## 2024-09-10 ENCOUNTER — APPOINTMENT (OUTPATIENT)
Dept: HEMATOLOGY ONCOLOGY | Facility: CLINIC | Age: 59
End: 2024-09-10
Payer: MEDICAID

## 2024-09-10 ENCOUNTER — RESULT REVIEW (OUTPATIENT)
Age: 59
End: 2024-09-10

## 2024-09-10 VITALS
HEART RATE: 104 BPM | SYSTOLIC BLOOD PRESSURE: 108 MMHG | DIASTOLIC BLOOD PRESSURE: 69 MMHG | OXYGEN SATURATION: 95 % | RESPIRATION RATE: 17 BRPM | BODY MASS INDEX: 17.18 KG/M2 | WEIGHT: 97 LBS | TEMPERATURE: 97.3 F

## 2024-09-10 DIAGNOSIS — T45.1X5A TOXIC GASTROENTERITIS AND COLITIS: ICD-10-CM

## 2024-09-10 DIAGNOSIS — K52.1 TOXIC GASTROENTERITIS AND COLITIS: ICD-10-CM

## 2024-09-10 LAB
BASOPHILS # BLD AUTO: 0.07 K/UL — SIGNIFICANT CHANGE UP (ref 0–0.2)
BASOPHILS NFR BLD AUTO: 0.4 % — SIGNIFICANT CHANGE UP (ref 0–2)
EOSINOPHIL # BLD AUTO: 0.01 K/UL — SIGNIFICANT CHANGE UP (ref 0–0.5)
EOSINOPHIL NFR BLD AUTO: 0.1 % — SIGNIFICANT CHANGE UP (ref 0–6)
HCT VFR BLD CALC: 26.8 % — LOW (ref 39–50)
HGB BLD-MCNC: 8.6 G/DL — LOW (ref 13–17)
IMM GRANULOCYTES NFR BLD AUTO: 0.9 % — SIGNIFICANT CHANGE UP (ref 0–0.9)
LYMPHOCYTES # BLD AUTO: 1.2 K/UL — SIGNIFICANT CHANGE UP (ref 1–3.3)
LYMPHOCYTES # BLD AUTO: 6.9 % — LOW (ref 13–44)
MCHC RBC-ENTMCNC: 28.2 PG — SIGNIFICANT CHANGE UP (ref 27–34)
MCHC RBC-ENTMCNC: 32.1 G/DL — SIGNIFICANT CHANGE UP (ref 32–36)
MCV RBC AUTO: 87.9 FL — SIGNIFICANT CHANGE UP (ref 80–100)
MONOCYTES # BLD AUTO: 0.98 K/UL — HIGH (ref 0–0.9)
MONOCYTES NFR BLD AUTO: 5.6 % — SIGNIFICANT CHANGE UP (ref 2–14)
NEUTROPHILS # BLD AUTO: 15.01 K/UL — HIGH (ref 1.8–7.4)
NEUTROPHILS NFR BLD AUTO: 86.1 % — HIGH (ref 43–77)
NRBC # BLD: 0 /100 WBCS — SIGNIFICANT CHANGE UP (ref 0–0)
PLATELET # BLD AUTO: 466 K/UL — HIGH (ref 150–400)
RBC # BLD: 3.05 M/UL — LOW (ref 4.2–5.8)
RBC # FLD: 14.7 % — HIGH (ref 10.3–14.5)
WBC # BLD: 17.43 K/UL — HIGH (ref 3.8–10.5)
WBC # FLD AUTO: 17.43 K/UL — HIGH (ref 3.8–10.5)

## 2024-09-10 PROCEDURE — 99214 OFFICE O/P EST MOD 30 MIN: CPT

## 2024-09-10 PROCEDURE — G2211 COMPLEX E/M VISIT ADD ON: CPT | Mod: NC

## 2024-09-11 ENCOUNTER — INPATIENT (INPATIENT)
Facility: HOSPITAL | Age: 59
LOS: 7 days | Discharge: HOME HEALTH | End: 2024-09-19
Attending: THORACIC SURGERY (CARDIOTHORACIC VASCULAR SURGERY) | Admitting: THORACIC SURGERY (CARDIOTHORACIC VASCULAR SURGERY)
Payer: MEDICAID

## 2024-09-11 ENCOUNTER — APPOINTMENT (OUTPATIENT)
Dept: GASTROENTEROLOGY | Facility: CLINIC | Age: 59
End: 2024-09-11
Payer: MEDICAID

## 2024-09-11 VITALS
OXYGEN SATURATION: 95 % | TEMPERATURE: 100 F | DIASTOLIC BLOOD PRESSURE: 67 MMHG | RESPIRATION RATE: 24 BRPM | WEIGHT: 97 LBS | HEIGHT: 64 IN | SYSTOLIC BLOOD PRESSURE: 114 MMHG | HEART RATE: 123 BPM

## 2024-09-11 DIAGNOSIS — Z95.828 PRESENCE OF OTHER VASCULAR IMPLANTS AND GRAFTS: Chronic | ICD-10-CM

## 2024-09-11 DIAGNOSIS — K91.89 OTHER POSTPROCEDURAL COMPLICATIONS AND DISORDERS OF DIGESTIVE SYSTEM: ICD-10-CM

## 2024-09-11 LAB
ALBUMIN SERPL ELPH-MCNC: 2.9 G/DL — LOW (ref 3.3–5)
ALBUMIN SERPL ELPH-MCNC: 3.3 G/DL
ALP BLD-CCNC: 235 U/L
ALP SERPL-CCNC: 190 U/L — HIGH (ref 40–120)
ALT FLD-CCNC: 13 U/L — SIGNIFICANT CHANGE UP (ref 4–41)
ALT SERPL-CCNC: 14 U/L
ANION GAP SERPL CALC-SCNC: 13 MMOL/L — SIGNIFICANT CHANGE UP (ref 7–14)
ANION GAP SERPL CALC-SCNC: 14 MMOL/L
APPEARANCE UR: CLEAR — SIGNIFICANT CHANGE UP
APTT BLD: 29.5 SEC — SIGNIFICANT CHANGE UP (ref 24.5–35.6)
APTT BLD: 31.2 SEC
AST SERPL-CCNC: 16 U/L — SIGNIFICANT CHANGE UP (ref 4–40)
AST SERPL-CCNC: 21 U/L
BACTERIA # UR AUTO: NEGATIVE /HPF — SIGNIFICANT CHANGE UP
BASOPHILS # BLD AUTO: 0.03 K/UL — SIGNIFICANT CHANGE UP (ref 0–0.2)
BASOPHILS NFR BLD AUTO: 0.2 % — SIGNIFICANT CHANGE UP (ref 0–2)
BILIRUB SERPL-MCNC: 0.4 MG/DL
BILIRUB SERPL-MCNC: 0.4 MG/DL — SIGNIFICANT CHANGE UP (ref 0.2–1.2)
BILIRUB UR-MCNC: NEGATIVE — SIGNIFICANT CHANGE UP
BLOOD GAS VENOUS COMPREHENSIVE RESULT: SIGNIFICANT CHANGE UP
BUN SERPL-MCNC: 26 MG/DL
BUN SERPL-MCNC: 26 MG/DL — HIGH (ref 7–23)
CALCIUM SERPL-MCNC: 8.5 MG/DL — SIGNIFICANT CHANGE UP (ref 8.4–10.5)
CALCIUM SERPL-MCNC: 9 MG/DL
CANCER AG19-9 SERPL-ACNC: 25 U/ML
CAST: 0 /LPF — SIGNIFICANT CHANGE UP (ref 0–4)
CEA SERPL-MCNC: 4 NG/ML
CHLORIDE SERPL-SCNC: 90 MMOL/L — LOW (ref 98–107)
CHLORIDE SERPL-SCNC: 91 MMOL/L
CO2 SERPL-SCNC: 27 MMOL/L
CO2 SERPL-SCNC: 28 MMOL/L — SIGNIFICANT CHANGE UP (ref 22–31)
COLOR SPEC: YELLOW — SIGNIFICANT CHANGE UP
CREAT SERPL-MCNC: 0.65 MG/DL — SIGNIFICANT CHANGE UP (ref 0.5–1.3)
CREAT SERPL-MCNC: 0.66 MG/DL
DIFF PNL FLD: NEGATIVE — SIGNIFICANT CHANGE UP
EGFR: 107 ML/MIN/1.73M2
EGFR: 107 ML/MIN/1.73M2 — SIGNIFICANT CHANGE UP
EOSINOPHIL # BLD AUTO: 0 K/UL — SIGNIFICANT CHANGE UP (ref 0–0.5)
EOSINOPHIL NFR BLD AUTO: 0 % — SIGNIFICANT CHANGE UP (ref 0–6)
FERRITIN SERPL-MCNC: 957 NG/ML
FLUAV AG NPH QL: SIGNIFICANT CHANGE UP
FLUBV AG NPH QL: SIGNIFICANT CHANGE UP
GLUCOSE SERPL-MCNC: 162 MG/DL
GLUCOSE SERPL-MCNC: 222 MG/DL — HIGH (ref 70–99)
GLUCOSE UR QL: >=1000 MG/DL
HCT VFR BLD CALC: 24.3 % — LOW (ref 39–50)
HGB BLD-MCNC: 7.7 G/DL — LOW (ref 13–17)
IANC: 17.24 K/UL — HIGH (ref 1.8–7.4)
IMM GRANULOCYTES NFR BLD AUTO: 0.8 % — SIGNIFICANT CHANGE UP (ref 0–0.9)
INR BLD: 1.05 RATIO — SIGNIFICANT CHANGE UP (ref 0.85–1.18)
INR PPP: 0.95 RATIO
IRON SATN MFR SERPL: 8 %
IRON SERPL-MCNC: 17 UG/DL
KETONES UR-MCNC: ABNORMAL MG/DL
LEUKOCYTE ESTERASE UR-ACNC: NEGATIVE — SIGNIFICANT CHANGE UP
LYMPHOCYTES # BLD AUTO: 1.38 K/UL — SIGNIFICANT CHANGE UP (ref 1–3.3)
LYMPHOCYTES # BLD AUTO: 7 % — LOW (ref 13–44)
MCHC RBC-ENTMCNC: 27.5 PG — SIGNIFICANT CHANGE UP (ref 27–34)
MCHC RBC-ENTMCNC: 31.7 GM/DL — LOW (ref 32–36)
MCV RBC AUTO: 86.8 FL — SIGNIFICANT CHANGE UP (ref 80–100)
MONOCYTES # BLD AUTO: 0.79 K/UL — SIGNIFICANT CHANGE UP (ref 0–0.9)
MONOCYTES NFR BLD AUTO: 4 % — SIGNIFICANT CHANGE UP (ref 2–14)
NEUTROPHILS # BLD AUTO: 17.24 K/UL — HIGH (ref 1.8–7.4)
NEUTROPHILS NFR BLD AUTO: 88 % — HIGH (ref 43–77)
NITRITE UR-MCNC: NEGATIVE — SIGNIFICANT CHANGE UP
NRBC # BLD: 0 /100 WBCS — SIGNIFICANT CHANGE UP (ref 0–0)
NRBC # FLD: 0 K/UL — SIGNIFICANT CHANGE UP (ref 0–0)
PH UR: 7 — SIGNIFICANT CHANGE UP (ref 5–8)
PLATELET # BLD AUTO: 491 K/UL — HIGH (ref 150–400)
POTASSIUM SERPL-MCNC: 4.8 MMOL/L — SIGNIFICANT CHANGE UP (ref 3.5–5.3)
POTASSIUM SERPL-SCNC: 4.8 MMOL/L — SIGNIFICANT CHANGE UP (ref 3.5–5.3)
POTASSIUM SERPL-SCNC: 5 MMOL/L
PROT SERPL-MCNC: 7.5 G/DL — SIGNIFICANT CHANGE UP (ref 6–8.3)
PROT SERPL-MCNC: 7.7 G/DL
PROT UR-MCNC: 100 MG/DL
PROTHROM AB SERPL-ACNC: 11.7 SEC — SIGNIFICANT CHANGE UP (ref 9.5–13)
PT BLD: 10.8 SEC
RBC # BLD: 2.8 M/UL — LOW (ref 4.2–5.8)
RBC # FLD: 14.8 % — HIGH (ref 10.3–14.5)
RBC CASTS # UR COMP ASSIST: 1 /HPF — SIGNIFICANT CHANGE UP (ref 0–4)
RSV RNA NPH QL NAA+NON-PROBE: SIGNIFICANT CHANGE UP
SARS-COV-2 RNA SPEC QL NAA+PROBE: SIGNIFICANT CHANGE UP
SODIUM SERPL-SCNC: 131 MMOL/L — LOW (ref 135–145)
SODIUM SERPL-SCNC: 132 MMOL/L
SP GR SPEC: 1.03 — SIGNIFICANT CHANGE UP (ref 1–1.03)
SQUAMOUS # UR AUTO: 0 /HPF — SIGNIFICANT CHANGE UP (ref 0–5)
TIBC SERPL-MCNC: 207 UG/DL
UIBC SERPL-MCNC: 190 UG/DL
UROBILINOGEN FLD QL: 0.2 MG/DL — SIGNIFICANT CHANGE UP (ref 0.2–1)
WBC # BLD: 19.59 K/UL — HIGH (ref 3.8–10.5)
WBC # FLD AUTO: 19.59 K/UL — HIGH (ref 3.8–10.5)
WBC UR QL: 0 /HPF — SIGNIFICANT CHANGE UP (ref 0–5)

## 2024-09-11 PROCEDURE — 99285 EMERGENCY DEPT VISIT HI MDM: CPT

## 2024-09-11 PROCEDURE — 99443: CPT

## 2024-09-11 PROCEDURE — 71046 X-RAY EXAM CHEST 2 VIEWS: CPT | Mod: 26

## 2024-09-11 PROCEDURE — 74177 CT ABD & PELVIS W/CONTRAST: CPT | Mod: 26,MC

## 2024-09-11 PROCEDURE — 71260 CT THORAX DX C+: CPT | Mod: 26,MC

## 2024-09-11 RX ORDER — IOHEXOL 350 MG/ML
30 INJECTION, SOLUTION INTRAVENOUS ONCE
Refills: 0 | Status: COMPLETED | OUTPATIENT
Start: 2024-09-11 | End: 2024-09-11

## 2024-09-11 RX ORDER — VANCOMYCIN/0.9 % SOD CHLORIDE 1.75G/25
1000 PLASTIC BAG, INJECTION (ML) INTRAVENOUS ONCE
Refills: 0 | Status: COMPLETED | OUTPATIENT
Start: 2024-09-11 | End: 2024-09-11

## 2024-09-11 RX ORDER — PIPERACILLIN SODIUM AND TAZOBACTAM SODIUM 3; .375 G/15ML; G/15ML
3.38 INJECTION, POWDER, FOR SOLUTION INTRAVENOUS ONCE
Refills: 0 | Status: COMPLETED | OUTPATIENT
Start: 2024-09-11 | End: 2024-09-11

## 2024-09-11 RX ADMIN — IOHEXOL 30 MILLILITER(S): 350 INJECTION, SOLUTION INTRAVENOUS at 19:43

## 2024-09-11 RX ADMIN — Medication 250 MILLIGRAM(S): at 17:26

## 2024-09-11 RX ADMIN — PIPERACILLIN SODIUM AND TAZOBACTAM SODIUM 200 GRAM(S): 3; .375 INJECTION, POWDER, FOR SOLUTION INTRAVENOUS at 17:10

## 2024-09-11 NOTE — PHYSICAL EXAM
[Thin] : thin [Normal] : affect appropriate [de-identified] : VENKATA drain and PEG in place. CDI

## 2024-09-11 NOTE — ED ADULT NURSE NOTE - CHIEF COMPLAINT QUOTE
sister states " he has high White count from yesterday blood work and has fever since today " h/o esophageal cancer and has esophagectomy on july16 and has feeding tube and VENKATA drain. patient had chemo/radiation in [facility]. .h/o DM

## 2024-09-11 NOTE — PHYSICAL EXAM
[Thin] : thin [Normal] : affect appropriate [de-identified] : VENKATA drain and PEG in place. CDI

## 2024-09-11 NOTE — ED ADULT TRIAGE NOTE - PRO INTERPRETER NEED 2
Problem: Adult Inpatient Plan of Care  Goal: Plan of Care Review  Outcome: Ongoing, Progressing  Flowsheets (Taken 12/8/2023 1626)  Progress: no change  Plan of Care Reviewed With: patient  Outcome Evaluation: Patient is disoriented to situation. Patient pocketed medications in cheek this morning and spit them out after RN left room, MD notified. Patient has no complaints at this time.  Goal: Patient-Specific Goal (Individualized)  Outcome: Ongoing, Progressing  Goal: Absence of Hospital-Acquired Illness or Injury  Outcome: Ongoing, Progressing  Intervention: Identify and Manage Fall Risk  Recent Flowsheet Documentation  Taken 12/8/2023 1625 by Sherry García RN  Safety Promotion/Fall Prevention:   nonskid shoes/slippers when out of bed   safety round/check completed  Taken 12/8/2023 1527 by Sherry García RN  Safety Promotion/Fall Prevention:   nonskid shoes/slippers when out of bed   safety round/check completed  Taken 12/8/2023 1301 by Sherry García RN  Safety Promotion/Fall Prevention:   nonskid shoes/slippers when out of bed   safety round/check completed  Taken 12/8/2023 1151 by Sherry García RN  Safety Promotion/Fall Prevention:   nonskid shoes/slippers when out of bed   safety round/check completed  Taken 12/8/2023 1024 by Sherry García RN  Safety Promotion/Fall Prevention:   nonskid shoes/slippers when out of bed   safety round/check completed  Taken 12/8/2023 0807 by Sherry García RN  Safety Promotion/Fall Prevention:   nonskid shoes/slippers when out of bed   safety round/check completed  Taken 12/8/2023 0741 by Sherry García RN  Safety Promotion/Fall Prevention:   assistive device/personal items within reach   clutter free environment maintained   fall prevention program maintained   lighting adjusted   nonskid shoes/slippers when out of bed   room organization consistent   safety round/check completed  Taken 12/8/2023 0715 by Sherry García RN  Safety Promotion/Fall Prevention:    [facility] Hospitalist Medicine Progress Note   [facility]       Jannette Parra is a 69 year old lady with macular degeneration, rheumatoid arthritis, osteoporosis, history of brain abscess who came to [facility] 8/14/20[facility] after she tripped and fell backwards in her garden with pain in her left leg and inability to stand or weight-bear on it.  X-ray showed oblique fracture at the medial midshaft of the left femur.  She was evaluated by orthopedic surgery and underwent left hip cephalomedullary nailing of the midshaft of the femur fracture by Dr. Armand Schmitz MD 8/15/2022       Date of Admission:  8/14/2022  Assessment & Plan     Left femur fracture  Mechanical fall  S/p left cephalomedullary nailing of the midshaft of femur by Dr. Armand Schmitz MD 8/15/2022    Postop anemia  Rheumatoid arthritis  Osteoporosis  Macular degeneration  History of brain abscess  Hypercalcemia -this is improved with calcium 9.2        Plan:   Monitor hemoglobin  PT OT as per orthopedic service    Diet: Advance Diet as Tolerated: Regular Diet Adult    DVT Prophylaxis: Aspirin  Brooke Catheter: Not present  Code Status: Full Code               The patient's care was discussed with the Patient     Jono Corea MD  Hospitalist Service  [facility]    ______________________________________________________________________    Interval History     Symptoms   Patient states that she still has some numbness in the left leg post op     Review of Systems:   No nausea vomiting chest pain or shortness of breath    Data reviewed today: I reviewed all medications, new labs and imaging results over the last 24 hours.     Physical Exam   Vital Signs: Temp: 97.8  F (36.6  C) Temp src: Temporal BP: 111/46 Pulse: 73   Resp: 19 SpO2: 98 % O2 Device: Nasal cannula Oxygen Delivery: 2 LPM  Weight: 0 lbs 0 oz      GENERAL: Patient is not  in acute distress  HEENT:  EOM+,Conjunctiva is clear    NECK: no  Jugular Venous distention  HEART: S1 S2 regular Rate and Rhythm, there is no murmur,   LUNGS: Respirations are not laboured, Lungs are clear to auscultation without Crepitations or Wheezing   ABDOMEN: Soft, there is no tenderness ,Bowel Sounds are Positive   LOWER LIMBS: no Pedal Edema  Bilaterally   CNS:  Alert,  Oriented x 3, Moving all the Four Limbs     Data   Recent Labs   Lab 08/15/22  0636 08/14/22  1218   WBC 6.4 9.4   HGB 9.7* 11.4*   * 99    254   INR 1.00  --     140   POTASSIUM 4.2 4.1   CHLORIDE 106 105   CO2 27 27   BUN 9.3 14.4   CR 0.52 0.62   ANIONGAP 5* 8   JOSEPH 9.2 10.5*   * 104*         Recent Results (from the past 24 hour(s))   XR Surgery JESÚS L/T 5 Min Fluoro w Stills    Narrative    This exam was marked as non-reportable because it will not be read by a   radiologist or a [place] non-radiologist provider.              nonskid shoes/slippers when out of bed   safety round/check completed  Intervention: Prevent and Manage VTE (Venous Thromboembolism) Risk  Recent Flowsheet Documentation  Taken 12/8/2023 0741 by Sherry García RN  Range of Motion: active ROM (range of motion) encouraged  Intervention: Prevent Infection  Recent Flowsheet Documentation  Taken 12/8/2023 0741 by Sherry García RN  Infection Prevention:   cohorting utilized   environmental surveillance performed   equipment surfaces disinfected   hand hygiene promoted   personal protective equipment utilized   rest/sleep promoted   single patient room provided  Goal: Optimal Comfort and Wellbeing  Outcome: Ongoing, Progressing  Intervention: Provide Person-Centered Care  Recent Flowsheet Documentation  Taken 12/8/2023 0741 by Sherry García RN  Trust Relationship/Rapport:   care explained   choices provided   emotional support provided   empathic listening provided   questions answered   questions encouraged   reassurance provided   thoughts/feelings acknowledged  Goal: Readiness for Transition of Care  Outcome: Ongoing, Progressing     Problem: Asthma Comorbidity  Goal: Maintenance of Asthma Control  Outcome: Ongoing, Progressing  Intervention: Maintain Asthma Symptom Control  Recent Flowsheet Documentation  Taken 12/8/2023 0741 by Sherry García RN  Medication Review/Management:   medications reviewed   high-risk medications identified     Problem: Behavioral Health Comorbidity  Goal: Maintenance of Behavioral Health Symptom Control  Outcome: Ongoing, Progressing  Intervention: Maintain Behavioral Health Symptom Control  Recent Flowsheet Documentation  Taken 12/8/2023 0741 by Sherry García RN  Medication Review/Management:   medications reviewed   high-risk medications identified     Problem: COPD (Chronic Obstructive Pulmonary Disease) Comorbidity  Goal: Maintenance of COPD Symptom Control  Outcome: Ongoing, Progressing  Intervention: Maintain COPD-Symptom  Control  Recent Flowsheet Documentation  Taken 12/8/2023 0741 by Sherry García RN  Medication Review/Management:   medications reviewed   high-risk medications identified     Problem: Diabetes Comorbidity  Goal: Blood Glucose Level Within Targeted Range  Outcome: Ongoing, Progressing     Problem: Heart Failure Comorbidity  Goal: Maintenance of Heart Failure Symptom Control  Outcome: Ongoing, Progressing  Intervention: Maintain Heart Failure-Management  Recent Flowsheet Documentation  Taken 12/8/2023 0741 by Sherry García RN  Medication Review/Management:   medications reviewed   high-risk medications identified     Problem: Hypertension Comorbidity  Goal: Blood Pressure in Desired Range  Outcome: Ongoing, Progressing  Intervention: Maintain Blood Pressure Management  Recent Flowsheet Documentation  Taken 12/8/2023 0741 by Sherry García RN  Medication Review/Management:   medications reviewed   high-risk medications identified     Problem: Obstructive Sleep Apnea Risk or Actual Comorbidity Management  Goal: Unobstructed Breathing During Sleep  Outcome: Ongoing, Progressing     Problem: Osteoarthritis Comorbidity  Goal: Maintenance of Osteoarthritis Symptom Control  Outcome: Ongoing, Progressing  Intervention: Maintain Osteoarthritis Symptom Control  Recent Flowsheet Documentation  Taken 12/8/2023 0741 by Sherry García RN  Medication Review/Management:   medications reviewed   high-risk medications identified     Problem: Pain Chronic (Persistent) (Comorbidity Management)  Goal: Acceptable Pain Control and Functional Ability  Outcome: Ongoing, Progressing  Intervention: Manage Persistent Pain  Recent Flowsheet Documentation  Taken 12/8/2023 0741 by Sherry García RN  Medication Review/Management:   medications reviewed   high-risk medications identified     Problem: Seizure Disorder Comorbidity  Goal: Maintenance of Seizure Control  Outcome: Ongoing, Progressing     Problem: Skin Injury Risk  Increased  Goal: Skin Health and Integrity  Outcome: Ongoing, Progressing  Intervention: Optimize Skin Protection  Recent Flowsheet Documentation  Taken 12/8/2023 1625 by Sherry García RN  Head of Bed (HOB) Positioning: HOB elevated  Taken 12/8/2023 1527 by Sherry García RN  Head of Bed (HOB) Positioning: HOB elevated  Taken 12/8/2023 1301 by Sherry García RN  Head of Bed (HOB) Positioning: HOB elevated  Taken 12/8/2023 1151 by Sherry García RN  Head of Bed (HOB) Positioning: HOB elevated  Taken 12/8/2023 1024 by Sherry García RN  Head of Bed (HOB) Positioning: HOB elevated  Taken 12/8/2023 0807 by Sherry García RN  Head of Bed (HOB) Positioning: HOB elevated  Taken 12/8/2023 0715 by Sherry García RN  Head of Bed (HOB) Positioning: HOB elevated   Goal Outcome Evaluation:  Plan of Care Reviewed With: patient        Progress: no change  Outcome Evaluation: Patient is alert and oriented x4. Patient pocketed medications in cheek this morning and spit them out after RN left room, MD notified. Patient has no complaints at this time.

## 2024-09-11 NOTE — HISTORY OF PRESENT ILLNESS
[Disease: _____________________] : Disease: [unfilled] [T: ___] : T[unfilled] [N: ___] : N[unfilled] [M: ___] : M[unfilled] [AJCC Stage: ____] : AJCC Stage: [unfilled] [de-identified] : 59 year old male with PMHx of HTN, HLD, IDDM2, and BPH with newly diagnosed GEJ adenocarcinoma   Patient presented [facility] 1/1/24 c/o dizziness and fatigue and admitted for symptomatic anemia (hb 4.7) and hyperglycemia. Symptoms started 2 days prior to admission. EGD on 01/02: nodule on gastric side of the GEJ with some old heme in stomach. Possible healing Kavita Valero tear. Pathology reveal GEJ bx: fragments of adenocarcinoma, moderately differentiated. Colonoscopy on 01/04: right sided diverticula, small internal hemorrhoids CT CAP 1/5/24: Indeterminate 3.6 mm left apical lung nodule, High attenuation in the gastric lumen either blood products or ingested material. Limited evaluation for underlying mass. Diffuse wall thickening consistent with gastritis. Mildly distended esophagus. Correlate with recent endoscopic findings, 6.6 x 6.7 mm peripancreatic node, enlarged prostate indenting the bladder base. Of note, no prior colonoscopy. Referred to Surgical Oncology. Saw Dr. Chong 1/24/24 who recommended PET/CT and referred to Rad Onc and Med Onc to discuss neoadjuvant chemoRT.   Patient here today for initial Medical Oncology evaluation.   2/19/24: PET: 1.  Mild uptake at proximal stomach, possibly related to reported gastroesophageal junction lesion. Remaining alimentary tract shows diffuse uptake which is likely related to use of metformin. This limits evaluation for additional lesions in the bowel and the aerodigestive tract. 2.  Previously identified peripancreatic node is difficult to identify on the current low-dose CT and there is no distinct abnormal uptake in that region. Suggest MRI of the abdomen. 3/4/24: C1 FOLFOX, dose reduced (as part of CALGB 53986 protocol) 3/18/24: C2  4/1/24: C3  4/15/24: C4 4/29/24: C5 5FU/LV alone 5/9/24: Started RT 5/13/24: C6 5FU/LV over 96 hours (oxali held 2/2 neuropathy) 5/20/24: C7 5/28/24: C8 FOLFOX 6/3/24: C9 5FU/lV.  6/10/24: C10 over 24 hours d/t RT completing 6/11/24 7/8/24: Resolution of mild hypermetabolism in GE junction, compatible with response to interval therapy. 7/12/24-8/9/24: s/p Michael Broderick on 7/16/24 w/ postop course c/b high VENKATA drain output. found on EGD to have esophago-gastric anastomotic now s/p esophageal stent placement by GI on 8/5.  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 T2N0 8/10/24: CT CAP: No lymphadenopathy. s/p Michael Broderick Esophagectomy, jejunostomy tube 8/3, s/p EGD with esophageal stenting 8/5. 8/19/24-8/22/24: admitted for emesis and new bloody output from the VENKATA drain. s/p CTA CAP w/o PO contrast, neg for active leak. s/p EGD with GI showing small ulcerating at base of distal stent but no active bleed. P.   [de-identified] : adenocarcinoma, moderately differentiated.  [de-identified] : JEREMI Her2/gunnar negative   [FreeTextEntry1] : s/p neoadjuvant chemo/RT and surgery [de-identified] : has an esophageal stent which is scheduled to be removed in 2 weeks. still has a VENKATA drain with increased output. he has lost significant weight - 20 lbs since pre op.  post op course has been complicated by anastomotic leak s/p stent placement, VENKATA drain still in place. He is dependent on tube feeds which run continuously at night for 18 hrs - recently formula adjusted to add more calories. weight has been stable in 2 weeks.  denies any pain.  + weak, requires assistance with some ADLs.  he gets thirsty often, daughter add 100 cc of flushes, he also uses ice chips.

## 2024-09-11 NOTE — ED PROVIDER NOTE - ATTENDING APP SHARED VISIT CONTRIBUTION OF CARE
Brief HPI:  59-year-old male past medical history of esophageal cancer status post esophagectomy July 2024 with postop course complicated by high VENKATA drain output found to have anastomotic leak status post esophageal stent placement, currently on chemotherapy, hypertension, diabetes presents at instruction of oncologist for leukocytosis.  Patient reports weight loss and recent fever overnight.  Reports nausea.  Denies vomiting, abdominal pain, chest pain, shortness of breath, diarrhea, bloody or dark stool, changes in Adam drain output.    Vitals:   Reviewed    Exam:    GEN:  Cachectic, ANO x 3  HEENT:  NCAT, neck supple, EOMI, PERRLA, sclera anicteric, no conjunctival pallor or injection, no stridor, normal voice, no tonsillar exudate, uvula midline  CV:  regular rhythm and rate, s1/s2 audible, no murmurs, rubs or gallops, peripheral pulses 2+ and symmetric  PULM:  non-labored respirations, Coarse breath sounds bilaterally  ABD:  non distended, non-tender, no rebound, no guarding, negative Mcclain's sign, bowel sounds normal, no cvat, J-tube in place  MSK:  no gross deformity, non-tender extremities and joints, range of motion grossly normal appearing, no extremity edema, extremities warm and well perfused   NEURO:  AAOx3, CN II-XII intact, motor 5/5 in upper and lower extremities bilaterally, sensation grossly intact in extremities and trunk  SKIN:  warm, dry, no rash or vesicles, Adam drain with whitish nonbloody collection    A/P:    59-year-old male past medical history of esophageal cancer status post esophagectomy July 2024 with postop course complicated by high VENKATA drain output found to have anastomotic leak status post esophageal stent placement, currently on chemotherapy, hypertension, diabetes presents at instruction of oncologist for leukocytosis. Vital stable.  Patient nontoxic-appearing.  Unclear etiology of leukocytosis and reported fever.  Will obtain labs, CT chest, abdomen, pelvis, cultures, empiric antibiotics.  Disposition pending.

## 2024-09-11 NOTE — ED PROVIDER NOTE - CLINICAL SUMMARY MEDICAL DECISION MAKING FREE TEXT BOX
60 y/o male pmh esophageal CA s/p esophagectomy in July with J tube placement now c/o elevated WBC and fever/chills x 1 day, no specific complaints, no infectious symptoms. Pt is well appearing, nad, afebrile, tolerating secretions, abd sfot nt nd, no rash, no neuro deficits. Pt seen by Oncology who rec to obtain CT chest/abd/pelvis with Po and IV contrast, will obtain labs and cultures and reassess.

## 2024-09-11 NOTE — ED ADULT TRIAGE NOTE - CHIEF COMPLAINT QUOTE
sister states " he has high White count from yesterday blood work and has fever since today " h/o esophageal cancer and has esophagectomy on july16 and has feeding tube and VENKATA drain. patient had chemo/radiation in [facility]. .h/o DM

## 2024-09-11 NOTE — PHYSICAL EXAM
[Alert] : alert [Normal Voice/Communication] : normal voice/communication [Healthy Appearing] : healthy appearing [No Acute Distress] : no acute distress [Sclera] : the sclera and conjunctiva were normal [Hearing Threshold Finger Rub Not [place]] : hearing was normal [Normal Lips/Gums] : the lips and gums were normal [Oropharynx] : the oropharynx was normal [Normal Appearance] : the appearance of the neck was normal [No Neck Mass] : no neck mass was observed [No Respiratory Distress] : no respiratory distress [No Acc Muscle Use] : no accessory muscle use [Respiration, Rhythm And Depth] : normal respiratory rhythm and effort [Auscultation Breath Sounds / Voice Sounds] : lungs were clear to auscultation bilaterally [Heart Rate And Rhythm] : heart rate was normal and rhythm regular [Normal S1, S2] : normal S1 and S2 [Murmurs] : no murmurs [Bowel Sounds] : normal bowel sounds [Abdomen Tenderness] : non-tender [No Masses] : no abdominal mass palpated [] : no hepatosplenomegaly [Abdomen Soft] : soft [Oriented To Time, Place, And Person] : oriented to person, place, and time

## 2024-09-11 NOTE — ED PROVIDER NOTE - OBJECTIVE STATEMENT
60 y/o male pmh esophageal CA s/o esophagectomy in July 2024  with J tube placement now c/o elevated WBC and fever/chills x1 day. Pt denies specific infectious complaints. Denies chest pain, sob, abd pain, n/v/d, numbness, tingling, weakness, dizziness or syncope.

## 2024-09-11 NOTE — ED ADULT NURSE NOTE - OBJECTIVE STATEMENT
Patient was sent  in by MD due to elevated white count accompanied with fever and chills, denies pain at this time.

## 2024-09-11 NOTE — HISTORY OF PRESENT ILLNESS
[Disease: _____________________] : Disease: [unfilled] [T: ___] : T[unfilled] [N: ___] : N[unfilled] [M: ___] : M[unfilled] [AJCC Stage: ____] : AJCC Stage: [unfilled] [de-identified] : 59 year old male with PMHx of HTN, HLD, IDDM2, and BPH with newly diagnosed GEJ adenocarcinoma   Patient presented [facility] 1/1/24 c/o dizziness and fatigue and admitted for symptomatic anemia (hb 4.7) and hyperglycemia. Symptoms started 2 days prior to admission. EGD on 01/02: nodule on gastric side of the GEJ with some old heme in stomach. Possible healing Kavita Valero tear. Pathology reveal GEJ bx: fragments of adenocarcinoma, moderately differentiated. Colonoscopy on 01/04: right sided diverticula, small internal hemorrhoids CT CAP 1/5/24: Indeterminate 3.6 mm left apical lung nodule, High attenuation in the gastric lumen either blood products or ingested material. Limited evaluation for underlying mass. Diffuse wall thickening consistent with gastritis. Mildly distended esophagus. Correlate with recent endoscopic findings, 6.6 x 6.7 mm peripancreatic node, enlarged prostate indenting the bladder base. Of note, no prior colonoscopy. Referred to Surgical Oncology. Saw Dr. Chong 1/24/24 who recommended PET/CT and referred to Rad Onc and Med Onc to discuss neoadjuvant chemoRT.   Patient here today for initial Medical Oncology evaluation.   2/19/24: PET: 1.  Mild uptake at proximal stomach, possibly related to reported gastroesophageal junction lesion. Remaining alimentary tract shows diffuse uptake which is likely related to use of metformin. This limits evaluation for additional lesions in the bowel and the aerodigestive tract. 2.  Previously identified peripancreatic node is difficult to identify on the current low-dose CT and there is no distinct abnormal uptake in that region. Suggest MRI of the abdomen. 3/4/24: C1 FOLFOX, dose reduced (as part of CALGB 81029 protocol) 3/18/24: C2  4/1/24: C3  4/15/24: C4 4/29/24: C5 5FU/LV alone 5/9/24: Started RT 5/13/24: C6 5FU/LV over 96 hours (oxali held 2/2 neuropathy) 5/20/24: C7 5/28/24: C8 FOLFOX 6/3/24: C9 5FU/lV.  6/10/24: C10 over 24 hours d/t RT completing 6/11/24 7/8/24: Resolution of mild hypermetabolism in GE junction, compatible with response to interval therapy. 7/12/24-8/9/24: s/p Michael Broderick on 7/16/24 w/ postop course c/b high VENKATA drain output. found on EGD to have esophago-gastric anastomotic now s/p esophageal stent placement by GI on 8/5.  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 T2N0 8/10/24: CT CAP: No lymphadenopathy. s/p Michael Broderick Esophagectomy, jejunostomy tube 8/3, s/p EGD with esophageal stenting 8/5. 8/19/24-8/22/24: admitted for emesis and new bloody output from the VENKATA drain. s/p CTA CAP w/o PO contrast, neg for active leak. s/p EGD with GI showing small ulcerating at base of distal stent but no active bleed. P.   [de-identified] : adenocarcinoma, moderately differentiated.  [de-identified] : JEREMI Her2/gunnar negative   [FreeTextEntry1] : s/p neoadjuvant chemo/RT and surgery [de-identified] : has an esophageal stent which is scheduled to be removed in 2 weeks. still has a VENKATA drain with increased output. he has lost significant weight - 20 lbs since pre op.  post op course has been complicated by anastomotic leak s/p stent placement, VENKATA drain still in place. He is dependent on tube feeds which run continuously at night for 18 hrs - recently formula adjusted to add more calories. weight has been stable in 2 weeks.  denies any pain.  + weak, requires assistance with some ADLs.  he gets thirsty often, daughter add 100 cc of flushes, he also uses ice chips.

## 2024-09-11 NOTE — ED PROVIDER NOTE - PROGRESS NOTE DETAILS
MD Trice (PGY-3) Received sign out on patient. In brief, 58 yo male with PMhx esophageal CA s/p esophagectomy in July 2024 with J tube, presenting to the ED with elevated WBC. Patient labs reviewed. WBC elevated to 19.59.  Patient pending CT.  Will touch base with oncology after CAT scan. MD Trice (PGY-3) patient's CT reviewed.  Patient with concerns for empyema necessitans.  CT surgery consulted.  To evaluate patient. MD Trice (PGY-3) patient about by CT surgery.  To admit patient.

## 2024-09-11 NOTE — CHART NOTE - NSCHARTNOTEFT_GEN_A_CORE
59 M w/ Stage II GEJ adenocarcinoma s/p neoadjuvant FOLFOX, followed by FOLFOX + RT followed by resection in July 2024 c/b anastomotic leak, stent placement and VENKATA drain placement. Pt is on tube feeds. Presented yesterday for med onc eval. Noted to have lost significant weight and frail, but denied fevers/chills. Routine labs noted WBC 17. Pt called today and reported fevers so sent to the hospital for further eval .      Recommendation  - labs, cultures  - CT CAP w/o oral and IV contrast. Please make sure pt drinks a cup of contrast while on table so that we can evaluate stent and anastomosis.   - Dr. Lewis Laureano (thoracic surg) and Dr. Agustin (surg onc) operated on pt, pls consult  - pt being treated with curative intent, planned for adjuvant immunotherapy once clinically stable   - further recommendaitons to follow pending work up. Likely will need admission.     I can be reached on teams.

## 2024-09-11 NOTE — REASON FOR VISIT
[Initial Evaluation] : an initial evaluation [Home] : at home, [unfilled] , at the time of the visit. [Medical Office: ([facility])___] : at the medical office located in  [Family Member] : family member [Patient] : the patient [Self] : self [This encounter was initiated by telehealth (audio with video) and converted to telephone (audio only) due to technical difficulties.] : This encounter was initiated by telehealth (audio with video) and converted to telephone (audio only) due to technical difficulties.

## 2024-09-11 NOTE — ED ADULT NURSE NOTE - CCCP TRG CHIEF CMPLNT
----- Message from Gabriel Jay sent at 3/5/2020 11:53 AM CST -----  Contact: Mom- [phone]  Would like to receive medical advice.    Would they like a call back or a response via [facility]:  Call back     Additional information:  Mom would like the pt to get the flu shot.          abnormal labs/fever/code: sepsis (adult)

## 2024-09-11 NOTE — HISTORY OF PRESENT ILLNESS
[FreeTextEntry1] : 59M with pmhx of HTN, HLD, DM2, BPH, esophageal adenocarcinoma s/p [place]-Broderick esophagectomy presenting for evaluation. Recently hospitalized at [facility] for postop leak, underwent EGD with stent placement c/b stent bleeding ulcers, treated with ppi. Pt reports now feeling ok. Still having drainage from VENKATA, reports stable around 80-90 mL daily. No further blood seen from VENKATA. Denies any other complaints, no abd pain, n/v/d/c, melena, hematochezia, fever/chills, jaundice, dark urine, or other issues.   Medications: Metformin, Jardiance, Insulin.

## 2024-09-11 NOTE — ASSESSMENT
[FreeTextEntry1] : 59M with pmhx of HTN, HLD, DM2, BPH, esophageal adenocarcinoma s/p [place]-Broderick esophagectomy presenting for evaluation. Recently hospitalized at [facility] for postop leak, underwent EGD with stent placement c/b stent bleeding ulcers, treated with ppi. Pt reports now feeling ok. Still having drainage from VENKATA, reports stable around 80-90 mL daily. No further blood seen from VENKATA.  - Still evidence of leak given VENKATA drainage despite stent placement. - Plan for EGD stent removal yariel in light of recent GI bleed, will reassess leak at that time to assess for any potential endoscopic closure.  - Continue PPI BID in the mean time.  Total time spent to complete patient's assessment, review medical chart including labs & personal review of prior imaging and available endoscopy records, counseling and discussion of plan of care was more than 30 minutes.

## 2024-09-11 NOTE — ED ADULT NURSE NOTE - NSFALLRISKINTERV_ED_ALL_ED

## 2024-09-12 DIAGNOSIS — J86.9 PYOTHORAX WITHOUT FISTULA: ICD-10-CM

## 2024-09-12 LAB
ANION GAP SERPL CALC-SCNC: 15 MMOL/L — HIGH (ref 7–14)
BLD GP AB SCN SERPL QL: NEGATIVE — SIGNIFICANT CHANGE UP
BUN SERPL-MCNC: 21 MG/DL — SIGNIFICANT CHANGE UP (ref 7–23)
CALCIUM SERPL-MCNC: 8.5 MG/DL — SIGNIFICANT CHANGE UP (ref 8.4–10.5)
CHLORIDE SERPL-SCNC: 94 MMOL/L — LOW (ref 98–107)
CO2 SERPL-SCNC: 24 MMOL/L — SIGNIFICANT CHANGE UP (ref 22–31)
CREAT SERPL-MCNC: 0.65 MG/DL — SIGNIFICANT CHANGE UP (ref 0.5–1.3)
EGFR: 107 ML/MIN/1.73M2 — SIGNIFICANT CHANGE UP
GLUCOSE BLDC GLUCOMTR-MCNC: 146 MG/DL — HIGH (ref 70–99)
GLUCOSE BLDC GLUCOMTR-MCNC: 149 MG/DL — HIGH (ref 70–99)
GLUCOSE BLDC GLUCOMTR-MCNC: 196 MG/DL — HIGH (ref 70–99)
GLUCOSE BLDC GLUCOMTR-MCNC: 197 MG/DL — HIGH (ref 70–99)
GLUCOSE BLDC GLUCOMTR-MCNC: 201 MG/DL — HIGH (ref 70–99)
GLUCOSE BLDC GLUCOMTR-MCNC: 227 MG/DL — HIGH (ref 70–99)
GLUCOSE SERPL-MCNC: 127 MG/DL — HIGH (ref 70–99)
HCT VFR BLD CALC: 26.1 % — LOW (ref 39–50)
HGB BLD-MCNC: 8.3 G/DL — LOW (ref 13–17)
MAGNESIUM SERPL-MCNC: 2.3 MG/DL — SIGNIFICANT CHANGE UP (ref 1.6–2.6)
MCHC RBC-ENTMCNC: 27.8 PG — SIGNIFICANT CHANGE UP (ref 27–34)
MCHC RBC-ENTMCNC: 31.8 GM/DL — LOW (ref 32–36)
MCV RBC AUTO: 87.3 FL — SIGNIFICANT CHANGE UP (ref 80–100)
NRBC # BLD: 0 /100 WBCS — SIGNIFICANT CHANGE UP (ref 0–0)
NRBC # FLD: 0 K/UL — SIGNIFICANT CHANGE UP (ref 0–0)
PHOSPHATE SERPL-MCNC: 4.2 MG/DL — SIGNIFICANT CHANGE UP (ref 2.5–4.5)
PLATELET # BLD AUTO: 502 K/UL — HIGH (ref 150–400)
POTASSIUM SERPL-MCNC: 4.9 MMOL/L — SIGNIFICANT CHANGE UP (ref 3.5–5.3)
POTASSIUM SERPL-SCNC: 4.9 MMOL/L — SIGNIFICANT CHANGE UP (ref 3.5–5.3)
RBC # BLD: 2.99 M/UL — LOW (ref 4.2–5.8)
RBC # FLD: 15.1 % — HIGH (ref 10.3–14.5)
RH IG SCN BLD-IMP: POSITIVE — SIGNIFICANT CHANGE UP
SODIUM SERPL-SCNC: 133 MMOL/L — LOW (ref 135–145)
WBC # BLD: 13.68 K/UL — HIGH (ref 3.8–10.5)
WBC # FLD AUTO: 13.68 K/UL — HIGH (ref 3.8–10.5)

## 2024-09-12 PROCEDURE — 99222 1ST HOSP IP/OBS MODERATE 55: CPT | Mod: 57,25

## 2024-09-12 PROCEDURE — 43255 EGD CONTROL BLEEDING ANY: CPT | Mod: 59

## 2024-09-12 PROCEDURE — 43247 EGD REMOVE FOREIGN BODY: CPT | Mod: 59

## 2024-09-12 DEVICE — GUIDEWIRE HYDRA JAGWIRE ANGLED .35X260CM: Type: IMPLANTABLE DEVICE | Status: FUNCTIONAL

## 2024-09-12 DEVICE — ANCHOR OVESCO ENDOSCOPY OTSC 165CM: Type: IMPLANTABLE DEVICE | Status: FUNCTIONAL

## 2024-09-12 DEVICE — CATH BLLN EXTRACT DIST GUIDE WIRE 15MM 3LUM: Type: IMPLANTABLE DEVICE | Status: FUNCTIONAL

## 2024-09-12 DEVICE — GEL PURASTAT 3ML: Type: IMPLANTABLE DEVICE | Status: FUNCTIONAL

## 2024-09-12 RX ORDER — DEXTROSE 15 G/33 G
12.5 GEL IN PACKET (GRAM) ORAL ONCE
Refills: 0 | Status: DISCONTINUED | OUTPATIENT
Start: 2024-09-12 | End: 2024-09-19

## 2024-09-12 RX ORDER — METFORMIN HYDROCHLORIDE 850 MG/1
1000 TABLET, FILM COATED ORAL
Refills: 0 | Status: DISCONTINUED | OUTPATIENT
Start: 2024-09-12 | End: 2024-09-19

## 2024-09-12 RX ORDER — SODIUM CHLORIDE 9 MG/ML
1 INJECTION INTRAMUSCULAR; INTRAVENOUS; SUBCUTANEOUS DAILY
Refills: 0 | Status: DISCONTINUED | OUTPATIENT
Start: 2024-09-12 | End: 2024-09-19

## 2024-09-12 RX ORDER — DEXTROSE 15 G/33 G
25 GEL IN PACKET (GRAM) ORAL ONCE
Refills: 0 | Status: DISCONTINUED | OUTPATIENT
Start: 2024-09-12 | End: 2024-09-19

## 2024-09-12 RX ORDER — GLUCAGON INJECTION, SOLUTION 1 MG/.2ML
1 INJECTION, SOLUTION SUBCUTANEOUS ONCE
Refills: 0 | Status: DISCONTINUED | OUTPATIENT
Start: 2024-09-12 | End: 2024-09-19

## 2024-09-12 RX ORDER — HEPARIN SODIUM,BOVINE 1000/ML
5000 VIAL (ML) INJECTION EVERY 12 HOURS
Refills: 0 | Status: DISCONTINUED | OUTPATIENT
Start: 2024-09-12 | End: 2024-09-15

## 2024-09-12 RX ORDER — ACETAMINOPHEN 325 MG/1
650 TABLET ORAL EVERY 6 HOURS
Refills: 0 | Status: DISCONTINUED | OUTPATIENT
Start: 2024-09-12 | End: 2024-09-19

## 2024-09-12 RX ORDER — INSULIN GLARGINE 100 [IU]/ML
8 INJECTION, SOLUTION SUBCUTANEOUS AT BEDTIME
Refills: 0 | Status: DISCONTINUED | OUTPATIENT
Start: 2024-09-12 | End: 2024-09-19

## 2024-09-12 RX ORDER — AMLODIPINE BESYLATE 10 MG/1
5 TABLET ORAL EVERY 24 HOURS
Refills: 0 | Status: DISCONTINUED | OUTPATIENT
Start: 2024-09-12 | End: 2024-09-19

## 2024-09-12 RX ORDER — DEXTROSE 15 G/33 G
15 GEL IN PACKET (GRAM) ORAL ONCE
Refills: 0 | Status: DISCONTINUED | OUTPATIENT
Start: 2024-09-12 | End: 2024-09-19

## 2024-09-12 RX ORDER — VANCOMYCIN/0.9 % SOD CHLORIDE 1.75G/25
1000 PLASTIC BAG, INJECTION (ML) INTRAVENOUS EVERY 24 HOURS
Refills: 0 | Status: DISCONTINUED | OUTPATIENT
Start: 2024-09-12 | End: 2024-09-12

## 2024-09-12 RX ORDER — VANCOMYCIN/0.9 % SOD CHLORIDE 1.75G/25
750 PLASTIC BAG, INJECTION (ML) INTRAVENOUS EVERY 12 HOURS
Refills: 0 | Status: DISCONTINUED | OUTPATIENT
Start: 2024-09-12 | End: 2024-09-14

## 2024-09-12 RX ORDER — PIPERACILLIN SODIUM AND TAZOBACTAM SODIUM 3; .375 G/15ML; G/15ML
3.38 INJECTION, POWDER, FOR SOLUTION INTRAVENOUS EVERY 8 HOURS
Refills: 0 | Status: DISCONTINUED | OUTPATIENT
Start: 2024-09-12 | End: 2024-09-16

## 2024-09-12 RX ADMIN — Medication 20 MILLIGRAM(S): at 22:10

## 2024-09-12 RX ADMIN — Medication 250 MILLIGRAM(S): at 08:57

## 2024-09-12 RX ADMIN — METFORMIN HYDROCHLORIDE 1000 MILLIGRAM(S): 850 TABLET, FILM COATED ORAL at 11:11

## 2024-09-12 RX ADMIN — METFORMIN HYDROCHLORIDE 1000 MILLIGRAM(S): 850 TABLET, FILM COATED ORAL at 22:08

## 2024-09-12 RX ADMIN — Medication 250 MILLIGRAM(S): at 18:47

## 2024-09-12 RX ADMIN — SODIUM CHLORIDE 1 GRAM(S): 9 INJECTION INTRAMUSCULAR; INTRAVENOUS; SUBCUTANEOUS at 11:10

## 2024-09-12 RX ADMIN — Medication 2: at 18:02

## 2024-09-12 RX ADMIN — Medication 5000 UNIT(S): at 17:21

## 2024-09-12 RX ADMIN — PIPERACILLIN SODIUM AND TAZOBACTAM SODIUM 25 GRAM(S): 3; .375 INJECTION, POWDER, FOR SOLUTION INTRAVENOUS at 17:19

## 2024-09-12 RX ADMIN — AMLODIPINE BESYLATE 5 MILLIGRAM(S): 10 TABLET ORAL at 11:11

## 2024-09-12 RX ADMIN — INSULIN GLARGINE 8 UNIT(S): 100 INJECTION, SOLUTION SUBCUTANEOUS at 22:07

## 2024-09-12 RX ADMIN — Medication 5000 UNIT(S): at 07:27

## 2024-09-12 RX ADMIN — PIPERACILLIN SODIUM AND TAZOBACTAM SODIUM 25 GRAM(S): 3; .375 INJECTION, POWDER, FOR SOLUTION INTRAVENOUS at 07:27

## 2024-09-12 RX ADMIN — Medication 0: at 22:07

## 2024-09-12 NOTE — H&P ADULT - NSHPPHYSICALEXAM_GEN_ALL_CORE
Vital Signs Last 24 Hrs  T(C): 37.5 (11 Sep 2024 23:32), Max: 37.6 (11 Sep 2024 15:33)  T(F): 99.5 (11 Sep 2024 23:32), Max: 99.7 (11 Sep 2024 15:33)  HR: 97 (11 Sep 2024 23:22) (97 - 123)  BP: 119/75 (11 Sep 2024 23:22) (114/67 - 119/75)  BP(mean): --  RR: 18 (11 Sep 2024 23:22) (16 - 24)  SpO2: 97% (11 Sep 2024 23:22) (95% - 100%)    Parameters below as of 11 Sep 2024 23:22  Patient On (Oxygen Delivery Method): room air    General: NAD  Neurology: A&Ox3, nonfocal, ALFORD x 4  Eyes: PERRLA/ EOMI, Gross vision intact  ENT/Neck: Neck supple, trachea midline, No JVD, Gross hearing intact  Respiratory: CTA B/L, No wheezing, rales, rhonchi  CV: RRR, S1S2, no murmurs, rubs or gallops  Abdominal: Soft, NT, ND +BS, J tube in place  Extremities: No edema, + peripheral pulses  Skin: No Rashes, Hematoma, Ecchymosis.  Adam drain site with pus like drainage, foul smelling   Incisions: healed  Tubes: Adam drain to bulb suction with tan/ brown drainage

## 2024-09-12 NOTE — ED ADULT NURSE REASSESSMENT NOTE - NS ED NURSE REASSESS COMMENT FT1
Patient A&Ox4 , breathing with ease, no signs of acute distress, no complaints at this time, IV patent and intact, will continue to monitor and assess
Patient A&Ox4, breathing with ease, no signs of acute distress, family at bedside, no complaints at this time, will continue to monitor and assess.
Report given to ESSU2 RN. Patient A&Ox4, respirations even and unlabored. Vitals stable. Patient sinus tach on cardiac monitor to low 100s. Patient offer no complaints at this time. IV line intact and patent.
Report received from TANI Carey. Patient A&Ox4, resting in stretcher, respirations even and unlabored. Vitals stable. FS to be completed. Patient offers no complaints at this time. Patient pending bed assignment. Stretcher in lowest position, wheels locked, appropriate side rails in place, call bell in reach.
BREAK COVERAGE RN. Patient A&Ox4, resting in stretcher, respirations even and unlabored. Vitals stable. Patient back from CT scan, awaiting results. IV line intact and patent. Patient offers no complaints at this time. Stretcher in lowest position, wheels locked, appropriate side rails in place, call bell in reach. Family at bedside.

## 2024-09-12 NOTE — CONSULT NOTE ADULT - ASSESSMENT
Mr. Villanueva is a 59 year old male with esophageal adenocarcinoma s/p chemotherapy, radiation and Michael Terrence esophagectomy (7/16/24) c/b anastomotic leak s/p esophageal stent placement (8/5/24), s/p J tube placement for EN, pleural effusion, who is admitted with fever tmax 39.7C and leukocytosis on outpatient labs. CT shows worsening effusion on right with multiple foci of gas and air fluid levels.     #s/p esophageal stent placement (8/5/24)  s/p EGD 8/5/24 for anastomotic leak following esophagectomy with esophogeal fully covered metal stent placement  Repeat EGD performed 8/20 for CGE with non-bleeding gastric ulcers along the inferior portion of the stent  Hgb stable without signs of bleeding    #acute febrile illness  #empyema     Recommendations: Mr. Villanueva is a 59 year old male with esophageal adenocarcinoma s/p chemotherapy, radiation and Michael Terrence esophagectomy (7/16/24) c/b anastomotic leak s/p esophageal stent placement (8/5/24), s/p J tube placement for EN, pleural effusion, who is admitted with fever tmax 39.7C and leukocytosis on outpatient labs. CT shows worsening effusion on right with multiple foci of gas and air fluid levels.     #s/p esophageal stent placement (8/5/24)  s/p EGD 8/5/24 for anastomotic leak following esophagectomy with esophogeal fully covered metal stent placement  Repeat EGD performed 8/20 for CGE with non-bleeding gastric ulcers along the inferior portion of the stent  Hgb stable without signs of bleeding    #acute febrile illness  #empyema     Recommendations:  - NPO for EGD with stent removal today  - Remainder per primary

## 2024-09-12 NOTE — H&P ADULT - NSHPLABSRESULTS_GEN_ALL_CORE
Lab Results:  CBC  CBC Full  -  ( 11 Sep 2024 15:20 )  WBC Count : 19.59 K/uL  RBC Count : 2.80 M/uL  Hemoglobin : 7.7 g/dL  Hematocrit : 24.3 %  Platelet Count - Automated : 491 K/uL  Mean Cell Volume : 86.8 fL  Mean Cell Hemoglobin : 27.5 pg  Mean Cell Hemoglobin Concentration : 31.7 gm/dL  Auto Neutrophil # : 17.24 K/uL  Auto Lymphocyte # : 1.38 K/uL  Auto Monocyte # : 0.79 K/uL  Auto Eosinophil # : 0.00 K/uL  Auto Basophil # : 0.03 K/uL  Auto Neutrophil % : 88.0 %  Auto Lymphocyte % : 7.0 %  Auto Monocyte % : 4.0 %  Auto Eosinophil % : 0.0 %  Auto Basophil % : 0.2 %    .		Differential:	[] Automated		[] Manual  Chemistry                        7.7    .59 )-----------( 491      ( 11 Sep 2024 15:20 )             24.3         131<L>  |  90<L>  |  26<H>  ----------------------------<  222<H>  4.8   |  28  |  0.65    Ca    8.5      11 Sep 2024 15:20    TPro  7.5  /  Alb  2.9<L>  /  TBili  0.4  /  DBili  x   /  AST  16  /  ALT  13  /  AlkPhos  190<H>      LIVER FUNCTIONS - ( 11 Sep 2024 15:20 )  Alb: 2.9 g/dL / Pro: 7.5 g/dL / ALK PHOS: 190 U/L / ALT: 13 U/L / AST: 16 U/L / GGT: x           PT/INR - ( 11 Sep 2024 15:20 )   PT: 11.7 sec;   INR: 1.05 ratio         PTT - ( 11 Sep 2024 15:20 )  PTT:29.5 sec  Urinalysis Basic - ( 11 Sep 2024 18:10 )    Color: Yellow / Appearance: Clear / S.026 / pH: x  Gluc: x / Ketone: Trace mg/dL  / Bili: Negative / Urobili: 0.2 mg/dL   Blood: x / Protein: 100 mg/dL / Nitrite: Negative   Leuk Esterase: Negative / RBC: 1 /HPF / WBC 0 /HPF   Sq Epi: x / Non Sq Epi: 0 /HPF / Bacteria: Negative /HPF    CT: Worsening effusion on right with multiple foci of gas and air fluid levels

## 2024-09-12 NOTE — H&P ADULT - HISTORY OF PRESENT ILLNESS
59 yr M w/ pmh HTN, DM2 s/p [place] Broderick on 7/16/24 w/ postop course c/b high VENKATA drain output. found on EGD  to have esophago-gastric anastomotic now s/p esophageal stent placement by GI on 8/5.  Was admitted 8/19 for emesis and bloody output from VENKATA drain.  S/P EGD with GI showing small ulcerating at base of distal stent but no active bleed.  He now presents to the hospital with elevated WBC and fever/ chills x 1 day.  CT showing worsening effusion on right with multiple foci of gas and air fluid levels.

## 2024-09-12 NOTE — H&P ADULT - ASSESSMENT
59 yr M w/ pmh HTN, DM2 s/p [place] Broderick on 7/16/24 w/ postop course c/b high VENKATA drain output. found on EGD  to have esophago-gastric anastomotic now s/p esophageal stent placement by GI on 8/5.  Was admitted 8/19 for emesis and bloody output from VENKATA drain.  S/P EGD with GI showing small ulcerating at base of distal stent but no active bleed.  He now presents to the hospital with elevated WBC and fever/ chills x 1 day.  CT showing worsening effusion on right with multiple foci of gas and air fluid levels.        PLAN:  Admit to Thoracic Surgery, [address] [place] [address]- Dr. Laureano   Neuro: Pain management  Pulm: Encourage coughing, deep breathing and use of incentive spirometry. Wean off supplemental oxygen as able. Daily CXR.   Cardio: Monitor telemetry/alarms  GI: NPO with tube feeds, free water flushes   Renal: monitor urine output, supplement electrolytes as needed  Vasc: Heparin SC/SCDs for DVT prophylaxis  Heme: Stable H/H. .   ID: [email or web address]   Therapy: OOB/ambulate  Tubes: Monitor lavern drain output   ID consult   Discussed with Dr. Poe .

## 2024-09-12 NOTE — H&P ADULT - NSHPREVIEWOFSYSTEMS_GEN_ALL_CORE
REVIEW OF SYSTEMS      General:+weight loss, +fever/chills x 1 day 	    Skin/Breast: No Rashes/ Lesions/ Masses  	  Ophthalmologic: No Blurry vision/ Glaucoma/ Blindness  	  ENMT: No Hearing loss/ Drainage/ Lesions	    Respiratory and Thorax: No Cough/ Wheezing/ SOB/ Hemoptysis/ Sputum production  	  Cardiovascular: No Chest pain/ Palpitations/ Diaphoresis	    Gastrointestinal: No Nausea/ Vomiting/ Constipation/ Appetite Change	    Genitourinary: No Heamturia/ Dysuria/ Frequency change/ Impotence	    Musculoskeletal: No Pain/ Weakness/ Claudication	    Neurological: No Seizures/ TIA/CVA/ Parastesias	    Psychiatric: No Dementia/ Depression/ SI/HI	    Hematology/Lymphatics: No hx of bleeding/ Edema	    Endocrine:	No Hyperglycemia/ Hypoglycemia    Allergic/Immunologic:	 No Anaphylaxis/ Intolerance/ Recent illnesses

## 2024-09-12 NOTE — CONSULT NOTE ADULT - NS ATTEND AMEND GEN_ALL_CORE FT
Patient seen and examined. Presenting with fever, found to have loculated pleural effusion, drain output consistent with persistent leak. Will plan for EGD today for esophageal stent removal and reevaluation of leak. NPO.    Total time spent to complete patient's bedside assessment, physical examination, review medical chart including labs & imaging, discuss medical plan of care with housestaff was more than 50 minutes.

## 2024-09-13 LAB
A1C WITH ESTIMATED AVERAGE GLUCOSE RESULT: 7 % — HIGH (ref 4–5.6)
ANION GAP SERPL CALC-SCNC: 14 MMOL/L — SIGNIFICANT CHANGE UP (ref 7–14)
BUN SERPL-MCNC: 33 MG/DL — HIGH (ref 7–23)
CALCIUM SERPL-MCNC: 8.3 MG/DL — LOW (ref 8.4–10.5)
CHLORIDE SERPL-SCNC: 94 MMOL/L — LOW (ref 98–107)
CO2 SERPL-SCNC: 25 MMOL/L — SIGNIFICANT CHANGE UP (ref 22–31)
CREAT SERPL-MCNC: 0.84 MG/DL — SIGNIFICANT CHANGE UP (ref 0.5–1.3)
EGFR: 99 ML/MIN/1.73M2 — SIGNIFICANT CHANGE UP
ESTIMATED AVERAGE GLUCOSE: 154 — SIGNIFICANT CHANGE UP
GLUCOSE BLDC GLUCOMTR-MCNC: 151 MG/DL — HIGH (ref 70–99)
GLUCOSE BLDC GLUCOMTR-MCNC: 175 MG/DL — HIGH (ref 70–99)
GLUCOSE BLDC GLUCOMTR-MCNC: 234 MG/DL — HIGH (ref 70–99)
GLUCOSE BLDC GLUCOMTR-MCNC: 328 MG/DL — HIGH (ref 70–99)
GLUCOSE SERPL-MCNC: 287 MG/DL — HIGH (ref 70–99)
GRAM STN FLD: ABNORMAL
HCT VFR BLD CALC: 26.5 % — LOW (ref 39–50)
HGB BLD-MCNC: 8.2 G/DL — LOW (ref 13–17)
MAGNESIUM SERPL-MCNC: 2.4 MG/DL — SIGNIFICANT CHANGE UP (ref 1.6–2.6)
MCHC RBC-ENTMCNC: 27.1 PG — SIGNIFICANT CHANGE UP (ref 27–34)
MCHC RBC-ENTMCNC: 30.9 GM/DL — LOW (ref 32–36)
MCV RBC AUTO: 87.5 FL — SIGNIFICANT CHANGE UP (ref 80–100)
NRBC # BLD: 0 /100 WBCS — SIGNIFICANT CHANGE UP (ref 0–0)
NRBC # FLD: 0 K/UL — SIGNIFICANT CHANGE UP (ref 0–0)
PHOSPHATE SERPL-MCNC: 5.3 MG/DL — HIGH (ref 2.5–4.5)
PLATELET # BLD AUTO: 556 K/UL — HIGH (ref 150–400)
POTASSIUM SERPL-MCNC: 5 MMOL/L — SIGNIFICANT CHANGE UP (ref 3.5–5.3)
POTASSIUM SERPL-SCNC: 5 MMOL/L — SIGNIFICANT CHANGE UP (ref 3.5–5.3)
RBC # BLD: 3.03 M/UL — LOW (ref 4.2–5.8)
RBC # FLD: 15 % — HIGH (ref 10.3–14.5)
SODIUM SERPL-SCNC: 133 MMOL/L — LOW (ref 135–145)
SPECIMEN SOURCE: SIGNIFICANT CHANGE UP
VANCOMYCIN TROUGH SERPL-MCNC: 17.9 UG/ML — SIGNIFICANT CHANGE UP (ref 10–20)
VANCOMYCIN TROUGH SERPL-MCNC: 25.5 UG/ML — CRITICAL HIGH (ref 10–20)
WBC # BLD: 12.39 K/UL — HIGH (ref 3.8–10.5)
WBC # FLD AUTO: 12.39 K/UL — HIGH (ref 3.8–10.5)

## 2024-09-13 PROCEDURE — 99254 IP/OBS CNSLTJ NEW/EST MOD 60: CPT

## 2024-09-13 PROCEDURE — 71045 X-RAY EXAM CHEST 1 VIEW: CPT | Mod: 26

## 2024-09-13 PROCEDURE — 99232 SBSQ HOSP IP/OBS MODERATE 35: CPT | Mod: GC

## 2024-09-13 PROCEDURE — 99232 SBSQ HOSP IP/OBS MODERATE 35: CPT

## 2024-09-13 RX ADMIN — Medication 20 MILLIGRAM(S): at 23:09

## 2024-09-13 RX ADMIN — Medication 250 MILLIGRAM(S): at 05:41

## 2024-09-13 RX ADMIN — PIPERACILLIN SODIUM AND TAZOBACTAM SODIUM 25 GRAM(S): 3; .375 INJECTION, POWDER, FOR SOLUTION INTRAVENOUS at 17:30

## 2024-09-13 RX ADMIN — Medication 2: at 17:30

## 2024-09-13 RX ADMIN — SODIUM CHLORIDE 1 GRAM(S): 9 INJECTION INTRAMUSCULAR; INTRAVENOUS; SUBCUTANEOUS at 12:36

## 2024-09-13 RX ADMIN — PIPERACILLIN SODIUM AND TAZOBACTAM SODIUM 25 GRAM(S): 3; .375 INJECTION, POWDER, FOR SOLUTION INTRAVENOUS at 08:36

## 2024-09-13 RX ADMIN — PIPERACILLIN SODIUM AND TAZOBACTAM SODIUM 25 GRAM(S): 3; .375 INJECTION, POWDER, FOR SOLUTION INTRAVENOUS at 00:47

## 2024-09-13 RX ADMIN — Medication 4: at 12:36

## 2024-09-13 RX ADMIN — Medication 5000 UNIT(S): at 05:41

## 2024-09-13 RX ADMIN — METFORMIN HYDROCHLORIDE 1000 MILLIGRAM(S): 850 TABLET, FILM COATED ORAL at 05:41

## 2024-09-13 RX ADMIN — Medication 8: at 08:33

## 2024-09-13 RX ADMIN — INSULIN GLARGINE 8 UNIT(S): 100 INJECTION, SOLUTION SUBCUTANEOUS at 22:59

## 2024-09-13 RX ADMIN — METFORMIN HYDROCHLORIDE 1000 MILLIGRAM(S): 850 TABLET, FILM COATED ORAL at 17:23

## 2024-09-13 RX ADMIN — AMLODIPINE BESYLATE 5 MILLIGRAM(S): 10 TABLET ORAL at 12:36

## 2024-09-13 RX ADMIN — Medication 5000 UNIT(S): at 17:23

## 2024-09-13 NOTE — CONSULT NOTE ADULT - SUBJECTIVE AND OBJECTIVE BOX
SURGERY CONSULT NOTE    HPI:  59 year old male with history of HTN, DM, and stage 2A (T3N0) GEJ adenocarcinoma s/p neoadjuvant chemo and [place]-Broderick esophagectomy on 7/16/24 c/b leak s/p stent 8/5 with readmission 8/19 for increased bloody VENKATA output secondary to ulceration readmitted for 1 day of fevers/chills and leukocytosis. Worsening right-sided effusion with air fluid levels noted on CT. Patient states he is feeling better since starting antibiotics, denies recurrent fevers/chills. Also denies abdominal pain, chest pain, SOB, N/V. He is tolerating tube feeds and passing gas, last BM 1 day ago was nonbloody.    PAST MEDICAL HISTORY:  DM (diabetes mellitus)  HLD (hyperlipidemia)  Insulin dependent type 2 diabetes mellitus  BPH (benign prostatic hyperplasia)  HTN (hypertension)  Gastroesophageal cancer    PAST SURGICAL HISTORY:  History of removal of Port-a-Cath  Port-A-Cath in place  [facility]-Broderick esophagectomy     SOCIAL HISTORY:  - Denies EtOH abuse, smoking, IVDA    MEDICATIONS:  acetaminophen   Oral Liquid .. 650 milliGRAM(s) Enteral Tube every 6 hours PRN  amLODIPine   Tablet 5 milliGRAM(s) Oral every 24 hours  atorvastatin 20 milliGRAM(s) Oral at bedtime  dextrose 5%. 1000 milliLiter(s) IV Continuous <Continuous>  dextrose 5%. 1000 milliLiter(s) IV Continuous <Continuous>  dextrose 50% Injectable 25 Gram(s) IV Push once  dextrose 50% Injectable 12.5 Gram(s) IV Push once  dextrose 50% Injectable 25 Gram(s) IV Push once  dextrose Oral Gel 15 Gram(s) Oral once PRN  glucagon  Injectable 1 milliGRAM(s) IntraMuscular once  heparin   Injectable 5000 Unit(s) SubCutaneous every 12 hours  insulin glargine Injectable (LANTUS) 8 Unit(s) SubCutaneous at bedtime  insulin lispro (ADMELOG) corrective regimen sliding scale   SubCutaneous three times a day before meals  insulin lispro (ADMELOG) corrective regimen sliding scale   SubCutaneous at bedtime  metFORMIN 1000 milliGRAM(s) Oral two times a day  piperacillin/tazobactam IVPB.. 3.375 Gram(s) IV Intermittent every 8 hours  sodium chloride 1 Gram(s) Oral daily  vancomycin  IVPB 750 milliGRAM(s) IV Intermittent every 12 hours      ALLERGIES:  No Known Allergies      VITALS & I/Os:  Vital Signs Last 24 Hrs  T(C): 36.7 (13 Sep 2024 04:17), Max: 36.9 (12 Sep 2024 10:50)  T(F): 98 (13 Sep 2024 04:17), Max: 98.4 (12 Sep 2024 10:50)  HR: 100 (13 Sep 2024 04:17) (72 - 105)  BP: 123/65 (13 Sep 2024 04:17) (100/62 - 132/71)  BP(mean): --  RR: 20 (13 Sep 2024 04:17) (17 - 20)  SpO2: 96% (13 Sep 2024 04:17) (93% - 100%)    Parameters below as of 13 Sep 2024 04:17  Patient On (Oxygen Delivery Method): room air        I&O's Summary    11 Sep 2024 07:01  -  12 Sep 2024 07:00  --------------------------------------------------------  IN: 0 mL / OUT: 65 mL / NET: -65 mL    12 Sep 2024 07:01  -  13 Sep 2024 06:39  --------------------------------------------------------  IN: 1340 mL / OUT: 1295 mL / NET: 45 mL        PHYSICAL EXAM:  GEN: resting comfortably in bed, in NAD  CHEST: non-tender to palpation across clavicles and b/l anterior ribs  PULM: Nonlabored breathing on RA, VENKATA drain with seropurulent output  ABD: soft, non-distended, non-tender. Incisions well healed. J tube C/D/I with TF running  EXTREMITIES: Grossly symmetric, WWP  NEURO: AAOx4, no focal neuro deficits    LABS:                        8.2    12.39 )-----------( 556      ( 13 Sep 2024 04:30 )             26.5     09-13    133<L>  |  94<L>  |  33<H>  ----------------------------<  287<H>  5.0   |  25  |  0.84    Ca    8.3<L>      13 Sep 2024 04:30  Phos  5.3     09-13  Mg     2.40     09-13    TPro  7.5  /  Alb  2.9<L>  /  TBili  0.4  /  DBili  x   /  AST  16  /  ALT  13  /  AlkPhos  190<H>  09-11    Lactate:    PT/INR - ( 11 Sep 2024 15:20 )   PT: 11.7 sec;   INR: 1.05 ratio         PTT - ( 11 Sep 2024 15:20 )  PTT:29.5 sec          Urinalysis Basic - ( 13 Sep 2024 04:30 )    Color: x / Appearance: x / SG: x / pH: x  Gluc: 287 mg/dL / Ketone: x  / Bili: x / Urobili: x   Blood: x / Protein: x / Nitrite: x   Leuk Esterase: x / RBC: x / WBC x   Sq Epi: x / Non Sq Epi: x / Bacteria: x        IMAGING:  < from: CT Abdomen and Pelvis w/ Oral Cont and w/ IV Cont (09.11.24 @ 21:06) >  FINDINGS:  CHEST:  LUNGS AND LARGE AIRWAYS: There are a few distal impacted lower lobe   subsegmental airways. There is passive right lower lobe atelectasis.   There are patchy opacities in the left lower lobe and lingula.  PLEURA: There is a right-sided Pleurx catheter. Slightly worsening   effusion on the right containing multiple foci of gas and air-fluid   levels. One of these locules is seen to partially extend into the   posterior chest wall between the 10th and 11th rib space, series 301:67.   There is associated enhancement pleura.  VESSELS: Aortic calcifications. Coronary artery calcifications.   Chemo-Port catheter terminates in the SVC/RAjunction.  HEART: Heart size is normal. No pericardial effusion.  MEDIASTINUM AND DENIS: Status post esophagectomy with surgical conduit   creation and stent. The esophageal conduit is air-fluid filled and mildly   distended.  CHEST WALL AND LOWER NECK: Right chest wall Chemo-Port.    ABDOMEN AND PELVIS:  LIVER: Subcentimeter hypodensities in the right hepatic lobe are too   small to characterize  BILE DUCTS: Normal caliber.  GALLBLADDER: Phyrgian cap gallbladder, normal anatomic variant.  SPLEEN:Chronic splenic infarcts.  PANCREAS: Redemonstrated cystic lesion in the uncinate process, measuring   1.6 cm., unchanged No pancreatic ductal dilatation.  ADRENALS: Within normal limits.  KIDNEYS/URETERS: No renal stones or hydronephrosis.    BLADDER: Distended  REPRODUCTIVE ORGANS: Prostate is enlarged.    BOWEL: There are a few loops of thickened small bowel in the proximal   jejunum on series 301:78. NG tube terminates in the stomach. J  tube. No   bowel obstruction. Appendix is normal.  PERITONEUM/RETROPERITONEUM: Unremarkable.  VESSELS: Atherosclerotic changes.  LYMPH NODES: No lymphadenopathy.  ABDOMINAL WALL: Within normal limits.  BONES: Subacute, mildly displaced fracture of the posterior lateral right   eighth rib. Degenerative changes.    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 Correlate with drain output.    Patchy airspace disease involving the left lower lobe and lingula, likely   infectious.    Question of enteritis in the jejunum.    Redemonstrated cystic lesion in the uncinate process, measuring 1.6 cm,   unchanged.    Other findings, as above.    < end of copied text >  
Cardiology    HISTORY OF PRESENT ILLNESS: HPI:  59 yr M w/ pmh HTN, DM2 s/p Michael Broderick on 7/16/24 w/ postop course c/b high VENKATA drain output. found on EGD  to have esophago-gastric anastomotic now s/p esophageal stent placement by GI on 8/5.  Was admitted 8/19 for emesis and bloody output from VENKATA drain.  S/P EGD with GI showing small ulcerating at base of distal stent but no active bleed.  He now presents to the hospital with elevated WBC and fever/ chills x 1 day.  CT showing worsening effusion on right with multiple foci of gas and air fluid levels.     (12 Sep 2024 01:32)    Blood pressure stable, 110s-130s.  no angina or palpitations, no lightheadedness or fainting.  had fever, has new pleural effusion. a 10 pt ROS is otherwise negative.      PAST MEDICAL & SURGICAL HISTORY:  HLD (hyperlipidemia)  Insulin dependent type 2 diabetes mellitus  BPH (benign prostatic hyperplasia)  HTN (hypertension)  Gastroesophageal cancer  Gastroesophageal cancer  Port-A-Cath in place      MEDICATIONS  (STANDING):  amLODIPine   Tablet 5 milliGRAM(s) Oral every 24 hours  atorvastatin 20 milliGRAM(s) Oral at bedtime  dextrose 5%. 1000 milliLiter(s) (50 mL/Hr) IV Continuous <Continuous>  dextrose 5%. 1000 milliLiter(s) (100 mL/Hr) IV Continuous <Continuous>  dextrose 50% Injectable 12.5 Gram(s) IV Push once  dextrose 50% Injectable 25 Gram(s) IV Push once  dextrose 50% Injectable 25 Gram(s) IV Push once  glucagon  Injectable 1 milliGRAM(s) IntraMuscular once  heparin   Injectable 5000 Unit(s) SubCutaneous every 12 hours  insulin glargine Injectable (LANTUS) 8 Unit(s) SubCutaneous at bedtime  insulin lispro (ADMELOG) corrective regimen sliding scale   SubCutaneous three times a day before meals  insulin lispro (ADMELOG) corrective regimen sliding scale   SubCutaneous at bedtime  metFORMIN 1000 milliGRAM(s) Oral two times a day  piperacillin/tazobactam IVPB.. 3.375 Gram(s) IV Intermittent every 8 hours  sodium chloride 1 Gram(s) Oral daily  vancomycin  IVPB 750 milliGRAM(s) IV Intermittent every 12 hours    Allergies    No Known Allergies    Intolerances    FAMILY HISTORY:    Non-contributary for premature coronary disease or sudden cardiac death    SOCIAL HISTORY:    [x ] Non-smoker  [ ] Smoker  [ ] Alcohol    PHYSICAL EXAM:  T(C): 36.9 (09-12-24 @ 13:08), Max: 37.6 (09-11-24 @ 15:33)  HR: 103 (09-12-24 @ 13:08) (97 - 123)  BP: 120/69 (09-12-24 @ 13:08) (114/67 - 131/82)  RR: 20 (09-12-24 @ 13:08) (16 - 24)  SpO2: 98% (09-12-24 @ 13:08) (95% - 100%)  Wt(kg): --    Appearance: thin frail adult male in no acute distress	  HEENT:   Normal oral mucosa, PERRL, EOMI	  Lymphatic: No lymphadenopathy , no edema  Cardiovascular: Normal S1 S2, No JVD, No murmurs , Peripheral pulses palpable 2+ bilaterally  Respiratory: Lungs clear to auscultation, normal effort 	  Gastrointestinal:  Soft, Non-tender, + BS	  Skin: No rashes, No ecchymoses, No cyanosis, warm to touch  Musculoskeletal: Normal range of motion, normal strength.  thin/frail build.  Psychiatry:  Mood & affect appropriate    TELEMETRY: NSR	    ECG:  NSR  Echo:  < from: TTE W or WO Ultrasound Enhancing Agent (07.20.24 @ 10:51) >  FINDINGS:     Left Ventricle:  The left ventricular cavity is normal in size. Left ventricular wall thickness is normal. Left ventricular systolic function is normal with an ejection fraction visually estimated at 60%. There are no regional wall motion abnormalities seen. Left ventricular filling pressures are normal. Analysis of left ventricular diastolic function and filling pressure is made challenging by the presence of tachycardia.     Right Ventricle:  The right ventricular cavity is normal in size and right ventricular systolic function is normal. Tricuspid annular plane systolic excursion (TAPSE) is 2.7 cm (normal >=1.7 cm).     Left Atrium:  The left atriumis normal in size with an indexed volume of 16.65 ml/m².     Right Atrium:  The right atrium is normal in size with an indexed volume of 12.03 ml/m².     Interatrial Septum:  There is no evidence of a patent foramen ovale with no shunt detected by color flow Doppler.     Aortic Valve:  There is moderate calcification of the aortic valve leaflets.     Mitral Valve:  There is no mitral valve stenosis. There is mild mitral regurgitation.     Tricuspid Valve:  Normal tricuspid valve.     Pulmonic Valve:  Normal pulmonic valve. There is trace pulmonic regurgitation.     Aorta:  The aortic arch is not well visualized. The aortic root appears normal in size. The aortic root at the sinuses of Valsalva is normal in size, measuring 3.10 cm (indexed 1.96 cm/m²). The ascending aorta diameter is normal in size, measuring 3.40 cm (indexed 2.15 cm/m²).     Pericardium:  No pericardial effusion seen.     Pleura:  Left pleural effusion noted.     Systemic Veins:  The inferior vena cava is normal in size (normal <2.1cm) with normal inspiratory collapse (normal >50%) consistent with normal right atrial pressure (~3, range 0-5mmHg).    < end of copied text >      LABS:	 	                          8.3    13.68 )-----------( 502      ( 12 Sep 2024 07:01 )             26.1     09-12    133<L>  |  94<L>  |  21  ----------------------------<  127<H>  4.9   |  24  |  0.65    Ca    8.5      12 Sep 2024 07:01  Phos  4.2     09-12  Mg     2.30     09-12    TPro  7.5  /  Alb  2.9<L>  /  TBili  0.4  /  DBili  x   /  AST  16  /  ALT  13  /  AlkPhos  190<H>  09-11    ASSESSMENT/PLAN: Mr Ho is a pleasant 59y Male known from recent prior admissions, managing sequelae after esophagectomy.  Awaiting esophageal stent removal +/- drainage of empyema.  Can continue amlodipine for hypertension, or hold it if necessary.  Will follow with you, and advise as needed.  Expect he will be stable from a CV perspective, with reassuring echocardiogram in recent weeks.        Terry Flynn M.D.  Cardiac Electrophysiology    office [phone]  pager [phone]
HPI:  59 yr M w/ pmh HTN, DM2 s/p [facility] Broderick on 7/16/24 w/ postop course c/b high VENKATA drain output. found on EGD  to have esophago-gastric anastomotic now s/p esophageal stent placement by GI on 8/5.      Was admitted 8/19 for emesis and bloody output from VENKATA drain.  S/P EGD with GI showing small ulcerating at base of distal stent but no active bleed.      He now presents to the hospital with elevated WBC and fever/ chills x 1 day.  CT showing worsening effusion on right with multiple foci of gas and air fluid levels.      Denies fever at this time        PAST MEDICAL & SURGICAL HISTORY:  HLD (hyperlipidemia)      Insulin dependent type 2 diabetes mellitus      BPH (benign prostatic hyperplasia)      HTN (hypertension)      Gastroesophageal cancer      Gastroesophageal cancer      Port-A-Cath in place          Allergies    No Known Allergies    Intolerances        ANTIMICROBIALS:  piperacillin/tazobactam IVPB.. 3.375 every 8 hours  vancomycin  IVPB 750 every 12 hours      OTHER MEDS:  acetaminophen   Oral Liquid .. 650 milliGRAM(s) Enteral Tube every 6 hours PRN  amLODIPine   Tablet 5 milliGRAM(s) Oral every 24 hours  atorvastatin 20 milliGRAM(s) Oral at bedtime  dextrose 5%. 1000 milliLiter(s) IV Continuous <Continuous>  dextrose 5%. 1000 milliLiter(s) IV Continuous <Continuous>  dextrose 50% Injectable 12.5 Gram(s) IV Push once  dextrose 50% Injectable 25 Gram(s) IV Push once  dextrose 50% Injectable 25 Gram(s) IV Push once  dextrose Oral Gel 15 Gram(s) Oral once PRN  glucagon  Injectable 1 milliGRAM(s) IntraMuscular once  heparin   Injectable 5000 Unit(s) SubCutaneous every 12 hours  insulin glargine Injectable (LANTUS) 8 Unit(s) SubCutaneous at bedtime  insulin lispro (ADMELOG) corrective regimen sliding scale   SubCutaneous three times a day before meals  insulin lispro (ADMELOG) corrective regimen sliding scale   SubCutaneous at bedtime  metFORMIN 1000 milliGRAM(s) Oral two times a day  sodium chloride 1 Gram(s) Oral daily      SOCIAL HISTORY:    FAMILY HISTORY:      REVIEW OF SYSTEMS  [  ] ROS unobtainable because:    [x  ] All other systems negative except as noted below:	    Constitutional:  [ ] fever [ ] chills  [ ] weight loss  [ ] weakness  Skin:  [ ] rash [ ] phlebitis	  Eyes: [ ] icterus [ ] pain  [ ] discharge	  ENMT: [ ] sore throat  [ ] thrush [ ] ulcers [ ] exudates  Respiratory: [ ] dyspnea [ ] hemoptysis [ ] cough [ ] sputum	  Cardiovascular:  [ ] chest pain [ ] palpitations [ ] edema	  Gastrointestinal:  [ ] nausea [ ] vomiting [ ] diarrhea [ ] constipation [ ] pain	  Genitourinary:  [ ] dysuria [ ] frequency [ ] hematuria [ ] discharge [ ] flank pain  [ ] incontinence  Musculoskeletal:  [ ] myalgias [ ] arthralgias [ ] arthritis  [ ] back pain  Neurological:  [ ] headache [ ] seizures  [ ] confusion/altered mental status  Psychiatric:  [ ] anxiety [ ] depression	  Hematology/Lymphatics:  [ ] lymphadenopathy  Endocrine:  [ ] adrenal [ ] thyroid  Allergic/Immunologic:	 [ ] transplant [ ] seasonal    PHYSICAL EXAM:  General: [ ] non-toxic  HEAD/EYES: [ ] PERRL [ ] white sclera [ ] icterus  ENT:  [ ] normal [ ] supple [ ] thrush [ ] pharyngeal exudate  Cardiovascular:   [ ] murmur [ ] normal [ ] PPM/AICD  Respiratory:  [ ] clear to ausculation bilaterally  GI:  [ ] soft, non-tender, normal bowel sounds  :  [ ] stallworth [ ] no CVA tenderness   Musculoskeletal:  [ ] no synovitis  Neurologic:  [ ] non-focal exam   Skin:  [ ] no rash  Lymph: [ ] no lymphadenopathy  Psychiatric:  [ ] appropriate affect [ ] alert & oriented  Lines:  [ ] no phlebitis [ ] central line          Drug Dosing Weight  Height (cm): 162.6 (12 Sep 2024 13:08)  Weight (kg): 44 (12 Sep 2024 13:08)  BMI (kg/m2): 16.6 (12 Sep 2024 13:08)  BSA (m2): 1.44 (12 Sep 2024 13:08)    Vital Signs Last 24 Hrs  T(F): 98.3 (09-13-24 @ 12:26), Max: 99.7 (09-11-24 @ 15:33)    Vital Signs Last 24 Hrs  HR: 92 (09-13-24 @ 12:26) (72 - 100)  BP: 121/59 (09-13-24 @ 12:26) (110/64 - 123/65)  RR: 18 (09-13-24 @ 12:26)  SpO2: 97% (09-13-24 @ 12:26) (93% - 97%)  Wt(kg): --                          8.2    12.39 )-----------( 556      ( 13 Sep 2024 04:30 )             26.5       09-13    133<L>  |  94<L>  |  33<H>  ----------------------------<  287<H>  5.0   |  25  |  0.84    Ca    8.3<L>      13 Sep 2024 04:30  Phos  5.3     09-13  Mg     2.40     09-13        Urinalysis Basic - ( 13 Sep 2024 04:30 )    Color: x / Appearance: x / SG: x / pH: x  Gluc: 287 mg/dL / Ketone: x  / Bili: x / Urobili: x   Blood: x / Protein: x / Nitrite: x   Leuk Esterase: x / RBC: x / WBC x   Sq Epi: x / Non Sq Epi: x / Bacteria: x        MICROBIOLOGY:  Culture - Body Fluid with Gram Stain (09.12.24 @ 18:00)    Gram Stain:   polymorphonuclear leukocytes seen  Yeast like cells seen  Gram Negative Rods seen  Gram positive cocci in pairs seen  by cytocentrifuge   Specimen Source: Abdominal Fl Abdominal Fluid      RADIOLOGY:    < from: CT Abdomen and Pelvis w/ Oral Cont and w/ IV Cont (09.11.24 @ 21:06) >  ACC: [id number] EXAM:  CT ABDOMEN AND PELVIS OC IC   ORDERED BY: EDER CHIN     ACC: [id number] EXAM:  CT CHEST OC IC   ORDERED BY: EDER CHIN     PROCEDURE DATE:  09/11/2024          INTERPRETATION:  CLINICAL INFORMATION: Fever of unknown origin,   esophageal cancer status post esophagectomy with G-tube placement    COMPARISON: CT chest abdomen pelvis 8/19/2024 8/10/2024, and 7/25/2024    CONTRAST/COMPLICATIONS:  IV Contrast: Omnipaque 350 (accession [id number]), IV contrast documented   in unlinked concurrent exam (accession [id number])  80 cc administered   20   cc discarded  Oral Contrast: Omnipaque 300   Fruit 2o (accession [id number]), NONE   (accession [id number])  Complications: None reported at time of study completion    PROCEDURE:  CT ofthe chest abdomen and pelvis was performed.  Sagittal and coronal reformats were performed.    FINDINGS:  CHEST:  LUNGS AND LARGE AIRWAYS: There are a few distal impacted lower lobe   subsegmental airways. There is passive right lower lobe atelectasis.   There are patchy opacities in the left lower lobe and lingula.  PLEURA: There is a right-sided Pleurx catheter. Slightly worsening   effusion on the right containing multiple foci of gas and air-fluid   levels. One of these locules is seen to partially extend into the   posterior chest wall between the 10th and 11th rib space, series 301:67.   There is associated enhancement pleura.  VESSELS: Aortic calcifications. Coronary artery calcifications.   Chemo-Port catheter terminates in the SVC/RAjunction.  HEART: Heart size is normal. No pericardial effusion.  MEDIASTINUM AND DENIS: Status post esophagectomy with surgical conduit   creation and stent. The esophageal conduit is air-fluid filled and mildly   distended.  CHEST WALL AND LOWER NECK: Right chest wall Chemo-Port.    ABDOMEN AND PELVIS:  LIVER: Subcentimeter hypodensities in the right hepatic lobe are too   small to characterize  BILE DUCTS: Normal caliber.  GALLBLADDER: Phyrgian cap gallbladder, normal anatomic variant.  SPLEEN:Chronic splenic infarcts.  PANCREAS: Redemonstrated cystic lesion in the uncinate process, measuring   1.6 cm., unchanged No pancreatic ductal dilatation.  ADRENALS: Within normal limits.  KIDNEYS/URETERS: No renal stones or hydronephrosis.    BLADDER: Distended  REPRODUCTIVE ORGANS: Prostate is enlarged.    BOWEL: There are a few loops of thickened small bowel in the proximal   jejunum on series 301:78. NG tube terminates in the stomach. J  tube. No   bowel obstruction. Appendix is normal.  PERITONEUM/RETROPERITONEUM: Unremarkable.  VESSELS: Atherosclerotic changes.  LYMPH NODES: No lymphadenopathy.  ABDOMINAL WALL: Within normal limits.  BONES: Subacute, mildly displaced fracture of the posterior lateral right   eighth rib. Degenerative changes.    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 Correlate with drain output.    Patchy airspace disease involving the left lower lobe and lingula, likely   infectious.    Question of enteritis in the jejunum.    Redemonstrated cystic lesion in the uncinate process, measuring 1.6 cm,   unchanged.    Other findings, as above.    < end of copied text >  
Chief Complaint:  fever    HPI:    Mr. Villanueva is a 59 year old male with esophageal adenocarcinoma s/p chemotherapy, radiation and [facility] Terrence esophagectomy (7/16/24) c/b anastomotic leak s/p esophageal stent placement (8/5/24), s/p J tube placement for EN, pleural effusion, who presented with fever tmax 39.7C and leukocytosis on outpatient labs. Patient denies chest pain, SOB, cough, abdominal pain, nausea, vomiting, hematemesis or melena. CT shows worsening effusion on right with multiple foci of gas and air fluid levels.  GI is consulted for evaluation of esophageal stent removal.     Allergies:  No Known Allergies    Hospital Medications:  acetaminophen   Oral Liquid .. 650 milliGRAM(s) Enteral Tube every 6 hours PRN  amLODIPine   Tablet 5 milliGRAM(s) Oral every 24 hours  atorvastatin 20 milliGRAM(s) Oral at bedtime  dextrose 5%. 1000 milliLiter(s) IV Continuous <Continuous>  dextrose 5%. 1000 milliLiter(s) IV Continuous <Continuous>  dextrose 50% Injectable 12.5 Gram(s) IV Push once  dextrose 50% Injectable 25 Gram(s) IV Push once  dextrose 50% Injectable 25 Gram(s) IV Push once  dextrose Oral Gel 15 Gram(s) Oral once PRN  glucagon  Injectable 1 milliGRAM(s) IntraMuscular once  heparin   Injectable 5000 Unit(s) SubCutaneous every 12 hours  insulin glargine Injectable (LANTUS) 8 Unit(s) SubCutaneous at bedtime  insulin lispro (ADMELOG) corrective regimen sliding scale   SubCutaneous three times a day before meals  insulin lispro (ADMELOG) corrective regimen sliding scale   SubCutaneous at bedtime  metFORMIN 1000 milliGRAM(s) Oral two times a day  piperacillin/tazobactam IVPB.. 3.375 Gram(s) IV Intermittent every 8 hours  sodium chloride 1 Gram(s) Oral daily  vancomycin  IVPB 750 milliGRAM(s) IV Intermittent every 12 hours      PMHX/PSHX:  DM (diabetes mellitus)    HLD (hyperlipidemia)    Insulin dependent type 2 diabetes mellitus    BPH (benign prostatic hyperplasia)    HTN (hypertension)    Gastroesophageal cancer    Gastroesophageal cancer    No significant past surgical history    History of removal of Port-a-Cath    Port-A-Cath in place    Family history:  No pertinent family history in first degree relatives    Social History:   No alcohol or smoking    ROS:   See HPI    PHYSICAL EXAM:   GENERAL:  NAD, resting comfortably in bed  HEENT:  Sclera anicteric  RESPIRATORY:  Normal effort  CARDIAC:  HDS  ABDOMEN:  Soft, non-tender, non-distended. J tube in place, feeds running.   EXTREMITIES:  No edema  SKIN:  Warm & Dry. No jaundice.   NEURO:  Alert, conversant, no focal deficit    Vital Signs:  Vital Signs Last 24 Hrs  T(C): 36.6 (12 Sep 2024 05:22), Max: 37.6 (11 Sep 2024 15:33)  T(F): 97.8 (12 Sep 2024 05:22), Max: 99.7 (11 Sep 2024 15:33)  HR: 99 (12 Sep 2024 05:22) (97 - 123)  BP: 131/76 (12 Sep 2024 05:22) (114/67 - 131/82)  BP(mean): --  RR: 20 (12 Sep 2024 05:22) (16 - 24)  SpO2: 97% (12 Sep 2024 05:22) (95% - 100%)    Parameters below as of 12 Sep 2024 05:22  Patient On (Oxygen Delivery Method): room air      Daily Height in cm: 162.56 (11 Sep 2024 15:33)    Daily     LABS:                        8.3    13.68 )-----------( 502      ( 12 Sep 2024 07:01 )             26.1     Mean Cell Volume: 87.3 fL (09-12-24 @ 07:01)    09-12    133<L>  |  94<L>  |  21  ----------------------------<  127<H>  4.9   |  24  |  0.65    Ca    8.5      12 Sep 2024 07:01  Phos  4.2     09-12  Mg     2.30     09-12    TPro  7.5  /  Alb  2.9<L>  /  TBili  0.4  /  DBili  x   /  AST  16  /  ALT  13  /  AlkPhos  190<H>  09-11    LIVER FUNCTIONS - ( 11 Sep 2024 15:20 )  Alb: 2.9 g/dL / Pro: 7.5 g/dL / ALK PHOS: 190 U/L / ALT: 13 U/L / AST: 16 U/L / GGT: x           PT/INR - ( 11 Sep 2024 15:20 )   PT: 11.7 sec;   INR: 1.05 ratio         PTT - ( 11 Sep 2024 15:20 )  PTT:29.5 sec  Urinalysis Basic - ( 12 Sep 2024 07:01 )    Color: x / Appearance: x / SG: x / pH: x  Gluc: 127 mg/dL / Ketone: x  / Bili: x / Urobili: x   Blood: x / Protein: x / Nitrite: x   Leuk Esterase: x / RBC: x / WBC x   Sq Epi: x / Non Sq Epi: x / Bacteria: x                              8.3    13.68 )-----------( 502      ( 12 Sep 2024 07:01 )             26.1                         7.7    19.59 )-----------( 491      ( 11 Sep 2024 15:20 )             24.3                         8.6    17.43 )-----------( 466      ( 10 Sep 2024 14:26 )             26.8     Imaging:    < from: CT Abdomen and Pelvis w/ Oral Cont and w/ IV Cont (09.11.24 @ 21:06) >  FINDINGS:  CHEST:  LUNGS AND LARGE AIRWAYS: There are a few distal impacted lower lobe   subsegmental airways. There is passive right lower lobe atelectasis.   There are patchy opacities in the left lower lobe and lingula.  PLEURA: There is a right-sided Pleurx catheter. Slightly worsening   effusion on the right containing multiple foci of gas and air-fluid   levels. One of these locules is seen to partially extend into the   posterior chest wall between the 10th and 11th rib space, series 301:67.   There is associated enhancement pleura.  MEDIASTINUM AND DENIS: Status post esophagectomy with surgical conduit   creation and stent. The esophageal conduit is air-fluid filled and mildly   distended.  CHEST WALL AND LOWER NECK: Right chest wall Chemo-Port.    ABDOMEN AND PELVIS:  LIVER: Subcentimeter hypodensities in the right hepatic lobe are too   small to characterize  BILE DUCTS: Normal caliber.  GALLBLADDER: Phyrgian cap gallbladder, normal anatomic variant.  SPLEEN:Chronic splenic infarcts.  PANCREAS: Redemonstrated cystic lesion in the uncinate process, measuring   1.6 cm., unchanged No pancreatic ductal dilatation.  BOWEL: There are a few loops of thickened small bowel in the proximal   jejunum on series 301:78. NG tube terminates in the stomach. J  tube. No   bowel obstruction. Appendix is normal.    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 Correlate with drain output.    Patchy airspace disease involving the left lower lobe and lingula, likely   infectious.    Redemonstrated cystic lesion in the uncinate process, measuring 1.6 cm,   unchanged.

## 2024-09-13 NOTE — CONSULT NOTE ADULT - ASSESSMENT
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 Worsening right-sided effusion with air fluid levels noted on CT. Patient with no complaints, tolerating TF, passing gas and having nonbloody BMs. Afebrile with improving leukocytosis on vanc/Zosyn.    Recommendations:  - Abd exam benign  - Continue abx  - Continue TF  - Global care per primary  - Surg onc will continue to follow    D Team Surgery  [id number]

## 2024-09-13 NOTE — PROGRESS NOTE ADULT - ASSESSMENT
Mr. Villanueva is a 59 year old male with esophageal adenocarcinoma s/p chemotherapy, radiation and Michael Terrence esophagectomy (7/16/24) c/b anastomotic leak s/p esophageal stent placement (8/5/24), s/p J tube placement for EN, pleural effusion, who is admitted with fever tmax 39.7C and leukocytosis on outpatient labs. CT shows worsening effusion on right with multiple foci of gas and air fluid levels.     #s/p esophageal stent placement (8/5/24)  s/p EGD 8/5/24 for anastomotic leak following esophagectomy with esophogeal fully covered metal stent placement  Repeat EGD performed 8/20 for CGE with non-bleeding gastric ulcers along the inferior portion of the stent  Hgb stable without signs of bleeding  - Now s/p EGD 9/12 with esophageal stent removal, OVESCO clip and PureStat placed to close leaking defect.   - Still with output in VENKATA drain but overall clinically stable      Recommendations:  - Continue to monitor drain output  - TF and abx per surgery  - Remainder per primary    D/w Dr. Abdirashid Cannon  GI/Hepatology Fellow    MONDAY-FRIDAY 8AM-5PM:  Pager# [phone] ([facility]) or [phone] ([facility])    NON-URGENT CONSULTS:  Please email [email or web address]  AT NIGHT AND ON WEEKENDS:  Contact on-call GI fellow via AMION by paging or hospital  from 5pm-8am and on weekends/holidays

## 2024-09-13 NOTE — PROVIDER CONTACT NOTE (CRITICAL VALUE NOTIFICATION) - TEST AND RESULT REPORTED:
polymorphonuclear leukocytes seen. yeast like cells seen. Gram negative Rods seen. Gram positive cocci in pairs seen by cytocentrifuge
vanco trough 25.5

## 2024-09-13 NOTE — CONSULT NOTE ADULT - ASSESSMENT
59 year old gentleman with diagnosis of adenocarcinoma at GE junction s/p chemotherapy and radiation  Now s/p robotic resection on 7/16    Post operatively, he had worsening leukocytosis  Imaging with a right sided loculated effusion    Now s/p jejunostomy    Now presents for fever and leukocytosis  Progression on imaging    Prior fluid analysis noted high amylase.  Fluid has not been purlent appearing and has had debris.   Ongoing concern for a leak.    Culture sent from chronic drain.  It will be difficult to distinguish a pathogen vs a colonizer    Plan:   1Continue vancomycin and zosyn for now  2. Follow up cultures  3. Continue to evaluate for leak and source control    Chicho Liu MD  Please contact via TEAM between 8 am and 6 pm    In the evening or on weekends, can contact call service at [phone]

## 2024-09-14 LAB
-  AMOXICILLIN/CLAVULANIC ACID: SIGNIFICANT CHANGE UP
-  AMPICILLIN/SULBACTAM: SIGNIFICANT CHANGE UP
-  AMPICILLIN: SIGNIFICANT CHANGE UP
-  AZTREONAM: SIGNIFICANT CHANGE UP
-  CEFAZOLIN: SIGNIFICANT CHANGE UP
-  CEFEPIME: SIGNIFICANT CHANGE UP
-  CEFOXITIN: SIGNIFICANT CHANGE UP
-  CEFTRIAXONE: SIGNIFICANT CHANGE UP
-  CIPROFLOXACIN: SIGNIFICANT CHANGE UP
-  CLINDAMYCIN: SIGNIFICANT CHANGE UP
-  ERTAPENEM: SIGNIFICANT CHANGE UP
-  ERYTHROMYCIN: SIGNIFICANT CHANGE UP
-  GENTAMICIN: SIGNIFICANT CHANGE UP
-  GENTAMICIN: SIGNIFICANT CHANGE UP
-  LEVOFLOXACIN: SIGNIFICANT CHANGE UP
-  MEROPENEM: SIGNIFICANT CHANGE UP
-  OXACILLIN: SIGNIFICANT CHANGE UP
-  PENICILLIN: SIGNIFICANT CHANGE UP
-  PIPERACILLIN/TAZOBACTAM: SIGNIFICANT CHANGE UP
-  RIFAMPIN: SIGNIFICANT CHANGE UP
-  TETRACYCLINE: SIGNIFICANT CHANGE UP
-  TOBRAMYCIN: SIGNIFICANT CHANGE UP
-  TRIMETHOPRIM/SULFAMETHOXAZOLE: SIGNIFICANT CHANGE UP
-  TRIMETHOPRIM/SULFAMETHOXAZOLE: SIGNIFICANT CHANGE UP
-  VANCOMYCIN: SIGNIFICANT CHANGE UP
ANION GAP SERPL CALC-SCNC: 12 MMOL/L — SIGNIFICANT CHANGE UP (ref 7–14)
APTT BLD: 29.2 SEC — SIGNIFICANT CHANGE UP (ref 24.5–35.6)
BLD GP AB SCN SERPL QL: NEGATIVE — SIGNIFICANT CHANGE UP
BUN SERPL-MCNC: 27 MG/DL — HIGH (ref 7–23)
CALCIUM SERPL-MCNC: 8.5 MG/DL — SIGNIFICANT CHANGE UP (ref 8.4–10.5)
CHLORIDE SERPL-SCNC: 96 MMOL/L — LOW (ref 98–107)
CO2 SERPL-SCNC: 26 MMOL/L — SIGNIFICANT CHANGE UP (ref 22–31)
CREAT SERPL-MCNC: 0.66 MG/DL — SIGNIFICANT CHANGE UP (ref 0.5–1.3)
EGFR: 107 ML/MIN/1.73M2 — SIGNIFICANT CHANGE UP
GLUCOSE BLDC GLUCOMTR-MCNC: 134 MG/DL — HIGH (ref 70–99)
GLUCOSE BLDC GLUCOMTR-MCNC: 143 MG/DL — HIGH (ref 70–99)
GLUCOSE BLDC GLUCOMTR-MCNC: 172 MG/DL — HIGH (ref 70–99)
GLUCOSE BLDC GLUCOMTR-MCNC: 280 MG/DL — HIGH (ref 70–99)
GLUCOSE SERPL-MCNC: 220 MG/DL — HIGH (ref 70–99)
HCT VFR BLD CALC: 24.7 % — LOW (ref 39–50)
HGB BLD-MCNC: 7.7 G/DL — LOW (ref 13–17)
MAGNESIUM SERPL-MCNC: 2.1 MG/DL — SIGNIFICANT CHANGE UP (ref 1.6–2.6)
MCHC RBC-ENTMCNC: 27.7 PG — SIGNIFICANT CHANGE UP (ref 27–34)
MCHC RBC-ENTMCNC: 31.2 GM/DL — LOW (ref 32–36)
MCV RBC AUTO: 88.8 FL — SIGNIFICANT CHANGE UP (ref 80–100)
METHOD TYPE: SIGNIFICANT CHANGE UP
METHOD TYPE: SIGNIFICANT CHANGE UP
MRSA PCR RESULT.: SIGNIFICANT CHANGE UP
NRBC # BLD: 0 /100 WBCS — SIGNIFICANT CHANGE UP (ref 0–0)
NRBC # FLD: 0 K/UL — SIGNIFICANT CHANGE UP (ref 0–0)
OB PNL STL: SIGNIFICANT CHANGE UP
PHOSPHATE SERPL-MCNC: 2.8 MG/DL — SIGNIFICANT CHANGE UP (ref 2.5–4.5)
PLATELET # BLD AUTO: 522 K/UL — HIGH (ref 150–400)
POTASSIUM SERPL-MCNC: 3.8 MMOL/L — SIGNIFICANT CHANGE UP (ref 3.5–5.3)
POTASSIUM SERPL-SCNC: 3.8 MMOL/L — SIGNIFICANT CHANGE UP (ref 3.5–5.3)
RBC # BLD: 2.78 M/UL — LOW (ref 4.2–5.8)
RBC # FLD: 14.5 % — SIGNIFICANT CHANGE UP (ref 10.3–14.5)
RH IG SCN BLD-IMP: POSITIVE — SIGNIFICANT CHANGE UP
S AUREUS DNA NOSE QL NAA+PROBE: DETECTED
SODIUM SERPL-SCNC: 134 MMOL/L — LOW (ref 135–145)
VANCOMYCIN TROUGH SERPL-MCNC: 10.5 UG/ML — SIGNIFICANT CHANGE UP (ref 10–20)
WBC # BLD: 10.29 K/UL — SIGNIFICANT CHANGE UP (ref 3.8–10.5)
WBC # FLD AUTO: 10.29 K/UL — SIGNIFICANT CHANGE UP (ref 3.8–10.5)

## 2024-09-14 PROCEDURE — 71045 X-RAY EXAM CHEST 1 VIEW: CPT | Mod: 26

## 2024-09-14 PROCEDURE — 99232 SBSQ HOSP IP/OBS MODERATE 35: CPT

## 2024-09-14 RX ADMIN — INSULIN GLARGINE 8 UNIT(S): 100 INJECTION, SOLUTION SUBCUTANEOUS at 22:12

## 2024-09-14 RX ADMIN — METFORMIN HYDROCHLORIDE 1000 MILLIGRAM(S): 850 TABLET, FILM COATED ORAL at 05:39

## 2024-09-14 RX ADMIN — SODIUM CHLORIDE 1 GRAM(S): 9 INJECTION INTRAMUSCULAR; INTRAVENOUS; SUBCUTANEOUS at 12:51

## 2024-09-14 RX ADMIN — Medication 250 MILLIGRAM(S): at 07:09

## 2024-09-14 RX ADMIN — Medication 20 MILLIGRAM(S): at 22:12

## 2024-09-14 RX ADMIN — AMLODIPINE BESYLATE 5 MILLIGRAM(S): 10 TABLET ORAL at 12:52

## 2024-09-14 RX ADMIN — Medication 5000 UNIT(S): at 18:05

## 2024-09-14 RX ADMIN — PIPERACILLIN SODIUM AND TAZOBACTAM SODIUM 25 GRAM(S): 3; .375 INJECTION, POWDER, FOR SOLUTION INTRAVENOUS at 01:07

## 2024-09-14 RX ADMIN — PIPERACILLIN SODIUM AND TAZOBACTAM SODIUM 25 GRAM(S): 3; .375 INJECTION, POWDER, FOR SOLUTION INTRAVENOUS at 18:05

## 2024-09-14 RX ADMIN — Medication 0: at 12:50

## 2024-09-14 RX ADMIN — Medication 2: at 18:07

## 2024-09-14 RX ADMIN — METFORMIN HYDROCHLORIDE 1000 MILLIGRAM(S): 850 TABLET, FILM COATED ORAL at 18:07

## 2024-09-14 RX ADMIN — Medication 6: at 08:55

## 2024-09-14 RX ADMIN — PIPERACILLIN SODIUM AND TAZOBACTAM SODIUM 25 GRAM(S): 3; .375 INJECTION, POWDER, FOR SOLUTION INTRAVENOUS at 08:56

## 2024-09-14 RX ADMIN — Medication 5000 UNIT(S): at 05:39

## 2024-09-15 LAB
ANION GAP SERPL CALC-SCNC: 10 MMOL/L — SIGNIFICANT CHANGE UP (ref 7–14)
BUN SERPL-MCNC: 19 MG/DL — SIGNIFICANT CHANGE UP (ref 7–23)
CALCIUM SERPL-MCNC: 8.5 MG/DL — SIGNIFICANT CHANGE UP (ref 8.4–10.5)
CHLORIDE SERPL-SCNC: 98 MMOL/L — SIGNIFICANT CHANGE UP (ref 98–107)
CO2 SERPL-SCNC: 24 MMOL/L — SIGNIFICANT CHANGE UP (ref 22–31)
CREAT SERPL-MCNC: 0.54 MG/DL — SIGNIFICANT CHANGE UP (ref 0.5–1.3)
EGFR: 113 ML/MIN/1.73M2 — SIGNIFICANT CHANGE UP
GLUCOSE BLDC GLUCOMTR-MCNC: 112 MG/DL — HIGH (ref 70–99)
GLUCOSE BLDC GLUCOMTR-MCNC: 149 MG/DL — HIGH (ref 70–99)
GLUCOSE BLDC GLUCOMTR-MCNC: 174 MG/DL — HIGH (ref 70–99)
GLUCOSE BLDC GLUCOMTR-MCNC: 245 MG/DL — HIGH (ref 70–99)
GLUCOSE SERPL-MCNC: 236 MG/DL — HIGH (ref 70–99)
HCT VFR BLD CALC: 25.8 % — LOW (ref 39–50)
HGB BLD-MCNC: 8 G/DL — LOW (ref 13–17)
MAGNESIUM SERPL-MCNC: 2 MG/DL — SIGNIFICANT CHANGE UP (ref 1.6–2.6)
MCHC RBC-ENTMCNC: 27.2 PG — SIGNIFICANT CHANGE UP (ref 27–34)
MCHC RBC-ENTMCNC: 31 GM/DL — LOW (ref 32–36)
MCV RBC AUTO: 87.8 FL — SIGNIFICANT CHANGE UP (ref 80–100)
NRBC # BLD: 0 /100 WBCS — SIGNIFICANT CHANGE UP (ref 0–0)
NRBC # FLD: 0 K/UL — SIGNIFICANT CHANGE UP (ref 0–0)
PHOSPHATE SERPL-MCNC: 2.4 MG/DL — LOW (ref 2.5–4.5)
PLATELET # BLD AUTO: 508 K/UL — HIGH (ref 150–400)
POTASSIUM SERPL-MCNC: 3.7 MMOL/L — SIGNIFICANT CHANGE UP (ref 3.5–5.3)
POTASSIUM SERPL-SCNC: 3.7 MMOL/L — SIGNIFICANT CHANGE UP (ref 3.5–5.3)
RBC # BLD: 2.94 M/UL — LOW (ref 4.2–5.8)
RBC # FLD: 14.7 % — HIGH (ref 10.3–14.5)
SODIUM SERPL-SCNC: 132 MMOL/L — LOW (ref 135–145)
WBC # BLD: 7.95 K/UL — SIGNIFICANT CHANGE UP (ref 3.8–10.5)
WBC # FLD AUTO: 7.95 K/UL — SIGNIFICANT CHANGE UP (ref 3.8–10.5)

## 2024-09-15 PROCEDURE — 99232 SBSQ HOSP IP/OBS MODERATE 35: CPT

## 2024-09-15 PROCEDURE — 71045 X-RAY EXAM CHEST 1 VIEW: CPT | Mod: 26

## 2024-09-15 RX ORDER — HEPARIN SODIUM,BOVINE 1000/ML
2500 VIAL (ML) INJECTION EVERY 12 HOURS
Refills: 0 | Status: DISCONTINUED | OUTPATIENT
Start: 2024-09-15 | End: 2024-09-19

## 2024-09-15 RX ORDER — SODIUM PHOSPHATE, DIBASIC, ANHYDROUS, POTASSIUM PHOSPHATE, MONOBASIC, AND SODIUM PHOSPHATE, MONOBASIC, MONOHYDRATE 852; 155; 130 MG/1; MG/1; MG/1
1 TABLET, COATED ORAL ONCE
Refills: 0 | Status: COMPLETED | OUTPATIENT
Start: 2024-09-15 | End: 2024-09-15

## 2024-09-15 RX ADMIN — AMLODIPINE BESYLATE 5 MILLIGRAM(S): 10 TABLET ORAL at 11:54

## 2024-09-15 RX ADMIN — SODIUM PHOSPHATE, DIBASIC, ANHYDROUS, POTASSIUM PHOSPHATE, MONOBASIC, AND SODIUM PHOSPHATE, MONOBASIC, MONOHYDRATE 1 PACKET(S): 852; 155; 130 TABLET, COATED ORAL at 11:55

## 2024-09-15 RX ADMIN — INSULIN GLARGINE 8 UNIT(S): 100 INJECTION, SOLUTION SUBCUTANEOUS at 21:01

## 2024-09-15 RX ADMIN — PIPERACILLIN SODIUM AND TAZOBACTAM SODIUM 25 GRAM(S): 3; .375 INJECTION, POWDER, FOR SOLUTION INTRAVENOUS at 18:28

## 2024-09-15 RX ADMIN — METFORMIN HYDROCHLORIDE 1000 MILLIGRAM(S): 850 TABLET, FILM COATED ORAL at 05:39

## 2024-09-15 RX ADMIN — Medication 2: at 12:43

## 2024-09-15 RX ADMIN — Medication 5000 UNIT(S): at 05:42

## 2024-09-15 RX ADMIN — PIPERACILLIN SODIUM AND TAZOBACTAM SODIUM 25 GRAM(S): 3; .375 INJECTION, POWDER, FOR SOLUTION INTRAVENOUS at 01:01

## 2024-09-15 RX ADMIN — Medication 2500 UNIT(S): at 18:26

## 2024-09-15 RX ADMIN — Medication 4: at 09:14

## 2024-09-15 RX ADMIN — SODIUM CHLORIDE 1 GRAM(S): 9 INJECTION INTRAMUSCULAR; INTRAVENOUS; SUBCUTANEOUS at 11:54

## 2024-09-15 RX ADMIN — METFORMIN HYDROCHLORIDE 1000 MILLIGRAM(S): 850 TABLET, FILM COATED ORAL at 18:32

## 2024-09-15 RX ADMIN — PIPERACILLIN SODIUM AND TAZOBACTAM SODIUM 25 GRAM(S): 3; .375 INJECTION, POWDER, FOR SOLUTION INTRAVENOUS at 09:19

## 2024-09-15 RX ADMIN — Medication 20 MILLIGRAM(S): at 21:01

## 2024-09-15 NOTE — DIETITIAN INITIAL EVALUATION ADULT - REASON FOR ADMISSION
Per chart, Pt is 59 yr M w/ pmh HTN, DM2 s/p [facility] Broderick on 7/16/24 w/ postop course c/b high VENKATA drain output. found on EGD  to have esophago-gastric anastomotic now s/p esophageal stent placement by GI on 8/5.  Was admitted 8/19 for emesis and bloody output from VENKATA drain.  S/P EGD with GI showing small ulcerating at base of distal stent but no active bleed.  He now presents to the hospital with elevated WBC and fever/ chills x 1 day.  CT showing worsening effusion on right with multiple foci of gas and air fluid levels.

## 2024-09-15 NOTE — DIETITIAN INITIAL EVALUATION ADULT - PERTINENT LABORATORY DATA
09-15    132<L>  |  98  |  19  ----------------------------<  236<H>  3.7   |  24  |  0.54    Ca    8.5      15 Sep 2024 06:02  Phos  2.4     09-15  Mg     2.00     09-15  CAPILLARY BLOOD GLUCOSE  POCT Blood Glucose.: 245 mg/dL (15 Sep 2024 08:44)  POCT Blood Glucose.: 134 mg/dL (14 Sep 2024 22:11)  POCT Blood Glucose.: 172 mg/dL (14 Sep 2024 17:52)  POCT Blood Glucose.: 143 mg/dL (14 Sep 2024 12:13)    A1C with Estimated Average Glucose Result: 7.0 % (09-13-24 @ 04:30)  A1C with Estimated Average Glucose Result: 10.9 % (07-13-24 @ 02:44)  A1C with Estimated Average Glucose Result: 12.8 % (01-01-24 @ 06:00)

## 2024-09-15 NOTE — DIETITIAN INITIAL EVALUATION ADULT - ORAL INTAKE PTA/DIET HISTORY
Patient reports NPO TF only as sole source of nutrition and hydration Glucerna 1.5 at goal of 60mL x16 hrs PTA. Pt confirms NKFA, food intolerance. Pt reported UBW ~97lbs. Pt denies any recent weight changes.

## 2024-09-15 NOTE — DIETITIAN INITIAL EVALUATION ADULT - OTHER INFO
Patient seen at bedside. Pt is alert able to make needs known. Observed tube feeding running at goal rate. Pt is tolerating tube feed. No GI distress (nausea/vomiting/constipation) per chart and RN. Last bowel reported per Pt earlier today 9/15. Pt remains NPO TF only as sole source of nutrition and hydration; Glucerna 1.5cal @ 60ml/hr x18hrs with a total volume 1080mL, 1620kcal, 89.6 gm protein, 820ml free water from formula. Defer fluid needs to MD/Team. No edema noted, skin is intact, per RN flowsheet. Labs noted for elevated -143 mg/dL, hyponatremia, and hypophosphatemia. Pt ordered for PHOS-NaK an sodium chloride. As per previous RD note 8/20; UBW 45.4kg. ABW 44kg (9/12/24). Weight decreased x 1 month (3.0%). Obtain weekly weights to monitor weight trend. RDN services remain available as needed.

## 2024-09-15 NOTE — DIETITIAN INITIAL EVALUATION ADULT - NS FNS DIET ORDER
Diet, NPO:   Tube Feeding Modality: Jejunostomy  Glucerna 1.5 Hood (GLUCERNA1.5RTH)  Total Volume for 24 Hours (mL): 1080  Continuous  Starting Tube Feed Rate {mL per Hour}: 30  Increase Tube Feed Rate by (mL): 10     Every 4 hours  Until Goal Tube Feed Rate (mL per Hour): 60  Tube Feed Duration (in Hours): 18  Tube Feed Start Time: 18:00  Tube Feed Stop Time: 12:00 (09-12-24 @ 01:26) [Active]

## 2024-09-15 NOTE — PROGRESS NOTE ADULT - ASSESSMENT
59 year old gentleman with diagnosis of adenocarcinoma at GE junction s/p chemotherapy and radiation  Now s/p robotic resection on 7/16    Post operatively, he had worsening leukocytosis  Imaging with a right sided loculated effusion    Now s/p jejunostomy    Now presents for fever and leukocytosis  Progression on imaging    Prior fluid analysis noted high amylase.  Fluid has not been purlent appearing and has had debris.   Ongoing concern for a leak.    Culture sent from chronic drain.  It will be difficult to distinguish a pathogen vs a colonizer  Culture growing serratia, mssa, strep species, candida    Can change antibiotics to Cefepime 2 gram iv q12 with fluconazole 200 mg IV daily for 4 additional weeks    Overall , I am concerned about a leak given prior elevated amylase in fluid, polymicrobial growth and appearance of the fluid.  Imaging with loculations in the fluid.      Not clear that antibiotics will resolve this      Chicho Liu MD  Please contact via TEAM between 8 am and 6 pm    In the evening or on weekends, can contact call service at [phone]

## 2024-09-15 NOTE — DIETITIAN NUTRITION RISK NOTIFICATION - TREATMENT: THE FOLLOWING DIET HAS BEEN RECOMMENDED
Diet, NPO:   Tube Feeding Modality: Jejunostomy  Glucerna 1.5 Hood (GLUCERNA1.5RTH)  Total Volume for 24 Hours (mL): 1080  Continuous  Starting Tube Feed Rate {mL per Hour}: 30  Increase Tube Feed Rate by (mL): 10     Every 4 hours  Until Goal Tube Feed Rate (mL per Hour): 60  Tube Feed Duration (in Hours): 18  Tube Feed Start Time: 18:00  Tube Feed Stop Time: 12:00 (09-12-24 @ 01:26) [Active]       4 = No assist / stand by assistance

## 2024-09-15 NOTE — DIETITIAN INITIAL EVALUATION ADULT - ADD RECOMMEND
- Continue with Glucerna 1.5cal @ 60ml/hr x18hrs with a total volume 1080mL, 1620kcal, 89.6 gm protein, 820ml free water from formula. Defer fluid needs to MD/Team.  -  Monitor Tube Feeding tolerance.  - Consider Multivitamin for micronutrient support.   - RDN services remain available as needed.

## 2024-09-15 NOTE — DIETITIAN INITIAL EVALUATION ADULT - PERTINENT MEDS FT
MEDICATIONS  (STANDING):  amLODIPine   Tablet 5 milliGRAM(s) Oral every 24 hours  atorvastatin 20 milliGRAM(s) Oral at bedtime  dextrose 5%. 1000 milliLiter(s) (50 mL/Hr) IV Continuous <Continuous>  dextrose 5%. 1000 milliLiter(s) (100 mL/Hr) IV Continuous <Continuous>  dextrose 50% Injectable 12.5 Gram(s) IV Push once  dextrose 50% Injectable 25 Gram(s) IV Push once  dextrose 50% Injectable 25 Gram(s) IV Push once  glucagon  Injectable 1 milliGRAM(s) IntraMuscular once  heparin   Injectable 2500 Unit(s) SubCutaneous every 12 hours  insulin glargine Injectable (LANTUS) 8 Unit(s) SubCutaneous at bedtime  insulin lispro (ADMELOG) corrective regimen sliding scale   SubCutaneous at bedtime  insulin lispro (ADMELOG) corrective regimen sliding scale   SubCutaneous three times a day before meals  metFORMIN 1000 milliGRAM(s) Oral two times a day  piperacillin/tazobactam IVPB.. 3.375 Gram(s) IV Intermittent every 8 hours  potassium phosphate / sodium phosphate Powder (PHOS-NaK) 1 Packet(s) Oral once  sodium chloride 1 Gram(s) Oral daily    MEDICATIONS  (PRN):  acetaminophen   Oral Liquid .. 650 milliGRAM(s) Enteral Tube every 6 hours PRN Temp greater or equal to 38C (100.4F), Mild Pain (1 - 3)  dextrose Oral Gel 15 Gram(s) Oral once PRN Blood Glucose LESS THAN 70 milliGRAM(s)/deciliter

## 2024-09-15 NOTE — DIETITIAN INITIAL EVALUATION ADULT - OTHER CALCULATIONS
Defer fluid needs to MD/Team.   Ideal Body Weight: 130lbs / 58.9kg +/-10%   Unable to access [facility] at this time.

## 2024-09-16 LAB
ANION GAP SERPL CALC-SCNC: 11 MMOL/L — SIGNIFICANT CHANGE UP (ref 7–14)
BUN SERPL-MCNC: 16 MG/DL — SIGNIFICANT CHANGE UP (ref 7–23)
CALCIUM SERPL-MCNC: 8 MG/DL — LOW (ref 8.4–10.5)
CHLORIDE SERPL-SCNC: 99 MMOL/L — SIGNIFICANT CHANGE UP (ref 98–107)
CO2 SERPL-SCNC: 23 MMOL/L — SIGNIFICANT CHANGE UP (ref 22–31)
CREAT SERPL-MCNC: 0.53 MG/DL — SIGNIFICANT CHANGE UP (ref 0.5–1.3)
CULTURE RESULTS: SIGNIFICANT CHANGE UP
CULTURE RESULTS: SIGNIFICANT CHANGE UP
EGFR: 114 ML/MIN/1.73M2 — SIGNIFICANT CHANGE UP
GLUCOSE BLDC GLUCOMTR-MCNC: 179 MG/DL — HIGH (ref 70–99)
GLUCOSE BLDC GLUCOMTR-MCNC: 187 MG/DL — HIGH (ref 70–99)
GLUCOSE BLDC GLUCOMTR-MCNC: 235 MG/DL — HIGH (ref 70–99)
GLUCOSE BLDC GLUCOMTR-MCNC: 98 MG/DL — SIGNIFICANT CHANGE UP (ref 70–99)
GLUCOSE SERPL-MCNC: 229 MG/DL — HIGH (ref 70–99)
HCT VFR BLD CALC: 26.3 % — LOW (ref 39–50)
HGB BLD-MCNC: 8.3 G/DL — LOW (ref 13–17)
MCHC RBC-ENTMCNC: 28 PG — SIGNIFICANT CHANGE UP (ref 27–34)
MCHC RBC-ENTMCNC: 31.6 GM/DL — LOW (ref 32–36)
MCV RBC AUTO: 88.9 FL — SIGNIFICANT CHANGE UP (ref 80–100)
NRBC # BLD: 0 /100 WBCS — SIGNIFICANT CHANGE UP (ref 0–0)
NRBC # FLD: 0 K/UL — SIGNIFICANT CHANGE UP (ref 0–0)
PLATELET # BLD AUTO: 448 K/UL — HIGH (ref 150–400)
POTASSIUM SERPL-MCNC: 4.3 MMOL/L — SIGNIFICANT CHANGE UP (ref 3.5–5.3)
POTASSIUM SERPL-SCNC: 4.3 MMOL/L — SIGNIFICANT CHANGE UP (ref 3.5–5.3)
RBC # BLD: 2.96 M/UL — LOW (ref 4.2–5.8)
RBC # FLD: 14.7 % — HIGH (ref 10.3–14.5)
SODIUM SERPL-SCNC: 133 MMOL/L — LOW (ref 135–145)
SPECIMEN SOURCE: SIGNIFICANT CHANGE UP
SPECIMEN SOURCE: SIGNIFICANT CHANGE UP
WBC # BLD: 10.75 K/UL — HIGH (ref 3.8–10.5)
WBC # FLD AUTO: 10.75 K/UL — HIGH (ref 3.8–10.5)

## 2024-09-16 PROCEDURE — 71045 X-RAY EXAM CHEST 1 VIEW: CPT | Mod: 26

## 2024-09-16 PROCEDURE — 99232 SBSQ HOSP IP/OBS MODERATE 35: CPT

## 2024-09-16 RX ORDER — FLUCONAZOLE 150 MG/1
200 TABLET ORAL EVERY 24 HOURS
Refills: 0 | Status: DISCONTINUED | OUTPATIENT
Start: 2024-09-16 | End: 2024-09-19

## 2024-09-16 RX ORDER — CEFEPIME 2 G/1
2000 INJECTION, POWDER, FOR SOLUTION INTRAVENOUS EVERY 12 HOURS
Refills: 0 | Status: DISCONTINUED | OUTPATIENT
Start: 2024-09-16 | End: 2024-09-16

## 2024-09-16 RX ORDER — MUPIROCIN 2 %
1 OINTMENT (GRAM) TOPICAL EVERY 12 HOURS
Refills: 0 | Status: DISCONTINUED | OUTPATIENT
Start: 2024-09-16 | End: 2024-09-16

## 2024-09-16 RX ORDER — CEFEPIME 2 G/1
2000 INJECTION, POWDER, FOR SOLUTION INTRAVENOUS EVERY 12 HOURS
Refills: 0 | Status: DISCONTINUED | OUTPATIENT
Start: 2024-09-16 | End: 2024-09-19

## 2024-09-16 RX ORDER — MUPIROCIN 2 %
1 OINTMENT (GRAM) TOPICAL EVERY 12 HOURS
Refills: 0 | Status: DISCONTINUED | OUTPATIENT
Start: 2024-09-16 | End: 2024-09-19

## 2024-09-16 RX ORDER — CHLORHEXIDINE GLUCONATE 40 MG/ML
1 SOLUTION TOPICAL DAILY
Refills: 0 | Status: DISCONTINUED | OUTPATIENT
Start: 2024-09-16 | End: 2024-09-19

## 2024-09-16 RX ADMIN — METFORMIN HYDROCHLORIDE 1000 MILLIGRAM(S): 850 TABLET, FILM COATED ORAL at 05:10

## 2024-09-16 RX ADMIN — Medication 1 APPLICATION(S): at 18:25

## 2024-09-16 RX ADMIN — SODIUM CHLORIDE 1 GRAM(S): 9 INJECTION INTRAMUSCULAR; INTRAVENOUS; SUBCUTANEOUS at 12:20

## 2024-09-16 RX ADMIN — Medication 2500 UNIT(S): at 05:10

## 2024-09-16 RX ADMIN — Medication 2500 UNIT(S): at 18:25

## 2024-09-16 RX ADMIN — Medication 2: at 12:18

## 2024-09-16 RX ADMIN — PIPERACILLIN SODIUM AND TAZOBACTAM SODIUM 25 GRAM(S): 3; .375 INJECTION, POWDER, FOR SOLUTION INTRAVENOUS at 02:11

## 2024-09-16 RX ADMIN — METFORMIN HYDROCHLORIDE 1000 MILLIGRAM(S): 850 TABLET, FILM COATED ORAL at 18:24

## 2024-09-16 RX ADMIN — CEFEPIME 100 MILLIGRAM(S): 2 INJECTION, POWDER, FOR SOLUTION INTRAVENOUS at 18:41

## 2024-09-16 RX ADMIN — INSULIN GLARGINE 8 UNIT(S): 100 INJECTION, SOLUTION SUBCUTANEOUS at 22:14

## 2024-09-16 RX ADMIN — FLUCONAZOLE 100 MILLIGRAM(S): 150 TABLET ORAL at 12:21

## 2024-09-16 RX ADMIN — AMLODIPINE BESYLATE 5 MILLIGRAM(S): 10 TABLET ORAL at 12:19

## 2024-09-16 RX ADMIN — Medication 4: at 08:14

## 2024-09-16 RX ADMIN — Medication 20 MILLIGRAM(S): at 21:06

## 2024-09-16 RX ADMIN — CHLORHEXIDINE GLUCONATE 1 APPLICATION(S): 40 SOLUTION TOPICAL at 12:20

## 2024-09-16 NOTE — PROGRESS NOTE ADULT - ASSESSMENT
59 year old gentleman with diagnosis of adenocarcinoma at GE junction s/p chemotherapy and radiation  Now s/p robotic resection on 7/16    Post operatively, he had worsening leukocytosis  Imaging with a right sided loculated effusion    Now s/p jejunostomy    Now presents for fever and leukocytosis  Progression on imaging    Prior fluid analysis noted high amylase.  Fluid has not been purulent appearing and has had debris.   I discussed with CT surgery attending, there is concern for a small residual leak.  Given that the patient is stable and the leak is small, CT surgery thinks the leak may heal with time, nutrition, and antibiotic treatment    Culture sent from chronic drain.  It will be difficult to distinguish a pathogen vs a colonizer  Culture growing serratia, mssa, strep species, candida    Pleural effusion with post operative leak    Cefepime 2 gram iv q12 with fluconazole 200 mg IV daily through 10/12  Weekly cbc, CMP  Follow up at:   [address] [facility] [address]  [place], [place] [id number] [phone]  Appoitment on : 10/4 at 9:00 AM     IF pt is not improving, may need to reconsider more definitive treatment.  But he does appear stable, so reasonable to continue to observe      Chicho Liu MD  Please contact via TEAM between 8 am and 6 pm    In the evening or on weekends, can contact call service at [phone]

## 2024-09-16 NOTE — PROGRESS NOTE ADULT - ASSESSMENT
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 Worsening right-sided effusion with air fluid levels noted on CT; started on vanc/Zosyn. Patient with no complaints, tolerating TF, passing gas and having nonbloody BMs. Remains afebrile, WBC now WNL. VENKATA cultures growing Serratia.    Recommendations:  - Abd exam benign  - Continue abx (will change to cefepime/fluconazole for 4 weeks per ID)  - Continue TF  - Global care per primary  - Surg onc will continue to follow    D Team Surgery  [id number]

## 2024-09-16 NOTE — CONSULT LETTER
[Dear  ___] : Dear  [unfilled], [Courtesy Letter:] : I had the pleasure of seeing your patient, [unfilled], in my office today. [Please see my note below.] : Please see my note below. [Consult Closing:] : Thank you very much for allowing me to participate in the care of this patient.  If you have any questions, please do not hesitate to contact me. [Sincerely,] : Sincerely, [FreeTextEntry3] : Dangelo Agustin MD, TATA, FACS Director of Surgical Oncology- [facility] , Department of Surgery [facility] [place] [id number] Ph: [phone] Cell: [phone] [DrGalilea  ___] : Dr. HURTADO [DrGalilea ___] : Dr. HURTADO

## 2024-09-16 NOTE — HISTORY OF PRESENT ILLNESS
[de-identified] : Mr. BHARATH GONZALES is a 59 year old male referred by Dr. Dudley for gastroesophageal junction lesion. 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 Chemo started on 03/04/2024 with Dr. Mima Angelo.   PET/CT on 02/19/2024: - Mild uptake at proximal stomach, possibly related to reported gastroesophageal junction lesion. Remaining alimentary tract shows diffuse uptake which is likely related to use of metformin. This limits evaluation for additional lesions in the bowel and the aerodigestive tract. - Previously identified peripancreatic node is difficult to identify on the current low-dose CT and there is no distinct abnormal uptake in that region. Suggest MRI of the abdomen.  CT chest on 04/24/2024: - A 0.3 cm left apical groundglass nodule (2/17), stable dating back to 2020. - Stable pancreatic uncinate process cystic lesion measuring 1.6 cm. - A 1.3 cm nodular lesion at the GE junction (2/67), likely corresponding to previously noted FDG avid lesion. - An indeterminant hyperattenuating nodular lesion measuring 1.9 cm along the course of distal gonadal vasculature (2/123), stable since 1/5/2024, however new since 10/13/2020. No correlate to findings noted in the prior PET/CT.  6/25/24 Patient reports doing well. Denies fever, chills, N/V/D, unintentional weight loss.  7/16/24 S/p [place] senait esophagectomy with Dr. Laureano    8/19/24-8/22/24: admitted for emesis and new bloody output from the VENKATA drain. s/p CTA CAP w/o PO contrast, neg for active leak. s/p EGD with GI showing small ulcerating at base of distal stent but no active bleed. P     9/10/24 CEA 4 (H) - 25 (WNL)  9/17/24 As per Dr. 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 Saw Dr. Garner and plans for - EGD stent removal yariel in light of recent GI bleed, will reassess leak at that time to assess for any potential endoscopic closure. Pt reports feeling

## 2024-09-16 NOTE — PROGRESS NOTE ADULT - NS ATTEND AMEND GEN_ALL_CORE FT
no change in above plan of care.  supportive care while he continue to recover from esophageal surgery.
no change in cardiac plan of care.
no changes made to above plan of care. will follow with you.

## 2024-09-17 ENCOUNTER — TRANSCRIPTION ENCOUNTER (OUTPATIENT)
Age: 59
End: 2024-09-17

## 2024-09-17 ENCOUNTER — APPOINTMENT (OUTPATIENT)
Dept: SURGICAL ONCOLOGY | Facility: CLINIC | Age: 59
End: 2024-09-17

## 2024-09-17 DIAGNOSIS — C16.0 MALIGNANT NEOPLASM OF CARDIA: ICD-10-CM

## 2024-09-17 LAB
ANION GAP SERPL CALC-SCNC: 12 MMOL/L — SIGNIFICANT CHANGE UP (ref 7–14)
BUN SERPL-MCNC: 18 MG/DL — SIGNIFICANT CHANGE UP (ref 7–23)
CALCIUM SERPL-MCNC: 8.3 MG/DL — LOW (ref 8.4–10.5)
CHLORIDE SERPL-SCNC: 100 MMOL/L — SIGNIFICANT CHANGE UP (ref 98–107)
CO2 SERPL-SCNC: 23 MMOL/L — SIGNIFICANT CHANGE UP (ref 22–31)
CREAT SERPL-MCNC: 0.49 MG/DL — LOW (ref 0.5–1.3)
EGFR: 117 ML/MIN/1.73M2 — SIGNIFICANT CHANGE UP
GLUCOSE BLDC GLUCOMTR-MCNC: 108 MG/DL — HIGH (ref 70–99)
GLUCOSE BLDC GLUCOMTR-MCNC: 166 MG/DL — HIGH (ref 70–99)
GLUCOSE BLDC GLUCOMTR-MCNC: 210 MG/DL — HIGH (ref 70–99)
GLUCOSE BLDC GLUCOMTR-MCNC: 98 MG/DL — SIGNIFICANT CHANGE UP (ref 70–99)
GLUCOSE SERPL-MCNC: 217 MG/DL — HIGH (ref 70–99)
HCT VFR BLD CALC: 25 % — LOW (ref 39–50)
HGB BLD-MCNC: 7.9 G/DL — LOW (ref 13–17)
MCHC RBC-ENTMCNC: 28.4 PG — SIGNIFICANT CHANGE UP (ref 27–34)
MCHC RBC-ENTMCNC: 31.6 GM/DL — LOW (ref 32–36)
MCV RBC AUTO: 89.9 FL — SIGNIFICANT CHANGE UP (ref 80–100)
NRBC # BLD: 0 /100 WBCS — SIGNIFICANT CHANGE UP (ref 0–0)
NRBC # FLD: 0 K/UL — SIGNIFICANT CHANGE UP (ref 0–0)
PLATELET # BLD AUTO: 452 K/UL — HIGH (ref 150–400)
POTASSIUM SERPL-MCNC: 4.1 MMOL/L — SIGNIFICANT CHANGE UP (ref 3.5–5.3)
POTASSIUM SERPL-SCNC: 4.1 MMOL/L — SIGNIFICANT CHANGE UP (ref 3.5–5.3)
RBC # BLD: 2.78 M/UL — LOW (ref 4.2–5.8)
RBC # FLD: 15.2 % — HIGH (ref 10.3–14.5)
SODIUM SERPL-SCNC: 135 MMOL/L — SIGNIFICANT CHANGE UP (ref 135–145)
WBC # BLD: 15.26 K/UL — HIGH (ref 3.8–10.5)
WBC # FLD AUTO: 15.26 K/UL — HIGH (ref 3.8–10.5)

## 2024-09-17 PROCEDURE — 71045 X-RAY EXAM CHEST 1 VIEW: CPT | Mod: 26

## 2024-09-17 PROCEDURE — 99232 SBSQ HOSP IP/OBS MODERATE 35: CPT

## 2024-09-17 RX ORDER — FLUCONAZOLE 150 MG/1
200 TABLET ORAL
Qty: 4800 | Refills: 0
Start: 2024-09-17 | End: 2024-10-10

## 2024-09-17 RX ORDER — CEFEPIME 2 G/1
2 INJECTION, POWDER, FOR SOLUTION INTRAVENOUS
Qty: 96 | Refills: 0
Start: 2024-09-17 | End: 2024-10-10

## 2024-09-17 RX ADMIN — Medication 20 MILLIGRAM(S): at 21:23

## 2024-09-17 RX ADMIN — SODIUM CHLORIDE 1 GRAM(S): 9 INJECTION INTRAMUSCULAR; INTRAVENOUS; SUBCUTANEOUS at 13:01

## 2024-09-17 RX ADMIN — FLUCONAZOLE 100 MILLIGRAM(S): 150 TABLET ORAL at 13:01

## 2024-09-17 RX ADMIN — CHLORHEXIDINE GLUCONATE 1 APPLICATION(S): 40 SOLUTION TOPICAL at 13:00

## 2024-09-17 RX ADMIN — METFORMIN HYDROCHLORIDE 1000 MILLIGRAM(S): 850 TABLET, FILM COATED ORAL at 05:05

## 2024-09-17 RX ADMIN — Medication 1 APPLICATION(S): at 05:06

## 2024-09-17 RX ADMIN — CEFEPIME 100 MILLIGRAM(S): 2 INJECTION, POWDER, FOR SOLUTION INTRAVENOUS at 17:39

## 2024-09-17 RX ADMIN — AMLODIPINE BESYLATE 5 MILLIGRAM(S): 10 TABLET ORAL at 13:01

## 2024-09-17 RX ADMIN — Medication 2500 UNIT(S): at 05:05

## 2024-09-17 RX ADMIN — Medication 2500 UNIT(S): at 17:39

## 2024-09-17 RX ADMIN — Medication 2: at 17:51

## 2024-09-17 RX ADMIN — INSULIN GLARGINE 8 UNIT(S): 100 INJECTION, SOLUTION SUBCUTANEOUS at 22:53

## 2024-09-17 RX ADMIN — METFORMIN HYDROCHLORIDE 1000 MILLIGRAM(S): 850 TABLET, FILM COATED ORAL at 17:39

## 2024-09-17 RX ADMIN — Medication 4: at 08:18

## 2024-09-17 RX ADMIN — CEFEPIME 100 MILLIGRAM(S): 2 INJECTION, POWDER, FOR SOLUTION INTRAVENOUS at 05:06

## 2024-09-17 NOTE — DISCHARGE NOTE PROVIDER - CARE PROVIDER_API CALL
Lewis Laureano  Thoracic Surgery  [address] [place] [id number]  Phone: [phone]  Fax: [phone]  Follow Up Time:

## 2024-09-17 NOTE — DISCHARGE NOTE NURSING/CASE MANAGEMENT/SOCIAL WORK - NSDCPEFALRISK_GEN_ALL_CORE
For information on Fall & Injury Prevention, visit: [email or web address] OR  [email or web address] OR  https://www.cdc.gov/steadi/patient.html

## 2024-09-17 NOTE — DISCHARGE NOTE PROVIDER - NSDCFUADDAPPT_GEN_ALL_CORE_FT
Follow with Dr Laureano in 2-3 weeks  call for appointment   have a chest xray prior to appointment and bring films  (you can arrange to have chest xray done on 2nd floor of [facility] before your follow up appointment on 3rd floor - speak with office staff to arrange)      Cefepime 2 gram iv q12 with fluconazole 200 mg IV daily through 10/12  Weekly cbc, CMP    Follow up at:   Chicho Liu MD  [address] [place] [address] [address]tr[address]  [place] [address] [phone]  Appoitment on : 10/4 at 9:00 AM    Follow with Dr Laureano in 3 weeks  call  [phone] for appointment   have a chest xray prior to appointment and bring films  (you can arrange to have chest xray done on 2nd floor of [facility] before your follow up appointment on 3rd floor - speak with office staff to arrange)      Cefepime 2 gram iv q12 with fluconazole 200 mg IV daily through 10/12  Weekly cbc, CMP    Follow up at:   Chicho Liu MD  [address] [facility] [address]  [place] [id number] [phone]  Appoitment on : 10/4 at 9:00 AM

## 2024-09-17 NOTE — DISCHARGE NOTE NURSING/CASE MANAGEMENT/SOCIAL WORK - NSDCFUADDAPPT_GEN_ALL_CORE_FT
Follow with Dr Laureano in 2-3 weeks  call for appointment   have a chest xray prior to appointment and bring films  (you can arrange to have chest xray done on 2nd floor of [facility] before your follow up appointment on 3rd floor - speak with office staff to arrange)      Cefepime 2 gram iv q12 with fluconazole 200 mg IV daily through 10/12  Weekly cbc, CMP    Follow up at:   Chicho Liu MD  [address] [place] [address] [address]tr[address]  [place] [address] [phone]  Appoitment on : 10/4 at 9:00 AM

## 2024-09-17 NOTE — PROGRESS NOTE ADULT - ASSESSMENT
59 year old gentleman with diagnosis of adenocarcinoma at GE junction s/p chemotherapy and radiation  Now s/p robotic resection on 7/16    Post operatively, he had worsening leukocytosis  Imaging with a right sided loculated effusion    Now s/p jejunostomy    Now presents for fever and leukocytosis  Progression on imaging    Prior fluid analysis noted high amylase.  Fluid has not been purulent appearing and has had debris.   I discussed with CT surgery attending, there is concern for a small residual leak.  Given that the patient is stable and the leak is small, CT surgery thinks the leak may heal with time, nutrition, and antibiotic treatment    Culture sent from chronic drain.  It will be difficult to distinguish a pathogen vs a colonizer  Culture growing serratia, mssa, strep species, candida    Pleural effusion with post operative leak    Cefepime 2 gram iv q12 with fluconazole 200 mg IV daily through 10/12  Weekly cbc, CMP    Follow up at:   [address] [facility] [address]  [place], [place] [id number] [phone]  Appoitment on : 10/4 at 9:00 AM     IF pt is not improving, may need to reconsider more definitive treatment.  But he does appear stable, so reasonable to continue to observe    Will sign off  Call ID service with questions    Chicho Liu MD  Please contact via TEAM between 8 am and 6 pm    In the evening or on weekends, can contact call service at [phone]

## 2024-09-17 NOTE — DISCHARGE NOTE NURSING/CASE MANAGEMENT/SOCIAL WORK - NSSCNAMETXT_GEN_ALL_CORE
[facility] [phone] initial visit will be day after discharge home. A nurse will call prior to the home visit.   [facility] [phone] for Antiobiotics and home blood draws

## 2024-09-17 NOTE — DISCHARGE NOTE PROVIDER - DETAILS OF MALNUTRITION DIAGNOSIS/DIAGNOSES
This patient has been assessed with a concern for Malnutrition and was treated during this hospitalization for the following Nutrition diagnosis/diagnoses:     -  09/15/2024: Severe protein-calorie malnutrition   -  09/15/2024: Underweight (BMI < 19)

## 2024-09-17 NOTE — DISCHARGE NOTE PROVIDER - NSDCCPCAREPLAN_GEN_ALL_CORE_FT
PRINCIPAL DISCHARGE DIAGNOSIS  Diagnosis: Empyema  Assessment and Plan of Treatment:      PRINCIPAL DISCHARGE DIAGNOSIS  Diagnosis: Empyema  Assessment and Plan of Treatment: - Follow up with Dr. Laureano in 3 weeks [phone]. Please call the office to make an appointment.   - Have your Chest x-ray done 1-2 days prior to your appointment.    - Please bring copy of x-ray to your appointment.  - Follow up with primary care provider in one week.  - Take the prescribed pain medication ONLY as needed.  - Can use over the counter Ibuprofen & or Tylenol as directed on the bottle.   - Please take a laxative or stool softeners to help support your bowel movements while on narcotic pain medication as it can cause constipation. Laxatives & stool softeners can be available over the counter at your local pharmacy.

## 2024-09-17 NOTE — PROGRESS NOTE ADULT - ASSESSMENT
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 Worsening right-sided effusion with air fluid levels noted on CT; started on vanc/Zosyn. Patient with no abdominal complaints, tolerating TF, passing gas and having nonbloody BMs. Chest VENKATA cultures growing Serratia, abx narrowed to cefepime/fluconazole. WBC uptrending, remains afebrile, abdomen exam remains benign.    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 Worsening right-sided effusion with air fluid levels noted on CT; started on vanc/Zosyn. Patient with no abdominal complaints, tolerating TF, passing gas and having nonbloody BMs. Chest VENKATA cultures growing Serratia, abx narrowed to cefepime/fluconazole. WBC uptrending, remains afebrile, abdomen exam remains benign.    Recommendations:  - Abd exam benign  - Continue abx per ID  - Continue TF  - Global care per primary  - Surg onc signing off, please page [phone] with any questions/concerns    D Team Surgery  [id number]

## 2024-09-17 NOTE — DISCHARGE NOTE NURSING/CASE MANAGEMENT/SOCIAL WORK - PATIENT PORTAL LINK FT
You can access the FollowMyHealth Patient Portal offered by [facility] by registering at the following website: [email or web address]. By joining [email or web address]’s FollowMyHealth portal, you will also be able to view your health information using other applications (apps) compatible with our system.

## 2024-09-17 NOTE — DISCHARGE NOTE NURSING/CASE MANAGEMENT/SOCIAL WORK - NSDCVIVACCINE_GEN_ALL_CORE_FT
influenza, injectable, quadrivalent, preservative free; 20-Oct-2020 12:16; Rafa Thacker (RN); Sals[employer]Kline; [id number] (Exp. Date: 30-Jun-2021); IntraMuscular; Deltoid Right.; 0.5 milliLiter(s); VIS (VIS Published: 15-Aug-2019, VIS Presented: 20-Oct-2020);

## 2024-09-17 NOTE — DISCHARGE NOTE PROVIDER - NSDCMRMEDTOKEN_GEN_ALL_CORE_FT
amLODIPine 5 mg oral tablet: 1 tab(s) by jejunostomy tube once a day Noon. Crush, fully dissolve and put via J tube  atorvastatin 20 mg oral tablet: 1 tab(s) by jejunostomy tube once a day crush, fully dissolve and put into J tube  cefepime 2 g injection: 2 gram(s) intravenously 2 times a day last date of therapy 10/12  fluconazole 200 mg/100 mL-NaCl 0.9% intravenous solution: 200 milligram(s) intravenously once a day last date of therapy 10/12  Jardiance 25 mg oral tablet: 1 tab(s) by jejunostomy tube once a day at 12noon. crush, fully dissolve and put through J tube  metFORMIN 1000 mg oral tablet: 1 tab(s) by jejunostomy tube 2 times a day crushed and dissolved fully and given through J tube  Semglee 100 units/mL subcutaneous solution: 8 unit(s) subcutaneous once a day Take before starting tube feeds. If sugar is high, can take 10 units instead  sodium chloride: 1 gram(s) enteral 2 times a day Crush and fully dissolve salt tab and flush well. Give via J tube.  Tylenol 80 mg/0.8 mL oral liquid: 650 milligram(s) by jejunostomy tube every 6 hours as needed for pain via your J tube   amLODIPine 5 mg oral tablet: 1 tab(s) by jejunostomy tube once a day Noon. Crush, fully dissolve and put via J tube  atorvastatin 20 mg oral tablet: 1 tab(s) by jejunostomy tube once a day crush, fully dissolve and put into J tube  cefepime 2 g injection: 2 gram(s) intravenously 2 times a day last date of therapy 10/12  Jardiance 25 mg oral tablet: 1 tab(s) by jejunostomy tube once a day at 12noon. crush, fully dissolve and put through J tube  metFORMIN 1000 mg oral tablet: 1 tab(s) by jejunostomy tube 2 times a day crushed and dissolved fully and given through J tube  Semglee 100 units/mL subcutaneous solution: 8 unit(s) subcutaneous once a day Take before starting tube feeds. If sugar is high, can take 10 units instead  sodium chloride: 1 gram(s) enteral 2 times a day Crush and fully dissolve salt tab and flush well. Give via J tube.  Tylenol 80 mg/0.8 mL oral liquid: 650 milligram(s) by jejunostomy tube every 6 hours as needed for pain via your J tube   alcohol swabs: Apply topically to affected area 4 times a day  amLODIPine 5 mg oral tablet: 1 tab(s) by jejunostomy tube once a day Noon. Crush, fully dissolve and put via J tube  atorvastatin 20 mg oral tablet: 1 tab(s) by jejunostomy tube once a day crush, fully dissolve and put into J tube  cefepime 2 g injection: 2 gram(s) intravenously 2 times a day last date of therapy 10/12  Chest Xray AP/PA: Please use this prescription should you choose to go to an outpatient imaging center of your choice to have your follow up chest Xray completed. Please know that should you go to an imaging center of your choice, your chest Xray should not be completed any earlier than two days prior to the date of your follow up appointment.    Please fax results to: Dr. Laureano @ [phone]  ICD: R91.8  Insulin Pen Needles, 4mm: 1 application subcutaneously 4 times a day. ** Use with insulin pen **  lancets: 1 application subcutaneously 4 times a day  NovoLOG 100 units/mL injectable solution: 1 unit(s) injectable 3 times a day Please follow novolog sliding scale 8am, 2pm, 8pm    1 Unit(s) if Glucose 151 - 200  2 Unit(s) if Glucose 201 - 250  3 Unit(s) if Glucose 251 - 300  4 Unit(s) if Glucose 301 - 350  5 Unit(s) if Glucose 351 - 400  6 Unit(s) if Glucose Greater Than 400  Semglee 100 units/mL subcutaneous solution: 10 unit(s) subcutaneous once a day Take before starting tube feeds at 6PM  sodium chloride: 1 gram(s) enteral 2 times a day Crush and fully dissolve salt tab and flush well. Give via J tube.  Tylenol 80 mg/0.8 mL oral liquid: 650 milligram(s) by jejunostomy tube every 6 hours as needed for pain via your J tube

## 2024-09-17 NOTE — DISCHARGE NOTE PROVIDER - NSDCFUADDINST_GEN_ALL_CORE_FT
WOUND CARE:  Please keep incisions clean and dry. Please do not Scrub or rub incisions. Do not use lotion or powder on incisions  BATHING: Please do not submerge wound underwater. You may shower and/or sponge bathe 48 hours after surgery.  ACTIVITY: No heavy lifting or straining. Otherwise, you may return to your usual level of physical activity. If you are taking narcotic pain medication (such as Oxycodone) DO NOT drive a car, operate machinery or make important decisions.  DIET: Return to your usual diet.  NOTIFY YOUR SURGEON IF: You have any bleeding that does not stop, any pus draining from your wound(s), any fever (over 100.4 F) or chills, persistent nausea/vomiting, persistent diarrhea, or if your pain is not controlled on your discharge pain medications.  FOLLOW-UP: Please follow up with your primary care physician in one week regarding your hospitalization.  Please follow up with your surgeon.  Call today for appointment in 1 week.

## 2024-09-17 NOTE — DISCHARGE NOTE PROVIDER - NSDCFUSCHEDAPPT_GEN_ALL_CORE_FT
Dangelo Quintanilla  [facility] Physician [facility]  S[address]ONC [address]  Scheduled Appointment: 09/17/2024    Lewis Laureano  [facility] Physician [facility]  THORSURG NUNEZ [address]  Scheduled Appointment: 10/08/2024    Fabian Nolasco  [facility] Physician [facility]  ENDOCRIN [address]  Scheduled Appointment: 10/11/2024    Donato Mota  [facility] Physician [facility]  RADMED [address]  Scheduled Appointment: 10/16/2024     [facility]  [facility] [address]  Scheduled Appointment: 10/08/2024    Fabian Nolasco  [facility] [address] [facility] [address]  Scheduled Appointment: 10/11/2024    Donato Mota  [facility]  RADMED [address]  Scheduled Appointment: 10/16/2024

## 2024-09-17 NOTE — DISCHARGE NOTE PROVIDER - HOSPITAL COURSE
Pt is 59 yr M w/ pmh HTN, DM2 s/p Michael Broderick on 7/16/24 w/ postop course c/b high VENKATA drain output. found on EGD  to have esophago-gastric anastomotic now s/p esophageal stent placement by GI on 8/5.  Was admitted 8/19 for emesis and bloody output from VENKATA drain.  S/P EGD with GI showing small ulcerating at base of distal stent but no active bleed.  He now presents to the hospital with elevated WBC and fever/ chills x 1 day.  CT showing worsening effusion on right with multiple foci of gas and air fluid levels.   59 yr M w/ pmh HTN, DM2 s/p Michael Broderick on 7/16/24 w/ postop course c/b high VENKATA drain output. found on EGD  to have esophago-gastric anastomotic now s/p esophageal stent placement by GI on 8/5.  Was admitted 8/19 for emesis and bloody output from VENKATA drain.  S/P EGD with GI showing small ulcerating at base of distal stent but no active bleed. 9/12 GI removed esophageal stent.Pt has been on IV antibiotics with infectious disease consult following closely.9/18- No fevers. Seen by nutritional who deemed current TF regiment adequate to support pt.No new recommendations. PICC line placed. Plan home with IV antibiotics until 10/12 Pt is 59 yr M w/ pmh HTN, DM2 s/p Michael Broderick on 7/16/24 w/ postop course c/b high VENKATA drain output. found on EGD  to have esophago-gastric anastomotic now s/p esophageal stent placement by GI on 8/5.  Was admitted 8/19 for emesis and bloody output from VENKATA drain.  S/P EGD with GI showing small ulcerating at base of distal stent but no active bleed.  He now presents to the hospital with elevated WBC and fever/ chills x 1 day.  CT showing worsening effusion on right with multiple foci of gas and air fluid levels.   59 yr M w/ pmh HTN, DM2 s/p Michael Broderick on 7/16/24 w/ postop course c/b high VENKATA drain output. found on EGD  to have esophago-gastric anastomotic now s/p esophageal stent placement by GI on 8/5.  Was admitted 8/19 for emesis and bloody output from VENKATA drain.  S/P EGD with GI showing small ulcerating at base of distal stent but no active bleed. 9/12 GI removed esophageal stent.Pt has been on IV antibiotics with infectious disease consult following closely.9/18- No fevers. Seen by nutritional who deemed current TF regiment adequate to support pt. No new recommendations. PICC line placed. Plan home with IV antibiotics until 10/12 Pt is 59 yr M w/ pmh HTN, DM2 s/p Michael Broderick on 7/16/24 w/ postop course c/b high VENKATA drain output. found on EGD  to have esophago-gastric anastomotic now s/p esophageal stent placement by GI on 8/5.  Was admitted 8/19 for emesis and bloody output from VENKATA drain.  S/P EGD with GI showing small ulcerating at base of distal stent but no active bleed.  He now presents to the hospital with elevated WBC and fever/ chills x 1 day.  CT showing worsening effusion on right with multiple foci of gas and air fluid levels.   59 yr M w/ pmh HTN, DM2 s/p Michael Broderick on 7/16/24 w/ postop course c/b high VENKATA drain output. found on EGD  to have esophago-gastric anastomotic now s/p esophageal stent placement by GI on 8/5.  Was admitted 8/19 for emesis and bloody output from VENKATA drain.  S/P EGD with GI showing small ulcerating at base of distal stent but no active bleed. 9/12 GI removed esophageal stent.Pt has been on IV antibiotics with infectious disease consult following closely.9/18- No fevers. Seen by nutritional who deemed current TF regiment adequate to support pt. No new recommendations. PICC line placed. Plan home with IV antibiotics until 10/12    Upon discussion with endocrinology, decision was made to stop metformin and to instead continue Semglee and Novolog.

## 2024-09-17 NOTE — PROGRESS NOTE ADULT - NUTRITIONAL ASSESSMENT
This patient has been assessed with a concern for Malnutrition and has been determined to have a diagnosis/diagnoses of Severe protein-calorie malnutrition and Underweight (BMI < 19).    This patient is being managed with:   Diet NPO-  Tube Feeding Modality: Jejunostomy  Glucerna 1.5 Hood (GLUCERNA1.5RTH)  Total Volume for 24 Hours (mL): 1080  Continuous  Starting Tube Feed Rate {mL per Hour}: 30  Increase Tube Feed Rate by (mL): 10     Every 4 hours  Until Goal Tube Feed Rate (mL per Hour): 60  Tube Feed Duration (in Hours): 18  Tube Feed Start Time: 18:00  Tube Feed Stop Time: 12:00  Entered: Sep 12 2024  1:26AM  
This patient has been assessed with a concern for Malnutrition and has been determined to have a diagnosis/diagnoses of Severe protein-calorie malnutrition and Underweight (BMI < 19).    This patient is being managed with:   Diet NPO-  Tube Feeding Modality: Jejunostomy  Glucerna 1.5 Hood (GLUCERNA1.5RTH)  Total Volume for 24 Hours (mL): 1080  Continuous  Starting Tube Feed Rate {mL per Hour}: 30  Increase Tube Feed Rate by (mL): 10     Every 4 hours  Until Goal Tube Feed Rate (mL per Hour): 60  Tube Feed Duration (in Hours): 18  Tube Feed Start Time: 18:00  Tube Feed Stop Time: 12:00  Entered: Sep 12 2024  1:26AM

## 2024-09-18 LAB
ADD ON TEST-SPECIMEN IN LAB: SIGNIFICANT CHANGE UP
ALBUMIN SERPL ELPH-MCNC: 2.9 G/DL — LOW (ref 3.3–5)
ALP SERPL-CCNC: 126 U/L — HIGH (ref 40–120)
ALT FLD-CCNC: 10 U/L — SIGNIFICANT CHANGE UP (ref 4–41)
ANION GAP SERPL CALC-SCNC: 12 MMOL/L — SIGNIFICANT CHANGE UP (ref 7–14)
AST SERPL-CCNC: 20 U/L — SIGNIFICANT CHANGE UP (ref 4–40)
BILIRUB DIRECT SERPL-MCNC: <0.2 MG/DL — SIGNIFICANT CHANGE UP (ref 0–0.3)
BILIRUB INDIRECT FLD-MCNC: >0 MG/DL — SIGNIFICANT CHANGE UP (ref 0–1)
BILIRUB SERPL-MCNC: 0.2 MG/DL — SIGNIFICANT CHANGE UP (ref 0.2–1.2)
BUN SERPL-MCNC: 21 MG/DL — SIGNIFICANT CHANGE UP (ref 7–23)
CALCIUM SERPL-MCNC: 8.5 MG/DL — SIGNIFICANT CHANGE UP (ref 8.4–10.5)
CHLORIDE SERPL-SCNC: 99 MMOL/L — SIGNIFICANT CHANGE UP (ref 98–107)
CO2 SERPL-SCNC: 22 MMOL/L — SIGNIFICANT CHANGE UP (ref 22–31)
CREAT SERPL-MCNC: 0.48 MG/DL — LOW (ref 0.5–1.3)
EGFR: 117 ML/MIN/1.73M2 — SIGNIFICANT CHANGE UP
GLUCOSE BLDC GLUCOMTR-MCNC: 126 MG/DL — HIGH (ref 70–99)
GLUCOSE BLDC GLUCOMTR-MCNC: 145 MG/DL — HIGH (ref 70–99)
GLUCOSE BLDC GLUCOMTR-MCNC: 202 MG/DL — HIGH (ref 70–99)
GLUCOSE BLDC GLUCOMTR-MCNC: 260 MG/DL — HIGH (ref 70–99)
GLUCOSE SERPL-MCNC: 198 MG/DL — HIGH (ref 70–99)
HCT VFR BLD CALC: 25.5 % — LOW (ref 39–50)
HGB BLD-MCNC: 7.9 G/DL — LOW (ref 13–17)
MAGNESIUM SERPL-MCNC: 2.1 MG/DL — SIGNIFICANT CHANGE UP (ref 1.6–2.6)
MCHC RBC-ENTMCNC: 27.5 PG — SIGNIFICANT CHANGE UP (ref 27–34)
MCHC RBC-ENTMCNC: 31 GM/DL — LOW (ref 32–36)
MCV RBC AUTO: 88.9 FL — SIGNIFICANT CHANGE UP (ref 80–100)
NRBC # BLD: 0 /100 WBCS — SIGNIFICANT CHANGE UP (ref 0–0)
NRBC # FLD: 0 K/UL — SIGNIFICANT CHANGE UP (ref 0–0)
PHOSPHATE SERPL-MCNC: 1.9 MG/DL — LOW (ref 2.5–4.5)
PLATELET # BLD AUTO: 439 K/UL — HIGH (ref 150–400)
POTASSIUM SERPL-MCNC: 4.7 MMOL/L — SIGNIFICANT CHANGE UP (ref 3.5–5.3)
POTASSIUM SERPL-SCNC: 4.7 MMOL/L — SIGNIFICANT CHANGE UP (ref 3.5–5.3)
PREALB SERPL-MCNC: 13 MG/DL — LOW (ref 20–40)
PROT SERPL-MCNC: 7.1 G/DL — SIGNIFICANT CHANGE UP (ref 6–8.3)
RBC # BLD: 2.87 M/UL — LOW (ref 4.2–5.8)
RBC # FLD: 15.9 % — HIGH (ref 10.3–14.5)
SODIUM SERPL-SCNC: 133 MMOL/L — LOW (ref 135–145)
WBC # BLD: 13.07 K/UL — HIGH (ref 3.8–10.5)
WBC # FLD AUTO: 13.07 K/UL — HIGH (ref 3.8–10.5)

## 2024-09-18 PROCEDURE — 36573 INSJ PICC RS&I 5 YR+: CPT

## 2024-09-18 PROCEDURE — 71045 X-RAY EXAM CHEST 1 VIEW: CPT | Mod: 26,59

## 2024-09-18 RX ORDER — CEFEPIME 2 G/1
2 INJECTION, POWDER, FOR SOLUTION INTRAVENOUS
Qty: 96 | Refills: 0
Start: 2024-09-18 | End: 2024-10-11

## 2024-09-18 RX ADMIN — Medication 2500 UNIT(S): at 17:22

## 2024-09-18 RX ADMIN — METFORMIN HYDROCHLORIDE 1000 MILLIGRAM(S): 850 TABLET, FILM COATED ORAL at 18:05

## 2024-09-18 RX ADMIN — METFORMIN HYDROCHLORIDE 1000 MILLIGRAM(S): 850 TABLET, FILM COATED ORAL at 06:01

## 2024-09-18 RX ADMIN — FLUCONAZOLE 100 MILLIGRAM(S): 150 TABLET ORAL at 12:13

## 2024-09-18 RX ADMIN — AMLODIPINE BESYLATE 5 MILLIGRAM(S): 10 TABLET ORAL at 12:13

## 2024-09-18 RX ADMIN — Medication 4: at 09:06

## 2024-09-18 RX ADMIN — CEFEPIME 100 MILLIGRAM(S): 2 INJECTION, POWDER, FOR SOLUTION INTRAVENOUS at 06:00

## 2024-09-18 RX ADMIN — CEFEPIME 100 MILLIGRAM(S): 2 INJECTION, POWDER, FOR SOLUTION INTRAVENOUS at 17:22

## 2024-09-18 RX ADMIN — Medication 20 MILLIGRAM(S): at 23:42

## 2024-09-18 RX ADMIN — INSULIN GLARGINE 8 UNIT(S): 100 INJECTION, SOLUTION SUBCUTANEOUS at 23:41

## 2024-09-18 RX ADMIN — SODIUM CHLORIDE 1 GRAM(S): 9 INJECTION INTRAMUSCULAR; INTRAVENOUS; SUBCUTANEOUS at 12:13

## 2024-09-18 RX ADMIN — Medication 1: at 23:41

## 2024-09-18 RX ADMIN — Medication 1 APPLICATION(S): at 06:01

## 2024-09-18 RX ADMIN — Medication 2500 UNIT(S): at 06:02

## 2024-09-18 RX ADMIN — CHLORHEXIDINE GLUCONATE 1 APPLICATION(S): 40 SOLUTION TOPICAL at 12:13

## 2024-09-18 NOTE — CHART NOTE - NSCHARTNOTEFT_GEN_A_CORE
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 7/16/24 c/b leak s/p stent 8/5 with readmission 8/19 for increased bloody VENKATA output secondary to ulceration now readmitted for 1 day of fevers/chills and leukocytosis per chart    Diet Prescription: Diet, NPO:   Tube Feeding Modality: Jejunostomy  Glucerna 1.5 Hood (GLUCERNA1.5RTH)  Total Volume for 24 Hours (mL): 1080  Continuous  Starting Tube Feed Rate {mL per Hour}: 30  Increase Tube Feed Rate by (mL): 10     Every 4 hours  Until Goal Tube Feed Rate (mL per Hour): 60  Tube Feed Duration (in Hours): 18  Tube Feed Start Time: 18:00  Tube Feed Stop Time: 12:00 (09-12-24 @ 01:26)      Nutrition Course: Pt and daughter at bedside. Reported no issues with tube feeding tolerance. RN at bedside. Pt undergoing nursing cares pump observed off at this time per current order. Current order noted Glucerna 1.5cal @ 60ml/hr x18hrs with a total volume 1080mL, 1620kcal, 89.6 gm protein, 820ml free water from formula. This meets 27 kcal/kg, 1.5 gm protein/kg @ dosing weight 58.9 kg. Current tubefeeding order remains appropriate. to meet estimated nutritional needs. Clarified with provider regarding consult, stated concerns that pt appears to have lost weight and depleted since prev visit, would like to review formula requirements for further repletion. Clarified home regimen, Per discussion with daughter, reported plan to change home infusion from 4 to 5 bottles of Glucerna 1.5 per day once discharged, daughter stated she only recently started to provide 5 bottles as of 2 weeks ago PTA (provides 1185 mL, 1777 kcal, 98 g pro, 861 mL free water meeting 30kcal/kg and 1.6g/kg pro based on current estimated needs/dosing wt 58.9 kg) Pt denied concerns for N/V/D or abdominal pain. Last noted BM 9/16, pt reported having BM today.       Pertinent Medications: MEDICATIONS  (STANDING):  amLODIPine   Tablet 5 milliGRAM(s) Oral every 24 hours  atorvastatin 20 milliGRAM(s) Oral at bedtime  cefepime   IVPB 2000 milliGRAM(s) IV Intermittent every 12 hours  chlorhexidine 2% Cloths 1 Application(s) Topical daily  dextrose 5%. 1000 milliLiter(s) (100 mL/Hr) IV Continuous <Continuous>  dextrose 5%. 1000 milliLiter(s) (50 mL/Hr) IV Continuous <Continuous>  dextrose 50% Injectable 12.5 Gram(s) IV Push once  dextrose 50% Injectable 25 Gram(s) IV Push once  dextrose 50% Injectable 25 Gram(s) IV Push once  fluconAZOLE IVPB 200 milliGRAM(s) IV Intermittent every 24 hours  glucagon  Injectable 1 milliGRAM(s) IntraMuscular once  heparin   Injectable 2500 Unit(s) SubCutaneous every 12 hours  insulin glargine Injectable (LANTUS) 8 Unit(s) SubCutaneous at bedtime  insulin lispro (ADMELOG) corrective regimen sliding scale   SubCutaneous three times a day before meals  insulin lispro (ADMELOG) corrective regimen sliding scale   SubCutaneous at bedtime  metFORMIN 1000 milliGRAM(s) Oral two times a day  mupirocin 2% Nasal 1 Application(s) Both Nostrils every 12 hours  sodium chloride 1 Gram(s) Oral daily    MEDICATIONS  (PRN):  acetaminophen   Oral Liquid .. 650 milliGRAM(s) Enteral Tube every 6 hours PRN Temp greater or equal to 38C (100.4F), Mild Pain (1 - 3)  dextrose Oral Gel 15 Gram(s) Oral once PRN Blood Glucose LESS THAN 70 milliGRAM(s)/deciliter    Pertinent Labs: 09-18 Na133 mmol/L[L] Glu 198 mg/dL[H] K+ 4.7 mmol/L Cr  0.48 mg/dL[L] BUN 21 mg/dL 09-18 Phos 1.9 mg/dL[L] 09-18 Alb 2.9 g/dL[L] 09-18 PAB 13 mg/dL[L]    A1C with Estimated Average Glucose Result: 7.0 % (09-13-24 @ 04:30)  A1C with Estimated Average Glucose Result: 10.9 % (07-13-24 @ 02:44)    CAPILLARY BLOOD GLUCOSE  POCT Blood Glucose.: 126 mg/dL (18 Sep 2024 12:03)  POCT Blood Glucose.: 202 mg/dL (18 Sep 2024 08:10)  POCT Blood Glucose.: 98 mg/dL (17 Sep 2024 22:29)  POCT Blood Glucose.: 166 mg/dL (17 Sep 2024 17:50)    Weight: Weight (kg): 44 (09-12 @ 13:08)  Weight Assessment: N/A no updates  Height: 162.6 cm/64 in  IBW: 130lbs / 59kg +/-10%  BMI: 16.6 kg/m^2    Physical Assessment, per flowsheets:  Edema: No edema documented per RN flowsheets  Pressure Injury: No noted pressure injuries per RN flowsheets    Estimated Needs:   [X] no changes since previously estimated nutritional needs  Weight Used: 130 lb/58.9 kg  Estimated Energy Needs:  1472 1767kcal (based on 25-30 kcal/kg)  Estimated Protein Needs:  76-94 gms (based on 1.2 to1.6 gms/kg)    Previous Nutrition Diagnosis: [X] Malnutrition   Nutrition Diagnosis is [X] ongoing      New Nutrition Diagnosis: [X] not applicable     Education:  [X] Provided pt with verbal education on home tube feeding recommendations    Interventions:   1) Continue current nutrition rx  2) Recommended home regimen: Glucerna 1.5, 054lEw6 bottles, may infuse @ 75mL/hr via Pump x16hrs cyclic infusion  (provides 1185 mL, 1777 kcal, 98 g pro, 861 mL free water meeting 30kcal/kg and 1.7g/kg pro based on current estimated needs/dosing wt 58.9 kg)  3) Monitor enteral nutrition tolerance, bowel function, weights trends, poct blood glucose, lbs/electrolytes (phos, Na)    Monitor & Evaluate:  PO intake, tolerance to diet/supplement, nutrition related lab values, weight trends, BMs/GI distress, hydration status, skin integrity.    Crissy Lyons, MS, RD, CDN, CNSC pg [phone]  Also available on [employer] Teams

## 2024-09-18 NOTE — CHART NOTE - NSCHARTNOTEFT_GEN_A_CORE
PRE-INTERVENTIONAL RADIOLOGY PROCEDURE NOTE      Patient Age: 59    Patient Gender: male    Procedure: picc line- approved by Edwin Devlin    Diagnosis/Indication: esophageal perf    Interventional Radiology Attending Physician:    Ordering Attending Physician: Dr. Laureano    Pertinent Medical History: s/p Esophageal perf s/p esophagectomy    Pertinent labs:                      7.9    13.07 )-----------( 439      ( 18 Sep 2024 07:26 )             25.5       09-18    133[L]  |  99  |  21  ----------------------------<  198[H]  4.7   |  22  |  0.48[L]    Ca    8.5      18 Sep 2024 07:26  Phos  1.9     09-18  Mg     2.10     09-18    TPro  7.1  /  Alb  2.9[L]  /  TBili  0.2  /  DBili  <0.2  /  AST  20  /  ALT  10  /  AlkPhos  126[H]  09-18              Patient and Family Aware ? Yes

## 2024-09-19 VITALS
TEMPERATURE: 98 F | OXYGEN SATURATION: 100 % | HEART RATE: 100 BPM | RESPIRATION RATE: 18 BRPM | SYSTOLIC BLOOD PRESSURE: 107 MMHG | DIASTOLIC BLOOD PRESSURE: 63 MMHG

## 2024-09-19 LAB
-  FLUCONAZOLE: SIGNIFICANT CHANGE UP
ANION GAP SERPL CALC-SCNC: 12 MMOL/L — SIGNIFICANT CHANGE UP (ref 7–14)
BUN SERPL-MCNC: 20 MG/DL — SIGNIFICANT CHANGE UP (ref 7–23)
CALCIUM SERPL-MCNC: 8.3 MG/DL — LOW (ref 8.4–10.5)
CHLORIDE SERPL-SCNC: 97 MMOL/L — LOW (ref 98–107)
CO2 SERPL-SCNC: 24 MMOL/L — SIGNIFICANT CHANGE UP (ref 22–31)
CREAT SERPL-MCNC: 0.5 MG/DL — SIGNIFICANT CHANGE UP (ref 0.5–1.3)
CULTURE RESULTS: ABNORMAL
EGFR: 116 ML/MIN/1.73M2 — SIGNIFICANT CHANGE UP
GLUCOSE BLDC GLUCOMTR-MCNC: 140 MG/DL — HIGH (ref 70–99)
GLUCOSE BLDC GLUCOMTR-MCNC: 211 MG/DL — HIGH (ref 70–99)
GLUCOSE BLDC GLUCOMTR-MCNC: 273 MG/DL — HIGH (ref 70–99)
GLUCOSE SERPL-MCNC: 213 MG/DL — HIGH (ref 70–99)
HCT VFR BLD CALC: 26 % — LOW (ref 39–50)
HGB BLD-MCNC: 8.3 G/DL — LOW (ref 13–17)
MCHC RBC-ENTMCNC: 28.6 PG — SIGNIFICANT CHANGE UP (ref 27–34)
MCHC RBC-ENTMCNC: 31.9 GM/DL — LOW (ref 32–36)
MCV RBC AUTO: 89.7 FL — SIGNIFICANT CHANGE UP (ref 80–100)
METHOD TYPE: SIGNIFICANT CHANGE UP
NRBC # BLD: 0 /100 WBCS — SIGNIFICANT CHANGE UP (ref 0–0)
NRBC # FLD: 0 K/UL — SIGNIFICANT CHANGE UP (ref 0–0)
ORGANISM # SPEC MICROSCOPIC CNT: ABNORMAL
PLATELET # BLD AUTO: 458 K/UL — HIGH (ref 150–400)
POTASSIUM SERPL-MCNC: 4.4 MMOL/L — SIGNIFICANT CHANGE UP (ref 3.5–5.3)
POTASSIUM SERPL-SCNC: 4.4 MMOL/L — SIGNIFICANT CHANGE UP (ref 3.5–5.3)
RBC # BLD: 2.9 M/UL — LOW (ref 4.2–5.8)
RBC # FLD: 16.2 % — HIGH (ref 10.3–14.5)
SODIUM SERPL-SCNC: 133 MMOL/L — LOW (ref 135–145)
SPECIMEN SOURCE: SIGNIFICANT CHANGE UP
WBC # BLD: 10.54 K/UL — HIGH (ref 3.8–10.5)
WBC # FLD AUTO: 10.54 K/UL — HIGH (ref 3.8–10.5)

## 2024-09-19 PROCEDURE — 71045 X-RAY EXAM CHEST 1 VIEW: CPT | Mod: 26

## 2024-09-19 RX ORDER — FLUCONAZOLE 150 MG/1
200 TABLET ORAL
Qty: 4800 | Refills: 0
Start: 2024-09-19 | End: 2024-10-12

## 2024-09-19 RX ORDER — INSULIN ASPART 100 [IU]/ML
1 INJECTION, SOLUTION INTRAVENOUS; SUBCUTANEOUS
Qty: 1 | Refills: 0
Start: 2024-09-19 | End: 2024-10-18

## 2024-09-19 RX ORDER — BENZOCAINE .06; .7 ML/1; ML/1
0 SWAB TOPICAL
Qty: 100 | Refills: 1
Start: 2024-09-19

## 2024-09-19 RX ORDER — INSULIN ASPART 100 [IU]/ML
1 INJECTION, SOLUTION INTRAVENOUS; SUBCUTANEOUS
Qty: 30 | Refills: 0
Start: 2024-09-19 | End: 2024-10-18

## 2024-09-19 RX ADMIN — Medication 2500 UNIT(S): at 05:32

## 2024-09-19 RX ADMIN — Medication 4: at 08:59

## 2024-09-19 RX ADMIN — CHLORHEXIDINE GLUCONATE 1 APPLICATION(S): 40 SOLUTION TOPICAL at 13:22

## 2024-09-19 RX ADMIN — FLUCONAZOLE 100 MILLIGRAM(S): 150 TABLET ORAL at 13:26

## 2024-09-19 RX ADMIN — CEFEPIME 100 MILLIGRAM(S): 2 INJECTION, POWDER, FOR SOLUTION INTRAVENOUS at 05:32

## 2024-09-19 RX ADMIN — AMLODIPINE BESYLATE 5 MILLIGRAM(S): 10 TABLET ORAL at 13:04

## 2024-09-19 RX ADMIN — SODIUM CHLORIDE 1 GRAM(S): 9 INJECTION INTRAMUSCULAR; INTRAVENOUS; SUBCUTANEOUS at 13:03

## 2024-09-19 RX ADMIN — Medication 1 APPLICATION(S): at 05:31

## 2024-09-19 RX ADMIN — METFORMIN HYDROCHLORIDE 1000 MILLIGRAM(S): 850 TABLET, FILM COATED ORAL at 05:32

## 2024-09-19 NOTE — PROGRESS NOTE ADULT - PROVIDER SPECIALTY LIST ADULT
CT Surgery
Cardiology
Gastroenterology
Infectious Disease
Surgery
Surgery
Thoracic Surgery
Anesthesia
Cardiology
Infectious Disease
Thoracic Surgery
Thoracic Surgery
Infectious Disease

## 2024-09-19 NOTE — PROGRESS NOTE ADULT - SUBJECTIVE AND OBJECTIVE BOX
59y Male s/p EGD with stent removal  under  GA    T(C): 36.9 (09-13-24 @ 09:38), Max: 36.9 (09-12-24 @ 10:50)  HR: 97 (09-13-24 @ 09:38) (72 - 105)  BP: 119/64 (09-13-24 @ 09:38) (100/62 - 132/71)  RR: 18 (09-13-24 @ 09:38) (17 - 20)  SpO2: 96% (09-13-24 @ 09:38) (93% - 100%)  Wt(kg): --    Pt doing well, no anesthesia complications or complaints noted or reported.   No Nausea  Pain well controlled
Cardiology    HISTORY OF PRESENT ILLNESS: HPI:  59 yr M w/ pmh HTN, DM2 s/p Michael Broderick on 7/16/24 w/ postop course c/b high VENKATA drain output. found on EGD  to have esophago-gastric anastomotic now s/p esophageal stent placement by GI on 8/5.  Was admitted 8/19 for emesis and bloody output from VENKATA drain.  S/P EGD with GI showing small ulcerating at base of distal stent but no active bleed.  He now presents to the hospital with elevated WBC and fever/ chills x 1 day.  CT showing worsening effusion on right with multiple foci of gas and air fluid levels.     (12 Sep 2024 01:32)    Blood pressure stable, 110s-130s.  no angina or palpitations, no lightheadedness or fainting.  had fever, has new pleural effusion. a 10 pt ROS is otherwise negative.    9/13- status post esophageal stent removal and empyema drainage for right lung.  feeling well, in good spirits today. no angina palpitations or SOB, but every day asks when he will be able to resume solid food.    9/14 pt in no distress, no events overnight  Date of service 9/15 pt offers no complaints     PAST MEDICAL & SURGICAL HISTORY:  HLD (hyperlipidemia)  Insulin dependent type 2 diabetes mellitus  BPH (benign prostatic hyperplasia)  HTN (hypertension)  Gastroesophageal cancer  Gastroesophageal cancer  Port-A-Cath in place          acetaminophen   Oral Liquid .. 650 milliGRAM(s) Enteral Tube every 6 hours PRN  amLODIPine   Tablet 5 milliGRAM(s) Oral every 24 hours  atorvastatin 20 milliGRAM(s) Oral at bedtime  dextrose 5%. 1000 milliLiter(s) IV Continuous <Continuous>  dextrose 5%. 1000 milliLiter(s) IV Continuous <Continuous>  dextrose 50% Injectable 25 Gram(s) IV Push once  dextrose 50% Injectable 12.5 Gram(s) IV Push once  dextrose 50% Injectable 25 Gram(s) IV Push once  dextrose Oral Gel 15 Gram(s) Oral once PRN  glucagon  Injectable 1 milliGRAM(s) IntraMuscular once  heparin   Injectable 2500 Unit(s) SubCutaneous every 12 hours  insulin glargine Injectable (LANTUS) 8 Unit(s) SubCutaneous at bedtime  insulin lispro (ADMELOG) corrective regimen sliding scale   SubCutaneous at bedtime  insulin lispro (ADMELOG) corrective regimen sliding scale   SubCutaneous three times a day before meals  metFORMIN 1000 milliGRAM(s) Oral two times a day  piperacillin/tazobactam IVPB.. 3.375 Gram(s) IV Intermittent every 8 hours  potassium phosphate / sodium phosphate Powder (PHOS-NaK) 1 Packet(s) Oral once  sodium chloride 1 Gram(s) Oral daily                            8.0    7.95  )-----------( 508      ( 15 Sep 2024 06:02 )             25.8       Hemoglobin: 8.0 g/dL (09-15 @ 06:02)  Hemoglobin: 7.7 g/dL (09-14 @ 05:45)  Hemoglobin: 8.2 g/dL (09-13 @ 04:30)  Hemoglobin: 8.3 g/dL (09-12 @ 07:01)  Hemoglobin: 7.7 g/dL (09-11 @ 15:20)      09-15    132<L>  |  98  |  19  ----------------------------<  236<H>  3.7   |  24  |  0.54    Ca    8.5      15 Sep 2024 06:02  Phos  2.4     09-15  Mg     2.00     09-15      Creatinine Trend: 0.54<--, 0.66<--, 0.84<--, 0.65<--, 0.65<--, 0.59<--    COAGS:           T(C): 36.5 (09-15-24 @ 09:35), Max: 36.6 (09-15-24 @ 05:42)  HR: 99 (09-15-24 @ 09:35) (94 - 102)  BP: 118/71 (09-15-24 @ 09:35) (98/59 - 125/69)  RR: 18 (09-15-24 @ 09:35) (17 - 18)  SpO2: 96% (09-15-24 @ 09:35) (96% - 99%)  Wt(kg): --    I&O's Summary    14 Sep 2024 07:01  -  15 Sep 2024 07:00  --------------------------------------------------------  IN: 1760 mL / OUT: 530 mL / NET: 1230 mL    15 Sep 2024 07:01  -  15 Sep 2024 10:45  --------------------------------------------------------  IN: 120 mL / OUT: 90 mL / NET: 30 mL         Appearance: thin frail adult male in no acute distress	  HEENT:   Normal oral mucosa, PERRL, EOMI	  Lymphatic: No lymphadenopathy , no edema  Cardiovascular: Normal S1 S2, No JVD, No murmurs , Peripheral pulses palpable 2+ bilaterally  Respiratory: Lungs clear to auscultation, normal effort 	  Gastrointestinal:  Soft, Non-tender, + BS	  Skin: No rashes, No ecchymoses, No cyanosis, warm to touch  Musculoskeletal: Normal range of motion, normal strength.  thin/frail build.  Psychiatry:  Mood & affect appropriate    TELEMETRY: NSR	    ECG:  NSR  Echo:  < from: TTE W or WO Ultrasound Enhancing Agent (07.20.24 @ 10:51) >  FINDINGS:     Left Ventricle:  The left ventricular cavity is normal in size. Left ventricular wall thickness is normal. Left ventricular systolic function is normal with an ejection fraction visually estimated at 60%. There are no regional wall motion abnormalities seen. Left ventricular filling pressures are normal. Analysis of left ventricular diastolic function and filling pressure is made challenging by the presence of tachycardia.     Right Ventricle:  The right ventricular cavity is normal in size and right ventricular systolic function is normal. Tricuspid annular plane systolic excursion (TAPSE) is 2.7 cm (normal >=1.7 cm).     Left Atrium:  The left atriumis normal in size with an indexed volume of 16.65 ml/m².     Right Atrium:  The right atrium is normal in size with an indexed volume of 12.03 ml/m².     Interatrial Septum:  There is no evidence of a patent foramen ovale with no shunt detected by color flow Doppler.     Aortic Valve:  There is moderate calcification of the aortic valve leaflets.     Mitral Valve:  There is no mitral valve stenosis. There is mild mitral regurgitation.     Tricuspid Valve:  Normal tricuspid valve.     Pulmonic Valve:  Normal pulmonic valve. There is trace pulmonic regurgitation.     Aorta:  The aortic arch is not well visualized. The aortic root appears normal in size. The aortic root at the sinuses of Valsalva is normal in size, measuring 3.10 cm (indexed 1.96 cm/m²). The ascending aorta diameter is normal in size, measuring 3.40 cm (indexed 2.15 cm/m²).     Pericardium:  No pericardial effusion seen.     Pleura:  Left pleural effusion noted.     Systemic Veins:  The inferior vena cava is normal in size (normal <2.1cm) with normal inspiratory collapse (normal >50%) consistent with normal right atrial pressure (~3, range 0-5mmHg).    < end of copied text >    ASSESSMENT/PLAN: Mr Ho is a pleasant 59y Male known from recent prior admissions, managing sequelae after esophagectomy.  s/p esophageal stent removal +/- drainage of empyema.  Can continue amlodipine for hypertension, or hold it if necessary.  Will follow with you, and advise as needed.  Expect he will be stable from a CV perspective, with reassuring echocardiogram in recent weeks.  No new complaints and no new CV testing planned.   
Cardiology    HISTORY OF PRESENT ILLNESS: HPI:  59 yr M w/ pmh HTN, DM2 s/p Michael Broderick on 7/16/24 w/ postop course c/b high VENKATA drain output. found on EGD  to have esophago-gastric anastomotic now s/p esophageal stent placement by GI on 8/5.  Was admitted 8/19 for emesis and bloody output from VENKATA drain.  S/P EGD with GI showing small ulcerating at base of distal stent but no active bleed.  He now presents to the hospital with elevated WBC and fever/ chills x 1 day.  CT showing worsening effusion on right with multiple foci of gas and air fluid levels.     (12 Sep 2024 01:32)    Blood pressure stable, 110s-130s.  no angina or palpitations, no lightheadedness or fainting.  had fever, has new pleural effusion. a 10 pt ROS is otherwise negative.    9/13- status post esophageal stent removal and empyema drainage for right lung.  feeling well, in good spirits today. no angina palpitations or SOB, but every day asks when he will be able to resume solid food.  Date of service  9/14 pt in no distress, no events overnight      PAST MEDICAL & SURGICAL HISTORY:  HLD (hyperlipidemia)  Insulin dependent type 2 diabetes mellitus  BPH (benign prostatic hyperplasia)  HTN (hypertension)  Gastroesophageal cancer  Gastroesophageal cancer  Port-A-Cath in place          acetaminophen   Oral Liquid .. 650 milliGRAM(s) Enteral Tube every 6 hours PRN  amLODIPine   Tablet 5 milliGRAM(s) Oral every 24 hours  atorvastatin 20 milliGRAM(s) Oral at bedtime  dextrose 5%. 1000 milliLiter(s) IV Continuous <Continuous>  dextrose 5%. 1000 milliLiter(s) IV Continuous <Continuous>  dextrose 50% Injectable 12.5 Gram(s) IV Push once  dextrose 50% Injectable 25 Gram(s) IV Push once  dextrose 50% Injectable 25 Gram(s) IV Push once  dextrose Oral Gel 15 Gram(s) Oral once PRN  glucagon  Injectable 1 milliGRAM(s) IntraMuscular once  heparin   Injectable 5000 Unit(s) SubCutaneous every 12 hours  insulin glargine Injectable (LANTUS) 8 Unit(s) SubCutaneous at bedtime  insulin lispro (ADMELOG) corrective regimen sliding scale   SubCutaneous three times a day before meals  insulin lispro (ADMELOG) corrective regimen sliding scale   SubCutaneous at bedtime  metFORMIN 1000 milliGRAM(s) Oral two times a day  piperacillin/tazobactam IVPB.. 3.375 Gram(s) IV Intermittent every 8 hours  sodium chloride 1 Gram(s) Oral daily  vancomycin  IVPB 750 milliGRAM(s) IV Intermittent every 12 hours                            7.7    10.29 )-----------( 522      ( 14 Sep 2024 05:45 )             24.7       Hemoglobin: 7.7 g/dL (09-14 @ 05:45)  Hemoglobin: 8.2 g/dL (09-13 @ 04:30)  Hemoglobin: 8.3 g/dL (09-12 @ 07:01)  Hemoglobin: 7.7 g/dL (09-11 @ 15:20)  Hemoglobin: 8.6 g/dL (09-10 @ 14:26)      09-14    134<L>  |  96<L>  |  27<H>  ----------------------------<  220<H>  3.8   |  26  |  0.66    Ca    8.5      14 Sep 2024 05:45  Phos  2.8     09-14  Mg     2.10     09-14      Creatinine Trend: 0.66<--, 0.84<--, 0.65<--, 0.65<--, 0.59<--, 0.73<--    COAGS:           T(C): 36.6 (09-14-24 @ 08:41), Max: 36.8 (09-13-24 @ 12:26)  HR: 95 (09-14-24 @ 08:41) (87 - 96)  BP: 114/61 (09-14-24 @ 08:41) (112/60 - 128/67)  RR: 18 (09-14-24 @ 08:41) (18 - 18)  SpO2: 96% (09-14-24 @ 08:41) (95% - 98%)  Wt(kg): --    I&O's Summary    13 Sep 2024 07:01  -  14 Sep 2024 07:00  --------------------------------------------------------  IN: 1360 mL / OUT: 240 mL / NET: 1120 mL    14 Sep 2024 07:01  -  14 Sep 2024 11:03  --------------------------------------------------------  IN: 320 mL / OUT: 30 mL / NET: 290 mL      Appearance: thin frail adult male in no acute distress	  HEENT:   Normal oral mucosa, PERRL, EOMI	  Lymphatic: No lymphadenopathy , no edema  Cardiovascular: Normal S1 S2, No JVD, No murmurs , Peripheral pulses palpable 2+ bilaterally  Respiratory: Lungs clear to auscultation, normal effort 	  Gastrointestinal:  Soft, Non-tender, + BS	  Skin: No rashes, No ecchymoses, No cyanosis, warm to touch  Musculoskeletal: Normal range of motion, normal strength.  thin/frail build.  Psychiatry:  Mood & affect appropriate    TELEMETRY: NSR	    ECG:  NSR  Echo:  < from: TTE W or WO Ultrasound Enhancing Agent (07.20.24 @ 10:51) >  FINDINGS:     Left Ventricle:  The left ventricular cavity is normal in size. Left ventricular wall thickness is normal. Left ventricular systolic function is normal with an ejection fraction visually estimated at 60%. There are no regional wall motion abnormalities seen. Left ventricular filling pressures are normal. Analysis of left ventricular diastolic function and filling pressure is made challenging by the presence of tachycardia.     Right Ventricle:  The right ventricular cavity is normal in size and right ventricular systolic function is normal. Tricuspid annular plane systolic excursion (TAPSE) is 2.7 cm (normal >=1.7 cm).     Left Atrium:  The left atriumis normal in size with an indexed volume of 16.65 ml/m².     Right Atrium:  The right atrium is normal in size with an indexed volume of 12.03 ml/m².     Interatrial Septum:  There is no evidence of a patent foramen ovale with no shunt detected by color flow Doppler.     Aortic Valve:  There is moderate calcification of the aortic valve leaflets.     Mitral Valve:  There is no mitral valve stenosis. There is mild mitral regurgitation.     Tricuspid Valve:  Normal tricuspid valve.     Pulmonic Valve:  Normal pulmonic valve. There is trace pulmonic regurgitation.     Aorta:  The aortic arch is not well visualized. The aortic root appears normal in size. The aortic root at the sinuses of Valsalva is normal in size, measuring 3.10 cm (indexed 1.96 cm/m²). The ascending aorta diameter is normal in size, measuring 3.40 cm (indexed 2.15 cm/m²).     Pericardium:  No pericardial effusion seen.     Pleura:  Left pleural effusion noted.     Systemic Veins:  The inferior vena cava is normal in size (normal <2.1cm) with normal inspiratory collapse (normal >50%) consistent with normal right atrial pressure (~3, range 0-5mmHg).    < end of copied text >    ASSESSMENT/PLAN: Mr Ho is a pleasant 59y Male known from recent prior admissions, managing sequelae after esophagectomy.  s/p esophageal stent removal +/- drainage of empyema.  Can continue amlodipine for hypertension, or hold it if necessary.  Will follow with you, and advise as needed.  Expect he will be stable from a CV perspective, with reassuring echocardiogram in recent weeks.  No new complaints and no new CV testing planned.   
Cardiology    HISTORY OF PRESENT ILLNESS: HPI:  59 yr M w/ pmh HTN, DM2 s/p Michael Broderick on 7/16/24 w/ postop course c/b high VENKATA drain output. found on EGD  to have esophago-gastric anastomotic now s/p esophageal stent placement by GI on 8/5.  Was admitted 8/19 for emesis and bloody output from VENKATA drain.  S/P EGD with GI showing small ulcerating at base of distal stent but no active bleed.  He now presents to the hospital with elevated WBC and fever/ chills x 1 day.  CT showing worsening effusion on right with multiple foci of gas and air fluid levels.     (12 Sep 2024 01:32)    Blood pressure stable, 110s-130s.  no angina or palpitations, no lightheadedness or fainting.  had fever, has new pleural effusion. a 10 pt ROS is otherwise negative.    Date of service 9/13- status post esophageal stent removal and empyema drainage for right lung.  feeling well, in good spirits today. no angina palpitations or SOB, but every day asks when he will be able to resume solid food.    PAST MEDICAL & SURGICAL HISTORY:  HLD (hyperlipidemia)  Insulin dependent type 2 diabetes mellitus  BPH (benign prostatic hyperplasia)  HTN (hypertension)  Gastroesophageal cancer  Gastroesophageal cancer  Port-A-Cath in place    acetaminophen   Oral Liquid .. 650 milliGRAM(s) Enteral Tube every 6 hours PRN  amLODIPine   Tablet 5 milliGRAM(s) Oral every 24 hours  atorvastatin 20 milliGRAM(s) Oral at bedtime  dextrose 5%. 1000 milliLiter(s) IV Continuous <Continuous>  dextrose 5%. 1000 milliLiter(s) IV Continuous <Continuous>  dextrose 50% Injectable 25 Gram(s) IV Push once  dextrose 50% Injectable 12.5 Gram(s) IV Push once  dextrose 50% Injectable 25 Gram(s) IV Push once  dextrose Oral Gel 15 Gram(s) Oral once PRN  glucagon  Injectable 1 milliGRAM(s) IntraMuscular once  heparin   Injectable 5000 Unit(s) SubCutaneous every 12 hours  insulin glargine Injectable (LANTUS) 8 Unit(s) SubCutaneous at bedtime  insulin lispro (ADMELOG) corrective regimen sliding scale   SubCutaneous at bedtime  insulin lispro (ADMELOG) corrective regimen sliding scale   SubCutaneous three times a day before meals  metFORMIN 1000 milliGRAM(s) Oral two times a day  piperacillin/tazobactam IVPB.. 3.375 Gram(s) IV Intermittent every 8 hours  sodium chloride 1 Gram(s) Oral daily  vancomycin  IVPB 750 milliGRAM(s) IV Intermittent every 12 hours                            8.2    12.39 )-----------( 556      ( 13 Sep 2024 04:30 )             26.5       09-13    133<L>  |  94<L>  |  33<H>  ----------------------------<  287<H>  5.0   |  25  |  0.84    Ca    8.3<L>      13 Sep 2024 04:30  Phos  5.3     09-13  Mg     2.40     09-13    TPro  7.5  /  Alb  2.9<L>  /  TBili  0.4  /  DBili  x   /  AST  16  /  ALT  13  /  AlkPhos  190<H>  09-11    T(C): 36.9 (09-13-24 @ 09:38), Max: 36.9 (09-12-24 @ 10:50)  HR: 97 (09-13-24 @ 09:38) (72 - 105)  BP: 119/64 (09-13-24 @ 09:38) (100/62 - 132/71)  RR: 18 (09-13-24 @ 09:38) (17 - 20)  SpO2: 96% (09-13-24 @ 09:38) (93% - 100%)  Wt(kg): --    I&O's Summary    12 Sep 2024 07:01  -  13 Sep 2024 07:00  --------------------------------------------------------  IN: 1340 mL / OUT: 1295 mL / NET: 45 mL    13 Sep 2024 07:01  -  13 Sep 2024 10:43  --------------------------------------------------------  IN: 120 mL / OUT: 80 mL / NET: 40 mL    Appearance: thin frail adult male in no acute distress	  HEENT:   Normal oral mucosa, PERRL, EOMI	  Lymphatic: No lymphadenopathy , no edema  Cardiovascular: Normal S1 S2, No JVD, No murmurs , Peripheral pulses palpable 2+ bilaterally  Respiratory: Lungs clear to auscultation, normal effort 	  Gastrointestinal:  Soft, Non-tender, + BS	  Skin: No rashes, No ecchymoses, No cyanosis, warm to touch  Musculoskeletal: Normal range of motion, normal strength.  thin/frail build.  Psychiatry:  Mood & affect appropriate    TELEMETRY: NSR	    ECG:  NSR  Echo:  < from: TTE W or WO Ultrasound Enhancing Agent (07.20.24 @ 10:51) >  FINDINGS:     Left Ventricle:  The left ventricular cavity is normal in size. Left ventricular wall thickness is normal. Left ventricular systolic function is normal with an ejection fraction visually estimated at 60%. There are no regional wall motion abnormalities seen. Left ventricular filling pressures are normal. Analysis of left ventricular diastolic function and filling pressure is made challenging by the presence of tachycardia.     Right Ventricle:  The right ventricular cavity is normal in size and right ventricular systolic function is normal. Tricuspid annular plane systolic excursion (TAPSE) is 2.7 cm (normal >=1.7 cm).     Left Atrium:  The left atriumis normal in size with an indexed volume of 16.65 ml/m².     Right Atrium:  The right atrium is normal in size with an indexed volume of 12.03 ml/m².     Interatrial Septum:  There is no evidence of a patent foramen ovale with no shunt detected by color flow Doppler.     Aortic Valve:  There is moderate calcification of the aortic valve leaflets.     Mitral Valve:  There is no mitral valve stenosis. There is mild mitral regurgitation.     Tricuspid Valve:  Normal tricuspid valve.     Pulmonic Valve:  Normal pulmonic valve. There is trace pulmonic regurgitation.     Aorta:  The aortic arch is not well visualized. The aortic root appears normal in size. The aortic root at the sinuses of Valsalva is normal in size, measuring 3.10 cm (indexed 1.96 cm/m²). The ascending aorta diameter is normal in size, measuring 3.40 cm (indexed 2.15 cm/m²).     Pericardium:  No pericardial effusion seen.     Pleura:  Left pleural effusion noted.     Systemic Veins:  The inferior vena cava is normal in size (normal <2.1cm) with normal inspiratory collapse (normal >50%) consistent with normal right atrial pressure (~3, range 0-5mmHg).    < end of copied text >    ASSESSMENT/PLAN: Mr Ho is a pleasant 59y Male known from recent prior admissions, managing sequelae after esophagectomy.  s/p esophageal stent removal +/- drainage of empyema.  Can continue amlodipine for hypertension, or hold it if necessary.  Will follow with you, and advise as needed.  Expect he will be stable from a CV perspective, with reassuring echocardiogram in recent weeks.  No new complaints and no new CV testing planned.      Terry Flynn M.D.  Cardiac Electrophysiology    office [phone]  pager [phone]
DATE OF SERVICE: 09-17-24    Patient denies chest pain or shortness of breath.   Review of symptoms otherwise negative.    MEDICATIONS:  acetaminophen   Oral Liquid .. 650 milliGRAM(s) Enteral Tube every 6 hours PRN  amLODIPine   Tablet 5 milliGRAM(s) Oral every 24 hours  atorvastatin 20 milliGRAM(s) Oral at bedtime  cefepime   IVPB 2000 milliGRAM(s) IV Intermittent every 12 hours  chlorhexidine 2% Cloths 1 Application(s) Topical daily  dextrose 5%. 1000 milliLiter(s) IV Continuous <Continuous>  dextrose 5%. 1000 milliLiter(s) IV Continuous <Continuous>  dextrose 50% Injectable 12.5 Gram(s) IV Push once  dextrose 50% Injectable 25 Gram(s) IV Push once  dextrose 50% Injectable 25 Gram(s) IV Push once  dextrose Oral Gel 15 Gram(s) Oral once PRN  fluconAZOLE IVPB 200 milliGRAM(s) IV Intermittent every 24 hours  glucagon  Injectable 1 milliGRAM(s) IntraMuscular once  heparin   Injectable 2500 Unit(s) SubCutaneous every 12 hours  insulin glargine Injectable (LANTUS) 8 Unit(s) SubCutaneous at bedtime  insulin lispro (ADMELOG) corrective regimen sliding scale   SubCutaneous at bedtime  insulin lispro (ADMELOG) corrective regimen sliding scale   SubCutaneous three times a day before meals  metFORMIN 1000 milliGRAM(s) Oral two times a day  mupirocin 2% Nasal 1 Application(s) Both Nostrils every 12 hours  sodium chloride 1 Gram(s) Oral daily      LABS:                        7.9    15.26 )-----------( 452      ( 17 Sep 2024 06:38 )             25.0     Hemoglobin: 7.9 g/dL (09-17 @ 06:38)  Hemoglobin: 8.3 g/dL (09-16 @ 07:40)  Hemoglobin: 8.0 g/dL (09-15 @ 06:02)  Hemoglobin: 7.7 g/dL (09-14 @ 05:45)  Hemoglobin: 8.2 g/dL (09-13 @ 04:30)    09-17    135  |  100  |  18  ----------------------------<  217[H]  4.1   |  23  |  0.49[L]    Ca    8.3[L]      17 Sep 2024 06:38    Creatinine Trend: 0.49<--, 0.53<--, 0.54<--, 0.66<--, 0.84<--, 0.65<--    PHYSICAL EXAM:  T(C): 36.6 (09-17-24 @ 08:15), Max: 37 (09-16-24 @ 20:28)  HR: 102 (09-17-24 @ 08:15) (97 - 108)  BP: 118/67 (09-17-24 @ 08:15) (108/56 - 128/69)  RR: 18 (09-17-24 @ 08:15) (18 - 18)  SpO2: 97% (09-17-24 @ 08:15) (96% - 99%)  Wt(kg): --    I&O's Summary    16 Sep 2024 07:01  -  17 Sep 2024 07:00  --------------------------------------------------------  IN: 1820 mL / OUT: 930 mL / NET: 890 mL    17 Sep 2024 07:01  -  17 Sep 2024 12:39  --------------------------------------------------------  IN: 600 mL / OUT: 720 mL / NET: -120 mL    Appearance: thin frail adult male in no acute distress	  HEENT:   Normal oral mucosa, PERRL, EOMI	  Lymphatic: No lymphadenopathy , no edema  Cardiovascular: Normal S1 S2, No JVD, No murmurs , Peripheral pulses palpable 2+ bilaterally  Respiratory: Lungs clear to auscultation, normal effort 	  Gastrointestinal:  Soft, Non-tender, + BS	  Skin: No rashes, No ecchymoses, No cyanosis, warm to touch  Musculoskeletal: Normal range of motion, normal strength.  thin/frail build.  Psychiatry:  Mood & affect appropriate    TELEMETRY: NSR  	    ECG:  NSR      TTE 07/2024  Findings:  1. Normal left ventricular size and function.Mild diastolic dysfunction.  2. Normal left atrial size  3. Right atrial cavity is normal in size.  4. Normal right ventricular size and function.  5. Normal trileaflet aortic valve opening.  6. Normal mitral valve opening.  7. Normal appearing tricuspid valve with mild tricuspid regurgitation.  8. Pulmonic valve is grossly normal, yet poorly visualized with no doppler evidence for pulmonic stenosis.  9. No evidence of significant pericardial effusion.  10. The aortic root is normal.  11. Normal pulmonary artery.  12. IVC is normal with respiratory variation.  FULL STUDY DONE INCLUDING M-MODE RECORDING,  SPECTRAL DOPPLER AND    Stress 07/2024  Normal myocardial perfusion SPECT images No evidence of stress induced ischemia or infarction.  Normal left ventricular size and function.  Calculated EF is: 68%    Pt is a 59 yr M w/ pmh HTN, DM2, GEJ adenocarcinoma (T3N0, stage 2A) s/p neoadjuvant chemotherapy s/p Michael Broderick on 7/16/24 w/ postop course c/b high VENKATA drain output found on EGD  to have esophago-gastric anastomotic leak now s/p esophageal stent placement by GI on 8/5, now readmitted for 1 day of fevers/chills and leukocytosis. Worsening right-sided effusion with air fluid levels noted on CT    - Worsening right-sided effusion with air fluid levels noted on CT, ABX per surgery  - s/p esophageal stent removal +/- drainage of empyema.  - Can continue amlodipine for hypertension for now if OK to give meds via J Tube    Mart Mason MD  Pager: [phone]   
DATE OF SERVICE: 09-18-24    No overnight events, resting in bed    MEDICATIONS:  acetaminophen   Oral Liquid .. 650 milliGRAM(s) Enteral Tube every 6 hours PRN  amLODIPine   Tablet 5 milliGRAM(s) Oral every 24 hours  atorvastatin 20 milliGRAM(s) Oral at bedtime  cefepime   IVPB 2000 milliGRAM(s) IV Intermittent every 12 hours  chlorhexidine 2% Cloths 1 Application(s) Topical daily  dextrose 5%. 1000 milliLiter(s) IV Continuous <Continuous>  dextrose 5%. 1000 milliLiter(s) IV Continuous <Continuous>  dextrose 50% Injectable 25 Gram(s) IV Push once  dextrose 50% Injectable 12.5 Gram(s) IV Push once  dextrose 50% Injectable 25 Gram(s) IV Push once  dextrose Oral Gel 15 Gram(s) Oral once PRN  fluconAZOLE IVPB 200 milliGRAM(s) IV Intermittent every 24 hours  glucagon  Injectable 1 milliGRAM(s) IntraMuscular once  heparin   Injectable 2500 Unit(s) SubCutaneous every 12 hours  insulin glargine Injectable (LANTUS) 8 Unit(s) SubCutaneous at bedtime  insulin lispro (ADMELOG) corrective regimen sliding scale   SubCutaneous three times a day before meals  insulin lispro (ADMELOG) corrective regimen sliding scale   SubCutaneous at bedtime  metFORMIN 1000 milliGRAM(s) Oral two times a day  mupirocin 2% Nasal 1 Application(s) Both Nostrils every 12 hours  sodium chloride 1 Gram(s) Oral daily      LABS:                        7.9    13.07 )-----------( 439      ( 18 Sep 2024 07:26 )             25.5       Hemoglobin: 7.9 g/dL (09-18 @ 07:26)  Hemoglobin: 7.9 g/dL (09-17 @ 06:38)  Hemoglobin: 8.3 g/dL (09-16 @ 07:40)  Hemoglobin: 8.0 g/dL (09-15 @ 06:02)  Hemoglobin: 7.7 g/dL (09-14 @ 05:45)      09-18    133[L]  |  99  |  21  ----------------------------<  198[H]  4.7   |  22  |  0.48[L]    Ca    8.5      18 Sep 2024 07:26  Phos  1.9     09-18  Mg     2.10     09-18    TPro  7.1  /  Alb  2.9[L]  /  TBili  0.2  /  DBili  <0.2  /  AST  20  /  ALT  10  /  AlkPhos  126[H]  09-18    Creatinine Trend: 0.48<--, 0.49<--, 0.53<--, 0.54<--, 0.66<--, 0.84<--    PHYSICAL EXAM:  T(C): 36.6 (09-18-24 @ 09:12), Max: 37.1 (09-17-24 @ 12:41)  HR: 101 (09-18-24 @ 09:12) (96 - 105)  BP: 129/81 (09-18-24 @ 09:12) (101/62 - 129/81)  RR: 18 (09-18-24 @ 09:12) (18 - 18)  SpO2: 95% (09-18-24 @ 09:12) (95% - 100%)  Wt(kg): --    I&O's Summary    17 Sep 2024 07:01  -  18 Sep 2024 07:00  --------------------------------------------------------  IN: 2105 mL / OUT: 1725 mL / NET: 380 mL    18 Sep 2024 07:01  -  18 Sep 2024 11:19  --------------------------------------------------------  IN: 0 mL / OUT: 30 mL / NET: -30 mL        Appearance: NAD  HEENT:   Normal oral mucosa, PERRL, EOMI	  Lymphatic: No lymphadenopathy , no edema  Cardiovascular: Normal S1 S2, No JVD, No murmurs , Peripheral pulses palpable 2+ bilaterally  Respiratory: Lungs clear to auscultation, normal effort 	  Gastrointestinal:  Soft, Non-tender, + BS	  Skin: No rashes, No ecchymoses, No cyanosis, warm to touch  Musculoskeletal: Normal range of motion, normal strength.  thin/frail build.  Psychiatry:  Mood & affect appropriate    TELEMETRY: NSR  	    ECG:  NSR      TTE 07/2024  Findings:  1. Normal left ventricular size and function.Mild diastolic dysfunction.  2. Normal left atrial size  3. Right atrial cavity is normal in size.  4. Normal right ventricular size and function.  5. Normal trileaflet aortic valve opening.  6. Normal mitral valve opening.  7. Normal appearing tricuspid valve with mild tricuspid regurgitation.  8. Pulmonic valve is grossly normal, yet poorly visualized with no doppler evidence for pulmonic stenosis.  9. No evidence of significant pericardial effusion.  10. The aortic root is normal.  11. Normal pulmonary artery.  12. IVC is normal with respiratory variation.  FULL STUDY DONE INCLUDING M-MODE RECORDING,  SPECTRAL DOPPLER AND    Stress 07/2024  Normal myocardial perfusion SPECT images No evidence of stress induced ischemia or infarction.  Normal left ventricular size and function.  Calculated EF is: 68%    Pt is a 59 yr M w/ pmh HTN, DM2, GEJ adenocarcinoma (T3N0, stage 2A) s/p neoadjuvant chemotherapy s/p [facility] Broderick on 7/16/24 w/ postop course c/b high VENKATA drain output found on EGD  to have esophago-gastric anastomotic leak now s/p esophageal stent placement by GI on 8/5, now readmitted for 1 day of fevers/chills and leukocytosis. Worsening right-sided effusion with air fluid levels noted on CT    - Worsening right-sided effusion with air fluid levels noted on CT, ABX per surgery  - s/p esophageal stent removal +/- drainage of empyema.  - Can continue amlodipine for hypertension for now if OK to give meds via J Tube    Mart Mason MD  Pager: [phone]   
DATE OF SERVICE: 09-19-24    NO events    MEDICATIONS:  acetaminophen   Oral Liquid .. 650 milliGRAM(s) Enteral Tube every 6 hours PRN  amLODIPine   Tablet 5 milliGRAM(s) Oral every 24 hours  atorvastatin 20 milliGRAM(s) Oral at bedtime  cefepime   IVPB 2000 milliGRAM(s) IV Intermittent every 12 hours  chlorhexidine 2% Cloths 1 Application(s) Topical daily  dextrose 5%. 1000 milliLiter(s) IV Continuous <Continuous>  dextrose 5%. 1000 milliLiter(s) IV Continuous <Continuous>  dextrose 50% Injectable 12.5 Gram(s) IV Push once  dextrose 50% Injectable 25 Gram(s) IV Push once  dextrose 50% Injectable 25 Gram(s) IV Push once  dextrose Oral Gel 15 Gram(s) Oral once PRN  fluconAZOLE IVPB 200 milliGRAM(s) IV Intermittent every 24 hours  glucagon  Injectable 1 milliGRAM(s) IntraMuscular once  heparin   Injectable 2500 Unit(s) SubCutaneous every 12 hours  insulin glargine Injectable (LANTUS) 8 Unit(s) SubCutaneous at bedtime  insulin lispro (ADMELOG) corrective regimen sliding scale   SubCutaneous three times a day before meals  insulin lispro (ADMELOG) corrective regimen sliding scale   SubCutaneous at bedtime  mupirocin 2% Nasal 1 Application(s) Both Nostrils every 12 hours  sodium chloride 1 Gram(s) Oral daily      LABS:                        8.3    10.54 )-----------( 458      ( 19 Sep 2024 06:24 )             26.0       Hemoglobin: 8.3 g/dL (09-19 @ 06:24)  Hemoglobin: 7.9 g/dL (09-18 @ 07:26)  Hemoglobin: 7.9 g/dL (09-17 @ 06:38)  Hemoglobin: 8.3 g/dL (09-16 @ 07:40)  Hemoglobin: 8.0 g/dL (09-15 @ 06:02)      09-19    133[L]  |  97[L]  |  20  ----------------------------<  213[H]  4.4   |  24  |  0.50    Ca    8.3[L]      19 Sep 2024 06:24  Phos  1.9     09-18  Mg     2.10     09-18    TPro  7.1  /  Alb  2.9[L]  /  TBili  0.2  /  DBili  <0.2  /  AST  20  /  ALT  10  /  AlkPhos  126[H]  09-18    Creatinine Trend: 0.50<--, 0.48<--, 0.49<--, 0.53<--, 0.54<--, 0.66<--    COAGS:           PHYSICAL EXAM:  T(C): 36.6 (09-19-24 @ 11:52), Max: 36.6 (09-19-24 @ 11:52)  HR: 100 (09-19-24 @ 11:52) (99 - 107)  BP: 107/63 (09-19-24 @ 11:52) (107/61 - 147/68)  RR: 18 (09-19-24 @ 11:52) (17 - 18)  SpO2: 100% (09-19-24 @ 11:52) (97% - 100%)  Wt(kg): --    I&O's Summary    18 Sep 2024 07:01  -  19 Sep 2024 07:00  --------------------------------------------------------  IN: 1490 mL / OUT: 215 mL / NET: 1275 mL    19 Sep 2024 07:01  -  19 Sep 2024 15:50  --------------------------------------------------------  IN: 500 mL / OUT: 200 mL / NET: 300 mL          Appearance: NAD  HEENT:   Normal oral mucosa, PERRL, EOMI	  Lymphatic: No lymphadenopathy , no edema  Cardiovascular: Normal S1 S2, No JVD, No murmurs , Peripheral pulses palpable 2+ bilaterally  Respiratory: Lungs clear to auscultation, normal effort 	  Gastrointestinal:  Soft, Non-tender, + BS	  Skin: No rashes, No ecchymoses, No cyanosis, warm to touch  Musculoskeletal: Normal range of motion, normal strength.  thin/frail build.  Psychiatry:  Mood & affect appropriate    TELEMETRY: NSR  	    ECG:  NSR      TTE 07/2024  Findings:  1. Normal left ventricular size and function.Mild diastolic dysfunction.  2. Normal left atrial size  3. Right atrial cavity is normal in size.  4. Normal right ventricular size and function.  5. Normal trileaflet aortic valve opening.  6. Normal mitral valve opening.  7. Normal appearing tricuspid valve with mild tricuspid regurgitation.  8. Pulmonic valve is grossly normal, yet poorly visualized with no doppler evidence for pulmonic stenosis.  9. No evidence of significant pericardial effusion.  10. The aortic root is normal.  11. Normal pulmonary artery.  12. IVC is normal with respiratory variation.  FULL STUDY DONE INCLUDING M-MODE RECORDING,  SPECTRAL DOPPLER AND    Stress 07/2024  Normal myocardial perfusion SPECT images No evidence of stress induced ischemia or infarction.  Normal left ventricular size and function.  Calculated EF is: 68%    Pt is a 59 yr M w/ pmh HTN, DM2, GEJ adenocarcinoma (T3N0, stage 2A) s/p neoadjuvant chemotherapy s/p [place] Broderick on 7/16/24 w/ postop course c/b high VENKATA drain output found on EGD  to have esophago-gastric anastomotic leak now s/p esophageal stent placement by GI on 8/5, now readmitted for 1 day of fevers/chills and leukocytosis. Worsening right-sided effusion with air fluid levels noted on CT    - Worsening right-sided effusion with air fluid levels noted on CT, ABX per surgery  - s/p esophageal stent removal +/- drainage of empyema.  - Can continue amlodipine for hypertension for now if OK to give meds via J Tube    Mart Mason MD  Pager: [phone]   
Follow Up:      Inverval History/ROS:Patient is a 59y old  Male who presents with a chief complaint of Per chart, Pt is 59 yr M w/ pmh HTN, DM2 s/p [place] Broderick on 7/16/24 w/ postop course c/b high VENKATA drain output. found on EGD  to have esophago-gastric anastomotic now s/p esophageal stent placement by GI on 8/5.  Was admitted 8/19 for emesis and bloody output from VENKATA drain.  S/P EGD with GI showing small ulcerating at base of distal stent but no active bleed.  He now presents to the hospital with elevated WBC and fever/ chills x 1 day.  CT showing worsening effusion on right with multiple foci of gas and air fluid levels.    (15 Sep 2024 10:05)    No fever    Allergies    No Known Allergies    Intolerances        ANTIMICROBIALS:  cefepime   IVPB 2000 every 12 hours  fluconAZOLE IVPB 200 every 24 hours      OTHER MEDS:  acetaminophen   Oral Liquid .. 650 milliGRAM(s) Enteral Tube every 6 hours PRN  amLODIPine   Tablet 5 milliGRAM(s) Oral every 24 hours  atorvastatin 20 milliGRAM(s) Oral at bedtime  chlorhexidine 2% Cloths 1 Application(s) Topical daily  dextrose 5%. 1000 milliLiter(s) IV Continuous <Continuous>  dextrose 5%. 1000 milliLiter(s) IV Continuous <Continuous>  dextrose 50% Injectable 12.5 Gram(s) IV Push once  dextrose 50% Injectable 25 Gram(s) IV Push once  dextrose 50% Injectable 25 Gram(s) IV Push once  dextrose Oral Gel 15 Gram(s) Oral once PRN  glucagon  Injectable 1 milliGRAM(s) IntraMuscular once  heparin   Injectable 2500 Unit(s) SubCutaneous every 12 hours  insulin glargine Injectable (LANTUS) 8 Unit(s) SubCutaneous at bedtime  insulin lispro (ADMELOG) corrective regimen sliding scale   SubCutaneous three times a day before meals  insulin lispro (ADMELOG) corrective regimen sliding scale   SubCutaneous at bedtime  metFORMIN 1000 milliGRAM(s) Oral two times a day  mupirocin 2% Nasal 1 Application(s) Both Nostrils every 12 hours  sodium chloride 1 Gram(s) Oral daily      Vital Signs Last 24 Hrs  T(C): 36.3 (17 Sep 2024 15:59), Max: 37.1 (17 Sep 2024 12:41)  T(F): 97.4 (17 Sep 2024 15:59), Max: 98.8 (17 Sep 2024 12:41)  HR: 97 (17 Sep 2024 15:59) (96 - 108)  BP: 111/63 (17 Sep 2024 15:59) (111/63 - 128/69)  BP(mean): --  RR: 18 (17 Sep 2024 15:59) (18 - 18)  SpO2: 98% (17 Sep 2024 15:59) (96% - 100%)    Parameters below as of 17 Sep 2024 15:59  Patient On (Oxygen Delivery Method): room air        PHYSICAL EXAM:  General: [ ] non-toxic  HEAD/EYES: [ ] PERRL [c ] white sclera [ ] icterus  ENT:  [ ] normal [ x] supple [ ] thrush [ ] pharyngeal exudate  Cardiovascular:   [ ] murmur [x ] normal [ ] PPM/AICD  Respiratory:  [ x] clear to ausculation bilaterally  GI:  [x ] soft, non-tender, normal bowel sounds  :  [ ] stallworth [ ] no CVA tenderness   Musculoskeletal:  [ ] no synovitis  Neurologic:  [ ] non-focal exam   Skin:  x[ ] no rash  Lymph: [ x] no lymphadenopathy  Psychiatric:  [ ] appropriate affect [ ] alert & oriented  Lines:  [ x] no phlebitis [ ] central line                                7.9    15.26 )-----------( 452      ( 17 Sep 2024 06:38 )             25.0       09-17    135  |  100  |  18  ----------------------------<  217[H]  4.1   |  23  |  0.49[L]    Ca    8.3[L]      17 Sep 2024 06:38        Urinalysis Basic - ( 17 Sep 2024 06:38 )    Color: x / Appearance: x / SG: x / pH: x  Gluc: 217 mg/dL / Ketone: x  / Bili: x / Urobili: x   Blood: x / Protein: x / Nitrite: x   Leuk Esterase: x / RBC: x / WBC x   Sq Epi: x / Non Sq Epi: x / Bacteria: x        MICROBIOLOGY:Culture Results:   Numerous Serratia marcescens  Moderate Staphylococcus aureus  Numerous Candida albicans  Numerous Streptococcus anginosus "Susceptibilities not performed" (09-12-24 @ 18:00)  Culture Results:   No growth at 5 days (09-11-24 @ 15:50)  Culture Results:   No growth at 5 days (09-11-24 @ 15:20)      RADIOLOGY:    
Subjective: 59yMale seen at bedside, no issues overnight. Tolerating feeds well. Ambulated yesterday with cane. Denies sob, cp, nvd.   Daam in place   J tube in place    Vital Signs:  Vital Signs Last 24 Hrs  T(C): 36.6 (09-14-24 @ 08:41), Max: 36.9 (09-13-24 @ 09:38)  T(F): 97.8 (09-14-24 @ 08:41), Max: 98.5 (09-13-24 @ 09:38)  HR: 95 (09-14-24 @ 08:41) (87 - 97)  BP: 114/61 (09-14-24 @ 08:41) (112/60 - 128/67)  RR: 18 (09-14-24 @ 08:41) (18 - 18)  SpO2: 96% (09-14-24 @ 08:41) (95% - 98%) on (O2)    Pertinent Physical Exam:  Neuro: A+O x 3  CV: regular rate, regular rhythm  Pulm/chest: lung sounds CTA and equal bilaterally. Adam with murky drainage  Abd: soft, NT, ND. BM yesterday. J tube in place  Ext: Moves all extremities x 4, without clubbing/cyanosis/edema  Skin: warm, well perfused, no rashes      R Adam to bulb 240cc/24hrs      I&O's Summary    13 Sep 2024 07:01  -  14 Sep 2024 07:00  --------------------------------------------------------  IN: 1360 mL / OUT: 240 mL / NET: 1120 mL    14 Sep 2024 07:01  -  14 Sep 2024 09:18  --------------------------------------------------------  IN: 320 mL / OUT: 30 mL / NET: 290 mL        Relevant labs, radiology and Medications reviewed                        7.7    10.29 )-----------( 522      ( 14 Sep 2024 05:45 )             24.7     09-14    134<L>  |  96<L>  |  27<H>  ----------------------------<  220<H>  3.8   |  26  |  0.66    Ca    8.5      14 Sep 2024 05:45  Phos  2.8     09-14  Mg     2.10     09-14            MEDICATIONS  (STANDING):  amLODIPine   Tablet 5 milliGRAM(s) Oral every 24 hours  atorvastatin 20 milliGRAM(s) Oral at bedtime  dextrose 5%. 1000 milliLiter(s) (50 mL/Hr) IV Continuous <Continuous>  dextrose 5%. 1000 milliLiter(s) (100 mL/Hr) IV Continuous <Continuous>  dextrose 50% Injectable 12.5 Gram(s) IV Push once  dextrose 50% Injectable 25 Gram(s) IV Push once  dextrose 50% Injectable 25 Gram(s) IV Push once  glucagon  Injectable 1 milliGRAM(s) IntraMuscular once  heparin   Injectable 5000 Unit(s) SubCutaneous every 12 hours  insulin glargine Injectable (LANTUS) 8 Unit(s) SubCutaneous at bedtime  insulin lispro (ADMELOG) corrective regimen sliding scale   SubCutaneous three times a day before meals  insulin lispro (ADMELOG) corrective regimen sliding scale   SubCutaneous at bedtime  metFORMIN 1000 milliGRAM(s) Oral two times a day  piperacillin/tazobactam IVPB.. 3.375 Gram(s) IV Intermittent every 8 hours  sodium chloride 1 Gram(s) Oral daily  vancomycin  IVPB 750 milliGRAM(s) IV Intermittent every 12 hours    MEDICATIONS  (PRN):  acetaminophen   Oral Liquid .. 650 milliGRAM(s) Enteral Tube every 6 hours PRN Temp greater or equal to 38C (100.4F), Mild Pain (1 - 3)  dextrose Oral Gel 15 Gram(s) Oral once PRN Blood Glucose LESS THAN 70 milliGRAM(s)/deciliter        Assessment  59 yr M w/ pmh HTN, DM2 s/p Michael Villafana on 7/16/24 w/ postop course c/b high VENKATA drain output. found on EGD  to have esophago-gastric anastomotic now s/p esophageal stent placement by GI on 8/5.  Was admitted 8/19 for emesis and bloody output from VENKATA drain.  S/P EGD with GI showing small ulcerating at base of distal stent but no active bleed.  He now presents to the hospital with elevated WBC and fever/ chills x 1 day.  CT showing worsening effusion on right with multiple foci of gas and air fluid levels.      9/12 GI removed esophageal stent  9/13 ID consulted for leukocytosis, subjective fevers (afebrile this admission)  9/14: Cont with IV abx, nutrition, meds. Drainage persist, plan for leak? HH downtrending, repeat labs, assess and blood if needed.       PLAN  Neuro: Pain management  Pulm: Encourage coughing, deep breathing and use of incentive spirometry. Daily CXR.   Cardio: Monitor telemetry/alarms. Cont with amlodipine and statin  GI: Tolerating TF. NPO  Renal: monitor urine output, supplement electrolytes as needed  Vasc: Heparin SC/SCDs for DVT prophylaxis  Heme: Repeat labs at 10am  Endocrine: Blood glucose checks per routine. Elevated sugars. ISS, lantus 8, metformin  ID: no leukocytosis, no fevers. Cont with vanc zosyn. Speak to ID for abx plan to cont vs dc.   Therapy: OOB/ambulate  Tubes: Monitor Chest tube output  Disposition: Evaluate plan for abx and leak  Discussed with Cardiothoracic Team at AM rounds.  
   Subjective: "I feel Ok, just tired" Pt states compliance with tube feeds at home despite wt loss. Denies any n/v. + BM this am. OOB w assist. No CP or SOB. On RA. NO fevers, Leukocytosis improved. Planned for PICC line today    Vital Signs:  Vital Signs Last 24 Hrs  T(C): 35.9 (09-18-24 @ 14:35), Max: 36.7 (09-17-24 @ 20:19)  T(F): 96.6 (09-18-24 @ 14:35), Max: 98.1 (09-17-24 @ 20:19)  HR: 98 (09-18-24 @ 14:54) (97 - 105)  BP: 139/80 (09-18-24 @ 14:54) (101/62 - 139/80)  RR: 22 (09-18-24 @ 14:54) (14 - 22)  SpO2: 96% (09-18-24 @ 14:54) (95% - 100%) on (O2)    Telemetry/Alarms: tele SR  Relevant labs, radiology and Medications reviewed           CXR stable.              7.9    13.07 )-----------( 439      ( 18 Sep 2024 07:26 )             25.5     09-18    133[L]  |  99  |  21  ----------------------------<  198[H]  4.7   |  22  |  0.48[L]    Ca    8.5      18 Sep 2024 07:26  Phos  1.9     09-18  Mg     2.10     09-18    TPro  7.1  /  Alb  2.9[L]  /  TBili  0.2  /  DBili  <0.2  /  AST  20  /  ALT  10  /  AlkPhos  126[H]  09-18      MEDICATIONS  (STANDING):  amLODIPine   Tablet 5 milliGRAM(s) Oral every 24 hours  atorvastatin 20 milliGRAM(s) Oral at bedtime  cefepime   IVPB 2000 milliGRAM(s) IV Intermittent every 12 hours  chlorhexidine 2% Cloths 1 Application(s) Topical daily  dextrose 5%. 1000 milliLiter(s) (50 mL/Hr) IV Continuous <Continuous>  dextrose 5%. 1000 milliLiter(s) (100 mL/Hr) IV Continuous <Continuous>  dextrose 50% Injectable 12.5 Gram(s) IV Push once  dextrose 50% Injectable 25 Gram(s) IV Push once  dextrose 50% Injectable 25 Gram(s) IV Push once  fluconAZOLE IVPB 200 milliGRAM(s) IV Intermittent every 24 hours  glucagon  Injectable 1 milliGRAM(s) IntraMuscular once  heparin   Injectable 2500 Unit(s) SubCutaneous every 12 hours  insulin glargine Injectable (LANTUS) 8 Unit(s) SubCutaneous at bedtime  insulin lispro (ADMELOG) corrective regimen sliding scale   SubCutaneous three times a day before meals  insulin lispro (ADMELOG) corrective regimen sliding scale   SubCutaneous at bedtime  metFORMIN 1000 milliGRAM(s) Oral two times a day  mupirocin 2% Nasal 1 Application(s) Both Nostrils every 12 hours  sodium chloride 1 Gram(s) Oral daily    MEDICATIONS  (PRN):  acetaminophen   Oral Liquid .. 650 milliGRAM(s) Enteral Tube every 6 hours PRN Temp greater or equal to 38C (100.4F), Mild Pain (1 - 3)  dextrose Oral Gel 15 Gram(s) Oral once PRN Blood Glucose LESS THAN 70 milliGRAM(s)/deciliter    General: WD NAD  Neurology: A&Ox3, nonfocal, ALFORD x 4  Eyes: PERRLA/ EOMI, Gross vision intact  ENT/Neck: Neck supple, trachea midline, No JVD, Gross hearing intact  Respiratory: CTA B/L, No wheezing, rales, rhonchi. Dec BS to Rt base  CV: RRR, S1S2, no murmurs, rubs or gallops  Abdominal: Soft, NT, ND +BS, + BM. STrict NPO. On TF at goal with free water.   Extremities: No edema, + peripheral pulses  Skin: No Rashes, Hematoma, Ecchymosis  Incisions: rt Chest incisions healed. Rt lavern site c/d/i. J tube site c/d/i.   Tubes: rt lavern- 320cc/24hrs to bulb, bilious, murky gastric output.   I&O's Summary    17 Sep 2024 07:01  -  18 Sep 2024 07:00  --------------------------------------------------------  IN: 2105 mL / OUT: 1725 mL / NET: 380 mL    18 Sep 2024 07:01  -  18 Sep 2024 16:30  --------------------------------------------------------  IN: 500 mL / OUT: 95 mL / NET: 405 mL        Assessment  59y Male  w/ PAST MEDICAL & SURGICAL HISTORY:  HLD (hyperlipidemia)      Insulin dependent type 2 diabetes mellitus      BPH (benign prostatic hyperplasia)      HTN (hypertension)      Gastroesophageal cancer      Gastroesophageal cancer      Port-A-Cath in place      admitted with complaints of Patient is a 59y old  Male who presents with a chief complaint of Per chart, Pt is 59 yr M w/ pmh HTN, DM2 s/p [place] Broderick on 7/16/24 w/ postop course c/b high VENKATA drain output. found on EGD  to have esophago-gastric anastomotic now s/p esophageal stent placement by GI on 8/5.  Was admitted 8/19 for emesis and bloody output from VENKATA drain.  S/P EGD with GI showing small ulcerating at base of distal stent but no active bleed.  He now presents to the hospital with elevated WBC and fever/ chills x 1 day.  CT showing worsening effusion on right with multiple foci of gas and air fluid levels.   59 yr M w/ pmh HTN, DM2 s/p [facility] Broderick on 7/16/24 w/ postop course c/b high VENKATA drain output. found on EGD  to have esophago-gastric anastomotic now s/p esophageal stent placement by GI on 8/5.  Was admitted 8/19 for emesis and bloody output from VENKATA drain.  S/P EGD with GI showing small ulcerating at base of distal stent but no active bleed. 9/12 GI removed esophageal stent.  Pt has been on IV antibiotics with infectious disease consult following closely.  9/18- No fevers. Seen by nutritional who deemed current TF regiment adequate to support pt.  No new recommendations. PICC line placed. Plan home tomorrow.       PLAN  Neuro: Pain management  Pulm: Encourage coughing, deep breathing and use of incentive spirometry.  Daily CXR.   Cardio: Monitor telemetry/alarms  GI: STrict NPO. Cont TF at goal. Nutritional reccs appreciated  Renal: monitor urine output, supplement electrolytes as needed  Vasc: Heparin SC/SCDs for DVT prophylaxis  Heme: Stable H/H. .   ID: On Rocephin and diflucan. PICC placed today  Therapy: OOB/ambulate  Tubes:  Cont lavern to bulb long term.   Disposition: Aim to D/C to home tomorrow  Discussed with Cardiothoracic Team at AM rounds.  
   Subjective: patient seen and examined with thoracic surgery team  pt without acute complaints  pain controlled  pt denies chest pain or shortness of breath  using incentive spirometer  tolerating nocturnal tube feeds x 18h  +flatus, +BM  ambulatory with assistance  pt on cefepime and diflucan  d/w attending on morning TEAMS report      Vital Signs:  Vital Signs Last 24 Hrs  T(C): 36.1 (09-16-24 @ 12:00), Max: 37.1 (09-16-24 @ 08:15)  T(F): 97 (09-16-24 @ 12:00), Max: 98.7 (09-16-24 @ 08:15)  HR: 92 (09-16-24 @ 12:00) (87 - 102)  BP: 111/54 (09-16-24 @ 12:00) (111/54 - 126/64)  RR: 18 (09-16-24 @ 12:00) (16 - 20)  SpO2: 99% (09-16-24 @ 12:00) (97% - 100%) on (O2)      PE  Telemetry/Alarms: SR   General: awake and alert NAD  Neurology: A&Ox3, nonfocal, ALFORD x 4  Respiratory: CTA B/L  CV: RRR, S1S2, no murmurs, rubs or gallops  Abdominal: Soft, NT, ND +BS, Last BM  Extremities: No edema, + peripheral pulses  Incisions: c,d,i  Tubes: lavern to bulb suction drained 332cc/24h  gray fluid w solid debris         Relevant labs, radiology and Medications reviewed                        8.3    10.75 )-----------( 448      ( 16 Sep 2024 07:40 )             26.3     09-16    133<L>  |  99  |  16  ----------------------------<  229<H>  4.3   |  23  |  0.53    Ca    8.0<L>      16 Sep 2024 07:40  Phos  2.4     09-15  Mg     2.00     09-15        MEDICATIONS  (STANDING):  amLODIPine   Tablet 5 milliGRAM(s) Oral every 24 hours  atorvastatin 20 milliGRAM(s) Oral at bedtime  cefepime   IVPB 2000 milliGRAM(s) IV Intermittent every 12 hours  chlorhexidine 2% Cloths 1 Application(s) Topical daily  dextrose 5%. 1000 milliLiter(s) (50 mL/Hr) IV Continuous <Continuous>  dextrose 5%. 1000 milliLiter(s) (100 mL/Hr) IV Continuous <Continuous>  dextrose 50% Injectable 12.5 Gram(s) IV Push once  dextrose 50% Injectable 25 Gram(s) IV Push once  dextrose 50% Injectable 25 Gram(s) IV Push once  fluconAZOLE IVPB 200 milliGRAM(s) IV Intermittent every 24 hours  glucagon  Injectable 1 milliGRAM(s) IntraMuscular once  heparin   Injectable 2500 Unit(s) SubCutaneous every 12 hours  insulin glargine Injectable (LANTUS) 8 Unit(s) SubCutaneous at bedtime  insulin lispro (ADMELOG) corrective regimen sliding scale   SubCutaneous three times a day before meals  insulin lispro (ADMELOG) corrective regimen sliding scale   SubCutaneous at bedtime  metFORMIN 1000 milliGRAM(s) Oral two times a day  mupirocin 2% Nasal 1 Application(s) Both Nostrils every 12 hours  sodium chloride 1 Gram(s) Oral daily    MEDICATIONS  (PRN):  acetaminophen   Oral Liquid .. 650 milliGRAM(s) Enteral Tube every 6 hours PRN Temp greater or equal to 38C (100.4F), Mild Pain (1 - 3)  dextrose Oral Gel 15 Gram(s) Oral once PRN Blood Glucose LESS THAN 70 milliGRAM(s)/deciliter    Pertinent Physical Exam  I&O's Summary    15 Sep 2024 07:01  -  16 Sep 2024 07:00  --------------------------------------------------------  IN: 2010 mL / OUT: 332 mL / NET: 1678 mL    16 Sep 2024 07:01  -  16 Sep 2024 12:16  --------------------------------------------------------  IN: 500 mL / OUT: 280 mL / NET: 220 mL       Social History:  · Substance use	No     Tobacco Screening:  · Core Measure Site	No          PAST MEDICAL & SURGICAL HISTORY:  HLD (hyperlipidemia)      Insulin dependent type 2 diabetes mellitus      BPH (benign prostatic hyperplasia)      HTN (hypertension)      Gastroesophageal cancer      Gastroesophageal cancer      Port-A-Cath in place    ASSESSMENT   59 yr M w/ pmh HTN, DM2 s/p Michael Broderick on 7/16/24 w/ postop course c/b high VENKATA drain output. found on EGD  to have esophago-gastric anastomotic now s/p esophageal stent placement by GI on 8/5.  Was admitted 8/19 for emesis and bloody output from VENKATA drain.  S/P EGD with GI showing small ulcerating at base of distal stent but no active bleed. 9/12 GI removed esophageal stent.  Pt has been on IV antibiotics with infectious disease consult following closely.        PLAN  Neuro: Pain management  Pulm: Encourage coughing, deep breathing and use of incentive spirometry. Wean off supplemental oxygen as able. Daily CXR.   Cardio: Monitor telemetry/alarms  GI: NPO , Tolerating nocturnal tube feeds  Renal: monitor urine output, supplement electrolytes as needed  Vasc: Heparin SC/SCDs for DVT prophylaxis  Heme: Stable H/H. .   ID: cefepime and diflucan   Therapy: OOB/ambulate  Tubes: lavern to bulb monitoring output  Disposition: following cultures and ID recommendations  ID Dr Liu d/w Dr Laureano by phone  Discussed with Cardiothoracic Team at AM rounds.  
 Subjective:   Patient seen and evaluated by thoracic team. OOB to chair, on RA. Tolerating TFs.    Patient offers no acute complaints, demeanor somewhat lower than normal   Denies shortness of breath, chest pain, palpitations, dizziness, nausea, vomiting, diarrhea.     Vital Signs:  Vital Signs Last 24 Hrs  T(C): 36.8 (09-13-24 @ 12:26), Max: 36.9 (09-13-24 @ 09:38)  T(F): 98.3 (09-13-24 @ 12:26), Max: 98.5 (09-13-24 @ 09:38)  HR: 92 (09-13-24 @ 12:26) (72 - 100)  BP: 121/59 (09-13-24 @ 12:26) (110/64 - 123/65)  RR: 18 (09-13-24 @ 12:26) (17 - 20)  SpO2: 97% (09-13-24 @ 12:26) (93% - 97%) on (O2)    General: Patient lying comfortably in bed, no acute distress   Neurological: Alert and oriented. No focal neurological deficits   Cardiovascular: S1S2, RRR  Respiratory: Clear to ausculation bilaterally   Gastrointestinal: Abdomen soft, non tender, non distended   Extremities: Warm and well perfused. No edema or calf tenderness   Vascular: Peripheral pulses   Incision Sites: old sites healing well  Tubes: R Adam drain to bulb 250cc/24hr - output is tan, chunky and malodorous                         8.2    12.39 )-----------( 556      ( 13 Sep 2024 04:30 )             26.5     09-13    133<L>  |  94<L>  |  33<H>  ----------------------------<  287<H>  5.0   |  25  |  0.84    Ca    8.3<L>      13 Sep 2024 04:30  Phos  5.3     09-13  Mg     2.40     09-13        MEDICATIONS  (STANDING):  amLODIPine   Tablet 5 milliGRAM(s) Oral every 24 hours  atorvastatin 20 milliGRAM(s) Oral at bedtime  dextrose 5%. 1000 milliLiter(s) (50 mL/Hr) IV Continuous <Continuous>  dextrose 5%. 1000 milliLiter(s) (100 mL/Hr) IV Continuous <Continuous>  dextrose 50% Injectable 12.5 Gram(s) IV Push once  dextrose 50% Injectable 25 Gram(s) IV Push once  dextrose 50% Injectable 25 Gram(s) IV Push once  glucagon  Injectable 1 milliGRAM(s) IntraMuscular once  heparin   Injectable 5000 Unit(s) SubCutaneous every 12 hours  insulin glargine Injectable (LANTUS) 8 Unit(s) SubCutaneous at bedtime  insulin lispro (ADMELOG) corrective regimen sliding scale   SubCutaneous three times a day before meals  insulin lispro (ADMELOG) corrective regimen sliding scale   SubCutaneous at bedtime  metFORMIN 1000 milliGRAM(s) Oral two times a day  piperacillin/tazobactam IVPB.. 3.375 Gram(s) IV Intermittent every 8 hours  sodium chloride 1 Gram(s) Oral daily  vancomycin  IVPB 750 milliGRAM(s) IV Intermittent every 12 hours    MEDICATIONS  (PRN):  acetaminophen   Oral Liquid .. 650 milliGRAM(s) Enteral Tube every 6 hours PRN Temp greater or equal to 38C (100.4F), Mild Pain (1 - 3)  dextrose Oral Gel 15 Gram(s) Oral once PRN Blood Glucose LESS THAN 70 milliGRAM(s)/deciliter    I&O's Summary    12 Sep 2024 07:01  -  13 Sep 2024 07:00  --------------------------------------------------------  IN: 1340 mL / OUT: 1295 mL / NET: 45 mL    13 Sep 2024 07:01  -  13 Sep 2024 15:53  --------------------------------------------------------  IN: 500 mL / OUT: 160 mL / NET: 340 mL    Assessment  59 yr M w/ pmh HTN, DM2 s/p Michael Villafana on 7/16/24 w/ postop course c/b high VENKATA drain output. found on EGD  to have esophago-gastric anastomotic now s/p esophageal stent placement by GI on 8/5.  Was admitted 8/19 for emesis and bloody output from VENKATA drain.  S/P EGD with GI showing small ulcerating at base of distal stent but no active bleed.  He now presents to the hospital with elevated WBC and fever/ chills x 1 day.  CT showing worsening effusion on right with multiple foci of gas and air fluid levels.    9/12 GI removed esophageal stent  9/13 ID consulted for leukocytosis, subjective fevers (afebrile this admission)    PLAN  Neuro: Pain management  Pulm: Encourage coughing, deep breathing and use of incentive spirometry. Wean off supplemental oxygen as able. Daily CXR.   Cardio: Monitor telemetry/alarms  GI: Tolerating TFs. Continue stool softeners.  Renal: monitor urine output, supplement electrolytes as needed  Vasc: Heparin SC/SCDs for DVT prophylaxis  Heme: Stable H/H.  ID: Cont [email or web address], dc vanco if MRSA swab negative   Therapy: OOB/ambulate  Tubes: Monitor adam output  Disposition: Aim to D/C once infection managed / obtain source control  Discussed with Cardiothoracic Team at AM rounds.  
Follow Up:      Inverval History/ROS:Patient is a 59y old  Male who presents with a chief complaint of Per chart, Pt is 59 yr M w/ pmh HTN, DM2 s/p [place] Broderick on 7/16/24 w/ postop course c/b high VENKATA drain output. found on EGD  to have esophago-gastric anastomotic now s/p esophageal stent placement by GI on 8/5.  Was admitted 8/19 for emesis and bloody output from VENKATA drain.  S/P EGD with GI showing small ulcerating at base of distal stent but no active bleed.  He now presents to the hospital with elevated WBC and fever/ chills x 1 day.  CT showing worsening effusion on right with multiple foci of gas and air fluid levels.    (15 Sep 2024 10:05)    No fever  No events      Allergies    No Known Allergies    Intolerances        ANTIMICROBIALS:  piperacillin/tazobactam IVPB.. 3.375 every 8 hours      OTHER MEDS:  acetaminophen   Oral Liquid .. 650 milliGRAM(s) Enteral Tube every 6 hours PRN  amLODIPine   Tablet 5 milliGRAM(s) Oral every 24 hours  atorvastatin 20 milliGRAM(s) Oral at bedtime  dextrose 5%. 1000 milliLiter(s) IV Continuous <Continuous>  dextrose 5%. 1000 milliLiter(s) IV Continuous <Continuous>  dextrose 50% Injectable 12.5 Gram(s) IV Push once  dextrose 50% Injectable 25 Gram(s) IV Push once  dextrose 50% Injectable 25 Gram(s) IV Push once  dextrose Oral Gel 15 Gram(s) Oral once PRN  glucagon  Injectable 1 milliGRAM(s) IntraMuscular once  heparin   Injectable 2500 Unit(s) SubCutaneous every 12 hours  insulin glargine Injectable (LANTUS) 8 Unit(s) SubCutaneous at bedtime  insulin lispro (ADMELOG) corrective regimen sliding scale   SubCutaneous at bedtime  insulin lispro (ADMELOG) corrective regimen sliding scale   SubCutaneous three times a day before meals  metFORMIN 1000 milliGRAM(s) Oral two times a day  sodium chloride 1 Gram(s) Oral daily      Vital Signs Last 24 Hrs  T(C): 36.4 (15 Sep 2024 16:26), Max: 36.7 (15 Sep 2024 11:50)  T(F): 97.5 (15 Sep 2024 16:26), Max: 98 (15 Sep 2024 11:50)  HR: 98 (15 Sep 2024 16:26) (94 - 102)  BP: 112/63 (15 Sep 2024 16:26) (112/63 - 123/68)  BP(mean): --  RR: 17 (15 Sep 2024 16:26) (17 - 18)  SpO2: 97% (15 Sep 2024 16:26) (94% - 99%)    Parameters below as of 15 Sep 2024 16:26  Patient On (Oxygen Delivery Method): room air        PHYSICAL EXAM:  General: [x ] non-toxic  HEAD/EYES: [ ] PERRL x[ ] white sclera [ ] icterus  ENT:  [ ] normal [ x] supple [ ] thrush [ ] pharyngeal exudate  Cardiovascular:   [ ] murmur x[ ] normal [ ] PPM/AICD  Respiratory:  [x ] clear to ausculation bilaterally  GI:  [ ] soft, non-tender, normal bowel sounds  :  [ ] stallworth [ ] no CVA tenderness   Musculoskeletal:  [ ] no synovitis  Neurologic:  [ ] non-focal exam   Skin:  [x ] no rash  Lymph: [x ] no lymphadenopathy  Psychiatric:  [ ] appropriate affect [ ] alert & oriented  Lines:  [ x] no phlebitis [ ] central line                                8.0    7.95  )-----------( 508      ( 15 Sep 2024 06:02 )             25.8       09-15    132<L>  |  98  |  19  ----------------------------<  236<H>  3.7   |  24  |  0.54    Ca    8.5      15 Sep 2024 06:02  Phos  2.4     09-15  Mg     2.00     09-15        Urinalysis Basic - ( 15 Sep 2024 06:02 )    Color: x / Appearance: x / SG: x / pH: x  Gluc: 236 mg/dL / Ketone: x  / Bili: x / Urobili: x   Blood: x / Protein: x / Nitrite: x   Leuk Esterase: x / RBC: x / WBC x   Sq Epi: x / Non Sq Epi: x / Bacteria: x        MICROBIOLOGY:Culture Results:   Numerous Serratia marcescens  Moderate Staphylococcus aureus  Numerous Candida albicans  Numerous Streptococcus anginosus "Susceptibilities not performed" (09-12-24 @ 18:00)  Culture Results:   No growth at 72 Hours (09-11-24 @ 15:50)  Culture Results:   No growth at 72 Hours (09-11-24 @ 15:20)      RADIOLOGY:  < from: CT Chest w/ Oral Cont and w/ IV Cont (09.11.24 @ 21:02) >  IMPRESSION:  Slightly worsening size of effusion on the right with multiple foci of   gas and air-fluid levels, one of these locules partially extending to the   posterior chest wall between the 10th and 11th rib space, 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 Correlate with drain output.    Patchy airspace disease involving the left lower lobe and lingula, likely   infectious.    Question of enteritis in the jejunum.    Redemonstrated cystic lesion in the uncinate process, measuring 1.6 cm,   unchanged.    Other findings, as above.    < end of copied text >    
HISTORY OF PRESENT ILLNESS: HPI:  59 yr M w/ pmh HTN, DM2 s/p [place] Broderick on 7/16/24 w/ postop course c/b high VENKATA drain output. found on EGD  to have esophago-gastric anastomotic now s/p esophageal stent placement by GI on 8/5.  Was admitted 8/19 for emesis and bloody output from VENKATA drain.  S/P EGD with GI showing small ulcerating at base of distal stent but no active bleed.  He now presents to the hospital with elevated WBC and fever/ chills x 1 day.  CT showing worsening effusion on right with multiple foci of gas and air fluid levels.     (12 Sep 2024 01:32)    Blood pressure stable, 110s-130s.  no angina or palpitations, no lightheadedness or fainting.  had fever, has new pleural effusion. a 10 pt ROS is otherwise negative.    9/13- status post esophageal stent removal and empyema drainage for right lung.  feeling well, in good spirits today. no angina palpitations or SOB, but every day asks when he will be able to resume solid food.   9/14 pt in no distress, no events overnight   9/15 pt offers no complaints   Date of service 9/16- no chest pain or SOB      acetaminophen   Oral Liquid .. 650 milliGRAM(s) Enteral Tube every 6 hours PRN  amLODIPine   Tablet 5 milliGRAM(s) Oral every 24 hours  atorvastatin 20 milliGRAM(s) Oral at bedtime  cefepime   IVPB 2000 milliGRAM(s) IV Intermittent every 12 hours  chlorhexidine 2% Cloths 1 Application(s) Topical daily  dextrose 5%. 1000 milliLiter(s) IV Continuous <Continuous>  dextrose 5%. 1000 milliLiter(s) IV Continuous <Continuous>  dextrose 50% Injectable 12.5 Gram(s) IV Push once  dextrose 50% Injectable 25 Gram(s) IV Push once  dextrose 50% Injectable 25 Gram(s) IV Push once  dextrose Oral Gel 15 Gram(s) Oral once PRN  fluconAZOLE IVPB 200 milliGRAM(s) IV Intermittent every 24 hours  glucagon  Injectable 1 milliGRAM(s) IntraMuscular once  heparin   Injectable 2500 Unit(s) SubCutaneous every 12 hours  insulin glargine Injectable (LANTUS) 8 Unit(s) SubCutaneous at bedtime  insulin lispro (ADMELOG) corrective regimen sliding scale   SubCutaneous at bedtime  insulin lispro (ADMELOG) corrective regimen sliding scale   SubCutaneous three times a day before meals  metFORMIN 1000 milliGRAM(s) Oral two times a day  mupirocin 2% Nasal 1 Application(s) Both Nostrils every 12 hours  sodium chloride 1 Gram(s) Oral daily                            8.3    10.75 )-----------( 448      ( 16 Sep 2024 07:40 )             26.3       133<L>  |  99  |  16  ----------------------------<  229<H>  4.3   |  23  |  0.53    Ca    8.0<L>      16 Sep 2024 07:40  Phos  2.4     09-15  Mg     2.00     09-15      T(C): 36.1 (09-16-24 @ 12:00), Max: 37.1 (09-16-24 @ 08:15)  HR: 92 (09-16-24 @ 12:00) (87 - 102)  BP: 111/54 (09-16-24 @ 12:00) (111/54 - 126/64)  RR: 18 (09-16-24 @ 12:00) (16 - 20)  SpO2: 99% (09-16-24 @ 12:00) (97% - 100%)  Wt(kg): --    I&O's Summary    15 Sep 2024 07:01  -  16 Sep 2024 07:00  --------------------------------------------------------  IN: 2010 mL / OUT: 332 mL / NET: 1678 mL    16 Sep 2024 07:01  -  16 Sep 2024 15:58  --------------------------------------------------------  IN: 500 mL / OUT: 280 mL / NET: 220 mL       Appearance: thin frail adult male in no acute distress	  HEENT:   Normal oral mucosa, PERRL, EOMI	  Lymphatic: No lymphadenopathy , no edema  Cardiovascular: Normal S1 S2, No JVD, No murmurs , Peripheral pulses palpable 2+ bilaterally  Respiratory: Lungs clear to auscultation, normal effort 	  Gastrointestinal:  Soft, Non-tender, + BS	  Skin: No rashes, No ecchymoses, No cyanosis, warm to touch  Musculoskeletal: Normal range of motion, normal strength.  thin/frail build.  Psychiatry:  Mood & affect appropriate    TELEMETRY: NSR  	    ECG:  NSR      < from: TTE W or WO Ultrasound Enhancing Agent (07.20.24 @ 10:51) >  FINDINGS:     Left Ventricle:  The left ventricular cavity is normal in size. Left ventricular wall thickness is normal. Left ventricular systolic function is normal with an ejection fraction visually estimated at 60%. There are no regional wall motion abnormalities seen. Left ventricular filling pressures are normal. Analysis of left ventricular diastolic function and filling pressure is made challenging by the presence of tachycardia.     Right Ventricle:  The right ventricular cavity is normal in size and right ventricular systolic function is normal. Tricuspid annular plane systolic excursion (TAPSE) is 2.7 cm (normal >=1.7 cm).     Left Atrium:  The left atriumis normal in size with an indexed volume of 16.65 ml/m².     Right Atrium:  The right atrium is normal in size with an indexed volume of 12.03 ml/m².     Interatrial Septum:  There is no evidence of a patent foramen ovale with no shunt detected by color flow Doppler.     Aortic Valve:  There is moderate calcification of the aortic valve leaflets.     Mitral Valve:  There is no mitral valve stenosis. There is mild mitral regurgitation.     Tricuspid Valve:  Normal tricuspid valve.     Pulmonic Valve:  Normal pulmonic valve. There is trace pulmonic regurgitation.     Aorta:  The aortic arch is not well visualized. The aortic root appears normal in size. The aortic root at the sinuses of Valsalva is normal in size, measuring 3.10 cm (indexed 1.96 cm/m²). The ascending aorta diameter is normal in size, measuring 3.40 cm (indexed 2.15 cm/m²).     Pericardium:  No pericardial effusion seen.     Pleura:  Left pleural effusion noted.     Systemic Veins:  The inferior vena cava is normal in size (normal <2.1cm) with normal inspiratory collapse (normal >50%) consistent with normal right atrial pressure (~3, range 0-5mmHg).    < end of copied text >    ASSESSMENT/PLAN: Mr Ho is a pleasant 59y Male known from recent prior admissions, managing sequelae after esophagectomy.  s/p esophageal stent removal +/- drainage of empyema.  Can continue amlodipine for hypertension, or hold it if necessary.  Will follow with you, and advise as needed.  Expect he will be stable from a CV perspective, with reassuring echocardiogram in recent weeks.  No new complaints and no new CV testing planned.   
Interval Events:   No acute events overnight.    Patient denied fever, chills, nausea, vomiting, abdominal pain, diarrhea, melena, hematochezia or hematemesis.   Tolerating TF.   (+) Output in drain      Hospital Medications:  acetaminophen   Oral Liquid .. 650 milliGRAM(s) Enteral Tube every 6 hours PRN  amLODIPine   Tablet 5 milliGRAM(s) Oral every 24 hours  atorvastatin 20 milliGRAM(s) Oral at bedtime  dextrose 5%. 1000 milliLiter(s) IV Continuous <Continuous>  dextrose 5%. 1000 milliLiter(s) IV Continuous <Continuous>  dextrose 50% Injectable 12.5 Gram(s) IV Push once  dextrose 50% Injectable 25 Gram(s) IV Push once  dextrose 50% Injectable 25 Gram(s) IV Push once  dextrose Oral Gel 15 Gram(s) Oral once PRN  glucagon  Injectable 1 milliGRAM(s) IntraMuscular once  heparin   Injectable 5000 Unit(s) SubCutaneous every 12 hours  insulin glargine Injectable (LANTUS) 8 Unit(s) SubCutaneous at bedtime  insulin lispro (ADMELOG) corrective regimen sliding scale   SubCutaneous at bedtime  insulin lispro (ADMELOG) corrective regimen sliding scale   SubCutaneous three times a day before meals  metFORMIN 1000 milliGRAM(s) Oral two times a day  piperacillin/tazobactam IVPB.. 3.375 Gram(s) IV Intermittent every 8 hours  sodium chloride 1 Gram(s) Oral daily  vancomycin  IVPB 750 milliGRAM(s) IV Intermittent every 12 hours      PHYSICAL EXAM:   Vital Signs:  Vital Signs Last 24 Hrs  T(C): 36.9 (13 Sep 2024 09:38), Max: 36.9 (12 Sep 2024 13:08)  T(F): 98.5 (13 Sep 2024 09:38), Max: 98.5 (13 Sep 2024 09:38)  HR: 97 (13 Sep 2024 09:38) (72 - 103)  BP: 119/64 (13 Sep 2024 09:38) (100/62 - 132/71)  BP(mean): --  RR: 18 (13 Sep 2024 09:38) (17 - 20)  SpO2: 96% (13 Sep 2024 09:38) (93% - 100%)    Parameters below as of 13 Sep 2024 09:38  Patient On (Oxygen Delivery Method): room air      Daily Height in cm: 162.6 (12 Sep 2024 13:08)    Daily     GENERAL: no acute distress  NEURO: alert and oriented x 3  HEENT: NCAT, no conjunctival pallor appreciated; anicteric sclera  CHEST: no respiratory distress, no accessory muscle use  CARDIAC: regular rate, +S1/S2  ABDOMEN: soft, nontender, no rebound or guarding; (+) non-bloody output in VENKATA drain (half-filled)  EXTREMITIES: warm, well perfused  SKIN: no active lesions noted    LABS: reviewed                        8.2    12.39 )-----------( 556      ( 13 Sep 2024 04:30 )             26.5     09-13    133<L>  |  94<L>  |  33<H>  ----------------------------<  287<H>  5.0   |  25  |  0.84    Ca    8.3<L>      13 Sep 2024 04:30  Phos  5.3     09-13  Mg     2.40     09-13    TPro  7.5  /  Alb  2.9<L>  /  TBili  0.4  /  DBili  x   /  AST  16  /  ALT  13  /  AlkPhos  190<H>  09-11      
Pt resting in bed. No acute complaints.     Vital Signs:  Vital Signs Last 24 Hrs  T(C): 36.6 (09-17-24 @ 08:15), Max: 37 (09-16-24 @ 20:28)  T(F): 97.9 (09-17-24 @ 08:15), Max: 98.6 (09-16-24 @ 20:28)  HR: 102 (09-17-24 @ 08:15) (92 - 108)  BP: 118/67 (09-17-24 @ 08:15) (108/56 - 128/69)  RR: 18 (09-17-24 @ 08:15) (18 - 18)  SpO2: 97% (09-17-24 @ 08:15) (96% - 99%) on (O2)    Telemetry/Alarms:    Relevant labs, radiology and Medications reviewed                        7.9    15.26 )-----------( 452      ( 17 Sep 2024 06:38 )             25.0     09-17    135  |  100  |  18  ----------------------------<  217[H]  4.1   |  23  |  0.49[L]    Ca    8.3[L]      17 Sep 2024 06:38        MEDICATIONS  (STANDING):  amLODIPine   Tablet 5 milliGRAM(s) Oral every 24 hours  atorvastatin 20 milliGRAM(s) Oral at bedtime  cefepime   IVPB 2000 milliGRAM(s) IV Intermittent every 12 hours  chlorhexidine 2% Cloths 1 Application(s) Topical daily  dextrose 5%. 1000 milliLiter(s) (100 mL/Hr) IV Continuous <Continuous>  dextrose 5%. 1000 milliLiter(s) (50 mL/Hr) IV Continuous <Continuous>  dextrose 50% Injectable 12.5 Gram(s) IV Push once  dextrose 50% Injectable 25 Gram(s) IV Push once  dextrose 50% Injectable 25 Gram(s) IV Push once  fluconAZOLE IVPB 200 milliGRAM(s) IV Intermittent every 24 hours  glucagon  Injectable 1 milliGRAM(s) IntraMuscular once  heparin   Injectable 2500 Unit(s) SubCutaneous every 12 hours  insulin glargine Injectable (LANTUS) 8 Unit(s) SubCutaneous at bedtime  insulin lispro (ADMELOG) corrective regimen sliding scale   SubCutaneous at bedtime  insulin lispro (ADMELOG) corrective regimen sliding scale   SubCutaneous three times a day before meals  metFORMIN 1000 milliGRAM(s) Oral two times a day  mupirocin 2% Nasal 1 Application(s) Both Nostrils every 12 hours  sodium chloride 1 Gram(s) Oral daily    MEDICATIONS  (PRN):  acetaminophen   Oral Liquid .. 650 milliGRAM(s) Enteral Tube every 6 hours PRN Temp greater or equal to 38C (100.4F), Mild Pain (1 - 3)  dextrose Oral Gel 15 Gram(s) Oral once PRN Blood Glucose LESS THAN 70 milliGRAM(s)/deciliter      Social:  Denies Smoking, Drinking illict drug use    Physical exam  Telemetry/Alarms: SR   General: awake and alert NAD  Neurology: A&Ox3, nonfocal, ALFORD x 4  Respiratory: CTA B/L  CV: RRR, S1S2, no murmurs, rubs or gallops  Abdominal: Soft, NT, ND +BS, Last BM  Extremities: No edema, + peripheral pulses  Incisions: c,d,i  Tubes: lavern to bulb suction drained 180cc/24h  gray fluid w solid debris     I&O's Summary    16 Sep 2024 07:01  -  17 Sep 2024 07:00  --------------------------------------------------------  IN: 1820 mL / OUT: 930 mL / NET: 890 mL    17 Sep 2024 07:01  -  17 Sep 2024 10:27  --------------------------------------------------------  IN: 220 mL / OUT: 380 mL / NET: -160 mL        Assessment  59y Male  w/ PAST MEDICAL & SURGICAL HISTORY:  HLD (hyperlipidemia)      Insulin dependent type 2 diabetes mellitus      BPH (benign prostatic hyperplasia)      HTN (hypertension)      Gastroesophageal cancer      Gastroesophageal cancer      Port-A-Cath in place      admitted with complaints of Patient is a 59y old  Male who presents with a chief complaint of Per chart, Pt is 59 yr M w/ pmh HTN, DM2 s/p [place] Broderick on 7/16/24 w/ postop course c/b high VENKATA drain output. found on EGD  to have esophago-gastric anastomotic now s/p esophageal stent placement by GI on 8/5.  Was admitted 8/19 for emesis and bloody output from VENKATA drain.  S/P EGD with GI showing small ulcerating at base of distal stent but no active bleed.  He now presents to the hospital with elevated WBC and fever/ chills x 1 day.  CT showing worsening effusion on right with multiple foci of gas and air fluid levels.    (15 Sep 2024 10:05)  .  On (Date), patient underwent . Postoperative course/issues:    PLAN  Neuro: Pain management  Pulm: Encourage coughing, deep breathing and use of incentive spirometry. Wean off supplemental oxygen as able. Daily CXR.   Cardio: Monitor telemetry/alarms  GI: NPO , Tolerating nocturnal tube feeds  Renal: monitor urine output, supplement electrolytes as needed  Vasc: Heparin SC/SCDs for DVT prophylaxis  Heme: Stable H/H. .   ID: cefepime and diflucan   Therapy: OOB/ambulate  Tubes: lavern to bulb monitoring output  Disposition: following cultures and ID recommendations  ID Dr Liu d/w Dr Laureano by phone  Discussed with Cardiothoracic Team at AM rounds.  
SURGERY DAILY PROGRESS NOTE    SUBJECTIVE: Patient seen and examined on AM rounds. Reports he is doing well, no complaints. Denies fevers, chills, CP, SOB, abdominal pain, N/V. Tolerating tube feeds and having BMs.        OBJECTIVE:  Vital Signs Last 24 Hrs  T(C): 36.6 (16 Sep 2024 04:41), Max: 36.8 (16 Sep 2024 00:00)  T(F): 97.9 (16 Sep 2024 04:41), Max: 98.3 (16 Sep 2024 00:00)  HR: 102 (16 Sep 2024 04:41) (91 - 102)  BP: 126/64 (16 Sep 2024 04:41) (112/63 - 126/64)  BP(mean): --  RR: 18 (16 Sep 2024 04:41) (16 - 18)  SpO2: 100% (16 Sep 2024 04:41) (94% - 100%)    Parameters below as of 16 Sep 2024 04:41  Patient On (Oxygen Delivery Method): room air        I&O's Summary    15 Sep 2024 07:01  -  16 Sep 2024 07:00  --------------------------------------------------------  IN: 2010 mL / OUT: 332 mL / NET: 1678 mL        Physical Exam:  General Appearance: Appears well, NAD, A& O x 3  Chest: Nonlabored breathing on room air. VENKATA drain with dark ss output  CV: Hemodynamically stable  Abdomen: Soft, nontender, nondistended. Well-healed incisions. J tube C/D/I with TF running  Extremities: Grossly symmetric, SCD's in place     LABS:                        8.0    7.95  )-----------( 508      ( 15 Sep 2024 06:02 )             25.8     09-15    132<L>  |  98  |  19  ----------------------------<  236<H>  3.7   |  24  |  0.54    Ca    8.5      15 Sep 2024 06:02  Phos  2.4     09-15  Mg     2.00     09-15      PTT - ( 14 Sep 2024 11:40 )  PTT:29.2 sec  Urinalysis Basic - ( 15 Sep 2024 06:02 )    Color: x / Appearance: x / SG: x / pH: x  Gluc: 236 mg/dL / Ketone: x  / Bili: x / Urobili: x   Blood: x / Protein: x / Nitrite: x   Leuk Esterase: x / RBC: x / WBC x   Sq Epi: x / Non Sq Epi: x / Bacteria: x      
   Subjective: No complaints. Still with high output from the R Adam.    Vital Signs:  Vital Signs Last 24 Hrs  T(C): 36.7 (09-15-24 @ 11:50), Max: 36.7 (09-15-24 @ 11:50)  T(F): 98 (09-15-24 @ 11:50), Max: 98 (09-15-24 @ 11:50)  HR: 102 (09-15-24 @ 11:50) (94 - 102)  BP: 123/68 (09-15-24 @ 11:50) (98/59 - 123/68)  RR: 18 (09-15-24 @ 11:50) (17 - 18)  SpO2: 94% (09-15-24 @ 11:50) (94% - 99%) on (O2)    Telemetry/Alarms:  General: NAD  Neurology: Awake, nonfocal, ALFORD x 4  Eyes: Scleras clear, PERRLA/ EOMI, Gross vision intact  ENT:Gross hearing intact, grossly patent pharynx, no stridor  Neck: Neck supple, trachea midline, No JVD,   Respiratory: CTA B/L, No wheezing, rales, rhonchi  CV: RRR, S1S2, no murmurs, rubs or gallops  Abdominal: jejunostomy  Extremities: No edema, + peripheral pulses  Skin: No Rashes, Hematoma, Ecchymosis  Lymphatic: No Neck, axilla, groin LAD  Psych: Oriented x 3, normal affect  Tubes: R Adam 205-murky  Relevant labs, radiology and Medications reviewed                        8.0    7.95  )-----------( 508      ( 15 Sep 2024 06:02 )             25.8     09-15    132<L>  |  98  |  19  ----------------------------<  236<H>  3.7   |  24  |  0.54    Ca    8.5      15 Sep 2024 06:02  Phos  2.4     09-15  Mg     2.00     09-15      PTT - ( 14 Sep 2024 11:40 )  PTT:29.2 sec  ABG:  CXR: EMELINA Medina  MEDICATIONS  (STANDING):  amLODIPine   Tablet 5 milliGRAM(s) Oral every 24 hours  atorvastatin 20 milliGRAM(s) Oral at bedtime  dextrose 5%. 1000 milliLiter(s) (100 mL/Hr) IV Continuous <Continuous>  dextrose 5%. 1000 milliLiter(s) (50 mL/Hr) IV Continuous <Continuous>  dextrose 50% Injectable 12.5 Gram(s) IV Push once  dextrose 50% Injectable 25 Gram(s) IV Push once  dextrose 50% Injectable 25 Gram(s) IV Push once  glucagon  Injectable 1 milliGRAM(s) IntraMuscular once  heparin   Injectable 2500 Unit(s) SubCutaneous every 12 hours  insulin glargine Injectable (LANTUS) 8 Unit(s) SubCutaneous at bedtime  insulin lispro (ADMELOG) corrective regimen sliding scale   SubCutaneous three times a day before meals  insulin lispro (ADMELOG) corrective regimen sliding scale   SubCutaneous at bedtime  metFORMIN 1000 milliGRAM(s) Oral two times a day  piperacillin/tazobactam IVPB.. 3.375 Gram(s) IV Intermittent every 8 hours  sodium chloride 1 Gram(s) Oral daily    MEDICATIONS  (PRN):  acetaminophen   Oral Liquid .. 650 milliGRAM(s) Enteral Tube every 6 hours PRN Temp greater or equal to 38C (100.4F), Mild Pain (1 - 3)  dextrose Oral Gel 15 Gram(s) Oral once PRN Blood Glucose LESS THAN 70 milliGRAM(s)/deciliter    Pertinent Physical Exam  I&O's Summary    14 Sep 2024 07:01  -  15 Sep 2024 07:00  --------------------------------------------------------  IN: 1760 mL / OUT: 530 mL / NET: 1230 mL    15 Sep 2024 07:01  -  15 Sep 2024 14:02  --------------------------------------------------------  IN: 500 mL / OUT: 120 mL / NET: 380 mL        Assessment  59y Male  w/ PAST MEDICAL & SURGICAL HISTORY:  HLD (hyperlipidemia)  Insulin dependent type 2 diabetes mellitus  BPH (benign prostatic hyperplasia)  HTN (hypertension)  Gastroesophageal cancer  Port-A-Cath in place      Patient is a 59y old  Male who presents with a chief complaint of Per chart, Pt is 59 yr M w/ pmh HTN, DM2 s/p [place] Broderick on 7/16/24 w/ postop course c/b high VENKATA drain output. found on EGD  to have esophago-gastric anastomotic now s/p esophageal stent placement by GI on 8/5.  Was admitted 8/19 for emesis and bloody output from VENKATA drain.  S/P EGD with GI showing small ulcerating at base of distal stent but no active bleed.  He now presents to the hospital with elevated WBC and fever/ chills x 1 day.  CT showing worsening effusion on right with multiple foci of gas and air fluid levels.     Postoperative course/issues:  R/o esophageal leak    PLAN  Neuro: Pain management  Pulm: Encourage coughing, deep breathing and use of incentive spirometry. Wean off supplemental oxygen as able. Daily CXR.   Cardio: Monitor telemetry/alarms  GI: Tolerating feeds.  Renal: monitor urine output, supplement electrolytes as needed  Vasc: Adjusted Heparin SC for DVT prophylaxis  Heme: Stable H/H.  ID: D/w Dr Liu-Concerned about leak. Cont IV antibiotics.   Therapy: OOB/ambulate  Tubes: Monitor Chest tube output    Discussed with Cardiothoracic Team at AM rounds.  
Follow Up:      Inverval History/ROS:Patient is a 59y old  Male who presents with a chief complaint of Per chart, Pt is 59 yr M w/ pmh HTN, DM2 s/p [place] Broderick on 7/16/24 w/ postop course c/b high VEKNATA drain output. found on EGD  to have esophago-gastric anastomotic now s/p esophageal stent placement by GI on 8/5.  Was admitted 8/19 for emesis and bloody output from VENKATA drain.  S/P EGD with GI showing small ulcerating at base of distal stent but no active bleed.  He now presents to the hospital with elevated WBC and fever/ chills x 1 day.  CT showing worsening effusion on right with multiple foci of gas and air fluid levels.    (15 Sep 2024 10:05)    No fever  No events    Allergies    No Known Allergies    Intolerances        ANTIMICROBIALS:  cefepime   IVPB 2000 every 12 hours  fluconAZOLE IVPB 200 every 24 hours      OTHER MEDS:  acetaminophen   Oral Liquid .. 650 milliGRAM(s) Enteral Tube every 6 hours PRN  amLODIPine   Tablet 5 milliGRAM(s) Oral every 24 hours  atorvastatin 20 milliGRAM(s) Oral at bedtime  chlorhexidine 2% Cloths 1 Application(s) Topical daily  dextrose 5%. 1000 milliLiter(s) IV Continuous <Continuous>  dextrose 5%. 1000 milliLiter(s) IV Continuous <Continuous>  dextrose 50% Injectable 12.5 Gram(s) IV Push once  dextrose 50% Injectable 25 Gram(s) IV Push once  dextrose 50% Injectable 25 Gram(s) IV Push once  dextrose Oral Gel 15 Gram(s) Oral once PRN  glucagon  Injectable 1 milliGRAM(s) IntraMuscular once  heparin   Injectable 2500 Unit(s) SubCutaneous every 12 hours  insulin glargine Injectable (LANTUS) 8 Unit(s) SubCutaneous at bedtime  insulin lispro (ADMELOG) corrective regimen sliding scale   SubCutaneous at bedtime  insulin lispro (ADMELOG) corrective regimen sliding scale   SubCutaneous three times a day before meals  metFORMIN 1000 milliGRAM(s) Oral two times a day  mupirocin 2% Nasal 1 Application(s) Both Nostrils every 12 hours  sodium chloride 1 Gram(s) Oral daily      Vital Signs Last 24 Hrs  T(C): 36.1 (16 Sep 2024 12:00), Max: 37.1 (16 Sep 2024 08:15)  T(F): 97 (16 Sep 2024 12:00), Max: 98.7 (16 Sep 2024 08:15)  HR: 92 (16 Sep 2024 12:00) (87 - 102)  BP: 111/54 (16 Sep 2024 12:00) (111/54 - 126/64)  BP(mean): --  RR: 18 (16 Sep 2024 12:00) (16 - 20)  SpO2: 99% (16 Sep 2024 12:00) (97% - 100%)    Parameters below as of 16 Sep 2024 12:00  Patient On (Oxygen Delivery Method): room air        PHYSICAL EXAM:  General: [x ] non-toxic  HEAD/EYES: [ ] PERRL [ x] white sclera [ ] icterus  ENT:  [ ] normal [x ] supple [ ] thrush [ ] pharyngeal exudate  Cardiovascular:   [ ] murmur [ x] normal [ ] PPM/AICD  Respiratory:  [x ] clear to ausculation bilaterally  GI:  [x ] soft, non-tender, normal bowel sounds  :  [ ] stallworth [ ] no CVA tenderness   Musculoskeletal:  [ ] no synovitis  Neurologic:  [ ] non-focal exam   Skin:  x[ ] no rash  Lymph: [ ] no lymphadenopathy  Psychiatric:  [ ] appropriate affect [ ] alert & oriented  Lines:  [x ] no phlebitis [ ] central line                                8.3    10.75 )-----------( 448      ( 16 Sep 2024 07:40 )             26.3       09-16    133<L>  |  99  |  16  ----------------------------<  229<H>  4.3   |  23  |  0.53    Ca    8.0<L>      16 Sep 2024 07:40  Phos  2.4     09-15  Mg     2.00     09-15        Urinalysis Basic - ( 16 Sep 2024 07:40 )    Color: x / Appearance: x / SG: x / pH: x  Gluc: 229 mg/dL / Ketone: x  / Bili: x / Urobili: x   Blood: x / Protein: x / Nitrite: x   Leuk Esterase: x / RBC: x / WBC x   Sq Epi: x / Non Sq Epi: x / Bacteria: x        MICROBIOLOGY:Culture Results:   Numerous Serratia marcescens  Moderate Staphylococcus aureus  Numerous Candida albicans  Numerous Streptococcus anginosus "Susceptibilities not performed" (09-12-24 @ 18:00)  Culture Results:   No growth at 4 days (09-11-24 @ 15:50)  Culture Results:   No growth at 4 days (09-11-24 @ 15:20)      RADIOLOGY:    
SURGERY DAILY PROGRESS NOTE    SUBJECTIVE: Patient seen and examined on AM rounds. Reports he has a mild cough since yesterday, no new complaints. Continues to deny fevers, abdominal pain, CP, SOB, N/V. Continues to tolerate TF, passing gas and having reportedly normal BMs.       OBJECTIVE:  Vital Signs Last 24 Hrs  T(C): 36.6 (17 Sep 2024 08:15), Max: 37 (16 Sep 2024 20:28)  T(F): 97.9 (17 Sep 2024 08:15), Max: 98.6 (16 Sep 2024 20:28)  HR: 102 (17 Sep 2024 08:15) (92 - 108)  BP: 118/67 (17 Sep 2024 08:15) (108/56 - 128/69)  BP(mean): --  RR: 18 (17 Sep 2024 08:15) (18 - 18)  SpO2: 97% (17 Sep 2024 08:15) (96% - 99%)    Parameters below as of 17 Sep 2024 08:15  Patient On (Oxygen Delivery Method): room air        I&O's Summary    16 Sep 2024 07:01  -  17 Sep 2024 07:00  --------------------------------------------------------  IN: 1820 mL / OUT: 930 mL / NET: 890 mL    17 Sep 2024 07:01  -  17 Sep 2024 11:25  --------------------------------------------------------  IN: 220 mL / OUT: 380 mL / NET: -160 mL        Physical Exam:  General Appearance: Appears well, NAD, A& O x 3  Chest: Nonlabored breathing on room air, VENKATA with dark murky output  CV: Hemodynamically stable  Abdomen: Soft, nontender, nondistended. Incisions well-healed. J tube C/D/I with TF running  Extremities: Grossly symmetric, SCD's in place     LABS:                        7.9    15.26 )-----------( 452      ( 17 Sep 2024 06:38 )             25.0     09-17    135  |  100  |  18  ----------------------------<  217[H]  4.1   |  23  |  0.49[L]    Ca    8.3[L]      17 Sep 2024 06:38        Urinalysis Basic - ( 17 Sep 2024 06:38 )    Color: x / Appearance: x / SG: x / pH: x  Gluc: 217 mg/dL / Ketone: x  / Bili: x / Urobili: x   Blood: x / Protein: x / Nitrite: x   Leuk Esterase: x / RBC: x / WBC x   Sq Epi: x / Non Sq Epi: x / Bacteria: x

## 2024-10-04 ENCOUNTER — APPOINTMENT (OUTPATIENT)
Dept: ENDOCRINOLOGY | Facility: CLINIC | Age: 59
End: 2024-10-04

## 2024-10-07 ENCOUNTER — APPOINTMENT (OUTPATIENT)
Dept: INFECTIOUS DISEASE | Facility: CLINIC | Age: 59
End: 2024-10-07
Payer: MEDICAID

## 2024-10-07 ENCOUNTER — APPOINTMENT (OUTPATIENT)
Dept: GASTROENTEROLOGY | Facility: HOSPITAL | Age: 59
End: 2024-10-07

## 2024-10-07 VITALS
BODY MASS INDEX: 17.19 KG/M2 | SYSTOLIC BLOOD PRESSURE: 105 MMHG | HEART RATE: 101 BPM | HEIGHT: 63 IN | DIASTOLIC BLOOD PRESSURE: 67 MMHG | TEMPERATURE: 97.8 F | WEIGHT: 97 LBS | OXYGEN SATURATION: 97 %

## 2024-10-07 DIAGNOSIS — K91.89 OTHER POSTPROCEDURAL COMPLICATIONS AND DISORDERS OF DIGESTIVE SYSTEM: ICD-10-CM

## 2024-10-07 PROCEDURE — 99214 OFFICE O/P EST MOD 30 MIN: CPT

## 2024-10-08 ENCOUNTER — APPOINTMENT (OUTPATIENT)
Dept: HEMATOLOGY ONCOLOGY | Facility: CLINIC | Age: 59
End: 2024-10-08
Payer: MEDICAID

## 2024-10-08 ENCOUNTER — APPOINTMENT (OUTPATIENT)
Dept: RADIOLOGY | Facility: HOSPITAL | Age: 59
End: 2024-10-08

## 2024-10-08 ENCOUNTER — APPOINTMENT (OUTPATIENT)
Dept: THORACIC SURGERY | Facility: CLINIC | Age: 59
End: 2024-10-08
Payer: MEDICAID

## 2024-10-08 ENCOUNTER — NON-APPOINTMENT (OUTPATIENT)
Age: 59
End: 2024-10-08

## 2024-10-08 VITALS
HEART RATE: 98 BPM | RESPIRATION RATE: 16 BRPM | OXYGEN SATURATION: 98 % | BODY MASS INDEX: 17.18 KG/M2 | SYSTOLIC BLOOD PRESSURE: 113 MMHG | WEIGHT: 97 LBS | TEMPERATURE: 97.4 F | DIASTOLIC BLOOD PRESSURE: 75 MMHG

## 2024-10-08 PROCEDURE — 99213 OFFICE O/P EST LOW 20 MIN: CPT

## 2024-10-08 PROCEDURE — 99024 POSTOP FOLLOW-UP VISIT: CPT

## 2024-10-08 NOTE — H&P PST ADULT - PRO ARRIVE FROM
Health Maintenance       DTaP/Tdap/Td Vaccine (1 - Tdap)  Never done    Shingles Vaccine (1 of 2)  Never done    Pneumococcal Vaccine 65+ (1 of 1 - PCV)  Never done    COVID-19 Vaccine (5 - 2023-24 season)  Overdue since 9/1/2024    Influenza Vaccine (1)  Due since 9/1/2024    Depression Screening (Yearly)  Due soon on 3/28/2025    Traditional Medicare- Medicare Wellness Visit (Yearly)  Due soon on 4/1/2025           Following review of the above:  Patient is not proceeding with: Pneumococcal and Shingles    Note: Refer to final orders and clinician documentation.         home

## 2024-10-14 ENCOUNTER — RESULT REVIEW (OUTPATIENT)
Age: 59
End: 2024-10-14

## 2024-10-14 ENCOUNTER — APPOINTMENT (OUTPATIENT)
Dept: INFUSION THERAPY | Facility: HOSPITAL | Age: 59
End: 2024-10-14

## 2024-10-14 ENCOUNTER — NON-APPOINTMENT (OUTPATIENT)
Age: 59
End: 2024-10-14

## 2024-10-16 ENCOUNTER — APPOINTMENT (OUTPATIENT)
Dept: ENDOCRINOLOGY | Facility: CLINIC | Age: 59
End: 2024-10-16
Payer: MEDICAID

## 2024-10-16 VITALS
HEART RATE: 101 BPM | SYSTOLIC BLOOD PRESSURE: 120 MMHG | RESPIRATION RATE: 18 BRPM | BODY MASS INDEX: 17.54 KG/M2 | WEIGHT: 99 LBS | DIASTOLIC BLOOD PRESSURE: 72 MMHG | HEIGHT: 63 IN | TEMPERATURE: 98 F | OXYGEN SATURATION: 99 %

## 2024-10-16 DIAGNOSIS — Z79.4 LONG TERM (CURRENT) USE OF INSULIN: ICD-10-CM

## 2024-10-16 DIAGNOSIS — E11.9 TYPE 2 DIABETES MELLITUS W/OUT COMPLICATIONS: ICD-10-CM

## 2024-10-16 LAB
GLUCOSE BLDC GLUCOMTR-MCNC: 161
HBA1C MFR BLD HPLC: 6.5

## 2024-10-16 PROCEDURE — 82962 GLUCOSE BLOOD TEST: CPT

## 2024-10-16 PROCEDURE — 83036 HEMOGLOBIN GLYCOSYLATED A1C: CPT | Mod: QW

## 2024-10-16 PROCEDURE — 99214 OFFICE O/P EST MOD 30 MIN: CPT

## 2024-10-16 RX ORDER — INSULIN GLARGINE-YFGN 100 [IU]/ML
100 INJECTION, SOLUTION SUBCUTANEOUS DAILY
Qty: 10 | Refills: 1 | Status: ACTIVE | COMMUNITY
Start: 2024-10-16 | End: 1900-01-01

## 2024-10-16 RX ORDER — INSULIN ASPART 100 [IU]/ML
100 INJECTION, SOLUTION INTRAVENOUS; SUBCUTANEOUS 3 TIMES DAILY
Qty: 10 | Refills: 2 | Status: ACTIVE | COMMUNITY
Start: 2024-10-16 | End: 1900-01-01

## 2024-10-17 ENCOUNTER — APPOINTMENT (OUTPATIENT)
Dept: RADIATION ONCOLOGY | Facility: CLINIC | Age: 59
End: 2024-10-17

## 2024-10-17 ENCOUNTER — APPOINTMENT (OUTPATIENT)
Dept: RADIATION ONCOLOGY | Facility: CLINIC | Age: 59
End: 2024-10-17
Payer: MEDICAID

## 2024-10-17 VITALS
HEART RATE: 96 BPM | HEIGHT: 63 IN | DIASTOLIC BLOOD PRESSURE: 75 MMHG | BODY MASS INDEX: 17.36 KG/M2 | OXYGEN SATURATION: 97 % | SYSTOLIC BLOOD PRESSURE: 126 MMHG | RESPIRATION RATE: 18 BRPM | WEIGHT: 98 LBS

## 2024-10-17 DIAGNOSIS — C16.0 MALIGNANT NEOPLASM OF CARDIA: ICD-10-CM

## 2024-10-17 PROCEDURE — 99212 OFFICE O/P EST SF 10 MIN: CPT | Mod: GC

## (undated) DEVICE — TUBING IV SET GRAVITY 3Y 100" MACRO

## (undated) DEVICE — DENTURE CUP PINK

## (undated) DEVICE — BASIN EMESIS 10IN GRADUATED MAUVE

## (undated) DEVICE — BIOPSY FORCEP COLD DISP

## (undated) DEVICE — SNARE POLYP SINGUL LG OVAL 230CM

## (undated) DEVICE — LUBRICATING JELLY HR ONE SHOT 3G

## (undated) DEVICE — ELCTR GROUNDING PAD ADULT COVIDIEN

## (undated) DEVICE — DRSG CURITY GAUZE SPONGE 4 X 4" 12-PLY NON-STERILE

## (undated) DEVICE — TUBING SUCTION NONCONDUCTIVE 6MM X 12FT

## (undated) DEVICE — DRSG BANDAID 0.75X3"

## (undated) DEVICE — LINE BREATHE SAMPLNG

## (undated) DEVICE — Device

## (undated) DEVICE — BIOPSY FORCEP RADIAL JAW 4 STANDARD WITH NEEDLE

## (undated) DEVICE — ELCTR ECG CONDUCTIVE ADHESIVE

## (undated) DEVICE — DRSG 2X2

## (undated) DEVICE — UNDERPAD LINEN SAVER 17 X 24"

## (undated) DEVICE — SALIVA EJECTOR (BLUE)

## (undated) DEVICE — SNARE OPTIMIZER SOFT POLYPECTOMY SMALL MINI OVAL

## (undated) DEVICE — CATH IV SAFE BC 22G X 1" (BLUE)

## (undated) DEVICE — CLAMP BX HOT RAD JAW 3

## (undated) DEVICE — KIT ENDO PROCEDURE CUST W/VLV

## (undated) DEVICE — GOWN LG

## (undated) DEVICE — BITE BLOCK ADULT 20 X 27MM (GREEN)

## (undated) DEVICE — ATTACHMENT DISTAL 4X13.4MM

## (undated) DEVICE — CONTAINER FORMALIN 80ML YELLOW

## (undated) DEVICE — PACK IV START WITH CHG

## (undated) DEVICE — SUT CINCH LONG

## (undated) DEVICE — TUBING MEDI-VAC W MAXIGRIP CONNECTORS 1/4"X6'

## (undated) DEVICE — DEVICE GRASPING RAPTOR

## (undated) DEVICE — SUT OVERSTITCH POLYPROPYLENE 2-0

## (undated) DEVICE — CATH GL0-TIP SPR

## (undated) DEVICE — 3D MATRIX ADAPTER SYRINGE

## (undated) DEVICE — GOWN IMPERV BREATHABLE XL

## (undated) DEVICE — SCISSOR ENDOSCOPIC FLEXIBLE 2.6MMX235CM DISP

## (undated) DEVICE — TUBING HYBRID CO2

## (undated) DEVICE — SUT OVERSTITCH ENDOSCOPIC SYS